# Patient Record
Sex: FEMALE | Race: WHITE | NOT HISPANIC OR LATINO | Employment: UNEMPLOYED | URBAN - METROPOLITAN AREA
[De-identification: names, ages, dates, MRNs, and addresses within clinical notes are randomized per-mention and may not be internally consistent; named-entity substitution may affect disease eponyms.]

---

## 2019-12-17 ENCOUNTER — TELEPHONE (OUTPATIENT)
Dept: NEUROLOGY | Facility: CLINIC | Age: 84
End: 2019-12-17

## 2019-12-17 NOTE — TELEPHONE ENCOUNTER
Best contact number for patient: [phone]    Emergency Contact name and number:    Referring provider: Alla Trejo     Referring provider Telephone:________________    Primary Care Provider Name: Alla Trejo     Reason for Appointment/Dx: Memory    Neurology Location patient would like to be seen: [facility] received? Yes                                                 Records Received? Have you ever seen another Neurologist?  No     Insurance Information    [place] [facility] [place] #:    Secondary Insurance:    ID/Policy#: Workman's Comp/ Accident/ School  Information      Workman's Comp/Accident/School related?      If yes name of Insurance company:    Date of Injury:    Open Claim:     Name and Telephone Number:    Notes: Np pack sent                    Appointment date: 4-13-20 3:00pm _____________________________

## 2020-04-01 ENCOUNTER — OFFICE VISIT (OUTPATIENT)
Dept: NEUROLOGY | Facility: CLINIC | Age: 85
End: 2020-04-01
Payer: COMMERCIAL

## 2020-04-01 VITALS — HEART RATE: 62 BPM | SYSTOLIC BLOOD PRESSURE: 145 MMHG | WEIGHT: 162.6 LBS | DIASTOLIC BLOOD PRESSURE: 75 MMHG

## 2020-04-01 DIAGNOSIS — G31.84 MILD COGNITIVE IMPAIRMENT: Primary | ICD-10-CM

## 2020-04-01 DIAGNOSIS — M81.8 AGE-RELATED OSTEOPOROSIS WITHOUT FRACTURE: ICD-10-CM

## 2020-04-01 DIAGNOSIS — G25.2 ACTION TREMOR: ICD-10-CM

## 2020-04-01 PROCEDURE — 99204 OFFICE O/P NEW MOD 45 MIN: CPT | Performed by: PSYCHIATRY & NEUROLOGY

## 2020-04-01 RX ORDER — DIGOXIN 125 MCG
1 TABLET ORAL DAILY
COMMUNITY
Start: 2020-02-20 | End: 2022-05-10 | Stop reason: ALTCHOICE

## 2020-04-01 RX ORDER — FUROSEMIDE 40 MG/1
1 TABLET ORAL DAILY
COMMUNITY
Start: 2020-02-20

## 2020-04-01 RX ORDER — SIMVASTATIN 20 MG
1 TABLET ORAL DAILY
COMMUNITY
Start: 2020-02-20

## 2020-04-01 RX ORDER — MULTIVIT-MIN/IRON/FOLIC ACID/K 18-600-40
500 CAPSULE ORAL DAILY
COMMUNITY

## 2020-04-01 RX ORDER — WARFARIN SODIUM 1 MG/1
1 TABLET ORAL DAILY
COMMUNITY
Start: 2020-02-20 | End: 2021-02-24 | Stop reason: ALTCHOICE

## 2020-08-02 ENCOUNTER — PATIENT MESSAGE (OUTPATIENT)
Dept: NEUROLOGY | Facility: CLINIC | Age: 85
End: 2020-08-02

## 2020-08-04 NOTE — TELEPHONE ENCOUNTER
Received call from pt's granddaughter (on consent) and reviewed message from Dr Bernardino Chand regarding pt's labs  Emailed communication consent to [employer] as Oz Hoffman would like to add more people for consent

## 2020-08-07 ENCOUNTER — TELEPHONE (OUTPATIENT)
Dept: NEUROLOGY | Facility: CLINIC | Age: 85
End: 2020-08-07

## 2020-08-07 NOTE — TELEPHONE ENCOUNTER
Dr Hector Clifton covering for Dr Key Byrne  The MRI result reviewed  The study demonstrates atrophy and small vessel change, all of which could be contributing to patient's problem  No acute changes noted  Will have to have Dr Key Byrne go into more detail on his return as he is familiar with the patient and I am not

## 2020-08-07 NOTE — TELEPHONE ENCOUNTER
Claudia calling in (granddaughter - on on communication sheet  She is asking if MRI results are in  I do see we received them today scanned into media  Please advise   Claudia recommended we call patient/          [phone] Toney Pod)

## 2020-08-10 NOTE — TELEPHONE ENCOUNTER
I suggest if she can have access as pt's proxy, then that would be ideal so that any updates reach Claudia instead via [employer]  Reviewed the MRI report from [facility] (HUBERT AGUSTIN) scanned in 8/7/20  Unfortunately, since the scan was at a different hospital system, the sequences I wanted could not be done and the images that were obtained are unable to be viewed by me for further comment  Thus, going by the report alone, it appears to support a diffuse atrophy of the brain, and no red flags of major stroke, tumor, bleed or infection as causes for the cognitive impairment  The moderate level of atrophy may be more than expected for her age and supportive of age-related cognitive decline, but no signs particularly suggestive for a neurodegenerative process  If they have any discs of those images, we would appreciate them to upload to our system   Thank you

## 2020-08-10 NOTE — TELEPHONE ENCOUNTER
Pt's grand daughter Colten Peer called regarding below  Reviewed below with her  She states that they don't check my chart very often so don't send a mychart message  She is asking that we call pt/    Dr louis-please review and advise

## 2020-08-18 NOTE — TELEPHONE ENCOUNTER
Hadley Barrett calling in (ok on communication sheet)  He states he is going to patient's house this weekend and will obtain the disk of imagine  He will bring to [facility] office next week to have it uploaded

## 2020-08-18 NOTE — TELEPHONE ENCOUNTER
My chart message was received from the patient asking for results  Copied below message to [facility] and sent to patient

## 2020-09-09 NOTE — TELEPHONE ENCOUNTER
Dr Michaela Cruz Patient    Patient's  states that he dropped the imaging CD off at the [facility] office to be copied  Asking for the MRI results after viewing the images  Please advise

## 2020-09-22 ENCOUNTER — TELEPHONE (OUTPATIENT)
Dept: NEUROLOGY | Facility: CLINIC | Age: 85
End: 2020-09-22

## 2020-09-22 NOTE — TELEPHONE ENCOUNTER
Pt's son Maral Stovall called and states that he dropped the imaging CD at the [facility] office 2 weeks ago  Noemi Campbell, were you able to view/review this?  Pls see enc 8/17/20              [phone] ok to leave detailed message

## 2020-09-22 NOTE — TELEPHONE ENCOUNTER
Left a detailed message for the son  Reviewed the imaging of the brain MRI  It shows evidence of atrophy and microvascular changes as already discussed  It was not done with NQ imaging so I am unable to speak more specifically about that  Certainly or management would not change based on these image results

## 2020-10-06 ENCOUNTER — TELEPHONE (OUTPATIENT)
Dept: NEUROLOGY | Facility: CLINIC | Age: 85
End: 2020-10-06

## 2021-02-11 DIAGNOSIS — Z23 ENCOUNTER FOR IMMUNIZATION: ICD-10-CM

## 2021-02-13 ENCOUNTER — IMMUNIZATIONS (OUTPATIENT)
Dept: FAMILY MEDICINE CLINIC | Facility: HOSPITAL | Age: 86
End: 2021-02-13

## 2021-02-13 DIAGNOSIS — Z23 ENCOUNTER FOR IMMUNIZATION: Primary | ICD-10-CM

## 2021-02-13 PROCEDURE — 0001A SARS-COV-2 / COVID-19 MRNA VACCINE (PFIZER-BIONTECH) 30 MCG: CPT

## 2021-02-13 PROCEDURE — 91300 SARS-COV-2 / COVID-19 MRNA VACCINE (PFIZER-BIONTECH) 30 MCG: CPT

## 2021-02-24 ENCOUNTER — TELEPHONE (OUTPATIENT)
Dept: NEUROLOGY | Facility: CLINIC | Age: 86
End: 2021-02-24

## 2021-02-24 ENCOUNTER — OFFICE VISIT (OUTPATIENT)
Dept: NEUROLOGY | Facility: CLINIC | Age: 86
End: 2021-02-24
Payer: COMMERCIAL

## 2021-02-24 VITALS
DIASTOLIC BLOOD PRESSURE: 90 MMHG | SYSTOLIC BLOOD PRESSURE: 130 MMHG | RESPIRATION RATE: 14 BRPM | WEIGHT: 161.9 LBS | HEIGHT: 66 IN | BODY MASS INDEX: 26.02 KG/M2

## 2021-02-24 DIAGNOSIS — G31.84 MILD COGNITIVE IMPAIRMENT: Primary | ICD-10-CM

## 2021-02-24 DIAGNOSIS — G25.2 ACTION TREMOR: ICD-10-CM

## 2021-02-24 PROCEDURE — 1160F RVW MEDS BY RX/DR IN RCRD: CPT | Performed by: PHYSICIAN ASSISTANT

## 2021-02-24 PROCEDURE — 1036F TOBACCO NON-USER: CPT | Performed by: PHYSICIAN ASSISTANT

## 2021-02-24 PROCEDURE — 99215 OFFICE O/P EST HI 40 MIN: CPT | Performed by: PHYSICIAN ASSISTANT

## 2021-02-24 RX ORDER — WARFARIN SODIUM 1 MG/1
TABLET ORAL
COMMUNITY
End: 2022-05-10 | Stop reason: ALTCHOICE

## 2021-02-24 RX ORDER — CHOLECALCIFEROL (VITAMIN D3) 1250 MCG
CAPSULE ORAL
COMMUNITY

## 2021-02-24 RX ORDER — ASCORBIC ACID 1000 MG
TABLET ORAL
COMMUNITY

## 2021-02-24 RX ORDER — ALPRAZOLAM 0.25 MG/1
TABLET ORAL
COMMUNITY
Start: 2021-01-28 | End: 2021-10-04 | Stop reason: HOSPADM

## 2021-02-24 RX ORDER — THIAMINE MONONITRATE (VIT B1) 100 MG
TABLET ORAL
COMMUNITY

## 2021-02-24 RX ORDER — CHLORAL HYDRATE 500 MG
CAPSULE ORAL
COMMUNITY

## 2021-02-24 NOTE — TELEPHONE ENCOUNTER
MSW received a return call from patient's son, Jethro Orellana  MSW inquired what services he is in search of for patient - to which he answered companionship  Jethro Reyna stated that patient's  passed away 3 weeks ago and that his sister has been staying with patient since until now  Jethro Reyna stated that he lives 2 doors away and will check in on patient  daily and will sleep there overnight, but that he and his wife still work so they are not able to be present at all times  Jethro Orellana stated that family purchased baby monitors and will observe patient remotely as able when they cannot be with her  Jethro Orellana stated that due to patient's memory issues that they do not want her to use the stove, so his sister recently arranged Meals on Wheels  Jethro Orellana stated that patient showers and dresses on her own  MSW discussed different ways of getting in home help - via a long term care insurance plan, via the [address] [place] [address] on Aging (would need to meet criteria based on her physical needs and finances, often times there is a waiting list for care), via private pay, and via applying for a waiver  Jethro Orellana stated that patient does have a long term care insurance plan  MSW advised that patient may be able to receive reimbursement for services needed to care for patient through that type of coverage  MSW explained that it would be best for patient's family to contact the insurance company to get details about patient's coverage - what services will be covered, if there is a waiting period, etc - as these details can vary from plan to plan  Jethro Orellana stated that patient previously had a cleaning person come every so often, and they have already made arrangements for that person to spend some additional companionship time there as well as they are a family friend and patent is familiar with them  MSW aso offered to email a list of private duty agencies to patient's son as well       MSW also discussed adult day programs with Jethro Orellana - that these are non-residential facilities that specialize in providing activities for older adults with cognitive/physical disabilities in a safe, supervised setting  These centers are typically open weekdays from 7AM to 5PM and provide snacks, lunch, medication administration, socialization, therapeutic activities, and respite services  Payment for these types of facilities can be through private pay/long term care insurance policies  Supplemental funding may be available through your local Area Agency on Aging, if financial criteria are met  MS will send Avani Beach a list of these agencies       Avani Beach requested that lists be emailed to him at [email or web address]

## 2021-02-24 NOTE — TELEPHONE ENCOUNTER
MSW received the following message from [place], [place]:    "Froilan, son would like to get some information regarding having an assessment with the [facility] to see if there are any resources available for his mother  Davon Meyer - [phone]"

## 2021-02-24 NOTE — Clinical Note
Froilan, son would like to get some information regarding having an assessment with the [facility] to see if there are any resources available for his mother    Bill basket - [phone]

## 2021-02-24 NOTE — TELEPHONE ENCOUNTER
MSW attempted to reach patient's son to discuss resources  No answer at [phone]  MSW left message requesting callback  Awaiting same

## 2021-02-24 NOTE — ASSESSMENT & PLAN NOTE
Patient continues to have some mild action tremors in the hands  No progression and they do not interfere with her daily functions at this time  She feels they are better when she tries to relax  No clear parkinsonian features on exam  Will continue to monitor for now

## 2021-02-24 NOTE — PROGRESS NOTES
Patient ID: Rylan Husain is a 82 y o  female  Assessment/Plan:    Mild cognitive impairment  Patient continues to have some cognitive deficits  She is no longer driving  She had one instance where she left her stove on and she is now getting meals from family and Meals On Wheels  She is still able to perform her ADLs independently  She can manage her medications however she does have some monitoring by her family  She had a MRI brain performed however this was not done with [facility] imaging  It did reveal moderate atrophy, no evidence of stroke  She scored a 21/30 on formal memory testing in the office today (28/30 at the last visit)  We discussed the difference between MCI and dementia  At this time she is having more difficulty with some of her daily functions and it is likely that she may be in early stages of dementia  She also recently lost her  which can certainly be playing a role  Regardless would like to obtain an MRI with NQ to get a better assessment of her atrophy  In the past her B12 and TSH were normal and she is now on vit D and B1 supplements  We discussed the medication options available for dementia  Also discussed the role of cognitive therapy  For now will start cognitive therapy  We will continue to monitor for any progression with time and will review the MRI results once completed  May consider a trial of Aricept depending on these results  Would also like to give her some time to grieve from the passing of her , it is likely that depression may be playing a bit of a role at this time  There is some question of assistance that could be available for the patient now that her  has passed and she is living alone  I sent a message to the  and will have her reach out to the son to see if we can get her set up for an [facility] with [facility]  She may also go stay with her daughter in [place] for a few months       Patient was encouraged to increase mind stimulating activities such as reading, crosswords, word searches, puzzles, Soduku, solitaire, coloring and other brain games  We also discussed the importance of staying physically active and eating a health diet such as the Mediterranean or MIND diet  Action tremor  Patient continues to have some mild action tremors in the hands  No progression and they do not interfere with her daily functions at this time  She feels they are better when she tries to relax  No clear parkinsonian features on exam  Will continue to monitor for now  Subjective:    Ms Zaida Schuler is an 82 y o  right handed female with A-fib on Coumadin and memory difficulties who presents for follow up  To review, at baseline the family describes her as very sharp-witted and independent  She tells me she notices that she repeats herself and sometimes unsure if she did so or not  She believes that this may be related to her "getting older"  Her family says "she is still with it some times" and corroborates of her forgetfulness since Spring 2019  During that time, she fell asleep and was mildly confused when she woke up but resolved to baseline  Ryanne Dejesus recounts pt was acting "different at dinner"  Long-term memory unaffected  Since then she has not had major confusional episodes, but occasionally when asked certain topics that she is expected to know she would sometimes "act confused" and sometimes act normally  Her  believes that she is mainly too bored at home  One time when they were driving home, she scraped the edge of the tire along the concrete traffic barrier briefly but corrected herself and no further incident afterwards  At her last visit she scored a 28/30 on memory testing  She was sent for an MRI brain given hand tremors as well  She was sent for labs, cognitive therapy and FTD evaluation  INTERVAL HISTORY:  Vit D and B1 were slightly low and she was told to start OTC supplements     MRI brain revealed diffuse atrophy of the brain, no evidence of stroke, tumor, bleed or infection  The moderate level of atrophy may be more than expected for her age and supportive of age-related cognitive decline, but no signs particularly suggestive for a neurodegenerative process  Imaging was not performed with NQ  Patient presents with her son  She continues to have issues with confusion  Her  recently passed and she has been having some issues with sleep  Her  passed 1/30/21  She is taking Xanax which is helpful  She tends to need things repeated and will often lose focus  She repeats herself  She is taking her supplements  She is no longer driving  She never went through cognitive therapy given health issues with her   She feels that she is overall doing well  She no longer feels into things since the passing of her   She resides on her own  She is able to perform her ADLs on her own  She had one recent instance where she forgot to turn off of stove  Her son brings food and she has Meals on Wheels  She is still managing her medications  She has a pill box and the family will supervise however she can take them on her own  No hallucinations  She feels that she has a good appetite  She does have some hand tremors at times  She feels they are less if she keeps herself calm  These do not interfere with her daily functions  Durable Power of : no  Living Will: yes  Family History: Number of First Degree Relatives With Parkinsonism/Dementia: none known    I personally reviewed and updated the ROS  Total time spent today was 60 minutes  Greater than 50% of total time was spent with the patient and / or family counseling and / or coordinating plan of care  Objective:    Blood pressure 130/90, resp  rate 14, height 5' 5 5" (1 664 m), weight 73 4 kg (161 lb 14 4 oz)  Physical Exam  Constitutional:       General: She is awake  HENT:      Right Ear: Hearing normal       Left Ear: Hearing normal    Eyes:      General: Lids are normal       Extraocular Movements: Extraocular movements intact  Pupils: Pupils are equal, round, and reactive to light  Neurological:      Mental Status: She is alert  Deep Tendon Reflexes: Strength normal    Psychiatric:         Speech: Speech normal          Neurological Exam  Mental Status  Awake and alert  Oriented to person, place and time  Unable to copy figure  Clock drawing is abnormal  Speech is normal  Able to name objects and write  Unable to perform serial calculations  Patient visibly upset at times, tearing up when speaking about her   MoCA 21/30 - 2/24/21    MoCA 28/30 - 4/1/20   Cranial Nerves  CN III, IV, VI: Extraocular movements intact bilaterally  Normal lids and orbits bilaterally  Pupils equal round and reactive to light bilaterally  CN V:  Right: Facial sensation is normal   Left: Facial sensation is normal on the left  CN VIII:  Right: Hearing is normal   Left: Hearing is normal   CN XI: Shoulder shrug strength is normal   CN XII: Tongue midline without atrophy or fasciculations  Motor   Strength is 5/5 throughout all four extremities  Sensory  Light touch is normal in upper and lower extremities  Coordination  Mild action tremors with FTN testing, slightly worse on the left  No resting tremors  No bradykinesia with finger taps or hand    No rigidity   Gait  Ambulating with cane  ROS:    Review of Systems   Constitutional: Negative  Negative for appetite change and fever  HENT: Negative  Negative for hearing loss, tinnitus, trouble swallowing and voice change  Eyes: Negative  Negative for photophobia and pain  Respiratory: Negative  Negative for shortness of breath  Cardiovascular: Negative  Negative for palpitations  Gastrointestinal: Negative  Negative for nausea and vomiting  Endocrine: Negative    Negative for cold intolerance  Genitourinary: Negative  Negative for dysuria, frequency and urgency  Musculoskeletal: Positive for gait problem  Negative for myalgias and neck pain  Skin: Negative  Negative for rash  Neurological: Negative for dizziness, tremors, seizures, syncope, facial asymmetry, speech difficulty, weakness, light-headedness, numbness and headaches  Hematological: Negative  Does not bruise/bleed easily  Psychiatric/Behavioral: Positive for sleep disturbance  Negative for confusion and hallucinations

## 2021-02-24 NOTE — PATIENT INSTRUCTIONS
Things that we know are helpful for thinking and memory   1  Exercise program: gradually increase your physical activity over time  Start small and be patient  Aerobic (cardio) activity is best but incorporate balance, strength and flexibility training as well    a  Try to get at least 30min 3 times per week   2  Diet: Mediterranean diet (colorful fruits and vegetables, olive oil, fish, whole grains, very little red meet is any), MIND diet, anti-inflammatory diet  a  Stay well hydrated: drink 6-8 glasses of water per day   b  What's good for your heart is good for your brain  c  Avoid high-salt foods   3  Sleep: aim for at least 7-8 hours per night   a  Avoid alcohol and medications like Benadryl (Tylenol PM) or other sedating drugs  4  Stress management/mindfulness practice:   a  Talk with our social workers about finding a cognitive behavioral therapists  b  Try a smart phone magdi like [employer] or [employer] for beginners   i  Try curable for pain    c  Take a course in Mindfulness Based Stress Reduction (MBSR)   i  Sydney spear  Read or listen to an audiobook about it:  i  Mindfulness for beginners   ii  10% happier  iii  The happiness advantage   5  Social engagement:   a  Stay in touch with family and friends   b  Plan a few specific activities for your social health every week   c  [place] rivera local support group   d  Volunteer! www [email or web address] or call [phone]     MIND diet score:  1 point for each component      · Green leafy vegetables: at least 6 per week  · Other vegetable: at least 1 per day   · Berries: at least 2 per week  · Red meat: fewer than 4 per week   · Fish: at least 1 per week   · Poultry: at least 2 per week  · Beans: at least 3 per week  · Nuts: at least 5 per week  · Fast or fried food: less than 1 per week  · Olive oil   · Butter less: less than 1 table-spoon per day   · Cheese: less than 1 serving per week   · Pastries/sweets: less than 5 servings per week  · Alcohol: no more than 1 serving/ day

## 2021-02-24 NOTE — ASSESSMENT & PLAN NOTE
Patient continues to have some cognitive deficits  She is no longer driving  She had one instance where she left her stove on and she is now getting meals from family and Meals On Wheels  She is still able to perform her ADLs independently  She can manage her medications however she does have some monitoring by her family  She had a MRI brain performed however this was not done with  imaging  It did reveal moderate atrophy, no evidence of stroke  She scored a 21/30 on formal memory testing in the office today (28/30 at the last visit)  We discussed the difference between MCI and dementia  At this time she is having more difficulty with some of her daily functions and it is likely that she may be in early stages of dementia  She also recently lost her  which can certainly be playing a role  Regardless would like to obtain an MRI with  to get a better assessment of her atrophy  In the past her B12 and TSH were normal and she is now on vit D and B1 supplements  We discussed the medication options available for dementia  Also discussed the role of cognitive therapy  For now will start cognitive therapy  We will continue to monitor for any progression with time and will review the MRI results once completed  May consider a trial of Aricept depending on these results  Would also like to give her some time to grieve from the passing of her , it is likely that depression may be playing a bit of a role at this time  There is some question of assistance that could be available for the patient now that her  has passed and she is living alone  I sent a message to the  and will have her reach out to the son to see if we can get her set up for an [facility] with [facility]  She may also go stay with her daughter in [place] for a few months       Patient was encouraged to increase mind stimulating activities such as reading, crosswords, word searches, puzzles, Soduku, solitaire, coloring and other brain games  We also discussed the importance of staying physically active and eating a health diet such as the Mediterranean or MIND diet

## 2021-02-25 NOTE — TELEPHONE ENCOUNTER
MSW emailed patient's son Avani Beach a list of the private duty agencies in the [facility] as well as information/a list of adult day programs in the [facility]  MSW asked son to contact this writer with an update after he gets patient's long term care insurance policy benefits to see if that will be able to help cover the cost of her care  If MSW does not hear from patient's son in about 1 week, MSW will contact him to follow-up

## 2021-03-05 NOTE — TELEPHONE ENCOUNTER
MSW attempted to reach patient's son this date at [phone] to follow-up regarding potential coverage from patient's long term care insurance plan  No answer  MSW left a message requesting callback  Awaiting same

## 2021-03-06 ENCOUNTER — IMMUNIZATIONS (OUTPATIENT)
Dept: FAMILY MEDICINE CLINIC | Facility: HOSPITAL | Age: 86
End: 2021-03-06

## 2021-03-06 DIAGNOSIS — Z23 ENCOUNTER FOR IMMUNIZATION: Primary | ICD-10-CM

## 2021-03-06 PROCEDURE — 91300 SARS-COV-2 / COVID-19 MRNA VACCINE (PFIZER-BIONTECH) 30 MCG: CPT

## 2021-03-06 PROCEDURE — 0002A SARS-COV-2 / COVID-19 MRNA VACCINE (PFIZER-BIONTECH) 30 MCG: CPT

## 2021-03-08 NOTE — TELEPHONE ENCOUNTER
MSW attempted to reach patient's son this date at [phone] to follow-up regarding community resources and potential coverage from patient's long term care insurance plan  No answer  MSW left a message requesting callback  Awaiting same

## 2021-03-08 NOTE — TELEPHONE ENCOUNTER
MSW received a callback from patient's son, Brian Estrella stated that he did look into patient's long term care insurance policy and that it only covers placement  Patient's son stated that he feels patient makes too much to qualify for assistance from Aging  MSW advised that Aging does not disclose their income guidelines, so there would not be a way for this writer to determine if patient would/would not meet financial criteria for services  MSW advised that a representative from the [address] [place] [address] on Aging would need to make the determination regarding eligibility  Kolby verbalized understanding  Brian Estrella stated that the arrangements they have made for patient at this time are "pretty sufficient" - he explained that they have a friend of the family who is currently stopping in regularly to clean for patient and visit with her  Brian Estrella did confirm that he had received the resource information (list of private duty agencies and information on adult day programs) and will keep this information should patient require additional services in the future  MSW encouraged patient's son to keep this writer's contact information should they require further assistance  MSW will be available to patient/family as needed

## 2021-03-10 ENCOUNTER — HOSPITAL ENCOUNTER (OUTPATIENT)
Dept: MRI IMAGING | Facility: HOSPITAL | Age: 86
Discharge: HOME | End: 2021-03-10
Payer: COMMERCIAL

## 2021-03-10 DIAGNOSIS — G31.84 MILD COGNITIVE IMPAIRMENT: ICD-10-CM

## 2021-03-10 PROCEDURE — G1004 CDSM NDSC: HCPCS

## 2021-03-10 PROCEDURE — 70551 MRI BRAIN STEM W/O DYE: CPT

## 2021-03-24 ENCOUNTER — TELEPHONE (OUTPATIENT)
Dept: NEUROLOGY | Facility: CLINIC | Age: 86
End: 2021-03-24

## 2021-03-24 NOTE — TELEPHONE ENCOUNTER
Pt's son Brandon Paz called for brain MRI results  This was completed on 3/10/21        Thanks              [phone] ok to leave detailed message

## 2021-03-25 NOTE — TELEPHONE ENCOUNTER
Left a detailed message on the son's phone regarding the MRI results  This did show atrophy of the hippocampus concerning for an underlying AD  I agree that having her depression better managed at this time would be a good start however may also consider a trial of a medication for her memory in the future such as Aricept

## 2021-03-29 NOTE — TELEPHONE ENCOUNTER
Son Jin Mello calling in  [facility] he would like to know the next steps are for right now  Next appt 8/26 with Freddyandi Davis, wondering if she can start something for depression before then as she has been more depressed lately  He also states she is traveling for 3 months at the end of April  States she will be in [place] with his sister and will be doing [address] while in [place]  He is asking if this is a bad idea or if you advise against this?

## 2021-03-31 NOTE — TELEPHONE ENCOUNTER
Called and advised pt's son Jethro Orellana of all of the below  He verbalized clear understanding of all instructions

## 2021-03-31 NOTE — TELEPHONE ENCOUNTER
IT would be best for her PCP to discuss with them starting a medication for her depression  I think this would be a good idea at this time  I would be fine with her going to [place], just know that a change from her normal routine may bring on some increased confusion  If things worsen despite her depression being better managed prior to her next visit they can call and we could start a trial of Aricept at that time  Thanks!

## 2021-04-14 ENCOUNTER — OFFICE VISIT (OUTPATIENT)
Dept: PODIATRY | Facility: CLINIC | Age: 86
End: 2021-04-14
Payer: COMMERCIAL

## 2021-04-14 VITALS
SYSTOLIC BLOOD PRESSURE: 126 MMHG | DIASTOLIC BLOOD PRESSURE: 84 MMHG | BODY MASS INDEX: 24.75 KG/M2 | WEIGHT: 154 LBS | HEIGHT: 66 IN

## 2021-04-14 DIAGNOSIS — B35.1 ONYCHOMYCOSIS: Primary | ICD-10-CM

## 2021-04-14 DIAGNOSIS — I73.9 PERIPHERAL VASCULAR DISEASE, UNSPECIFIED (HCC): ICD-10-CM

## 2021-04-14 PROCEDURE — 99202 OFFICE O/P NEW SF 15 MIN: CPT | Performed by: PODIATRIST

## 2021-04-14 PROCEDURE — 1036F TOBACCO NON-USER: CPT | Performed by: PODIATRIST

## 2021-04-14 PROCEDURE — 11721 DEBRIDE NAIL 6 OR MORE: CPT | Performed by: PODIATRIST

## 2021-04-14 PROCEDURE — 1160F RVW MEDS BY RX/DR IN RCRD: CPT | Performed by: PODIATRIST

## 2021-04-14 NOTE — PROGRESS NOTES
Assessment/Plan:    Treatment consisted of debridement of multiple mycotic toenails reducing nails in thickness and removing devitalized tissue and subungual debris  Electrical and manual debridement performed  No problem-specific Assessment & Plan notes found for this encounter  Diagnoses and all orders for this visit:    Onychomycosis    Peripheral vascular disease, unspecified ([facility] )          Subjective:      Patient ID: Guillermo Clifton is a 82 y o  female  HPI     Patient, an 61-year-old female presents with extremely thickened elongated toenails that she cannot cut  It appears as if the toenails have not been addressed in years  Patient has a severe flatfoot disorder but it is asymptomatic  The following portions of the patient's history were reviewed and updated as appropriate: allergies, current medications, past family history, past medical history, past social history, past surgical history and problem list     Review of Systems   Respiratory: Negative  Cardiovascular: Negative  Gastrointestinal: Negative  Musculoskeletal: Positive for gait problem  Neurological: Negative for numbness  Objective:      /84   Ht 5' 5 5" (1 664 m)   Wt 69 9 kg (154 lb)   BMI 25 24 kg/m²          Physical Exam  Constitutional:       Appearance: Normal appearance  Cardiovascular:      Comments: No palpable pedal pulses bilateral   Marked varicosities bilateral  Musculoskeletal:         General: Deformity present  Comments: Severe flatfoot disorder bilateral   Brought instep bilateral suggesting osteoarthritis  Skin:     Comments: Each toenail is thickened yellow with subungual debris  Multiple nails are very long  The great toenails are extremely thick compared to the other nails  Neurological:      General: No focal deficit present  Mental Status: She is oriented to person, place, and time

## 2021-05-05 ENCOUNTER — TELEPHONE (OUTPATIENT)
Dept: NEUROLOGY | Facility: CLINIC | Age: 86
End: 2021-05-05

## 2021-05-05 NOTE — TELEPHONE ENCOUNTER
Granddaughter Aram Smith called in to ask about therapy referrals so that she can assist with patient scheduling  Informed her that she is not on the communication consent form and I cannot divulge this information  I directed her to contact patient/POA regarding adding her to the communication consent via [employer] or I can send a blank form to the home  She asked for the blank consent form to be emailed to [email or web address]  Sent via secure fax to her email of a blank communication consent form

## 2021-05-13 ENCOUNTER — EVALUATION (OUTPATIENT)
Dept: SPEECH THERAPY | Facility: REHABILITATION | Age: 86
End: 2021-05-13
Payer: COMMERCIAL

## 2021-05-13 DIAGNOSIS — G31.84 MILD COGNITIVE IMPAIRMENT: ICD-10-CM

## 2021-05-13 DIAGNOSIS — R48.8 OTHER SYMBOLIC DYSFUNCTIONS: Primary | ICD-10-CM

## 2021-05-13 PROCEDURE — 96125 COGNITIVE TEST BY HC PRO: CPT

## 2021-05-13 NOTE — PROGRESS NOTES
Speech-Language Pathology Initial Evaluation    Today's date: 2021  Patients name: Rodrick Oconnor  : 1935  MRN: [id number]  Safety measures: allergy Morphine, MCI  Referring provider: Verlee Lesches, PA-C    Encounter Diagnosis     ICD-10-CM    1  Other symbolic dysfunctions  F38 6    2  Mild cognitive impairment  G31 84      Visit tracking:  -Referring provider: Epic  -Billing guidelines: CMS  -Visit #1  -Insurance: [employer] [address] [place]) Prieto Tripathi required  -RE due 2021     Subjective comments: Patient presents with memory issues for approximately 3 years as per family member however notes recent increasing changes    How did the patient hear about us? Physician    Patient's goal(s): to improve memory    Reason for referral: Change in cognitive status  Prior functional status: Communication effective and appropriate in all situations  Clinically complex situations: N/A    History: Patient is a 82 y o  female who was referred to outpatient skilled Speech Therapy services for a cognitive-linguistic evaluation  Pt with medical history significant for Mild cognitive impairment resulting in other symbolic dysfunctions at this time  Pt was previously able to recall important information/instructions/events and functional information within envirionment however now presents with increased recall difficulties impacting functionality within envirionment  Pt requires skilled SLP interventions to reduce risk of further decline in function,, reduced participation in conversations within environment,, reduced ability to function at prior level of independence, and additional associated complications       Hearing:   DNT due to time limitations this date  Vision: DNT due to time limitations this date    Home environment/lifestyle: patient lives independently (family provides support for higher level home management tasks as able)  Highest level of education: DNT due to time limitations this date  Vocational status: DNT due to time limitations this date    Mental status: Alert  Behavior status: Cooperative  Communication modalities: Verbal  Rehabilitation prognosis: Fair rehab potential to reach the established goals    Assessments    The Repeatable Battery for the Assessment of Neuropsychological Status (RBANS) is a brief, individually-administered assessment which measures attention, language, visuospatial/constructional abilities, and immediate & delayed memory  The RBANS is intended for use with adolescents to adults, ages 15 to 80 years  The following results were obtained during the administration of the assessment  Form: A    Cognitive Domain/Subtest: Index Score: Percentile Rank: Classification:   IMMEDIATE MEMORY 73 4%ile Borderline        1  List Learning (17/40)        2  Story Memory (8/24)       VISUOSPATIAL/  CONSTRUCTIONAL 102 55%ile Average        3  Figure Copy (18/20)        4  Line Orientation (16/20)       LANGUAGE 92 30%ile Average        5  Picture Naming (10/10)        6  Semantic Fluency (13/40)       ATTENTION 85 16%ile Low Average        7  Digit Span (9/16)        8  Coding (22/89)       DELAYED MEMORY 40 <0 1%ile Extremely Low        9  List Recall (0/10)        10  List Recognition (11/20)        11  Story Recall (0/12)        12  Figure Recall (0/20)         Sum of Index Scores:  392   Total Score:  73   Percentile: 4%ile   Classification: Borderline         Goals    Short-term Goals:     1  Patient will facilitate planning by completing thought organization tasks (e g , sequencing, deduction puzzles, etc ) with 70% accuracy to facilitate increased executive functioning, working memory, problem solving, and processing skills, to be achieved in 4-6 weeks      2  Patient will complete auditory immediate and short term memory tasks to 80% accuracy with use of compensatory strategies to facilitate increased ability to retell narratives and recall information within functional living environment, to be achieved in 4-6 weeks  3  Patient will demonstrate divided attention by responding to multiple tasks or details within tasks at the same time with cues as needed in a distracting environment with 80% accuracy, to be achieved in 4-6 weeks  4  Patient will complete concrete and abstract categorization tasks to 80% accuracy provided cues as needed to facilitate improved generative naming skills and working memory, to be achieved in 4-6 weeks  5  Patient will be educated on the use of internal and external memory aids and compensatory strategies with 80% accuracy to facilitate increased recall of routine, personal information, and recent events, to be achieved in 4-6 weeks  Long-term Goals:    1  Patient will demonstrate cognitive-communication skills consistent with age and education given use of compensatory strategies when needed to resume baseline activities and responsibilities in home, community, and work/school settings by discharge  2  Patient will improve functional cognitive-linguistic skills with the implementation of cues and external aids, by discharge  Impressions/Recommendations    Impressions:     Patient presents with mild to moderate cognitive linguistic difficulties at this time, characterized by reduced immediate recall, delayed recall, short term memory recall, generative naming accuracy impacting functionality within envirionment at this time  Pt was administered RBANs examination A this date  Pt scored a rating of borderline in the area of immediate memory recall, average in the area of visuospatial/constructional, average for language function, low average for attention to task, and rating extremely low in the area of delayed memory recall  Pt received a score of borderline in the overall summation of index scores   Patient would benefit from skilled SLP interventions at this time in order to facilitate improved recall of information, word finding accuracy/naming accuracy and attention to task and to train in strategies in order to facilitate improved recall of information, improved participation in ADLs allowing for improved functionality within environment         Recommendations:  -Patient would benefit from outpatient skilled Speech Therapy services: Speech/ language therapy and Cognitive-Linguistic therapy    -Frequency: 1-2x weekly  -Duration: 6-8 weeks    -Intervention certification from: 0/48/7755  -Intervention certification to: 6/4/7791     -Intervention comments:   1 hr assessment, 1 hr scoring/interpreting/preparing results

## 2021-05-20 ENCOUNTER — OFFICE VISIT (OUTPATIENT)
Dept: SPEECH THERAPY | Facility: REHABILITATION | Age: 86
End: 2021-05-20
Payer: COMMERCIAL

## 2021-05-20 DIAGNOSIS — G31.84 MILD COGNITIVE IMPAIRMENT: ICD-10-CM

## 2021-05-20 DIAGNOSIS — R48.8 OTHER SYMBOLIC DYSFUNCTIONS: Primary | ICD-10-CM

## 2021-05-20 PROCEDURE — 92507 TX SP LANG VOICE COMM INDIV: CPT

## 2021-05-20 NOTE — PROGRESS NOTES
Daily Speech Treatment Note    Today's date: 2021  Patients name: Christina Salas  : 1935  MRN: [id number]  Safety measures: allergy Morphine, MCI  Referring provider: Jorge Marc PA-C    Encounter Diagnosis     ICD-10-CM    1  Other symbolic dysfunctions  F72 1    2  Mild cognitive impairment  G31 84          Visit tracking:  -Referring provider: Epic  -Billing guidelines: CMS  -Visit #2  -Insurance: [employer] [address]) auth required  -RE due 2021     Subjective/Behavioral:  The Patient arrived late to the session  The patient reported that she is doing well and was engaged and responsive throughout the session  Objective/Assessment:  -Patient's family member/caregiver was present during today's session   -Reviewed testing results and goals in plan care with patient  Patient is in agreement at this time  Short-term goals:    1  Patient will facilitate planning by completing thought organization tasks (e g , sequencing, deduction puzzles, etc ) with 70% accuracy to facilitate increased executive functioning, working memory, problem solving, and processing skills, to be achieved in 4-6 weeks  2  Patient will complete auditory immediate and short term memory tasks to 80% accuracy with use of compensatory strategies to facilitate increased ability to retell narratives and recall information within functional living environment, to be achieved in 4-6 weeks  The patient completed immediate and delayed auditory memory tasks in order to target increased ability to recall information  The patient was presented with 4 related words orally and immediately recalled them with 83% accuracy across 3 trials  After 2 minutes, the patient recalled 3/4 items independently, increasing to 4/4 items when provided with mod verbal and visual cues from the clinician  After an additional 4 minutes, the patient required maximum clinician prompting to recall 4/4 words      3  Patient will demonstrate divided attention by responding to multiple tasks or details within tasks at the same time with cues as needed in a distracting environment with 80% accuracy, to be achieved in 4-6 weeks  4  Patient will complete concrete and abstract categorization tasks to 80% accuracy provided cues as needed to facilitate improved generative naming skills and working memory, to be achieved in 4-6 weeks  The patient completed a concrete naming task during the session to improve working memory skills with 95% accuracy independently  When provided with min clinician prompting, accuracy increased to 100%  5  Patient will be educated on the use of internal and external memory aids and compensatory strategies with 80% accuracy to facilitate increased recall of routine, personal information, and recent events, to be achieved in 4-6 weeks  The patient and accompanying family member were provided with a list of memory strategies to implement in the home in order to facilitate recall of information for functional daily living  The patient and the family member verbalized understanding  Plan:  -Patient was provided with home exercises/activities to target goals in plan of care at the end of today's session   -Discussed session and patient's progress with caregiver/family member after today's session   -Continue with current plan of care  Patient was treated by Danette Garcia, graduate SLP student, and was under the direct supervision of NICOLA Tidwell , [id number] [place] [address]

## 2021-05-27 ENCOUNTER — OFFICE VISIT (OUTPATIENT)
Dept: SPEECH THERAPY | Facility: REHABILITATION | Age: 86
End: 2021-05-27
Payer: COMMERCIAL

## 2021-05-27 DIAGNOSIS — G31.84 MILD COGNITIVE IMPAIRMENT: ICD-10-CM

## 2021-05-27 DIAGNOSIS — R48.8 OTHER SYMBOLIC DYSFUNCTIONS: Primary | ICD-10-CM

## 2021-05-27 PROCEDURE — 92507 TX SP LANG VOICE COMM INDIV: CPT

## 2021-05-27 NOTE — PROGRESS NOTES
Daily Speech Treatment Note    Today's date: 2021  Patients name: Guillermo Zaldivar  : 1935  MRN: [id number]  Safety measures: allergy Morphine, MCI  Referring provider: Rinku Mosqueda PA-C    Encounter Diagnosis     ICD-10-CM    1  Other symbolic dysfunctions  G39 5    2  Mild cognitive impairment  G31 84          Visit tracking:  -Referring provider: Epic  -Billing guidelines: CMS  -Visit #3  -Insurance: [employer] [address] [place]) auth required  -RE due 2021     Subjective/Behavioral:  The Pt arrived a few minutes late to the session in the clinic  The Pt reports she is doing pretty good  The Pt states she is reminiscing on old events and she is still having some difficulty with current events and short-term memory  Objective/Assessment:  -Patient's family member/caregiver was present during today's session  Short-term goals:    1  Patient will facilitate planning by completing thought organization tasks (e g , sequencing, deduction puzzles, etc ) with 70% accuracy to facilitate increased executive functioning, working memory, problem solving, and processing skills, to be achieved in 4-6 weeks  Pt participated in a sequencing activity in order to facilitate increased working memory and processing skills  When presented with pictures of a 4-step activity, Pt required mod-max cues to accurately organize two sequences  2  Patient will complete auditory immediate and short term memory tasks to 80% accuracy with use of compensatory strategies to facilitate increased ability to retell narratives and recall information within functional living environment, to be achieved in 4-6 weeks  The patient completed immediate and delayed auditory memory tasks in order to target increased ability to recall information  The patient was presented with 4 related words orally and trained in an association strategy  Pt immediately recalled them with 100% accuracy across 3 successive trials   After 8 minutes of unrelated cognitive activity, the patient recalled 1/4 items independently, increasing to 4/4 items when provided with mod verbal and visual cues from the clinician  After an additional minute, the Pt independently recalled 4/4 items  After an additional 5 minutes, the Pt independently recalled 3/4 words independently, increasing to 4/4 items when provided with mod verbal/visual cues from the clinician  3  Patient will demonstrate divided attention by responding to multiple tasks or details within tasks at the same time with cues as needed in a distracting environment with 80% accuracy, to be achieved in 4-6 weeks  Pt participated in divided attention tasks in the therapy office with conversation in order to facilitate improved divided attention skills across a variety of functional settings (at home, in the community, etc)  Pt achieved 75% accuracy independently, improving to 95% accuracy when provided with mod cues  4  Patient will complete concrete and abstract categorization tasks to 80% accuracy provided cues as needed to facilitate improved generative naming skills and working memory, to be achieved in 4-6 weeks  5  Patient will be educated on the use of internal and external memory aids and compensatory strategies with 80% accuracy to facilitate increased recall of routine, personal information, and recent events, to be achieved in 4-6 weeks  Plan:  -Patient was provided with home exercises/activities to target goals in plan of care at the end of today's session   -Discussed session and patient's progress with caregiver/family member after today's session   -Continue with current plan of care  Patient was treated by Salvador Hernandez, graduate SLP student, and was under the direct supervision of NICOLA Ramos , [address] [place] [address]

## 2021-06-02 ENCOUNTER — OFFICE VISIT (OUTPATIENT)
Dept: SPEECH THERAPY | Facility: REHABILITATION | Age: 86
End: 2021-06-02
Payer: COMMERCIAL

## 2021-06-02 DIAGNOSIS — R48.8 OTHER SYMBOLIC DYSFUNCTIONS: Primary | ICD-10-CM

## 2021-06-02 DIAGNOSIS — G31.84 MILD COGNITIVE IMPAIRMENT: ICD-10-CM

## 2021-06-02 PROCEDURE — 92507 TX SP LANG VOICE COMM INDIV: CPT

## 2021-06-09 ENCOUNTER — OFFICE VISIT (OUTPATIENT)
Dept: SPEECH THERAPY | Facility: REHABILITATION | Age: 86
End: 2021-06-09
Payer: COMMERCIAL

## 2021-06-09 DIAGNOSIS — G31.84 MILD COGNITIVE IMPAIRMENT: ICD-10-CM

## 2021-06-09 DIAGNOSIS — R48.8 OTHER SYMBOLIC DYSFUNCTIONS: Primary | ICD-10-CM

## 2021-06-09 PROCEDURE — 92507 TX SP LANG VOICE COMM INDIV: CPT

## 2021-06-09 NOTE — PROGRESS NOTES
Daily Speech Treatment Note    Today's date: 2021  Patients name: Alison Blake  : 1935  MRN: [id number]  Safety measures: allergy Morphine, MCI  Referring provider: Pearl Butler PA-C    Encounter Diagnosis     ICD-10-CM    1  Other symbolic dysfunctions  Q71 6    2  Mild cognitive impairment  G31 84          Visit tracking:  -Referring provider: Epic  -Billing guidelines: CMS  -Visit #5  -Insurance: [employer] [address]) auth required  -RE due 2021     Subjective/Behavioral:  The Pt arrived several minutes late to the clinic for the therapy session  Pt was alert and engaged throughout the session  Pt reports she is doing alright and that she spent a good portion of last night reminiscing about her past     Objective/Assessment:  -Patient's family member/caregiver was present during today's session  Short-term goals:    1  Patient will facilitate planning by completing thought organization tasks (e g , sequencing, deduction puzzles, etc ) with 70% accuracy to facilitate increased executive functioning, working memory, problem solving, and processing skills, to be achieved in 4-6 weeks  2  Patient will complete auditory immediate and short term memory tasks to 80% accuracy with use of compensatory strategies to facilitate increased ability to retell narratives and recall information within functional living environment, to be achieved in 4-6 weeks  The Pt accurately answered questions regarding personal past events from the current day in order to facilitate increased ability to recall information with 100% accuracy during the session  Pt participated in immediate and delayed memory recall activities presented orally in order to facilitate improved recall of information across settings (e g , at home, in the community, at work, etc)   After listening to information of 3 sentences in length presented orally, Pt immediately answered questions with 20% accuracy independently, increasing to 80% accuracy when provided with min-mod cues  After a 1 minute delay, Pt answered questions with 75% accuracy independently, increasing to 100% accuracy when provided with min verbal cues  After an additional 6 minute delay, Pt answered questions with 80% accuracy independently, increasing to 100% when provided with min verbal cues  At the end of the session, the Pt answered questions with 100% accuracy independently  3  Patient will demonstrate divided attention by responding to multiple tasks or details within tasks at the same time with cues as needed in a distracting environment with 80% accuracy, to be achieved in 4-6 weeks  Pt participated in divided attention tasks in a quiet room with conversation with 83% accuracy independently, increasing to 100% accuracy when provided with min cues  4  Patient will complete concrete and abstract categorization tasks to 80% accuracy provided cues as needed to facilitate improved generative naming skills and working memory, to be achieved in 4-6 weeks  Pt participated in abstract categorization tasks in order to facilitate improved generative naming across settings (at home, in the community, etc)  Pt completed tasks with 26% accuracy independently, increasing to 93% accuracy when provided with mod-max cues  5  Patient will be educated on the use of internal and external memory aids and compensatory strategies with 80% accuracy to facilitate increased recall of routine, personal information, and recent events, to be achieved in 4-6 weeks  Plan:  -Patient was provided with home exercises/activities to target goals in plan of care at the end of today's session   -Discussed session and patient's progress with caregiver/family member after today's session   -Continue with current plan of care  Patient was treated by Clyde Giles, graduate SLP student, and was under the direct supervision of NICOLA Craven , [address] [place] [address]

## 2021-06-17 ENCOUNTER — OFFICE VISIT (OUTPATIENT)
Dept: SPEECH THERAPY | Facility: REHABILITATION | Age: 86
End: 2021-06-17
Payer: COMMERCIAL

## 2021-06-17 DIAGNOSIS — G31.84 MILD COGNITIVE IMPAIRMENT: ICD-10-CM

## 2021-06-17 DIAGNOSIS — R48.8 OTHER SYMBOLIC DYSFUNCTIONS: Primary | ICD-10-CM

## 2021-06-17 PROCEDURE — 92507 TX SP LANG VOICE COMM INDIV: CPT

## 2021-06-17 NOTE — PROGRESS NOTES
Daily Speech Treatment Note    Today's date: 2021  Patients name: Ruperto Mahajan  : 1935  MRN: [id number]  Safety measures: allergy Morphine, MCI  Referring provider: Alicia Kimble PA-C    Encounter Diagnosis     ICD-10-CM    1  Other symbolic dysfunctions  V63 1    2  Mild cognitive impairment  G31 84          Visit tracking:  -Referring provider: Epic  -Billing guidelines: CMS  -Visit #6  -Insurance: [employer] [place] [address] [place] required  -RE due 2021 (scheduled )    Subjective/Behavioral:     - Pt reports she is "back to when she was a little girl" and began reminiscing about her childhood  The Pt's family member reports that the Pt had a challenging week in her scheduling  Objective/Assessment:  -Patient's family member/caregiver was present during today's session  Short-term goals:    1  Patient will facilitate planning by completing thought organization tasks (e g , sequencing, deduction puzzles, etc ) with 70% accuracy to facilitate increased executive functioning, working memory, problem solving, and processing skills, to be achieved in 4-6 weeks  2  Patient will complete auditory immediate and short term memory tasks to 80% accuracy with use of compensatory strategies to facilitate increased ability to retell narratives and recall information within functional living environment, to be achieved in 4-6 weeks  The Pt accurately relayed information regarding personal past events from the current day in order to facilitate increased ability to recall information with 100% accuracy during the session  Pt participated in immediate and delayed memory recall activities presented orally in order to facilitate improved recall of information across settings (e g , at home, in the community, at work, etc)   After presented with a paragraph of 3-4 sentences in length, Pt immediately recalled information with 75% accuracy independently across three trials, increasing to 95% accuracy when provided with mod cues  After a 8 min delay, Pt independently recalled information with 71% accuracy independently, increasing to 100% accuracy when provided with mod cues  3  Patient will demonstrate divided attention by responding to multiple tasks or details within tasks at the same time with cues as needed in a distracting environment with 80% accuracy, to be achieved in 4-6 weeks  4  Patient will complete concrete and abstract categorization tasks to 80% accuracy provided cues as needed to facilitate improved generative naming skills and working memory, to be achieved in 4-6 weeks  Pt participated in abstract categorization tasks in order to facilitate improved generative naming across settings (at home, in the community, etc)  Pt completed tasks with 100% accuracy when provided with mod cues  5  Patient will be educated on the use of internal and external memory aids and compensatory strategies with 80% accuracy to facilitate increased recall of routine, personal information, and recent events, to be achieved in 4-6 weeks  Plan:  -Patient was provided with home exercises/activities to target goals in plan of care at the end of today's session   -Discussed session and patient's progress with caregiver/family member after today's session   -Continue with current plan of care  Patient was treated by Vik Samaniego, graduate SLP student, and was under the direct supervision of NICOLA Gill , [address] [place] [address]

## 2021-06-21 ENCOUNTER — OFFICE VISIT (OUTPATIENT)
Dept: SPEECH THERAPY | Facility: REHABILITATION | Age: 86
End: 2021-06-21
Payer: COMMERCIAL

## 2021-06-21 DIAGNOSIS — G31.84 MILD COGNITIVE IMPAIRMENT: ICD-10-CM

## 2021-06-21 DIAGNOSIS — R48.8 OTHER SYMBOLIC DYSFUNCTIONS: Primary | ICD-10-CM

## 2021-06-21 PROCEDURE — 92507 TX SP LANG VOICE COMM INDIV: CPT

## 2021-06-21 NOTE — PROGRESS NOTES
Daily Speech Treatment Note    Today's date: 2021  Patients name: Keyon Estrella  : 1935  MRN: [id number]  Safety measures: allergy Morphine, MCI  Referring provider: Austin Butterfield PA-C    Encounter Diagnosis     ICD-10-CM    1  Other symbolic dysfunctions  V36 2    2  Mild cognitive impairment  G31 84        Visit tracking:  -Referring provider: Epic  -Billing guidelines: CMS  -Visit #7  -Insurance: [employer] [address] [place] [facility]el required  -RE due 2021 (scheduled )    Subjective/Behavioral:     - Pt reports she is doing okay, but that she had a difficult week with grief related to the passing of her   Family member reports Pt remembering appointments and utilizing strategies independently  Objective/Assessment:  -Patient's family member/caregiver was present during today's session  Short-term goals:    1  Patient will facilitate planning by completing thought organization tasks (e g , sequencing, deduction puzzles, etc ) with 70% accuracy to facilitate increased executive functioning, working memory, problem solving, and processing skills, to be achieved in 4-6 weeks  2  Patient will complete auditory immediate and short term memory tasks to 80% accuracy with use of compensatory strategies to facilitate increased ability to retell narratives and recall information within functional living environment, to be achieved in 4-6 weeks  Pt completed orally-presented directions in order to facilitate increased ability to recall information  Pt achieved 40% accuracy independent, increasing to 100% accuracy when provided with max verbal and visual prompts from the clinician  3  Patient will demonstrate divided attention by responding to multiple tasks or details within tasks at the same time with cues as needed in a distracting environment with 80% accuracy, to be achieved in 4-6 weeks        4  Patient will complete concrete and abstract categorization tasks to 80% accuracy provided cues as needed to facilitate improved generative naming skills and working memory, to be achieved in 4-6 weeks  Pt participated in complex abstract categorization tasks in order to facilitate improved generative naming across settings (at home, in the community, etc)  Pt completed tasks with 57% accuracy independently, increasing to 100% accuracy when provided with max cues  5  Patient will be educated on the use of internal and external memory aids and compensatory strategies with 80% accuracy to facilitate increased recall of routine, personal information, and recent events, to be achieved in 4-6 weeks  Plan:  -Patient was provided with home exercises/activities to target goals in plan of care at the end of today's session   -Discussed session and patient's progress with caregiver/family member after today's session   -Continue with current plan of care  Patient was treated by Abdirahman Ortega, graduate SLP student, and was under the direct supervision of NICOLA Gallardo , [address] [facility]

## 2021-06-29 ENCOUNTER — TELEPHONE (OUTPATIENT)
Dept: PALLIATIVE MEDICINE | Facility: CLINIC | Age: 86
End: 2021-06-29

## 2021-07-26 ENCOUNTER — TELEPHONE (OUTPATIENT)
Dept: SPEECH THERAPY | Facility: REHABILITATION | Age: 86
End: 2021-07-26

## 2021-07-26 NOTE — TELEPHONE ENCOUNTER
Call placed to patient, message left in response to request to schedule future appointments for speech therapy from patient  Requesting return call to schedule

## 2021-08-26 ENCOUNTER — OFFICE VISIT (OUTPATIENT)
Dept: NEUROLOGY | Facility: CLINIC | Age: 86
End: 2021-08-26
Payer: COMMERCIAL

## 2021-08-26 VITALS
WEIGHT: 158.4 LBS | HEART RATE: 52 BPM | BODY MASS INDEX: 25.96 KG/M2 | SYSTOLIC BLOOD PRESSURE: 138 MMHG | DIASTOLIC BLOOD PRESSURE: 67 MMHG

## 2021-08-26 DIAGNOSIS — F02.80 LATE ONSET ALZHEIMER'S DEMENTIA WITHOUT BEHAVIORAL DISTURBANCE (HCC): Primary | ICD-10-CM

## 2021-08-26 DIAGNOSIS — G30.1 LATE ONSET ALZHEIMER'S DEMENTIA WITHOUT BEHAVIORAL DISTURBANCE (HCC): Primary | ICD-10-CM

## 2021-08-26 PROCEDURE — 99214 OFFICE O/P EST MOD 30 MIN: CPT | Performed by: PHYSICIAN ASSISTANT

## 2021-08-26 NOTE — PATIENT INSTRUCTIONS
Patient with progressive cognitive deficits over the past 2-3 years which can interfere with her daily functions  Her prior MRI with NQ revealed findings consistent with a neurodegenerative process  Given her progression with time and her MRI findings it is likely that she has mild dementia  We discussed the treatment options at this time including medications  Would avoid Aricept at this time given she is already bradycardiac and this could make this worse  May consider a trial of namenda in the future however given overall stability since her last visit will hold off on starting a medication at this time  Patient was encouraged to increase mind stimulating activities such as reading, crosswords, word searches, puzzles, Soduku, solitaire, coloring and other brain games  We also discussed the importance of staying physically active and eating a health diet such as the Mediterranean or MIND diet  She had cognitive therapy in the past and may consider going back in the future  She no longer drives

## 2021-08-26 NOTE — PROGRESS NOTES
Patient ID: Bonny Mcallister is a 82 y o  female  Assessment/Plan:    Late onset Alzheimer's dementia without behavioral disturbance [facility]  Patient with progressive cognitive deficits over the past 2-3 years which can interfere with her daily functions  Her prior MRI with NQ revealed findings consistent with a neurodegenerative process  Given her progression with time and her MRI findings it is likely that she has mild dementia  We discussed the treatment options at this time including medications  Would avoid Aricept at this time given she is already bradycardiac and this could make this worse  May consider a trial of namenda in the future however given overall stability since her last visit will hold off on starting a medication at this time  Patient was encouraged to increase mind stimulating activities such as reading, crosswords, word searches, puzzles, Soduku, solitaire, coloring and other brain games  We also discussed the importance of staying physically active and eating a health diet such as the Mediterranean or MIND diet  She had cognitive therapy in the past and may consider going back in the future  She no longer drives  At this time will have her follow up with the [facility] with Geriatrics for continued management  We would be happy to see her back if needed  Subjective:    Ms Lakisha Voss is an 82 y o  right handed female with A-fib on Coumadin, memory difficulties and mild action tremors who presents for follow up  To review, at baseline the family describes her as very sharp-witted and independent  She tells me she notices that she repeats herself and sometimes unsure if she did so or not  She believes that this may be related to her "getting older"  Her family says "she is still with it some times" and corroborates her forgetfulness since Spring 2019  Long-term memory unaffected   Since then she has not had major confusional episodes, but occasionally when asked certain topics that she is expected to know she would sometimes "act confused" and sometimes act normally  One time when they were driving home, she scraped the edge of the tire along the concrete traffic barrier briefly but corrected herself and no further incident afterwards  Vit D and B1 were slightly low and she was told to start OTC supplements  MRI brain revealed diffuse atrophy of the brain, no evidence of stroke, tumor, bleed or infection  The moderate level of atrophy may be more than expected for her age and supportive of age-related cognitive decline, but no signs particularly suggestive for a neurodegenerative process  Imaging was not performed with NQ  No longer driving  At her last visit she scored a 21/30 on MoCA  She was sent for repeat MRI with NQ  She was referred to cognitive therapy  Tremors were stable and not bothersome  INTERVAL HISTORY:  MRI with NQ 3/10/21 - Diffuse generalized volume loss consistent with age  Mild degree of chronic small vessel disease  NeuroQuant analysis was performed: Low hippocampal volume and enlarged inferior lateral and overall ventricular system, suggestive of global ex-vacuo dilatation  The additional finding of an abnormal Hippocamal Occupancy Score, ([address]), 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  Patient presents with her son  She completed cognitive therapy   She feels that she is doing well  She goes for walks around the block with her walker   She resides alone   She likes to listen to music   She uses a chair lift to get up and down the stairs   She sits outside and does word searches   Her son lives a few doors down   She will likely restart Meals on Wheels in the fall  She will go have meals with her son   She performs her own ADLs     Family helps with medication management   She sleeps well through the night  She does not notice tremors often, she feels the right hand is more stable then the left Durable Power of : yes  Living Will: yes  Family History: Number of First Degree Relatives With Parkinsonism/Dementia: none known    I personally reviewed and updated the ROS  Objective:    Blood pressure 138/67, pulse (!) 52, weight 71 8 kg (158 lb 6 4 oz)  Physical Exam  Constitutional:       General: She is awake  HENT:      Right Ear: Hearing normal       Left Ear: Hearing normal    Eyes:      General: Lids are normal       Extraocular Movements: Extraocular movements intact  Pupils: Pupils are equal, round, and reactive to light  Neurological:      Mental Status: She is alert  Deep Tendon Reflexes: Strength normal    Psychiatric:         Speech: Speech normal          Neurological Exam  Mental Status  Awake and alert  Oriented to person, place and time  Unable to copy figure  Clock drawing is abnormal  Speech is normal  Able to name objects  Able to perform serial calculations  MoCA 20/30 - 8/26/21    MoCA 21/30 - 2/24/21  MoCA 28/30 - 4/1/20   Cranial Nerves  CN III, IV, VI: Extraocular movements intact bilaterally  Normal lids and orbits bilaterally  Pupils equal round and reactive to light bilaterally  CN V:  Right: Facial sensation is normal   Left: Facial sensation is normal on the left  CN VIII:  Right: Hearing is normal   Left: Hearing is normal   CN XI: Shoulder shrug strength is normal   CN XII: Tongue midline without atrophy or fasciculations  Motor   Strength is 5/5 throughout all four extremities  Sensory  Light touch is normal in upper and lower extremities  Coordination  Mild action tremors with FTN testing  No resting tremors  No bradykinesia with finger taps or hand    No rigidity   Gait  Ambulating with cane  ROS:    Review of Systems   Constitutional: Negative  Negative for appetite change and fever  HENT: Negative  Negative for hearing loss, tinnitus, trouble swallowing and voice change  Eyes: Negative    Negative for photophobia and pain  Respiratory: Negative  Negative for shortness of breath  Cardiovascular: Negative  Negative for palpitations  Gastrointestinal: Negative  Negative for nausea and vomiting  Endocrine: Negative  Negative for cold intolerance  Genitourinary: Negative  Negative for dysuria, frequency and urgency  Musculoskeletal: Negative for myalgias and neck pain  Balance issues a little now and then but uses cane and walker     Skin: Negative  Negative for rash  Allergic/Immunologic: Negative  Neurological: Negative  Negative for dizziness, tremors, seizures, syncope, facial asymmetry, speech difficulty, weakness, light-headedness, numbness and headaches  Hematological: Bruises/bleeds easily (bruise due to warfarin)  Psychiatric/Behavioral: Negative for confusion, hallucinations and sleep disturbance  Memory issues     All other systems reviewed and are negative

## 2021-08-26 NOTE — ASSESSMENT & PLAN NOTE
Patient with progressive cognitive deficits over the past 2-3 years which can interfere with her daily functions  Her prior MRI with NQ revealed findings consistent with a neurodegenerative process  Given her progression with time and her MRI findings it is likely that she has mild dementia  We discussed the treatment options at this time including medications  Would avoid Aricept at this time given she is already bradycardiac and this could make this worse  May consider a trial of namenda in the future however given overall stability since her last visit will hold off on starting a medication at this time  Patient was encouraged to increase mind stimulating activities such as reading, crosswords, word searches, puzzles, Soduku, solitaire, coloring and other brain games  We also discussed the importance of staying physically active and eating a health diet such as the Mediterranean or MIND diet  She had cognitive therapy in the past and may consider going back in the future  She no longer drives  At this time will have her follow up with the [facility] with Geriatrics for continued management  We would be happy to see her back if needed

## 2021-09-13 ENCOUNTER — TELEPHONE (OUTPATIENT)
Dept: GERIATRICS | Age: 86
End: 2021-09-13

## 2021-09-13 NOTE — TELEPHONE ENCOUNTER
[place] [address]  [phone]    Telephone Intake: Geriatric Assessment     Referral source: [facility] Neurology                                         Caller who is scheduling/relationship to patient: Radha Rush son  Caller's phone number: [phone]              Is there a Power of  in place/If so, who? Yes , son Interiano    Reason for referral: Family member concerns regarding memory concerns  What is the goal of visit? looking for PCP and specialties to manage Dementia   Has the patient been seen by a Neurologist or Geriatrician? Yes   Has the patient ever been diagnosed with dementia? Yes       Preferred language? English  Highest education level? High school  Does the patient wear glasses? Yes   Does the patient use hearing aids? No     Does the patient and/or caregiver have access for a virtual visit (computer or smart phone, working audio and video)? No    Caller was informed:    Please make sure the patient is accompanied by someone who knows them well / a caregiver / family member to participate in this appointment  If a patient plans to attend the assessment alone, please route a message to the assigned provider   Primary number on file will be called to confirm this appointment  Who will accompany the patient to their assessment? Son Interiano    [employer] mailed out to: Elsy Anderson [facility] [address]    NOTE FOR : If the patient was recently hospitalized, please route intake to assigned provider for chart review  [address]  [phone]  Telephone Intake: PCP    Referral Source: neurology       Caller (and relationship to patient): Son Yan Nicely (relationship to patient): son   Phone Number: [phone]   Is caller POA?  yes- please bring copy of POA    Reason for choosing Geriatric PCP: what like his mother to see a provider better at dealing with changes in her future health due to dementia  Any additional concerns that you would like the provider to be aware of: want to be prepared as health condition worsen    Patient Insurance:[employer]   If insurance is a Medicare Advantage plan please make the patient aware that they will be responsible for a specialties co-pay  Is the patient aware of higher co-pay? Yes       NOTE FOR : Dr Graham Cardenas does not accept new PCP patients who reside in facilities (i e  levels of care higher than independent living)  She WILL accept patients at independent living facilities

## 2021-10-04 ENCOUNTER — CONSULT (OUTPATIENT)
Dept: GERIATRICS | Age: 86
End: 2021-10-04
Payer: COMMERCIAL

## 2021-10-04 VITALS
SYSTOLIC BLOOD PRESSURE: 130 MMHG | RESPIRATION RATE: 14 BRPM | OXYGEN SATURATION: 96 % | HEART RATE: 66 BPM | WEIGHT: 160.25 LBS | BODY MASS INDEX: 28.39 KG/M2 | DIASTOLIC BLOOD PRESSURE: 60 MMHG | HEIGHT: 63 IN | TEMPERATURE: 97.5 F

## 2021-10-04 DIAGNOSIS — Z79.01 CURRENT USE OF LONG TERM ANTICOAGULATION: ICD-10-CM

## 2021-10-04 DIAGNOSIS — I10 PRIMARY HYPERTENSION: ICD-10-CM

## 2021-10-04 DIAGNOSIS — G47.09 OTHER INSOMNIA: ICD-10-CM

## 2021-10-04 DIAGNOSIS — R26.2 AMBULATORY DYSFUNCTION: ICD-10-CM

## 2021-10-04 DIAGNOSIS — I48.91 ATRIAL FIBRILLATION, UNSPECIFIED TYPE (HCC): ICD-10-CM

## 2021-10-04 DIAGNOSIS — G31.84 AMNESTIC MCI (MILD COGNITIVE IMPAIRMENT WITH MEMORY LOSS): Primary | ICD-10-CM

## 2021-10-04 DIAGNOSIS — Z63.4 BEREAVEMENT WITHOUT COMPLICATION: ICD-10-CM

## 2021-10-04 DIAGNOSIS — E78.49 OTHER HYPERLIPIDEMIA: ICD-10-CM

## 2021-10-04 DIAGNOSIS — L98.9 SKIN LESION: ICD-10-CM

## 2021-10-04 PROCEDURE — 99205 OFFICE O/P NEW HI 60 MIN: CPT | Performed by: STUDENT IN AN ORGANIZED HEALTH CARE EDUCATION/TRAINING PROGRAM

## 2021-10-04 SDOH — SOCIAL STABILITY - SOCIAL INSECURITY: DISSAPEARANCE AND DEATH OF FAMILY MEMBER: Z63.4

## 2021-10-06 ENCOUNTER — TELEPHONE (OUTPATIENT)
Dept: GERIATRICS | Age: 86
End: 2021-10-06

## 2021-10-06 PROBLEM — E78.49 OTHER HYPERLIPIDEMIA: Status: ACTIVE | Noted: 2021-10-06

## 2021-10-06 PROBLEM — G31.84 AMNESTIC MCI (MILD COGNITIVE IMPAIRMENT WITH MEMORY LOSS): Status: ACTIVE | Noted: 2021-10-06

## 2021-10-06 PROBLEM — G47.09 OTHER INSOMNIA: Status: ACTIVE | Noted: 2021-10-06

## 2021-10-06 PROBLEM — I48.91 ATRIAL FIBRILLATION (HCC): Status: ACTIVE | Noted: 2021-10-06

## 2021-10-06 PROBLEM — I10 PRIMARY HYPERTENSION: Status: ACTIVE | Noted: 2021-10-06

## 2021-10-08 ENCOUNTER — APPOINTMENT (OUTPATIENT)
Dept: LAB | Facility: CLINIC | Age: 86
End: 2021-10-08
Payer: COMMERCIAL

## 2021-10-08 DIAGNOSIS — G31.84 AMNESTIC MCI (MILD COGNITIVE IMPAIRMENT WITH MEMORY LOSS): ICD-10-CM

## 2021-10-08 LAB
FOLATE SERPL-MCNC: >20 NG/ML (ref 3.1–17.5)
TSH SERPL DL<=0.05 MIU/L-ACNC: 1.37 UIU/ML (ref 0.36–3.74)
VIT B12 SERPL-MCNC: 445 PG/ML (ref 100–900)

## 2021-10-08 PROCEDURE — 82607 VITAMIN B-12: CPT

## 2021-10-08 PROCEDURE — 84443 ASSAY THYROID STIM HORMONE: CPT

## 2021-10-08 PROCEDURE — 82746 ASSAY OF FOLIC ACID SERUM: CPT

## 2021-10-08 PROCEDURE — 36415 COLL VENOUS BLD VENIPUNCTURE: CPT

## 2021-11-04 ENCOUNTER — OFFICE VISIT (OUTPATIENT)
Dept: GERIATRICS | Age: 86
End: 2021-11-04
Payer: COMMERCIAL

## 2021-11-04 DIAGNOSIS — G31.84 AMNESTIC MCI (MILD COGNITIVE IMPAIRMENT WITH MEMORY LOSS): ICD-10-CM

## 2021-11-04 DIAGNOSIS — R41.3 MEMORY LOSS: ICD-10-CM

## 2021-11-04 PROCEDURE — 96138 PSYCL/NRPSYC TECH 1ST: CPT | Performed by: STUDENT IN AN ORGANIZED HEALTH CARE EDUCATION/TRAINING PROGRAM

## 2021-11-04 PROCEDURE — 96139 PSYCL/NRPSYC TST TECH EA: CPT | Performed by: STUDENT IN AN ORGANIZED HEALTH CARE EDUCATION/TRAINING PROGRAM

## 2021-11-11 ENCOUNTER — OFFICE VISIT (OUTPATIENT)
Dept: GERIATRICS | Age: 86
End: 2021-11-11
Payer: COMMERCIAL

## 2021-11-11 VITALS
BODY MASS INDEX: 28.21 KG/M2 | RESPIRATION RATE: 16 BRPM | HEART RATE: 61 BPM | DIASTOLIC BLOOD PRESSURE: 82 MMHG | TEMPERATURE: 97.3 F | OXYGEN SATURATION: 98 % | HEIGHT: 63 IN | WEIGHT: 159.2 LBS | SYSTOLIC BLOOD PRESSURE: 140 MMHG

## 2021-11-11 DIAGNOSIS — E78.49 OTHER HYPERLIPIDEMIA: ICD-10-CM

## 2021-11-11 DIAGNOSIS — G31.84 AMNESTIC MCI (MILD COGNITIVE IMPAIRMENT WITH MEMORY LOSS): Primary | ICD-10-CM

## 2021-11-11 DIAGNOSIS — I10 PRIMARY HYPERTENSION: ICD-10-CM

## 2021-11-11 DIAGNOSIS — G47.09 OTHER INSOMNIA: ICD-10-CM

## 2021-11-11 DIAGNOSIS — I48.91 ATRIAL FIBRILLATION, UNSPECIFIED TYPE (HCC): ICD-10-CM

## 2021-11-11 PROCEDURE — 99215 OFFICE O/P EST HI 40 MIN: CPT | Performed by: STUDENT IN AN ORGANIZED HEALTH CARE EDUCATION/TRAINING PROGRAM

## 2021-11-22 ENCOUNTER — TELEPHONE (OUTPATIENT)
Dept: GERIATRICS | Age: 86
End: 2021-11-22

## 2021-12-08 ENCOUNTER — TELEPHONE (OUTPATIENT)
Dept: GERIATRICS | Age: 86
End: 2021-12-08

## 2021-12-10 ENCOUNTER — TELEPHONE (OUTPATIENT)
Dept: GASTROENTEROLOGY | Facility: MEDICAL CENTER | Age: 86
End: 2021-12-10

## 2021-12-11 ENCOUNTER — IMMUNIZATIONS (OUTPATIENT)
Dept: FAMILY MEDICINE CLINIC | Facility: HOSPITAL | Age: 86
End: 2021-12-11

## 2021-12-11 DIAGNOSIS — Z23 ENCOUNTER FOR IMMUNIZATION: Primary | ICD-10-CM

## 2021-12-11 PROCEDURE — 0001A COVID-19 PFIZER VACC 0.3 ML: CPT

## 2021-12-11 PROCEDURE — 91300 COVID-19 PFIZER VACC 0.3 ML: CPT

## 2021-12-28 ENCOUNTER — OFFICE VISIT (OUTPATIENT)
Dept: GERIATRICS | Age: 86
End: 2021-12-28
Payer: COMMERCIAL

## 2021-12-28 DIAGNOSIS — M81.8 AGE-RELATED OSTEOPOROSIS WITHOUT FRACTURE: Primary | ICD-10-CM

## 2021-12-28 PROCEDURE — 90670 PCV13 VACCINE IM: CPT | Performed by: STUDENT IN AN ORGANIZED HEALTH CARE EDUCATION/TRAINING PROGRAM

## 2021-12-28 PROCEDURE — G0009 ADMIN PNEUMOCOCCAL VACCINE: HCPCS | Performed by: STUDENT IN AN ORGANIZED HEALTH CARE EDUCATION/TRAINING PROGRAM

## 2022-05-10 ENCOUNTER — APPOINTMENT (INPATIENT)
Dept: CT IMAGING | Facility: HOSPITAL | Age: 87
DRG: 291 | End: 2022-05-10
Payer: COMMERCIAL

## 2022-05-10 ENCOUNTER — APPOINTMENT (EMERGENCY)
Dept: RADIOLOGY | Facility: HOSPITAL | Age: 87
DRG: 291 | End: 2022-05-10
Payer: COMMERCIAL

## 2022-05-10 ENCOUNTER — HOSPITAL ENCOUNTER (INPATIENT)
Facility: HOSPITAL | Age: 87
LOS: 2 days | Discharge: HOME HEALTH | DRG: 291 | End: 2022-05-12
Attending: EMERGENCY MEDICINE | Admitting: FAMILY MEDICINE
Payer: COMMERCIAL

## 2022-05-10 DIAGNOSIS — R50.9 FEVER: ICD-10-CM

## 2022-05-10 DIAGNOSIS — R06.89 RESPIRATORY INSUFFICIENCY: ICD-10-CM

## 2022-05-10 DIAGNOSIS — R77.8 ELEVATED TROPONIN: ICD-10-CM

## 2022-05-10 DIAGNOSIS — I10 PRIMARY HYPERTENSION: ICD-10-CM

## 2022-05-10 DIAGNOSIS — I48.91 ATRIAL FIBRILLATION (HCC): ICD-10-CM

## 2022-05-10 DIAGNOSIS — R79.89 ELEVATED BRAIN NATRIURETIC PEPTIDE (BNP) LEVEL: ICD-10-CM

## 2022-05-10 DIAGNOSIS — I50.33 ACUTE ON CHRONIC DIASTOLIC CONGESTIVE HEART FAILURE (HCC): ICD-10-CM

## 2022-05-10 DIAGNOSIS — J18.9 ATYPICAL PNEUMONIA: ICD-10-CM

## 2022-05-10 DIAGNOSIS — I50.9 CHF (CONGESTIVE HEART FAILURE) (HCC): ICD-10-CM

## 2022-05-10 DIAGNOSIS — R05.9 COUGH: Primary | ICD-10-CM

## 2022-05-10 PROBLEM — R06.02 SOB (SHORTNESS OF BREATH): Status: ACTIVE | Noted: 2022-05-10

## 2022-05-10 PROBLEM — G93.41 ACUTE METABOLIC ENCEPHALOPATHY: Status: ACTIVE | Noted: 2022-05-10

## 2022-05-10 PROBLEM — D69.6 THROMBOCYTOPENIA (HCC): Status: ACTIVE | Noted: 2022-05-10

## 2022-05-10 LAB
2HR DELTA HS TROPONIN: 24 NG/L
4HR DELTA HS TROPONIN: 32 NG/L
ALBUMIN SERPL BCP-MCNC: 3.6 G/DL (ref 3.5–5)
ALP SERPL-CCNC: 78 U/L (ref 46–116)
ALT SERPL W P-5'-P-CCNC: 17 U/L (ref 12–78)
ANION GAP SERPL CALCULATED.3IONS-SCNC: 11 MMOL/L (ref 4–13)
APTT PPP: 33 SECONDS (ref 23–37)
AST SERPL W P-5'-P-CCNC: 27 U/L (ref 5–45)
ATRIAL RATE: 357 BPM
ATRIAL RATE: 375 BPM
BACTERIA UR QL AUTO: ABNORMAL /HPF
BASOPHILS # BLD AUTO: 0.04 THOUSANDS/ΜL (ref 0–0.1)
BASOPHILS NFR BLD AUTO: 1 % (ref 0–1)
BILIRUB SERPL-MCNC: 0.76 MG/DL (ref 0.2–1)
BILIRUB UR QL STRIP: NEGATIVE
BUN SERPL-MCNC: 18 MG/DL (ref 5–25)
CALCIUM SERPL-MCNC: 9 MG/DL (ref 8.3–10.1)
CARDIAC TROPONIN I PNL SERPL HS: 130 NG/L
CARDIAC TROPONIN I PNL SERPL HS: 154 NG/L
CARDIAC TROPONIN I PNL SERPL HS: 162 NG/L
CHLORIDE SERPL-SCNC: 102 MMOL/L (ref 100–108)
CLARITY UR: CLEAR
CO2 SERPL-SCNC: 21 MMOL/L (ref 21–32)
COLOR UR: YELLOW
CREAT SERPL-MCNC: 0.91 MG/DL (ref 0.6–1.3)
EOSINOPHIL # BLD AUTO: 0.02 THOUSAND/ΜL (ref 0–0.61)
EOSINOPHIL NFR BLD AUTO: 0 % (ref 0–6)
ERYTHROCYTE [DISTWIDTH] IN BLOOD BY AUTOMATED COUNT: 13.3 % (ref 11.6–15.1)
FLUAV RNA RESP QL NAA+PROBE: NEGATIVE
FLUBV RNA RESP QL NAA+PROBE: NEGATIVE
GFR SERPL CREATININE-BSD FRML MDRD: 57 ML/MIN/1.73SQ M
GLUCOSE SERPL-MCNC: 108 MG/DL (ref 65–140)
GLUCOSE UR STRIP-MCNC: NEGATIVE MG/DL
HCT VFR BLD AUTO: 42 % (ref 34.8–46.1)
HGB BLD-MCNC: 14.5 G/DL (ref 11.5–15.4)
HGB UR QL STRIP.AUTO: ABNORMAL
IMM GRANULOCYTES # BLD AUTO: 0.02 THOUSAND/UL (ref 0–0.2)
IMM GRANULOCYTES NFR BLD AUTO: 0 % (ref 0–2)
INR PPP: 1.19 (ref 0.84–1.19)
KETONES UR STRIP-MCNC: NEGATIVE MG/DL
L PNEUMO1 AG UR QL IA.RAPID: NEGATIVE
LACTATE SERPL-SCNC: 0.8 MMOL/L (ref 0.5–2)
LEUKOCYTE ESTERASE UR QL STRIP: ABNORMAL
LIPASE SERPL-CCNC: 70 U/L (ref 73–393)
LYMPHOCYTES # BLD AUTO: 0.72 THOUSANDS/ΜL (ref 0.6–4.47)
LYMPHOCYTES NFR BLD AUTO: 12 % (ref 14–44)
MCH RBC QN AUTO: 33.2 PG (ref 26.8–34.3)
MCHC RBC AUTO-ENTMCNC: 34.5 G/DL (ref 31.4–37.4)
MCV RBC AUTO: 96 FL (ref 82–98)
MONOCYTES # BLD AUTO: 0.65 THOUSAND/ΜL (ref 0.17–1.22)
MONOCYTES NFR BLD AUTO: 11 % (ref 4–12)
NEUTROPHILS # BLD AUTO: 4.54 THOUSANDS/ΜL (ref 1.85–7.62)
NEUTS SEG NFR BLD AUTO: 76 % (ref 43–75)
NITRITE UR QL STRIP: NEGATIVE
NON-SQ EPI CELLS URNS QL MICRO: ABNORMAL /HPF
NRBC BLD AUTO-RTO: 0 /100 WBCS
NT-PROBNP SERPL-MCNC: 7504 PG/ML
P AXIS: 0 DEGREES
PH UR STRIP.AUTO: 5.5 [PH] (ref 4.5–8)
PLATELET # BLD AUTO: 107 THOUSANDS/UL (ref 149–390)
PMV BLD AUTO: 11.9 FL (ref 8.9–12.7)
POTASSIUM SERPL-SCNC: 4.2 MMOL/L (ref 3.5–5.3)
PROCALCITONIN SERPL-MCNC: 0.07 NG/ML
PROT SERPL-MCNC: 7.4 G/DL (ref 6.4–8.2)
PROT UR STRIP-MCNC: NEGATIVE MG/DL
PROTHROMBIN TIME: 14.8 SECONDS (ref 11.6–14.5)
QRS AXIS: 55 DEGREES
QRS AXIS: 63 DEGREES
QRSD INTERVAL: 82 MS
QRSD INTERVAL: 82 MS
QT INTERVAL: 354 MS
QT INTERVAL: 388 MS
QTC INTERVAL: 368 MS
QTC INTERVAL: 403 MS
RBC # BLD AUTO: 4.37 MILLION/UL (ref 3.81–5.12)
RBC #/AREA URNS AUTO: ABNORMAL /HPF
RSV RNA RESP QL NAA+PROBE: NEGATIVE
S PNEUM AG UR QL: NEGATIVE
SARS-COV-2 RNA RESP QL NAA+PROBE: NEGATIVE
SODIUM SERPL-SCNC: 134 MMOL/L (ref 136–145)
SP GR UR STRIP.AUTO: 1.01 (ref 1–1.03)
T WAVE AXIS: 106 DEGREES
T WAVE AXIS: 65 DEGREES
UROBILINOGEN UR QL STRIP.AUTO: 0.2 E.U./DL
VENTRICULAR RATE: 65 BPM
VENTRICULAR RATE: 65 BPM
WBC # BLD AUTO: 5.99 THOUSAND/UL (ref 4.31–10.16)
WBC #/AREA URNS AUTO: ABNORMAL /HPF

## 2022-05-10 PROCEDURE — 99223 1ST HOSP IP/OBS HIGH 75: CPT | Performed by: FAMILY MEDICINE

## 2022-05-10 PROCEDURE — 81001 URINALYSIS AUTO W/SCOPE: CPT

## 2022-05-10 PROCEDURE — 85730 THROMBOPLASTIN TIME PARTIAL: CPT | Performed by: PHYSICIAN ASSISTANT

## 2022-05-10 PROCEDURE — 85025 COMPLETE CBC W/AUTO DIFF WBC: CPT | Performed by: PHYSICIAN ASSISTANT

## 2022-05-10 PROCEDURE — 94640 AIRWAY INHALATION TREATMENT: CPT

## 2022-05-10 PROCEDURE — 87449 NOS EACH ORGANISM AG IA: CPT | Performed by: FAMILY MEDICINE

## 2022-05-10 PROCEDURE — 84484 ASSAY OF TROPONIN QUANT: CPT | Performed by: FAMILY MEDICINE

## 2022-05-10 PROCEDURE — 84145 PROCALCITONIN (PCT): CPT | Performed by: PHYSICIAN ASSISTANT

## 2022-05-10 PROCEDURE — 85610 PROTHROMBIN TIME: CPT | Performed by: PHYSICIAN ASSISTANT

## 2022-05-10 PROCEDURE — 99285 EMERGENCY DEPT VISIT HI MDM: CPT

## 2022-05-10 PROCEDURE — 83605 ASSAY OF LACTIC ACID: CPT | Performed by: PHYSICIAN ASSISTANT

## 2022-05-10 PROCEDURE — 99285 EMERGENCY DEPT VISIT HI MDM: CPT | Performed by: PHYSICIAN ASSISTANT

## 2022-05-10 PROCEDURE — 84484 ASSAY OF TROPONIN QUANT: CPT | Performed by: PHYSICIAN ASSISTANT

## 2022-05-10 PROCEDURE — 93010 ELECTROCARDIOGRAM REPORT: CPT | Performed by: INTERNAL MEDICINE

## 2022-05-10 PROCEDURE — 83690 ASSAY OF LIPASE: CPT | Performed by: PHYSICIAN ASSISTANT

## 2022-05-10 PROCEDURE — 96365 THER/PROPH/DIAG IV INF INIT: CPT

## 2022-05-10 PROCEDURE — G1004 CDSM NDSC: HCPCS

## 2022-05-10 PROCEDURE — 71250 CT THORAX DX C-: CPT

## 2022-05-10 PROCEDURE — 71045 X-RAY EXAM CHEST 1 VIEW: CPT

## 2022-05-10 PROCEDURE — 87040 BLOOD CULTURE FOR BACTERIA: CPT | Performed by: PHYSICIAN ASSISTANT

## 2022-05-10 PROCEDURE — 0241U HB NFCT DS VIR RESP RNA 4 TRGT: CPT | Performed by: PHYSICIAN ASSISTANT

## 2022-05-10 PROCEDURE — 36415 COLL VENOUS BLD VENIPUNCTURE: CPT | Performed by: PHYSICIAN ASSISTANT

## 2022-05-10 PROCEDURE — 80053 COMPREHEN METABOLIC PANEL: CPT | Performed by: PHYSICIAN ASSISTANT

## 2022-05-10 PROCEDURE — 83880 ASSAY OF NATRIURETIC PEPTIDE: CPT | Performed by: PHYSICIAN ASSISTANT

## 2022-05-10 PROCEDURE — 93005 ELECTROCARDIOGRAM TRACING: CPT

## 2022-05-10 PROCEDURE — 99223 1ST HOSP IP/OBS HIGH 75: CPT | Performed by: INTERNAL MEDICINE

## 2022-05-10 RX ORDER — ONDANSETRON 2 MG/ML
4 INJECTION INTRAMUSCULAR; INTRAVENOUS EVERY 6 HOURS PRN
Status: DISCONTINUED | OUTPATIENT
Start: 2022-05-10 | End: 2022-05-12 | Stop reason: HOSPADM

## 2022-05-10 RX ORDER — FUROSEMIDE 10 MG/ML
40 INJECTION INTRAMUSCULAR; INTRAVENOUS DAILY
Status: DISCONTINUED | OUTPATIENT
Start: 2022-05-10 | End: 2022-05-11

## 2022-05-10 RX ORDER — GUAIFENESIN 600 MG
600 TABLET, EXTENDED RELEASE 12 HR ORAL EVERY 12 HOURS SCHEDULED
Status: DISCONTINUED | OUTPATIENT
Start: 2022-05-10 | End: 2022-05-12 | Stop reason: HOSPADM

## 2022-05-10 RX ORDER — ATORVASTATIN CALCIUM 40 MG/1
40 TABLET, FILM COATED ORAL
Status: DISCONTINUED | OUTPATIENT
Start: 2022-05-10 | End: 2022-05-12 | Stop reason: HOSPADM

## 2022-05-10 RX ORDER — ASPIRIN 325 MG
325 TABLET ORAL ONCE
Status: COMPLETED | OUTPATIENT
Start: 2022-05-10 | End: 2022-05-10

## 2022-05-10 RX ORDER — ALBUTEROL SULFATE 2.5 MG/3ML
2.5 SOLUTION RESPIRATORY (INHALATION) 4 TIMES DAILY PRN
Status: DISCONTINUED | OUTPATIENT
Start: 2022-05-10 | End: 2022-05-12 | Stop reason: HOSPADM

## 2022-05-10 RX ORDER — LANOLIN ALCOHOL/MO/W.PET/CERES
100 CREAM (GRAM) TOPICAL DAILY
Status: DISCONTINUED | OUTPATIENT
Start: 2022-05-10 | End: 2022-05-12 | Stop reason: HOSPADM

## 2022-05-10 RX ORDER — BENZONATATE 100 MG/1
100 CAPSULE ORAL 3 TIMES DAILY PRN
Status: DISCONTINUED | OUTPATIENT
Start: 2022-05-10 | End: 2022-05-12 | Stop reason: HOSPADM

## 2022-05-10 RX ADMIN — ALBUTEROL SULFATE 2.5 MG: 2.5 SOLUTION RESPIRATORY (INHALATION) at 20:12

## 2022-05-10 RX ADMIN — FUROSEMIDE 40 MG: 10 INJECTION, SOLUTION INTRAVENOUS at 14:53

## 2022-05-10 RX ADMIN — ASPIRIN 325 MG ORAL TABLET 325 MG: 325 PILL ORAL at 12:08

## 2022-05-10 RX ADMIN — APIXABAN 5 MG: 5 TABLET, FILM COATED ORAL at 16:58

## 2022-05-10 RX ADMIN — APIXABAN 5 MG: 5 TABLET, FILM COATED ORAL at 12:08

## 2022-05-10 RX ADMIN — CEFTRIAXONE 2000 MG: 2 INJECTION, POWDER, FOR SOLUTION INTRAMUSCULAR; INTRAVENOUS at 10:20

## 2022-05-10 RX ADMIN — THIAMINE HCL TAB 100 MG 100 MG: 100 TAB at 12:08

## 2022-05-10 RX ADMIN — AZITHROMYCIN MONOHYDRATE 500 MG: 500 INJECTION, POWDER, LYOPHILIZED, FOR SOLUTION INTRAVENOUS at 14:56

## 2022-05-10 RX ADMIN — GUAIFENESIN 600 MG: 600 TABLET, EXTENDED RELEASE ORAL at 20:40

## 2022-05-10 RX ADMIN — ATORVASTATIN CALCIUM 40 MG: 40 TABLET, FILM COATED ORAL at 16:58

## 2022-05-10 RX ADMIN — GUAIFENESIN 600 MG: 600 TABLET, EXTENDED RELEASE ORAL at 14:53

## 2022-05-10 NOTE — ASSESSMENT & PLAN NOTE
This is an 81 yo F with PMH significant for a fib on AC with Eliquis, dementia and HTN who presents with mild confusion from baseline and respiratory symptoms including cough and rhinorrhea for almost 1 week worsening today  Most of history obtained from daughter at the bedside due to patient's underlying cognitive decline  Patient was also noted for feeling hot to touch ? fever, "wheezing and crackles" by the family  No diet changes, no known infection in family members  · Initial workup suggestive of CHF exacerbation with elevated pro-BNP and pulmonary edema on portable CXR, differentials include atypical pneumonia  · Received ceftriaxone, initial procalcitonin negative, no leukocytosis - will obtain CT chest and pneumonia workup   Extensive wheezing on exam, trial of albuterol, request Pulmonology consultation, add azitrhomycin for now  · No recent 2D Echo, will order echo, initiate on IV Lasix (on 40 mg daily at home)

## 2022-05-10 NOTE — CONSULTS
Consult - Cardiology   Perez Salguero 82 y o  female MRN: [id number]  Unit/Bed#: ED 17 Encounter: [id number]        Reason For Consult:  Congestive heart failure                 Assessment:  Febrile illness  Respiratory insufficiency:  Suspect an element of congestive heart failure with additional concern for some infectious component - possible bronchitis  Permanent Atrial fibrillation   DX 2009   Eliquis anticoagulation   AV blocking Rx:  None - suspect sinus and AV sandro dysfunction   Hypertension   O/p Rx:  Lasix 40 mg daily  Dyslipidemia     Plan:       Agree with intravenous diuretic and for now empiric antibiotic   Will follow clinical course including standing weights/exam/renal function   Echocardiogram and CT scan the chest forthcoming  · For the patient's atrial fibrillation she remains on Eliquis with controlled ventricular rate sans Rx, suspecting cardiac conduction disease      History Of Present Illness:  Perez Salguero is 59-year-old with dementia and other problems as listed above  She is routinely cared for by Armandina Scheuermann Dr [place]  She has also cared for by [facility] cardiologist Dr Kunal Sanabria with problems as listed above  Approximately 3 days ago the patient began to experience symptoms of cough and chest congestion for which she started taking mucolytics and Robitussin with some improvement  Last evening the patient apparently had some worsening cough and this morning was observed by family to have some increased confusion, work of breathing and fever to 101 7 prompting her ED presentation  Since arrival COVID/flu/RSV PCR is negative  Chest x-ray reported increased interstitial markings bilaterally  Procalcitonin was normal at 0 07  NT proBNP 7,504  With CBCs and chemistry within normal limits with the exception of sodium just below normal at 134  With concern for some component of CHF our consultation is requested            Past Medical History: History reviewed  No pertinent past medical history  Past Surgical History:   Procedure Laterality Date    CHOLECYSTECTOMY      REPLACEMENT TOTAL KNEE Left 2008        Allergy:        Allergies   Allergen Reactions    Morphine        Medications:       Prior to Admission medications    Medication Sig Start Date End Date Taking? Authorizing Provider   apixaban (Eliquis) 5 mg Take 5 mg by mouth 2 (two) times a day   Yes Historical Provider, MD   Ascorbic Acid (VITAMIN C) 500 MG CAPS Take 500 mg by mouth daily   Yes Historical Provider, MD   Cholecalciferol (Vitamin D3) 1 25 MG (34971 UT) CAPS    Yes Historical Provider, MD   furosemide (LASIX) 40 mg tablet Take 1 tablet by mouth daily 2/20/20  Yes Historical Provider, MD   Omega-3 Fatty Acids (fish oil) 1,000 mg    Yes Historical Provider, MD   simvastatin (ZOCOR) 20 mg tablet Take 1 tablet by mouth daily 2/20/20  Yes Historical Provider, MD   thiamine (VITAMIN B1) 100 mg tablet    Yes Historical Provider, MD   Valerian Root 500 MG CAPS    Yes Historical Provider, MD   digoxin (LANOXIN) 0 125 mg tablet Take 1 tablet by mouth daily  Patient not taking: Reported on 8/26/2021 2/20/20 5/10/22  Historical Provider, MD   warfarin (COUMADIN) 1 mg tablet   5/10/22  Historical Provider, MD       Family History:     Family History   Problem Relation Age of Onset    Lung cancer Father     Leukemia Brother     Breast cancer Daughter         Social History:       Social History     Socioeconomic History    Marital status:       Spouse name: None    Number of children: None    Years of education: None    Highest education level: None   Occupational History    None   Tobacco Use    Smoking status: Never Smoker    Smokeless tobacco: Never Used   Vaping Use    Vaping Use: Never used   Substance and Sexual Activity    Alcohol use: Never    Drug use: Never    Sexual activity: Not Currently     Partners: Male     Birth control/protection: None   Other Topics Concern    None   Social History Narrative    None     Social Determinants of Health     Financial Resource Strain: Not on file   Food Insecurity: Not on file   Transportation Needs: Not on file   Physical Activity: Not on file   Stress: Not on file   Social Connections: Not on file   Intimate Partner Violence: Not on file   Housing Stability: Not on file       ROS:  Somewhat limited by dementia  Symptoms per HPI  Intermittent use of a cane to assist with indoor ambulation  Out of the home she regularly uses a Rollator  Patient reports that at baseline she can slowly walk 1 block uninterrupted  The remainder of the review of systems is negative    Exam:  General:  Alert, pleasant and comfortable appearing elderly woman who was normally conversant and at least a fair historian  Head: Normocephalic, atraumatic  Eyes:  EOMI  Pupils - equal, round, reactive to accomodation  No icterus  Normal Conjunctiva  Oropharynx: Moist without lesion  Neck:  No gross bruit, JVD, thyromegaly, or lymphadenopathy  Heart:  Irregular with controlled rate  Lungs:  Some scattered coarse breath sounds - mostly central with mild end-expiratory wheezing  Abdomen:  Soft and nontender with normal bowel sounds  No organomegaly or mass  Lower Limbs:  Brawny stasis changes without edema  Pulses[de-identified]  RLE - DP:  1+                 LLE - DP:  1+  Musculoskeletal: Independent movement of limbs observed, Formal ROM and strength eval not performed  Neurologic:    Oriented to: person, place, situation  Cranial Nerves: grossly intact - vision, smell, taste, and hearing were not tested       Motor function: grossly normal, symmetric   Sensation: Was not tested  Vitals:    05/10/22 0849 05/10/22 1015 05/10/22 1209   BP: 161/99 124/56 130/60   BP Location:  Right arm Right arm   Pulse: 68 64 64   Resp: (!) 28 (!) 29 (!) 28   Temp: 99 6 °F (37 6 °C)     TempSrc: Oral     SpO2: 94% 92% 97%   Weight: 79 kg (174 lb 2 6 oz)     Height: 5' 3" (1 6 m) DATA:      -----------  Telemetry:   Atrial fibrillation with a controlled ventricular rate in the 60s with narrow QRS       -----------------------------------------------------------------------------------------------------------------------------------------------  Weights: Wt Readings from Last 20 Encounters:   05/10/22 79 kg (174 lb 2 6 oz)   11/11/21 72 2 kg (159 lb 3 2 oz)   10/04/21 72 7 kg (160 lb 4 oz)   08/26/21 71 8 kg (158 lb 6 4 oz)   04/14/21 69 9 kg (154 lb)   02/24/21 73 4 kg (161 lb 14 4 oz)   04/01/20 73 8 kg (162 lb 9 6 oz)   , Body mass index is 30 85 kg/m²           Lab Studies:               Results from last 7 days   Lab Units 05/10/22  0912   WBC Thousand/uL 5 99   HEMOGLOBIN g/dL 14 5   HEMATOCRIT % 42 0   PLATELETS Thousands/uL 107*   ,   Results from last 7 days   Lab Units 05/10/22  0912   POTASSIUM mmol/L 4 2   CHLORIDE mmol/L 102   CO2 mmol/L 21   BUN mg/dL 18   CREATININE mg/dL 0 91   CALCIUM mg/dL 9 0   ALK PHOS U/L 78   ALT U/L 17   AST U/L 27

## 2022-05-10 NOTE — ASSESSMENT & PLAN NOTE
As suggestive on CXR  Patient does present tachypnic, respiratory symptoms reported by family, diffuse wheezing noted on exam, reported ? fever at home  · Otherwise without leukocytosis or fever currently, normal procal - continue to monitor  · As above, Pulmonology consult is requested  · Will check CT chest wo contrast

## 2022-05-10 NOTE — ED PROVIDER NOTES
History  Chief Complaint   Patient presents with    Fever - 75 years or older     fever of 101 7, noted to be confused by family, tachypnea noted by EMS improved with 2L nasal cannula  pt reports cough and dyspnea     Patient is an 60-year-old female past medical history of Alzheimer's, hypertension, hyperlipidemia, AFib on Eliquis who presents with cough, tachypnea, generalized weakness and fever  History from patient and family  Patient did have cough and chest congestion on Saturday, but improved with Robitussin and Mucinex  Patient started again with cough last night and this morning patient seemed more confused, generally weak, and seemed to be working to breathe  Cough has been intermittently productive with yellow sputum  Had a fever of 101 7 at home  Family reports the patient was slower to respond and had more confusion than her baseline Alzheimer's  No focal weakness  Patient currently offers no complaints, just states she feels a little sick  Patient family specifically deny any headache, dizziness, lightheadedness, vision changes, neck pain or stiffness, congestion, chest pain, shortness of breath, abdominal pain, nausea, vomiting, diarrhea, dysuria, hematuria, urinary frequency or urgency  Patient given a 1000 mg of Tylenol by EMS  Prior to Admission Medications   Prescriptions Last Dose Informant Patient Reported? Taking?    Ascorbic Acid (VITAMIN C) 500 MG CAPS 5/9/2022 at Unknown time  Yes Yes   Sig: Take 500 mg by mouth daily   Cholecalciferol (Vitamin D3) 1 25 MG (58333 UT) CAPS 5/9/2022 at Unknown time  Yes Yes   Omega-3 Fatty Acids (fish oil) 1,000 mg 5/9/2022 at Unknown time  Yes Yes   Valerian Root 500 MG CAPS 5/9/2022 at Unknown time  Yes Yes   apixaban (Eliquis) 5 mg 5/9/2022 at Unknown time  Yes Yes   Sig: Take 5 mg by mouth 2 (two) times a day   furosemide (LASIX) 40 mg tablet 5/9/2022 at Unknown time  Yes Yes   Sig: Take 1 tablet by mouth daily   simvastatin (ZOCOR) 20 mg tablet 5/9/2022 at Unknown time  Yes Yes   Sig: Take 1 tablet by mouth daily   thiamine (VITAMIN B1) 100 mg tablet 5/9/2022 at Unknown time  Yes Yes      Facility-Administered Medications: None       History reviewed  No pertinent past medical history  Past Surgical History:   Procedure Laterality Date    CHOLECYSTECTOMY      REPLACEMENT TOTAL KNEE Left 2008       Family History   Problem Relation Age of Onset    Lung cancer Father     Leukemia Brother     Breast cancer Daughter      I have reviewed and agree with the history as documented  E-Cigarette/Vaping    E-Cigarette Use Never User      E-Cigarette/Vaping Substances    Nicotine No     THC No     CBD No     Flavoring No     Other No     Unknown No      Social History     Tobacco Use    Smoking status: Never Smoker    Smokeless tobacco: Never Used   Vaping Use    Vaping Use: Never used   Substance Use Topics    Alcohol use: Never    Drug use: Never       Review of Systems   Reason unable to perform ROS: provided by EMS and patient  Constitutional: Positive for fever  Negative for chills and diaphoresis  HENT: Negative for congestion, ear pain, rhinorrhea and sore throat  Eyes: Negative for visual disturbance  Respiratory: Positive for cough  Negative for shortness of breath, wheezing and stridor  Cardiovascular: Negative for chest pain, palpitations and leg swelling  Gastrointestinal: Negative for abdominal pain, diarrhea, nausea and vomiting  Genitourinary: Negative for difficulty urinating, dysuria, frequency, hematuria and urgency  Musculoskeletal: Negative for myalgias, neck pain and neck stiffness  Skin: Negative for color change, pallor and rash  Neurological: Negative for dizziness, weakness, light-headedness, numbness and headaches  All other systems reviewed and are negative  Physical Exam  Physical Exam  Vitals and nursing note reviewed  Constitutional:       General: She is awake   She is not in acute distress  Appearance: She is well-developed  She is ill-appearing  She is not toxic-appearing or diaphoretic  Comments: Patient appears ill, nontoxic-appearing  AAO x4, answering questions appropriately   HENT:      Head: Normocephalic and atraumatic  Right Ear: External ear normal       Left Ear: External ear normal       Nose: Nose normal    Eyes:      Conjunctiva/sclera: Conjunctivae normal       Pupils: Pupils are equal, round, and reactive to light  Cardiovascular:      Rate and Rhythm: Normal rate and regular rhythm  Pulses: Normal pulses  Heart sounds: Normal heart sounds  Pulmonary:      Effort: Tachypnea present  No respiratory distress  Breath sounds: No stridor  Rhonchi present  No decreased breath sounds, wheezing or rales  Abdominal:      General: Bowel sounds are normal  There is no distension  Palpations: Abdomen is soft  Tenderness: There is no abdominal tenderness  Musculoskeletal:         General: Normal range of motion  Cervical back: Normal range of motion and neck supple  Comments: Moving all 4 extremities freely   Skin:     General: Skin is warm and dry  Capillary Refill: Capillary refill takes less than 2 seconds  Neurological:      General: No focal deficit present  Mental Status: She is alert and oriented to person, place, and time  GCS: GCS eye subscore is 4  GCS verbal subscore is 5  GCS motor subscore is 6  Psychiatric:         Behavior: Behavior is cooperative           Vital Signs  ED Triage Vitals   Temperature Pulse Respirations Blood Pressure SpO2   05/10/22 0849 05/10/22 0849 05/10/22 0849 05/10/22 0849 05/10/22 0849   99 6 °F (37 6 °C) 68 (!) 28 161/99 94 %      Temp Source Heart Rate Source Patient Position - Orthostatic VS BP Location FiO2 (%)   05/10/22 0849 05/10/22 0849 05/10/22 1015 05/10/22 1015 --   Oral Monitor Sitting Right arm       Pain Score       05/10/22 0849       No Pain Vitals:    05/10/22 0849 05/10/22 1015 05/10/22 1209 05/10/22 1545   BP: 161/99 124/56 130/60 131/63   Pulse: 68 64 64 69   Patient Position - Orthostatic VS:  Sitting Lying Sitting         Visual Acuity      ED Medications  Medications   apixaban (ELIQUIS) tablet 5 mg (5 mg Oral Given 5/10/22 1208)   atorvastatin (LIPITOR) tablet 40 mg (has no administration in time range)   thiamine tablet 100 mg (100 mg Oral Given 5/10/22 1208)   ondansetron (ZOFRAN) injection 4 mg (has no administration in time range)   guaiFENesin (MUCINEX) 12 hr tablet 600 mg (600 mg Oral Given 5/10/22 1453)   benzonatate (TESSALON PERLES) capsule 100 mg (has no administration in time range)   furosemide (LASIX) injection 40 mg (40 mg Intravenous Given 5/10/22 1453)   azithromycin (ZITHROMAX) 500 mg in sodium chloride 0 9% 250mL IVPB 500 mg (500 mg Intravenous New Bag 5/10/22 1456)   ceftriaxone (ROCEPHIN) 2 g/50 mL in dextrose IVPB (has no administration in time range)   ceftriaxone (ROCEPHIN) 2 g/50 mL in dextrose IVPB (0 mg Intravenous Stopped 5/10/22 1129)   aspirin tablet 325 mg (325 mg Oral Given 5/10/22 1208)       Diagnostic Studies  Results Reviewed     Procedure Component Value Units Date/Time    HS Troponin I 4hr [[id number]]  (Abnormal) Collected: 05/10/22 1325    Lab Status: Final result Specimen: Blood from Arm, Right Updated: 05/10/22 1410     hs TnI 4hr 162 ng/L      Delta 4hr hsTnI 32 ng/L     Blood culture #1 [[id number]] Collected: 05/10/22 0912    Lab Status: Preliminary result Specimen: Blood from Arm, Right Updated: 05/10/22 1401     Blood Culture Received in Microbiology Lab  Culture in Progress  Blood culture #2 [[id number]] Collected: 05/10/22 0915    Lab Status: Preliminary result Specimen: Blood from Arm, Left Updated: 05/10/22 1401     Blood Culture Received in Microbiology Lab  Culture in Progress      Urine Microscopic [[id number]]  (Abnormal) Collected: 05/10/22 1247    Lab Status: Final result Specimen: Urine, Clean Catch Updated: 05/10/22 1359     RBC, UA 2-4 /hpf      WBC, UA 0-1 /hpf      Epithelial Cells Occasional /hpf      Bacteria, UA Occasional /hpf     Legionella antigen, urine [[id number]] Collected: 05/10/22 1250    Lab Status: In process Specimen: Urine, Clean Catch Updated: 05/10/22 1257    Strep Pneumoniae, Urine [[id number]] Collected: 05/10/22 1250    Lab Status:  In process Specimen: Urine, Clean Catch Updated: 05/10/22 1257    Urine Macroscopic, POC [[id number]]  (Abnormal) Collected: 05/10/22 1247    Lab Status: Final result Specimen: Urine Updated: 05/10/22 1249     Color, UA Yellow     Clarity, UA Clear     pH, UA 5 5     Leukocytes, UA Trace     Nitrite, UA Negative     Protein, UA Negative mg/dl      Glucose, UA Negative mg/dl      Ketones, UA Negative mg/dl      Urobilinogen, UA 0 2 E U /dl      Bilirubin, UA Negative     Blood, UA Moderate     Specific [place], UA 1 010    Narrative:      CLINITEK RESULT    HS Troponin I 2hr [[id number]]  (Abnormal) Collected: 05/10/22 1120    Lab Status: Final result Specimen: Blood from Hand, Left Updated: 05/10/22 1206     hs TnI 2hr 154 ng/L      Delta 2hr hsTnI 24 ng/L     Sputum culture and Gram stain [[id number]]     Lab Status: No result Specimen: Sputum     Protime-INR [[id number]]  (Abnormal) Collected: 05/10/22 0913    Lab Status: Final result Specimen: Blood from Arm, Right Updated: 05/10/22 1126     Protime 14 8 seconds      INR 1 19    APTT [[id number]]  (Normal) Collected: 05/10/22 0913    Lab Status: Final result Specimen: Blood from Arm, Right Updated: 05/10/22 1126     PTT 33 seconds     COVID/FLU/RSV - 2 hour TAT [[id number]]  (Normal) Collected: 05/10/22 0920    Lab Status: Final result Specimen: Nares from Nasopharyngeal Swab Updated: 05/10/22 1004     SARS-CoV-2 Negative     INFLUENZA A PCR Negative     INFLUENZA B PCR Negative     RSV PCR Negative    Narrative:      FOR PEDIATRIC PATIENTS - copy/paste COVID Guidelines URL to browser: [email or web address]    SARS-CoV-2 assay is a Nucleic Acid Amplification assay intended for the  qualitative detection of nucleic acid from SARS-CoV-2 in nasopharyngeal  swabs  Results are for the presumptive identification of SARS-CoV-2 RNA  Positive results are indicative of infection with SARS-CoV-2, the virus  causing COVID-19, but do not rule out bacterial infection or co-infection  with other viruses  Laboratories within the [place] and its  territories are required to report all positive results to the appropriate  public health authorities  Negative results do not preclude SARS-CoV-2  infection and should not be used as the sole basis for treatment or other  patient management decisions  Negative results must be combined with  clinical observations, patient history, and epidemiological information  This test has not been FDA cleared or approved  This test has been authorized by FDA under an Emergency Use Authorization  (EUA)  This test is only authorized for the duration of time the  declaration that circumstances exist justifying the authorization of the  emergency use of an in vitro diagnostic tests for detection of SARS-CoV-2  virus and/or diagnosis of COVID-19 infection under section 564(b)(1) of  the Act, 21 U  S C  775HRB-9(V)(8), unless the authorization is terminated  or revoked sooner  The test has been validated but independent review by FDA  and CLIA is pending  Test performed using [employer] Gene[employer]pert: This RT-PCR assay targets N2,  a region unique to SARS-CoV-2  A conserved region in the E-gene was chosen  for pan-Sarbecovirus detection which includes SARS-CoV-2      Procalcitonin [[id number]]  (Normal) Collected: 05/10/22 0913    Lab Status: Final result Specimen: Blood from Arm, Right Updated: 05/10/22 1002     Procalcitonin 0 07 ng/ml     HS Troponin 0hr (reflex protocol) [[id number]]  (Abnormal) Collected: 05/10/22 0912    Lab Status: Final result Specimen: Blood from Arm, Right Updated: 05/10/22 0958     hs TnI 0hr 130 ng/L     Lactic acid [[id number]]  (Normal) Collected: 05/10/22 0912    Lab Status: Final result Specimen: Blood from Arm, Right Updated: 05/10/22 0955     LACTIC ACID 0 8 mmol/L     Narrative:      Result may be elevated if tourniquet was used during collection      NT-BNP PRO [[id number]]  (Abnormal) Collected: 05/10/22 0912    Lab Status: Final result Specimen: Blood from Arm, Right Updated: 05/10/22 0953     NT-proBNP 7,504 pg/mL     Comprehensive metabolic panel [[id number]]  (Abnormal) Collected: 05/10/22 0912    Lab Status: Final result Specimen: Blood from Arm, Right Updated: 05/10/22 0948     Sodium 134 mmol/L      Potassium 4 2 mmol/L      Chloride 102 mmol/L      CO2 21 mmol/L      ANION GAP 11 mmol/L      BUN 18 mg/dL      Creatinine 0 91 mg/dL      Glucose 108 mg/dL      Calcium 9 0 mg/dL      AST 27 U/L      ALT 17 U/L      Alkaline Phosphatase 78 U/L      Total Protein 7 4 g/dL      Albumin 3 6 g/dL      Total Bilirubin 0 76 mg/dL      eGFR 57 ml/min/1 73sq m     Narrative:      [place] guidelines for Chronic Kidney Disease (CKD):     Stage 1 with normal or high GFR (GFR > 90 mL/min/1 73 square meters)    Stage 2 Mild CKD (GFR = 60-89 mL/min/1 73 square meters)    Stage 3A Moderate CKD (GFR = 45-59 mL/min/1 73 square meters)    Stage 3B Moderate CKD (GFR = 30-44 mL/min/1 73 square meters)    Stage 4 Severe CKD (GFR = 15-29 mL/min/1 73 square meters)    Stage 5 End Stage CKD (GFR <15 mL/min/1 73 square meters)  Note: GFR calculation is accurate only with a steady state creatinine    Lipase [[id number]]  (Abnormal) Collected: 05/10/22 0912    Lab Status: Final result Specimen: Blood from Arm, Right Updated: 05/10/22 0948     Lipase 70 u/L     CBC and differential [[id number]]  (Abnormal) Collected: 05/10/22 0912    Lab Status: Final result Specimen: Blood from Arm, Right Updated: 05/10/22 0934     WBC 5 99 Thousand/uL      RBC 4 37 Million/uL      Hemoglobin 14 5 g/dL      Hematocrit 42 0 %      MCV 96 fL      MCH 33 2 pg      MCHC 34 5 g/dL      RDW 13 3 %      MPV 11 9 fL      Platelets 263 Thousands/uL      nRBC 0 /100 WBCs      Neutrophils Relative 76 %      Immat GRANS % 0 %      Lymphocytes Relative 12 %      Monocytes Relative 11 %      Eosinophils Relative 0 %      Basophils Relative 1 %      Neutrophils Absolute 4 54 Thousands/µL      Immature Grans Absolute 0 02 Thousand/uL      Lymphocytes Absolute 0 72 Thousands/µL      Monocytes Absolute 0 65 Thousand/µL      Eosinophils Absolute 0 02 Thousand/µL      Basophils Absolute 0 04 Thousands/µL                  CT chest wo contrast   Final Result by Maximo Hawkins MD (05/10 1429)      No acute focal pneumonia  Workstation performed: [id number]         XR chest portable - 1 view   Final Result by Trina Padilla MD (05/10 1009)      Increased bilateral interstitial lung markings  Differential includes atypical pneumonia, mild interstitial pulmonary edema, among other differentials  Enlarged cardiac silhouette, likely due to cardiomegaly  The study was marked in [facility] for immediate notification              Workstation performed: [id number]                    Procedures  ECG 12 Lead Documentation Only    Date/Time: 5/10/2022 9:15 AM  Performed by: Tenzin Dodd PA-C  Authorized by: Tenzin Dodd PA-C     Indications / Diagnosis:  Weakness  ECG reviewed by me, the ED Provider: yes    Patient location:  ED  Previous ECG:     Previous ECG:  Unavailable  Rate:     ECG rate:  65    ECG rate assessment: normal    Rhythm:     Rhythm: A-V block and atrial flutter      Rhythm comment:  Variable AV block  Ectopy:     Ectopy: none    QRS:     QRS axis:  Normal  Conduction:     Conduction: normal    ST segments:     ST segments:  Normal  T waves:     T waves: non-specific      ECG 12 Lead Documentation Only    Date/Time: 5/10/2022 11:20 AM  Performed by: Ila Tate PA-C  Authorized by: Ila Tate PA-C     Indications / Diagnosis:  Elevated trop  Patient location:  ED  Previous ECG:     Previous ECG:  Compared to current    Comparison ECG info:  Earlier today  Rate:     ECG rate:  65    ECG rate assessment: normal    Rhythm:     Rhythm: atrial fibrillation    Ectopy:     Ectopy: none    QRS:     QRS axis:  Normal  Conduction:     Conduction: normal    ST segments:     ST segments:  Normal  T waves:     T waves: normal               ED Course  ED Course as of 05/10/22 1605   Tue May 10, 2022   0956 NT-proBNP(!): 7,504  No prior to compare   0959 hs TnI 0hr(!): 130   1018 XR chest portable - 1 view  IMPRESSION:     Increased bilateral interstitial lung markings  Differential includes atypical pneumonia, mild interstitial pulmonary edema, among other differentials      Enlarged cardiac silhouette, likely due to cardiomegaly  1039 LACTIC ACID: 0 8   1039 Procalcitonin: 0 07   1039 SARS-COV-2: Negative   1039 INFLU A PCR: Negative   1050 Reviewed all results with patient and family at bedside, answered questions  Patient agreeable to plan for admission  1225 Erickson Johnson with [facility], Dr Vanessa Watson, reviewed case and ED course  He is agreeable to inpatient admission  Will hold off on lasix for now             HEART Risk Score      Most Recent Value   Heart Score Risk Calculator    History 0 Filed at: 05/10/2022 1000   ECG 0 Filed at: 05/10/2022 1000   Age 2 Filed at: 05/10/2022 1000   Risk Factors 2 Filed at: 05/10/2022 1000   Troponin 2 Filed at: 05/10/2022 1000   HEART Score 6 Filed at: 05/10/2022 1000                     Initial Sepsis Screening     Row Name 05/10/22 1038                Is the patient's history suggestive of a new or worsening infection?  Yes (Proceed)  -[address]        Suspected source of infection pneumonia  -[place]        Are two or more of the following signs & symptoms of infection both present and new to the patient? Yes (Proceed)  -[place]        Indicate SIRS criteria Hyperthemia > 38 3C (100 9F); Tachypnea > 20 resp per min  -[place]        If the answer is yes to both questions, suspicion of sepsis is present --        If severe sepsis is present AND tissue hypoperfusion perists in the hour after fluid resuscitation or lactate > 4, the patient meets criteria for SEPTIC SHOCK --        Are any of the following organ dysfunction criteria present within 6 hours of suspected infection and SIRS criteria that are NOT considered to be chronic conditions?  No  -[place]        Organ dysfunction --        Date of presentation of severe sepsis --        Time of presentation of severe sepsis --        Tissue hypoperfusion persists in the hour after crystalloid fluid administration, evidenced, by either: --        Was hypotension present within one hour of the conclusion of crystalloid fluid administration? --        Date of presentation of septic shock --        Time of presentation of septic shock --              User Key  (r) = Recorded By, (t) = Taken By, (c) = Cosigned By    [address] [place] [address] Name Provider Type    [address] [place] [address], [facility] Physician Assistant                              MDM    Disposition  Final diagnoses:   Cough   Fever   Elevated brain natriuretic peptide (BNP) level   Elevated troponin     Time reflects when diagnosis was documented in both MDM as applicable and the Disposition within this note     Time User Action Codes Description Comment    5/10/2022 10:39 AM CostlowGerald Punt Add [R05 9] Cough     5/10/2022 10:39 AM CostlowWintsonridharoldo Punt Add [R50 9] Fever     5/10/2022 10:39 AM CostlowWinstonridharoldo Punt Add [R79 89] Elevated brain natriuretic peptide (BNP) level     5/10/2022 10:40 AM CostGerald andrews Punt Add [R77 8] Elevated troponin     5/10/2022 11:26 AM Diana Kruger Add [I50 9] CHF (congestive heart failure) ([facility] [address] )     5/10/2022 12:03 PM Diana Kruger Add [J18 9] Atypical pneumonia       ED Disposition     ED Disposition Condition Date/Time Comment    Admit Stable Tue May 10, 2022 11:28 AM Case was discussed with Dr Krystin Tate and the patient's admission status was agreed to be Admission Status: inpatient status to the service of Dr Dr Sy Chiang  Follow-up Information    None         Patient's Medications   Discharge Prescriptions    No medications on file       No discharge procedures on file      PDMP Review     None          ED Provider  Electronically Signed by           Get Kennedy PA-C  05/10/22 5072

## 2022-05-10 NOTE — ASSESSMENT & PLAN NOTE
Patient presents with SOB and respiratory symptoms including cough and rhinorrhea for almost 1 week with SOB worsening today  Most of history obtained from daughter at the bedside due to patient's underlying cognitive decline  Patient was also noted for "wheezing and crackles" by the family   No diet changes, no known infection in family members

## 2022-05-10 NOTE — H&P
[facility]  [facility] 1935, 82 [facility]  [facility] MRN: [id number]  Unit/Bed#: ED 17 Encounter: [id number]  Primary Care Provider: Jad Frank DO   Date and time admitted to hospital: 5/10/2022  8:44 AM    * Respiratory insufficiency  Assessment & Plan  This is an 81 yo F with PMH significant for a fib on AC with Eliquis, dementia and HTN who presents with mild confusion from baseline and respiratory symptoms including cough and rhinorrhea for almost 1 week worsening today  Most of history obtained from daughter at the bedside due to patient's underlying cognitive decline  Patient was also noted for feeling hot to touch ? fever, "wheezing and crackles" by the family  No diet changes, no known infection in family members  · Initial workup suggestive of CHF exacerbation with elevated pro-BNP and pulmonary edema on portable CXR, differentials include atypical pneumonia  · Received ceftriaxone, initial procalcitonin negative, no leukocytosis - will obtain CT chest and pneumonia workup   Extensive wheezing on exam, trial of albuterol, request Pulmonology consultation, add azitrhomycin for now  · No recent 2D Echo, will order echo, initiate on IV Lasix (on 40 mg daily at home)        Acute on chronic diastolic congestive heart failure (HCC)  Assessment & Plan  Wt Readings from Last 3 Encounters:   05/10/22 79 kg (174 lb 2 6 oz)   11/11/21 72 2 kg (159 lb 3 2 oz)   10/04/21 72 7 kg (160 lb 4 oz)     Patient presents with generalized weakness and respiratory symptoms, cardiomegaly, pulm edema on CXR, elevated proBNP  Does not appear significantly fluid-overloaded on exam otherwise, on Lasix 40 mg daily at home  · Will start on Lasix 40 mg IV daily  · Request Cardiology evaluation  · Obtain 2D Echo  · Monitor daily weights, I's and O's           Atypical pneumonia  Assessment & Plan  As suggestive on CXR  Patient does present tachypnic, respiratory symptoms reported by family, diffuse wheezing noted on exam, reported ? fever at home  · Otherwise without leukocytosis or fever currently, normal procal - continue to monitor  · As above, Pulmonology consult is requested  · Will check CT chest wo contrast       Acute metabolic encephalopathy  Assessment & Plan  Underlying cognitive decline, presenting with generalized weakness respiratory symptoms, atypical PNA vs CHF exacerbation  Continue to treat underlying conditions, monitor MS/reorient       Thrombocytopenia (HCC)  Assessment & Plan  Mild with platelets 878  Previously (labs from 2018) 120-130  Monitor CBC, continue Eliquis     Amnestic MCI (mild cognitive impairment with memory loss)  Assessment & Plan  Going workup with geriatric medicine    Atrial fibrillation [facility])  Assessment & Plan  Diagnosed 2009  Follows with [facility]   Rate controlled without medication  Continue Eliquis    Primary hypertension  Assessment & Plan  On furosemide alone    VTE Pharmacologic Prophylaxis:   High Risk (Score >/= 5) - Pharmacological DVT Prophylaxis Ordered: apixaban (Eliquis)  Sequential Compression Devices Ordered  Code Status: Level 3 - DNAR and DNI   Discussion with family: Updated  (daughter) at bedside  Anticipated Length of Stay: Patient will be admitted on an inpatient basis with an anticipated length of stay of greater than 2 midnights secondary to IV Rx, diagnostic workup   Total Time for Visit, including Counseling / Coordination of Care: 60 minutes Greater than 50% of this total time spent on direct patient counseling and coordination of care  Chief Complaint: generalized weakness, respiratory symptoms    History of Present Illness:  Elke Mo is a 82 y o  female with a PMH of AFib on anticoagulation with Eliquis, cognitive decline, hypertension hyperlipidemia who presents with generalized weakness and confusion as well as respiratory symptoms noted by patient's family    The patient apparently was noted for rhinorrhea and cough since Wednesday intermittently improving symptomatic management including Mucinex and Robitussin  However, this morning the patient was found unsteady, confused and wheezing, she also was noted to be warm to touch per patient's daughter  The patient is currently on 2 L nasal cannula saturating and mid 90s  She states that her shortness of breath is better  At home she sleeps flat with 1 pillow and has not changed this recently  There is no history of smoking or lung disease  No known infectious contacts  The patient denies chest pain or palpitations  Denies GI or urinary symptoms, denies lower extremity edema  Her appetite is normal and she had a normal meal last night consisting of steak and salad, she does not follow strict low-sodium diet, however, does not add salt to food  Review of Systems:  Review of Systems   Constitutional: Positive for fatigue and fever (subjective )  HENT: Positive for congestion and rhinorrhea  Negative for trouble swallowing  Respiratory: Positive for cough and wheezing  Negative for shortness of breath  Cardiovascular: Negative for chest pain, palpitations and leg swelling  Gastrointestinal: Negative for abdominal pain, constipation, diarrhea and nausea  Endocrine: Negative for polydipsia and polyuria  Genitourinary: Negative for dysuria  Musculoskeletal: Positive for gait problem  Skin: Negative for rash  Neurological: Negative for dizziness  Hematological: Negative for adenopathy  Psychiatric/Behavioral: Positive for confusion  Past Medical and Surgical History:   History reviewed  No pertinent past medical history  Past Surgical History:   Procedure Laterality Date    CHOLECYSTECTOMY      REPLACEMENT TOTAL KNEE Left 2008       Meds/Allergies:  Prior to Admission medications    Medication Sig Start Date End Date Taking?  Authorizing Provider   apixaban (Eliquis) 5 mg Take 5 mg by mouth 2 (two) times a day   Yes Historical Provider, MD   Ascorbic Acid (VITAMIN C) 500 MG CAPS Take 500 mg by mouth daily   Yes Historical Provider, MD   Cholecalciferol (Vitamin D3) 1 25 MG (57965 UT) CAPS    Yes Historical Provider, MD   furosemide (LASIX) 40 mg tablet Take 1 tablet by mouth daily 2/20/20  Yes Historical Provider, MD   Omega-3 Fatty Acids (fish oil) 1,000 mg    Yes Historical Provider, MD   simvastatin (ZOCOR) 20 mg tablet Take 1 tablet by mouth daily 2/20/20  Yes Historical Provider, MD   thiamine (VITAMIN B1) 100 mg tablet    Yes Historical Provider, MD   Valerian Root 500 MG CAPS    Yes Historical Provider, MD   digoxin (LANOXIN) 0 125 mg tablet Take 1 tablet by mouth daily  Patient not taking: Reported on 8/26/2021 2/20/20 5/10/22  Historical Provider, MD   warfarin (COUMADIN) 1 mg tablet   5/10/22  Historical Provider, MD     I have reviewed home medications with a medical source (PCP, Pharmacy, other)  Allergies: Allergies   Allergen Reactions    Morphine        Social History:  Marital Status:     Occupation: retired  Patient Pre-hospital Living Situation: Home  Patient Pre-hospital Level of Mobility: walks with walker  Patient Pre-hospital Diet Restrictions: no salt added  Substance Use History:   Social History     Substance and Sexual Activity   Alcohol Use Never     Social History     Tobacco Use   Smoking Status Never Smoker   Smokeless Tobacco Never Used     Social History     Substance and Sexual Activity   Drug Use Never       Family History:  Family History   Problem Relation Age of Onset    Lung cancer Father     Leukemia Brother     Breast cancer Daughter        Physical Exam:     Vitals:   Blood Pressure: 130/60 (05/10/22 1209)  Pulse: 64 (05/10/22 1209)  Temperature: 99 6 °F (37 6 °C) (05/10/22 0849)  Temp Source: Oral (05/10/22 0849)  Respirations: (!) 28 (05/10/22 1209)  Height: 5' 3" (160 cm) (05/10/22 0849)  Weight - Scale: 79 kg (174 lb 2 6 oz) (05/10/22 0849)  SpO2: 97 % (05/10/22 1209)    Physical Exam  Constitutional:       General: She is not in acute distress  HENT:      Head: Normocephalic and atraumatic  Nose: No congestion  Cardiovascular:      Rate and Rhythm: Normal rate  Rhythm irregular  Pulmonary:      Effort: No respiratory distress  Breath sounds: Wheezing present  No rales  Abdominal:      General: There is no distension  Tenderness: There is no abdominal tenderness  There is no guarding  Musculoskeletal:      Right lower leg: No edema  Left lower leg: No edema  Skin:     General: Skin is warm and dry  Neurological:      Mental Status: Mental status is at baseline  Psychiatric:         Mood and Affect: Mood normal           Additional Data:     Lab Results:  Results from last 7 days   Lab Units 05/10/22  0912   WBC Thousand/uL 5 99   HEMOGLOBIN g/dL 14 5   HEMATOCRIT % 42 0   PLATELETS Thousands/uL 107*   NEUTROS PCT % 76*   LYMPHS PCT % 12*   MONOS PCT % 11   EOS PCT % 0     Results from last 7 days   Lab Units 05/10/22  0912   SODIUM mmol/L 134*   POTASSIUM mmol/L 4 2   CHLORIDE mmol/L 102   CO2 mmol/L 21   BUN mg/dL 18   CREATININE mg/dL 0 91   ANION GAP mmol/L 11   CALCIUM mg/dL 9 0   ALBUMIN g/dL 3 6   TOTAL BILIRUBIN mg/dL 0 76   ALK PHOS U/L 78   ALT U/L 17   AST U/L 27   GLUCOSE RANDOM mg/dL 108     Results from last 7 days   Lab Units 05/10/22  0913   INR  1 19             Results from last 7 days   Lab Units 05/10/22  0913 05/10/22  0912   LACTIC ACID mmol/L  --  0 8   PROCALCITONIN ng/ml 0 07  --        Imaging: Reviewed radiology reports from this admission including: chest xray and Personally reviewed the following imaging: chest xray  XR chest portable - 1 view   Final Result by Lenin Spence MD (05/10 1009)      Increased bilateral interstitial lung markings  Differential includes atypical pneumonia, mild interstitial pulmonary edema, among other differentials  Enlarged cardiac silhouette, likely due to cardiomegaly        The study was marked in [facility] for immediate notification  Workstation performed: [id number]         CT chest wo contrast    (Results Pending)       EKG and Other Studies Reviewed on Admission:   · EKG: Atrial fibrillation  HR 60s  ** Please Note: This note has been constructed using a voice recognition system   **

## 2022-05-10 NOTE — SEPSIS NOTE
Sepsis Note   Mp Pedraza 82 y o  female MRN: [id number]  Unit/Bed#: ED 17 Encounter: [id number]       qSOFA     [address] Name 05/10/22 1015 05/10/22 0849             Altered mental status GCS < 15 -- --       Respiratory Rate > / =45 1 1       Systolic BP < / =788 0 0       Q Sofa Score 1 1                  Initial Sepsis Screening     Row Name 05/10/22 1038                Is the patient's history suggestive of a new or worsening infection? Yes (Proceed)  -[facility]        Suspected source of infection pneumonia  -[facility]        Are two or more of the following signs & symptoms of infection both present and new to the patient? Yes (Proceed)  -[facility]        Indicate SIRS criteria Hyperthemia > 38 3C (100 9F); Tachypnea > 20 resp per min  -[facility]        If the answer is yes to both questions, suspicion of sepsis is present --        If severe sepsis is present AND tissue hypoperfusion perists in the hour after fluid resuscitation or lactate > 4, the patient meets criteria for SEPTIC SHOCK --        Are any of the following organ dysfunction criteria present within 6 hours of suspected infection and SIRS criteria that are NOT considered to be chronic conditions?  No  -[facility]        Organ dysfunction --        Date of presentation of severe sepsis --        Time of presentation of severe sepsis --        Tissue hypoperfusion persists in the hour after crystalloid fluid administration, evidenced, by either: --        Was hypotension present within one hour of the conclusion of crystalloid fluid administration? --        Date of presentation of septic shock --        Time of presentation of septic shock --              User Key  (r) = Recorded By, (t) = Taken By, (c) = Cosigned By    [address] Name Provider Type    [facility], PA-C Physician Assistant

## 2022-05-10 NOTE — ASSESSMENT & PLAN NOTE
Underlying cognitive decline, presenting with generalized weakness respiratory symptoms, atypical PNA vs CHF exacerbation  Continue to treat underlying conditions, monitor MS/reorient

## 2022-05-10 NOTE — ASSESSMENT & PLAN NOTE
Wt Readings from Last 3 Encounters:   05/10/22 79 kg (174 lb 2 6 oz)   11/11/21 72 2 kg (159 lb 3 2 oz)   10/04/21 72 7 kg (160 lb 4 oz)     Patient presents with generalized weakness and respiratory symptoms, cardiomegaly, pulm edema on CXR, elevated proBNP  Does not appear significantly fluid-overloaded on exam otherwise, on Lasix 40 mg daily at home  · Will start on Lasix 40 mg IV daily  · Request Cardiology evaluation  · Obtain 2D Echo  · Monitor daily weights, I's and O's

## 2022-05-10 NOTE — PLAN OF CARE
Problem: MOBILITY - ADULT  Goal: Maintain or return to baseline ADL function  Description: INTERVENTIONS:  -  Assess patient's ability to carry out ADLs; assess patient's baseline for ADL function and identify physical deficits which impact ability to perform ADLs (bathing, care of mouth/teeth, toileting, grooming, dressing, etc )  - Assess/evaluate cause of self-care deficits   - Assess range of motion  - Assess patient's mobility; develop plan if impaired  - Assess patient's need for assistive devices and provide as appropriate  - Encourage maximum independence but intervene and supervise when necessary  - Involve family in performance of ADLs  - Assess for home care needs following discharge   - Consider OT consult to assist with ADL evaluation and planning for discharge  - Provide patient education as appropriate  Outcome: Progressing  Goal: Maintains/Returns to pre admission functional level  Description: INTERVENTIONS:  - Perform BMAT or MOVE assessment daily    - Set and communicate daily mobility goal to care team and patient/family/caregiver     - Collaborate with rehabilitation services on mobility goals if consulted  - Dangle patient 3 times a day  - Stand patient 3 times a day  - Ambulate patient 3 times a day  - Out of bed to chair 3 times a day   - Out of bed for meals 3 times a day  - Out of bed for toileting  - Record patient progress and toleration of activity level   Outcome: Progressing     Problem: Potential for Falls  Goal: Patient will remain free of falls  Description: INTERVENTIONS:  - Educate patient/family on patient safety including physical limitations  - Instruct patient to call for assistance with activity   - Consult OT/PT to assist with strengthening/mobility   - Keep Call bell within reach  - Keep bed low and locked with side rails adjusted as appropriate  - Keep care items and personal belongings within reach  - Initiate and maintain comfort rounds  - Make Fall Risk Sign visible to staff  - Offer Toileting every 2 Hours, in advance of need  - Initiate/Maintain bed alarm  - Obtain necessary fall risk management equipment: bed alarm  - Apply yellow socks and bracelet for high fall risk patients  - Consider moving patient to room near nurses station  Outcome: Progressing     Problem: Prexisting or High Potential for Compromised Skin Integrity  Goal: Skin integrity is maintained or improved  Description: INTERVENTIONS:  - Identify patients at risk for skin breakdown  - Assess and monitor skin integrity  - Assess and monitor nutrition and hydration status  - Monitor labs   - Assess for incontinence   - Turn and reposition patient  - Assist with mobility/ambulation  - Relieve pressure over bony prominences  - Avoid friction and shearing  - Provide appropriate hygiene as needed including keeping skin clean and dry  - Evaluate need for skin moisturizer/barrier cream  - Collaborate with interdisciplinary team   - Patient/family teaching  - Consider wound care consult   Outcome: Progressing     Problem: PAIN - ADULT  Goal: Verbalizes/displays adequate comfort level or baseline comfort level  Description: Interventions:  - Encourage patient to monitor pain and request assistance  - Assess pain using appropriate pain scale  - Administer analgesics based on type and severity of pain and evaluate response  - Implement non-pharmacological measures as appropriate and evaluate response  - Consider cultural and social influences on pain and pain management  - Notify physician/advanced practitioner if interventions unsuccessful or patient reports new pain  Outcome: Progressing     Problem: INFECTION - ADULT  Goal: Absence or prevention of progression during hospitalization  Description: INTERVENTIONS:  - Assess and monitor for signs and symptoms of infection  - Monitor lab/diagnostic results  - Monitor all insertion sites, i e  indwelling lines, tubes, and drains  - Monitor endotracheal if appropriate and nasal secretions for changes in amount and color  - Maynard appropriate cooling/warming therapies per order  - Administer medications as ordered  - Instruct and encourage patient and family to use good hand hygiene technique  - Identify and instruct in appropriate isolation precautions for identified infection/condition  Outcome: Progressing     Problem: DISCHARGE PLANNING  Goal: Discharge to home or other facility with appropriate resources  Description: INTERVENTIONS:  - Identify barriers to discharge w/patient and caregiver  - Arrange for needed discharge resources and transportation as appropriate  - Identify discharge learning needs (meds, wound care, etc )  - Arrange for interpretive services to assist at discharge as needed  - Refer to Case Management Department for coordinating discharge planning if the patient needs post-hospital services based on physician/advanced practitioner order or complex needs related to functional status, cognitive ability, or social support system  Outcome: Progressing     Problem: Knowledge Deficit  Goal: Patient/family/caregiver demonstrates understanding of disease process, treatment plan, medications, and discharge instructions  Description: Complete learning assessment and assess knowledge base    Interventions:  - Provide teaching at level of understanding  - Provide teaching via preferred learning methods  Outcome: Progressing     Problem: CARDIOVASCULAR - ADULT  Goal: Maintains optimal cardiac output and hemodynamic stability  Description: INTERVENTIONS:  - Monitor I/O, vital signs and rhythm  - Monitor for S/S and trends of decreased cardiac output  - Administer and titrate ordered vasoactive medications to optimize hemodynamic stability  - Assess quality of pulses, skin color and temperature  - Assess for signs of decreased coronary artery perfusion  - Instruct patient to report change in severity of symptoms  Outcome: Progressing  Goal: Absence of cardiac dysrhythmias or at baseline rhythm  Description: INTERVENTIONS:  - Continuous cardiac monitoring, vital signs, obtain 12 lead EKG if ordered  - Administer antiarrhythmic and heart rate control medications as ordered  - Monitor electrolytes and administer replacement therapy as ordered  Outcome: Progressing     Problem: RESPIRATORY - ADULT  Goal: Achieves optimal ventilation and oxygenation  Description: INTERVENTIONS:  - Assess for changes in respiratory status  - Assess for changes in mentation and behavior  - Position to facilitate oxygenation and minimize respiratory effort  - Oxygen administered by appropriate delivery if ordered  - Initiate smoking cessation education as indicated  - Encourage broncho-pulmonary hygiene including cough, deep breathe, Incentive Spirometry  - Assess the need for suctioning and aspirate as needed  - Assess and instruct to report SOB or any respiratory difficulty  - Respiratory Therapy support as indicated  Outcome: Progressing

## 2022-05-11 ENCOUNTER — APPOINTMENT (INPATIENT)
Dept: NON INVASIVE DIAGNOSTICS | Facility: HOSPITAL | Age: 87
DRG: 291 | End: 2022-05-11
Payer: COMMERCIAL

## 2022-05-11 LAB
ALBUMIN SERPL BCP-MCNC: 3.1 G/DL (ref 3.5–5)
ALP SERPL-CCNC: 62 U/L (ref 46–116)
ALT SERPL W P-5'-P-CCNC: 17 U/L (ref 12–78)
ANION GAP SERPL CALCULATED.3IONS-SCNC: 9 MMOL/L (ref 4–13)
AORTIC ROOT: 3.8 CM
AORTIC VALVE MEAN VELOCITY: 7.4 M/S
APICAL FOUR CHAMBER EJECTION FRACTION: 48 %
ASCENDING AORTA: 3.7 CM
AST SERPL W P-5'-P-CCNC: 32 U/L (ref 5–45)
AV AREA BY CONTINUOUS VTI: 3.5 CM2
AV AREA PEAK VELOCITY: 3.6 CM2
AV LVOT MEAN GRADIENT: 3 MMHG
AV LVOT PEAK GRADIENT: 5 MMHG
AV MEAN GRADIENT: 3 MMHG
AV PEAK GRADIENT: 5 MMHG
AV VALVE AREA: 3.49 CM2
AV VELOCITY RATIO: 1.04
BASOPHILS # BLD AUTO: 0.03 THOUSANDS/ΜL (ref 0–0.1)
BASOPHILS NFR BLD AUTO: 1 % (ref 0–1)
BILIRUB SERPL-MCNC: 0.51 MG/DL (ref 0.2–1)
BUN SERPL-MCNC: 18 MG/DL (ref 5–25)
CALCIUM ALBUM COR SERPL-MCNC: 9.6 MG/DL (ref 8.3–10.1)
CALCIUM SERPL-MCNC: 8.9 MG/DL (ref 8.3–10.1)
CHLORIDE SERPL-SCNC: 106 MMOL/L (ref 100–108)
CO2 SERPL-SCNC: 25 MMOL/L (ref 21–32)
CREAT SERPL-MCNC: 0.84 MG/DL (ref 0.6–1.3)
DOP CALC AO PEAK VEL: 1.07 M/S
DOP CALC AO VTI: 19.02 CM
DOP CALC LVOT AREA: 3.46 CM2
DOP CALC LVOT DIAMETER: 2.1 CM
DOP CALC LVOT PEAK VEL VTI: 19.17 CM
DOP CALC LVOT PEAK VEL: 1.11 M/S
DOP CALC LVOT STROKE INDEX: 35.8 ML/M2
DOP CALC LVOT STROKE VOLUME: 66.36 CM3
EOSINOPHIL # BLD AUTO: 0.04 THOUSAND/ΜL (ref 0–0.61)
EOSINOPHIL NFR BLD AUTO: 1 % (ref 0–6)
ERYTHROCYTE [DISTWIDTH] IN BLOOD BY AUTOMATED COUNT: 13.6 % (ref 11.6–15.1)
FRACTIONAL SHORTENING: 26 % (ref 28–44)
GFR SERPL CREATININE-BSD FRML MDRD: 63 ML/MIN/1.73SQ M
GLUCOSE SERPL-MCNC: 109 MG/DL (ref 65–140)
HCT VFR BLD AUTO: 41.3 % (ref 34.8–46.1)
HGB BLD-MCNC: 13.9 G/DL (ref 11.5–15.4)
IMM GRANULOCYTES # BLD AUTO: 0.02 THOUSAND/UL (ref 0–0.2)
IMM GRANULOCYTES NFR BLD AUTO: 0 % (ref 0–2)
INTERVENTRICULAR SEPTUM IN DIASTOLE (PARASTERNAL SHORT AXIS VIEW): 1.8 CM
INTERVENTRICULAR SEPTUM: 1.8 CM (ref 0.6–1.1)
LAAS-AP2: 30 CM2
LAAS-AP4: 26.3 CM2
LEFT ATRIUM SIZE: 4.8 CM
LEFT INTERNAL DIMENSION IN SYSTOLE: 2.8 CM (ref 2.1–4)
LEFT VENTRICULAR INTERNAL DIMENSION IN DIASTOLE: 3.8 CM (ref 3.5–6)
LEFT VENTRICULAR POSTERIOR WALL IN END DIASTOLE: 1.7 CM
LEFT VENTRICULAR STROKE VOLUME: 33 ML
LVSV (TEICH): 33 ML
LYMPHOCYTES # BLD AUTO: 1.07 THOUSANDS/ΜL (ref 0.6–4.47)
LYMPHOCYTES NFR BLD AUTO: 21 % (ref 14–44)
MCH RBC QN AUTO: 32 PG (ref 26.8–34.3)
MCHC RBC AUTO-ENTMCNC: 33.7 G/DL (ref 31.4–37.4)
MCV RBC AUTO: 95 FL (ref 82–98)
MONOCYTES # BLD AUTO: 0.71 THOUSAND/ΜL (ref 0.17–1.22)
MONOCYTES NFR BLD AUTO: 14 % (ref 4–12)
MV PEAK E VEL: 90 CM/S
NEUTROPHILS # BLD AUTO: 3.29 THOUSANDS/ΜL (ref 1.85–7.62)
NEUTS SEG NFR BLD AUTO: 63 % (ref 43–75)
NRBC BLD AUTO-RTO: 0 /100 WBCS
PLATELET # BLD AUTO: 93 THOUSANDS/UL (ref 149–390)
PMV BLD AUTO: 11.6 FL (ref 8.9–12.7)
POTASSIUM SERPL-SCNC: 3.9 MMOL/L (ref 3.5–5.3)
PROCALCITONIN SERPL-MCNC: 0.08 NG/ML
PROT SERPL-MCNC: 6.7 G/DL (ref 6.4–8.2)
RBC # BLD AUTO: 4.35 MILLION/UL (ref 3.81–5.12)
RIGHT ATRIAL 2D VOLUME: 134 ML
RIGHT ATRIUM AREA SYSTOLE A4C: 36.1 CM2
RIGHT VENTRICLE ID DIMENSION: 4 CM
SL CV LEFT ATRIUM LENGTH A2C: 6.7 CM
SL CV PED ECHO LEFT VENTRICLE DIASTOLIC VOLUME (MOD BIPLANE) 2D: 63 ML
SL CV PED ECHO LEFT VENTRICLE SYSTOLIC VOLUME (MOD BIPLANE) 2D: 30 ML
SODIUM SERPL-SCNC: 140 MMOL/L (ref 136–145)
TR MAX PG: 41 MMHG
TR PEAK VELOCITY: 3.2 M/S
TRICUSPID VALVE PEAK REGURGITATION VELOCITY: 3.18 M/S
WBC # BLD AUTO: 5.16 THOUSAND/UL (ref 4.31–10.16)

## 2022-05-11 PROCEDURE — 80053 COMPREHEN METABOLIC PANEL: CPT | Performed by: FAMILY MEDICINE

## 2022-05-11 PROCEDURE — 93306 TTE W/DOPPLER COMPLETE: CPT

## 2022-05-11 PROCEDURE — 97163 PT EVAL HIGH COMPLEX 45 MIN: CPT

## 2022-05-11 PROCEDURE — 84145 PROCALCITONIN (PCT): CPT | Performed by: FAMILY MEDICINE

## 2022-05-11 PROCEDURE — 99232 SBSQ HOSP IP/OBS MODERATE 35: CPT | Performed by: INTERNAL MEDICINE

## 2022-05-11 PROCEDURE — 93306 TTE W/DOPPLER COMPLETE: CPT | Performed by: INTERNAL MEDICINE

## 2022-05-11 PROCEDURE — 92610 EVALUATE SWALLOWING FUNCTION: CPT

## 2022-05-11 PROCEDURE — 85025 COMPLETE CBC W/AUTO DIFF WBC: CPT | Performed by: FAMILY MEDICINE

## 2022-05-11 PROCEDURE — 99233 SBSQ HOSP IP/OBS HIGH 50: CPT | Performed by: FAMILY MEDICINE

## 2022-05-11 RX ORDER — AZITHROMYCIN 250 MG/1
500 TABLET, FILM COATED ORAL EVERY 24 HOURS
Status: DISCONTINUED | OUTPATIENT
Start: 2022-05-11 | End: 2022-05-12 | Stop reason: HOSPADM

## 2022-05-11 RX ORDER — AMLODIPINE BESYLATE 2.5 MG/1
2.5 TABLET ORAL DAILY
Status: DISCONTINUED | OUTPATIENT
Start: 2022-05-12 | End: 2022-05-12 | Stop reason: HOSPADM

## 2022-05-11 RX ORDER — BISOPROLOL FUMARATE 5 MG/1
2.5 TABLET ORAL DAILY
Status: DISCONTINUED | OUTPATIENT
Start: 2022-05-11 | End: 2022-05-11

## 2022-05-11 RX ORDER — PREDNISONE 20 MG/1
40 TABLET ORAL DAILY
Status: DISCONTINUED | OUTPATIENT
Start: 2022-05-11 | End: 2022-05-12 | Stop reason: HOSPADM

## 2022-05-11 RX ORDER — FUROSEMIDE 40 MG/1
40 TABLET ORAL DAILY
Status: DISCONTINUED | OUTPATIENT
Start: 2022-05-12 | End: 2022-05-12 | Stop reason: HOSPADM

## 2022-05-11 RX ADMIN — APIXABAN 5 MG: 5 TABLET, FILM COATED ORAL at 08:16

## 2022-05-11 RX ADMIN — ATORVASTATIN CALCIUM 40 MG: 40 TABLET, FILM COATED ORAL at 17:08

## 2022-05-11 RX ADMIN — GUAIFENESIN 600 MG: 600 TABLET, EXTENDED RELEASE ORAL at 08:16

## 2022-05-11 RX ADMIN — CEFTRIAXONE SODIUM 2000 MG: 10 INJECTION, POWDER, FOR SOLUTION INTRAVENOUS at 11:59

## 2022-05-11 RX ADMIN — GUAIFENESIN 600 MG: 600 TABLET, EXTENDED RELEASE ORAL at 21:15

## 2022-05-11 RX ADMIN — FUROSEMIDE 40 MG: 10 INJECTION, SOLUTION INTRAVENOUS at 08:16

## 2022-05-11 RX ADMIN — AZITHROMYCIN MONOHYDRATE 500 MG: 250 TABLET ORAL at 14:09

## 2022-05-11 RX ADMIN — THIAMINE HCL TAB 100 MG 100 MG: 100 TAB at 08:16

## 2022-05-11 RX ADMIN — PREDNISONE 40 MG: 20 TABLET ORAL at 11:58

## 2022-05-11 RX ADMIN — APIXABAN 5 MG: 5 TABLET, FILM COATED ORAL at 17:08

## 2022-05-11 NOTE — ASSESSMENT & PLAN NOTE
Wt Readings from Last 3 Encounters:   05/11/22 75 3 kg (166 lb)   11/11/21 72 2 kg (159 lb 3 2 oz)   10/04/21 72 7 kg (160 lb 4 oz)     Patient presents with generalized weakness and respiratory symptoms, cardiomegaly, pulm edema on CXR, elevated proBNP  Does not appear significantly fluid-overloaded on exam otherwise, on Lasix 40 mg daily at home  · Received Lasix 40 mg IV - transition to home 40 mg daily at discharge  · Cardiology evaluation appreciated  · 2D Echo reviewed, preserved EF, [facility]  · Monitor daily weights, I's and O's

## 2022-05-11 NOTE — PROGRESS NOTES
[address]  Progress Note - Ruba Delong 1935, 82 y o  female MRN: [id number]  Unit/Bed#: E4 -01 Encounter: [id number]  Primary Care Provider: Lazarus Mano, DO   Date and time admitted to hospital: 5/10/2022  8:44 AM    * Respiratory insufficiency  Assessment & Plan  This is an 83 yo F with PMH significant for a fib on AC with Eliquis, dementia and HTN who presents with mild confusion from baseline and respiratory symptoms including cough and rhinorrhea for almost 1 week worsening today  Most of history obtained from daughter at the bedside due to patient's underlying cognitive decline  Patient was also noted for feeling hot to touch ? fever, "wheezing and crackles" by the family   No diet changes, no known infection in family members  · Initial workup suggestive of CHF exacerbation with elevated pro-BNP and pulmonary edema on portable CXR, differentials include atypical pneumonia  · Received ceftriaxone, azithromycin was added - pneumonia workup otherwise negative with pneumonia ruled out, suspect bronchitis, trial of prednisone, short course of antibiotics  · 2D echo reviewed, preserved EF, left ventricular hypertrophy  · Received IV Lasix, will transition to oral Lasix on discharge  · Anticipate discharge in 24-48 hours        Acute on chronic diastolic congestive heart failure ([facility] [address] )  Assessment & Plan  Wt Readings from Last 3 Encounters:   05/11/22 75 3 kg (166 lb)   11/11/21 72 2 kg (159 lb 3 2 oz)   10/04/21 72 7 kg (160 lb 4 oz)     Patient presents with generalized weakness and respiratory symptoms, cardiomegaly, pulm edema on CXR, elevated proBNP  Does not appear significantly fluid-overloaded on exam otherwise, on Lasix 40 mg daily at home  · Received Lasix 40 mg IV - transition to home 40 mg daily at discharge  · Cardiology evaluation appreciated  · 2D Echo reviewed, preserved EF, LVH  · Monitor daily weights, I's and O's           Atypical pneumonia  Assessment & Plan  As suggestive on CXR  Ruled out with negative CT chest, procalcitonin negative  Suspect tracheobronchitis with dyspnea, diffused wheezing  Continue Abx coverage x3 days, then discontinue  Prednisone 40 mg daily x5 days           Acute metabolic encephalopathy  Assessment & Plan  Underlying cognitive decline, presenting with generalized weakness respiratory symptoms, atypical PNA vs CHF exacerbation  Appears improved and at baseline  Continue to treat underlying conditions, monitor MS/reorient       Thrombocytopenia (HCC)  Assessment & Plan  Mild with platelets 190  Previously (labs from 2018) 120-130  Monitor CBC, continue Eliquis     Amnestic MCI (mild cognitive impairment with memory loss)  Assessment & Plan  Undergoing workup with geriatric medicine    Atrial fibrillation [facility])  Assessment & Plan  Diagnosed 2009  Follows with [facility]   Rate controlled without medication  Continue Eliquis    Primary hypertension  Assessment & Plan  On furosemide alone        VTE Pharmacologic Prophylaxis: VTE Score: 6 High Risk (Score >/= 5) - Pharmacological DVT Prophylaxis Ordered: apixaban (Eliquis)  Sequential Compression Devices Ordered  Patient Centered Rounds: I performed bedside rounds with nursing staff today  Discussions with Specialists or Other Care Team Provider:  Cardiology    Education and Discussions with Family / Patient: Updated  (son) via phone  Time Spent for Care: 45 minutes  More than 50% of total time spent on counseling and coordination of care as described above  Current Length of Stay: 1 day(s)  Current Patient Status: Inpatient   Certification Statement: The patient will continue to require additional inpatient hospital stay due to Close monitoring  Discharge Plan: Anticipate discharge in 24-48 hrs to home with home services  Code Status: Level 3 - DNAR and DNI    Subjective:   Patient seen and examined  She reports feeling well    She denies shortness of breath or chest pain  No overnight events  Objective:     Vitals:   Temp (24hrs), Av 1 °F (36 7 °C), Min:97 °F (36 1 °C), Max:99 6 °F (37 6 °C)    Temp:  [97 °F (36 1 °C)-99 6 °F (37 6 °C)] 97 8 °F (36 6 °C)  HR:  [60-83] 70  Resp:  [18-22] 18  BP: (122-164)/(65-90) 135/66  SpO2:  [94 %-96 %] 96 %  Body mass index is 29 41 kg/m²  Input and Output Summary (last 24 hours): Intake/Output Summary (Last 24 hours) at 2022 1632  Last data filed at 2022 1001  Gross per 24 hour   Intake 720 ml   Output 2500 ml   Net -1780 ml       Physical Exam:   Physical Exam  HENT:      Head: Normocephalic and atraumatic  Mouth/Throat:      Pharynx: Oropharynx is clear  Eyes:      Conjunctiva/sclera: Conjunctivae normal    Cardiovascular:      Rate and Rhythm: Normal rate  Rhythm irregular  Pulmonary:      Effort: No respiratory distress  Breath sounds: Wheezing present  No rales  Abdominal:      General: There is no distension  Tenderness: There is no abdominal tenderness  There is no guarding  Musculoskeletal:      Right lower leg: No edema  Left lower leg: No edema  Skin:     General: Skin is warm and dry  Neurological:      Mental Status: Mental status is at baseline     Psychiatric:         Mood and Affect: Mood normal           Additional Data:     Labs:  Results from last 7 days   Lab Units 22  0527   WBC Thousand/uL 5 16   HEMOGLOBIN g/dL 13 9   HEMATOCRIT % 41 3   PLATELETS Thousands/uL 93*   NEUTROS PCT % 63   LYMPHS PCT % 21   MONOS PCT % 14*   EOS PCT % 1     Results from last 7 days   Lab Units 22  0527   SODIUM mmol/L 140   POTASSIUM mmol/L 3 9   CHLORIDE mmol/L 106   CO2 mmol/L 25   BUN mg/dL 18   CREATININE mg/dL 0 84   ANION GAP mmol/L 9   CALCIUM mg/dL 8 9   ALBUMIN g/dL 3 1*   TOTAL BILIRUBIN mg/dL 0 51   ALK PHOS U/L 62   ALT U/L 17   AST U/L 32   GLUCOSE RANDOM mg/dL 109     Results from last 7 days   Lab Units 05/10/22  0913   INR  1 19             Results from last 7 days   Lab Units 05/11/22  0527 05/10/22  0913 05/10/22  0912   LACTIC ACID mmol/L  --   --  0 8   PROCALCITONIN ng/ml 0 08 0 07  --        Lines/Drains:  Invasive Devices  Report    Peripheral Intravenous Line  Duration           Peripheral IV 05/10/22 Right Wrist 1 day                  Telemetry:  Telemetry Orders (From admission, onward)             48 Hour Telemetry Monitoring  Continuous x 48 hours        References:    Telemetry Guidelines   Question:  Reason for 48 Hour Telemetry  Answer:  Arrhythmias Requiring Medical Therapy (eg  SVT, Vtach/fib, Bradycardia, Uncontrolled A-fib)                 Telemetry Reviewed: Atrial fibrillation  HR averaging 60s  Indication for Continued Telemetry Use: No indication for continued use  Will discontinue  Imaging: Reviewed radiology reports from this admission including: ECHO and Personally reviewed the following imaging: ECHO    Recent Cultures (last 7 days):   Results from last 7 days   Lab Units 05/10/22  1250 05/10/22  0915 05/10/22  0912   BLOOD CULTURE   --  No Growth at 24 hrs  No Growth at 24 hrs     LEGIONELLA URINARY ANTIGEN  Negative  --   --        Last 24 Hours Medication List:   Current Facility-Administered Medications   Medication Dose Route Frequency Provider Last Rate    albuterol  2 5 mg Nebulization 4x Daily PRN Padmaja Aden MD     Valaria Reil [START ON 5/12/2022] amLODIPine  2 5 mg Oral Daily Reece Cortez MD      apixaban  5 mg Oral BID Padmaja Aden MD      atorvastatin  40 mg Oral Daily With Willy Jensen MD      azithromycin  500 mg Oral Q24H Padmaja Aden MD      benzonatate  100 mg Oral TID PRN Padmaja Aden MD      cefTRIAXone  2,000 mg Intravenous Q24H Padmaja Aden MD 2,000 mg (05/11/22 1159)    [START ON 5/12/2022] furosemide  40 mg Oral Daily Reece Cortez MD      guaiFENesin  600 mg Oral Q12H [address] Padmaja Aden MD      ondansetron  4 mg Intravenous Q6H PRN Padmaja Aden MD      predniSONE  40 mg Oral Daily Karyle Mater, MD      thiamine  100 mg Oral Daily Karyle Mater, MD          Today, Patient Was Seen By: Linda Carney MD    **Please Note: This note may have been constructed using a voice recognition system  **

## 2022-05-11 NOTE — ASSESSMENT & PLAN NOTE
As suggestive on CXR  Ruled out with negative CT chest, procalcitonin negative  Suspect tracheobronchitis with dyspnea, diffused wheezing  Continue Abx coverage x3 days, then discontinue  Prednisone 40 mg daily x5 days

## 2022-05-11 NOTE — PHYSICAL THERAPY NOTE
PT EVALUATION    Pt  Name: Sawyre Chan  Pt  Age: 82 y o  MRN: [id number]  LENGTH OF STAY: 1      Admitting Diagnoses:   Cough [R05 9]  CHF (congestive heart failure) (HCC) [I50 9]  Atypical pneumonia [J18 9]  Elevated troponin [R77 8]  Fever [R50 9]  Elevated brain natriuretic peptide (BNP) level [R79 89]    History reviewed  No pertinent past medical history  Past Surgical History:   Procedure Laterality Date    CHOLECYSTECTOMY      REPLACEMENT TOTAL KNEE Left 2008       Imaging Studies:  CT chest wo contrast   Final Result by Cash Gross MD (05/10 2209)      No acute focal pneumonia  Workstation performed: [id number]         XR chest portable - 1 view   Final Result by Malika Garcia MD (05/10 1009)      Increased bilateral interstitial lung markings  Differential includes atypical pneumonia, mild interstitial pulmonary edema, among other differentials  Enlarged cardiac silhouette, likely due to cardiomegaly  The study was marked in [facility] for immediate notification  Workstation performed: [id number]               05/11/22 1141   PT Last Visit   PT Visit Date 05/11/22   Note Type   Note type Evaluation   Pain Assessment   Pain Score No Pain   Restrictions/Precautions   Other Precautions Telemetry;O2;Fall Risk  (2L O2 NC)   Home Living   Type of [address] [place] [address] Multi-level;1/2 bath on main level; Laundry in basement;Stairs to enter with rails  (1STE w/ HR; FOS to basement; stair glide to 2nd flr bed/bath)   Bathroom Shower/Tub Walk-in shower   Bathroom Toilet Standard   Bathroom Equipment Grab bars in shower; Tub transfer bench;Grab bars around toilet   Home Equipment Walker;Cane;Wheelchair-manual;Other (Comment); Stair glide  (rollator)   Prior Function   Level of [place] Independent with ADLs and functional mobility  (w/o AD household amb; w/ rollator for community amb)   Lives With Alone   Receives Help From Family  (son lives house next to hers)   ADL Assistance Independent   Falls in the last 6 months 0   Vocational Retired   Comments (-) ; family provides transportation & food; has a cleaning lady every other week   General   Additional Pertinent History Amnestic MCI (mild cognitive impairment with memory loss)   Family/Caregiver Present No   Cognition   Overall Cognitive Status WFL   Arousal/Participation Alert   Orientation Level Oriented to person;Oriented to place;Oriented to time   Following Commands Follows one step commands without difficulty   Comments pt pleasant & cooperative   Subjective   Subjective Pt agreeable to PT eval    RUE Assessment   RUE Assessment WFL  (4/5 grossly)   LUE Assessment   LUE Assessment WFL  (4/5 grossly)   RLE Assessment   RLE Assessment WFL  (4/5 grossly)   LLE Assessment   LLE Assessment WFL  (4/5 grossly)   Coordination   Movements are Fluid and Coordinated 1   Sensation WFL   Bed Mobility   Supine to Sit Unable to assess   Sit to Supine Unable to assess   Additional Comments pt OOB in chair pre & post session   Transfers   Sit to Stand 5  Supervision   Additional items Armrests; Increased time required;Verbal cues   Stand to Sit 5  Supervision   Additional items Armrests; Increased time required;Verbal cues   Additional Comments cues for techniques & safety   Ambulation/Elevation   Gait pattern Decreased foot clearance; Excessively slow; Short stride   Gait Assistance 5  Supervision   Additional items Verbal cues   Assistive Device Rolling walker   Distance 80'x1   Balance   Static Sitting Good   Dynamic Sitting Fair +   Static Standing Fair   Dynamic Standing Fair -   Ambulatory Fair -   Activity Tolerance   Activity Tolerance Patient tolerated treatment well   Nurse Made Aware RN Katie   Assessment   Prognosis Good   Problem List Decreased strength;Decreased endurance; Impaired balance;Decreased mobility   Assessment Pt  87 y  o female presented with generalized weakness and confusion as well as respiratory symptoms  Pt admitted for Respiratory insufficiency w/ atypical pneumonia, CHF exacerbation, acute metabolic encephalopathy & thrombocytopenia  Pt referred to PT for mobility assessment & D/C planning  Please see above for information re: home set-up & PLOF as well as objective findings during PT assessment  PTA, pt reports being I w/o AD household amb & w/ rollator for community amb  On eval, pt functioning minimally below baseline hence will continue skilled PT to improve function & safety  Pt require S for most functional mobility + cues for techniques & safety  Gait slow w/ dec foot clearance but no gross LOB noted  The patient's AM-PAC Basic Mobility Inpatient Short Form Raw Score is 18  A Raw score of greater than 16 suggests the patient may benefit from discharge to home  Please also refer to the recommendation of the Physical Therapist for safe discharge planning  From PT standpoint, will anticipate good progress in PT for safe D/C to home  Will recommend HHPT & family support at D/C  No SOB & dizziness reported t/o session  Pt on 2L O2 upon my arrival w/ resting SpO2 93%  Pt not on home O2 at baseline hence trialed off during activity  SpO2 90% at RA after amb  No SOB & dizziness reported t/o session  Nsg staff most recent vital signs as follows: /90 (BP Location: Left arm)   Pulse 79   Temp (!) 97 °F (36 1 °C) (Temporal)   Resp 18   Ht 5' 3" (1 6 m)   Wt 75 3 kg (166 lb)   SpO2 94%   BMI 29 41 kg/m²   At end of session, pt OOB in chair in stable condition, call bell & phone in reach  Fall precautions reinforced w/ good understanding  CM to follow  Nsg staff to continue to mobilized pt (OOB in chair for all meals & ambulate in room/unit) as tolerated to prevent further decline in function  Nsg notified     Barriers to Discharge Inaccessible home environment;Decreased caregiver support   Barriers to Discharge Comments home alone; (+) stairs   Goals   Patient Goals to go home   STG Expiration Date 05/18/22   Short Term Goal #1 Goals to be met in 7 days; pt will be able to: 1) inc strength & balance by 1/2 grade to improve overall functional mobility & dec fall risk; 2) inc bed mobility to modified I for pt to be able to get in/OOB safely w/ proper techniques 100% of the time, to dec caregiver burden & safely function at home; 3) inc transfers to modified I for pt to transition safely from one surface to another w/o % of the time, to dec caregiver burden & safely function at home; 4) inc amb w/ appropriate AD approx  >80' w/ modified I for pt to ambulate household distances w/o any % of the time, to dec caregiver burden & safely function at home; 5) negotiate stairs w/ modified I for inc safety during stair mgt inside/outside of home & dec caregiver burden; 6) pt/caregiver ed   PT Treatment Day 0   Plan   Treatment/Interventions Functional transfer training;LE strengthening/ROM; Elevations; Therapeutic exercise; Endurance training;Patient/family training;Gait training;Bed mobility;Spoke to nursing   PT Frequency 3-5x/wk   Recommendation   PT Discharge Recommendation Home with home health rehabilitation   Equipment Recommended Other (Comment); Echo Camacho  (has rollator at home)   [place] 8 in Bed Without Bedrails 3   Lying on Back to Sitting on Edge of Flat Bed 3   Moving Bed to Chair 3   Standing Up From Chair 3   Walk in Room 3   Climb 3-5 Stairs 3   Basic Mobility Inpatient Raw Score 18   Basic Mobility Standardized Score 41 05   Highest Level Of Mobility   JH-HLM Goal 6: Walk 10 steps or more   JH-HLM Highest Level of Mobility 7: Walk 25 feet or more   JH-HLM Goal Achieved Yes   End of Consult   Patient Position at End of Consult Bedside chair; All needs within reach   End of Consult Comments Pt in stable condition at end of session     Hx/personal factors: co-morbidities, inaccessible home, home alone, advanced age, telemetry, use of AD, fall risk, and O2  Examination: dec balance, dec endurance, dec amb, risk for falls  Clinical: unpredictable (ongoing medical status, abnormal lab values, and risk for falls)  Complexity: high    Hanna Khan, PT

## 2022-05-11 NOTE — SPEECH THERAPY NOTE
Speech Language/Pathology  Speech/Language Pathology  Assessment    Patient Name: Shi Zhou  UYKGS'F Date: 5/11/2022     Problem List  Principal Problem:    Respiratory insufficiency  Active Problems:    Primary hypertension    Atrial fibrillation (HCC)    Amnestic MCI (mild cognitive impairment with memory loss)    Atypical pneumonia    Acute metabolic encephalopathy    Acute on chronic diastolic congestive heart failure ([facility] [address] )    Thrombocytopenia (HCC)    Past Medical History  History reviewed  No pertinent past medical history  Past Surgical History  Past Surgical History:   Procedure Laterality Date    CHOLECYSTECTOMY      REPLACEMENT TOTAL KNEE Left 2008        Bedside Swallow Evaluation:    Summary:  Family at chairside  Pt presents w/ excellent intake  Fed self large pieces of chicken, sweet potato, side salad w/ tomatoes and cucumber, fresh fruit, and ice cream  Drank gingerale, pills w/ gingerale, and some water WNL  Mastication, bolus formation, control, and transfer WNL  Swallows prompt  Laryngeal rise WNL  No cough or wet vocal quality  No overt s/s DANIELLE  Appeared to have increased RR towards end of meal  (? from talking while eating/fatigue)/ Wheeze noted x 1  Spoke w/ nurse  Reported pt had wheezing and increased RR earlier as well  Oral/pharyngeal stages WNL this eval      Recommendations:  Diet: Regular  Liquid:thin  Meds:whole as tolerated  Supervision:none  Positioning:Upright  Strategies: Pt to take PO/Meds only when fully alert and upright  Oral care  Aspiration precautions  Reflux precautions  Eval only, No f/u tx indicated  If no other etiologies are identified for change in status, Consider vbs to r/o silent aspiration  Noted lungs are clear on ST      Consider consult w/:  Nutrition    H&P/Admit info/ pertinent provider notes: (PMH noted above)  Chief Complaint: generalized weakness, respiratory symptoms  History of Present Illness:  Shi Zhou is a 82 y o  female with a PMH of AFib on anticoagulation with Eliquis, cognitive decline, hypertension hyperlipidemia who presents with generalized weakness and confusion as well as respiratory symptoms noted by patient's family  The patient apparently was noted for rhinorrhea and cough since Wednesday intermittently improving symptomatic management including Mucinex and Robitussin  However, this morning the patient was found unsteady, confused and wheezing, she also was noted to be warm to touch per patient's daughter  The patient is currently on 2 L nasal cannula saturating and mid 90s  She states that her shortness of breath is better  At home she sleeps flat with 1 pillow and has not changed this recently  There is no history of smoking or lung disease  No known infectious contacts  The patient denies chest pain or palpitations  Denies GI or urinary symptoms, denies lower extremity edema  Her appetite is normal and she had a normal meal last night consisting of steak and salad, she does not follow strict low-sodium diet, however, does not add salt to food  Special Studies:  Ct chest:  FINDINGS:  LUNGS:  Lungs are clear  There is no tracheal or endobronchial lesion  PLEURA:  Unremarkable  HEART/GREAT VESSELS: Heart is unremarkable for patient's age  No thoracic aortic aneurysm  MEDIASTINUM AND LEN:  Subcentimeter reactive lymph nodes  CHEST WALL AND LOWER NECK:  Unremarkable  VISUALIZED STRUCTURES IN THE UPPER ABDOMEN:  Unremarkable  OSSEOUS STRUCTURES:  No acute fracture or destructive osseous lesion  IMPRESSION:  No acute focal pneumonia  Previous VBS:  none    Patient's goal: none stated    Did the pt report pain? no  If yes, was nursing notified/was it addressed? na    Reason for consult:  R/o aspiration  Determine safest and least restrictive diet  H/o neurological disease  respiratory compromise    Precautions:  Fall    Food allergies:  none  Current diet:  regular  Premorbid diet[de-identified]  regular  O2 requirement:  2l nc  Voice/Speech:  wnl  Social:  Lives alone  Family close and spend a lot of time w/ her  Follows commands:  yes                       Cognitive Status:  Pt was last seen for OP ST for cog tx 6/21/21, called 7/26/21 for f/u but pt did not f/u     Oral mech exam:  Dentition:natural  Labial strength and ROM:+  Lingual strength and ROM:+  Mandibular strength and ROM:+  Secretion management:+  Volitional cough:+  Volitional swallow:  Esophageal stage:  No s/s reported    Results d/w:  Pt, nursing, family

## 2022-05-11 NOTE — ASSESSMENT & PLAN NOTE
This is an 83 yo F with PMH significant for a fib on AC with Eliquis, dementia and HTN who presents with mild confusion from baseline and respiratory symptoms including cough and rhinorrhea for almost 1 week worsening today  Most of history obtained from daughter at the bedside due to patient's underlying cognitive decline  Patient was also noted for feeling hot to touch ? fever, "wheezing and crackles" by the family   No diet changes, no known infection in family members  · Initial workup suggestive of CHF exacerbation with elevated pro-BNP and pulmonary edema on portable CXR, differentials include atypical pneumonia  · Received ceftriaxone, azithromycin was added - pneumonia workup otherwise negative with pneumonia ruled out, suspect bronchitis, trial of prednisone, short course of antibiotics  · 2D echo reviewed, preserved EF, left ventricular hypertrophy  · Received IV Lasix, will transition to oral Lasix on discharge  · Anticipate discharge in 24-48 hours

## 2022-05-11 NOTE — RESTORATIVE TECHNICIAN NOTE
Restorative Technician Note      Patient Name: Mariza Rowley     Restorative Tech Visit Date: 5/11/2022  Note Type: Mobility  Patient Position Upon Consult: Seated edge of bed  Activity Performed: Ambulated  Assistive Device: Roller walker (with O2)  Patient Position at End of Consult: Bedside chair;  All needs within reach

## 2022-05-11 NOTE — PROGRESS NOTES
CARDIOLOGY INPATIENT FOLLOW-UP PROGRESS NOTE  *-*-*-*-*-*-*-*-*-*-*-*-*-*-*-*-*-*-*-*-*-*-*-*-*-*-*-*-*-*-*-*-*-*-*-*-*-*-*-*-*-*-*-*-*-*-*-*-*-*-*-*-*-*-  [id number] DATE: 05/11/22 3:10 PM   AUTHOR: Memo Ashby MD  PATIENT: Reshma Luevano   1935    [id number]   82 y o    female  INPATIENT HOSPITALIST PHYSICIAN: Torres Antonio MD  DATE OF ADMISSION: 5/10/2022  8:44 AM; LENGTH OF STAY: 1 days  *-*-*-*-*-*-*-*-*-*-*-*-*-*-*-*-*-*-*-*-*-*-*-*-*-*-*-*-*-*-*-*-*-*-*-*-*-*-*-*-*-*-*-*-*-*-*-*-*-*-*-*-*-*-    CARDIOLOGY ASSESSMENT:  1  Hypoxemic respiratory failure  2  Asymmetric left ventricular hypertrophy with severe hypertrophy of apical region  3  Mild pulmonary hypertension  4  Moderate to marked biatrial enlargement  5  Permanent atrial fibrillation, slow ventricular response at night  6  Primary hypertension  7  Dyslipidemia  8  Peripheral venous insufficiency    *-*-*-*-*-*-*-*-*-*-*-*-*-*-*-*-*-*-*-*-*-*-*-*-*-*-*-*-*-*-*-*-*-*-*-*-*-*-*-*-*-*-*-*-*-*-*-*-*-*-*-*-*-*-    CURRENT CARDIAC MEDICATIONS:  Eliquis 5 mg twice daily, atorvastatin 40 mg daily, furosemide 40 mg IV daily    PERTINENT LABS AND OTHER INVESTIGATIONS:  Sodium 140, potassium 3 9, creatinine 0 84, BUN 18, normal procalcitonin level, stable hemoglobin and hematocrit, platelet count 93  Urinalysis significant for moderate amount of blood and trace leukocytes  NT proBNP level July 5, 2004 at presentation    ECHOCARDIOGRAM AND OTHER CARDIAC TESTS RESULTS:  Echocardiogram yes this morning showed marked asymmetric left ventricular hypertrophy involving the apex and surrounding regions, normal left ventricular systolic function, indeterminate diastolic function, moderate to marked biatrial enlargement, aortic valve sclerosis, mild pulmonary hypertension with estimated RVSP/PASP 44 mmHg, no pericardial effusion      *-*-*-*-*-*-*-*-*-*-*-*-*-*-*-*-*-*-*-*-*-*-*-*-*-*-*-*-*-*-*-*-*-*-*-*-*-*-*-*-*-*-*-*-*-*-*-*-*-*-*-*-*-*-  Patient is doing clinically better  Her acute respiratory distress and wheezing may have been precipitated by bout of bronchitis  Currently she appears to be more or less euvolemic  Echocardiogram today significant for mild pulmonary hypertension and other findings as noted above  PLAN:  -- will transition to amlodipine 2 5 mg daily and furosemide 40 mg p o  Daily from tomorrow  -- Continue Eliquis  -- Continue weaning off of O2  -- Incentive spirometry is advised  -- May likely be able to be discharged to home tomorrow if off O2       *-*-*-*-*-*-*-*-*-*-*-*-*-*-*-*-*-*-*-*-*-*-*-*-*-*-*-*-*-*-*-*-*-*-*-*-*-*-*-*-*-*-*-*-*-*-*-*-*-*-*-*-*-*-  INTERVAL CHANGES / HISTORY OF PRESENT ILLNESS:   No acute events overnight  Patient today is feeling much better with no confusion  Denies chest pain shortness of breath dizziness  Had some wheezing which is improved  Did walk around the unit with physical therapy  *-*-*-*-*-*-*-*-*-*-*-*-*-*-*-*-*-*-*-*-*-*-*-*-*-*-*-*-*-*-*-*-*-*-*-*-*-*-*-*-*-*-*-*-*-*-*-*-*-*-*-*-*-*    PERTINENT REVIEW OF SYMPTOMS:  As noted above in HPI    *-*-*-*-*-*-*-*-*-*-*-*-*-*-*-*-*-*-*-*-*-*-*-*-*-*-*-*-*-*-*-*-*-*-*-*-*-*-*-*-*-*-*-*-*-*-*-*-*-*-*-*-*-*-  VITAL SIGNS:  Vitals:    22 0250 22 0600 22 0659 22 0726   BP: 143/65 122/67 122/67 130/90   BP Location: Left arm   Left arm   Pulse: 73   79   Resp: 20   18   Temp: (!) 97 4 °F (36 3 °C)   (!) 97 °F (36 1 °C)   TempSrc: Temporal   Temporal   SpO2: 95%   94%   Weight:  75 4 kg (166 lb 3 6 oz) 75 3 kg (166 lb)    Height:  5' 3" (1 6 m) 5' 3" (1 6 m)       Temp (24hrs), Av 1 °F (36 7 °C), Min:97 °F (36 1 °C), Max:99 6 °F (37 6 °C)  Current: Temperature: (!) 97 °F (36 1 °C)    Weight    05/10/22 0849 05/10/22 1621 22 0600 22 0659   Weight: 79 kg (174 lb 2 6 oz) 76 7 kg (169 lb 1 5 oz) 75 4 kg (166 lb 3 6 oz) 75 3 kg (166 lb)      Body mass index is 29 41 kg/m²   Wt Readings from Last 3 Encounters:   22 75 3 kg (166 lb)   11/11/21 72 2 kg (159 lb 3 2 oz)   10/04/21 72 7 kg (160 lb 4 oz)      Intake/Output Summary (Last 24 hours) at 5/11/2022 1510  Last data filed at 5/11/2022 1001  Gross per 24 hour   Intake 720 ml   Output 2500 ml   Net -1780 ml          *-*-*-*-*-*-*-*-*-*-*-*-*-*-*-*-*-*-*-*-*-*-*-*-*-*-*-*-*-*-*-*-*-*-*-*-*-*-*-*-*-*-*-*-*-*-*-*-*-*-*-*-*-*-  PHYSICAL EXAM:  General Appearance:    Alert, cooperative, in no respiratory distress, appears stated age  has very mild converse a shunt dyspnea   Head, Eyes, ENT:    No obvious abnormality, moist mucous mebranes  Neck:   Supple, no carotid bruit or JVD   Back:     Symmetric, no curvature  Lungs:     Respirations unlabored  decreased breath sounds without any wheeze rhonchi or crackles,    Chest wall:    No tenderness or deformity   Heart:    Regular rate and rhythm, distant heart sounds, unable to appreciate murmur   Abdomen:     Soft, non-tender, No obvious masses, or organomegaly   Extremities:   Extremities warm, no cyanosis or edema    Skin:    moderate to marked venous static abnormalities of lower extremity  *-*-*-*-*-*-*-*-*-*-*-*-*-*-*-*-*-*-*-*-*-*-*-*-*-*-*-*-*-*-*-*-*-*-*-*-*-*-*-*-*-*-*-*-*-*-*-*-*-*-*-*-*-*-  TELEMETRY, LAST ECG:  Telemetry reviewed    Atrial fibrillation with controlled ventricular response with periods of bradycardia at night   Results for orders placed or performed during the hospital encounter of 05/10/22   ECG 12 lead   Result Value    Ventricular Rate 65    Atrial Rate 357    ND Interval     QRSD Interval 82    QT Interval 354    QTC Interval 368    P Axis     QRS Axis 63    T Wave Axis 106    Narrative    Atrial fibrillation  Possible Septal infarct (cited on or before 10-MAY-2022)  Abnormal ECG  When compared with ECG of 10-MAY-2022 09:07,  Atrial fibrillation has replaced Atrial flutter  Confirmed by Felipe Morin ([id number]) on 5/10/2022 1:05:14 PM *-*-*-*-*-*-*-*-*-*-*-*-*-*-*-*-*-*-*-*-*-*-*-*-*-*-*-*-*-*-*-*-*-*-*-*-*-*-*-*-*-*-*-*-*-*-*-*-*-*-*-*-*-*-      CURRENT SCHEDULED MEDICATIONS:    Current Facility-Administered Medications:     albuterol inhalation solution 2 5 mg, 2 5 mg, Nebulization, 4x Daily PRN, Cecilia Felix MD, 2 5 mg at 05/10/22 2012    apixaban (ELIQUIS) tablet 5 mg, 5 mg, Oral, BID, Cecilia Felix MD, 5 mg at 05/11/22 0816    atorvastatin (LIPITOR) tablet 40 mg, 40 mg, Oral, Daily With Ajay Duran MD, 40 mg at 05/10/22 1658    azithromycin (ZITHROMAX) tablet 500 mg, 500 mg, Oral, Q24H, Diana Kruger MD, 500 mg at 05/11/22 1409    benzonatate (TESSALON PERLES) capsule 100 mg, 100 mg, Oral, TID PRN, Cecilia Felix MD    ceftriaxone (ROCEPHIN) 2 g/50 mL in dextrose IVPB, 2,000 mg, Intravenous, Q24H, Diana Kruger MD, Last Rate: 100 mL/hr at 05/11/22 1159, 2,000 mg at 05/11/22 1159    furosemide (LASIX) injection 40 mg, 40 mg, Intravenous, Daily, Diana Kruger MD, 40 mg at 05/11/22 0816    guaiFENesin (MUCINEX) 12 hr tablet 600 mg, 600 mg, Oral, Q12H [facility], Diana Kruger MD, 600 mg at 05/11/22 0816    ondansetron (ZOFRAN) injection 4 mg, 4 mg, Intravenous, Q6H PRN, Cecilia Felix MD    predniSONE tablet 40 mg, 40 mg, Oral, Daily, Diana Kruger MD, 40 mg at 05/11/22 1158    thiamine tablet 100 mg, 100 mg, Oral, Daily, Cecilia Felix MD, 100 mg at 05/11/22 0816 ALLERGIES:  Allergies   Allergen Reactions    Morphine         *-*-*-*-*-*-*-*-*-*-*-*-*-*-*-*-*-*-*-*-*-*-*-*-*-*-*-*-*-*-*-*-*-*-*-*-*-*-*-*-*-*-*-*-*-*-*-*-*-*-*-*-*-*  LABORATORY DATA:  I have personally reviewed pertinent labs      CMP:  Results from last 7 days   Lab Units 05/11/22  0527 05/10/22  0912   SODIUM mmol/L 140 134*   POTASSIUM mmol/L 3 9 4 2   CHLORIDE mmol/L 106 102   CO2 mmol/L 25 21   BUN mg/dL 18 18   CREATININE mg/dL 0 84 0 91   CALCIUM mg/dL 8 9 9 0   ALK PHOS U/L 62 78   ALT U/L 17 17   AST U/L 32 27               Cardiac Profile: Results from last 7 days   Lab Units 05/10/22  0912   NT-PRO BNP pg/mL 7,504*                CBC:   Results from last 7 days   Lab Units 05/11/22  0527 05/10/22  0912   WBC Thousand/uL 5 16 5 99   HEMOGLOBIN g/dL 13 9 14 5   HEMATOCRIT % 41 3 42 0   PLATELETS Thousands/uL 93* 107*     PT/INR: No results found for: PT, INR, Microbiology:   Results from last 7 days   Lab Units 05/10/22  1250 05/10/22  0915 05/10/22  0912   BLOOD CULTURE   --  No Growth at 24 hrs  No Growth at 24 hrs  STREP PNEUMONIAE ANTIGEN, URINE  Negative  --   --    LEGIONELLA URINARY ANTIGEN  Negative  --   --           *-*-*-*-*-*-*-*-*-*-*-*-*-*-*-*-*-*-*-*-*-*-*-*-*-*-*-*-*-*-*-*-*-*-*-*-*-*-*-*-*-*-*-*-*-*-*-*-*-*-*-*-*-*-  IMAGING STUDIES REPORTS: Imaging studies results reviewed    No valid procedures specified  No Chest XR results available for this patient  *-*-*-*-*-*-*-*-*-*-*-*-*-*-*-*-*-*-*-*-*-*-*-*-*-*-*-*-*-*-*-*-*-*-*-*-*-*-*-*-*-*-*-*-*-*-*-*-*-*-*-*-*-*-    No results found for this or any previous visit  No results found for this or any previous visit  No results found for this or any previous visit  No results found for this or any previous visit         *-*-*-*-*-*-*-*-*-*-*-*-*-*-*-*-*-*-*-*-*-*-*-*-*-*-*-*-*-*-*-*-*-*-*-*-*-*-*-*-*-*-*-*-*-*-*-*-*-*-*-*-*-*-  SIGNATURES:   [unfilled]   Shaun Pittman MD

## 2022-05-11 NOTE — UTILIZATION REVIEW
Initial Clinical Review    Admission: Date/Time/Statement:   Admission Orders (From admission, onward)     Ordered        05/10/22 1126  Inpatient Admission  Once                      Orders Placed This Encounter   Procedures    Inpatient Admission     Standing Status:   Standing     Number of Occurrences:   1     Order Specific Question:   Level of Care     Answer:   Med Surg [16]     Order Specific Question:   Estimated length of stay     Answer:   More than 2 Midnights     Order Specific Question:   Certification     Answer:   I certify that inpatient services are medically necessary for this patient for a duration of greater than two midnights  See H&P and MD Progress Notes for additional information about the patient's course of treatment  ED Arrival Information     Expected   -    Arrival   5/10/2022 08:44    Acuity   Emergent            Means of arrival   Ambulance    Escorted by   [facility]    Service   Hospitalist    Admission type   Emergency            Arrival complaint   fever           Chief Complaint   Patient presents with    Fever - 75 years or older     fever of 101 7, noted to be confused by family, tachypnea noted by EMS improved with 2L nasal cannula  pt reports cough and dyspnea       Initial Presentation: 82 y o  female presents to the ED from home with c/o unsteadiness, wheezing, fatigue, warm to touch,  generalized weakness, confusion, respiratory symptoms - rhinorrhea, cough since 5/4 treated with Mucinex, Robitussin  PMH: AFib on anticoagulation with Eliquis, cognitive decline, HTN, HLD  In the ED she was put on oxygen with improvement in SOB  Has increased troponins, proBNP, lipase  She was treated with IV antibiotics, ASA, Thiamine, Robitussin, IV Lasix  Imaging shows increased bilat interstitial lung markings  On exam she is wheezing    She is admitted to INPATIENT status with Respiratory InsufficiencyAtypical Pneumonia- likely CHF exacerbation - IV antibiotics, nebs, Pulmonary consult  Acute on chronic dCHF  - IV Lasix 40 mg daily, Echo, Cardio consult  Acute metabolic Encephalopathy - treat underlying symptoms, monitor  5/10 Cardio Consult - perm A fib- rate controlled, diastolic dysfunction  On exam, comfortable on 1 5 L oxygen, no JVD, bilat Exp rhonchi, no peripheral edema  Continue IV Lasix, Echo ordered  Imaging negative  Date: 5/11   Day 2:   On 2L NC oxygen  Afebrile, BP WNL, heart rate controlled  Echo completed - mild Pulm HTN  Pt is improving today  Respiratory distress poss r/t bronchitis  Wheezing improving  is ambulating with PT  Will change to Amlodipine 2 5 mg daily and Lasix 40 mg daily  Wean off oxygen        ED Triage Vitals   Temperature Pulse Respirations Blood Pressure SpO2   05/10/22 0849 05/10/22 0849 05/10/22 0849 05/10/22 0849 05/10/22 0849   99 6 °F (37 6 °C) 68 (!) 28 161/99 94 %      Temp Source Heart Rate Source Patient Position - Orthostatic VS BP Location FiO2 (%)   05/10/22 0849 05/10/22 0849 05/10/22 1015 05/10/22 1015 --   Oral Monitor Sitting Right arm       Pain Score       05/10/22 0849       No Pain          Wt Readings from Last 1 Encounters:   05/11/22 75 3 kg (166 lb)     Additional Vital Signs:   05/11/22 0726 97 °F (36 1 °C) Abnormal  79 18 130/90 -- 94 % 28 2 L/min Nasal cannula Lying   05/11/22 0659 -- -- -- 122/67 -- -- -- -- -- --   05/11/22 0600 -- -- -- 122/67 -- -- -- -- -- --   05/11/22 0250 97 4 °F (36 3 °C) Abnormal  73 20 143/65 94 95 % 28 2 L/min Nasal cannula Lying   05/10/22 2311 99 6 °F (37 6 °C) 65 20 150/70 101 95 % 28 2 L/min Nasal cannula Lying   05/10/22 2012 -- -- -- -- -- 96 % 28 2 L/min Nasal cannula --   05/10/22 1916 98 6 °F (37 °C) 60 20 140/74 99 95 % 28 2 L/min Nasal cannula Lying   05/10/22 1716 98 °F (36 7 °C) 83 22 164/86 -- 96 % 28 2 L/min Nasal cannula Lying   05/10/22 1621 98 °F (36 7 °C) 65 22 153/78 101 97 % 28 2 L/min Nasal cannula Sitting   05/10/22 1545 -- 69 28 Abnormal  131/63 -- 96 % 28 2 L/min Nasal cannula Sitting   05/10/22 1209 -- 64 28 Abnormal  130/60 87 97 % -- -- None (Room air) Lying   05/10/22 1015 -- 64 29 Abnormal  124/56 81 92 % -- -- None (Room air) Sitting     Pertinent Labs/Diagnostic Test Results:     5/10 ECG - Atrial flutter with variable A-V block  RSR' or QR pattern in V1 suggests right ventricular conduction delay  Cannot rule out Septal infarct , age undetermined  Abnormal ECG    5/10 ECG - Atrial fibrillation  Possible Septal infarct (cited on or before 10-MAY-2022)  Abnormal ECG    5/11 Echo - Technically fair but adequate transthoracic echocardiogram study  1  Relatively small left ventricular cavity size, marked asymmetric left ventricular hypertrophy left ventricular apex and surrounding region and moderate hypertrophy of other left ventricular walls  Normal left ventricular systolic function  Indeterminate diastolic dysfunction grade  Ejection fraction is greater than 60%  2  Normal right ventricular size and systolic function  3  Moderate to marked biatrial enlargement  4  Aortic valve sclerosis, no aortic valve stenosis or regurgitation  5  Mitral valve sclerosis, mild mitral valve regurgitation  6  Mild-to-moderate tricuspid valve regurgitation  7  Mild pulmonary hypertension  Estimated RVSP/PASP 44 mmHg  8  No pericardial effusion  No previous echocardiogram is available for comparison  Follow-up study with use of definity contrast can be considered for reliable estimation of left ventricular ejection fraction although it appears to be normal       CT chest wo contrast   Final Result by Dimitry Garrido MD (05/10 8439)      No acute focal pneumonia  XR chest portable - 1 view   Final Result by Gus Uriarte MD (05/10 1007)      Increased bilateral interstitial lung markings  Differential includes atypical pneumonia, mild interstitial pulmonary edema, among other differentials        Enlarged cardiac silhouette, likely due to cardiomegaly       Results from last 7 days   Lab Units 05/10/22  0920   SARS-COV-2  Negative     Results from last 7 days   Lab Units 05/11/22  0527 05/10/22  0912   WBC Thousand/uL 5 16 5 99   HEMOGLOBIN g/dL 13 9 14 5   HEMATOCRIT % 41 3 42 0   PLATELETS Thousands/uL 93* 107*   NEUTROS ABS Thousands/µL 3 29 4 54         Results from last 7 days   Lab Units 05/11/22  0527 05/10/22  0912   SODIUM mmol/L 140 134*   POTASSIUM mmol/L 3 9 4 2   CHLORIDE mmol/L 106 102   CO2 mmol/L 25 21   ANION GAP mmol/L 9 11   BUN mg/dL 18 18   CREATININE mg/dL 0 84 0 91   EGFR ml/min/1 73sq m 63 57   CALCIUM mg/dL 8 9 9 0     Results from last 7 days   Lab Units 05/11/22  0527 05/10/22  0912   AST U/L 32 27   ALT U/L 17 17   ALK PHOS U/L 62 78   TOTAL PROTEIN g/dL 6 7 7 4   ALBUMIN g/dL 3 1* 3 6   TOTAL BILIRUBIN mg/dL 0 51 0 76         Results from last 7 days   Lab Units 05/11/22  0527 05/10/22  0912   GLUCOSE RANDOM mg/dL 109 108     Results from last 7 days   Lab Units 05/10/22  1325 05/10/22  1120 05/10/22  0912   HS TNI 0HR ng/L  --   --  130*   HS TNI 2HR ng/L  --  154*  --    HSTNI D2 ng/L  --  24*  --    HS TNI 4HR ng/L 162*  --   --    HSTNI D4 ng/L 32*  --   --          Results from last 7 days   Lab Units 05/10/22  0913   PROTIME seconds 14 8*   INR  1 19   PTT seconds 33         Results from last 7 days   Lab Units 05/11/22  0527 05/10/22  0913   PROCALCITONIN ng/ml 0 08 0 07     Results from last 7 days   Lab Units 05/10/22  0912   LACTIC ACID mmol/L 0 8             Results from last 7 days   Lab Units 05/10/22  0912   NT-PRO BNP pg/mL 7,504*             Results from last 7 days   Lab Units 05/10/22  0912   LIPASE u/L 70*     Results from last 7 days   Lab Units 05/10/22  1247   CLARITY UA  Clear   COLOR UA  Yellow   SPEC GRAV UA  1 010   PH UA  5 5   GLUCOSE UA mg/dl Negative   KETONES UA mg/dl Negative   BLOOD UA  Moderate*   PROTEIN UA mg/dl Negative   NITRITE UA  Negative   BILIRUBIN UA  Negative UROBILINOGEN UA E U /dl 0 2   LEUKOCYTES UA  Trace*   WBC UA /hpf 0-1*   RBC UA /hpf 2-4   BACTERIA UA /hpf Occasional   EPITHELIAL CELLS WET PREP /hpf Occasional     Results from last 7 days   Lab Units 05/10/22  1250 05/10/22  0920   STREP PNEUMONIAE ANTIGEN, URINE  Negative  --    LEGIONELLA URINARY ANTIGEN  Negative  --    INFLUENZA A PCR   --  Negative   INFLUENZA B PCR   --  Negative   RSV PCR   --  Negative     Results from last 7 days   Lab Units 05/10/22  0915 05/10/22  0912   BLOOD CULTURE  Received in Microbiology Lab  Culture in Progress  Received in Microbiology Lab  Culture in Progress       ED Treatment:   Medication Administration from 05/10/2022 0844 to 05/10/2022 1609    Date/Time Order Dose Route Action   05/10/2022 1020 ceftriaxone (ROCEPHIN) 2 g/50 mL in dextrose IVPB 2,000 mg Intravenous New Bag   05/10/2022 1208 aspirin tablet 325 mg 325 mg Oral Given   05/10/2022 1208 apixaban (ELIQUIS) tablet 5 mg 5 mg Oral Given   05/10/2022 1208 thiamine tablet 100 mg 100 mg Oral Given   05/10/2022 1453 guaiFENesin (MUCINEX) 12 hr tablet 600 mg 600 mg Oral Given   05/10/2022 1453 furosemide (LASIX) injection 40 mg 40 mg Intravenous Given   05/10/2022 1456 azithromycin (ZITHROMAX) 500 mg in sodium chloride 0 9% 250mL IVPB 500 mg 500 mg Intravenous New Bag        Present on Admission:   Amnestic MCI (mild cognitive impairment with memory loss)   Atrial fibrillation (formerly [facility])   Primary hypertension      Admitting Diagnosis: Cough [R05 9]  CHF (congestive heart failure) (formerly [facility]) [I50 9]  Atypical pneumonia [J18 9]  Elevated troponin [R77 8]  Fever [R50 9]  Elevated brain natriuretic peptide (BNP) level [R79 89]  Age/Sex: 82 y o  female  Admission Orders:  Scheduled Medications:  apixaban, 5 mg, Oral, BID  atorvastatin, 40 mg, Oral, Daily With Dinner  azithromycin, 500 mg, Oral, Q24H  cefTRIAXone, 2,000 mg, Intravenous, Q24H  furosemide, 40 mg, Intravenous, Daily  guaiFENesin, 600 mg, Oral, Q12H [facility]  predniSONE, 40 mg, Oral, Daily  thiamine, 100 mg, Oral, Daily      Continuous IV Infusions:     PRN Meds:  albuterol, 2 5 mg, Nebulization, 4x Daily PRN - x 1 5/10  benzonatate, 100 mg, Oral, TID PRN  ondansetron, 4 mg, Intravenous, Q6H PRN    Tele  Daily wt  Oxygen via NC  Sputum culture  IP CONSULT TO CARDIOLOGY  IP CONSULT TO CASE MANAGEMENT    Network Utilization Review Department  ATTENTION: Please call with any questions or concerns to [phone] and carefully listen to the prompts so that you are directed to the right person  All voicemails are confidential   Stephanie Brown all requests for admission clinical reviews, approved or denied determinations and any other requests to dedicated fax number below belonging to the campus where the patient is receiving treatment   List of dedicated fax numbers for the Facilities:  [address] DENIALS (Administrative/Medical Necessity) [phone]   [address] (Maternity/NICU/Pediatrics) [phone] [address]  [address] [address] [place] [address] Ashkan Mendoza [address] [facility] [place] [address] [phone]

## 2022-05-11 NOTE — PLAN OF CARE
Problem: MOBILITY - ADULT  Goal: Maintain or return to baseline ADL function  Description: INTERVENTIONS:  -  Assess patient's ability to carry out ADLs; assess patient's baseline for ADL function and identify physical deficits which impact ability to perform ADLs (bathing, care of mouth/teeth, toileting, grooming, dressing, etc )  - Assess/evaluate cause of self-care deficits   - Assess range of motion  - Assess patient's mobility; develop plan if impaired  - Assess patient's need for assistive devices and provide as appropriate  - Encourage maximum independence but intervene and supervise when necessary  - Involve family in performance of ADLs  - Assess for home care needs following discharge   - Consider OT consult to assist with ADL evaluation and planning for discharge  - Provide patient education as appropriate  Outcome: Progressing  Goal: Maintains/Returns to pre admission functional level  Description: INTERVENTIONS:  - Perform BMAT or MOVE assessment daily    - Set and communicate daily mobility goal to care team and patient/family/caregiver     - Collaborate with rehabilitation services on mobility goals if consulted  - Dangle patient 3 times a day  - Stand patient 3 times a day  - Ambulate patient 3 times a day  - Out of bed to chair 3 times a day   - Out of bed for meals 3 times a day  - Out of bed for toileting  - Record patient progress and toleration of activity level   Outcome: Progressing     Problem: Potential for Falls  Goal: Patient will remain free of falls  Description: INTERVENTIONS:  - Educate patient/family on patient safety including physical limitations  - Instruct patient to call for assistance with activity   - Consult OT/PT to assist with strengthening/mobility   - Keep Call bell within reach  - Keep bed low and locked with side rails adjusted as appropriate  - Keep care items and personal belongings within reach  - Initiate and maintain comfort rounds  - Make Fall Risk Sign visible to staff  - Offer Toileting every 2 Hours, in advance of need  - Initiate/Maintain bed alarm  - Obtain necessary fall risk management equipment: bed alarm  - Apply yellow socks and bracelet for high fall risk patients  - Consider moving patient to room near nurses station  Outcome: Progressing     Problem: Prexisting or High Potential for Compromised Skin Integrity  Goal: Skin integrity is maintained or improved  Description: INTERVENTIONS:  - Identify patients at risk for skin breakdown  - Assess and monitor skin integrity  - Assess and monitor nutrition and hydration status  - Monitor labs   - Assess for incontinence   - Turn and reposition patient  - Assist with mobility/ambulation  - Relieve pressure over bony prominences  - Avoid friction and shearing  - Provide appropriate hygiene as needed including keeping skin clean and dry  - Evaluate need for skin moisturizer/barrier cream  - Collaborate with interdisciplinary team   - Patient/family teaching  - Consider wound care consult   Outcome: Progressing     Problem: PAIN - ADULT  Goal: Verbalizes/displays adequate comfort level or baseline comfort level  Description: Interventions:  - Encourage patient to monitor pain and request assistance  - Assess pain using appropriate pain scale  - Administer analgesics based on type and severity of pain and evaluate response  - Implement non-pharmacological measures as appropriate and evaluate response  - Consider cultural and social influences on pain and pain management  - Notify physician/advanced practitioner if interventions unsuccessful or patient reports new pain  Outcome: Progressing     Problem: INFECTION - ADULT  Goal: Absence or prevention of progression during hospitalization  Description: INTERVENTIONS:  - Assess and monitor for signs and symptoms of infection  - Monitor lab/diagnostic results  - Monitor all insertion sites, i e  indwelling lines, tubes, and drains  - Monitor endotracheal if appropriate and nasal secretions for changes in amount and color  - [place] appropriate cooling/warming therapies per order  - Administer medications as ordered  - Instruct and encourage patient and family to use good hand hygiene technique  - Identify and instruct in appropriate isolation precautions for identified infection/condition  Outcome: Progressing     Problem: DISCHARGE PLANNING  Goal: Discharge to home or other facility with appropriate resources  Description: INTERVENTIONS:  - Identify barriers to discharge w/patient and caregiver  - Arrange for needed discharge resources and transportation as appropriate  - Identify discharge learning needs (meds, wound care, etc )  - Arrange for interpretive services to assist at discharge as needed  - Refer to Case Management Department for coordinating discharge planning if the patient needs post-hospital services based on physician/advanced practitioner order or complex needs related to functional status, cognitive ability, or social support system  Outcome: Progressing     Problem: Knowledge Deficit  Goal: Patient/family/caregiver demonstrates understanding of disease process, treatment plan, medications, and discharge instructions  Description: Complete learning assessment and assess knowledge base    Interventions:  - Provide teaching at level of understanding  - Provide teaching via preferred learning methods  Outcome: Progressing     Problem: CARDIOVASCULAR - ADULT  Goal: Maintains optimal cardiac output and hemodynamic stability  Description: INTERVENTIONS:  - Monitor I/O, vital signs and rhythm  - Monitor for S/S and trends of decreased cardiac output  - Administer and titrate ordered vasoactive medications to optimize hemodynamic stability  - Assess quality of pulses, skin color and temperature  - Assess for signs of decreased coronary artery perfusion  - Instruct patient to report change in severity of symptoms  Outcome: Progressing  Goal: Absence of cardiac dysrhythmias or at baseline rhythm  Description: INTERVENTIONS:  - Continuous cardiac monitoring, vital signs, obtain 12 lead EKG if ordered  - Administer antiarrhythmic and heart rate control medications as ordered  - Monitor electrolytes and administer replacement therapy as ordered  Outcome: Progressing     Problem: RESPIRATORY - ADULT  Goal: Achieves optimal ventilation and oxygenation  Description: INTERVENTIONS:  - Assess for changes in respiratory status  - Assess for changes in mentation and behavior  - Position to facilitate oxygenation and minimize respiratory effort  - Oxygen administered by appropriate delivery if ordered  - Initiate smoking cessation education as indicated  - Encourage broncho-pulmonary hygiene including cough, deep breathe, Incentive Spirometry  - Assess the need for suctioning and aspirate as needed  - Assess and instruct to report SOB or any respiratory difficulty  - Respiratory Therapy support as indicated  Outcome: Progressing

## 2022-05-11 NOTE — PROGRESS NOTES
The azithromycin has / have been converted to Oral per [facility] IV-to-PO Auto-Conversion Protocol for Adults as approved by the Pharmacy and Therapeutics Committee  The patient met all eligible criteria:  3 Age = 25years old   2) Received at least one dose of the IV form   3) Receiving at least one other scheduled oral/enteral medication   4) Tolerating an oral/enteral diet   and did not have any exclusions:   1) Critical care patient   2) Active GI bleed (IF assessing H2RAs or PPIs)   3) Continuous tube feeding (IF assessing cipro, doxycycline, levofloxacin, minocycline, rifampin, or voriconazole)   4) Receiving PO vancomycin (IF assessing metronidazole)   5) Persistent nausea and/or vomiting   6) Ileus or gastrointestinal obstruction   7) Maryanne/nasogastric tube set for continuous suction   8) Specific order not to automatically convert to PO (in the order's comments or if discussed in the most recent Infectious Disease or primary team's progress notes)

## 2022-05-11 NOTE — ASSESSMENT & PLAN NOTE
Underlying cognitive decline, presenting with generalized weakness respiratory symptoms, atypical PNA vs CHF exacerbation  Appears improved and at baseline  Continue to treat underlying conditions, monitor MS/reorient

## 2022-05-11 NOTE — PLAN OF CARE
Problem: PHYSICAL THERAPY ADULT  Goal: Performs mobility at highest level of function for planned discharge setting  See evaluation for individualized goals  Description: Treatment/Interventions: Functional transfer training, LE strengthening/ROM, Elevations, Therapeutic exercise, Endurance training, Patient/family training, Gait training, Bed mobility, Spoke to nursing  Equipment Recommended: Other (Comment), Walker (has rollator at home)       See flowsheet documentation for full assessment, interventions and recommendations  Note: Prognosis: Good  Problem List: Decreased strength, Decreased endurance, Impaired balance, Decreased mobility  Assessment: Pt  87 y  o female presented with generalized weakness and confusion as well as respiratory symptoms  Pt admitted for Respiratory insufficiency w/ atypical pneumonia, CHF exacerbation, acute metabolic encephalopathy & thrombocytopenia  Pt referred to PT for mobility assessment & D/C planning  Please see above for information re: home set-up & PLOF as well as objective findings during PT assessment  PTA, pt reports being I w/o AD household amb & w/ rollator for community amb  On eval, pt functioning minimally below baseline hence will continue skilled PT to improve function & safety  Pt require S for most functional mobility + cues for techniques & safety  Gait slow w/ dec foot clearance but no gross LOB noted  The patient's AM-PAC Basic Mobility Inpatient Short Form Raw Score is 18  A Raw score of greater than 16 suggests the patient may benefit from discharge to home  Please also refer to the recommendation of the Physical Therapist for safe discharge planning  From PT standpoint, will anticipate good progress in PT for safe D/C to home  Will recommend HHPT & family support at D/C  No SOB & dizziness reported t/o session  Pt on 2L O2 upon my arrival w/ resting SpO2 93%  Pt not on home O2 at baseline hence trialed off during activity  SpO2 90% at RA after amb   No SOB & dizziness reported t/o session  Nsg staff most recent vital signs as follows: /90 (BP Location: Left arm)   Pulse 79   Temp (!) 97 °F (36 1 °C) (Temporal)   Resp 18   Ht 5' 3" (1 6 m)   Wt 75 3 kg (166 lb)   SpO2 94%   BMI 29 41 kg/m²   At end of session, pt OOB in chair in stable condition, call bell & phone in reach  Fall precautions reinforced w/ good understanding  CM to follow  Nsg staff to continue to mobilized pt (OOB in chair for all meals & ambulate in room/unit) as tolerated to prevent further decline in function  Nsg notified  Barriers to Discharge: Inaccessible home environment, Decreased caregiver support  Barriers to Discharge Comments: home alone; (+) stairs     PT Discharge Recommendation: Home with home health rehabilitation          See flowsheet documentation for full assessment

## 2022-05-12 VITALS
DIASTOLIC BLOOD PRESSURE: 77 MMHG | OXYGEN SATURATION: 94 % | HEART RATE: 65 BPM | HEIGHT: 63 IN | SYSTOLIC BLOOD PRESSURE: 129 MMHG | RESPIRATION RATE: 18 BRPM | TEMPERATURE: 97.1 F | WEIGHT: 168.65 LBS | BODY MASS INDEX: 29.88 KG/M2

## 2022-05-12 LAB
ANION GAP SERPL CALCULATED.3IONS-SCNC: 9 MMOL/L (ref 4–13)
BASOPHILS # BLD MANUAL: 0 THOUSAND/UL (ref 0–0.1)
BASOPHILS NFR MAR MANUAL: 0 % (ref 0–1)
BUN SERPL-MCNC: 18 MG/DL (ref 5–25)
CALCIUM SERPL-MCNC: 9.7 MG/DL (ref 8.3–10.1)
CHLORIDE SERPL-SCNC: 103 MMOL/L (ref 100–108)
CO2 SERPL-SCNC: 26 MMOL/L (ref 21–32)
CREAT SERPL-MCNC: 0.66 MG/DL (ref 0.6–1.3)
EOSINOPHIL # BLD MANUAL: 0 THOUSAND/UL (ref 0–0.4)
EOSINOPHIL NFR BLD MANUAL: 0 % (ref 0–6)
ERYTHROCYTE [DISTWIDTH] IN BLOOD BY AUTOMATED COUNT: 13.2 % (ref 11.6–15.1)
GFR SERPL CREATININE-BSD FRML MDRD: 80 ML/MIN/1.73SQ M
GLUCOSE SERPL-MCNC: 119 MG/DL (ref 65–140)
HCT VFR BLD AUTO: 42.7 % (ref 34.8–46.1)
HGB BLD-MCNC: 14.5 G/DL (ref 11.5–15.4)
LYMPHOCYTES # BLD AUTO: 1.12 THOUSAND/UL (ref 0.6–4.47)
LYMPHOCYTES # BLD AUTO: 19 % (ref 14–44)
MCH RBC QN AUTO: 32.2 PG (ref 26.8–34.3)
MCHC RBC AUTO-ENTMCNC: 34 G/DL (ref 31.4–37.4)
MCV RBC AUTO: 95 FL (ref 82–98)
MONOCYTES # BLD AUTO: 0.59 THOUSAND/UL (ref 0–1.22)
MONOCYTES NFR BLD: 10 % (ref 4–12)
NEUTROPHILS # BLD MANUAL: 4.19 THOUSAND/UL (ref 1.85–7.62)
NEUTS SEG NFR BLD AUTO: 71 % (ref 43–75)
PLATELET # BLD AUTO: 98 THOUSANDS/UL (ref 149–390)
PLATELET BLD QL SMEAR: ABNORMAL
PMV BLD AUTO: 12.1 FL (ref 8.9–12.7)
POTASSIUM SERPL-SCNC: 4 MMOL/L (ref 3.5–5.3)
RBC # BLD AUTO: 4.51 MILLION/UL (ref 3.81–5.12)
RBC MORPH BLD: NORMAL
SODIUM SERPL-SCNC: 138 MMOL/L (ref 136–145)
WBC # BLD AUTO: 5.9 THOUSAND/UL (ref 4.31–10.16)

## 2022-05-12 PROCEDURE — 85025 COMPLETE CBC W/AUTO DIFF WBC: CPT | Performed by: FAMILY MEDICINE

## 2022-05-12 PROCEDURE — 85007 BL SMEAR W/DIFF WBC COUNT: CPT | Performed by: FAMILY MEDICINE

## 2022-05-12 PROCEDURE — 80048 BASIC METABOLIC PNL TOTAL CA: CPT | Performed by: FAMILY MEDICINE

## 2022-05-12 PROCEDURE — 85027 COMPLETE CBC AUTOMATED: CPT | Performed by: FAMILY MEDICINE

## 2022-05-12 PROCEDURE — 99239 HOSP IP/OBS DSCHRG MGMT >30: CPT | Performed by: FAMILY MEDICINE

## 2022-05-12 RX ORDER — PREDNISONE 20 MG/1
40 TABLET ORAL DAILY
Qty: 6 TABLET | Refills: 0 | Status: SHIPPED | OUTPATIENT
Start: 2022-05-13 | End: 2022-05-16

## 2022-05-12 RX ORDER — GUAIFENESIN 600 MG
600 TABLET, EXTENDED RELEASE 12 HR ORAL EVERY 12 HOURS SCHEDULED
Qty: 10 TABLET | Refills: 0 | Status: SHIPPED | OUTPATIENT
Start: 2022-05-12 | End: 2022-05-12 | Stop reason: SDUPTHER

## 2022-05-12 RX ORDER — PREDNISONE 20 MG/1
40 TABLET ORAL DAILY
Qty: 6 TABLET | Refills: 0 | Status: SHIPPED | OUTPATIENT
Start: 2022-05-13 | End: 2022-05-12 | Stop reason: SDUPTHER

## 2022-05-12 RX ORDER — AMLODIPINE BESYLATE 2.5 MG/1
2.5 TABLET ORAL DAILY
Qty: 30 TABLET | Refills: 0 | Status: SHIPPED | OUTPATIENT
Start: 2022-05-13 | End: 2022-05-12 | Stop reason: SDUPTHER

## 2022-05-12 RX ORDER — AMLODIPINE BESYLATE 2.5 MG/1
2.5 TABLET ORAL DAILY
Qty: 30 TABLET | Refills: 0 | Status: SHIPPED | OUTPATIENT
Start: 2022-05-13 | End: 2022-05-23 | Stop reason: SDUPTHER

## 2022-05-12 RX ORDER — GUAIFENESIN 600 MG
600 TABLET, EXTENDED RELEASE 12 HR ORAL EVERY 12 HOURS SCHEDULED
Qty: 10 TABLET | Refills: 0 | Status: SHIPPED | OUTPATIENT
Start: 2022-05-12 | End: 2022-05-23

## 2022-05-12 RX ORDER — AZITHROMYCIN 500 MG/1
500 TABLET, FILM COATED ORAL EVERY 24 HOURS
Qty: 2 TABLET | Refills: 0 | Status: SHIPPED | OUTPATIENT
Start: 2022-05-12 | End: 2022-05-12 | Stop reason: SDUPTHER

## 2022-05-12 RX ORDER — AZITHROMYCIN 500 MG/1
500 TABLET, FILM COATED ORAL EVERY 24 HOURS
Qty: 2 TABLET | Refills: 0 | Status: SHIPPED | OUTPATIENT
Start: 2022-05-12 | End: 2022-05-14

## 2022-05-12 RX ADMIN — APIXABAN 5 MG: 5 TABLET, FILM COATED ORAL at 08:28

## 2022-05-12 RX ADMIN — AMLODIPINE BESYLATE 2.5 MG: 2.5 TABLET ORAL at 08:28

## 2022-05-12 RX ADMIN — PREDNISONE 40 MG: 20 TABLET ORAL at 08:28

## 2022-05-12 RX ADMIN — FUROSEMIDE 40 MG: 40 TABLET ORAL at 08:28

## 2022-05-12 RX ADMIN — THIAMINE HCL TAB 100 MG 100 MG: 100 TAB at 08:28

## 2022-05-12 RX ADMIN — GUAIFENESIN 600 MG: 600 TABLET, EXTENDED RELEASE ORAL at 08:28

## 2022-05-12 NOTE — ASSESSMENT & PLAN NOTE
Underlying cognitive decline, presenting with generalized weakness respiratory symptoms, atypical PNA vs CHF exacerbation  Appears improved and at baseline - appropriate to discharge home with [facility] per PT/OT

## 2022-05-12 NOTE — ASSESSMENT & PLAN NOTE
Wt Readings from Last 3 Encounters:   05/12/22 76 5 kg (168 lb 10 4 oz)   11/11/21 72 2 kg (159 lb 3 2 oz)   10/04/21 72 7 kg (160 lb 4 oz)     Patient presents with generalized weakness and respiratory symptoms, cardiomegaly, pulm edema on CXR, elevated proBNP  Does not appear significantly fluid-overloaded on exam otherwise, on Lasix 40 mg daily at home  · Received Lasix 40 mg IV - transition to home 40 mg daily at discharge  · Cardiology evaluation appreciated  · 2D Echo reviewed, preserved EF, LVH  · Monitor daily weights upon discharge

## 2022-05-12 NOTE — CASE MANAGEMENT
Case Management Assessment & Discharge Planning Note    Patient name Katy Vazquez  Location [facility] /E4  [phone]-* MRN [id number]  : 1935 Date 2022       Current Admission Date: 5/10/2022  Current Admission Diagnosis:Respiratory insufficiency   Patient Active Problem List    Diagnosis Date Noted    Atypical pneumonia 05/10/2022    Respiratory insufficiency 05/10/2022    Acute metabolic encephalopathy     Acute on chronic diastolic congestive heart failure ([facility] ) 05/10/2022    Thrombocytopenia ([facility] ) 05/10/2022    Primary hypertension 10/06/2021    Other hyperlipidemia 10/06/2021    Atrial fibrillation ([facility] ) 10/06/2021    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    Other insomnia 10/06/2021    Skin lesion 10/04/2021    Action tremor 2020    Age-related osteoporosis without fracture 2020      LOS (days): 2  Geometric Mean LOS (GMLOS) (days): 3 80  Days to GMLOS:1 8     OBJECTIVE:    Risk of Unplanned Readmission Score: 10 33         Current admission status: Inpatient       Preferred Pharmacy:   [address] [place] [address], [address] [place] [address]  Phone: [phone] Fax: [phone]  [facility] [address]  Phone: [phone] Fax: [phone]    Primary Care Provider: Adrienne Chacon DO    Primary Insurance: [address] [place] [address]  Secondary Insurance:     ASSESSMENT:  Tatiana Maldonado Proxies    There are no active Health Care Proxies on file  Patient Information  Admitted from[de-identified] Home  Mental Status: Alert  During Assessment patient was accompanied by: Son, Daughter  Assessment information provided by[de-identified] Son, Daughter  Primary Caregiver: Self  Support Systems: Family members  [place] of Residence: [address] do you live in?: [place]  Home entry access options   Select all that apply : Stairs  Number of steps to enter home  : 1  Do the steps have railings?: No  Type of Current Residence: 2 story home  Upon entering residence, is there a bedroom on the main floor (no further steps)?: No  A bedroom is located on the following floor levels of residence (select all that apply):: 2nd Floor  Number of steps to 2nd floor from main floor: One Flight  In the last 12 months, was there a time when you were not able to pay the mortgage or rent on time?: No  In the last 12 months, how many places have you lived?: 1  In the last 12 months, was there a time when you did not have a steady place to sleep or slept in a shelter (including now)?: No  Homeless/housing insecurity resource given?: No, N/A  Living Arrangements: Lives Alone    Activities of Daily Living Prior to Admission  Functional Status: Assistance (family assist with things like grocery shopping/ meal prep etc)  Completes ADLs independently?: Yes  Ambulates independently?: No  Level of ambulatory dependence: Assistance  Does patient use assisted devices?: Yes  Assisted Devices (DME) used: Tomer Primes  Does patient currently own DME?: Yes  What DME does the patient currently own?: Straight Tiana Hubbard  Does patient have a history of Outpatient Therapy (PT/OT)?: Yes Chris Argueta)  Does the patient have a history of Short-Term Rehab?: No  Does patient have a history of HHC?: Yes (Angela Edwards)  Does patient currently have [address]?: No         Patient Information Continued  Income Source: Pension/senior living  Does patient have prescription coverage?: Yes  Within the past 12 months, you worried that your food would run out before you got the money to buy more : Never true  Within the past 12 months, the food you bought just didn't last and you didn't have money to get more : Never true  Food insecurity resource given?: N/A  Does patient receive dialysis treatments?: No  Does patient have a history of substance abuse?: No  Does patient have a history of Mental Health Diagnosis?: No    PHQ 2/9 Screening   Reviewed PHQ 2/9 Depression Screening Score?: No    Means of Transportation  In the past 12 months, has lack of transportation kept you from medical appointments or from getting medications?: No  In the past 12 months, has lack of transportation kept you from meetings, work, or from getting things needed for daily living?: No  Was application for public transport provided?: No        DISCHARGE DETAILS:    Discharge planning discussed with[de-identified] patient/ family  Freedom of Choice: Yes  Comments - Freedom of Choice: Pt has a history with [facility] and wants this again  Also CM recomended RN due to PNA and CHF  [facility] uses [facility] for this    Family wants to use them again  CM contacted family/caregiver?: Yes  Were Treatment Team discharge recommendations reviewed with patient/caregiver?: Yes  Did patient/caregiver verbalize understanding of patient care needs?: Yes  Were patient/caregiver advised of the risks associated with not following Treatment Team discharge recommendations?: Yes    Contacts  Patient Contacts: Mammie Falling (Son)  (M  Relationship to Patient[de-identified] Family  Contact Method: Phone  Phone Number: [phone]  Reason/Outcome: Emergency Contact, Discharge [address]         Is the patient interested in [address] at discharge?: Yes  [address] requested[de-identified] Nursing, Occupational Therapy, Physical [address] Name[de-identified] Other ([facility]/ [facility])  [address] Provider[de-identified] PCP  Home Health Services Needed[de-identified] Strengthening/Theraputic Exercises to Improve Function, Gait/ADL Training, Administration of IV, IM or SC Medications, Evaluate Functional Status and Safety  Homebound Criteria Met[de-identified] Requires Medical Transportation, Uses an Assist Device (i e  cane, walker, etc)  Supporting Clincal Findings[de-identified] Limited Endurance, Dyspnea with Exertion         Other Referral/Resources/Interventions Provided:  Interventions: [address], A  Referral Comments: provided family with info on home health aids per their request         Treatment Team Recommendation: Home with [address] [place] [address]  Discharge Destination Plan[de-identified] Home with [place] at Discharge : Family

## 2022-05-12 NOTE — ASSESSMENT & PLAN NOTE
As suggestive on CXR  Ruled out with negative CT chest, procalcitonin negative  Suspect tracheobronchitis with dyspnea, diffused wheezing  Complete 5 days of azithromycin and prednisone 40 mg daily

## 2022-05-12 NOTE — PLAN OF CARE
Problem: MOBILITY - ADULT  Goal: Maintain or return to baseline ADL function  Description: INTERVENTIONS:  -  Assess patient's ability to carry out ADLs; assess patient's baseline for ADL function and identify physical deficits which impact ability to perform ADLs (bathing, care of mouth/teeth, toileting, grooming, dressing, etc )  - Assess/evaluate cause of self-care deficits   - Assess range of motion  - Assess patient's mobility; develop plan if impaired  - Assess patient's need for assistive devices and provide as appropriate  - Encourage maximum independence but intervene and supervise when necessary  - Involve family in performance of ADLs  - Assess for home care needs following discharge   - Consider OT consult to assist with ADL evaluation and planning for discharge  - Provide patient education as appropriate  Outcome: Progressing  Goal: Maintains/Returns to pre admission functional level  Description: INTERVENTIONS:  - Perform BMAT or MOVE assessment daily    - Set and communicate daily mobility goal to care team and patient/family/caregiver     - Collaborate with rehabilitation services on mobility goals if consulted  - Dangle patient 3 times a day  - Stand patient 3 times a day  - Ambulate patient 3 times a day  - Out of bed to chair 3 times a day   - Out of bed for meals 3 times a day  - Out of bed for toileting  - Record patient progress and toleration of activity level   Outcome: Progressing     Problem: Potential for Falls  Goal: Patient will remain free of falls  Description: INTERVENTIONS:  - Educate patient/family on patient safety including physical limitations  - Instruct patient to call for assistance with activity   - Consult OT/PT to assist with strengthening/mobility   - Keep Call bell within reach  - Keep bed low and locked with side rails adjusted as appropriate  - Keep care items and personal belongings within reach  - Initiate and maintain comfort rounds  - Make Fall Risk Sign visible to staff  - Offer Toileting every 2 Hours, in advance of need  - Initiate/Maintain bed alarm  - Obtain necessary fall risk management equipment: bed alarm  - Apply yellow socks and bracelet for high fall risk patients  - Consider moving patient to room near nurses station  Outcome: Progressing     Problem: Prexisting or High Potential for Compromised Skin Integrity  Goal: Skin integrity is maintained or improved  Description: INTERVENTIONS:  - Identify patients at risk for skin breakdown  - Assess and monitor skin integrity  - Assess and monitor nutrition and hydration status  - Monitor labs   - Assess for incontinence   - Turn and reposition patient  - Assist with mobility/ambulation  - Relieve pressure over bony prominences  - Avoid friction and shearing  - Provide appropriate hygiene as needed including keeping skin clean and dry  - Evaluate need for skin moisturizer/barrier cream  - Collaborate with interdisciplinary team   - Patient/family teaching  - Consider wound care consult   Outcome: Progressing     Problem: PAIN - ADULT  Goal: Verbalizes/displays adequate comfort level or baseline comfort level  Description: Interventions:  - Encourage patient to monitor pain and request assistance  - Assess pain using appropriate pain scale  - Administer analgesics based on type and severity of pain and evaluate response  - Implement non-pharmacological measures as appropriate and evaluate response  - Consider cultural and social influences on pain and pain management  - Notify physician/advanced practitioner if interventions unsuccessful or patient reports new pain  Outcome: Progressing     Problem: INFECTION - ADULT  Goal: Absence or prevention of progression during hospitalization  Description: INTERVENTIONS:  - Assess and monitor for signs and symptoms of infection  - Monitor lab/diagnostic results  - Monitor all insertion sites, i e  indwelling lines, tubes, and drains  - Monitor endotracheal if appropriate and nasal secretions for changes in amount and color  - [place] appropriate cooling/warming therapies per order  - Administer medications as ordered  - Instruct and encourage patient and family to use good hand hygiene technique  - Identify and instruct in appropriate isolation precautions for identified infection/condition  Outcome: Progressing     Problem: DISCHARGE PLANNING  Goal: Discharge to home or other facility with appropriate resources  Description: INTERVENTIONS:  - Identify barriers to discharge w/patient and caregiver  - Arrange for needed discharge resources and transportation as appropriate  - Identify discharge learning needs (meds, wound care, etc )  - Arrange for interpretive services to assist at discharge as needed  - Refer to Case Management Department for coordinating discharge planning if the patient needs post-hospital services based on physician/advanced practitioner order or complex needs related to functional status, cognitive ability, or social support system  Outcome: Progressing     Problem: Knowledge Deficit  Goal: Patient/family/caregiver demonstrates understanding of disease process, treatment plan, medications, and discharge instructions  Description: Complete learning assessment and assess knowledge base    Interventions:  - Provide teaching at level of understanding  - Provide teaching via preferred learning methods  Outcome: Progressing     Problem: CARDIOVASCULAR - ADULT  Goal: Maintains optimal cardiac output and hemodynamic stability  Description: INTERVENTIONS:  - Monitor I/O, vital signs and rhythm  - Monitor for S/S and trends of decreased cardiac output  - Administer and titrate ordered vasoactive medications to optimize hemodynamic stability  - Assess quality of pulses, skin color and temperature  - Assess for signs of decreased coronary artery perfusion  - Instruct patient to report change in severity of symptoms  Outcome: Progressing  Goal: Absence of cardiac dysrhythmias or at baseline rhythm  Description: INTERVENTIONS:  - Continuous cardiac monitoring, vital signs, obtain 12 lead EKG if ordered  - Administer antiarrhythmic and heart rate control medications as ordered  - Monitor electrolytes and administer replacement therapy as ordered  Outcome: Progressing     Problem: RESPIRATORY - ADULT  Goal: Achieves optimal ventilation and oxygenation  Description: INTERVENTIONS:  - Assess for changes in respiratory status  - Assess for changes in mentation and behavior  - Position to facilitate oxygenation and minimize respiratory effort  - Oxygen administered by appropriate delivery if ordered  - Initiate smoking cessation education as indicated  - Encourage broncho-pulmonary hygiene including cough, deep breathe, Incentive Spirometry  - Assess the need for suctioning and aspirate as needed  - Assess and instruct to report SOB or any respiratory difficulty  - Respiratory Therapy support as indicated  Outcome: Progressing

## 2022-05-12 NOTE — ASSESSMENT & PLAN NOTE
This is an 83 yo F with PMH significant for a fib on AC with Eliquis, dementia and HTN who presents with mild confusion from baseline and respiratory symptoms including cough and rhinorrhea for almost 1 week worsening today  Most of history obtained from daughter at the bedside due to patient's underlying cognitive decline  Patient was also noted for feeling hot to touch ? fever, "wheezing and crackles" by the family   No diet changes, no known infection in family members  · Symptoms resolved, stable on room air  · Initial workup suggestive of CHF exacerbation with elevated pro-BNP and pulmonary edema on portable CXR, differentials include atypical pneumonia  · Received ceftriaxone, azithromycin was added - pneumonia workup otherwise negative with pneumonia ruled out, suspect bronchitis, trial of prednisone, short course of antibiotics  · 2D echo reviewed, preserved EF, left ventricular hypertrophy  · Received IV Lasix, transition to oral Lasix on discharge  · Stable for discharge with home VNA  · Complete course of prednisone and azithromycin as above

## 2022-05-12 NOTE — RESTORATIVE TECHNICIAN NOTE
Restorative Technician Note      Patient Name: Perez Salguero     Restorative Tech Visit Date: 5/12/2022  Note Type: Mobility  Patient Position Upon Consult: Seated edge of bed  Activity Performed: Ambulated  Assistive Device: Roller walker  Patient Position at End of Consult: Seated edge of bed;  All needs within reach

## 2022-05-12 NOTE — ASSESSMENT & PLAN NOTE
Mild with platelets around 516  Previously (labs from 2018) 120-130  Monitor CBC on outpatient basis, continue Eliquis

## 2022-05-12 NOTE — DISCHARGE SUMMARY
[address] [facility]  Discharge- Janel Phoenix 1935, 82 y o  female MRN: [id number]  Unit/Bed#: E4 -01 Encounter: [id number]  Primary Care Provider: Kay Duran DO   Date and time admitted to hospital: 5/10/2022  8:44 AM    * Respiratory insufficiency  Assessment & Plan  This is an 81 yo F with PMH significant for a fib on AC with Eliquis, dementia and HTN who presents with mild confusion from baseline and respiratory symptoms including cough and rhinorrhea for almost 1 week worsening today  Most of history obtained from daughter at the bedside due to patient's underlying cognitive decline  Patient was also noted for feeling hot to touch ? fever, "wheezing and crackles" by the family   No diet changes, no known infection in family members  · Symptoms resolved, stable on room air  · Initial workup suggestive of CHF exacerbation with elevated pro-BNP and pulmonary edema on portable CXR, differentials include atypical pneumonia  · Received ceftriaxone, azithromycin was added - pneumonia workup otherwise negative with pneumonia ruled out, suspect bronchitis, trial of prednisone, short course of antibiotics  · 2D echo reviewed, preserved EF, left ventricular hypertrophy  · Received IV Lasix, transition to oral Lasix on discharge  · Stable for discharge with home VNA  · Complete course of prednisone and azithromycin as above      Acute on chronic diastolic congestive heart failure ([facility] [address] )  Assessment & Plan  Wt Readings from Last 3 Encounters:   05/12/22 76 5 kg (168 lb 10 4 oz)   11/11/21 72 2 kg (159 lb 3 2 oz)   10/04/21 72 7 kg (160 lb 4 oz)     Patient presents with generalized weakness and respiratory symptoms, cardiomegaly, pulm edema on CXR, elevated proBNP  Does not appear significantly fluid-overloaded on exam otherwise, on Lasix 40 mg daily at home  · Received Lasix 40 mg IV - transition to home 40 mg daily at discharge  · Cardiology evaluation appreciated  · 2D Echo reviewed, preserved EF, LVH  · Monitor daily weights upon discharge          Atypical pneumonia  Assessment & Plan  As suggestive on CXR  Ruled out with negative CT chest, procalcitonin negative  Suspect tracheobronchitis with dyspnea, diffused wheezing  Complete 5 days of azithromycin and prednisone 40 mg daily            Acute metabolic encephalopathy  Assessment & Plan  Underlying cognitive decline, presenting with generalized weakness respiratory symptoms, atypical PNA vs CHF exacerbation  Appears improved and at baseline - appropriate to discharge home with VNA per PT/OT      Thrombocytopenia (HCC)  Assessment & Plan  Mild with platelets around 818  Previously (labs from 2018) 120-130  Monitor CBC on outpatient basis, continue Eliquis     Amnestic MCI (mild cognitive impairment with memory loss)  Assessment & Plan  Undergoing workup with geriatric medicine on outpatient basis    Atrial fibrillation [facility])  Assessment & Plan  Diagnosed 2009  Follows with [facility]   Rate controlled without medication  Continue Eliquis    Primary hypertension  Assessment & Plan  On furosemide alone  Amlodipine 2 5 mg daily was added      Medical Problems             Resolved Problems  Date Reviewed: 5/12/2022   None               Discharging Physician / Practitioner: Lina Patel MD  PCP: Chanell Baltazar DO  Admission Date:   Admission Orders (From admission, onward)     Ordered        05/10/22 1126  Inpatient Admission  Once                      Discharge Date: 05/12/22    Consultations During Hospital Stay:  · Cardiology    Procedures Performed:   CT chest wo contrast   Final Result by Tahira Fernandez MD (05/10 8033)      No acute focal pneumonia  Workstation performed: [id number]         XR chest portable - 1 view   Final Result by John Vera MD (05/10 9601)      Increased bilateral interstitial lung markings    Differential includes atypical pneumonia, mild interstitial pulmonary edema, among other differentials  Enlarged cardiac silhouette, likely due to cardiomegaly  The study was marked in [facility] for immediate notification  Workstation performed: [id number]           2D echo  Technically fair but adequate transthoracic echocardiogram study    1  Relatively small left ventricular cavity size, marked asymmetric left ventricular hypertrophy left ventricular apex and surrounding region and moderate hypertrophy of other left ventricular walls  Normal left ventricular systolic function  Indeterminate diastolic dysfunction grade  Ejection fraction is greater than 60%  2  Normal right ventricular size and systolic function  3  Moderate to marked biatrial enlargement  4  Aortic valve sclerosis, no aortic valve stenosis or regurgitation  5  Mitral valve sclerosis, mild mitral valve regurgitation  6  Mild-to-moderate tricuspid valve regurgitation  7  Mild pulmonary hypertension  Estimated RVSP/PASP 44 mmHg  8  No pericardial effusion  No previous echocardiogram is available for comparison      Follow-up study with use of definity contrast can be considered for reliable estimation of left ventricular ejection fraction although it appears to be normal            Significant Findings / Test Results:   Results from last 7 days   Lab Units 05/12/22  0528   WBC Thousand/uL 5 90   HEMOGLOBIN g/dL 14 5   HEMATOCRIT % 42 7   PLATELETS Thousands/uL 98*     Results from last 7 days   Lab Units 05/12/22  0528   SODIUM mmol/L 138   POTASSIUM mmol/L 4 0   CHLORIDE mmol/L 103   CO2 mmol/L 26   BUN mg/dL 18   CREATININE mg/dL 0 66   CALCIUM mg/dL 9 7         Incidental Findings:   · None     Test Results Pending at Discharge (will require follow up):    · None      Outpatient Tests Requested:  · None    Complications:  None    Reason for Admission:  Generalized weakness     Hospital Course:   Karson Brown is a 82 y o  female patient who originally presented to the hospital on 5/10/2022 due to generalized weakness, confusion, respiratory symptoms  Was admitted with initial concern for pneumonia and CHF exacerbation  Was treated for mild CHF exacerbation  Pneumonia was ruled out and the patient underwent treatment for bronchitis with antibiotics and steroids  With this treatment the patient significantly improved and was at her baseline respiratory status at time of discharge  She is to complete course of treatment as above  She was evaluated by PT and OT and was deemed appropriate for discharge home with VNA  She is encouraged to follow-up with her PCP and specialists  Please see above list of diagnoses and related plan for additional information  Condition at Discharge: good    Discharge Day Visit / Exam:   Subjective:  Patient seen and examined  She reports feeling well  She denies shortness of breath  On room air  Good appetite  No overnight events  Remains afebrile  Vitals: Blood Pressure: 129/77 (05/12/22 0734)  Pulse: 65 (05/12/22 0734)  Temperature: (!) 97 1 °F (36 2 °C) (05/12/22 0734)  Temp Source: Temporal (05/12/22 0734)  Respirations: 18 (05/12/22 0734)  Height: 5' 3" (160 cm) (05/11/22 0659)  Weight - Scale: 76 5 kg (168 lb 10 4 oz) (05/12/22 0600)  SpO2: 94 % (05/12/22 0734)  Exam:   Physical Exam  Constitutional:       General: She is not in acute distress  HENT:      Head: Normocephalic and atraumatic  Nose: No congestion  Mouth/Throat:      Pharynx: Oropharynx is clear  Eyes:      Conjunctiva/sclera: Conjunctivae normal    Cardiovascular:      Rate and Rhythm: Normal rate and regular rhythm  Heart sounds: No murmur heard  Pulmonary:      Effort: No respiratory distress  Breath sounds: No wheezing or rales  Abdominal:      General: There is no distension  Tenderness: There is no abdominal tenderness  There is no guarding  Musculoskeletal:      Right lower leg: No edema  Left lower leg: No edema     Skin:     General: Skin is warm and dry  Neurological:      Mental Status: She is oriented to person, place, and time  Psychiatric:         Mood and Affect: Mood normal           Discussion with Family: Attempted to update  (son) via phone  Left voicemail  Discharge instructions/Information to patient and family:   See after visit summary for information provided to patient and family  Provisions for Follow-Up Care:  See after visit summary for information related to follow-up care and any pertinent home health orders  Disposition:   Home with VNA Services (Reminder: Complete face to face encounter)    Planned Readmission: No     Discharge Statement:  I spent 35 minutes discharging the patient  This time was spent on the day of discharge  I had direct contact with the patient on the day of discharge  Greater than 50% of the total time was spent examining patient, answering all patient questions, arranging and discussing plan of care with patient as well as directly providing post-discharge instructions  Additional time then spent on discharge activities  Discharge Medications:  See after visit summary for reconciled discharge medications provided to patient and/or family        **Please Note: This note may have been constructed using a voice recognition system**

## 2022-05-12 NOTE — PLAN OF CARE
Problem: MOBILITY - ADULT  Goal: Maintain or return to baseline ADL function  Description: INTERVENTIONS:  -  Assess patient's ability to carry out ADLs; assess patient's baseline for ADL function and identify physical deficits which impact ability to perform ADLs (bathing, care of mouth/teeth, toileting, grooming, dressing, etc )  - Assess/evaluate cause of self-care deficits   - Assess range of motion  - Assess patient's mobility; develop plan if impaired  - Assess patient's need for assistive devices and provide as appropriate  - Encourage maximum independence but intervene and supervise when necessary  - Involve family in performance of ADLs  - Assess for home care needs following discharge   - Consider OT consult to assist with ADL evaluation and planning for discharge  - Provide patient education as appropriate  Outcome: Adequate for Discharge  Goal: Maintains/Returns to pre admission functional level  Description: INTERVENTIONS:  - Perform BMAT or MOVE assessment daily    - Set and communicate daily mobility goal to care team and patient/family/caregiver     - Collaborate with rehabilitation services on mobility goals if consulted  - Dangle patient 3 times a day  - Stand patient 3 times a day  - Ambulate patient 3 times a day  - Out of bed to chair 3 times a day   - Out of bed for meals 3 times a day  - Out of bed for toileting  - Record patient progress and toleration of activity level   Outcome: Adequate for Discharge     Problem: Potential for Falls  Goal: Patient will remain free of falls  Description: INTERVENTIONS:  - Educate patient/family on patient safety including physical limitations  - Instruct patient to call for assistance with activity   - Consult OT/PT to assist with strengthening/mobility   - Keep Call bell within reach  - Keep bed low and locked with side rails adjusted as appropriate  - Keep care items and personal belongings within reach  - Initiate and maintain comfort rounds  - Make Fall Risk Sign visible to staff  - Offer Toileting every 2 Hours, in advance of need  - Initiate/Maintain bed alarm  - Obtain necessary fall risk management equipment: bed alarm  - Apply yellow socks and bracelet for high fall risk patients  - Consider moving patient to room near nurses station  Outcome: Adequate for Discharge     Problem: Prexisting or High Potential for Compromised Skin Integrity  Goal: Skin integrity is maintained or improved  Description: INTERVENTIONS:  - Identify patients at risk for skin breakdown  - Assess and monitor skin integrity  - Assess and monitor nutrition and hydration status  - Monitor labs   - Assess for incontinence   - Turn and reposition patient  - Assist with mobility/ambulation  - Relieve pressure over bony prominences  - Avoid friction and shearing  - Provide appropriate hygiene as needed including keeping skin clean and dry  - Evaluate need for skin moisturizer/barrier cream  - Collaborate with interdisciplinary team   - Patient/family teaching  - Consider wound care consult   Outcome: Adequate for Discharge     Problem: PAIN - ADULT  Goal: Verbalizes/displays adequate comfort level or baseline comfort level  Description: Interventions:  - Encourage patient to monitor pain and request assistance  - Assess pain using appropriate pain scale  - Administer analgesics based on type and severity of pain and evaluate response  - Implement non-pharmacological measures as appropriate and evaluate response  - Consider cultural and social influences on pain and pain management  - Notify physician/advanced practitioner if interventions unsuccessful or patient reports new pain  Outcome: Adequate for Discharge     Problem: INFECTION - ADULT  Goal: Absence or prevention of progression during hospitalization  Description: INTERVENTIONS:  - Assess and monitor for signs and symptoms of infection  - Monitor lab/diagnostic results  - Monitor all insertion sites, i e  indwelling lines, tubes, and drains  - Monitor endotracheal if appropriate and nasal secretions for changes in amount and color  - [place] appropriate cooling/warming therapies per order  - Administer medications as ordered  - Instruct and encourage patient and family to use good hand hygiene technique  - Identify and instruct in appropriate isolation precautions for identified infection/condition  Outcome: Adequate for Discharge     Problem: DISCHARGE PLANNING  Goal: Discharge to home or other facility with appropriate resources  Description: INTERVENTIONS:  - Identify barriers to discharge w/patient and caregiver  - Arrange for needed discharge resources and transportation as appropriate  - Identify discharge learning needs (meds, wound care, etc )  - Arrange for interpretive services to assist at discharge as needed  - Refer to Case Management Department for coordinating discharge planning if the patient needs post-hospital services based on physician/advanced practitioner order or complex needs related to functional status, cognitive ability, or social support system  Outcome: Adequate for Discharge     Problem: Knowledge Deficit  Goal: Patient/family/caregiver demonstrates understanding of disease process, treatment plan, medications, and discharge instructions  Description: Complete learning assessment and assess knowledge base    Interventions:  - Provide teaching at level of understanding  - Provide teaching via preferred learning methods  Outcome: Adequate for Discharge     Problem: CARDIOVASCULAR - ADULT  Goal: Maintains optimal cardiac output and hemodynamic stability  Description: INTERVENTIONS:  - Monitor I/O, vital signs and rhythm  - Monitor for S/S and trends of decreased cardiac output  - Administer and titrate ordered vasoactive medications to optimize hemodynamic stability  - Assess quality of pulses, skin color and temperature  - Assess for signs of decreased coronary artery perfusion  - Instruct patient to report change in severity of symptoms  Outcome: Adequate for Discharge  Goal: Absence of cardiac dysrhythmias or at baseline rhythm  Description: INTERVENTIONS:  - Continuous cardiac monitoring, vital signs, obtain 12 lead EKG if ordered  - Administer antiarrhythmic and heart rate control medications as ordered  - Monitor electrolytes and administer replacement therapy as ordered  Outcome: Adequate for Discharge     Problem: RESPIRATORY - ADULT  Goal: Achieves optimal ventilation and oxygenation  Description: INTERVENTIONS:  - Assess for changes in respiratory status  - Assess for changes in mentation and behavior  - Position to facilitate oxygenation and minimize respiratory effort  - Oxygen administered by appropriate delivery if ordered  - Initiate smoking cessation education as indicated  - Encourage broncho-pulmonary hygiene including cough, deep breathe, Incentive Spirometry  - Assess the need for suctioning and aspirate as needed  - Assess and instruct to report SOB or any respiratory difficulty  - Respiratory Therapy support as indicated  Outcome: Adequate for Discharge

## 2022-05-15 LAB
BACTERIA BLD CULT: NORMAL
BACTERIA BLD CULT: NORMAL

## 2022-05-16 NOTE — UTILIZATION REVIEW
Notification of Discharge   This is a Notification of Discharge from our facility [address]  Please be advised that this patient has been discharge from our facility  Below you will find the admission and discharge date and time including the patients disposition  UTILIZATION REVIEW CONTACT:  Iván Turner  Utilization   Network Utilization Review Department  Phone: [phone] x carefully listen to the prompts  All voicemails are confidential   Email: [email or web address]     PHYSICIAN ADVISORY SERVICES:  FOR [id number] REVIEW - MEDICAL NECESSITY DENIAL  Phone: [phone]  Fax: [phone]  Email: [email or web address]     PRESENTATION DATE: 5/10/2022  8:44 AM    INPATIENT ADMISSION DATE: 5/10/22 11:26 AM   DISCHARGE DATE: 5/12/2022 12:45 PM  DIS[facility] [address] INFORMATION:  Send all requests for admission clinical reviews, approved or denied determinations and any other requests to dedicated fax number below belonging to the campus where the patient is receiving treatment   List of dedicated fax numbers:  [address] DENIALS (Administrative/Medical Necessity) [phone]   [address] (Maternity/NICU/Pediatrics) [phone]   [facility] [phone]   130 [facility] [phone]   [address] [place] [address] [phone]   2000 [facility] [address] [place] [address],[address] [phone]   [facility]  [phone]   2205 [facility]  2401 [facility] 1000 [address] [facility] [phone]

## 2022-05-18 ENCOUNTER — OFFICE VISIT (OUTPATIENT)
Dept: URGENT CARE | Facility: CLINIC | Age: 87
End: 2022-05-18
Payer: COMMERCIAL

## 2022-05-18 VITALS
RESPIRATION RATE: 18 BRPM | SYSTOLIC BLOOD PRESSURE: 98 MMHG | HEART RATE: 62 BPM | DIASTOLIC BLOOD PRESSURE: 60 MMHG | OXYGEN SATURATION: 95 % | TEMPERATURE: 96.8 F

## 2022-05-18 DIAGNOSIS — R06.00 DYSPNEA ON EXERTION: ICD-10-CM

## 2022-05-18 DIAGNOSIS — R53.83 FATIGUE, UNSPECIFIED TYPE: Primary | ICD-10-CM

## 2022-05-18 PROCEDURE — 99213 OFFICE O/P EST LOW 20 MIN: CPT | Performed by: PHYSICIAN ASSISTANT

## 2022-05-18 NOTE — PATIENT INSTRUCTIONS
Continue to call therapy and visiting nurse organization repeatedly until you get answers on home care follow-up  Call the primary care office that your transferring 2 to request that she be put on a cancellation list to see if she can get in earlier  Left primary care office know that she has not had follow-up post hospital stay and she needs to have this done as soon as possible  Also let them know that she was at the care now office today and we do not feel like she needed to go back to emergency room for further evaluation  Decrease amlodipine to half tablet in the morning  If worsening shortness of breath, confusion, poor appetite and worsening weakness proceed immediately to emergency room for repeat evaluation

## 2022-05-18 NOTE — PROGRESS NOTES
[address] [facility]    NAME: Mariza Rowley is a 82 y o  female  : 1935    MRN: [id number]  DATE: May 18, 2022  TIME: 8:58 AM    Assessment and Plan   Fatigue, unspecified type [R53 83]  1  Fatigue, unspecified type     2  Dyspnea on exertion         Patient Instructions   Patient Instructions   Continue to call therapy and visiting nurse organization repeatedly until you get answers on home care follow-up  Call the primary care office that your transferring 2 to request that she be put on a cancellation list to see if she can get in earlier  Left primary care office know that she has not had follow-up post hospital stay and she needs to have this done as soon as possible  Also let them know that she was at the care now office today and we do not feel like she needed to go back to emergency room for further evaluation  Decrease amlodipine to half tablet in the morning  If worsening shortness of breath, confusion, poor appetite and worsening weakness proceed immediately to emergency room for repeat evaluation  Chief Complaint     Chief Complaint   Patient presents with    Fatigue     Patient just discharged form the hospital and son and family concerned that she is overly weak and still has cough   Concerned that bp is low and she is on this new BP med  Does she need it they are wondering       History of Present Illness   Mariza Rowley presents to the clinic c/o  46-year-old female brought in by son for fatigue  She was in the hospital last week for a couple days and treated for pneumonia as well as some congestive heart failure  She seemed to be doing better when she 1st came home but yesterday she slept all day  She is not up and around as much as she was prior to the hospitalization  She was supposed to have visiting nurses as well as therapy  Therapy was supposed to come today for initial evaluation but son cancel that so he could bring her here    That has been rescheduled for Friday  She has not had follow-up visit with her primary care  Son says that her original primary care doctor could not get her in until June  They have started to get established with a primary care provider at the [facility] but that appointment is on Monday to establish care  No increased cough  No swelling of the legs  Good appetite  She is sleeping with a wedge pillow but said she has done that since she has come home from the hospital due to cough and that seems to help her sleep  She says she is sleeping like a baby  No worsening of confusion and in fact it is better than pre hospitalization  Son is concerned about patient's blood pressure  Amlodipine was started in the hospital and her blood pressure seems very low today  Typically its own the 130s  Denies chest pain, palpitations  No shortness of breath at rest   When asked about shortness of breath with walking no specific answer is given but patient says she takes her time in uses her cane or walker that has a seat that she can sit down on as needed  Review of Systems   Review of Systems   Constitutional: Positive for activity change and fatigue  Negative for appetite change, chills and fever  Respiratory: Positive for cough and shortness of breath  Negative for choking, chest tightness, wheezing and stridor  Cardiovascular: Negative for chest pain, palpitations and leg swelling  Gastrointestinal: Negative for abdominal pain  Current Medications     Long-Term Medications   Medication Sig Dispense Refill    amLODIPine (NORVASC) 2 5 mg tablet Take 1 tablet (2 5 mg total) by mouth in the morning   30 tablet 0    apixaban (ELIQUIS) 5 mg Take 5 mg by mouth 2 (two) times a day      Ascorbic Acid (VITAMIN C) 500 MG CAPS Take 500 mg by mouth daily      Cholecalciferol (Vitamin D3) 1 25 MG (51716 UT) CAPS       furosemide (LASIX) 40 mg tablet Take 1 tablet by mouth daily      Omega-3 Fatty Acids (fish oil) 1,000 mg       simvastatin (ZOCOR) 20 mg tablet Take 1 tablet by mouth daily      thiamine (VITAMIN B1) 100 mg tablet          Current Allergies     Allergies as of 05/18/2022 - Reviewed 05/18/2022   Allergen Reaction Noted    Morphine  04/07/2015          The following portions of the patient's history were reviewed and updated as appropriate: allergies, current medications, past family history, past medical history, past social history, past surgical history and problem list   History reviewed  No pertinent past medical history  Past Surgical History:   Procedure Laterality Date    CHOLECYSTECTOMY      REPLACEMENT TOTAL KNEE Left 2008     Family History   Problem Relation Age of Onset    Lung cancer Father     Leukemia Brother     Breast cancer Daughter        Objective   BP 98/60   Pulse 62   Temp (!) 96 8 °F (36 °C) (Tympanic)   Resp 18   SpO2 95%   No LMP recorded  Patient is postmenopausal        Physical Exam     Physical Exam  Vitals and nursing note reviewed  Constitutional:       General: She is not in acute distress  Appearance: She is not ill-appearing, toxic-appearing or diaphoretic  Comments: Elderly female in wheelchair with son accompanying  No acute distress  Pleasant  HENT:      Head: Normocephalic and atraumatic  Mouth/Throat:      Mouth: Mucous membranes are moist    Eyes:      Conjunctiva/sclera: Conjunctivae normal       Pupils: Pupils are equal, round, and reactive to light  Cardiovascular:      Rate and Rhythm: Normal rate and regular rhythm  Heart sounds: Normal heart sounds  No murmur heard  No friction rub  No gallop  Pulmonary:      Effort: Pulmonary effort is normal  No respiratory distress  Breath sounds: Normal breath sounds  No stridor  No wheezing, rhonchi or rales  Musculoskeletal:      Cervical back: Normal range of motion and neck supple  No rigidity or tenderness  Right lower leg: No edema  Left lower leg: No edema  Lymphadenopathy:      Cervical: No cervical adenopathy  Skin:     General: Skin is warm and dry  Coloration: Skin is not pale  Neurological:      Mental Status: She is alert     Psychiatric:         Mood and Affect: Mood normal          Behavior: Behavior normal

## 2022-05-23 ENCOUNTER — OFFICE VISIT (OUTPATIENT)
Dept: FAMILY MEDICINE CLINIC | Facility: CLINIC | Age: 87
End: 2022-05-23
Payer: COMMERCIAL

## 2022-05-23 VITALS
BODY MASS INDEX: 30.99 KG/M2 | HEIGHT: 62 IN | DIASTOLIC BLOOD PRESSURE: 66 MMHG | TEMPERATURE: 97.9 F | SYSTOLIC BLOOD PRESSURE: 122 MMHG | WEIGHT: 168.4 LBS | HEART RATE: 73 BPM | OXYGEN SATURATION: 98 % | RESPIRATION RATE: 18 BRPM

## 2022-05-23 DIAGNOSIS — R53.83 OTHER FATIGUE: ICD-10-CM

## 2022-05-23 DIAGNOSIS — J18.9 ATYPICAL PNEUMONIA: ICD-10-CM

## 2022-05-23 DIAGNOSIS — D69.6 THROMBOCYTOPENIA (HCC): ICD-10-CM

## 2022-05-23 DIAGNOSIS — I50.32 CHRONIC DIASTOLIC CONGESTIVE HEART FAILURE (HCC): Primary | ICD-10-CM

## 2022-05-23 DIAGNOSIS — G31.84 AMNESTIC MCI (MILD COGNITIVE IMPAIRMENT WITH MEMORY LOSS): ICD-10-CM

## 2022-05-23 DIAGNOSIS — I48.19 PERSISTENT ATRIAL FIBRILLATION (HCC): ICD-10-CM

## 2022-05-23 DIAGNOSIS — I10 PRIMARY HYPERTENSION: ICD-10-CM

## 2022-05-23 DIAGNOSIS — R26.2 AMBULATORY DYSFUNCTION: ICD-10-CM

## 2022-05-23 PROCEDURE — 99204 OFFICE O/P NEW MOD 45 MIN: CPT | Performed by: FAMILY MEDICINE

## 2022-05-23 PROCEDURE — 3288F FALL RISK ASSESSMENT DOCD: CPT | Performed by: FAMILY MEDICINE

## 2022-05-23 PROCEDURE — 3078F DIAST BP <80 MM HG: CPT | Performed by: FAMILY MEDICINE

## 2022-05-23 PROCEDURE — 1100F PTFALLS ASSESS-DOCD GE2>/YR: CPT | Performed by: FAMILY MEDICINE

## 2022-05-23 PROCEDURE — 1160F RVW MEDS BY RX/DR IN RCRD: CPT | Performed by: FAMILY MEDICINE

## 2022-05-23 PROCEDURE — 1036F TOBACCO NON-USER: CPT | Performed by: FAMILY MEDICINE

## 2022-05-23 PROCEDURE — 1111F DSCHRG MED/CURRENT MED MERGE: CPT | Performed by: FAMILY MEDICINE

## 2022-05-23 PROCEDURE — 3074F SYST BP LT 130 MM HG: CPT | Performed by: FAMILY MEDICINE

## 2022-05-23 RX ORDER — AMLODIPINE BESYLATE 2.5 MG/1
1.25 TABLET ORAL DAILY
Qty: 90 TABLET | Refills: 0 | Status: SHIPPED | OUTPATIENT
Start: 2022-05-23

## 2022-05-23 NOTE — ASSESSMENT & PLAN NOTE
Will repeat cbc next month as baseline is around 120-130 and around 100 in hospital likely secondary to illness

## 2022-05-23 NOTE — PROGRESS NOTES
Assessment/Plan:     1  Chronic diastolic congestive heart failure (HCC)  Assessment & Plan:  Wt Readings from Last 3 Encounters:   05/23/22 76 4 kg (168 lb 6 4 oz)   05/12/22 76 5 kg (168 lb 10 4 oz)   11/11/21 72 2 kg (159 lb 3 2 oz)       Weight stable; appears euvolemic; c/w current tx; referral to cardiology placed; VNA ordered        Orders:  -     Ambulatory Referral to Cardiology; Future    2  Primary hypertension  Assessment & Plan: Tolerating amlodipine on very low dose; hypotension resolved; will c/w current tx but may consider d/c'ing amlodipine in future given minimal dosage needed for control     Orders:  -     Ambulatory Referral to Cardiology; Future  -     amLODIPine (NORVASC) 2 5 mg tablet; Take 0 5 tablets (1 25 mg total) by mouth in the morning  3  Amnestic MCI (mild cognitive impairment with memory loss)  Assessment & Plan:  Follows with geriatric medicine      4  Thrombocytopenia ([address] )  Assessment & Plan: Will repeat cbc next month as baseline is around 120-130 and around 100 in hospital likely secondary to illness    Orders:  -     CBC and differential; Future    5  Other fatigue  Assessment & Plan:  Slowly improving; advised to c/w home PT/OT; recheck in 1 month    Orders:  -     Referral to [address] [place], [place] [address]A; Future    6  Ambulatory dysfunction  -     Referral to [address] [place] [address]; Future    7  Persistent atrial fibrillation (HCC)  Assessment & Plan:  Rate controlled; c/w current tx    Orders:  -     Ambulatory Referral to Cardiology; Future    8  Atypical pneumonia  Assessment & Plan:  CT negative for pneumonia; resolved with tx with steroids and abx          Subjective:      Patient ID: Chino Bautista is a 82 y o  female  Patient is here to establish care and for follow up on recent hospital admission  She was admitted from 5/10/22-5/12/22 and treated for mild CHF and bronchitis  Pt improved with treatment and discharged home   She was awaiting evaluation from home PT/OT when noticed she was starting to feel unwell with fatigue and unsteadiness of feet  Went to urgent care for evaluation and found to have low BP and had BP medication cut in half  Patient states she is feeling better since urgent care  Was feeling lightheaded and unsteady and blood pressure was found to be low  Feeling better and those symptoms have resolved  Cough resolved  No fevers  Does have low activity tolerance but doing okay if pacing herself  Denies any leg swelling  Would like to establish with cardiology through [address]  Patient just started with home PT and OT and had initial evaluation  Hospital Discharge Summary:     "Lexie Bates is a 82 y o  female patient who originally presented to the hospital on 5/10/2022 due to generalized weakness, confusion, respiratory symptoms  Was admitted with initial concern for pneumonia and CHF exacerbation  Was treated for mild CHF exacerbation  Pneumonia was ruled out and the patient underwent treatment for bronchitis with antibiotics and steroids  With this treatment the patient significantly improved and was at her baseline respiratory status at time of discharge  She is to complete course of treatment as above  She was evaluated by PT and OT and was deemed appropriate for discharge home with VNA  She is encouraged to follow-up with her PCP and specialists      Please see above list of diagnoses and related plan for additional information  "         The following portions of the patient's history were reviewed and updated as appropriate: allergies, current medications, past family history, past medical history, past social history, past surgical history, and problem list     Review of Systems   Constitutional: Positive for fatigue  Negative for chills and fever  Respiratory: Negative for cough, shortness of breath and wheezing  Cardiovascular: Negative for chest pain and leg swelling  Musculoskeletal: Positive for gait problem  Objective:      /66 (BP Location: Right arm, Patient Position: Sitting, Cuff Size: Adult)   Pulse 73   Temp 97 9 °F (36 6 °C) (Temporal)   Resp 18   Ht 5' 2" (1 575 m)   Wt 76 4 kg (168 lb 6 4 oz)   SpO2 98%   BMI 30 80 kg/m²          Physical Exam  Vitals reviewed  Constitutional:       General: She is not in acute distress  Appearance: Normal appearance  She is not ill-appearing, toxic-appearing or diaphoretic  HENT:      Head: Normocephalic and atraumatic  Eyes:      General: No scleral icterus  Right eye: No discharge  Left eye: No discharge  Conjunctiva/sclera: Conjunctivae normal    Cardiovascular:      Rate and Rhythm: Normal rate  Rhythm irregular  Pulses: Normal pulses  Heart sounds: Normal heart sounds  No murmur heard  No gallop  Pulmonary:      Effort: Pulmonary effort is normal  No respiratory distress  Breath sounds: Normal breath sounds  No stridor  No wheezing, rhonchi or rales  Musculoskeletal:      Right lower leg: No edema  Left lower leg: No edema  Neurological:      General: No focal deficit present  Mental Status: She is alert and oriented to person, place, and time  Psychiatric:         Mood and Affect: Mood normal          Behavior: Behavior normal          Thought Content:  Thought content normal          Judgment: Judgment normal

## 2022-05-23 NOTE — PATIENT INSTRUCTIONS
Continue with current medications  Continue with home physical therapy and occupational therapy  Follow up with cardiology  Recheck in 1 month  Call with any concerns or changes

## 2022-05-23 NOTE — ASSESSMENT & PLAN NOTE
Tolerating amlodipine on very low dose; hypotension resolved; will c/w current tx but may consider d/c'ing amlodipine in future given minimal dosage needed for control

## 2022-05-23 NOTE — ASSESSMENT & PLAN NOTE
Wt Readings from Last 3 Encounters:   05/23/22 76 4 kg (168 lb 6 4 oz)   05/12/22 76 5 kg (168 lb 10 4 oz)   11/11/21 72 2 kg (159 lb 3 2 oz)       Weight stable; appears euvolemic; c/w current tx; referral to cardiology placed; VNA ordered

## 2022-05-25 ENCOUNTER — OFFICE VISIT (OUTPATIENT)
Dept: PODIATRY | Facility: CLINIC | Age: 87
End: 2022-05-25
Payer: COMMERCIAL

## 2022-05-25 VITALS
DIASTOLIC BLOOD PRESSURE: 56 MMHG | BODY MASS INDEX: 30.91 KG/M2 | HEIGHT: 62 IN | SYSTOLIC BLOOD PRESSURE: 100 MMHG | WEIGHT: 168 LBS

## 2022-05-25 DIAGNOSIS — I73.9 PERIPHERAL VASCULAR DISEASE, UNSPECIFIED (HCC): ICD-10-CM

## 2022-05-25 DIAGNOSIS — B35.1 ONYCHOMYCOSIS: Primary | ICD-10-CM

## 2022-05-25 PROCEDURE — 11721 DEBRIDE NAIL 6 OR MORE: CPT | Performed by: PODIATRIST

## 2022-05-25 NOTE — PROGRESS NOTES
Established patient with class findings presents for mycotic toenail care  Vascular exam:  DP 0 4 bilateral; PT 0 4 bilateral  Derm exam:  Each toenail is thick, yellow with subungual debris  Diagnoses:  Mycotic toenails x 10; diabetes mellitus  Treatment:  Debrided each mycotic toenail reducing nails in thickness and removing devitalized tissue and subungual debris  Electrical and manual debridement performed

## 2022-06-15 ENCOUNTER — TELEPHONE (OUTPATIENT)
Dept: FAMILY MEDICINE CLINIC | Facility: CLINIC | Age: 87
End: 2022-06-15

## 2022-06-15 NOTE — TELEPHONE ENCOUNTER
Kavin Nelson from [address] [place] [address] called to confirm there are two referrals in Pt chart  I spoke to Moriah Sterling [address] needs a OT referral to [facility]

## 2022-06-17 ENCOUNTER — RA CDI HCC (OUTPATIENT)
Dept: OTHER | Facility: HOSPITAL | Age: 87
End: 2022-06-17

## 2022-06-17 NOTE — PROGRESS NOTES
I11 0  [facility]  coding opportunities          Chart Reviewed number of suggestions sent to Provider: 1     Patients Insurance     Medicare Insurance: Tatiana Maldonado

## 2022-06-22 ENCOUNTER — OFFICE VISIT (OUTPATIENT)
Dept: GERIATRICS | Age: 87
End: 2022-06-22
Payer: COMMERCIAL

## 2022-06-22 ENCOUNTER — TELEPHONE (OUTPATIENT)
Dept: GERIATRICS | Age: 87
End: 2022-06-22

## 2022-06-22 VITALS
OXYGEN SATURATION: 94 % | RESPIRATION RATE: 20 BRPM | DIASTOLIC BLOOD PRESSURE: 62 MMHG | SYSTOLIC BLOOD PRESSURE: 138 MMHG | HEART RATE: 51 BPM | TEMPERATURE: 99.5 F | HEIGHT: 63 IN | WEIGHT: 169.2 LBS | BODY MASS INDEX: 29.98 KG/M2

## 2022-06-22 DIAGNOSIS — I10 BENIGN ESSENTIAL HYPERTENSION: ICD-10-CM

## 2022-06-22 DIAGNOSIS — H61.20 CERUMEN IN AUDITORY CANAL ON EXAMINATION: ICD-10-CM

## 2022-06-22 DIAGNOSIS — I48.19 PERSISTENT ATRIAL FIBRILLATION (HCC): ICD-10-CM

## 2022-06-22 DIAGNOSIS — I50.33 ACUTE ON CHRONIC DIASTOLIC CONGESTIVE HEART FAILURE (HCC): ICD-10-CM

## 2022-06-22 DIAGNOSIS — R26.81 GAIT INSTABILITY: ICD-10-CM

## 2022-06-22 DIAGNOSIS — G47.09 OTHER INSOMNIA: ICD-10-CM

## 2022-06-22 DIAGNOSIS — G31.84 AMNESTIC MCI (MILD COGNITIVE IMPAIRMENT WITH MEMORY LOSS): Primary | ICD-10-CM

## 2022-06-22 DIAGNOSIS — E78.49 OTHER HYPERLIPIDEMIA: ICD-10-CM

## 2022-06-22 DIAGNOSIS — H61.23 HEARING LOSS OF BOTH EARS DUE TO CERUMEN IMPACTION: ICD-10-CM

## 2022-06-22 PROCEDURE — 1160F RVW MEDS BY RX/DR IN RCRD: CPT | Performed by: NURSE PRACTITIONER

## 2022-06-22 PROCEDURE — 3075F SYST BP GE 130 - 139MM HG: CPT | Performed by: NURSE PRACTITIONER

## 2022-06-22 PROCEDURE — 69210 REMOVE IMPACTED EAR WAX UNI: CPT | Performed by: NURSE PRACTITIONER

## 2022-06-22 PROCEDURE — 99215 OFFICE O/P EST HI 40 MIN: CPT | Performed by: NURSE PRACTITIONER

## 2022-06-22 PROCEDURE — 3078F DIAST BP <80 MM HG: CPT | Performed by: NURSE PRACTITIONER

## 2022-06-22 PROCEDURE — 1036F TOBACCO NON-USER: CPT | Performed by: NURSE PRACTITIONER

## 2022-06-22 NOTE — PATIENT INSTRUCTIONS
Recommend weight daily  Notify PCP for weight gain of 3 lbs in one day and 5 lbs in one week  Refrain from high sodium foods such as canned/processed foods  Notify PCP if you develop shortness of breath and or leg swelling    Recommend compression stockings and bilateral leg elevation when sitting in your chair  Speech therapy consulted through [facility] for cognitive rehabilitation  Follow-up in 6 months or sooner if needed

## 2022-06-22 NOTE — PROGRESS NOTES
Ear cerumen removal    Date/Time: 6/22/2022 3:28 PM  Performed by: CRISTO Burch  Authorized by: CRISTO Burch   Universal Protocol:  Consent: Verbal consent obtained  Consent given by: patient  Patient understanding: patient states understanding of the procedure being performed  Patient consent: the patient's understanding of the procedure matches consent given  Patient identity confirmed: verbally with patient      Patient location:  Clinic  Procedure details:     Location:  L ear and R ear    Procedure type: curette      Approach:  Internal and natural orifice  Post-procedure details:     Complication:  None    Hearing quality:  Improved    Patient tolerance of procedure:   Tolerated well, no immediate complications

## 2022-06-22 NOTE — TELEPHONE ENCOUNTER
Left detailed message for the son Sunday Barley that we can continue to see patient for geriatric assessment but not as a PCP as we are closing that part of the practice

## 2022-06-22 NOTE — PROGRESS NOTES
Franco [address]  [address], [address] [facility], [address] [facility]  [phone]    Geriatric Consultation    ASSESSMENT AND PLAN:  1  Amnestic MCI (mild cognitive impairment with memory loss)  Assessment & Plan:  · MOCA score 21/30 today  Previous MOCA scores were 21/30 August 2021 and 23/30 October 2021  · No acute behaviors reported  · Patient has great family support   · Continue additional support with [facility]  · Recommend to continue to stay mentally, physically and socially active  · Will order ST for home cognitive therapy  · Manage chronic conditions with PCP  · Maintain a heart healthy diet and exercise as tolerated  · Follow-up with repeat MOCA in 6 months    Orders:  -     Ambulatory Referral to Speech Therapy; Future    2  Persistent atrial fibrillation (HCC)  Assessment & Plan:  · Rate controlled  · Patient recently started on Eliquis 5 mg 2 times daily      3  Benign essential hypertension  Assessment & Plan:  · Blood pressure stable at 138/62  · Continue low dose of Amlodipine 1 25 mg daily  · Follow-up with PCP      4  Acute on chronic diastolic congestive heart failure (HCC)  Assessment & Plan:  Wt Readings from Last 3 Encounters:   06/22/22 76 7 kg (169 lb 3 2 oz)   05/25/22 76 2 kg (168 lb)   05/23/22 76 4 kg (168 lb 6 4 oz)     · Patient euvolemic on examination  · Continue to monitor weights and fluid intake  · Follow-up with Cardiology          5  Other insomnia  Assessment & Plan:  · Currently denies sleep disturbance  · Recommend melatonin if sleep issues reoccur      6  Gait instability  Assessment & Plan:  · Continue home PT services  · Continue assistive device  · Maintain home fall precautions      7  Hearing loss of both ears due to cerumen impaction  -     Ear cerumen removal    8   Other hyperlipidemia  Assessment & Plan:  · Continue statin and Omega 3 supplement            HPI:    We had the pleasure of evaluating Wally Moura who is a 82 y o  female in Geriatric consultation today  Ms Lauryn Mcneill is in the office with her granddaughter  She lives by herself  Family lives right on the side of a twin home  Patient is independent with all ADLs, has stair lift  Has PT [facility] and loves therapist   Da Calhoun once in the last month from throw rug  Knees or arms were bruised  Did not hit head  Appetite is good  No weight loss  Small weight gain  Has a life alert  Does a lot of word searches  Granddaughter states that her grandmother is doing relatively ok  Her long term memory is intact  Patient lost  last year  She has not been as emotional lately  She has support services through [facility] for 4 days a week for a couple of hours  They sit and chat  Patient gets along great with senior  Patient was in hospital last month with bronchitis and found heart issues  Patients family is making sure that the patient is weighing herself and showering   twice a week  Patient has a pill container  Senoir gives reminder to take am medications  Family reminds her and watches her to take her night time medications  Pill container is at family's house  Cleaning lady comes once every two weeks  Patient is reported not to wander and she does not cook for herself  There was a time that she would leave the stove on  Family had cameras installed in the patient's home for safety but do give her the privacy that she needs  Patient is reported to be a little resistant to care when she left the hospital  She got a little confused in the hospital and doesn't remember being in the hospital       Patient states that she feels good  She has therapy come to the house  She enjoys going for walks around the house  ROS: Review of Systems   Constitutional: Negative for appetite change  Respiratory: Negative for chest tightness and wheezing  Cardiovascular: Negative for chest pain and leg swelling  Gastrointestinal: Negative for abdominal distention, abdominal pain, constipation, nausea and vomiting  Genitourinary: Positive for frequency  Negative for difficulty urinating, dysuria and flank pain  Due to diuretic   Musculoskeletal: Negative for arthralgias, back pain, gait problem and myalgias  Neurological: Negative for dizziness, weakness, light-headedness and headaches  Psychiatric/Behavioral: Negative for dysphoric mood and sleep disturbance  The patient is not nervous/anxious  Allergies   Allergen Reactions    Morphine        Medications:    Current Outpatient Medications on File Prior to Visit   Medication Sig Dispense Refill    amLODIPine (NORVASC) 2 5 mg tablet Take 0 5 tablets (1 25 mg total) by mouth in the morning  90 tablet 0    apixaban (ELIQUIS) 5 mg Take 5 mg by mouth 2 (two) times a day      Ascorbic Acid (VITAMIN C) 500 MG CAPS Take 500 mg by mouth daily      Cholecalciferol (Vitamin D3) 1 25 MG (87464 UT) CAPS       furosemide (LASIX) 40 mg tablet Take 1 tablet by mouth daily      Omega-3 Fatty Acids (fish oil) 1,000 mg       simvastatin (ZOCOR) 20 mg tablet Take 1 tablet by mouth daily      thiamine (VITAMIN B1) 100 mg tablet       Valerian Root 500 MG CAPS        No current facility-administered medications on file prior to visit  History:  Past Medical History:   Diagnosis Date    Memory loss     Urinary tract infection      Past Surgical History:   Procedure Laterality Date    CHOLECYSTECTOMY      REPLACEMENT TOTAL KNEE Left 2008     Family History   Problem Relation Age of Onset    Lung cancer Father     Leukemia Brother     Breast cancer Daughter      Social History     Socioeconomic History    Marital status:       Spouse name: Not on file    Number of children: Not on file    Years of education: Not on file    Highest education level: Not on file   Occupational History    Not on file   Tobacco Use    Smoking status: Never Smoker  Smokeless tobacco: Never Used   Vaping Use    Vaping Use: Never used   Substance and Sexual Activity    Alcohol use: Never    Drug use: Never    Sexual activity: Not Currently     Partners: Male     Birth control/protection: None   Other Topics Concern    Not on file   Social History Narrative    Not on file     Social Determinants of Health     Financial Resource Strain: Not on file   Food Insecurity: No Food Insecurity    Worried About Running Out of Food in the Last Year: Never true    Jalen of Food in the Last Year: Never true   Transportation Needs: No Transportation Needs    Lack of Transportation (Medical): No    Lack of Transportation (Non-Medical): No   Physical Activity: Not on file   Stress: Not on file   Social Connections: Not on file   Intimate Partner Violence: Not on file   Housing Stability: Low Risk     Unable to Pay for Housing in the Last Year: No    Number of Places Lived in the Last Year: 1    Unstable Housing in the Last Year: No     Past Surgical History:   Procedure Laterality Date    CHOLECYSTECTOMY      REPLACEMENT TOTAL KNEE Left 2008       OBJECTIVE:  Vitals:    06/22/22 1403   BP: 138/62   BP Location: Left arm   Patient Position: Sitting   Cuff Size: Adult   Pulse: (!) 51   Resp: 20   Temp: 99 5 °F (37 5 °C)   TempSrc: Temporal   SpO2: 94%   Weight: 76 7 kg (169 lb 3 2 oz)   Height: 5' 3" (1 6 m)     Body mass index is 29 97 kg/m²  Physical Exam  Constitutional:       General: She is not in acute distress  Appearance: Normal appearance  She is not ill-appearing, toxic-appearing or diaphoretic  HENT:      Head: Normocephalic and atraumatic  Right Ear: Tympanic membrane, ear canal and external ear normal  There is no impacted cerumen  Left Ear: Tympanic membrane, ear canal and external ear normal  There is no impacted cerumen        Ears:      Comments: Moderate amount of cerumen present in bilateral EAC     Nose: Nose normal  No congestion or rhinorrhea  Mouth/Throat:      Mouth: Mucous membranes are moist       Pharynx: Oropharynx is clear  No oropharyngeal exudate or posterior oropharyngeal erythema  Eyes:      General: No scleral icterus  Right eye: No discharge  Left eye: No discharge  Extraocular Movements: Extraocular movements intact  Conjunctiva/sclera: Conjunctivae normal    Cardiovascular:      Rate and Rhythm: Normal rate and regular rhythm  Pulses: Normal pulses  Heart sounds: Normal heart sounds  Pulmonary:      Effort: Pulmonary effort is normal  No respiratory distress  Breath sounds: Normal breath sounds  No wheezing, rhonchi or rales  Abdominal:      General: Bowel sounds are normal  There is no distension  Palpations: Abdomen is soft  There is no mass  Tenderness: There is no abdominal tenderness  There is no guarding  Musculoskeletal:         General: Normal range of motion  Cervical back: Normal range of motion and neck supple  Right lower leg: No edema  Left lower leg: No edema  Skin:     General: Skin is warm and dry  Coloration: Skin is not jaundiced  Findings: No bruising, erythema or lesion  Neurological:      General: No focal deficit present  Mental Status: She is alert and oriented to person, place, and time  Mental status is at baseline  Cranial Nerves: No cranial nerve deficit  Motor: No weakness  Gait: Gait abnormal    Psychiatric:         Mood and Affect: Mood normal          Behavior: Behavior normal          Thought Content:  Thought content normal          MoCA:  21/30  GDS:  3/15  TUGT: 12 seconds    Labs & Imaging:  Lab Results   Component Value Date    WBC 5 90 05/12/2022    HGB 14 5 05/12/2022    HCT 42 7 05/12/2022    MCV 95 05/12/2022    PLT 98 (L) 05/12/2022     Lab Results   Component Value Date    SODIUM 138 05/12/2022    K 4 0 05/12/2022     05/12/2022    CO2 26 05/12/2022    BUN 18 05/12/2022 CREATININE 0 66 05/12/2022    GLUC 119 05/12/2022    CALCIUM 9 7 05/12/2022     Lab Results   Component Value Date    BCIXQVWN46 543 10/08/2021     Lab Results   Component Value Date    VNK2MBPPVNTY 1 370 10/08/2021     No results found for: RKIX93XODVYU, [id number]   No results found for this or any previous visit

## 2022-06-24 ENCOUNTER — TELEPHONE (OUTPATIENT)
Dept: GERIATRICS | Age: 87
End: 2022-06-24

## 2022-06-24 NOTE — TELEPHONE ENCOUNTER
Called pt son and left message to give our office a call back in regards to scheduling pt for a Jhoana 6 month follow up  Pt is due for a follow up around 12/22/2022 with Prisiclla Rasheed

## 2022-06-26 PROBLEM — H61.20 CERUMEN IN AUDITORY CANAL ON EXAMINATION: Status: ACTIVE | Noted: 2022-06-22

## 2022-06-26 NOTE — ASSESSMENT & PLAN NOTE
· Blood pressure stable at 138/62  · Continue low dose of Amlodipine 1 25 mg daily  · Follow-up with PCP

## 2022-06-26 NOTE — ASSESSMENT & PLAN NOTE
· MOCA score 21/30 today  Previous MOCA scores were 21/30 August 2021 and 23/30 October 2021  · No acute behaviors reported  · Patient has great family support   · Continue additional support with [facility]  · Recommend to continue to stay mentally, physically and socially active    · Will order ST for home cognitive therapy  · Manage chronic conditions with PCP  · Maintain a heart healthy diet and exercise as tolerated  · Follow-up with repeat MOCA in 6 months

## 2022-06-26 NOTE — ASSESSMENT & PLAN NOTE
Wt Readings from Last 3 Encounters:   06/22/22 76 7 kg (169 lb 3 2 oz)   05/25/22 76 2 kg (168 lb)   05/23/22 76 4 kg (168 lb 6 4 oz)     · Patient euvolemic on examination  · Continue to monitor weights and fluid intake  · Follow-up with Cardiology

## 2022-08-18 ENCOUNTER — TELEPHONE (OUTPATIENT)
Dept: UROLOGY | Facility: MEDICAL CENTER | Age: 87
End: 2022-08-18

## 2022-08-18 ENCOUNTER — HOSPITAL ENCOUNTER (EMERGENCY)
Facility: HOSPITAL | Age: 87
Discharge: HOME | End: 2022-08-18
Attending: EMERGENCY MEDICINE
Payer: COMMERCIAL

## 2022-08-18 VITALS
RESPIRATION RATE: 18 BRPM | TEMPERATURE: 97.7 F | HEART RATE: 67 BPM | DIASTOLIC BLOOD PRESSURE: 95 MMHG | OXYGEN SATURATION: 97 % | SYSTOLIC BLOOD PRESSURE: 158 MMHG

## 2022-08-18 DIAGNOSIS — L03.116 CELLULITIS OF LEFT LOWER EXTREMITY: Primary | ICD-10-CM

## 2022-08-18 PROCEDURE — 99284 EMERGENCY DEPT VISIT MOD MDM: CPT | Performed by: EMERGENCY MEDICINE

## 2022-08-18 PROCEDURE — 99283 EMERGENCY DEPT VISIT LOW MDM: CPT

## 2022-08-18 RX ORDER — ATORVASTATIN CALCIUM 10 MG/1
10 TABLET, FILM COATED ORAL DAILY
COMMUNITY

## 2022-08-18 RX ORDER — POLYETHYLENE GLYCOL 3350 17 G/17G
17 POWDER, FOR SOLUTION ORAL DAILY
COMMUNITY

## 2022-08-18 RX ORDER — ACETAMINOPHEN 500 MG
500 TABLET ORAL EVERY 6 HOURS PRN
COMMUNITY

## 2022-08-18 RX ORDER — CEPHALEXIN 250 MG/1
500 CAPSULE ORAL ONCE
Status: COMPLETED | OUTPATIENT
Start: 2022-08-18 | End: 2022-08-18

## 2022-08-18 RX ORDER — TRAMADOL HYDROCHLORIDE 50 MG/1
50 TABLET ORAL 2 TIMES DAILY
COMMUNITY

## 2022-08-18 RX ORDER — CALCIUM CARBONATE 200(500)MG
1 TABLET,CHEWABLE ORAL DAILY
COMMUNITY

## 2022-08-18 RX ORDER — DOCUSATE SODIUM 100 MG/1
100 CAPSULE, LIQUID FILLED ORAL 2 TIMES DAILY
COMMUNITY

## 2022-08-18 RX ORDER — POTASSIUM CHLORIDE 20 MEQ/1
20 TABLET, EXTENDED RELEASE ORAL DAILY
COMMUNITY

## 2022-08-18 RX ORDER — AMPICILLIN TRIHYDRATE 500 MG
1 CAPSULE ORAL DAILY
COMMUNITY

## 2022-08-18 RX ORDER — CEPHALEXIN 500 MG/1
500 CAPSULE ORAL EVERY 6 HOURS SCHEDULED
Qty: 28 CAPSULE | Refills: 0 | Status: SHIPPED | OUTPATIENT
Start: 2022-08-18 | End: 2022-08-25

## 2022-08-18 RX ADMIN — CEPHALEXIN 500 MG: 250 CAPSULE ORAL at 12:33

## 2022-08-18 NOTE — ED PROVIDER NOTES
History  Chief Complaint   Patient presents with    Knee Swelling     Lt knee swelling & redness  Pt new to facility - unknown baseline & wants her checked  Dementia hx     79-year-old demented female presents for evaluation of swelling and redness in the left knee  Unknown duration  Patient is unsure how long it has been there for  She states it is nonpainful and has full active and passive range of motion of her leg without pain  She is able ambulate on it  No flu symptoms  History provided by:  Patient and medical records  History limited by:  Dementia      Prior to Admission Medications   Prescriptions Last Dose Informant Patient Reported? Taking? Ascorbic Acid (VITAMIN C) 500 MG CAPS   Yes Yes   Sig: Take 500 mg by mouth daily   Cholecalciferol (Vitamin D3) 1 25 MG (25443 UT) CAPS   Yes Yes   Cranberry 450 MG CAPS   Yes Yes   Sig: Take 1 capsule by mouth in the morning   Omega-3 Fatty Acids (fish oil) 1,000 mg   Yes Yes   Valerian Root 500 MG CAPS   Yes No   acetaminophen (TYLENOL) 500 mg tablet   Yes Yes   Sig: Take 500 mg by mouth every 6 (six) hours as needed for mild pain   amLODIPine (NORVASC) 2 5 mg tablet   No Yes   Sig: Take 0 5 tablets (1 25 mg total) by mouth in the morning     apixaban (ELIQUIS) 5 mg   Yes Yes   Sig: Take 5 mg by mouth 2 (two) times a day   atorvastatin (LIPITOR) 10 mg tablet   Yes Yes   Sig: Take 10 mg by mouth daily   calcium carbonate (TUMS) 500 mg chewable tablet   Yes Yes   Sig: Chew 1 tablet daily   docusate sodium (COLACE) 100 mg capsule   Yes Yes   Sig: Take 100 mg by mouth 2 (two) times a day   furosemide (LASIX) 40 mg tablet   Yes Yes   Sig: Take 1 tablet by mouth daily   polyethylene glycol (MiraLax) 17 g packet   Yes Yes   Sig: Take 17 g by mouth daily   potassium chloride (K-DUR,KLOR-CON) 20 mEq tablet   Yes Yes   Sig: Take 20 mEq by mouth daily   simvastatin (ZOCOR) 20 mg tablet   Yes No   Sig: Take 1 tablet by mouth daily   thiamine (VITAMIN B1) 100 mg tablet   Yes Yes   traMADol (ULTRAM) 50 mg tablet   Yes Yes   Sig: Take 50 mg by mouth 2 (two) times a day      Facility-Administered Medications: None       Past Medical History:   Diagnosis Date    Memory loss     Urinary tract infection        Past Surgical History:   Procedure Laterality Date    CHOLECYSTECTOMY      REPLACEMENT TOTAL KNEE Left 2008       Family History   Problem Relation Age of Onset    Lung cancer Father     Leukemia Brother     Breast cancer Daughter      I have reviewed and agree with the history as documented  E-Cigarette/Vaping    E-Cigarette Use Never User      E-Cigarette/Vaping Substances    Nicotine No     THC No     CBD No     Flavoring No     Other No     Unknown No      Social History     Tobacco Use    Smoking status: Never Smoker    Smokeless tobacco: Never Used   Vaping Use    Vaping Use: Never used   Substance Use Topics    Alcohol use: Never    Drug use: Never       Review of Systems   Unable to perform ROS: Dementia       Physical Exam  Physical Exam  Vitals and nursing note reviewed  Constitutional:       Appearance: Normal appearance  She is well-developed  HENT:      Head: Normocephalic and atraumatic  Right Ear: External ear normal       Left Ear: External ear normal    Eyes:      Pupils: Pupils are equal, round, and reactive to light  Neck:      Vascular: No JVD  Trachea: No tracheal deviation  Cardiovascular:      Rate and Rhythm: Normal rate and regular rhythm  Pulmonary:      Effort: No tachypnea, accessory muscle usage or respiratory distress  Breath sounds: Normal breath sounds  Abdominal:      General: There is no distension  Tenderness: There is no abdominal tenderness  Musculoskeletal:         General: No swelling, tenderness or deformity  Cervical back: Normal range of motion  No rigidity        Right lower leg: Normal       Left lower leg: Normal    Skin:     Capillary Refill: Capillary refill takes less than 2 seconds  Neurological:      General: No focal deficit present  Mental Status: She is alert  Mental status is at baseline  Psychiatric:         Mood and Affect: Mood normal          Behavior: Behavior normal          Vital Signs  ED Triage Vitals [08/18/22 1156]   Temperature Pulse Respirations Blood Pressure SpO2   97 7 °F (36 5 °C) 67 18 158/95 97 %      Temp Source Heart Rate Source Patient Position - Orthostatic VS BP Location FiO2 (%)   Oral Monitor Lying Left arm --      Pain Score       --           Vitals:    08/18/22 1156   BP: 158/95   Pulse: 67   Patient Position - Orthostatic VS: Lying         Visual Acuity      ED Medications  Medications   cephalexin (KEFLEX) capsule 500 mg (has no administration in time range)       Diagnostic Studies  Results Reviewed     None                 No orders to display              Procedures  Procedures         ED Course                               SBIRT 20yo+    Flowsheet Row Most Recent Value   SBIRT (23 yo +)    In order to provide better care to our patients, we are screening all of our patients for alcohol and drug use  Would it be okay to ask you these screening questions? Yes Filed at: 08/18/2022 1211   Initial Alcohol Screen: US AUDIT-C     1  How often do you have a drink containing alcohol? 0 Filed at: 08/18/2022 1211   2  How many drinks containing alcohol do you have on a typical day you are drinking? 0 Filed at: 08/18/2022 1211   3a  Male UNDER 65: How often do you have five or more drinks on one occasion? 0 Filed at: 08/18/2022 1211   3b  FEMALE Any Age, or MALE 65+: How often do you have 4 or more drinks on one occassion? 0 Filed at: 08/18/2022 1211   Audit-C Score 0 Filed at: 08/18/2022 1211   MIN: How many times in the past year have you    Used an illegal drug or used a prescription medication for non-medical reasons?  Never Filed at: 08/18/2022 1211                    MDM  Number of Diagnoses or Management Options  Cellulitis of left lower extremity  Diagnosis management comments: Cellulitis left lower extremity without evidence of joint involvement-will reassure, counseled treat with antibiotics  Disposition  Final diagnoses:   Cellulitis of left lower extremity     Time reflects when diagnosis was documented in both MDM as applicable and the Disposition within this note     Time User Action Codes Description Comment    8/18/2022 12:20 PM Nathan Dubose Add [[id number] [id number]] Cellulitis of left lower extremity       ED Disposition     ED Disposition   Discharge    Condition   Stable    Date/Time   Thu Aug 18, 2022 12:19 PM    Comment   Jeanne Mack discharge to home/self care  Follow-up Information     Follow up With Specialties Details Why Contact Info    Christiano Villa,  Family Medicine Go in 2 days  [address]  [address] [facility]  [phone]            Patient's Medications   Discharge Prescriptions    CEPHALEXIN (KEFLEX) 500 MG CAPSULE    Take 1 capsule (500 mg total) by mouth every 6 (six) hours for 7 days       Start Date: 8/18/2022 End Date: 8/25/2022       Order Dose: 500 mg       Quantity: 28 capsule    Refills: 0       No discharge procedures on file      PDMP Review     None          ED Provider  Electronically Signed by           Nidia Mccarty MD  08/18/22 8155

## 2022-08-18 NOTE — TELEPHONE ENCOUNTER
Please Triage  New Patient    What is the reason for the patients appointment? Pt's son called the office to schedule a NP visit for Antoinette Sheth for cath change  Pt was in the hospital recently  What office location does the patient prefer? [place]  Imaging/Lab Results:in epic    Do we accept the patient's insurance or is the patient Self-Pay? [employer]  Insurance Provider:  Plan Type/Number:  Member ID#: Has the patient had any previous Urologist(s)?  no  Have patient records been requested? If not are records showing in Epic: In epic     Has the patient had any outside testing done? In [facility]  Does the patient have a personal history of cancer?  Anjali Mancia can be reached at [phone]

## 2022-08-22 NOTE — TELEPHONE ENCOUNTER
Spoke to pt's son Girish Alexandro  Pt has New Pt appt with Dr Alexandre Barajas on 9/8/22 to establish care for frost maintenance  Pt has dementia  Son stated he would be with pt for this appt

## 2022-09-08 ENCOUNTER — OFFICE VISIT (OUTPATIENT)
Dept: UROLOGY | Facility: MEDICAL CENTER | Age: 87
End: 2022-09-08
Payer: COMMERCIAL

## 2022-09-08 VITALS
OXYGEN SATURATION: 97 % | HEIGHT: 63 IN | SYSTOLIC BLOOD PRESSURE: 118 MMHG | BODY MASS INDEX: 29.97 KG/M2 | DIASTOLIC BLOOD PRESSURE: 72 MMHG | HEART RATE: 56 BPM

## 2022-09-08 DIAGNOSIS — R33.9 URINARY RETENTION: Primary | ICD-10-CM

## 2022-09-08 LAB — POST-VOID RESIDUAL VOLUME, ML POC: 149 ML

## 2022-09-08 PROCEDURE — 51798 US URINE CAPACITY MEASURE: CPT | Performed by: STUDENT IN AN ORGANIZED HEALTH CARE EDUCATION/TRAINING PROGRAM

## 2022-09-08 PROCEDURE — 3074F SYST BP LT 130 MM HG: CPT | Performed by: STUDENT IN AN ORGANIZED HEALTH CARE EDUCATION/TRAINING PROGRAM

## 2022-09-08 PROCEDURE — 99203 OFFICE O/P NEW LOW 30 MIN: CPT | Performed by: STUDENT IN AN ORGANIZED HEALTH CARE EDUCATION/TRAINING PROGRAM

## 2022-09-08 PROCEDURE — 3078F DIAST BP <80 MM HG: CPT | Performed by: STUDENT IN AN ORGANIZED HEALTH CARE EDUCATION/TRAINING PROGRAM

## 2022-09-08 NOTE — PROGRESS NOTES
UROLOGY OUTPATIENT CONSULT NOTE     Name: Ary Christianson  : 1935  MRN: [id number]  Date of Service: 2022      CHIEF COMPLAINT   Urinary retention    History of Present Illness:     Ary Christianson is a 82 y o  female presenting for evaluation of urinary retention  She presents with her son who provides the majority of the history  She was recently admitted to [address] [place] [address] found to have a left MCA acute ischemic stroke  The patient was not eligible for thrombectomy due to age and functional status  She was managed medically on Eliquis which she was already on for history of atrial fibrillation  She was discharged to a rehab facility in [place]  The son was informed that a Mcnair catheter was placed due to urinary retention  He does not know how much her residuals were  In the meantime she transfer to a facility closer to home  Urology appointment was advised for catheter management  About a week and half ago the catheter actually fell out and she has been voiding per report  The patient feels she empties her bladder with voids  She does not currently have the urge to urinate  Her son does report recent course of antibiotics for concern for urinary infection based on the appearance of the urine  Bladder Scan PVR:149mL      PAST MEDICAL HISTORY:     Past Medical History:   Diagnosis Date    Memory loss     Urinary tract infection    Atrial fibrillation  Hypertension  Hyperlipidemia  Vitamin-D deficiency    PAST SURGICAL HISTORY:     Past Surgical History:   Procedure Laterality Date    CHOLECYSTECTOMY      REPLACEMENT TOTAL KNEE Left        CURRENT MEDICATIONS:     Current Outpatient Medications   Medication Sig Dispense Refill    acetaminophen (TYLENOL) 500 mg tablet Take 500 mg by mouth every 6 (six) hours as needed for mild pain      amLODIPine (NORVASC) 2 5 mg tablet Take 0 5 tablets (1 25 mg total) by mouth in the morning   90 tablet 0    apixaban (ELIQUIS) 5 mg Take 5 mg by mouth 2 (two) times a day      Ascorbic Acid (VITAMIN C) 500 MG CAPS Take 500 mg by mouth daily      atorvastatin (LIPITOR) 10 mg tablet Take 10 mg by mouth daily      calcium carbonate (TUMS) 500 mg chewable tablet Chew 1 tablet daily      Cholecalciferol (Vitamin D3) 1 25 MG (09223 UT) CAPS       Cranberry 450 MG CAPS Take 1 capsule by mouth in the morning      docusate sodium (COLACE) 100 mg capsule Take 100 mg by mouth 2 (two) times a day      furosemide (LASIX) 40 mg tablet Take 1 tablet by mouth daily      Omega-3 Fatty Acids (fish oil) 1,000 mg       polyethylene glycol (MIRALAX) 17 g packet Take 17 g by mouth daily      potassium chloride (K-DUR,KLOR-CON) 20 mEq tablet Take 20 mEq by mouth daily      thiamine (VITAMIN B1) 100 mg tablet       traMADol (ULTRAM) 50 mg tablet Take 50 mg by mouth 2 (two) times a day      Valerian Root 500 MG CAPS       simvastatin (ZOCOR) 20 mg tablet Take 1 tablet by mouth daily (Patient not taking: Reported on 9/8/2022)       No current facility-administered medications for this visit  ALLERGIES:     Allergies   Allergen Reactions    Morphine        SOCIAL HISTORY:     Social History     Socioeconomic History    Marital status:       Spouse name: None    Number of children: None    Years of education: None    Highest education level: None   Occupational History    None   Tobacco Use    Smoking status: Never Smoker    Smokeless tobacco: Never Used   Vaping Use    Vaping Use: Never used   Substance and Sexual Activity    Alcohol use: Never    Drug use: Never    Sexual activity: Not Currently     Partners: Male     Birth control/protection: None   Other Topics Concern    None   Social History Narrative    None     Social Determinants of Health     Financial Resource Strain: Not on file   Food Insecurity: No Food Insecurity    Worried About Running Out of Food in the Last Year: Never true    [address] in the Last Year: Never true   Transportation Needs: No Transportation Needs    Lack of Transportation (Medical): No    Lack of Transportation (Non-Medical): No   Physical Activity: Not on file   Stress: Not on file   Social Connections: Not on file   Intimate Partner Violence: Not on file   Housing Stability: Low Risk     Unable to Pay for Housing in the Last Year: No    Number of Places Lived in the Last Year: 1    Unstable Housing in the Last Year: No       FAMILY HISTORY:     Family History   Problem Relation Age of Onset    Lung cancer Father     Leukemia Brother     Breast cancer Daughter        REVIEW OF SYSTEMS:     Review of Systems   Constitutional: Negative for chills, fever and unexpected weight change  HENT: Negative for hearing loss and sore throat  Eyes: Negative for redness and visual disturbance  Respiratory: Negative for cough and shortness of breath  Cardiovascular: Negative for chest pain, palpitations and leg swelling  Gastrointestinal: Negative for blood in stool, constipation, diarrhea, nausea and vomiting  Endocrine: Negative for cold intolerance and heat intolerance  Genitourinary:        Per HPI   Musculoskeletal: Negative for arthralgias, back pain and myalgias  Skin: Negative for color change and rash  Neurological: Negative for dizziness, seizures and headaches  Hematological: Bruises/bleeds easily  PHYSICAL EXAM:     /72 (BP Location: Left arm, Patient Position: Sitting, Cuff Size: Adult)   Pulse 56   Ht 5' 3" (1 6 m)   SpO2 97%   BMI 29 97 kg/m²     General:  Healthy appearing female in no acute distress  Head:  Normocephalic, atraumatic  Eyes: no scleral icterus  ENMT: Nares patent, moist mucous membranes  Cardiovascular:  Regular rate  Respiratory:  Patient has unlabored respirations  Abdomen:  Abdomen nondistended  Musculoskeletal:  Presents in a wheelchair     Neurological: Grossly intact  Psych: Normal affect    LABS:     CBC:   Lab Results Component Value Date    WBC 5 90 05/12/2022    HGB 14 5 05/12/2022    HCT 42 7 05/12/2022    MCV 95 05/12/2022    PLT 98 (L) 05/12/2022       BMP:   Lab Results   Component Value Date    CALCIUM 9 7 05/12/2022    K 4 0 05/12/2022    CO2 26 05/12/2022     05/12/2022    BUN 18 05/12/2022    CREATININE 0 66 05/12/2022       ASSESSMENT:     82 y o  female with history of urinary retention likely related to recent stroke now resolved  Bladder scan office was 149 mL  The patient did not have the urge to urinate the last voided about 3 hours prior  She has had the catheter out for over a week at this point and bladder scan is low  I do not think she needs the catheter replaced  PLAN:     Return to clinic [facility] Patient's son will have the facility continue to monitor her urination  Destiny Giraldo MD  [facility]    This note was written using fluency dictation software  Please excuse any resulting minor grammatical errors

## 2022-09-29 ENCOUNTER — CONSULT (OUTPATIENT)
Dept: CARDIOLOGY CLINIC | Facility: CLINIC | Age: 87
End: 2022-09-29
Payer: COMMERCIAL

## 2022-09-29 VITALS
SYSTOLIC BLOOD PRESSURE: 119 MMHG | HEART RATE: 69 BPM | DIASTOLIC BLOOD PRESSURE: 77 MMHG | WEIGHT: 171.2 LBS | BODY MASS INDEX: 30.33 KG/M2

## 2022-09-29 DIAGNOSIS — I10 BENIGN ESSENTIAL HYPERTENSION: Primary | ICD-10-CM

## 2022-09-29 DIAGNOSIS — I50.32 CHRONIC DIASTOLIC CONGESTIVE HEART FAILURE (HCC): ICD-10-CM

## 2022-09-29 DIAGNOSIS — I10 PRIMARY HYPERTENSION: ICD-10-CM

## 2022-09-29 DIAGNOSIS — I48.19 PERSISTENT ATRIAL FIBRILLATION (HCC): ICD-10-CM

## 2022-09-29 DIAGNOSIS — I87.2 VENOUS STASIS DERMATITIS OF BOTH LOWER EXTREMITIES: ICD-10-CM

## 2022-09-29 DIAGNOSIS — I50.32 CHRONIC DIASTOLIC HEART FAILURE (HCC): ICD-10-CM

## 2022-09-29 PROCEDURE — 99215 OFFICE O/P EST HI 40 MIN: CPT | Performed by: INTERNAL MEDICINE

## 2022-09-29 PROCEDURE — 93000 ELECTROCARDIOGRAM COMPLETE: CPT | Performed by: INTERNAL MEDICINE

## 2022-09-29 NOTE — ASSESSMENT & PLAN NOTE
Wt Readings from Last 3 Encounters:   09/29/22 77 7 kg (171 lb 3 2 oz)   06/22/22 76 7 kg (169 lb 3 2 oz)   05/25/22 76 2 kg (168 lb)     Ms Ruperto Mahajan is establishing follow-up with us regarding her history of longstanding persistent/permanent atrial fibrillation and chronic diastolic heart failure  Her blood pressure seems to be well controlled  She gives no history that suggest decompensated heart failure or angina  On examination she has swelling in left lower extremity relating to her history of injury in the knee  She has significant venous dermatitis  Her last blood work from July indicated stable renal function and electrolytes  Her ECG shows atrial fibrillation with controlled ventricular response  Her echocardiogram done in May 2022 identified marked asymmetric septal left ventricular hypertrophy with mild pulmonary hypertension  On examination there is no evidence of outflow tract obstruction  -- at this time I would like to continue her current medications  -- I am advising her to stay well hydrated but avoid excess water intake  She is also advised to take minimal salt in her diet  -- I am encouraging her to continue normal activities as much as tolerated but avoid sudden turning or bending or getting up from sitting or down or lying position  -- regarding venous dermatitis in lower extremities she should have moisturizer cream applied regularly to prevent recurrence of cellulitis in the region  I am advising her to keep the feet elevated when she is sitting or when she is lying down  -- if able to compression stockings can be tried if lower extremity edema persists  She is advised to follow regularly with her primary care physician  Cardiology follow-up can be arranged in 6 months time  Earlier if necessary  The following routine measures are being advised to prevent exacerbation of congestive heart failure:     - Daily or atleast weekly checking of weight      Notify your clinicians if greater than 2 lb is gained in 1 day or greater than 5 lb is gained in 1 wk  - Checking for lower extremity swelling  Examine legs for swelling (new or increase in existing swelling) and describe if swelling reaches ankle, shin, or knee  - Monitoring for decrease in exercise tolerance  Check your self for unusual shortness of breath at rest, or with mild exertion, moderate exertion, or none at all     - Monitoring for worsening shortness of breath on lying down  Check for increase in number of pillows used at night and for increase in waking at night with shortness of breath  - Salt/sodium restriction to less than 2 g a day  Calculate total sodium intake in a day from nutrition labels and food charts  Understand hidden sources of salt intake   Eliminate the salt shaker  (Remember, One teaspoon of table salt = 2,300 mg of sodium)    Avoid using garlic salt, onion salt, MSG, meat tenderizers, broth mixes, [place] food, soy sauce, teriyaki sauce, barbeque sauce, sauerkraut, olives, pickles, pickle relish, smith bits, and croutons    Use fresh ingredients and/or foods with no added salt   Try orange, lemon, lime, pineapple juice, or vinegar as a base for meat marinades or to add tart flavor   Avoid convenience foods such as canned soups, entrees, vegetables, pasta and rice mixes, frozen dinners, instant cereal and puddings, and gravy sauce mixes    Select frozen meals that contain around 600 mg sodium or less   Use fresh, frozen, no-added-salt canned vegetables, low-sodium soups, and low-sodium lunchmeats   Look for seasoning or spice blends with no salt, or try fresh herbs, onions, or garlic  You are advised to inform us for any concerns regarding above measures

## 2022-09-29 NOTE — PROGRESS NOTES
[facility]  [address] [facility], [place]  Phone#  [phone]  Fax#  [phone]  *-*-*-*-*-*-*-*-*-*-*-*-*-*-*-*-*-*-*-*-*-*-*-*-*-*-*-*-*-*-*-*-*-*-*-*-*-*-*-*-*-*-*-*-*-*-*-*-*-*-*-*-*-*  ENCOUNTER DATE: 09/29/22 11:37 AM  PATIENT NAME: Glo Vieyra   1935    [id number]  Age: 82 y o  Sex: female  AUTHOR: Reece Cortez  PRIMARYCARE PHYSICIAN: Daron Saeed DO  REFERRING PHYSICIAN: Daron Saeed DO  [address]  [facility] HEART,  [address] Susanne Maki DO   *-*-*-*-*-*-*-*-*-*-*-*-*-*-*-*-*-*-*-*-*-*-*-*-*-*-*-*-*-*-*-*-*-*-*-*-*-*-*-*-*-*-*-*-*-*-*-*-*-*-*-*-*-*-  REASON FOR REFERRAL:  Comanagement of cardiac comorbidities including chronic diastolic heart failure, hypertension, persistent/permanent atrial fibrillation and other comorbidities  *-*-*-*-*-*-*-*-*-*-*-*-*-*-*-*-*-*-*-*-*-*-*-*-*-*-*-*-*-*-*-*-*-*-*-*-*-*-*-*-*-*-*-*-*-*-*-*-*-*-*-*-*-*-  CARDIOLOGY ASSESSMENT & PLAN:   Diagnosis ICD-10-CM Associated Orders   1  Benign essential hypertension  I10    2  Chronic diastolic congestive heart failure ([facility] )  I50 32 Ambulatory Referral to Cardiology     POCT ECG   3  Primary hypertension  I10 Ambulatory Referral to Cardiology   4  Persistent atrial fibrillation ([facility] )  I48 19 Ambulatory Referral to Cardiology     POCT ECG   5  Chronic diastolic heart failure (HCC)  I50 32    6  Venous stasis dermatitis of both lower extremities  I87 2      Chronic diastolic heart failure (HCC)  Wt Readings from Last 3 Encounters:   09/29/22 77 7 kg (171 lb 3 2 oz)   06/22/22 76 7 kg (169 lb 3 2 oz)   05/25/22 76 2 kg (168 lb)     Ms Cody Pulse is establishing follow-up with us regarding her history of longstanding persistent/permanent atrial fibrillation and chronic diastolic heart failure  Her blood pressure seems to be well controlled  She gives no history that suggest decompensated heart failure or angina    On examination she has swelling in left lower extremity relating to her history of injury in the knee  She has significant venous dermatitis  Her last blood work from July indicated stable renal function and electrolytes  Her ECG shows atrial fibrillation with controlled ventricular response  Her echocardiogram done in May 2022 identified marked asymmetric septal left ventricular hypertrophy with mild pulmonary hypertension  On examination there is no evidence of outflow tract obstruction  -- at this time I would like to continue her current medications  -- I am advising her to stay well hydrated but avoid excess water intake  She is also advised to take minimal salt in her diet  -- I am encouraging her to continue normal activities as much as tolerated but avoid sudden turning or bending or getting up from sitting or down or lying position  -- regarding venous dermatitis in lower extremities she should have moisturizer cream applied regularly to prevent recurrence of cellulitis in the region  I am advising her to keep the feet elevated when she is sitting or when she is lying down  -- if able to compression stockings can be tried if lower extremity edema persists  She is advised to follow regularly with her primary care physician  Cardiology follow-up can be arranged in 6 months time  Earlier if necessary  The following routine measures are being advised to prevent exacerbation of congestive heart failure:     - Daily or atleast weekly checking of weight  Notify your clinicians if greater than 2 lb is gained in 1 day or greater than 5 lb is gained in 1 wk  - Checking for lower extremity swelling  Examine legs for swelling (new or increase in existing swelling) and describe if swelling reaches ankle, shin, or knee  - Monitoring for decrease in exercise tolerance      Check your self for unusual shortness of breath at rest, or with mild exertion, moderate exertion, or none at all     - Monitoring for worsening shortness of breath on lying down  Check for increase in number of pillows used at night and for increase in waking at night with shortness of breath  - Salt/sodium restriction to less than 2 g a day  Calculate total sodium intake in a day from nutrition labels and food charts  Understand hidden sources of salt intake   Eliminate the salt shaker  (Remember, One teaspoon of table salt = 2,300 mg of sodium)    Avoid using garlic salt, onion salt, MSG, meat tenderizers, broth mixes, [place] food, soy sauce, teriyaki sauce, barbeque sauce, sauerkraut, olives, pickles, pickle relish, smith bits, and croutons    Use fresh ingredients and/or foods with no added salt   Try orange, lemon, lime, pineapple juice, or vinegar as a base for meat marinades or to add tart flavor   Avoid convenience foods such as canned soups, entrees, vegetables, pasta and rice mixes, frozen dinners, instant cereal and puddings, and gravy sauce mixes    Select frozen meals that contain around 600 mg sodium or less   Use fresh, frozen, no-added-salt canned vegetables, low-sodium soups, and low-sodium lunchmeats   Look for seasoning or spice blends with no salt, or try fresh herbs, onions, or garlic  You are advised to inform us for any concerns regarding above measures  *-*-*-*-*-*-*-*-*-*-*-*-*-*-*-*-*-*-*-*-*-*-*-*-*-*-*-*-*-*-*-*-*-*-*-*-*-*-*-*-*-*-*-*-*-*-*-*-*-*-*-*-*-*-  CURRENT ECG:  Results for orders placed or performed in visit on 09/29/22   POCT ECG    Narrative    Atrial fibrillation with controlled ventricular response, HR 69 beats per minute, normal axis, diffuse nonspecific ST T-wave abnormalities  No evidence of left ventricular or right ventricular hypertrophy       *-*-*-*-*-*-*-*-*-*-*-*-*-*-*-*-*-*-*-*-*-*-*-*-*-*-*-*-*-*-*-*-*-*-*-*-*-*-*-*-*-*-*-*-*-*-*-*-*-*-*-*-*-*-  HISTORY OF PRESENT ILLNESS:  Patient is a pleasant 82-year-old female who is known to me from prior evaluation during hospitalization at [facility] in May 2022  Her prior medical history is significant for:    1  Chronic diastolic Heart failure  2  Asymmetric left ventricular hypertrophy with severe hypertrophy of apical region  3  Mild pulmonary hypertension  4  Moderate to marked biatrial enlargement  5  Permanent atrial fibrillation, slow ventricular response at night  6  Primary hypertension  7  Dyslipidemia  8  Peripheral venous insufficiency  9  History of CVA July 2022  10  Stage 2 CKD  11  DJD with Osteoarthrisits  12  Vitamin D deficiency  13  History of knee injury leading to hemarthrosis of left knee, July 2022  14  Mild dementia    She is here for visit accompanied with her son  She is currently a resident at [facility] living facility in [facility] in July 2022 she had mechanical injury while she was staying [place] her [place]'s [place]  She was taking to the local [facility] [address] [facility] and was transferred to [facility]  She had hemarthrosis of the left knee  She was eventually discharged to nursing facility where she had stroke-like symptoms and she was readmitted to the hospital between July 15th and July 19 2022  She was diagnosed with acute left MCA stroke  Her cerebrovascular angiogram showed left M2 acute occlusion  She was medically managed and her symptoms improved with time and she was discharged eventually to nursing facility and from where she is now at assisted living facility  The deficits from her stroke include some word-finding difficulty and issues with memory  She still has significant swelling in left knee and left lower extremity region relating to her history of injury  From a symptom perspective her son has noticed that she has intermittent heavy breathing and she looks slightly short of breath  Patient herself is unable to provide this history  She has chronic left closer than right lower extremity edema worse since she had the hemarthrosis in left knee    She denies any palpitations or dizziness or lightheadedness or chest pains  She ambulates with a walker  She denies any orthopnea or PND  Her recent issues also include cellulitis of the left lower extremity for which she was seen in the emergency room on August 18, 2022  She also had UTI like symptoms and was treated with antibiotics recently  She has had no recent falls  Denies any excessive bruising or bleeding  Functional capacity status: Moderate to markedly limited primarily due to age and comorbidities  (Excellent- >10 METs; Good: (7-10 METs); Moderate (4-7 METs); Poor (<= 4 METs)    Any chronic stressors:  None   (feeling of poor health, financial problems, and social isolation etc)  Tobacco or alcohol dependence:  She has never been a smoker and she does not drink    There is no family history of premature CAD or sudden cardiac death  Current cardiac meds:  Atorvastatin 10 mg daily, Eliquis 5 mg twice daily, furosemide 40 mg daily, fish oil  Last available blood work for review is from July 25, 2022  Her renal function and electrolytes were normal   Her hemoglobin A1c was 5 2  Lipid profile showed total cholesterol of 131, triglyceride 63, HDL 35, calculated LDL 83  TSH was normal at 1 46  According to her son she had blood work more recently at the assisted living facility      *-*-*-*-*-*-*-*-*-*-*-*-*-*-*-*-*-*-*-*-*-*-*-*-*-*-*-*-*-*-*-*-*-*-*-*-*-*-*-*-*-*-*-*-*-*-*-*-*-*-*-*-*-*  PAST MEDICAL HISTORY:  Past Medical History:   Diagnosis Date    Memory loss     Urinary tract infection     PAST SURGICAL HISTORY:   Past Surgical History:   Procedure Laterality Date    CHOLECYSTECTOMY      REPLACEMENT TOTAL KNEE Left 2008         FAMILY HISTORY:  Family History   Problem Relation Age of Onset    Lung cancer Father     Leukemia Brother     Breast cancer Daughter     SOCIAL HISTORY:  Social History     Tobacco Use   Smoking Status Never Smoker   Smokeless Tobacco Never Used Social History     Substance and Sexual Activity   Alcohol Use Never     Social History     Substance and Sexual Activity   Drug Use Never    [unfilled]     *-*-*-*-*-*-*-*-*-*-*-*-*-*-*-*-*-*-*-*-*-*-*-*-*-*-*-*-*-*-*-*-*-*-*-*-*-*-*-*-*-*-*-*-*-*-*-*-*-*-*-*-*-*  ALLERGIES:  Allergies   Allergen Reactions    Morphine     CURRENT SCHEDULED MEDICATIONS:    Current Outpatient Medications:     acetaminophen (TYLENOL) 500 mg tablet, Take 500 mg by mouth every 6 (six) hours as needed for mild pain, Disp: , Rfl:     apixaban (ELIQUIS) 5 mg, Take 5 mg by mouth 2 (two) times a day, Disp: , Rfl:     Ascorbic Acid (VITAMIN C) 500 MG CAPS, Take 500 mg by mouth daily, Disp: , Rfl:     atorvastatin (LIPITOR) 10 mg tablet, Take 10 mg by mouth daily, Disp: , Rfl:     bisacodyl (DULCOLAX) 5 mg EC tablet, Take 5 mg by mouth daily as needed for constipation, Disp: , Rfl:     calcium carbonate (TUMS) 500 mg chewable tablet, Chew 1 tablet daily, Disp: , Rfl:     Cholecalciferol (Vitamin D3) 1 25 MG (21128 UT) CAPS, , Disp: , Rfl:     Cranberry 450 MG CAPS, Take 1 capsule by mouth in the morning, Disp: , Rfl:     furosemide (LASIX) 40 mg tablet, Take 1 tablet by mouth daily, Disp: , Rfl:     Omega-3 Fatty Acids (fish oil) 1,000 mg, , Disp: , Rfl:     potassium chloride (K-DUR,KLOR-CON) 20 mEq tablet, Take 20 mEq by mouth daily, Disp: , Rfl:     thiamine (VITAMIN B1) 100 mg tablet, , Disp: , Rfl:     amLODIPine (NORVASC) 2 5 mg tablet, Take 0 5 tablets (1 25 mg total) by mouth in the morning   (Patient not taking: Reported on 9/29/2022), Disp: 90 tablet, Rfl: 0    docusate sodium (COLACE) 100 mg capsule, Take 100 mg by mouth 2 (two) times a day (Patient not taking: Reported on 9/29/2022), Disp: , Rfl:     polyethylene glycol (MIRALAX) 17 g packet, Take 17 g by mouth daily, Disp: , Rfl:     simvastatin (ZOCOR) 20 mg tablet, Take 1 tablet by mouth daily (Patient not taking: No sig reported), Disp: , Rfl:     traMADol (ULTRAM) 50 mg tablet, Take 50 mg by mouth 2 (two) times a day (Patient not taking: Reported on 9/29/2022), Disp: , Rfl:     Valerian Root 500 MG CAPS, , Disp: , Rfl:      *-*-*-*-*-*-*-*-*-*-*-*-*-*-*-*-*-*-*-*-*-*-*-*-*-*-*-*-*-*-*-*-*-*-*-*-*-*-*-*-*-*-*-*-*-*-*-*-*-*-*-*-*-*  REVIEW OF SYMPTOMS:    Positive for:  As noted above in HPI  Negative for: All remaining as reviewed below and in HPI   SYSTEM SYMPTOMS REVIEWED:  General--weight change, fever, night sweats  Respiratoryl-- Wheezing, shortness of breath, cough, URI symptoms, sputum, blood  Cardiovascular--chest pain, syncope, dyspnea on exertion, edema, decline in exercise tolerance, claudication   Gastrointestinal--persistent vomiting, diarrhea, abdominal distention, blood in stool   Muscular or skeletal--joint pain or swelling   Neurologic--headaches, syncope, abnormal movement  Hematologic--history of easy bruising and bleeding   Endocrine--thyroid enlargement, heat or cold intolerance, polyuria   Psychiatric--anxiety, depression      *-*-*-*-*-*-*-*-*-*-*-*-*-*-*-*-*-*-*-*-*-*-*-*-*-*-*-*-*-*-*-*-*-*-*-*-*-*-*-*-*-*-*-*-*-*-*-*-*-*-*-*-*-*  CURRENT OUTPATIENT MEDICATIONS:     Current Outpatient Medications:     acetaminophen (TYLENOL) 500 mg tablet, Take 500 mg by mouth every 6 (six) hours as needed for mild pain, Disp: , Rfl:     apixaban (ELIQUIS) 5 mg, Take 5 mg by mouth 2 (two) times a day, Disp: , Rfl:     Ascorbic Acid (VITAMIN C) 500 MG CAPS, Take 500 mg by mouth daily, Disp: , Rfl:     atorvastatin (LIPITOR) 10 mg tablet, Take 10 mg by mouth daily, Disp: , Rfl:     bisacodyl (DULCOLAX) 5 mg EC tablet, Take 5 mg by mouth daily as needed for constipation, Disp: , Rfl:     calcium carbonate (TUMS) 500 mg chewable tablet, Chew 1 tablet daily, Disp: , Rfl:     Cholecalciferol (Vitamin D3) 1 25 MG (91539 UT) CAPS, , Disp: , Rfl:     Cranberry 450 MG CAPS, Take 1 capsule by mouth in the morning, Disp: , Rfl:     furosemide (LASIX) 40 mg tablet, Take 1 tablet by mouth daily, Disp: , Rfl:     Omega-3 Fatty Acids (fish oil) 1,000 mg, , Disp: , Rfl:     potassium chloride (K-DUR,KLOR-CON) 20 mEq tablet, Take 20 mEq by mouth daily, Disp: , Rfl:     thiamine (VITAMIN B1) 100 mg tablet, , Disp: , Rfl:     amLODIPine (NORVASC) 2 5 mg tablet, Take 0 5 tablets (1 25 mg total) by mouth in the morning  (Patient not taking: Reported on 9/29/2022), Disp: 90 tablet, Rfl: 0    docusate sodium (COLACE) 100 mg capsule, Take 100 mg by mouth 2 (two) times a day (Patient not taking: Reported on 9/29/2022), Disp: , Rfl:     polyethylene glycol (MIRALAX) 17 g packet, Take 17 g by mouth daily, Disp: , Rfl:     simvastatin (ZOCOR) 20 mg tablet, Take 1 tablet by mouth daily (Patient not taking: No sig reported), Disp: , Rfl:     traMADol (ULTRAM) 50 mg tablet, Take 50 mg by mouth 2 (two) times a day (Patient not taking: Reported on 9/29/2022), Disp: , Rfl:     Valerian Root 500 MG CAPS, , Disp: , Rfl:     *-*-*-*-*-*-*-*-*-*-*-*-*-*-*-*-*-*-*-*-*-*-*-*-*-*-*-*-*-*-*-*-*-*-*-*-*-*-*-*-*-*-*-*-*-*-*-*-*-*-*-*-*-*  VITAL SIGNS:  Vitals:    09/29/22 1049   BP: 119/77   BP Location: Right arm   Patient Position: Sitting   Cuff Size: Large   Pulse: 69   Weight: 77 7 kg (171 lb 3 2 oz)       BMI: Body mass index is 30 33 kg/m²      WEIGHTS:   Wt Readings from Last 25 Encounters:   09/29/22 77 7 kg (171 lb 3 2 oz)   06/22/22 76 7 kg (169 lb 3 2 oz)   05/25/22 76 2 kg (168 lb)   05/23/22 76 4 kg (168 lb 6 4 oz)   05/12/22 76 5 kg (168 lb 10 4 oz)   11/11/21 72 2 kg (159 lb 3 2 oz)   10/04/21 72 7 kg (160 lb 4 oz)   08/26/21 71 8 kg (158 lb 6 4 oz)   04/14/21 69 9 kg (154 lb)   02/24/21 73 4 kg (161 lb 14 4 oz)   04/01/20 73 8 kg (162 lb 9 6 oz)        *-*-*-*-*-*-*-*-*-*-*-*-*-*-*-*-*-*-*-*-*-*-*-*-*-*-*-*-*-*-*-*-*-*-*-*-*-*-*-*-*-*-*-*-*-*-*-*-*-*-*-*-*-*-  PHYSICAL EXAM:  General Appearance:    Alert, cooperative, no distress, appears stated age, slightly obese   Head, Eyes, ENT:    No obvious abnormality, moist mucous mebranes  Neck:   Supple, no carotid bruit or JVD   Back:     Symmetric, no curvature  Lungs:     Respirations unlabored  Clear to auscultation bilaterally,    Chest wall:    No tenderness or deformity   Heart:   irregularly irregular rhythm, normal intensity heart sounds, unable to appreciate murmur   Abdomen:     Soft, non-tender,    Extremities:   Extremities warm, there is marked swelling of the left knee region relating to her history of injury and hemarthrosis with swelling extending upward into the thigh and downward into the leg  There is great 2-3 pedal edema in left lower extremity and grade 1-2 pedal edema in right lower extremity  Skin:   there are moderate venostatic changes in lower extremities  No open wounds are noted  Normal skin color, texture, and turgor  No rashes or lesions     *-*-*-*-*-*-*-*-*-*-*-*-*-*-*-*-*-*-*-*-*-*-*-*-*-*-*-*-*-*-*-*-*-*-*-*-*-*-*-*-*-*-*-*-*-*-*-*-*-*-*-*-*-*-  LABORATORY DATA: I have personally reviewed the available laboratory data          Potassium   Date Value Ref Range Status   05/12/2022 4 0 3 5 - 5 3 mmol/L Final   05/11/2022 3 9 3 5 - 5 3 mmol/L Final   05/10/2022 4 2 3 5 - 5 3 mmol/L Final     Chloride   Date Value Ref Range Status   05/12/2022 103 100 - 108 mmol/L Final   05/11/2022 106 100 - 108 mmol/L Final   05/10/2022 102 100 - 108 mmol/L Final     CO2   Date Value Ref Range Status   05/12/2022 26 21 - 32 mmol/L Final   05/11/2022 25 21 - 32 mmol/L Final   05/10/2022 21 21 - 32 mmol/L Final     BUN   Date Value Ref Range Status   05/12/2022 18 5 - 25 mg/dL Final   05/11/2022 18 5 - 25 mg/dL Final   05/10/2022 18 5 - 25 mg/dL Final     Creatinine   Date Value Ref Range Status   05/12/2022 0 66 0 60 - 1 30 mg/dL Final     Comment:     Standardized to IDMS reference method   05/11/2022 0 84 0 60 - 1 30 mg/dL Final     Comment:     Standardized to IDMS reference method   05/10/2022 0 91 0 60 - 1 30 mg/dL Final     Comment:     Standardized to IDMS reference method     eGFR   Date Value Ref Range Status   05/12/2022 80 ml/min/1 73sq m Final   05/11/2022 63 ml/min/1 73sq m Final   05/10/2022 57 ml/min/1 73sq m Final     Calcium   Date Value Ref Range Status   05/12/2022 9 7 8 3 - 10 1 mg/dL Final   05/11/2022 8 9 8 3 - 10 1 mg/dL Final   05/10/2022 9 0 8 3 - 10 1 mg/dL Final     AST   Date Value Ref Range Status   05/11/2022 32 5 - 45 U/L Final     Comment:     Specimen collection should occur prior to Sulfasalazine administration due to the potential for falsely depressed results  05/10/2022 27 5 - 45 U/L Final     Comment:     Specimen collection should occur prior to Sulfasalazine administration due to the potential for falsely depressed results  ALT   Date Value Ref Range Status   05/11/2022 17 12 - 78 U/L Final     Comment:     Specimen collection should occur prior to Sulfasalazine administration due to the potential for falsely depressed results  05/10/2022 17 12 - 78 U/L Final     Comment:     Specimen collection should occur prior to Sulfasalazine administration due to the potential for falsely depressed results        Alkaline Phosphatase   Date Value Ref Range Status   05/11/2022 62 46 - 116 U/L Final   05/10/2022 78 46 - 116 U/L Final     WBC   Date Value Ref Range Status   05/12/2022 5 90 4 31 - 10 16 Thousand/uL Final   05/11/2022 5 16 4 31 - 10 16 Thousand/uL Final   05/10/2022 5 99 4 31 - 10 16 Thousand/uL Final     Hemoglobin   Date Value Ref Range Status   05/12/2022 14 5 11 5 - 15 4 g/dL Final   05/11/2022 13 9 11 5 - 15 4 g/dL Final   05/10/2022 14 5 11 5 - 15 4 g/dL Final     Platelets   Date Value Ref Range Status   05/12/2022 98 (L) 149 - 390 Thousands/uL Final   05/11/2022 93 (L) 149 - 390 Thousands/uL Final   05/10/2022 107 (L) 149 - 390 Thousands/uL Final     PTT   Date Value Ref Range Status   05/10/2022 33 23 - 37 seconds Final     Comment:     Therapeutic Heparin Range = 60-90 seconds     INR   Date Value Ref Range Status   05/10/2022 1 19 0 84 - 1 19 Final     No results found for: CKMB, DIGOXIN  No results found for: TSH  No results found for: CHOL, HDL, LDL, TRIG   Hemoglobin A1C   Date Value Ref Range Status   07/16/2022 5 2 4 0 - 5 6 % Final     Comment:     The use of HbA1c to monitor glycemic status is based on normal hemoglobin and HbA composition  This test should not be used in patients with abnormal hemoglobin that affects the half life of the red blood cell or the in vivo glycation rates  Blood Culture   Date Value Ref Range Status   05/10/2022 No Growth After 5 Days  Final   05/10/2022 No Growth After 5 Days  Final     Legionella Urinary Antigen   Date Value Ref Range Status   05/10/2022 Negative Negative Final       *-*-*-*-*-*-*-*-*-*-*-*-*-*-*-*-*-*-*-*-*-*-*-*-*-*-*-*-*-*-*-*-*-*-*-*-*-*-*-*-*-*-*-*-*-*-*-*-*-*-*-*-*-*-  RADIOLOGY RESULTS:  No results found  *-*-*-*-*-*-*-*-*-*-*-*-*-*-*-*-*-*-*-*-*-*-*-*-*-*-*-*-*-*-*-*-*-*-*-*-*-*-*-*-*-*-*-*-*-*-*-*-*-*-*-*-*-*-  LAST ECHOCARDIOGRAM AND OTHER CARDIOLOGY RESULTS:  No results found for this or any previous visit  No results found for this or any previous visit  No results found for this or any previous visit  No results found for this or any previous visit  *-*-*-*-*-*-*-*-*-*-*-*-*-*-*-*-*-*-*-*-*-*-*-*-*-*-*-*-*-*-*-*-*-*-*-*-*-*-*-*-*-*-*-*-*-*-*-*-*-*-*-*-*-*-  RADIOLOGY RESULTS:  No results found  *-*-*-*-*-*-*-*-*-*-*-*-*-*-*-*-*-*-*-*-*-*-*-*-*-*-*-*-*-*-*-*-*-*-*-*-*-*-*-*-*-*-*-*-*-*-*-*-*-*-*-*-*-*-  ECHOCARDIOGRAM AND OTHER CARDIOLOGY RESULTS:  No results found for this or any previous visit  No results found for this or any previous visit  No results found for this or any previous visit  No results found for this or any previous visit         *-*-*-*-*-*-*-*-*-*-*-*-*-*-*-*-*-*-*-*-*-*-*-*-*-*-*-*-*-*-*-*-*-*-*-*-*-*-*-*-*-*-*-*-*-*-*-*-*-*-*-*-*-*-  SIGNATURES: @UQM@   [facility]     CC:   DO Kiran Bergman DO

## 2022-09-29 NOTE — PATIENT INSTRUCTIONS
CARDIOLOGY ASSESSMENT & PLAN:   Diagnosis ICD-10-CM Associated Orders   1  Benign essential hypertension  I10    2  Chronic diastolic congestive heart failure ([address] )  I50 32 Ambulatory Referral to Cardiology     POCT ECG   3  Primary hypertension  I10 Ambulatory Referral to Cardiology   4  Persistent atrial fibrillation ([address] )  I48 19 Ambulatory Referral to Cardiology     POCT ECG   5  Chronic diastolic heart failure (HCC)  I50 32    6  Venous stasis dermatitis of both lower extremities  I87 2      Chronic diastolic heart failure (HCC)  Wt Readings from Last 3 Encounters:   09/29/22 77 7 kg (171 lb 3 2 oz)   06/22/22 76 7 kg (169 lb 3 2 oz)   05/25/22 76 2 kg (168 lb)     Ms Gerard Rushing is establishing follow-up with us regarding her history of longstanding persistent/permanent atrial fibrillation and chronic diastolic heart failure  Her blood pressure seems to be well controlled  She gives no history that suggest decompensated heart failure or angina  On examination she has swelling in left lower extremity relating to her history of injury in the knee  She has significant venous dermatitis  Her last blood work from July indicated stable renal function and electrolytes  Her ECG shows atrial fibrillation with controlled ventricular response  Her echocardiogram done in May 2022 identified marked asymmetric septal left ventricular hypertrophy with mild pulmonary hypertension  On examination there is no evidence of outflow tract obstruction  -- at this time I would like to continue her current medications  -- I am advising her to stay well hydrated but avoid excess water intake  She is also advised to take minimal salt in her diet  -- I am encouraging her to continue normal activities as much as tolerated but avoid sudden turning or bending or getting up from sitting or down or lying position    -- regarding venous dermatitis in lower extremities she should have moisturizer cream applied regularly to prevent recurrence of cellulitis in the region  I am advising her to keep the feet elevated when she is sitting or when she is lying down  -- if able to compression stockings can be tried if lower extremity edema persists  She is advised to follow regularly with her primary care physician  Cardiology follow-up can be arranged in 6 months time  Earlier if necessary  The following routine measures are being advised to prevent exacerbation of congestive heart failure:     - Daily or atleast weekly checking of weight  Notify your clinicians if greater than 2 lb is gained in 1 day or greater than 5 lb is gained in 1 wk  - Checking for lower extremity swelling  Examine legs for swelling (new or increase in existing swelling) and describe if swelling reaches ankle, shin, or knee  - Monitoring for decrease in exercise tolerance  Check your self for unusual shortness of breath at rest, or with mild exertion, moderate exertion, or none at all     - Monitoring for worsening shortness of breath on lying down  Check for increase in number of pillows used at night and for increase in waking at night with shortness of breath  - Salt/sodium restriction to less than 2 g a day  Calculate total sodium intake in a day from nutrition labels and food charts  Understand hidden sources of salt intake  Eliminate the salt shaker  (Remember, One teaspoon of table salt = 2,300 mg of sodium)   Avoid using garlic salt, onion salt, MSG, meat tenderizers, broth mixes, [place] food, soy sauce, teriyaki sauce, barbeque sauce, sauerkraut, olives, pickles, pickle relish, smith bits, and croutons  Use fresh ingredients and/or foods with no added salt  Try orange, lemon, lime, pineapple juice, or vinegar as a base for meat marinades or to add tart flavor  Avoid convenience foods such as canned soups, entrees, vegetables, pasta and rice mixes, frozen dinners, instant cereal and puddings, and gravy sauce mixes  Select frozen meals that contain around 600 mg sodium or less  Use fresh, frozen, no-added-salt canned vegetables, low-sodium soups, and low-sodium lunchmeats  Look for seasoning or spice blends with no salt, or try fresh herbs, onions, or garlic  You are advised to inform us for any concerns regarding above measures

## 2022-10-11 PROBLEM — H61.20 CERUMEN IN AUDITORY CANAL ON EXAMINATION: Status: RESOLVED | Noted: 2022-06-22 | Resolved: 2022-10-11

## 2022-11-14 ENCOUNTER — APPOINTMENT (EMERGENCY)
Dept: CT IMAGING | Facility: HOSPITAL | Age: 87
End: 2022-11-14

## 2022-11-14 ENCOUNTER — HOSPITAL ENCOUNTER (INPATIENT)
Facility: HOSPITAL | Age: 87
LOS: 4 days | Discharge: NURSING, REHAB OR LONG-TERM CARE | End: 2022-11-18
Attending: EMERGENCY MEDICINE | Admitting: INTERNAL MEDICINE

## 2022-11-14 DIAGNOSIS — G93.41 ACUTE METABOLIC ENCEPHALOPATHY: ICD-10-CM

## 2022-11-14 DIAGNOSIS — R56.9 SEIZURE (HCC): ICD-10-CM

## 2022-11-14 DIAGNOSIS — Z86.73 HISTORY OF CVA (CEREBROVASCULAR ACCIDENT): ICD-10-CM

## 2022-11-14 DIAGNOSIS — R56.9 NEW ONSET SEIZURE (HCC): Primary | ICD-10-CM

## 2022-11-14 PROBLEM — R33.9 URINARY RETENTION: Status: ACTIVE | Noted: 2022-11-14

## 2022-11-14 LAB
2HR DELTA HS TROPONIN: -7 NG/L
ALBUMIN SERPL BCP-MCNC: 3.7 G/DL (ref 3.5–5)
ALP SERPL-CCNC: 104 U/L (ref 46–116)
ALT SERPL W P-5'-P-CCNC: 20 U/L (ref 12–78)
ANION GAP SERPL CALCULATED.3IONS-SCNC: 13 MMOL/L (ref 4–13)
APTT PPP: 30 SECONDS (ref 23–37)
AST SERPL W P-5'-P-CCNC: 29 U/L (ref 5–45)
ATRIAL RATE: 202 BPM
ATRIAL RATE: 300 BPM
ATRIAL RATE: 416 BPM
BASOPHILS # BLD AUTO: 0.04 THOUSANDS/ÂΜL (ref 0–0.1)
BASOPHILS NFR BLD AUTO: 1 % (ref 0–1)
BILIRUB SERPL-MCNC: 0.58 MG/DL (ref 0.2–1)
BILIRUB UR QL STRIP: NEGATIVE
BUN SERPL-MCNC: 16 MG/DL (ref 5–25)
CALCIUM SERPL-MCNC: 9.6 MG/DL (ref 8.3–10.1)
CARDIAC TROPONIN I PNL SERPL HS: 22 NG/L
CARDIAC TROPONIN I PNL SERPL HS: 29 NG/L
CHLORIDE SERPL-SCNC: 104 MMOL/L (ref 96–108)
CLARITY UR: CLEAR
CO2 SERPL-SCNC: 23 MMOL/L (ref 21–32)
COLOR UR: YELLOW
CREAT SERPL-MCNC: 0.9 MG/DL (ref 0.6–1.3)
EOSINOPHIL # BLD AUTO: 0.21 THOUSAND/ÂΜL (ref 0–0.61)
EOSINOPHIL NFR BLD AUTO: 3 % (ref 0–6)
ERYTHROCYTE [DISTWIDTH] IN BLOOD BY AUTOMATED COUNT: 15.2 % (ref 11.6–15.1)
GFR SERPL CREATININE-BSD FRML MDRD: 58 ML/MIN/1.73SQ M
GLUCOSE SERPL-MCNC: 116 MG/DL (ref 65–140)
GLUCOSE UR STRIP-MCNC: NEGATIVE MG/DL
HCT VFR BLD AUTO: 43.3 % (ref 34.8–46.1)
HGB BLD-MCNC: 14.1 G/DL (ref 11.5–15.4)
HGB UR QL STRIP.AUTO: NEGATIVE
IMM GRANULOCYTES # BLD AUTO: 0.02 THOUSAND/UL (ref 0–0.2)
IMM GRANULOCYTES NFR BLD AUTO: 0 % (ref 0–2)
INR PPP: 1.26 (ref 0.84–1.19)
KETONES UR STRIP-MCNC: NEGATIVE MG/DL
LACTATE SERPL-SCNC: 3 MMOL/L (ref 0.5–2)
LACTATE SERPL-SCNC: 5.8 MMOL/L (ref 0.5–2)
LEUKOCYTE ESTERASE UR QL STRIP: NEGATIVE
LYMPHOCYTES # BLD AUTO: 1.88 THOUSANDS/ÂΜL (ref 0.6–4.47)
LYMPHOCYTES NFR BLD AUTO: 28 % (ref 14–44)
MCH RBC QN AUTO: 29.7 PG (ref 26.8–34.3)
MCHC RBC AUTO-ENTMCNC: 32.6 G/DL (ref 31.4–37.4)
MCV RBC AUTO: 91 FL (ref 82–98)
MONOCYTES # BLD AUTO: 0.79 THOUSAND/ÂΜL (ref 0.17–1.22)
MONOCYTES NFR BLD AUTO: 12 % (ref 4–12)
NEUTROPHILS # BLD AUTO: 3.71 THOUSANDS/ÂΜL (ref 1.85–7.62)
NEUTS SEG NFR BLD AUTO: 56 % (ref 43–75)
NITRITE UR QL STRIP: NEGATIVE
NRBC BLD AUTO-RTO: 0 /100 WBCS
P AXIS: 0 DEGREES
PH UR STRIP.AUTO: 5.5 [PH]
PLATELET # BLD AUTO: 154 THOUSANDS/UL (ref 149–390)
PMV BLD AUTO: 11.8 FL (ref 8.9–12.7)
POTASSIUM SERPL-SCNC: 3.9 MMOL/L (ref 3.5–5.3)
PROT SERPL-MCNC: 8.2 G/DL (ref 6.4–8.4)
PROT UR STRIP-MCNC: NEGATIVE MG/DL
PROTHROMBIN TIME: 15.8 SECONDS (ref 11.6–14.5)
QRS AXIS: 67 DEGREES
QRS AXIS: 69 DEGREES
QRS AXIS: 91 DEGREES
QRSD INTERVAL: 106 MS
QRSD INTERVAL: 86 MS
QRSD INTERVAL: 94 MS
QT INTERVAL: 376 MS
QT INTERVAL: 412 MS
QT INTERVAL: 446 MS
QTC INTERVAL: 411 MS
QTC INTERVAL: 414 MS
QTC INTERVAL: 477 MS
RBC # BLD AUTO: 4.75 MILLION/UL (ref 3.81–5.12)
SODIUM SERPL-SCNC: 140 MMOL/L (ref 135–147)
SP GR UR STRIP.AUTO: 1.02 (ref 1–1.03)
T WAVE AXIS: 158 DEGREES
T WAVE AXIS: 72 DEGREES
T WAVE AXIS: 83 DEGREES
TSH SERPL DL<=0.05 MIU/L-ACNC: 2.82 UIU/ML (ref 0.45–4.5)
UROBILINOGEN UR QL STRIP.AUTO: 0.2 E.U./DL
VENTRICULAR RATE: 61 BPM
VENTRICULAR RATE: 69 BPM
VENTRICULAR RATE: 72 BPM
WBC # BLD AUTO: 6.65 THOUSAND/UL (ref 4.31–10.16)

## 2022-11-14 RX ORDER — ACETAMINOPHEN 500 MG
500 TABLET ORAL EVERY 6 HOURS PRN
Status: CANCELLED | OUTPATIENT
Start: 2022-11-14

## 2022-11-14 RX ORDER — ATORVASTATIN CALCIUM 10 MG/1
10 TABLET, FILM COATED ORAL DAILY
Status: DISCONTINUED | OUTPATIENT
Start: 2022-11-15 | End: 2022-11-14

## 2022-11-14 RX ORDER — FUROSEMIDE 40 MG/1
40 TABLET ORAL DAILY
Status: DISCONTINUED | OUTPATIENT
Start: 2022-11-15 | End: 2022-11-18 | Stop reason: HOSPADM

## 2022-11-14 RX ORDER — POTASSIUM CHLORIDE 20 MEQ/1
20 TABLET, EXTENDED RELEASE ORAL DAILY
Status: DISCONTINUED | OUTPATIENT
Start: 2022-11-15 | End: 2022-11-18 | Stop reason: HOSPADM

## 2022-11-14 RX ORDER — DOCUSATE SODIUM 100 MG/1
100 CAPSULE, LIQUID FILLED ORAL 2 TIMES DAILY
Status: DISCONTINUED | OUTPATIENT
Start: 2022-11-14 | End: 2022-11-18 | Stop reason: HOSPADM

## 2022-11-14 RX ORDER — ACETAMINOPHEN 325 MG/1
650 TABLET ORAL EVERY 6 HOURS PRN
Status: DISCONTINUED | OUTPATIENT
Start: 2022-11-14 | End: 2022-11-18 | Stop reason: HOSPADM

## 2022-11-14 RX ORDER — LORAZEPAM 2 MG/ML
1 INJECTION INTRAMUSCULAR ONCE
Status: COMPLETED | OUTPATIENT
Start: 2022-11-14 | End: 2022-11-14

## 2022-11-14 RX ORDER — ONDANSETRON 2 MG/ML
INJECTION INTRAMUSCULAR; INTRAVENOUS
Status: COMPLETED
Start: 2022-11-14 | End: 2022-11-14

## 2022-11-14 RX ORDER — ONDANSETRON 2 MG/ML
4 INJECTION INTRAMUSCULAR; INTRAVENOUS ONCE
Status: COMPLETED | OUTPATIENT
Start: 2022-11-14 | End: 2022-11-14

## 2022-11-14 RX ORDER — ATORVASTATIN CALCIUM 10 MG/1
10 TABLET, FILM COATED ORAL
Status: DISCONTINUED | OUTPATIENT
Start: 2022-11-15 | End: 2022-11-18 | Stop reason: HOSPADM

## 2022-11-14 RX ORDER — LANOLIN ALCOHOL/MO/W.PET/CERES
100 CREAM (GRAM) TOPICAL DAILY
Status: DISCONTINUED | OUTPATIENT
Start: 2022-11-15 | End: 2022-11-18 | Stop reason: HOSPADM

## 2022-11-14 RX ADMIN — ONDANSETRON 4 MG: 2 INJECTION INTRAMUSCULAR; INTRAVENOUS at 13:35

## 2022-11-14 RX ADMIN — ONDANSETRON 4 MG: 2 INJECTION INTRAMUSCULAR; INTRAVENOUS at 14:01

## 2022-11-14 RX ADMIN — LEVETIRACETAM 2000 MG: 100 INJECTION, SOLUTION INTRAVENOUS at 15:00

## 2022-11-14 RX ADMIN — LEVETIRACETAM 750 MG: 100 INJECTION, SOLUTION INTRAVENOUS at 22:23

## 2022-11-14 RX ADMIN — IOHEXOL 85 ML: 350 INJECTION, SOLUTION INTRAVENOUS at 13:54

## 2022-11-14 NOTE — ASSESSMENT & PLAN NOTE
Wt Readings from Last 3 Encounters:   11/14/22 77 8 kg (171 lb 8 3 oz)   09/29/22 77 7 kg (171 lb 3 2 oz)   06/22/22 76 7 kg (169 lb 3 2 oz)     · History of diastolic CHF  · Maintain on furosemide 40 mg daily  · Does not appear volume overloaded  · Monitor daily weights

## 2022-11-14 NOTE — ASSESSMENT & PLAN NOTE
This is an 16-year-old female with history of atrial fibrillation on Eliquis, dementia, history of CVA in July, hypertension, diastolic CHF who lives at [facility] walks with a walker sent for evaluation of altered mental status today  At baseline patient has aphasia and dementia  Patient was seen to be slightly altered and agitated around lunchtime was then noted to be unresponsive with new onset seizure activity  with right gaze deviation, tonic/convulsive activity and upper/lower extremities  Stroke alert initially called, neuro exam improved, not a thrombolytic candidate  No report of any fever, chills, nausea, vomiting, diarrhea      · CTA revealed mild atherosclerotic disease involving cavernous and supraclinoid internal carotid arteries bilaterally  · CT head revealed decreased and loss of gray-white differentiation within the left temporoparietal region suggesting old infarct, findings suggest a late subacute to early chronic infarct  · Evaluated by Neurology dating 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

## 2022-11-14 NOTE — ED PROVIDER NOTES
History  Chief Complaint   Patient presents with   • Seizure - New Onset     Pt arrives via EMS from SNF for altered mental status, enroute to hospital had full body seizure described as "shaking and stiffening" with a right sided gaze that lasted approx 1 minute  No meds given pta  Pt agitated and not following directions      81 yo F from [facility] brought by EMS for altered mental status, they arrived to find her not responding verbally, reported by NH staff to be confused this morning, repeating "No", and appearing in some distress before becoming unresponsive  During paramedic assessment, she had right eyeward gaze and seizure like activity, described as clonic posturing of both upper extremity and lower extremity  Command was called to me enroute for benzodiazepine, however, the clonic activity resolved within 2 min, and she became responsive, but confused  On arrival in ED she is agitated, but alert, perseverating the word "No" to any question I ask, not following commands, appears to be moving all extremities equally  History provided by:  EMS personnel  History limited by:  Mental status change      Prior to Admission Medications   Prescriptions Last Dose Informant Patient Reported? Taking?    Ascorbic Acid (VITAMIN C) 500 MG CAPS  Outside Facility (Specify) Yes No   Sig: Take 500 mg by mouth daily   Cholecalciferol (Vitamin D3) 1 25 MG (64459 UT) CAPS  Outside Facility (Specify) Yes No   Cranberry 450 MG CAPS  Outside Facility (Specify) Yes No   Sig: Take 1 capsule by mouth in the morning   Omega-3 Fatty Acids (fish oil) 1,000 mg  Outside Facility (Specify) Yes No   Valerian Root 500 MG CAPS  Outside Facility (Specify) Yes No   Patient not taking: Reported on 9/29/2022   acetaminophen (TYLENOL) 500 mg tablet  Outside Facility (Specify) Yes No   Sig: Take 500 mg by mouth every 6 (six) hours as needed for mild pain   amLODIPine (NORVASC) 2 5 mg tablet  Outside Facility (Specify) No No Sig: Take 0 5 tablets (1 25 mg total) by mouth in the morning     Patient not taking: Reported on 9/29/2022   apixaban (ELIQUIS) 5 mg  Outside Facility (Specify) Yes No   Sig: Take 5 mg by mouth 2 (two) times a day   atorvastatin (LIPITOR) 10 mg tablet  Outside Facility (Specify) Yes No   Sig: Take 10 mg by mouth daily   bisacodyl (DULCOLAX) 5 mg EC tablet   Yes No   Sig: Take 5 mg by mouth daily as needed for constipation   calcium carbonate (TUMS) 500 mg chewable tablet  Outside Facility (Specify) Yes No   Sig: Chew 1 tablet daily   docusate sodium (COLACE) 100 mg capsule  Outside Facility (Specify) Yes No   Sig: Take 100 mg by mouth 2 (two) times a day   Patient not taking: Reported on 9/29/2022   furosemide (LASIX) 40 mg tablet  Outside Facility (Specify) Yes No   Sig: Take 1 tablet by mouth daily   polyethylene glycol (MIRALAX) 17 g packet  Outside Facility (Specify) Yes No   Sig: Take 17 g by mouth daily   potassium chloride (K-DUR,KLOR-CON) 20 mEq tablet  Outside Facility (Specify) Yes No   Sig: Take 20 mEq by mouth daily   simvastatin (ZOCOR) 20 mg tablet  Outside Facility (Specify) Yes No   Sig: Take 1 tablet by mouth daily   Patient not taking: No sig reported   thiamine (VITAMIN B1) 100 mg tablet  Outside Facility (Specify) Yes No   traMADol (ULTRAM) 50 mg tablet  Outside Facility (Specify) Yes No   Sig: Take 50 mg by mouth 2 (two) times a day   Patient not taking: Reported on 9/29/2022      Facility-Administered Medications: None       Past Medical History:   Diagnosis Date   • A-fib ([facility] )    • CAD (coronary artery disease)    • CKD (chronic kidney disease) stage 2, GFR 60-89 ml/min    • Hypertension    • Memory loss    • Urinary tract infection        Past Surgical History:   Procedure Laterality Date   • CHOLECYSTECTOMY     • REPLACEMENT TOTAL KNEE Left 2008       Family History   Problem Relation Age of Onset   • Lung cancer Father    • Leukemia Brother    • Breast cancer Daughter      I have reviewed and agree with the history as documented  E-Cigarette/Vaping   • E-Cigarette Use Never User      E-Cigarette/Vaping Substances   • Nicotine No    • THC No    • CBD No    • Flavoring No    • Other No    • Unknown No      Social History     Tobacco Use   • Smoking status: Never Smoker   • Smokeless tobacco: Never Used   Vaping Use   • Vaping Use: Never used   Substance Use Topics   • Alcohol use: Never   • Drug use: Never       Review of Systems   Unable to perform ROS: Mental status change   Neurological: Positive for seizures  Physical Exam  Physical Exam  Vitals and nursing note reviewed  Constitutional:       Appearance: She is well-developed and well-groomed  She is obese  HENT:      Head: Normocephalic and atraumatic  Eyes:      Pupils: Pupils are equal, round, and reactive to light  Cardiovascular:      Rate and Rhythm: Normal rate  Rhythm irregular  Pulmonary:      Effort: Tachypnea present  No accessory muscle usage or respiratory distress  Abdominal:      General: There is no distension  Tenderness: There is no abdominal tenderness  Skin:     Capillary Refill: Capillary refill takes less than 2 seconds  Neurological:      Mental Status: She is alert  She is confused  Cranial Nerves: No facial asymmetry  Gait: Abnormal gait: not tested  Psychiatric:         Behavior: Behavior is agitated  Cognition and Memory: Cognition is impaired  Memory is impaired           Vital Signs  ED Triage Vitals   Temp Pulse Respirations Blood Pressure SpO2   -- 11/14/22 1321 11/14/22 1321 11/14/22 1321 11/14/22 1321    82 19 (!) 183/92 91 %      Temp src Heart Rate Source Patient Position - Orthostatic VS BP Location FiO2 (%)   -- 11/14/22 1415 11/14/22 1345 -- --    Monitor Lying        Pain Score       11/14/22 1321       No Pain           Vitals:    11/14/22 1345 11/14/22 1400 11/14/22 1415 11/14/22 1430   BP: (!) 178/88 (!) 183/92 (!) 158/132 166/77   Pulse:   65 60 Patient Position - Orthostatic VS: Lying Lying Lying Lying         Visual Acuity  Visual Acuity    Flowsheet Row Most Recent Value   L Pupil Size (mm) 4   R Pupil Size (mm) 4          ED Medications  Medications   levETIRAcetam (KEPPRA) 750 mg in sodium chloride 0 9 % 100 mL IVPB (has no administration in time range)   LORazepam (FOR EMS ONLY) (ATIVAN) 2 mg/mL injection 2 mg (0 mg Does not apply Given to EMS 11/14/22 1323)   ondansetron (ZOFRAN) injection 4 mg (4 mg Intravenous Given 11/14/22 1335)   iohexol (OMNIPAQUE) 350 MG/ML injection (MULTI-DOSE) 85 mL (85 mL Intravenous Given 11/14/22 1354)   ondansetron (ZOFRAN) injection 4 mg (4 mg Intravenous Given 11/14/22 1401)   levETIRAcetam (KEPPRA) 2,000 mg in sodium chloride 0 9 % 250 mL IVPB (0 mg Intravenous Stopped 11/14/22 1515)       Diagnostic Studies  Results Reviewed     Procedure Component Value Units Date/Time    HS Troponin I 4hr [[id number]]     Lab Status: No result Specimen: Blood     Lactic acid [[id number]]  (Abnormal) Collected: 11/14/22 1339    Lab Status: Final result Specimen: Blood from Arm, Left Updated: 11/14/22 1423     LACTIC ACID 5 8 mmol/L     Narrative:      Result may be elevated if tourniquet was used during collection  Lactic acid 2 Hours [[id number]]     Lab Status: No result Specimen: Blood     TSH [[id number]]  (Normal) Collected: 11/14/22 1339    Lab Status: Final result Specimen: Blood from Arm, Left Updated: 11/14/22 1417     TSH 3RD GENERATON 2 823 uIU/mL     Narrative:      Patients undergoing fluorescein dye angiography may retain small amounts of fluorescein in the body for 48-72 hours post procedure  Samples containing fluorescein can produce falsely depressed TSH values  If the patient had this procedure,a specimen should be resubmitted post fluorescein clearance        HS Troponin 0hr (reflex protocol) [[id number]]  (Normal) Collected: 11/14/22 1339    Lab Status: Final result Specimen: Blood from Arm, Left Updated: 11/14/22 1411     hs TnI 0hr 29 ng/L     HS Troponin I 2hr [[id number]]     Lab Status: No result Specimen: Blood     Comprehensive metabolic panel [[id number]] Collected: 11/14/22 1339    Lab Status: Final result Specimen: Blood from Arm, Left Updated: 11/14/22 1408     Sodium 140 mmol/L      Potassium 3 9 mmol/L      Chloride 104 mmol/L      CO2 23 mmol/L      ANION GAP 13 mmol/L      BUN 16 mg/dL      Creatinine 0 90 mg/dL      Glucose 116 mg/dL      Calcium 9 6 mg/dL      AST 29 U/L      ALT 20 U/L      Alkaline Phosphatase 104 U/L      Total Protein 8 2 g/dL      Albumin 3 7 g/dL      Total Bilirubin 0 58 mg/dL      eGFR 58 ml/min/1 73sq m     Narrative:      [place] guidelines for Chronic Kidney Disease (CKD):   •  Stage 1 with normal or high GFR (GFR > 90 mL/min/1 73 square meters)  •  Stage 2 Mild CKD (GFR = 60-89 mL/min/1 73 square meters)  •  Stage 3A Moderate CKD (GFR = 45-59 mL/min/1 73 square meters)  •  Stage 3B Moderate CKD (GFR = 30-44 mL/min/1 73 square meters)  •  Stage 4 Severe CKD (GFR = 15-29 mL/min/1 73 square meters)  •  Stage 5 End Stage CKD (GFR <15 mL/min/1 73 square meters)  Note: GFR calculation is accurate only with a steady state creatinine    Protime-INR [[id number]]  (Abnormal) Collected: 11/14/22 1339    Lab Status: Final result Specimen: Blood from Arm, Left Updated: 11/14/22 1406     Protime 15 8 seconds      INR 1 26    APTT [[id number]]  (Normal) Collected: 11/14/22 1339    Lab Status: Final result Specimen: Blood from Arm, Left Updated: 11/14/22 1406     PTT 30 seconds     CBC and differential [[id number]]  (Abnormal) Collected: 11/14/22 1339    Lab Status: Final result Specimen: Blood from Arm, Left Updated: 11/14/22 1346     WBC 6 65 Thousand/uL      RBC 4 75 Million/uL      Hemoglobin 14 1 g/dL      Hematocrit 43 3 %      MCV 91 fL      MCH 29 7 pg      MCHC 32 6 g/dL      RDW 15 2 %      MPV 11 8 fL      Platelets 177 Thousands/uL      nRBC 0 /100 WBCs Neutrophils Relative 56 %      Immat GRANS % 0 %      Lymphocytes Relative 28 %      Monocytes Relative 12 %      Eosinophils Relative 3 %      Basophils Relative 1 %      Neutrophils Absolute 3 71 Thousands/µL      Immature Grans Absolute 0 02 Thousand/uL      Lymphocytes Absolute 1 88 Thousands/µL      Monocytes Absolute 0 79 Thousand/µL      Eosinophils Absolute 0 21 Thousand/µL      Basophils Absolute 0 04 Thousands/µL                  CTA stroke alert (head/neck)   Final Result by Gene Jalene Collet, DO (11/14 1417)      Unremarkable cervical vasculature  There is mild atherosclerotic disease involving the cavernous and supraclinoid internal carotid arteries bilaterally with no moderate right and mild left smooth stenosis  Both M1 segments are patent  Slight decrease in the number of M2/M3 branches on the left compared to the right side without a definitive branch occlusion identified  Findings were directly discussed with Sammy Zacarias at approximately 2:10 PM                            Workstation performed: [id number]         CT stroke alert brain   Final Result by Gene Jalene Collet, DO (11/14 1417)      Decreased attenuation and loss of gray-white differentiation within the left temporoparietal region posterior to the insula suggestive of an old infarct, new since prior MRI dated 3/10/2021  There is a prior outside MRI report available from 7/16/2022    describing restricted diffusion in this region  Findings are suggestive of a late subacute to early chronic infarct  If there is concern for new ethel-infarct ischemia, MRI with diffusion-weighted imaging is recommended        Findings were directly discussed with Sammy Zacarias at approximately 2:10 PM       Workstation performed: [id number]                    Procedures  ECG 12 Lead Documentation Only    Date/Time: 11/14/2022 1:30 PM  Performed by: Cheyenne Patel MD  Authorized by: Cheyenne Patel MD Rate:     ECG rate:  61  Rhythm:     Rhythm: atrial fibrillation               ED Course  ED Course as of 11/14/22 1646   Mon Nov 14, 2022   1339 Patient began to have dry heaving  I have opted to call stroke alert for onset of AMS, with seizure, considering ICH, CVA, for new onset seizure, given limitations of exam   1400 She vomited for nurse just prior to CT, for which I gave Consuella Bullocks, now on the way back from CT she began to vomit profusely, so I am trying more zofran  0 Affirmed with her son at baseline, she has aphasia, but is typically ambulatory, oriented and recognizes things familiar to her, with remote memory issues  No h/o seizure   1446 LACTIC ACID(!!): 5 8  C/w lactic acidosis from seizure   1534 D/W Bennetta Erp, for neurology, no TPA, more likely this is new onset seizure from previous stroke, started Keppra, and recommended admission to start meds, confirm she returns to functional basline, but does not need to be stroke pathway  MDM    Disposition  Final diagnoses:   New onset seizure ([facility] )   History of CVA (cerebrovascular accident)     Time reflects when diagnosis was documented in both MDM as applicable and the Disposition within this note     Time User Action Codes Description Comment    11/14/2022  2:45 PM Kristen Pearce Add [R56 9] New onset seizure ([address] )     11/14/2022  4:36 PM Emmie Blizzard Add [R56 9] Seizure ([facility] )     11/14/2022  4:46 PM Kristen Pearce Add [Z86 73] History of CVA (cerebrovascular accident)       ED Disposition     ED Disposition   Admit    Condition   Stable    Date/Time   Mon Nov 14, 2022  3:55 PM    Comment   Case was discussed with Dr Elaine Garcia and the patient's admission status was agreed to be Admission Status: inpatient status to the service of Dr Elaine Garcia              Follow-up Information    None         Patient's Medications   Discharge Prescriptions    No medications on file       No discharge procedures on file      PDMP Review     None          ED Provider  Electronically Signed by           Usman Darling MD  11/14/22 5427

## 2022-11-14 NOTE — H&P
[address] [facility]  H&P- Nasir Zazueta 1935, 82 y o  female MRN: [id number]  Unit/Bed#: ED 22 Encounter: [id number]  Primary Care Provider: Izabel Reardon DO   Date and time admitted to hospital: 11/14/2022  1:16 PM    * Seizure [facility])  Assessment & Plan  This is an 29-year-old female with history of atrial fibrillation on Eliquis, dementia, history of CVA in July, hypertension, diastolic CHF who lives at [facility] walks with a walker sent for evaluation of altered mental status today  At baseline patient has aphasia and dementia  Patient was seen to be slightly altered and agitated around lunchtime was then noted to be unresponsive with new onset seizure activity  with right gaze deviation, tonic/convulsive activity and upper/lower extremities  Stroke alert initially called, neuro exam improved, not a thrombolytic candidate  No report of any fever, chills, nausea, vomiting, diarrhea      · CTA revealed mild atherosclerotic disease involving cavernous and supraclinoid internal carotid arteries bilaterally  · CT head revealed decreased and loss of gray-white differentiation within the left temporoparietal region suggesting old infarct, findings suggest a late subacute to early chronic infarct  · Evaluated by Neurology dating 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    Urinary retention  Assessment & Plan  · Noted to have bladder retention in the ED  · Straight cathed in the ED  · Will place orders for urinary retention protocol  · Will check urinalysis    Chronic diastolic heart failure (HCC)  Assessment & Plan  Wt Readings from Last 3 Encounters:   11/14/22 77 8 kg (171 lb 8 3 oz)   09/29/22 77 7 kg (171 lb 3 2 oz)   06/22/22 76 7 kg (169 lb 3 2 oz)     · History of diastolic CHF  · Maintain on furosemide 40 mg daily  · Does not appear volume overloaded  · Monitor daily weights    Atrial fibrillation (HCC)  Assessment & Plan  · Maintain on Eliquis  · Heart rate well controlled off of medication    Other hyperlipidemia  Assessment & Plan  · Continue statin    Benign essential hypertension  Assessment & Plan  · On furosemide alone        VTE Prophylaxis: Apixaban (Eliquis)  / sequential compression device   Code Status: DNR/DNI  POLST: There is no POLST form on file for this patient (pre-hospital)    Anticipated Length of Stay:  Patient will be admitted on an Inpatient basis with an anticipated length of stay of  greater than 2 midnights  Justification for Hospital Stay:  Episode of altered mental status    Total Time for Visit, including Counseling / Coordination of Care: 45 minutes  Greater than 50% of this total time spent on direct patient counseling and coordination of care  Chief Complaint:   AMS    History of Present Illness:    Sheryl Delgado is a 82 y o  female who presents with episode of altered mental status 1  Patient has historyof atrial fibrillation on Eliquis, dementia, history of CVA in July, hypertension, diastolic CHF who lives at [facility] with a walker sent for evaluation of altered mental status today  At baseline patient has aphasia and dementia  Patient was seen to be slightly altered and agitated around lunchtime was then noted to be unresponsive with new onset seizure activity  with right gaze deviation, tonic/convulsive activity and upper/lower extremities  Stroke alert initially called, neuro exam improved, not a thrombolytic candidate  No report of any fever, chills, nausea, vomiting, diarrhea  Review of Systems:    Review of Systems   Constitutional: Negative  HENT: Negative  Eyes: Negative  Respiratory: Negative  Cardiovascular: Negative  Gastrointestinal: Negative  Endocrine: Negative  Genitourinary: Negative  Musculoskeletal: Negative  Skin: Negative  Allergic/Immunologic: Negative  Neurological: Negative  Hematological: Negative  Psychiatric/Behavioral: Positive for confusion  Past Medical and Surgical History:     Past Medical History:   Diagnosis Date   • A-fib ([facility] [address] )    • CAD (coronary artery disease)    • CKD (chronic kidney disease) stage 2, GFR 60-89 ml/min    • Hypertension    • Memory loss    • Urinary tract infection        Past Surgical History:   Procedure Laterality Date   • CHOLECYSTECTOMY     • REPLACEMENT TOTAL KNEE Left 2008       Meds/Allergies:    Prior to Admission medications    Medication Sig Start Date End Date Taking? Authorizing Provider   acetaminophen (TYLENOL) 500 mg tablet Take 500 mg by mouth every 6 (six) hours as needed for mild pain    Historical Provider, MD   amLODIPine (NORVASC) 2 5 mg tablet Take 0 5 tablets (1 25 mg total) by mouth in the morning    Patient not taking: Reported on 9/29/2022 5/23/22   Keya Garcia DO   apixaban (ELIQUIS) 5 mg Take 5 mg by mouth 2 (two) times a day    Historical Provider, MD   Ascorbic Acid (VITAMIN C) 500 MG CAPS Take 500 mg by mouth daily    Historical Provider, MD   atorvastatin (LIPITOR) 10 mg tablet Take 10 mg by mouth daily    Historical Provider, MD   bisacodyl (DULCOLAX) 5 mg EC tablet Take 5 mg by mouth daily as needed for constipation    Historical Provider, MD   calcium carbonate (TUMS) 500 mg chewable tablet Chew 1 tablet daily    Historical Provider, MD   Cholecalciferol (Vitamin D3) 1 25 MG (96114 UT) CAPS     Historical Provider, MD   Cranberry 450 MG CAPS Take 1 capsule by mouth in the morning    Historical Provider, MD   docusate sodium (COLACE) 100 mg capsule Take 100 mg by mouth 2 (two) times a day  Patient not taking: Reported on 9/29/2022    Historical Provider, MD   furosemide (LASIX) 40 mg tablet Take 1 tablet by mouth daily 2/20/20   Historical Provider, MD   Omega-3 Fatty Acids (fish oil) 1,000 mg     Historical Provider, MD   polyethylene glycol (MIRALAX) 17 g packet Take 17 g by mouth daily    Historical Provider, MD   potassium chloride (K-DUR,KLOR-CON) 20 mEq tablet Take 20 mEq by mouth daily    Historical Provider, MD   simvastatin (ZOCOR) 20 mg tablet Take 1 tablet by mouth daily  Patient not taking: No sig reported 2/20/20   Historical Provider, MD   thiamine (VITAMIN B1) 100 mg tablet     Historical Provider, MD   traMADol (ULTRAM) 50 mg tablet Take 50 mg by mouth 2 (two) times a day  Patient not taking: Reported on 9/29/2022    Historical Provider, MD   Valeriarenita Root 500 MG CAPS     Historical Provider, MD     I have reveiwed home medications using records provided by [facility]  Allergies: Allergies   Allergen Reactions   • Morphine        Social History:     Social History     Substance and Sexual Activity   Alcohol Use Never     Social History     Tobacco Use   Smoking Status Never Smoker   Smokeless Tobacco Never Used     Social History     Substance and Sexual Activity   Drug Use Never       Family History:    Family History   Problem Relation Age of Onset   • Lung cancer Father    • Leukemia Brother    • Breast cancer Daughter        Physical Exam:     Vitals:   Blood Pressure: 165/75 (11/14/22 1815)  Pulse: 65 (11/14/22 1815)  Respirations: 18 (11/14/22 1815)  Height: 5' 3" (160 cm) (11/14/22 1321)  Weight - Scale: 77 8 kg (171 lb 8 3 oz) (11/14/22 1321)  SpO2: 95 % (11/14/22 1815)    Constitutional: Patient is in no acute distress  HEENT:  Normocephalic, atraumatic  Cardiovascular: Normal S1S2, irregular, No murmurs/rubs/gallops appreciated  Pulmonary:  Bilateral air entry, No rhonchi/rales/wheezing appreciated  Abdominal: Soft, Bowel sounds present, Non-tender, Non-distended  Extremities:  No cyanosis, clubbing or edema  Neurological:  Awake, alert, moves all extremities    Additional Data:     Lab Results: I have personally reviewed pertinent reports        Results from last 7 days   Lab Units 11/14/22  1339   WBC Thousand/uL 6 65 HEMOGLOBIN g/dL 14 1   HEMATOCRIT % 43 3   PLATELETS Thousands/uL 154   NEUTROS PCT % 56   LYMPHS PCT % 28   MONOS PCT % 12   EOS PCT % 3     Results from last 7 days   Lab Units 11/14/22  1339   POTASSIUM mmol/L 3 9   CHLORIDE mmol/L 104   CO2 mmol/L 23   BUN mg/dL 16   CREATININE mg/dL 0 90   CALCIUM mg/dL 9 6   ALK PHOS U/L 104   ALT U/L 20   AST U/L 29     Results from last 7 days   Lab Units 11/14/22  1339   INR  1 26*       Imaging: I have personally reviewed pertinent reports  CT stroke alert brain    Result Date: 11/14/2022  Narrative: CT BRAIN - STROKE ALERT PROTOCOL INDICATION:   Seizure, new-onset, no history of trauma seizure  COMPARISON:  MRI dated 3/10/2021 TECHNIQUE:  CT examination of the brain was performed  In addition to axial images, coronal reformatted images were created and submitted for interpretation  Radiation dose length product (DLP) for this visit:  947 mGy-cm   This examination, like all CT scans performed in the [facility], was performed utilizing techniques to minimize radiation dose exposure, including the use of iterative reconstruction and automated exposure control  IMAGE QUALITY:  Diagnostic  FINDINGS:  PARENCHYMA:  There is a focus of decreased attenuation and loss of gray-white differentiation identified within the temporal parietal region posterior to the sylvian fissure on the left  This appears to demonstrate volume loss and likely represents encephalomalacia, possibly from an old infarct  Possibility of late subacute or new ethel-infarct ischemia is not excluded  Additional decreased attenuation within the cerebral hemispheres consistent with chronic microangiopathic change  No mass, mass effect or midline shift  No hemorrhage  No definitive signs of acute territorial infarction  Normal intracranial vasculature  VENTRICLES AND EXTRA-AXIAL SPACES:  Normal for patient's age  VISUALIZED ORBITS AND PARANASAL SINUSES:  Unremarkable   CALVARIUM AND EXTRACRANIAL SOFT TISSUES:   Normal      Impression: Decreased attenuation and loss of gray-white differentiation within the left temporoparietal region posterior to the insula suggestive of an old infarct, new since prior MRI dated 3/10/2021  There is a prior outside MRI report available from 7/16/2022 describing restricted diffusion in this region  Findings are suggestive of a late subacute to early chronic infarct  If there is concern for new ethel-infarct ischemia, MRI with diffusion-weighted imaging is recommended  Findings were directly discussed with Felipe Clayton at approximately 2:10 PM  Workstation performed: [id number]     CTA stroke alert (head/neck)    Result Date: 11/14/2022  Narrative: CTA NECK AND BRAIN WITH CONTRAST INDICATION: Stroke Alert COMPARISON:   No prior vascular imaging  TECHNIQUE:   Post contrast imaging was performed after administration of iodinated contrast through the neck and brain  Post contrast axial 0 625 mm images timed to opacify the arterial system  3D rendering was performed on an independent workstation  MIP reconstructions performed  Coronal reconstructions were performed of the noncontrast portion of the brain  Radiation dose length product (DLP) for this visit:  749 mGy-cm   This examination, like all CT scans performed in the [facility], was performed utilizing techniques to minimize radiation dose exposure, including the use of iterative reconstruction and automated exposure control  IV Contrast:  85 mL of iohexol (OMNIPAQUE)  IMAGE QUALITY:   Diagnostic FINDINGS: CERVICAL VASCULATURE AORTIC ARCH AND GREAT VESSELS:  Mild atherosclerotic disease of the arch, proximal great vessels and visualized subclavian vessels  No significant stenosis  Mild tortuosity of the proximal great vessels with no focal stenosis  RIGHT VERTEBRAL ARTERY CERVICAL SEGMENT:  Normal origin  The vessel is normal in caliber throughout the neck   LEFT VERTEBRAL ARTERY CERVICAL SEGMENT:  Normal origin  The vessel is normal in caliber throughout the neck  RIGHT EXTRACRANIAL CAROTID SEGMENT:  Minor atherosclerotic disease of the carotid bifurcation  No stenosis  LEFT EXTRACRANIAL CAROTID SEGMENT:  Normal caliber common carotid artery  Normal bifurcation and cervical internal carotid artery  No stenosis or dissection  NASCET criteria was used to determine the degree of internal carotid artery diameter stenosis  INTRACRANIAL VASCULATURE INTERNAL CAROTID ARTERIES:  Calcification of the intracranial internal carotid arteries involving the cavernous and paraclinoid segments  There is moderate stenosis of the right side at the level of the anterior clinoid  Minimal stenosis on the left  ANTERIOR CIRCULATION:  Dominant left and hypoplastic right A1 segments  Normal anterior communicating artery and A2/A3 segments  MIDDLE CEREBRAL ARTERY CIRCULATION:  The M1 segments are patent  Normal right M2 and M3 branches  Prior outside CT angiogram describes an M2 branch occlusion  Although there is slight decrease in the number of M2 and M3 branches on the left side, an abrupt cut off is not visualized  DISTAL VERTEBRAL ARTERIES:  Normal distal vertebral arteries  Posterior inferior cerebellar artery origins are normal  Normal vertebral basilar junction  BASILAR ARTERY:  Basilar artery is normal in caliber  Normal superior cerebellar arteries  POSTERIOR CEREBRAL ARTERIES: There is fetal origin of the right posterior cerebral artery  The left posterior cerebral artery arises from the basilar tip  Both demonstrate no focal stenosis  Normal left posterior communicating artery  VENOUS STRUCTURES:  Midcervical degenerative change with no acute osseous abnormality  NON VASCULAR ANATOMY BONY STRUCTURES:  No acute osseous abnormality  SOFT TISSUES OF THE NECK:  Normal  THORACIC INLET:  Unremarkable  Impression: Unremarkable cervical vasculature   There is mild atherosclerotic disease involving the cavernous and supraclinoid internal carotid arteries bilaterally with no moderate right and mild left smooth stenosis  Both M1 segments are patent  Slight decrease in the number of M2/M3 branches on the left compared to the right side without a definitive branch occlusion identified  Findings were directly discussed with Nuvia Spencer at approximately 2:10 PM  Workstation performed: [id number]       EKG, Pathology, and Other Studies Reviewed on Admission:   · EKG: atrial fib    Allscripts / Epic Records Reviewed: Yes     ** Please Note: This note has been constructed using a voice recognition system   **

## 2022-11-14 NOTE — CONSULTS
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 aphasia, prior neurologic evaluations for progressive cognitive impairment and dementia, atrial fibrillation on Eliquis  Presents as stroke alert today 11/14 following altered mental status at nursing facility with new onset seizure activity (right gaze deviation, tonic/convulsive activity in the upper/lower extremities), was perseverative and with aphasia after the event (may be post ictal)  Vomiting as well in ED during stroke alert  Hypertensive as well  Neuro exam improved during duration of stroke alert  Suspect new onset seizure, 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): No new/obvious neurologic deficits compared to baseline are appreciated during stroke alert this afternoon  Thrombolytic Decision: Patient not a candidate  Bleeding risk  and on Eliquis    Plan:  -no need for stroke pathway initiation:  -CT head during alert with right temporoparietal infarct, now late subacute to early chronic (matches description of stroke from [facility] in July 2022)  -CTA head/neck with no significant critical stenosis nor large vessel occlusion   -no need for MRI brain  -can continue Eliquis from neurologic standpoint  -given seizure concern given 2 G Keppra load and started on maintenance Keppra 750 twice daily  -will obtain routine EEG, but can be performed as an outpatient if otherwise back to baseline neurologically and medically stable for discharge  -supportive care/seizure precautions  -monitoring further metabolic/infectious derangements    Discussed plan of care with attending neurologist during stroke alert  Recommendations for outpatient neurological follow up have yet to be determined      History of Present Illness     Reason for Consult / Principal Problem: Stroke alert, new onset seizure, AMS, aphasia  Hx and PE limited by:   Patient last known well: Unclear, approximately 12 PM  Stroke alert called: 1:38 PM  Neurology time of arrival: 1:40 PM  HPI: Jorge Alberto Carney is a 82 y o  female with history as mentioned above in assessment who neurology is asked to in regards to stroke alert for new onset seizure concern, altered mental status  See below for neurologic history back in July with left MCA stroke/residual aphasia; patient resides at nursing facility and was seen slightly altered and agitated around lunchtime/noon today  She was then noted to be unresponsive with new onset seizure activity including a right gaze deviation and tonic/convulsive activity of the upper extremities (and potentially the lower extremities); EMS was called patient was still altered and upon arrival to the ED perseverative (stating no to all questions):  Stroke alert was activated, see below for NIHSS, and above for neuro imaging results  Was not deemed a thrombolytic candidate as she is on Eliquis for AFib and stroke, no endovascular intervention  In discussion with patient's son at bedside and review of notes, patient was [facility] in July with acute left MCA stroke (this was in the setting of holding her Eliquis given a fall and concern for bleeding at the time)  Stroke symptoms at that time included aphasia, facial droop; son states at baseline patient continues to have expressive aphasia        Consults    Review of Systems   Unable to perform ROS: Acuity of condition       Historical Information   Past Medical History:   Diagnosis Date   • A-fib ([facility] [address] )    • CAD (coronary artery disease)    • CKD (chronic kidney disease) stage 2, GFR 60-89 ml/min    • Hypertension    • Memory loss    • Urinary tract infection      Past Surgical History:   Procedure Laterality Date   • CHOLECYSTECTOMY     • REPLACEMENT TOTAL KNEE Left 2008     Social History Social History     Substance and Sexual Activity   Alcohol Use Never     Social History     Substance and Sexual Activity   Drug Use Never     E-Cigarette/Vaping   • E-Cigarette Use Never User      E-Cigarette/Vaping Substances   • Nicotine No    • THC No    • CBD No    • Flavoring No    • Other No    • Unknown No      Social History     Tobacco Use   Smoking Status Never Smoker   Smokeless Tobacco Never Used     Family History:   Family History   Problem Relation Age of Onset   • Lung cancer Father    • Leukemia Brother    • Breast cancer Daughter        Review of previous medical records was completed  Meds/Allergies   current meds:   No current facility-administered medications for this encounter  and PTA meds:   Prior to Admission Medications   Prescriptions Last Dose Informant Patient Reported? Taking? Ascorbic Acid (VITAMIN C) 500 MG CAPS  Outside Facility (Specify) Yes No   Sig: Take 500 mg by mouth daily   Cholecalciferol (Vitamin D3) 1 25 MG (09166 UT) CAPS  Outside Facility (Specify) Yes No   Cranberry 450 MG CAPS  Outside Facility (Specify) Yes No   Sig: Take 1 capsule by mouth in the morning   Omega-3 Fatty Acids (fish oil) 1,000 mg  Outside Facility (Specify) Yes No   Valerian Root 500 MG CAPS  Outside Facility (Specify) Yes No   Patient not taking: Reported on 9/29/2022   acetaminophen (TYLENOL) 500 mg tablet  Outside Facility (Specify) Yes No   Sig: Take 500 mg by mouth every 6 (six) hours as needed for mild pain   amLODIPine (NORVASC) 2 5 mg tablet  Outside Facility (Specify) No No   Sig: Take 0 5 tablets (1 25 mg total) by mouth in the morning     Patient not taking: Reported on 9/29/2022   apixaban (ELIQUIS) 5 mg  Outside Facility (Specify) Yes No   Sig: Take 5 mg by mouth 2 (two) times a day   atorvastatin (LIPITOR) 10 mg tablet  Outside Facility (Specify) Yes No   Sig: Take 10 mg by mouth daily   bisacodyl (DULCOLAX) 5 mg EC tablet   Yes No   Sig: Take 5 mg by mouth daily as needed for constipation   calcium carbonate (TUMS) 500 mg chewable tablet  Outside Facility (Specify) Yes No   Sig: Chew 1 tablet daily   docusate sodium (COLACE) 100 mg capsule  Outside Facility (Specify) Yes No   Sig: Take 100 mg by mouth 2 (two) times a day   Patient not taking: Reported on 9/29/2022   furosemide (LASIX) 40 mg tablet  Outside Facility (Specify) Yes No   Sig: Take 1 tablet by mouth daily   polyethylene glycol (MIRALAX) 17 g packet  Outside Facility (Specify) Yes No   Sig: Take 17 g by mouth daily   potassium chloride (K-DUR,KLOR-CON) 20 mEq tablet  Outside Facility (Specify) Yes No   Sig: Take 20 mEq by mouth daily   simvastatin (ZOCOR) 20 mg tablet  Outside Facility (Specify) Yes No   Sig: Take 1 tablet by mouth daily   Patient not taking: No sig reported   thiamine (VITAMIN B1) 100 mg tablet  Outside Facility (Specify) Yes No   traMADol (ULTRAM) 50 mg tablet  Outside Facility (Specify) Yes No   Sig: Take 50 mg by mouth 2 (two) times a day   Patient not taking: Reported on 9/29/2022      Facility-Administered Medications: None       Allergies   Allergen Reactions   • Morphine        Objective   Vitals:Blood pressure (!) 183/92, pulse 82, resp  rate 19, height 5' 3" (1 6 m), weight 77 8 kg (171 lb 8 3 oz), SpO2 91 %  ,Body mass index is 30 38 kg/m²  No intake or output data in the 24 hours ending 11/14/22 1409    Invasive Devices: Invasive Devices  Report    Peripheral Intravenous Line  Duration           Peripheral IV 11/14/22 Dorsal (posterior); Left Hand <1 day    Peripheral IV 11/14/22 Left Antecubital <1 day                Physical Exam  Constitutional:       Appearance: She is ill-appearing  HENT:      Head: Normocephalic and atraumatic  Eyes:      Extraocular Movements: Extraocular movements intact  Conjunctiva/sclera: Conjunctivae normal       Pupils: Pupils are equal, round, and reactive to light  Cardiovascular:      Rate and Rhythm: Normal rate and regular rhythm     Musculoskeletal: Cervical back: Normal range of motion and neck supple  Skin:     Comments: Patient unfortunately did vomit prior to exam, covering the left upper extremity   Neurological:      Mental Status: She is alert  Neurologic Exam     Mental Status     Patient is awake, altered, able to state her 1st and last name in [facility]  Otherwise states "I do not know" when asked the month and location  Has expressive aphasia: Cannot name simple objects, some speech is poorly intelligible  She does follow verbal commands including sticking out tongue, smiling, following extremity commands  Cranial Nerves     CN III, IV, VI   Pupils are equal, round, and reactive to light  Conjugate gaze, tracks in both directions, difficult to assess blink to threat  No obvious facial asymmetry or droop  Difficult to formally assess visual fields although no obvious blink to threat abnormality  Motor Exam   Is able to sustain arms antigravity for 10 seconds; slightly stronger on the left and right with antigravity maintenance  In the lowers, slightly brisker withdrawal on the left compared to right with noxious stim application  Brisk grimace/moans and withdrawal in all extremities  Gait, Coordination, and Reflexes   No clinical seizure activity seen throughout exam        NIHSS:  1a Level of Consciousness: 0 = Alert   1b  LOC Questions: 2 = Answers neither correctly   1c  LOC Commands: 0 = Obeys both correctly   2  Best Gaze: 0 = Normal   3  Visual: 0 = No visual field loss   4  Facial Palsy: 0=Normal symmetric movement   5a  Motor Right Arm: 0=No drift, limb holds 90 (or 45) degrees for full 10 seconds   5b  Motor Left Arm: 0=No drift, limb holds 90 (or 45) degrees for full 10 seconds   6a  Motor Right Le=Some effort against gravity, limb cannot get to or maintain (if cured) 90 (or 45) degrees, drifts down to bed, but has some effort against gravity   6b   Motor Left Le=Drift, limb holds 90 (or 45) degrees but drifts down before full 10 seconds: does not hit bed   7  Limb Ataxia:  0=Absent   8  Sensory: 0=Normal; no sensory loss   9  Best Language:  2=Severe aphasia; fragmentary expression, inference needed, cannot identify materials   10  Dysarthria: 0=Normal articulation   11  Extinction and Inattention (formerly Neglect): 0=No abnormality   Total Score: 5     Time NIHSS was completed: 2:10 PM    Modified Travis Score:  3 (Moderate disability; requiring some help, but able to walk without assistance)    Lab Results:   CBC:   Results from last 7 days   Lab Units 11/14/22  1339   WBC Thousand/uL 6 65   RBC Million/uL 4 75   HEMOGLOBIN g/dL 14 1   HEMATOCRIT % 43 3   MCV fL 91   PLATELETS Thousands/uL 154   , BMP/CMP:   Results from last 7 days   Lab Units 11/14/22  1339   SODIUM mmol/L 140   POTASSIUM mmol/L 3 9   CHLORIDE mmol/L 104   CO2 mmol/L 23   BUN mg/dL 16   CREATININE mg/dL 0 90   CALCIUM mg/dL 9 6   AST U/L 29   ALT U/L 20   ALK PHOS U/L 104   EGFR ml/min/1 73sq m 58   , Vitamin B12:   , HgBA1C:   , TSH:   Results from last 7 days   Lab Units 11/14/22  1339   TSH 3RD GENERATON uIU/mL 2 823   , Coagulation:   Results from last 7 days   Lab Units 11/14/22  1339   INR  1 26*   , Lipid Profile:   , Ammonia:   , Urinalysis:       Invalid input(s): URIBILINOGEN, Drug Screen:   , Medication Drug Levels:       Invalid input(s): CARBAMAZEPINE,  PHENOBARB, LACOSAMIDE, OXCARBAZEPINE  Imaging Studies: I have personally reviewed pertinent films in PACS   CTA stroke alert (head/neck)   Final Result by Giovani Miller DO (11/14 1417)      Unremarkable cervical vasculature  There is mild atherosclerotic disease involving the cavernous and supraclinoid internal carotid arteries bilaterally with no moderate right and mild left smooth stenosis  Both M1 segments are patent    Slight decrease in the number of M2/M3 branches on the left compared to the right side without a definitive branch occlusion identified  Findings were directly discussed with Anny Ta at approximately 2:10 PM                            Workstation performed: [id number]         CT stroke alert brain   Final Result by Giovani Rojas DO (11/14 1417)      Decreased attenuation and loss of gray-white differentiation within the left temporoparietal region posterior to the insula suggestive of an old infarct, new since prior MRI dated 3/10/2021  There is a prior outside MRI report available from 7/16/2022    describing restricted diffusion in this region  Findings are suggestive of a late subacute to early chronic infarct  If there is concern for new ethel-infarct ischemia, MRI with diffusion-weighted imaging is recommended  Findings were directly discussed with Anny Ta at approximately 2:10 PM       Workstation performed: [id number]             EKG, Pathology, and Other Studies: I have personally reviewed pertinent reports  VTE Prophylaxis: None, in ED    Code Status: Prior    Total Critical Care time spent 45 minutes excluding procedures, teaching and family updates

## 2022-11-14 NOTE — ASSESSMENT & PLAN NOTE
· Noted to have bladder retention in the ED  · Straight cathed in the ED  · Will place orders for urinary retention protocol  · Will check urinalysis

## 2022-11-15 LAB
4HR DELTA HS TROPONIN: 28 NG/L
ALBUMIN SERPL BCP-MCNC: 3.1 G/DL (ref 3.5–5)
ALP SERPL-CCNC: 80 U/L (ref 46–116)
ALT SERPL W P-5'-P-CCNC: 16 U/L (ref 12–78)
ANION GAP SERPL CALCULATED.3IONS-SCNC: 8 MMOL/L (ref 4–13)
AST SERPL W P-5'-P-CCNC: 28 U/L (ref 5–45)
BASOPHILS # BLD AUTO: 0.05 THOUSANDS/ÂΜL (ref 0–0.1)
BASOPHILS NFR BLD AUTO: 1 % (ref 0–1)
BILIRUB SERPL-MCNC: 0.8 MG/DL (ref 0.2–1)
BILIRUB UR QL STRIP: NEGATIVE
BUN SERPL-MCNC: 13 MG/DL (ref 5–25)
CALCIUM ALBUM COR SERPL-MCNC: 9.9 MG/DL (ref 8.3–10.1)
CALCIUM SERPL-MCNC: 9.2 MG/DL (ref 8.3–10.1)
CARDIAC TROPONIN I PNL SERPL HS: 57 NG/L
CHLORIDE SERPL-SCNC: 105 MMOL/L (ref 96–108)
CLARITY UR: CLEAR
CO2 SERPL-SCNC: 27 MMOL/L (ref 21–32)
COLOR UR: YELLOW
CREAT SERPL-MCNC: 0.77 MG/DL (ref 0.6–1.3)
EOSINOPHIL # BLD AUTO: 0.11 THOUSAND/ÂΜL (ref 0–0.61)
EOSINOPHIL NFR BLD AUTO: 2 % (ref 0–6)
ERYTHROCYTE [DISTWIDTH] IN BLOOD BY AUTOMATED COUNT: 15.6 % (ref 11.6–15.1)
GFR SERPL CREATININE-BSD FRML MDRD: 70 ML/MIN/1.73SQ M
GLUCOSE SERPL-MCNC: 88 MG/DL (ref 65–140)
GLUCOSE UR STRIP-MCNC: NEGATIVE MG/DL
HCT VFR BLD AUTO: 39.4 % (ref 34.8–46.1)
HGB BLD-MCNC: 12.7 G/DL (ref 11.5–15.4)
HGB UR QL STRIP.AUTO: NEGATIVE
IMM GRANULOCYTES # BLD AUTO: 0.02 THOUSAND/UL (ref 0–0.2)
IMM GRANULOCYTES NFR BLD AUTO: 0 % (ref 0–2)
KETONES UR STRIP-MCNC: NEGATIVE MG/DL
LEUKOCYTE ESTERASE UR QL STRIP: NEGATIVE
LYMPHOCYTES # BLD AUTO: 1.14 THOUSANDS/ÂΜL (ref 0.6–4.47)
LYMPHOCYTES NFR BLD AUTO: 19 % (ref 14–44)
MCH RBC QN AUTO: 29.1 PG (ref 26.8–34.3)
MCHC RBC AUTO-ENTMCNC: 32.2 G/DL (ref 31.4–37.4)
MCV RBC AUTO: 90 FL (ref 82–98)
MONOCYTES # BLD AUTO: 0.71 THOUSAND/ÂΜL (ref 0.17–1.22)
MONOCYTES NFR BLD AUTO: 12 % (ref 4–12)
NEUTROPHILS # BLD AUTO: 3.97 THOUSANDS/ÂΜL (ref 1.85–7.62)
NEUTS SEG NFR BLD AUTO: 66 % (ref 43–75)
NITRITE UR QL STRIP: NEGATIVE
NRBC BLD AUTO-RTO: 0 /100 WBCS
PH UR STRIP.AUTO: 5.5 [PH] (ref 4.5–8)
PLATELET # BLD AUTO: 141 THOUSANDS/UL (ref 149–390)
PMV BLD AUTO: 12.2 FL (ref 8.9–12.7)
POTASSIUM SERPL-SCNC: 3.9 MMOL/L (ref 3.5–5.3)
PROT SERPL-MCNC: 7 G/DL (ref 6.4–8.4)
PROT UR STRIP-MCNC: NEGATIVE MG/DL
RBC # BLD AUTO: 4.36 MILLION/UL (ref 3.81–5.12)
SARS-COV-2 RNA RESP QL NAA+PROBE: NEGATIVE
SODIUM SERPL-SCNC: 140 MMOL/L (ref 135–147)
SP GR UR STRIP.AUTO: 1.02 (ref 1–1.03)
UROBILINOGEN UR QL STRIP.AUTO: 0.2 E.U./DL
WBC # BLD AUTO: 6 THOUSAND/UL (ref 4.31–10.16)

## 2022-11-15 RX ORDER — QUETIAPINE FUMARATE 25 MG/1
12.5 TABLET, FILM COATED ORAL
Status: DISCONTINUED | OUTPATIENT
Start: 2022-11-15 | End: 2022-11-18 | Stop reason: HOSPADM

## 2022-11-15 RX ORDER — QUETIAPINE FUMARATE 25 MG/1
12.5 TABLET, FILM COATED ORAL ONCE
Status: DISCONTINUED | OUTPATIENT
Start: 2022-11-16 | End: 2022-11-18 | Stop reason: HOSPADM

## 2022-11-15 RX ORDER — LEVETIRACETAM 750 MG/1
750 TABLET ORAL 2 TIMES DAILY
Qty: 60 TABLET | Refills: 1 | Status: SHIPPED | OUTPATIENT
Start: 2022-11-15 | End: 2022-11-18

## 2022-11-15 RX ADMIN — APIXABAN 5 MG: 5 TABLET, FILM COATED ORAL at 08:37

## 2022-11-15 RX ADMIN — FUROSEMIDE 40 MG: 40 TABLET ORAL at 08:37

## 2022-11-15 RX ADMIN — QUETIAPINE FUMARATE 12.5 MG: 25 TABLET ORAL at 23:30

## 2022-11-15 RX ADMIN — POTASSIUM CHLORIDE 20 MEQ: 1500 TABLET, EXTENDED RELEASE ORAL at 08:37

## 2022-11-15 RX ADMIN — DOCUSATE SODIUM 100 MG: 100 CAPSULE, LIQUID FILLED ORAL at 08:37

## 2022-11-15 RX ADMIN — LEVETIRACETAM 750 MG: 100 INJECTION, SOLUTION INTRAVENOUS at 08:36

## 2022-11-15 RX ADMIN — LEVETIRACETAM 750 MG: 100 INJECTION, SOLUTION INTRAVENOUS at 20:05

## 2022-11-15 RX ADMIN — THIAMINE HCL TAB 100 MG 100 MG: 100 TAB at 08:37

## 2022-11-15 RX ADMIN — DOCUSATE SODIUM 100 MG: 100 CAPSULE, LIQUID FILLED ORAL at 17:02

## 2022-11-15 RX ADMIN — APIXABAN 5 MG: 5 TABLET, FILM COATED ORAL at 17:02

## 2022-11-15 RX ADMIN — ATORVASTATIN CALCIUM 10 MG: 10 TABLET, FILM COATED ORAL at 17:02

## 2022-11-15 NOTE — NURSING NOTE
Patient's bed alarm ringing, staff responded, pt found seated on the floor at the bottom of her bed at [phone], pt appears to have slid of the bed  Patient denies injuries  Pt unable to report what happened due to her aphasia  Patients vital signs stable  Pt assisted back into the bed  Dr Emma Brand made aware of fall, no new orders  Patient moved to room 217 from room 210-2 to be closer to the nursing station  Patients son , Phillip Thorpe, made aware of fall and room change at [address] [place] [address]

## 2022-11-15 NOTE — ASSESSMENT & PLAN NOTE
This is an 72-year-old female with history of atrial fibrillation on Eliquis, dementia, history of CVA in July, hypertension, diastolic CHF who lives at [facility] walks with a walker sent for evaluation of altered mental status today  At baseline patient has aphasia and dementia  Patient was seen to be slightly altered and agitated around lunchtime was then noted to be unresponsive with new onset seizure activity  with right gaze deviation, tonic/convulsive activity and upper/lower extremities  Stroke alert initially called, neuro exam improved, not a thrombolytic candidate  No report of any fever, chills, nausea, vomiting, diarrhea  · CTA revealed mild atherosclerotic disease involving cavernous and supraclinoid internal carotid arteries bilaterally  · CT head revealed decreased and loss of gray-white differentiation within the left temporoparietal region suggesting old infarct, findings suggest a late subacute to early chronic infarct  · Evaluated by Neurology dating 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

## 2022-11-15 NOTE — ASSESSMENT & PLAN NOTE
This is an 16-year-old female with history of atrial fibrillation on Eliquis, dementia, history of CVA in July, hypertension, diastolic CHF who lives at [facility] walks with a walker sent for evaluation of altered mental status today  At baseline patient has aphasia and dementia  Patient was seen to be slightly altered and agitated around lunchtime was then noted to be unresponsive with new onset seizure activity  with right gaze deviation, tonic/convulsive activity and upper/lower extremities  Stroke alert initially called, neuro exam improved, not a thrombolytic candidate  No report of any fever, chills, nausea, vomiting, diarrhea  · CTA revealed mild atherosclerotic disease involving cavernous and supraclinoid internal carotid arteries bilaterally  · CT head revealed decreased and loss of gray-white differentiation within the left temporoparietal region suggesting old infarct, findings suggest a late subacute to early chronic infarct  · Evaluated by Neurology dating 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

## 2022-11-15 NOTE — PROGRESS NOTES
[facility]  Progress Note - Zoila1935, 82 y o  female MRN: [id number]  Unit/Bed#: [place] [address] [address] 210-02 Encounter: [id number]  Primary Care Provider: Donte Barillas DO   Date and time admitted to hospital: 2022  1:16 PM    * Seizure [facility])  Assessment & Plan  This is an 81-year-old female with history of atrial fibrillation on Eliquis, dementia, history of CVA in July, hypertension, diastolic CHF who lives at [facility] [place], operated by [facility] walks with a walker sent for evaluation of altered mental status today  At baseline patient has aphasia and dementia  Patient was seen to be slightly altered and agitated around lunchtime was then noted to be unresponsive with new onset seizure activity  with right gaze deviation, tonic/convulsive activity and upper/lower extremities  Stroke alert initially called, neuro exam improved, not a thrombolytic candidate  No report of any fever, chills, nausea, vomiting, diarrhea  · CTA revealed mild atherosclerotic disease involving cavernous and supraclinoid internal carotid arteries bilaterally  · CT head revealed decreased and loss of gray-white differentiation within the left temporoparietal region suggesting old infarct, findings suggest a late subacute to early chronic infarct  · Evaluated by Neurology dating 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    Patient has had no further seizures since starting the [address]  Tried did discharge her back to assisted than, but they refused to take her back in feel she needs a higher level of care  We will cancel discharge and put in a PT, OT consult          Subjective:   No further seizures  She has needed help with feeding herself        Objective:     Vitals:   Temp (24hrs), Av 9 °F (36 6 °C), Min:96 7 °F (35 9 °C), Max:99 2 °F (37 3 °C)    Temp:  [96 7 °F (35 9 °C)-99 2 °F (37 3 °C)] 96 7 °F (35 9 °C)  HR:  [53-79] 55  Resp:  [18-20] 18  BP: (105-177)/(52-92) 125/69  SpO2:  [91 %-95 %] 93 %  Body mass index is 30 38 kg/m²  Input and Output Summary (last 24 hours): Intake/Output Summary (Last 24 hours) at 11/15/2022 1521  Last data filed at 11/15/2022 1422  Gross per 24 hour   Intake --   Output 2200 ml   Net -2200 ml       Physical Exam:     Physical Exam  Vitals and nursing note reviewed  HENT:      Head: Normocephalic and atraumatic  Eyes:      Pupils: Pupils are equal, round, and reactive to light  Cardiovascular:      Rate and Rhythm: Normal rate and regular rhythm  Heart sounds: No murmur heard  No friction rub  No gallop  Pulmonary:      Effort: Pulmonary effort is normal       Breath sounds: Normal breath sounds  No wheezing or rales  Abdominal:      General: Bowel sounds are normal       Palpations: Abdomen is soft  Tenderness: There is no abdominal tenderness  Musculoskeletal:      Right lower leg: No edema  Left lower leg: No edema                 Additional Data:     Labs:    Results from last 7 days   Lab Units 11/15/22  0647   WBC Thousand/uL 6 00   HEMOGLOBIN g/dL 12 7   HEMATOCRIT % 39 4   PLATELETS Thousands/uL 141*   NEUTROS PCT % 66   LYMPHS PCT % 19   MONOS PCT % 12   EOS PCT % 2     Results from last 7 days   Lab Units 11/15/22  0647   POTASSIUM mmol/L 3 9   CHLORIDE mmol/L 105   CO2 mmol/L 27   BUN mg/dL 13   CREATININE mg/dL 0 77   CALCIUM mg/dL 9 2   ALK PHOS U/L 80   ALT U/L 16   AST U/L 28     Results from last 7 days   Lab Units 11/14/22  1339   INR  1 26*                   * I Have Reviewed All Lab Data     Recent Cultures (last 7 days):             Last 24 Hours Medication List:   Current Facility-Administered Medications   Medication Dose Route Frequency Provider Last Rate   • acetaminophen  650 mg Oral Q6H PRN Isaiah Dias MD     • apixaban  5 mg Oral BID Isaiah Dias MD     • atorvastatin  10 mg Oral Daily With Alonzo Boone MD     • bisacodyl  5 mg Oral Daily PRN Bartolo Cao Monica Small MD     • docusate sodium  100 mg Oral BID Home Knowles MD     • furosemide  40 mg Oral Daily Home Knowles MD     • levETIRAcetam  750 mg Intravenous Q12H Home Knowles  mg (11/15/22 9191)   • potassium chloride  20 mEq Oral Daily Home Knowles MD     • thiamine  100 mg Oral Daily Home Knowles MD           VTE Pharmacologic Prophylaxis:   Pharmacologic: Apixaban (Eliquis)      Current Length of Stay: 1 day(s)    Current Patient Status: Inpatient       Discharge Plan: [facility] refused to take her back, feel she needs a higher level of care  Code Status: Level 3 - DNAR and DNI           Today, Patient Was Seen By: Merlin Sovereign, DO    ** Please Note: Dictation voice to text software may have been used in the creation of this document   **

## 2022-11-15 NOTE — PLAN OF CARE
Problem: Potential for Falls  Goal: Patient will remain free of falls  Description: INTERVENTIONS:  - Educate patient/family on patient safety including physical limitations  - Instruct patient to call for assistance with activity   - Consult OT/PT to assist with strengthening/mobility   - Keep Call bell within reach  - Keep bed low and locked with side rails adjusted as appropriate  - Keep care items and personal belongings within reach  - Initiate and maintain comfort rounds  - Make Fall Risk Sign visible to staff  - Offer Toileting every 2 Hours, in advance of need  - Initiate/Maintain bed alarm  - Obtain necessary fall risk management equipment:   - Apply yellow socks and bracelet for high fall risk patients  - Consider moving patient to room near nurses station  Outcome: Progressing     Problem: Nutrition/Hydration-ADULT  Goal: Nutrient/Hydration intake appropriate for improving, restoring or maintaining nutritional needs  Description: Monitor and assess patient's nutrition/hydration status for malnutrition  Collaborate with interdisciplinary team and initiate plan and interventions as ordered  Monitor patient's weight and dietary intake as ordered or per policy  Utilize nutrition screening tool and intervene as necessary  Determine patient's food preferences and provide high-protein, high-caloric foods as appropriate       INTERVENTIONS:  - Monitor oral intake, urinary output, labs, and treatment plans  - Assess nutrition and hydration status and recommend course of action  - Evaluate amount of meals eaten  - Assist patient with eating if necessary   - Allow adequate time for meals  - Recommend/ encourage appropriate diets, oral nutritional supplements, and vitamin/mineral supplements  - Order, calculate, and assess calorie counts as needed  - Recommend, monitor, and adjust tube feedings and TPN/PPN based on assessed needs  - Assess need for intravenous fluids  - Provide specific nutrition/hydration education as appropriate  - Include patient/family/caregiver in decisions related to nutrition  Outcome: Progressing     Problem: MOBILITY - ADULT  Goal: Maintain or return to baseline ADL function  Description: INTERVENTIONS:  -  Assess patient's ability to carry out ADLs; assess patient's baseline for ADL function and identify physical deficits which impact ability to perform ADLs (bathing, care of mouth/teeth, toileting, grooming, dressing, etc )  - Assess/evaluate cause of self-care deficits   - Assess range of motion  - Assess patient's mobility; develop plan if impaired  - Assess patient's need for assistive devices and provide as appropriate  - Encourage maximum independence but intervene and supervise when necessary  - Involve family in performance of ADLs  - Assess for home care needs following discharge   - Consider OT consult to assist with ADL evaluation and planning for discharge  - Provide patient education as appropriate  Outcome: Progressing  Goal: Maintains/Returns to pre admission functional level  Description: INTERVENTIONS:  - Perform BMAT or MOVE assessment daily    - Set and communicate daily mobility goal to care team and patient/family/caregiver  - Collaborate with rehabilitation services on mobility goals if consulted  - Perform Range of Motion 2 times a day  - Reposition patient every 2 hours    - Dangle patient 2 times a day  - Stand patient 2 times a day  - Ambulate patient 2 times a day  - Out of bed to chair 2 times a day   - Out of bed for meals 3 times a day  - Out of bed for toileting  - Record patient progress and toleration of activity level   Outcome: Progressing

## 2022-11-15 NOTE — UTILIZATION REVIEW
Initial Clinical Review    Admission: Date/Time/Statement:   Admission Orders (From admission, onward)     Ordered        11/14/22 1556  INPATIENT ADMISSION  Once                      Orders Placed This Encounter   Procedures   • INPATIENT ADMISSION     Standing Status:   Standing     Number of Occurrences:   1     Order Specific Question:   Level of Care     Answer:   Med Surg [16]     Order Specific Question:   Estimated length of stay     Answer:   More than 2 Midnights     Order Specific Question:   Certification     Answer:   I certify that inpatient services are medically necessary for this patient for a duration of greater than two midnights  See H&P and MD Progress Notes for additional information about the patient's course of treatment  ED Arrival Information     Expected   -    Arrival   11/14/2022 13:15    Acuity   Emergent            Means of arrival   Ambulance    Escorted by   [facility]    Admission type   Emergency            Arrival complaint   seizure           Chief Complaint   Patient presents with   • Seizure - New Onset     Pt arrives via EMS from SNF for altered mental status, enroute to hospital had full body seizure described as "shaking and stiffening" with a right sided gaze that lasted approx 1 minute  No meds given pta  Pt agitated and not following directions    Initial Presentation: 82 y o  female to the ED from nursing home via EMS with complaints of confused, repeating the word "no"  Upon EMS evaluation, noted to have right eyeward gaze, seizure like activity with clonic posturing which resolved within 2 minutes  On arrival to the ED, stroke alert  H/O CHF, htn, afib, dementia  Arrives agitated, impaired, tachypneic  Lactic acid elevated  CT head revealed decreased and loss of gray-white differentiation within the left temporoparietal region suggesting old infarct, findings suggest a late subacute to early chronic infarct  Lactic acid 5 8  As per family, she has aphagia baseline, typically ambulatory  Started on [address]  Awake, alert, moving all extremities  Check UA  Neurology consult:  Right gaze deviation followed by generalized tonic clonic movement are consistent with seizure  Vomiting on arrival  Loaded with IV keppra  Continue  Continue ELiquis  Date: 11/15   Day 2: Has been seizure free  Continu Keppra  Neurology:  Suspect new onset seizures possibly related to recent left MCA infarct  No further stroke workup needed  No need for MRi brain  Continue keppra       ED Triage Vitals   Temperature Pulse Respirations Blood Pressure SpO2   11/14/22 2052 11/14/22 1321 11/14/22 1321 11/14/22 1321 11/14/22 1321   99 2 °F (37 3 °C) 82 19 (!) 183/92 91 %      Temp Source Heart Rate Source Patient Position - Orthostatic VS BP Location FiO2 (%)   11/15/22 0750 11/14/22 1415 11/14/22 1345 11/14/22 1815 --   Oral Monitor Lying Right arm       Pain Score       11/14/22 1321       No Pain          Wt Readings from Last 1 Encounters:   11/14/22 77 8 kg (171 lb 8 3 oz)     Additional Vital Signs:   Date/Time Temp Pulse Resp BP MAP (mmHg) SpO2 O2 Device Patient Position - Orthostatic VS   11/15/22 07:50:26 98 1 °F (36 7 °C) 53 Abnormal  19 120/68 85 92 % None (Room air) Lying   11/15/22 03:13:19 98 °F (36 7 °C) 57 18 135/74 94 94 % -- --   11/14/22 2100 -- -- -- -- -- 92 % None (Room air) --   11/14/22 20:52:07 99 2 °F (37 3 °C) 67 18 146/72 97 91 % -- --   11/14/22 1924 -- 79 18 177/92 Abnormal  -- 95 % None (Room air) --   11/14/22 1815 -- 65 18 165/75 -- 95 % None (Room air) Lying   11/14/22 14:30:53 -- 60 19 166/77 -- 96 % None (Room air) Lying   11/14/22 14:15:32 -- 65 18 158/132 Abnormal  -- 92 % None (Room air) Lying   11/14/22 14:00:26 -- -- 17 183/92 Abnormal  -- 94 % None (Room air) Lying   11/14/22 1345 -- -- 18 178/88 Abnormal  -- 92 % None (Room air) Lying   11/14/22 1321 -- 82 19 183/92 Abnormal  125 91 % -- --       Pertinent Labs/Diagnostic Test Results:   11/13 EKG: Atrial fibrillation  Incomplete right bundle branch block  Septal infarct (cited on or before 10-MAY-2022)  Abnormal ECG  When compared with ECG of 14-NOV-2022 13:23, (unconfirmed)  No significant change was found  CTA stroke alert (head/neck)   Final Result by Giovani Miller DO (11/14 1417)      Unremarkable cervical vasculature  There is mild atherosclerotic disease involving the cavernous and supraclinoid internal carotid arteries bilaterally with no moderate right and mild left smooth stenosis  Both M1 segments are patent  Slight decrease in the number of M2/M3 branches on the left compared to the right side without a definitive branch occlusion identified  Findings were directly discussed with Dhara Villa at approximately 2:10 PM                            Workstation performed: [id number]         CT stroke alert brain   Final Result by Giovani Miller DO (11/14 1417)      Decreased attenuation and loss of gray-white differentiation within the left temporoparietal region posterior to the insula suggestive of an old infarct, new since prior MRI dated 3/10/2021  There is a prior outside MRI report available from 7/16/2022    describing restricted diffusion in this region  Findings are suggestive of a late subacute to early chronic infarct  If there is concern for new ethel-infarct ischemia, MRI with diffusion-weighted imaging is recommended        Findings were directly discussed with Dhara Villa at approximately 2:10 PM       Workstation performed: [id number]               Results from last 7 days   Lab Units 11/15/22  0647 11/14/22  1339   WBC Thousand/uL 6 00 6 65   HEMOGLOBIN g/dL 12 7 14 1   HEMATOCRIT % 39 4 43 3   PLATELETS Thousands/uL 141* 154   NEUTROS ABS Thousands/µL 3 97 3 71         Results from last 7 days   Lab Units 11/15/22  0647 11/14/22  1339   SODIUM mmol/L 140 140   POTASSIUM mmol/L 3 9 3 9   CHLORIDE mmol/L 105 104   CO2 mmol/L 27 23   ANION GAP mmol/L 8 13   BUN mg/dL 13 16   CREATININE mg/dL 0 77 0 90   EGFR ml/min/1 73sq m 70 58   CALCIUM mg/dL 9 2 9 6     Results from last 7 days   Lab Units 11/15/22  0647 11/14/22  1339   AST U/L 28 29   ALT U/L 16 20   ALK PHOS U/L 80 104   TOTAL PROTEIN g/dL 7 0 8 2   ALBUMIN g/dL 3 1* 3 7   TOTAL BILIRUBIN mg/dL 0 80 0 58         Results from last 7 days   Lab Units 11/15/22  0647 11/14/22  1339   GLUCOSE RANDOM mg/dL 88 116       Results from last 7 days   Lab Units 11/14/22  1730 11/14/22  1339   HS TNI 0HR ng/L  --  29   HS TNI 2HR ng/L 22  --    HSTNI D2 ng/L -7  --          Results from last 7 days   Lab Units 11/14/22  1339   PROTIME seconds 15 8*   INR  1 26*   PTT seconds 30     Results from last 7 days   Lab Units 11/14/22  1339   TSH 3RD GENERATON uIU/mL 2 823         Results from last 7 days   Lab Units 11/14/22  1730 11/14/22  1339   LACTIC ACID mmol/L 3 0* 5 8*         Results from last 7 days   Lab Units 11/14/22  1927   CLARITY UA  Clear   COLOR UA  Yellow   SPEC GRAV UA  1 025   PH UA  5 5   GLUCOSE UA mg/dl Negative   KETONES UA mg/dl Negative   BLOOD UA  Negative   PROTEIN UA mg/dl Negative   NITRITE UA  Negative   BILIRUBIN UA  Negative   UROBILINOGEN UA E U /dl 0 2   LEUKOCYTES UA  Negative               ED Treatment:   Medication Administration from 11/14/2022 1315 to 11/14/2022 2032      Date/Time Order Dose Route Action    11/14/2022 1335 ondansetron (ZOFRAN) injection 4 mg 4 mg Intravenous Given    11/14/2022 1401 ondansetron (ZOFRAN) injection 4 mg 4 mg Intravenous Given    11/14/2022 1500 levETIRAcetam (KEPPRA) 2,000 mg in sodium chloride 0 9 % 250 mL IVPB 2,000 mg Intravenous New Bag        Past Medical History:   Diagnosis Date   • A-fib (HCC)    • CAD (coronary artery disease)    • CKD (chronic kidney disease) stage 2, GFR 60-89 ml/min    • Hypertension    • Memory loss    • Urinary tract infection        Ad[facility]: [facility] [R56 9]  History of CVA (cerebrovascular accident) [Z86 73]  New onset seizure ([facility] ) [R56 9]  Age/Sex: 82 y o  female  Admission Orders:    Scheduled Medications:  apixaban, 5 mg, Oral, BID  atorvastatin, 10 mg, Oral, Daily With Dinner  docusate sodium, 100 mg, Oral, BID  furosemide, 40 mg, Oral, Daily  levETIRAcetam, 750 mg, Intravenous, Q12H  potassium chloride, 20 mEq, Oral, Daily  thiamine, 100 mg, Oral, Daily      Continuous IV Infusions:     PRN Meds:  acetaminophen, 650 mg, Oral, Q6H PRN  bisacodyl, 5 mg, Oral, Daily PRN        IP CONSULT TO NEUROLOGY    Network Utilization Review Department  ATTENTION: Please call with any questions or concerns to [phone] and carefully listen to the prompts so that you are directed to the right person  All voicemails are confidential   Sudie Crigler all requests for admission clinical reviews, approved or denied determinations and any other requests to dedicated fax number below belonging to the campus where the patient is receiving treatment   List of dedicated fax numbers for the Facilities:  [address] DENIALS (Administrative/Medical Necessity) [phone]   [address] (Maternity/NICU/Pediatrics) [phone]   91 Winnie Fink [phone]   Eusebio Camacho 77 [phone]   [address] [place] [address] Karma Kavon [place] 28 [phone]   1551 [facility] Alex Harp [facility] 134 815 [facility] [phone]

## 2022-11-15 NOTE — ASSESSMENT & PLAN NOTE
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 (not not described as nonverbal), prior neurologic evaluations for progressive cognitive impairment and dementia, atrial fibrillation on Eliquis, presented as stroke alert on 11/14 following altered mental status at nursing facility with new onset seizure activity (right gaze deviation, tonic/convulsive activity in the upper/lower extremities), was perseverative and with aphasia after the event (may have been post ictal)  Vomiting and hypertensive as well in ED during stroke alert  Neuro exam improved during duration of stroke alert      Suspect new onset seizure, 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)  No new/obvious neurologic deficits compared to baseline are appreciated during stroke alert  -CT head during alert with right temporoparietal infarct, now late subacute to early chronic (matches description of stroke from [facility] in July 2022)  -CTA head/neck with no significant critical stenosis nor large vessel occlusion  Neuro exam 11/15: continues with expressive aphasia, no other significant deficits  Asked by Medicine to re-evaluate patient on 11/16 due to reported agitation, with suspicion that Keppra could be contributing  Patient actually quite lethargic on exam after briefly attending examiner and not responding verbally, only laughing inappropriately at times  Following select commands  Upon re-evaluation with attending in the afternoon, patient more consistently following commands and offering verbalizations, though without obvious expressive aphasia  Patient likely experiencing delirium      Plan:  -2 G Keppra bolus on presentation and started on maintenance Keppra 750 twice daily  Keppra discontinued on 11/16/2022 with plan to initiate alternative AED given agitation    -will instead start zonisamide 100 mg daily, and after 2 weeks, can increase to 100 mg b i d Karrie Nissen   -routine EEG can be performed as an outpatient  -supportive care/seizure precautions  -monitoring further metabolic/infectious derangements  -Geriatrics consult ordered; appreciate recommendations

## 2022-11-15 NOTE — DISCHARGE SUMMARY
[address] [facility]  Discharge- Wendee Friday 1935, 82 y o  female MRN: [id number]  Unit/Bed#: [place] [address] 210-02 Encounter: [id number]  Primary Care Provider: Donte Barillas DO   Date and time admitted to hospital: 11/14/2022  1:16 PM    * Seizure [facility])  Assessment & Plan  This is an 82-year-old female with history of atrial fibrillation on Eliquis, dementia, history of CVA in July, hypertension, diastolic CHF who lives at [facility] walks with a walker sent for evaluation of altered mental status today  At baseline patient has aphasia and dementia  Patient was seen to be slightly altered and agitated around lunchtime was then noted to be unresponsive with new onset seizure activity  with right gaze deviation, tonic/convulsive activity and upper/lower extremities  Stroke alert initially called, neuro exam improved, not a thrombolytic candidate  No report of any fever, chills, nausea, vomiting, diarrhea  · CTA revealed mild atherosclerotic disease involving cavernous and supraclinoid internal carotid arteries bilaterally  · CT head revealed decreased and loss of gray-white differentiation within the left temporoparietal region suggesting old infarct, findings suggest a late subacute to early chronic infarct  · Evaluated by Neurology dating 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    Urinary retention  Assessment & Plan  She did require straight catheterization here in the hospital   Recommend they continue to follow this as an outpatient    Chronic diastolic heart failure [facility])  Assessment & Plan  Wt Readings from Last 3 Encounters:   11/14/22 77 8 kg (171 lb 8 3 oz)   09/29/22 77 7 kg (171 lb 3 2 oz)   06/22/22 76 7 kg (169 lb 3 2 oz)     · History of diastolic CHF  · Maintain on furosemide 40 mg daily  · Does not appear volume overloaded  · Monitor daily weights    Atrial fibrillation ([facility] )  Assessment & Plan  · Maintain on Eliquis  · Heart rate well controlled off of medication        Discharging Physician / Practitioner: Tad Alexandre DO  PCP: Lance Melton DO  Admission Date:   Admission Orders (From admission, onward)     Ordered        11/14/22 [address]  Once                      Discharge Date: 11/15/22    Medical Problems             Resolved Problems  Date Reviewed: 11/14/2022   None                   Consultations During Hospital Stay:  · Neurology      Procedures Performed:     · CTA head      Reason for Admission:  Seizure-like activity      Hospital Course:     Clara Younger is a 82 y o  female patient who originally presented to the hospital on 11/14/2022 due to seizure-like activity  Patient has a history of stroke  She presents from assisted living with seizure-like activity was tonic clonic movements  No history of seizures  She was seen by Neurology  They felt this was common with a recent stroke  They started her on Keppra  She has had no further seizures  We will continue this at discharge         She did have some urinary retention in the hospital   Certainly would like this to be kept in I on at assisted living  If necessary they could place a Mcnair catheter    Please see above list of diagnoses and related plan for additional information  Condition at Discharge: stable       Discharge Day Visit / Exam:     Subjective:  Feels well  No complaints  Vitals: Blood Pressure: 105/52 (11/15/22 1123)  Pulse: 56 (11/15/22 1123)  Temperature: 97 7 °F (36 5 °C) (11/15/22 1123)  Temp Source: Oral (11/15/22 1123)  Respirations: 20 (11/15/22 1123)  Height: 5' 3" (160 cm) (11/14/22 1321)  Weight - Scale: 77 8 kg (171 lb 8 3 oz) (11/14/22 1321)  SpO2: 92 % (11/15/22 1123)    Exam:     Physical Exam  Vitals and nursing note reviewed     HENT:      Head: Normocephalic and atraumatic  Eyes:      Pupils: Pupils are equal, round, and reactive to light  Cardiovascular:      Rate and Rhythm: Normal rate and regular rhythm  Heart sounds: No murmur heard  No friction rub  No gallop  Pulmonary:      Effort: Pulmonary effort is normal       Breath sounds: Normal breath sounds  No wheezing or rales  Abdominal:      General: Bowel sounds are normal       Palpations: Abdomen is soft  Tenderness: There is no abdominal tenderness  Musculoskeletal:      Right lower leg: No edema  Left lower leg: No edema            Discharge instructions/Information to patient and family:   See after visit summary for information provided to patient and family  Provisions for Follow-Up Care:  See after visit summary for information related to follow-up care and any pertinent home health orders  Disposition:     Assisted Living Facility at   Discharge Statement:  I spent 38 minutes discharging the patient  This time was spent on the day of discharge  I had direct contact with the patient on the day of discharge  Greater than 50% of the total time was spent examining patient, answering all patient questions, arranging and discussing plan of care with patient as well as directly providing post-discharge instructions  Additional time then spent on discharge activities  Discharge Medications:  See after visit summary for reconciled discharge medications provided to patient and family        ** Please Note: This note has been constructed using a voice recognition system **

## 2022-11-15 NOTE — CASE MANAGEMENT
Case Management Assessment & Discharge Planning Note    Patient name Ary Christianson  Location [facility] 2 /South 2 Glo Olivo* MRN [id number]  : 1935 Date 11/15/2022       Current Admission Date: 2022  Current Admission Diagnosis:Seizure [facility])   Patient Active Problem List    Diagnosis Date Noted   • Seizure ([facility] [address] ) 2022   • Urinary retention 2022   • Venous stasis dermatitis of both lower extremities 2022   • Gait instability 2022   • Other fatigue 2022   • Atypical pneumonia 05/10/2022   • Respiratory insufficiency 05/10/2022   • Acute metabolic encephalopathy    • Chronic diastolic heart failure ([facility] [address] ) 05/10/2022   • Thrombocytopenia ([facility] [address] ) 05/10/2022   • Other hyperlipidemia 10/06/2021   • 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   • Other insomnia 10/06/2021   • Skin lesion 10/04/2021   • Action tremor 2020   • Age-related osteoporosis without fracture 2020   • Atrial fibrillation ([facility] [address] ) 04/10/2015   • Chronic anticoagulation 04/10/2015   • Benign essential hypertension 2011      LOS (days): 1  Geometric Mean LOS (GMLOS) (days):   Days to GMLOS:     OBJECTIVE:    Risk of Unplanned Readmission Score: 12 59         Current admission status: Inpatient       Preferred Pharmacy:   [address] [place] [address], [address] [place] [address]  Phone: [phone] Fax: [phone] [place], [place] - [address] [place] [address] Dr Molina  [facility] [address]  Phone: [phone] Fax: [phone]    [facility], [address] [facility] [address] [place] [address] [address]  [place] [id number]  Phone: [phone] Fax: [phone]    Primary Care Provider: El Franklin DO    Primary Insurance: [facility]  Secondary Insurance:     ASSESSMENT:  [address] Representative - Son Primary Phone: [phone] (Mobile)                         Readmission Root Cause  30 Day Readmission: No    Patient Information  Admitted from[de-identified] Facility  Mental Status: Alert  During Assessment patient was accompanied by: Not accompanied during assessment  Assessment information provided by[de-identified] Other - please comment (Bre at [facility])  Primary Caregiver: Other (Comment) (staff at [address] [place] [address])  [address] Name[de-identified] staff at [address] [facility] Telephone Number[de-identified] [phone]  Support Systems: Other (Comment) (staff at [address] [place] [address])  What city do you live in?: [place]  Home entry access options   Select all that apply : No steps to enter home  Type of Current Residence: Other (Comment) ([facility])  In the last 12 months, was there a time when you were not able to pay the mortgage or rent on time?: No  In the last 12 months, how many places have you lived?: 1  In the last 12 months, was there a time when you did not have a steady place to sleep or slept in a shelter (including now)?: No  Homeless/housing insecurity resource given?: N/A  Living Arrangements: Other (Comment) ([facility])    Activities of Daily Living Prior to Admission  Functional Status: Assistance  Completes ADLs independently?: No  Level of ADL dependence: Assistance  Ambulates independently?: No  Level of ambulatory dependence: Assistance  Does patient use assisted devices?: Yes  Assisted Devices (DME) used: Johnny Oquendo  Does patient currently own DME?: Yes  What DME does the patient currently own?: Johnny Oquendo  Does patient have a history of Outpatient Therapy (PT/OT)?: No  Does the patient have a history of Short-Term Rehab?: No  Does patient currently have [facility]?: No         Patient Information Continued  Income Source: Pension/intermediate  Does patient have prescription coverage?: Yes  Within the past 12 months, you worried that your food would run out before you got the money to buy more : Never true  Within the past 12 months, the food you bought just didn't last and you didn't have money to get more : Never true  Food insecurity resource given?: N/A  Does patient receive dialysis treatments?: No  Does patient have a history of substance abuse?: No  Does patient have a history of Mental Health Diagnosis?: No         Means of Transportation  Means of Transport to Appts[de-identified] Other (Comment) (staff at [address] [place] [address])  In the past 12 months, has lack of transportation kept you from medical appointments or from getting medications?: No  In the past 12 months, has lack of transportation kept you from meetings, work, or from getting things needed for daily living?: No  Was application for public transport provided?: N/A        DISCHARGE DETAILS:    Discharge planning discussed with[de-identified] Bre at [address] [place] [address]         Is the patient interested in [place] [address] at discharge?: No    DME Referral Provided  Referral made for DME?: No    Other Referral/Resources/Interventions Provided:  Interventions: Assisted Living         Treatment Team Recommendation: Assisted Living  Discharge Destination Plan[de-identified] Assisted Living  Transport at Discharge : BLS Ambulance                       Accompanied by: EMS personnel

## 2022-11-15 NOTE — ASSESSMENT & PLAN NOTE
She did require straight catheterization here in the hospital   Recommend they continue to follow this as an outpatient

## 2022-11-15 NOTE — PLAN OF CARE
Problem: Potential for Falls  Goal: Patient will remain free of falls  Description: INTERVENTIONS:  - Educate patient/family on patient safety including physical limitations  - Instruct patient to call for assistance with activity   - Consult OT/PT to assist with strengthening/mobility   - Keep Call bell within reach  - Keep bed low and locked with side rails adjusted as appropriate  - Keep care items and personal belongings within reach  - Initiate and maintain comfort rounds  - Apply yellow socks and bracelet for high fall risk patients  - Consider moving patient to room near nurses station  Outcome: Progressing     Problem: Nutrition/Hydration-ADULT  Goal: Nutrient/Hydration intake appropriate for improving, restoring or maintaining nutritional needs  Description: Monitor and assess patient's nutrition/hydration status for malnutrition  Collaborate with interdisciplinary team and initiate plan and interventions as ordered  Monitor patient's weight and dietary intake as ordered or per policy  Utilize nutrition screening tool and intervene as necessary  Determine patient's food preferences and provide high-protein, high-caloric foods as appropriate       INTERVENTIONS:  - Monitor oral intake, urinary output, labs, and treatment plans  - Assess nutrition and hydration status and recommend course of action  - Evaluate amount of meals eaten  - Assist patient with eating if necessary   - Allow adequate time for meals  - Recommend/ encourage appropriate diets, oral nutritional supplements, and vitamin/mineral supplements  - Order, calculate, and assess calorie counts as needed  - Recommend, monitor, and adjust tube feedings and TPN/PPN based on assessed needs  - Assess need for intravenous fluids  - Provide specific nutrition/hydration education as appropriate  - Include patient/family/caregiver in decisions related to nutrition  Outcome: Progressing     Problem: MOBILITY - ADULT  Goal: Maintain or return to baseline ADL function  Description: INTERVENTIONS:  -  Assess patient's ability to carry out ADLs; assess patient's baseline for ADL function and identify physical deficits which impact ability to perform ADLs (bathing, care of mouth/teeth, toileting, grooming, dressing, etc )  - Assess/evaluate cause of self-care deficits   - Assess range of motion  - Assess patient's mobility; develop plan if impaired  - Assess patient's need for assistive devices and provide as appropriate  - Encourage maximum independence but intervene and supervise when necessary  - Involve family in performance of ADLs  - Assess for home care needs following discharge   - Consider OT consult to assist with ADL evaluation and planning for discharge  - Provide patient education as appropriate  Outcome: Progressing  Goal: Maintains/Returns to pre admission functional level  Description: INTERVENTIONS:  - Perform BMAT or MOVE assessment daily    - Set and communicate daily mobility goal to care team and patient/family/caregiver  - Collaborate with rehabilitation services on mobility goals if consulted  - Perform Range of Motion 3 times a day  - Reposition patient every 3 hours    - Dangle patient 3 times a day  - Stand patient 3 times a day  - Out of bed for toileting  - Record patient progress and toleration of activity level   Outcome: Progressing

## 2022-11-15 NOTE — PROGRESS NOTES
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, prior neurologic evaluations for progressive cognitive impairment and dementia, atrial fibrillation on Eliquis      Presents as stroke alert on 11/14 following altered mental status at nursing facility with new onset seizure activity (right gaze deviation, tonic/convulsive activity in the upper/lower extremities), was perseverative and with aphasia after the event (may be post ictal)  Vomiting as well in ED during stroke alert  Hypertensive as well  Neuro exam improved during duration of stroke alert      Suspect new onset seizure, 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?):   No new/obvious neurologic deficits compared to baseline were appreciated during stroke alert   Neuro exam this morning 11/15: continues with expressive aphasia, no other significant deficits (appears at her neurologic baseline)       Plan:  -no need for stroke pathway initiation:  -CT head during alert with right temporoparietal infarct, now late subacute to early chronic (matches description of stroke from [facility] in July 2022)  -CTA head/neck with no significant critical stenosis nor large vessel occlusion   -no need for MRI brain  -can continue Eliquis from neurologic standpoint  -given seizure concern given 2 G Keppra load yesterday and started on maintenance Keppra 750 twice daily (to continue)  -routine EEG can be performed as an outpatient as patient medically stable for discharge (will order)  -supportive care/seizure precautions  -monitoring for metabolic/infectious derangements  -will set up  epilepsy clinic follow up    No further inpatient neurologic workup, ok from neurology standpoint for discharge   Discussed plan of care with attending neurologist      Guero Uyen will need follow up in in 6 weeks with epilepsy attending or advance practitioner  She will require a routine EEG within 4-6 weeks  Subjective:   Patient resting in bed, aphasia precludes full ROS; patient pleasant appearing, laughing, does not appear in any distress  No recurrence of seizure activity overnight  Vitals: Blood pressure 105/52, pulse 56, temperature 97 7 °F (36 5 °C), temperature source Oral, resp  rate 20, height 5' 3" (1 6 m), weight 77 8 kg (171 lb 8 3 oz), SpO2 92 %  ,Body mass index is 30 38 kg/m²  Physical Exam:  Physical Exam  Constitutional:       Appearance: Normal appearance  HENT:      Head: Normocephalic and atraumatic  Eyes:      Extraocular Movements: Extraocular movements intact and EOM normal       Conjunctiva/sclera: Conjunctivae normal       Pupils: Pupils are equal, round, and reactive to light  Cardiovascular:      Rate and Rhythm: Normal rate  Pulmonary:      Effort: Pulmonary effort is normal    Abdominal:      General: There is no distension  Musculoskeletal:         General: Normal range of motion  Cervical back: Normal range of motion and neck supple  Skin:     General: Skin is warm and dry  Neurological:      Mental Status: She is alert  Neurologic Exam     Mental Status   Awake, alert, pleasant, expressive aphasia with simple questions, but can state her first and last name, unable to name simple objects  Some speech output if garbled/out of context  Does follow simple commands reliably  Cranial Nerves     CN II   Visual fields full to confrontation  CN III, IV, VI   Pupils are equal, round, and reactive to light  Extraocular motions are normal      CN V   Facial sensation intact  CN VII   Facial expression full, symmetric       CN VIII   CN VIII normal      CN IX, X   CN IX normal    CN X normal      CN XI   CN XI normal      CN XII   CN XII normal      Motor Exam Lifts and sustains arms and legs antigravity; no significant focal deficit or laterality observed (at most just minimal L strength > R in keeping with L MCA infarct in July)     Sensory Exam   Light touch normal      Gait, Coordination, and Reflexes     Tremor   Resting tremor: absent  Intention tremor: absent  Volitional movements without ataxia/clumsiness  No clinical seizure activity observed throughout exam  Gait deferred for safety/seizure precautions  Lab, Imaging and other studies:   CTA stroke alert (head/neck)   Final Result by Giovani Bright DO (11/14 1417)      Unremarkable cervical vasculature  There is mild atherosclerotic disease involving the cavernous and supraclinoid internal carotid arteries bilaterally with no moderate right and mild left smooth stenosis  Both M1 segments are patent  Slight decrease in the number of M2/M3 branches on the left compared to the right side without a definitive branch occlusion identified  Findings were directly discussed with Michelle Flores at approximately 2:10 PM                            Workstation performed: [id number]         CT stroke alert brain   Final Result by Giovani Bright DO (11/14 1417)      Decreased attenuation and loss of gray-white differentiation within the left temporoparietal region posterior to the insula suggestive of an old infarct, new since prior MRI dated 3/10/2021  There is a prior outside MRI report available from 7/16/2022    describing restricted diffusion in this region  Findings are suggestive of a late subacute to early chronic infarct  If there is concern for new ethel-infarct ischemia, MRI with diffusion-weighted imaging is recommended        Findings were directly discussed with Michelle Flores at approximately 2:10 PM       Workstation performed: [id number]             CBC:   Results from last 7 days   Lab Units 11/15/22  0647 11/14/22  1339   WBC Thousand/uL 6 00 6 65   RBC Million/uL 4 36 4  75   HEMOGLOBIN g/dL 12 7 14 1   HEMATOCRIT % 39 4 43 3   MCV fL 90 91   PLATELETS Thousands/uL 141* 154   , BMP/CMP:   Results from last 7 days   Lab Units 11/15/22  0647 11/14/22  1339   SODIUM mmol/L 140 140   POTASSIUM mmol/L 3 9 3 9   CHLORIDE mmol/L 105 104   CO2 mmol/L 27 23   BUN mg/dL 13 16   CREATININE mg/dL 0 77 0 90   CALCIUM mg/dL 9 2 9 6   AST U/L 28 29   ALT U/L 16 20   ALK PHOS U/L 80 104   EGFR ml/min/1 73sq m 70 58   , Vitamin B12:   , HgBA1C:   , TSH:   Results from last 7 days   Lab Units 11/14/22  1339   TSH 3RD GENERATON uIU/mL 2 823   , Coagulation:   Results from last 7 days   Lab Units 11/14/22  1339   INR  1 26*   , Lipid Profile:     VTE Prophylaxis: Sequential compression device (Venodyne)  and Eliquis    Total time spent today 15 minutes

## 2022-11-15 NOTE — CASE MANAGEMENT
Case Management Discharge Planning Note    Patient name Domingo Howell  Location [facility] 2 /South 2 Felton Yanez* MRN [id number]  : 1935 Date 11/15/2022       Current Admission Date: 2022  Current Admission Diagnosis:Seizure [facility])   Patient Active Problem List    Diagnosis Date Noted   • Seizure ([facility] [address] ) 2022   • Urinary retention 2022   • Venous stasis dermatitis of both lower extremities 2022   • Gait instability 2022   • Other fatigue 2022   • Atypical pneumonia 05/10/2022   • Respiratory insufficiency 05/10/2022   • Acute metabolic encephalopathy    • Chronic diastolic heart failure ([facility] [address] ) 05/10/2022   • Thrombocytopenia ([facility] [address] ) 05/10/2022   • Other hyperlipidemia 10/06/2021   • 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   • Other insomnia 10/06/2021   • Skin lesion 10/04/2021   • Action tremor 2020   • Age-related osteoporosis without fracture 2020   • Atrial fibrillation ([facility] [address] ) 04/10/2015   • Chronic anticoagulation 04/10/2015   • Benign essential hypertension 2011      LOS (days): 1  Geometric Mean LOS (GMLOS) (days): 3 80  Days to GMLOS:2 8     OBJECTIVE:  Risk of Unplanned Readmission Score: 12 59         Current admission status: Inpatient   Preferred Pharmacy:   [address] [place] [address], [address] [place] [address]  Phone: [phone] Fax: [phone]  [address] - [address] [facility] [address] Molina  [place] [address] [address]  Phone: [phone] Fax: [phone]    [address] [facility], [address] [facility]  [address]  [place] [address] [address]  Phone: [phone] Fax: [phone]    Primary Care Provider: Antony Dugan DO    Primary Insurance: [place]:     DISCHARGE DETAILS:  [facility] has declined accepting return of pt due to her not being at baseline  Requires observation with walker now and assist with eating per RN  Referrals for STR made in [place]  Son Christiano Nicolás in pts room provided above information  He asked pt not go to [facility] or [facility] [employer] at [facility]

## 2022-11-15 NOTE — CONSULTS
Duplicate order, please see my/Dr Saul's neurology consult note during stroke alert yesterday, 11/14

## 2022-11-16 RX ORDER — ZONISAMIDE 100 MG/1
100 CAPSULE ORAL DAILY
Status: DISCONTINUED | OUTPATIENT
Start: 2022-11-16 | End: 2022-11-18 | Stop reason: HOSPADM

## 2022-11-16 RX ORDER — LORAZEPAM 2 MG/ML
0.5 INJECTION INTRAMUSCULAR ONCE
Status: DISCONTINUED | OUTPATIENT
Start: 2022-11-16 | End: 2022-11-18 | Stop reason: HOSPADM

## 2022-11-16 RX ADMIN — APIXABAN 5 MG: 5 TABLET, FILM COATED ORAL at 18:13

## 2022-11-16 RX ADMIN — INFLUENZA A VIRUS A/VICTORIA/2570/2019 IVR-215 (H1N1) ANTIGEN (FORMALDEHYDE INACTIVATED), INFLUENZA A VIRUS A/DARWIN/9/2021 SAN-010 (H3N2) ANTIGEN (FORMALDEHYDE INACTIVATED), INFLUENZA B VIRUS B/PHUKET/3073/2013 ANTIGEN (FORMALDEHYDE INACTIVATED), AND INFLUENZA B VIRUS B/MICHIGAN/01/2021 ANTIGEN (FORMALDEHYDE INACTIVATED) 0.7 ML: 60; 60; 60; 60 INJECTION, SUSPENSION INTRAMUSCULAR at 16:11

## 2022-11-16 RX ADMIN — DOCUSATE SODIUM 100 MG: 100 CAPSULE, LIQUID FILLED ORAL at 08:54

## 2022-11-16 RX ADMIN — LEVETIRACETAM 750 MG: 100 INJECTION, SOLUTION INTRAVENOUS at 08:49

## 2022-11-16 RX ADMIN — ATORVASTATIN CALCIUM 10 MG: 10 TABLET, FILM COATED ORAL at 16:07

## 2022-11-16 RX ADMIN — POTASSIUM CHLORIDE 20 MEQ: 1500 TABLET, EXTENDED RELEASE ORAL at 08:54

## 2022-11-16 RX ADMIN — THIAMINE HCL TAB 100 MG 100 MG: 100 TAB at 08:55

## 2022-11-16 RX ADMIN — APIXABAN 5 MG: 5 TABLET, FILM COATED ORAL at 08:54

## 2022-11-16 RX ADMIN — FUROSEMIDE 40 MG: 40 TABLET ORAL at 08:54

## 2022-11-16 RX ADMIN — QUETIAPINE FUMARATE 12.5 MG: 25 TABLET ORAL at 22:39

## 2022-11-16 RX ADMIN — ZONISAMIDE 100 MG: 100 CAPSULE ORAL at 16:08

## 2022-11-16 RX ADMIN — DOCUSATE SODIUM 100 MG: 100 CAPSULE, LIQUID FILLED ORAL at 18:13

## 2022-11-16 NOTE — PROGRESS NOTES
Addendum    1  Agitachandlerion    I spoke with neurology  It may be the keppra contributing to this agitation  Will stop the keppra    Neuro to follow up on patient today for an alternative agent

## 2022-11-16 NOTE — CASE MANAGEMENT
Support Center received request for authorization from Care Manager  Authorization request for: SNF  Facility Name: [facility]  NPI: [id number]  Facility MD:  Dr Amy Morris: [id number]  Authorization initiated by contacting: Ingris Bustillos  Via: Phone  Pending Reference #: [id number]  Clinicals submitted via:  Fax

## 2022-11-16 NOTE — CONSULTS
Consultation - Geriatrics   Ozzy Vallecillo 82 y o  female MRN: [id number]  Unit/Bed#: [place] [facility] 2 -01 Encounter: [id number]      Assessment/Plan    Cognitive Screening  · Patient with history of mild cognitive impairment as noted in geriatrics note from 6/22/2022  · Scored a 21/30 on the [facility] OF THE [facility]  · Prior [facility] scores as follows  · October 2021 23/30  · August 2021 21/30  · No behaviors noted back in June   Most recent TSH on 11/14/2022 noted to be 2 823  Most recent vitamin B12 level on 10/8/2021 noted to be 445  · Recommend rechecking vitamin B 12 level  CT of the brain on 11/14/2022 revealed encephalomalacia possibly from an old infarct with the possibility of a late subacute or new ethel-infarct ischemia not excluded and chronic microangiopathic changes  Patient noted to be agitated this morning   Neurology consulted and suspected agitation to be secondary to recent Keppra administration for seizure  · Keppra discontinued by neurology and alternative medication to be ordered   · Maintain delirium precautions   · Keep physically, mentally, and socially active     Delirium  • Baseline mentation: alert and oriented x 2-3   · Patient also with known history of dementia  • Current mentation: alert and oriented to person and place   • Patient is at high risk secondary to age, medication, and hospitalization   • Maintain delirium precautions   · Provide redirection, reorientation, and distraction techniques  · Maintain fall and safety precautions   · Assist with ADLs/IADLs  · Avoid deliriogenic medications such as tramadol, benzodiazepines, anticholinergics, benadryl  · Treat pain using geriatric pain protocol   · Encourage oral hydration and nutrition   · Monitor for constipation and urinary retention   · Implement sleep hygiene and limit night time interuptions   · Maintain sleep-wake cycle   · Encourage early and frequent mobilization   · Encourage participation in group activities  · EKG on 11/14/2022 revealed QTc interval of 477  · Would recommend avoiding Zyprexa unless repeat EKG reveals normalized QTc interval  · Can consider Depakote 125 mg once for agitation if needed  · Would avoid Ativan or other benzodiazepines as this can contribute to worsening delirium     Deconditioning  • Baseline function: ADLs and IADLs  • Patient is at increased risk for deconditioning secondary to recent CVA, weakness, gait dysfunction, seizure, and hospitalization   • Continue to optimize diet, hydration, and mobility for healing   · GFR on labs from 11/15 noted to be 70  · Avoid nephrotoxic medication   · Keep hydrated  · Congestive heart failure  · Monitor I&O  · Monitor for signs and symptoms of infection, dehydration, DVT, and skin breakdown    Frailty  · Clinical Frail Scale: 6- Moderately Frail  · Need help with all outside activities  · Need help with stairs and bathing  · May need assistance with dressing  · Most recent albumin on 11/15/2022 noted to be 3 1  · Consider nutrition consult  · Encourage protein supplementation     Ambulatory Dysfunction/Falls  · Patients son notes no recent falls   · Last fall was when she had her stroke in July  · Ambulates with a walker at baseline  PT/OT consulted  · Recommending rehab   Maintain fall and safety precautions   Encourage adequate oral hydration and nutrition   Out of bed as tolerated     Impaired Vision  • Patients son notes no vision difficulties   • Encourage adequate lighting and encourage use of assistance with ambulation  • Keep personal belongings close to avoid reaching  • Encourage appropriate footwear at all times  • Recommend large font for printed materials provided to patient    Impaired Hearing  · Patients son notes no hearing difficulties  · Hearing impairment strongly correlated with depression, cognitive impairment, delirium and falls in the older adult  · Use sound amplifier as needed   · Speak face to face  · Use clear dictation and enunciation of words    Elimination  · Patient primarily continent of bowel and bladder at baseline per son   · Was noted to have urinary retention on admission requiring straight cath   · Was agitated yesterday and nursing notes that they bladder scanned her as they remembered she was retaining and she had over 500 cc in the bladder  · She was placed on the commode and voided 600 cc   · Encourage patient to use the commode for voiding  · No documented bowel movement since admission   · Current bowel regimen includes  · Colace 100 mg BID  · Bisacodyl 5 mg tablet daily prn   · If constipation becomes an issue can order the following  · Senna 8 6 mg BID  · Miralax 17 g daily or daily prn   · If patient is agitated, consider urinary retention and constipation as the source of agitation     Insomnia  · Son notes no difficulties with sleeping at baseline   · Denies that she takes any OTC medication   First line is behavioral therapy   Avoid sedative hypnotics including benzodiazepines and benadryl  Encourage staying awake during the day   Encourage daytime activities and morning exercise   Decrease or eliminate daytime naps   Avoid caffeine especially during late afternoon and evening hours  Establish a nighttime routine  · Implement sleep hygiene and limit nighttime interruptions    Seizure  · Patient presented to the hospital for change in mental status and seizure-like activity  · Seizure had been described as "shaking and stiffening" with a right sided gaze that lasted for approximately 1 minute per EMS   · Patient then became agitated and confused   · Neurology consulted and initially ordered [address] for seizure activity   · Patient noted to be agitated this morning   · Neurology evaluated and suspected possible cause of confusion to be Keppra  · Medication adjusted to Zonegran daily   · Patient to follow-up outpatient with neurology   · Management per neurology     Home Safety  · Patient resides at [facility]   · Is primarily independent with ADLs at baseline   · Will obtain more information from Grac[facility]  · Level 3 DNR    Home Medication Review  · Will attempt to obtain from [facility] tomorrow      History of Present Illness     Physician Requesting Consult: Babs Martinez DO  Reason for Consult / Principal Problem: Severe agitation   Hx and PE limited by: Aphasia and confusion     HPI: Lexii Angela is a 82y o  year old female who has hypertension, hyperlipidemia, CHF, atrial fibrillation, mild cognitive impairment, and gait instability and presented to the hospital for seizure-like activity  Per EMS, the patient was having a full body seizure described as "shaking and stiffening" with a right gaze that lasted approximately one minute  No medications were administered  She was noted to be confused and agitated when she woke up  CTA revealed mild atherosclerotic disease involving cavernous and supraclinoid internal carotid arteries bilaterally and the CT of the head revealed decreased and loss of gray-white differentiation within the left temporoparietal region suggesting an old infarct with findings suggestive of a late subacute to early chronic infarct  Neurology 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  She was initially started on Keppra for seizures, however, today she was noted to be more agitated  Neurology was consulted again and suspected that [address] could be contributing to agitation and confusion  They will be prescribing a new medication for seizures  Care was coordinated with patients son Fall at the bedside  He notes that at this time, the patient appears to be close to her baseline with respect to her mentation and aphasia  He states that at her baseline she was typically alert and oriented to person and place but not always time   He states that she was ambulating with a rollator and that she has not had any recent falls that he is aware of  He notes her last fall to be right before she had her stroke in July  He notes that she is primarily independent for ADLs but believes that she does require some assistance at times  He notes that she is primarily continent of bowel and bladder  He notes no difficulties with vision or hearing that he is aware of  He also notes no issues sleeping  The patient was seen and evaluated today at the bedside  She is noted to be lying in bed comfortably in no acute distress  She is alert and oriented to person and place but is disoriented to time  She is noted to have expressive aphasia which is her baseline  Her son who is at the bedside notes that she is almost back to baseline with her mentation and aphasia  She denies headaches or dizziness  She denies chest pain, shortness of breath, and cough  She denies nausea, vomiting, constipation, and diarrhea  She notes that she is eating and sleeping  Care was coordinated with patients nurse Avelino Chapin and Neurology team Marvin Hayden PA-C and Dr Tomer Lizarraga at the bedside  Will attempt to contact [facility]  Inpatient consult to Gerontology  Consult performed by: CRISTO Stover  Consult ordered by: Ekaterina Fisher DO        Review of Systems   Constitutional: Positive for activity change  Negative for appetite change  Respiratory: Negative for cough and shortness of breath  Cardiovascular: Negative for chest pain  Gastrointestinal: Negative for constipation, diarrhea, nausea and vomiting  Musculoskeletal: Positive for gait problem  Negative for arthralgias  Neurological: Positive for weakness  Psychiatric/Behavioral: Positive for confusion  Negative for agitation, behavioral problems and sleep disturbance  The patient is not nervous/anxious          Historical Information   Past Medical History:   Diagnosis Date   • A-fib [facility])    • CAD (coronary artery disease)    • CKD (chronic kidney disease) stage 2, GFR 60-89 ml/min    • Hypertension    • Memory loss    • Urinary tract infection      Past Surgical History:   Procedure Laterality Date   • CHOLECYSTECTOMY     • REPLACEMENT TOTAL KNEE Left 2008     Social History   Social History     Substance and Sexual Activity   Alcohol Use Never     Social History     Substance and Sexual Activity   Drug Use Never     Social History     Tobacco Use   Smoking Status Never   Smokeless Tobacco Never         Family History:   Family History   Problem Relation Age of Onset   • Lung cancer Father    • Leukemia Brother    • Breast cancer Daughter        Meds/Allergies   Current meds:   Current Facility-Administered Medications   Medication Dose Route Frequency   • acetaminophen (TYLENOL) tablet 650 mg  650 mg Oral Q6H PRN   • apixaban (ELIQUIS) tablet 5 mg  5 mg Oral BID   • atorvastatin (LIPITOR) tablet 10 mg  10 mg Oral Daily With Dinner   • bisacodyl (DULCOLAX) EC tablet 5 mg  5 mg Oral Daily PRN   • docusate sodium (COLACE) capsule 100 mg  100 mg Oral BID   • furosemide (LASIX) tablet 40 mg  40 mg Oral Daily   • LORazepam (ATIVAN) injection 0 5 mg  0 5 mg Intravenous Once   • potassium chloride (K-DUR,KLOR-CON) CR tablet 20 mEq  20 mEq Oral Daily   • QUEtiapine (SEROquel) tablet 12 5 mg  12 5 mg Oral HS   • QUEtiapine (SEROquel) tablet 12 5 mg  12 5 mg Oral Once   • thiamine tablet 100 mg  100 mg Oral Daily   • zonisamide (ZONEGRAN) capsule 100 mg  100 mg Oral Daily          Allergies   Allergen Reactions   • Morphine        Objective   Vitals: Blood pressure 111/64, pulse 67, temperature 98 7 °F (37 1 °C), resp  rate 16, height 5' 3" (1 6 m), weight 77 8 kg (171 lb 8 3 oz), SpO2 94 %  ,Body mass index is 30 38 kg/m²  Physical Exam  Vitals reviewed  Constitutional:       General: She is not in acute distress  Appearance: Normal appearance  She is not ill-appearing  HENT:      Head: Normocephalic  Mouth/Throat:      Mouth: Mucous membranes are dry     Eyes: General: No scleral icterus  Conjunctiva/sclera: Conjunctivae normal    Cardiovascular:      Rate and Rhythm: Normal rate and regular rhythm  Heart sounds: No murmur heard  Pulmonary:      Effort: Pulmonary effort is normal  No respiratory distress  Breath sounds: Normal breath sounds  Abdominal:      General: Bowel sounds are normal  There is no distension  Palpations: Abdomen is soft  Tenderness: There is no abdominal tenderness  Musculoskeletal:         General: No swelling or tenderness  Skin:     General: Skin is warm and dry  Neurological:      General: No focal deficit present  Mental Status: She is alert  Mental status is at baseline  Motor: No weakness  Gait: Gait abnormal       Comments: Oriented to person and place       Lab Results:   Results from last 7 days   Lab Units 11/15/22  0647   WBC Thousand/uL 6 00   HEMOGLOBIN g/dL 12 7   HEMATOCRIT % 39 4   PLATELETS Thousands/uL 141*        Results from last 7 days   Lab Units 11/15/22  0647   POTASSIUM mmol/L 3 9   CHLORIDE mmol/L 105   CO2 mmol/L 27   BUN mg/dL 13   CREATININE mg/dL 0 77   CALCIUM mg/dL 9 2   ALK PHOS U/L 80   ALT U/L 16   AST U/L 28       Imaging Studies: I have personally reviewed pertinent reports  EKG, Pathology, and Other Studies: I have personally reviewed pertinent reports      VTE Prophylaxis: Sequential compression device (Venodyne)     Code Status: Level 3 - DNAR and DNI

## 2022-11-16 NOTE — PLAN OF CARE
Problem: PHYSICAL THERAPY ADULT  Goal: Performs mobility at highest level of function for planned discharge setting  See evaluation for individualized goals  Description: Treatment/Interventions: Functional transfer training, LE strengthening/ROM, Therapeutic exercise, Endurance training, Patient/family training, Bed mobility, Gait training, Spoke to nursing, Spoke to case management, OT          See flowsheet documentation for full assessment, interventions and recommendations  Note: Prognosis: Guarded  Problem List: Decreased strength, Decreased endurance, Impaired balance, Decreased mobility, Decreased cognition, Impaired judgement, Decreased safety awareness  Assessment: Pt  82 y  o female presented w/ episode of altered mental status & new onset seizure activity  Pt admitted for Seizure [facility]) w/ urinary retention & afib  Pt referred to PT for mobility assessment & D/C planning w/ orders of up & OOB as tolerated  Please see above for information re: home set-up & PLOF as well as objective findings during PT assessment  On eval, pt functioning below baseline hence will continue skilled PT to improve function & safety  Pt require modAx2 for most functional mobility + cues for techniques & safety  Gait deviations as above, slow & unsteady but no gross LOB noted  Pt was initially lethargic in the beginning of session but inc alertness once seated at EOB  The patient's AM-PAC Basic Mobility Inpatient Short Form Raw Score is 10  A Raw score of less than or equal to 16 suggests the patient may benefit from discharge to post-acute rehabilitation services  Please also refer to the recommendation of the Physical Therapist for safe discharge planning  From PT standpoint, due to above mentioned deficits & high risk for falls, pt will benefit from inpt rehab at D/C  No SOB & dizziness reported t/o session   Nsg staff most recent vital signs as follows: /64   Pulse (!) 52   Temp 97 8 °F (36 6 °C)   Resp 16   Ht 5' 3" (1 6 m)   Wt 77 8 kg (171 lb 8 3 oz)   SpO2 93%   BMI 30 38 kg/m²   Pt not appropriate to remain OOB 2* to severely impaired cognition & alertness level at this time hence assisted back in bed at end of session  All needs in reach  Bed alarn activated  Fall precautions reinforced w/ good understanding  CM to follow  Nsg staff to continue to mobilized pt (OOB in chair for all meals) as tolerated to prevent further decline in function  Nsg notified  Co-eval was necessary to complete this PT eval for the pt's best interest given pt's medical acuity & complexity  PT Discharge Recommendation: Post acute rehabilitation services    See flowsheet documentation for full assessment

## 2022-11-16 NOTE — NURSING NOTE
Patient attempting to get out of bed many times  Patient fell earlier today during day shift  Due to fall risk, cognitive impairment related to dementia, and confusion, after 7p SLIM notified to discontinue telemetry and Masimo  Patient given HS 12 5mg Seroquel      Most recent vitals:  Visit Vitals  /80 (BP Location: Right arm)   Pulse 65   Temp 97 9 °F (36 6 °C) (Oral)   Resp 20   Ht 5' 3" (1 6 m)   Wt 77 8 kg (171 lb 8 3 oz)   SpO2 93%   BMI 30 38 kg/m²   OB Status Postmenopausal   Smoking Status Never Smoker   BSA 1 81 m²       Leon Conroy RN

## 2022-11-16 NOTE — PROGRESS NOTES
Progress Note - Neurology   Mike Sethi 82 y o  female MRN: [id number]  Unit/Bed#: [place] [id number] -01 Encounter: [id number]      Assessment/Plan     * Seizure [facility])  Assessment & Plan  44-year-old female with left MCA stroke in July 2022 with residual expressive aphasia (not not described as nonverbal), prior neurologic evaluations for progressive cognitive impairment and dementia, atrial fibrillation on Eliquis, presented as stroke alert on 11/14 following altered mental status at nursing facility with new onset seizure activity (right gaze deviation, tonic/convulsive activity in the upper/lower extremities), was perseverative and with aphasia after the event (may have been post ictal)  Vomiting and hypertensive as well in ED during stroke alert  Neuro exam improved during duration of stroke alert      Suspect new onset seizure, 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)  No new/obvious neurologic deficits compared to baseline are appreciated during stroke alert  -CT head during alert with right temporoparietal infarct, now late subacute to early chronic (matches description of stroke from [facility] in July 2022)  -CTA head/neck with no significant critical stenosis nor large vessel occlusion  Neuro exam 11/15: continues with expressive aphasia, no other significant deficits  Asked by Medicine to re-evaluate patient on 11/16 due to reported agitation, with suspicion that Keppra could be contributing  Patient actually quite lethargic on exam after briefly attending examiner and not responding verbally, only laughing inappropriately at times  Following select commands  Upon re-evaluation with attending in the afternoon, patient more consistently following commands and offering verbalizations, though without obvious expressive aphasia  Patient likely experiencing delirium      Plan:  -2 G Keppra bolus on presentation and started on maintenance Keppra 750 twice daily  Keppra discontinued on 11/16/2022 with plan to initiate alternative AED given agitation  -will instead start zonisamide 100 mg daily, and after 2 weeks, can increase to 100 mg b i d  -routine EEG can be performed as an outpatient  -supportive care/seizure precautions  -monitoring further metabolic/infectious derangements  -Geriatrics consult ordered; appreciate recommendations    Atrial fibrillation (HCC)  Assessment & Plan  Continue Eliquis 5 mg b i d  for secondary stroke prevention  Benign essential hypertension  Assessment & Plan  Goal normotension  Wendee Friday will need follow up in in 6 weeks with epilepsy attending or advance practitioner  She will require a routine EEG within 4 weeks  Subjective:   Contacted by Internal Medicine as patient reportedly agitated and yelling out, now requiring one-to-one observation at bedside  Coy felt to be contributing  Upon neurologic evaluation, patient kept falling asleep (does not appear she received any p r n  prior to evaluation)  Per PCA at bedside, has not offered any verbal responses today, which does not appear in keeping with her baseline of mild to moderate expressive aphasia  Patient noted to at times laugh inappropriately but otherwise nonverbal     ROS: unable to query secondary to patient's dementia/currently nonverbal     Vitals: Blood pressure 111/64, pulse 67, temperature 98 7 °F (37 1 °C), resp  rate 16, height 5' 3" (1 6 m), weight 77 8 kg (171 lb 8 3 oz), SpO2 94 %  ,Body mass index is 30 38 kg/m²  Physical Exam:  Limited due to patient's inability to fully cooperate  General:  Patient is well-developed, obese BMI 30 38, and in no acute distress  HEENT:  Head normocephalic  Eyes anicteric  Cardiovascular:  With irregularly irregular rhythm  Lungs:  Normal effort  Nonlabored breathing  Extremities:  With no significant edema  Skin: No rashes    Skin changes associated with venous stasis bilateral lower extremities  Neurologic:  Patient is lethargic and falls asleep when left unstimulated  Does not offer any verbalizations and will laugh inappropriately at times  Follows select commands such as wiggling toes but otherwise does not follow (would not protrude tongue, give thumbs up, follow finger)  Patient does briefly attend examiner  Gait deferred for safety  Cranial Nerves:   Patient blinks to threat bilaterally  EOMs appear intact without nystagmus  No gaze preference or deviation  No obvious facial asymmetry noted  Coordination:  Unable to assess  Patient able to hold bilateral upper extremities antigravity when raised passively  Did wiggle toes on command, right more so than left foot  Lab, Imaging and other studies:   CBC:   Results from last 7 days   Lab Units 11/15/22  0647 11/14/22  1339   WBC Thousand/uL 6 00 6 65   RBC Million/uL 4 36 4 75   HEMOGLOBIN g/dL 12 7 14 1   HEMATOCRIT % 39 4 43 3   MCV fL 90 91   PLATELETS Thousands/uL 141* 154   , BMP/CMP:   Results from last 7 days   Lab Units 11/15/22  0647 11/14/22  1339   SODIUM mmol/L 140 140   POTASSIUM mmol/L 3 9 3 9   CHLORIDE mmol/L 105 104   CO2 mmol/L 27 23   BUN mg/dL 13 16   CREATININE mg/dL 0 77 0 90   CALCIUM mg/dL 9 2 9 6   AST U/L 28 29   ALT U/L 16 20   ALK PHOS U/L 80 104   EGFR ml/min/1 73sq m 70 58     VTE Prophylaxis:  Eliquis    Counseling / Coordination of Care  Total time spent today 25 minutes  Greater than 50% of total time was spent with the patient and / or family counseling and / or coordination of care  A description of the counseling / coordination of care: Discussion with nursing and with patient's son at bedside regarding plan to discontinue Keppra and start zonisamide for seizure prevention

## 2022-11-16 NOTE — CASE MANAGEMENT
Case Management Discharge Planning Note    Patient name Guero Qiu  Location [facility] 68 2 /South 2 Radha Rucker* MRN [id number]  : 1935 Date 2022       Current Admission Date: 2022  Current Admission Diagnosis:Seizure [facility])   Patient Active Problem List    Diagnosis Date Noted   • Seizure ([facility] [address] ) 2022   • Urinary retention 2022   • Venous stasis dermatitis of both lower extremities 2022   • Gait instability 2022   • Other fatigue 2022   • Atypical pneumonia 05/10/2022   • Respiratory insufficiency 05/10/2022   • Acute metabolic encephalopathy    • Chronic diastolic heart failure ([facility] [address] ) 05/10/2022   • Thrombocytopenia ([facility] [address] ) 05/10/2022   • Other hyperlipidemia 10/06/2021   • 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   • Other insomnia 10/06/2021   • Skin lesion 10/04/2021   • Action tremor 2020   • Age-related osteoporosis without fracture 2020   • Atrial fibrillation ([facility] [address] ) 04/10/2015   • Chronic anticoagulation 04/10/2015   • Benign essential hypertension 2011      LOS (days): 2  Geometric Mean LOS (GMLOS) (days): 3 80  Days to GMLOS:2     OBJECTIVE:  Risk of Unplanned Readmission Score: 15 26         Current admission status: Inpatient   Preferred Pharmacy:   [address] [place] [address], [address] [place] [address]  Phone: [phone] Fax: [phone] [place], [place] - [address] [place] [address] Dr Molina  [facility] [address]  Phone: [phone] Fax: [phone]    6 [facility], [address] [facility] [address] [place] [address]  [facility] [address]  Phone: [phone] Fax: [phone]    Primary Care Provider: Claudell Alias, DO    Primary Insurance: [address] [facility]  Secondary Insurance:     DISCHARGE DETAILS:  CM called Nallely Zamarripa (son) and reviewed 3 SNF facilities with bed availability   He will call back no later than tomorrow with decision  Choice List left in pt room for Matt's review upon his arrival later today

## 2022-11-16 NOTE — ASSESSMENT & PLAN NOTE
This is an 61-year-old female with history of atrial fibrillation on Eliquis, dementia, history of CVA in July, hypertension, diastolic CHF who lives at [facility] walks with a walker sent for evaluation of altered mental status today  At baseline patient has aphasia and dementia  Patient was seen to be slightly altered and agitated around lunchtime was then noted to be unresponsive with new onset seizure activity  with right gaze deviation, tonic/convulsive activity and upper/lower extremities  Stroke alert initially called, neuro exam improved, not a thrombolytic candidate  No report of any fever, chills, nausea, vomiting, diarrhea  Patient seen by Neurology  Not thought to have a new stroke    Thought to have a seizure which is new, likely from the area of her stroke it July  She was started on Keppra but had severe agitation thought to be possibly from the [address]    She has changed alternative agent is doing much better   We are now looking for skilled nursing facility for her

## 2022-11-16 NOTE — ASSESSMENT & PLAN NOTE
This is an 22-year-old female with history of atrial fibrillation on Eliquis, dementia, history of CVA in July, hypertension, diastolic CHF who lives at [facility], operated by [facility] walks with a walker sent for evaluation of altered mental status today  At baseline patient has aphasia and dementia  Patient was seen to be slightly altered and agitated around lunchtime was then noted to be unresponsive with new onset seizure activity  with right gaze deviation, tonic/convulsive activity and upper/lower extremities  Stroke alert initially called, neuro exam improved, not a thrombolytic candidate  No report of any fever, chills, nausea, vomiting, diarrhea  Patient seen by Neurology  Not thought to have a new stroke    Thought to have a seizure which is new, likely from the area of her stroke it July  She was started on Keppra  However she was not able to go back to assisted living because she was agitated    She ready has a history of dementia and aphasia from her stroke in July, and now has new seizures which are likely contributing to the aphasia  I will have geriatrics see her  Will give a 1 time dose of Ativan

## 2022-11-16 NOTE — OCCUPATIONAL THERAPY NOTE
Occupational Therapy Evaluation(time=4685-0728)     Patient Name: Christina MURPHY Date: 11/16/2022  Problem List  Principal Problem:    Seizure [facility])  Active Problems:    Benign essential hypertension    Other hyperlipidemia    Atrial fibrillation ([place] [address] )    Chronic diastolic heart failure [facility])    Urinary retention    Past Medical History  Past Medical History:   Diagnosis Date    A-fib ([place] [address] )     CAD (coronary artery disease)     CKD (chronic kidney disease) stage 2, GFR 60-89 ml/min     Hypertension     Memory loss     Urinary tract infection      Past Surgical History  Past Surgical History:   Procedure Laterality Date    CHOLECYSTECTOMY      REPLACEMENT TOTAL KNEE Left 2008 11/16/22 0840   Note Type   Note type Evaluation   Pain Assessment   Pain Assessment Tool FLACC   Pain Rating: FLACC (Rest) - Face 0   Pain Rating: FLACC (Rest) - Legs 0   Pain Rating: FLACC (Rest) - Activity 0   Pain Rating: FLACC (Rest) - Cry 0   Pain Rating: FLACC (Rest) - Consolability 0   Score: FLACC (Rest) 0   Restrictions/Precautions   Weight Bearing Precautions Per Order No   Other Precautions Cognitive; Chair Alarm; Bed Alarm; Fall Risk;Multiple lines; Seizure   Home Living   Type of Home Assisted living  ([facility])   Home Layout One level   Prior Function   Lives With Facility staff   Falls in the last 6 months 1 to 4   Lifestyle   Autonomy Per CM notes, PTA pt had assistance with her ADLs; ambulates with RW   Reciprocal Relationships children   Service to Others unable to assess   Intrinsic Gratification unable to assess   Subjective   Subjective limited verbalization   ADL   Where Assessed Edge of bed   Eating Assistance 4  Minimal Assistance   Grooming Assistance 2  Maximal Assistance   UB Bathing Assistance 2  Maximal Assistance   LB Bathing Assistance 2  Maximal Assistance   UB Dressing Assistance 2  Maximal Assistance   LB Dressing Assistance 2  Maximal Assistance   Bed Mobility   Rolling R 4  Minimal assistance   Additional items Increased time required;Verbal cues;LE management   Rolling L 4  Minimal assistance   Additional items Assist x 1; Increased time required;Verbal cues;LE management   Supine to Sit 3  Moderate assistance   Additional items Assist x 2; Increased time required;Verbal cues;LE management   Sit to Supine 3  Moderate assistance   Additional items Assist x 2;LE management   Transfers   Sit to Stand 3  Moderate assistance   Additional items Assist x 2; Increased time required   Stand to Sit 3  Moderate assistance   Additional items Assist x 2; Increased time required   Functional Mobility   Functional Mobility 3  Moderate assistance   Additional Comments x2   Additional items Rolling walker   Balance   Static Sitting Poor +   Dynamic Sitting Poor   Static Standing Poor   Dynamic Standing Poor -   Activity Tolerance   Activity Tolerance Patient limited by fatigue   Medical Staff Made Aware nsg, P T    RUE Assessment   RUE Assessment X  (limited formal assessment; actively moving with functional tasks;PROM=WFLs, active shr flex=40-50*;elbow-distal=grossly WFLs)   RUE Strength   RUE Overall Strength   (shr=3-/5, elbow-distal=3+/5)   LUE Assessment   LUE Assessment X  (limited formal assessment; actively moving L UE moreso than R UE)   LUE Strength   LUE Overall Strength   (grossly 3+/5 throughout)   Hand Function   Gross Motor Coordination Functional   Fine Motor Coordination   (L=intact, R=impaired)   Sensation   Light Touch No apparent deficits   Proprioception   Proprioception No apparent deficits   Vision - Complex Assessment   Visual Fields   (actively scanning visual fields)   Psychosocial   Psychosocial (WDL) X   Patient Behaviors/Mood Wandering; Cooperative  (poor attention)   Perception   Inattention/Neglect Appears intact   Cognition   Overall Cognitive Status Impaired   Arousal/Participation Arousable   Attention Difficulty attending to directions   Orientation Level Disoriented to person;Disoriented to place; Disoriented to time;Disoriented to situation   Memory Decreased long term memory;Decreased recall of biographical information;Decreased short term memory;Decreased recall of recent events;Decreased recall of precautions   Following Commands Follows one step commands inconsistently   Comments hx aphasia and dementia   Assessment   Limitation Decreased ADL status; Decreased UE ROM; Decreased Safe judgement during ADL;Decreased UE strength;Decreased cognition;Decreased endurance;Decreased high-level ADLs   Prognosis Fair   Assessment Pt is a 86y/o female admitted to the hospital 2* noted altered mental status, agitation, episode of unresponsiveness, new seizure activity; CT(brain)=possible late subacute infarct  Pt with PMH A-fib, CAD, CKD, HTN, CVA, L TKR  Per CM notes, PTA pt had assistance with her ADLs; ambulates with RW  During initial eval, pt demonstrated deficits with her functional balance, functional mobility, ADL status, transfer safety, b/l UE strength, activity tolerance(currently fair=15-20mins), and cognition(i e attention, memory, direction-following)  If pt is able to demonstrate tx carryover, pt would benefit from continued OT tx for the above deficits  2-3xwk/1-2wks  The patient's raw score on the AM-PAC Daily Activity inpatient short form is 13, standardized score is 32 03, less than 39 4  Patients at this level are likely to benefit from discharge to post-acute rehabilitation services  Please refer to the recommendation of the Occupational Therapist for safe discharge planning  Goals   Patient Goals unable to assess   STG Time Frame   (1-7 days)   Short Term Goal #1 Pt will demonstrate improved activity tolerance to good(20-30mins) and standing tolerance to 3-5mins to assist with ADLs  Short Term Goal #2 Pt will tolerate continued cognitive/home-safety assessment and appropriate d/c recommendations will be provided     Short Term Goal  Pt will demonstrate proper walker/transfer safety 100% of the time  LTG Time Frame   (7-14 days)   Long Term Goal #1 Pt will demonstrate improved functional balance by 1 grade to assist with ADLs/transfers  Long Term Goal #2 Pt will demonstrate supervision with simple ADL task(i e grooming, feeding)  Long Term Goal Pt will demonstrate supervision with their sit-stand transfers to assist with completion of their LE dressing  Plan   Treatment Interventions ADL retraining;Functional transfer training;UE strengthening/ROM; Endurance training;Cognitive reorientation;Patient/family training; Compensatory technique education;Continued evaluation   Goal Expiration Date 11/30/22   OT Treatment Day 0   OT Frequency 2-3x/wk   Recommendation   OT Discharge Recommendation Post acute rehabilitation services   AM-PAC Daily Activity Inpatient   Lower Body Dressing 2   Bathing 2   Toileting 1   Upper Body Dressing 2   Grooming 3   Eating 3   Daily Activity Raw Score 13   Daily Activity Standardized Score (Calc for Raw Score >=11) 32 03   AM-PAC Applied Cognition Inpatient   Following a Speech/Presentation 1   Understanding Ordinary Conversation 1   Taking Medications 1   Remembering Where Things Are Placed or Put Away 1   Remembering List of 4-5 Errands 1   Taking Care of Complicated Tasks 1   Applied Cognition Raw Score 6   Applied Cognition Standardized Score 7 69   Priyank Naqvi

## 2022-11-16 NOTE — NURSING NOTE
Maral Stovall (Son) made this nurse aware that he would be taking a life alert button and room key that belong to [facility]  He states he will be returning them as they are pricey items that belong to them  He also is taking with the patient's watch   Documented in belongings at this time

## 2022-11-16 NOTE — PHYSICAL THERAPY NOTE
PT EVALUATION    Pt  Name: Gerard Rushing  Pt  Age: 82 y o  MRN: [id number]  LENGTH OF STAY: 2      Admitting Diagnoses:   Seizure ([facility] [address] ) [R56 9]  History of CVA (cerebrovascular accident) [[facility] [facility]]  New onset seizure ([facility] [address] ) [R56 9]    Past Medical History:   Diagnosis Date    A-fib ([facility] [address] )     CAD (coronary artery disease)     CKD (chronic kidney disease) stage 2, GFR 60-89 ml/min     Hypertension     Memory loss     Urinary tract infection        Past Surgical History:   Procedure Laterality Date    CHOLECYSTECTOMY      REPLACEMENT TOTAL KNEE Left 2008       Imaging Studies:  CTA stroke alert (head/neck)   Final Result by Gene Renie Hammans, DO (11/14 1417)      Unremarkable cervical vasculature  There is mild atherosclerotic disease involving the cavernous and supraclinoid internal carotid arteries bilaterally with no moderate right and mild left smooth stenosis  Both M1 segments are patent  Slight decrease in the number of M2/M3 branches on the left compared to the right side without a definitive branch occlusion identified  Findings were directly discussed with Luis Nguyen at approximately 2:10 PM                            Workstation performed: [id number]         CT stroke alert brain   Final Result by Gene Renie Hammans, DO (11/14 1417)      Decreased attenuation and loss of gray-white differentiation within the left temporoparietal region posterior to the insula suggestive of an old infarct, new since prior MRI dated 3/10/2021  There is a prior outside MRI report available from 7/16/2022    describing restricted diffusion in this region  Findings are suggestive of a late subacute to early chronic infarct  If there is concern for new ethel-infarct ischemia, MRI with diffusion-weighted imaging is recommended        Findings were directly discussed with Luis Nguyen at approximately 2:10 PM       Workstation performed: [id number]               11/16/22 0901   PT Last Visit   PT Visit Date 11/16/22   Note Type   Note type Evaluation   Pain Assessment   Pain Assessment Tool FLACC   Pain Score No Pain   Pain Rating: FLACC (Rest) - Face 0   Pain Rating: FLACC (Rest) - Legs 0   Pain Rating: FLACC (Rest) - Activity 0   Pain Rating: FLACC (Rest) - Cry 0   Pain Rating: FLACC (Rest) - Consolability 0   Score: FLACC (Rest) 0   Pain Rating: FLACC (Activity) - Face 0   Pain Rating: FLACC (Activity) - Legs 0   Pain Rating: FLACC (Activity) - Activity 0   Pain Rating: FLACC (Activity) - Cry 0   Pain Rating: FLACC (Activity) - Consolability 0   Score: FLACC (Activity) 0   Restrictions/Precautions   Weight Bearing Precautions Per Order No   Other Precautions Cognitive; Chair Alarm; Bed Alarm; Fall Risk;Seizure  (masimo)   Home Living   Type of Home Assisted living  [facility])   Additional Comments pt poor historian, unable to provide any history; per chart, pt resident of [address] [place] [address] staff   Receives Help From Personal care attendant   Falls in the last 6 months 1 to 4  (pt unable to provide history; at least 1x, pt fell yesterday 11/15/22)   Comments Pt poor historian, unable to provide any history; per chart, requires assistance w/ ADLs & able to ambulate w/ RW   General   Additional Pertinent History dementia at baseline; h/o stroke 7/2022   Family/Caregiver Present No   Cognition   Overall Cognitive Status Impaired   Arousal/Participation Arousable   Orientation Level Disoriented to person;Disoriented to place; Disoriented to time;Disoriented to situation   Following Commands Follows one step commands inconsistently   Comments pt initially lethargic but inc alertness w/ further activity   RUE Assessment   RUE Assessment   (refer to OT)   LUE Assessment   LUE Assessment   (refer to OT)   RLE Assessment   RLE Assessment X  (WFL PROM; unable to formally assess MMT as pt unable to follow commands)   LLE Assessment   LLE Assessment X  (WFL PROM; unable to formally assess MMT as pt unable to follow commands)   Bed Mobility   Supine to Sit 3  Moderate assistance   Additional items Assist x 2;HOB elevated; Increased time required;Verbal cues;LE management   Sit to Supine 3  Moderate assistance   Additional items Assist x 2; Increased time required;Verbal cues;LE management   Additional Comments cues for techniques & safety; inc alertness once seated at EOB   Transfers   Sit to Stand 3  Moderate assistance   Additional items Assist x 2; Increased time required;Verbal cues   Stand to Sit 3  Moderate assistance   Additional items Assist x 2; Increased time required;Verbal cues   Additional Comments cues for techniques & safety   Ambulation/Elevation   Gait pattern Decreased foot clearance;Shuffling; Short stride; Ataxia; Excessively slow;Decreased heel strike;Decreased toe off;Decreased hip extension   Gait Assistance 3  Moderate assist   Additional items Assist x 2;Verbal cues; Tactile cues   Assistive Device Rolling walker   Distance 2'x1 lateral steps to [facility]   Ambulation/Elevation Additional Comments unsteady gait; require cues for gait sequencing; assist w/ RW mgt   Balance   Static Sitting Poor +   Dynamic Sitting Poor   Static Standing Poor   Dynamic Standing Poor -   Ambulatory Poor -   Activity Tolerance   Activity Tolerance Patient limited by fatigue;Treatment limited secondary to medical complications (Comment)   Medical Staff Made Aware OTR Henrique   Nurse Made Aware BERTO Lima   Assessment   Prognosis Guarded   Problem List Decreased strength;Decreased endurance; Impaired balance;Decreased mobility; Decreased cognition; Impaired judgement;Decreased safety awareness   Assessment Pt  87 y  o female presented w/ episode of altered mental status & new onset seizure activity  Pt admitted for Seizure [facility]) w/ urinary retention & afib  Pt referred to PT for mobility assessment & D/C planning w/ orders of up & OOB as tolerated   Please see above for information re: home set-up & PLOF as well as objective findings during PT assessment  On eval, pt functioning below baseline hence will continue skilled PT to improve function & safety  Pt require modAx2 for most functional mobility + cues for techniques & safety  Gait deviations as above, slow & unsteady but no gross LOB noted  Pt was initially lethargic in the beginning of session but inc alertness once seated at EOB  The patient's AM-PAC Basic Mobility Inpatient Short Form Raw Score is 10  A Raw score of less than or equal to 16 suggests the patient may benefit from discharge to post-acute rehabilitation services  Please also refer to the recommendation of the Physical Therapist for safe discharge planning  From PT standpoint, due to above mentioned deficits & high risk for falls, pt will benefit from inpt rehab at D/C  No SOB & dizziness reported t/o session  Nsg staff most recent vital signs as follows: /64   Pulse (!) 52   Temp 97 8 °F (36 6 °C)   Resp 16   Ht 5' 3" (1 6 m)   Wt 77 8 kg (171 lb 8 3 oz)   SpO2 93%   BMI 30 38 kg/m²   Pt not appropriate to remain OOB 2* to severely impaired cognition & alertness level at this time hence assisted back in bed at end of session  All needs in reach  Bed alarn activated  Fall precautions reinforced w/ good understanding  CM to follow  Nsg staff to continue to mobilized pt (OOB in chair for all meals) as tolerated to prevent further decline in function  Nsg notified  Co-eval was necessary to complete this PT eval for the pt's best interest given pt's medical acuity & complexity     Goals   Patient Goals none stated 2* to impaired cognition   STG Expiration Date 11/30/22   Short Term Goal #1 Goals to be met in 14 days; pt will be able to: 1) inc strength & balance by 1/2 grade to improve overall functional mobility & dec fall risk; 2) inc bed mobility to minAx1 for pt to be able to get in/OOB safely w/ proper techniques 100% of the time, to dec caregiver burden & safely function at home; 3) inc transfers to minAx1 for pt to transition safely from one surface to another w/o % of the time, to dec caregiver burden & safely function at home; 4) inc amb w/ RW approx  150' w/ minAx1 for pt to ambulate household distances w/o any % of the time, to dec caregiver burden & safely function at home; 5) pt/caregiver ed   PT Treatment Day 0   Plan   Treatment/Interventions Functional transfer training;LE strengthening/ROM; Therapeutic exercise; Endurance training;Patient/family training;Bed mobility;Gait training;Spoke to nursing;Spoke to case management;OT   PT Frequency 3-5x/wk   Recommendation   PT Discharge Recommendation Post acute rehabilitation services   AM-PAC Basic Mobility Inpatient   Turning in Bed Without Bedrails 2   Lying on Back to Sitting on Edge of Flat Bed 2   Moving Bed to Chair 2   Standing Up From Chair 2   Walk in Room 1   Climb 3-5 Stairs 1   Basic Mobility Inpatient Raw Score 10   Turning Head Towards Sound 2   Follow Simple Instructions 1   Low Function Basic Mobility Raw Score 13   Low Function Basic Mobility Standardized Score 20 14   Highest Level Of Mobility   -[facility] Goal 4: Move to chair/commode   -[facility] Achieved 3: Sit at edge of bed   End of Consult   Patient Position at End of Consult Supine;Bed/Chair alarm activated; All needs within reach   End of Consult Comments Pt in stable condition  All needs in reach  Bed alarm activated     Hx/personal factors: co-morbidities, advanced age, mutliple lines, use of AD, dec cognition, h/o of falls, fall risk, and assist w/ ADL's  Examination: dec mobility, dec balance, dec endurance, dec amb, risk for falls, dec cognition  Clinical: unpredictable (ongoing medical status, abnormal lab values, and risk for falls)  Complexity: high    [employer]

## 2022-11-16 NOTE — PROGRESS NOTES
[facility]  Progress Note - Mike Sethi 1935, 82 y o  female MRN: [id number]  Unit/Bed#: [place] [address] -01 Encounter: [id number]  Primary Care Provider: Nathan Kiran DO   Date and time admitted to hospital: 2022  1:16 PM    * Seizure [facility])  Assessment & Plan  This is an 82-year-old female with history of atrial fibrillation on Eliquis, dementia, history of CVA in July, hypertension, diastolic CHF who lives at [facility] walks with a walker sent for evaluation of altered mental status today  At baseline patient has aphasia and dementia  Patient was seen to be slightly altered and agitated around lunchtime was then noted to be unresponsive with new onset seizure activity  with right gaze deviation, tonic/convulsive activity and upper/lower extremities  Stroke alert initially called, neuro exam improved, not a thrombolytic candidate  No report of any fever, chills, nausea, vomiting, diarrhea  Patient seen by Neurology  Not thought to have a new stroke  Thought to have a seizure which is new, likely from the area of her stroke it July  She was started on Keppra  However she was not able to go back to assisted living because she was agitated    She ready has a history of dementia and aphasia from her stroke in July, and now has new seizures which are likely contributing to the aphasia  I will have geriatrics see her  Will give a 1 time dose of Ativan    Urinary retention  Assessment & Plan  She did require straight cath once in the hospital  Will keep an eye on her bladder volume    Atrial fibrillation ([address] )  Assessment & Plan  · Maintain on Eliquis  · Heart rate well controlled off of medication            Subjective:   Agitated this morning  Calling out for her   Trying to get out of bed    She can offer me any specific      Objective:     Vitals:   Temp (24hrs), Av 8 °F (36 6 °C), Min:96 7 °F (35 9 °C), Max:98 8 °F (37 1 °C)    Temp:  [96 7 °F (35 9 °C)-98 8 °F (37 1 °C)] 98 2 °F (36 8 °C)  HR:  [54-67] 54  Resp:  [16-20] 20  BP: (105-136)/(52-80) 126/80  SpO2:  [92 %-95 %] 95 %  Body mass index is 30 38 kg/m²  Input and Output Summary (last 24 hours): Intake/Output Summary (Last 24 hours) at 11/16/2022 1041  Last data filed at 11/15/2022 1700  Gross per 24 hour   Intake 480 ml   Output 500 ml   Net -20 ml       Physical Exam:     Physical Exam  Vitals and nursing note reviewed  Constitutional:       Comments: agitated   HENT:      Head: Normocephalic and atraumatic  Eyes:      Extraocular Movements: EOM normal       Pupils: Pupils are equal, round, and reactive to light  Cardiovascular:      Rate and Rhythm: Normal rate and regular rhythm  Heart sounds: No murmur heard  No friction rub  No gallop  Pulmonary:      Effort: Pulmonary effort is normal       Breath sounds: Normal breath sounds  No wheezing or rales  Abdominal:      General: Bowel sounds are normal       Palpations: Abdomen is soft  Tenderness: There is no abdominal tenderness  Musculoskeletal:         General: No edema  Right lower leg: No edema  Left lower leg: No edema     Complaints          Additional Data:     Labs:    Results from last 7 days   Lab Units 11/15/22  0647   WBC Thousand/uL 6 00   HEMOGLOBIN g/dL 12 7   HEMATOCRIT % 39 4   PLATELETS Thousands/uL 141*   NEUTROS PCT % 66   LYMPHS PCT % 19   MONOS PCT % 12   EOS PCT % 2     Results from last 7 days   Lab Units 11/15/22  0647   POTASSIUM mmol/L 3 9   CHLORIDE mmol/L 105   CO2 mmol/L 27   BUN mg/dL 13   CREATININE mg/dL 0 77   CALCIUM mg/dL 9 2   ALK PHOS U/L 80   ALT U/L 16   AST U/L 28     Results from last 7 days   Lab Units 11/14/22  1339   INR  1 26*                   * I Have Reviewed All Lab Data     Recent Cultures (last 7 days):             Last 24 Hours Medication List:   Current Facility-Administered Medications   Medication Dose Route Frequency Provider Last Rate   • acetaminophen  650 mg Oral Q6H PRN Marlo Holbrook MD     • apixaban  5 mg Oral BID Marlo Holbrook MD     • atorvastatin  10 mg Oral Daily With Abdirashid Collins MD     • bisacodyl  5 mg Oral Daily PRN Marlo Holbrook MD     • docusate sodium  100 mg Oral BID Marlo Holbrook MD     • furosemide  40 mg Oral Daily Marlo Holbrook MD     • levETIRAcetam  750 mg Intravenous Q12H Marlo Holbrook  mg (11/16/22 0849)   • LORazepam  0 5 mg Intravenous Once Guanako Shoemaker DO     • potassium chloride  20 mEq Oral Daily Marlo Holbrook MD     • QUEtiapine  12 5 mg Oral HS Amparo Kent PA-C     • QUEtiapine  12 5 mg Oral Once Bjorn Dey PA-C     • thiamine  100 mg Oral Daily Marlo Holbrook MD           VTE Pharmacologic Prophylaxis:   Pharmacologic: Apixaban (Eliquis)      Current Length of Stay: 2 day(s)    Current Patient Status: Inpatient       Discharge Plan: looking for SNF    Code Status: Level 3 - DNAR and DNI           Today, Patient Was Seen By: Hoa Calabrese DO    ** Please Note: Dictation voice to text software may have been used in the creation of this document   **

## 2022-11-16 NOTE — PLAN OF CARE
Problem: OCCUPATIONAL THERAPY ADULT  Goal: Performs self-care activities at highest level of function for planned discharge setting  See evaluation for individualized goals  Description: Treatment Interventions: ADL retraining, Functional transfer training, UE strengthening/ROM, Endurance training, Cognitive reorientation, Patient/family training, Compensatory technique education, Continued evaluation          See flowsheet documentation for full assessment, interventions and recommendations  Note: Limitation: Decreased ADL status, Decreased UE ROM, Decreased Safe judgement during ADL, Decreased UE strength, Decreased cognition, Decreased endurance, Decreased high-level ADLs  Prognosis: Fair  Assessment: Pt is a 86y/o female admitted to the hospital 2* noted altered mental status, agitation, episode of unresponsiveness, new seizure activity; CT(brain)=possible late subacute infarct  Pt with PMH A-fib, CAD, CKD, HTN, CVA, L TKR  Per CM notes, PTA pt had assistance with her ADLs; ambulates with RW  During initial eval, pt demonstrated deficits with her functional balance, functional mobility, ADL status, transfer safety, b/l UE strength, activity tolerance(currently fair=15-20mins), and cognition(i e attention, memory, direction-following)  If pt is able to demonstrate tx carryover, pt would benefit from continued OT tx for the above deficits  2-3xwk/1-2wks  The patient's raw score on the AM-PAC Daily Activity inpatient short form is 13, standardized score is 32 03, less than 39 4  Patients at this level are likely to benefit from discharge to post-acute rehabilitation services  Please refer to the recommendation of the Occupational Therapist for safe discharge planning       OT Discharge Recommendation: Post acute rehabilitation services

## 2022-11-17 LAB — SARS-COV-2 RNA RESP QL NAA+PROBE: NEGATIVE

## 2022-11-17 RX ADMIN — POTASSIUM CHLORIDE 20 MEQ: 1500 TABLET, EXTENDED RELEASE ORAL at 08:44

## 2022-11-17 RX ADMIN — THIAMINE HCL TAB 100 MG 100 MG: 100 TAB at 08:45

## 2022-11-17 RX ADMIN — DOCUSATE SODIUM 100 MG: 100 CAPSULE, LIQUID FILLED ORAL at 08:45

## 2022-11-17 RX ADMIN — ZONISAMIDE 100 MG: 100 CAPSULE ORAL at 08:46

## 2022-11-17 RX ADMIN — QUETIAPINE FUMARATE 12.5 MG: 25 TABLET ORAL at 21:30

## 2022-11-17 RX ADMIN — APIXABAN 5 MG: 5 TABLET, FILM COATED ORAL at 18:20

## 2022-11-17 RX ADMIN — DOCUSATE SODIUM 100 MG: 100 CAPSULE, LIQUID FILLED ORAL at 18:20

## 2022-11-17 RX ADMIN — FUROSEMIDE 40 MG: 40 TABLET ORAL at 08:45

## 2022-11-17 RX ADMIN — APIXABAN 5 MG: 5 TABLET, FILM COATED ORAL at 08:45

## 2022-11-17 RX ADMIN — ATORVASTATIN CALCIUM 10 MG: 10 TABLET, FILM COATED ORAL at 18:20

## 2022-11-17 NOTE — PHYSICAL THERAPY NOTE
PHYSICAL THERAPY NOTE          Patient Name: Radha Millard  VNMTJ'N Date: 11/17/2022 11/17/22 1518   Note Type   Note Type Treatment   Restrictions/Precautions   Other Precautions Cognitive; Chair Alarm; Bed Alarm; Fall Risk;Seizure   General   Chart Reviewed Yes   Family/Caregiver Present No   Cognition   Overall Cognitive Status Impaired   Arousal/Participation Alert; Responsive; Cooperative   Attention Difficulty attending to directions   Orientation Level Oriented to person;Disoriented to place; Disoriented to time;Disoriented to situation   Memory Decreased long term memory;Decreased recall of biographical information;Decreased short term memory;Decreased recall of recent events;Decreased recall of precautions   Following Commands Follows one step commands with increased time or repetition   Subjective   Subjective I have to go to the bathroom  Bed Mobility   Supine to Sit 3  Moderate assistance   Additional items Assist x 1;HOB elevated; Increased time required;Verbal cues;LE management   Sit to Supine 4  Minimal assistance   Additional items HOB elevated;Assist x 1; Increased time required;Verbal cues;LE management   Transfers   Sit to Stand 3  Moderate assistance   Additional items Assist x 1; Increased time required;Verbal cues   Stand to Sit 4  Minimal assistance   Additional items Assist x 1; Armrests; Increased time required;Verbal cues   Stand pivot 3  Moderate assistance   Additional items Assist x 1; Increased time required;Verbal cues   Additional Comments cues for hand placement   Ambulation/Elevation   Gait pattern Forward Flexion; Short stride; Excessively slow; Step to;Decreased foot clearance; Improper Weight shift   Gait Assistance 3  Moderate assist   Additional items Assist x 1;Verbal cues   Assistive Device Rolling walker   Distance 2' x1, 6' x1   Balance   Static Sitting Fair   Dynamic Sitting Fair -   Static Standing Poor +   Dynamic Standing Poor   Ambulatory Poor   Exercises   Hip Flexion Sitting;10 reps;AROM; Bilateral   Hip Adduction Sitting;10 reps;Bilateral  (isometric hip add )   Knee AROM Long Arc Quad Sitting;10 reps;AROM; Bilateral   Ankle Pumps Sitting;10 reps;AROM; Bilateral   Marching Sitting;10 reps;AROM; Bilateral   Assessment   Prognosis Fair   Problem List Decreased strength;Decreased endurance; Impaired balance;Decreased mobility; Decreased cognition; Impaired judgement;Decreased safety awareness   Assessment Pt attempting to get out of bed upon entering room  Pt asking to go to the bathroom  Pt is showing improved ability to perform bed mobility, transfer and ambulation  Pt requiring less assistance for all mobility, performing supine to sit with mod assist x1, si to supine with min assist x1, sit to stand transfers with mod assist x1 and stand to sit with min assist x1  Pt progressed with ambulation distances to 2' x1 and 6' x1 with use of rw and mod assist x1, cues for le sequencing, upright posture, safety cues for turning and backing up to bed with use of rw  Pt  Limited by fatigue  Pt  Transfers on and off BSC with min- mod assist x1  Pt  performed seated b/l heather arom and isometric exercises at EOB x 10 reps  With verbal, tactile and visual cues for correct technique and performance  Gait is slow unsteady with decreased foot clearance and step to gait pattern  Pt  will benefit from continued inpt PT to progress mobility, strength, balance, activity tolerance and endurance in order to facilitate return to [facility] and rehab to [place] functional outcomes and mobility  The patient's AM-PAC Basic Mobility Inpatient Short Form Raw Score is 14  A Raw score of less than or equal to 16 suggests the patient may benefit from discharge to post-acute rehabilitation services  Please also refer to the recommendation of the Physical Therapist for safe discharge planning     Goals   Patient Goals none stated duet o impaired cognition   STG Expiration Date 11/30/22   PT Treatment Day 1   Plan   Treatment/Interventions Functional transfer training;LE strengthening/ROM; Therapeutic exercise; Endurance training;Patient/family training;Equipment eval/education; Bed mobility;Gait training;Spoke to nursing   Progress Slow progress, decreased activity tolerance   PT Frequency 3-5x/wk   Recommendation   PT Discharge Recommendation Post acute rehabilitation services   AM-PAC Basic Mobility Inpatient   Turning in Bed Without Bedrails 2   Lying on Back to Sitting on Edge of Flat Bed 2   Moving Bed to Chair 3   Standing Up From Chair 3   Walk in Room 3   Climb 3-5 Stairs 1   Basic Mobility Inpatient Raw Score 14   Basic Mobility Standardized Score 35 55   Highest Level Of Mobility   -[facility] Goal 4: Move to chair/commode   -[facility] Achieved 5: Stand (1 or more minutes)   Education   Education Provided Mobility training;Home exercise program;Assistive device   Patient Reinforcement needed;Demonstrates acceptance/verbal understanding   End of Consult   Patient Position at End of Consult Supine;Bed/Chair alarm activated; All needs within reach      11/17/22 4168   Note Type   Note Type Treatment   Restrictions/Precautions   Other Precautions Cognitive; Chair Alarm; Bed Alarm; Fall Risk;Seizure   General   Chart Reviewed Yes   Family/Caregiver Present No   Cognition   Overall Cognitive Status Impaired   Arousal/Participation Alert; Responsive; Cooperative   Attention Difficulty attending to directions   Orientation Level Oriented to person;Disoriented to place; Disoriented to time;Disoriented to situation   Memory Decreased long term memory;Decreased recall of biographical information;Decreased short term memory;Decreased recall of recent events;Decreased recall of precautions   Following Commands Follows one step commands with increased time or repetition   Subjective   Subjective I have to go to the bathroom     Bed Mobility   Supine to Sit 3  Moderate assistance Additional items Assist x 1;HOB elevated; Increased time required;Verbal cues;LE management   Sit to Supine 4  Minimal assistance   Additional items HOB elevated;Assist x 1; Increased time required;Verbal cues;LE management   Transfers   Sit to Stand 3  Moderate assistance   Additional items Assist x 1; Increased time required;Verbal cues   Stand to Sit 4  Minimal assistance   Additional items Assist x 1; Armrests; Increased time required;Verbal cues   Stand pivot 3  Moderate assistance   Additional items Assist x 1; Increased time required;Verbal cues   Additional Comments cues for hand placement   Ambulation/Elevation   Gait pattern Forward Flexion; Short stride; Excessively slow; Step to;Decreased foot clearance; Improper Weight shift   Gait Assistance 3  Moderate assist   Additional items Assist x 1;Verbal cues   Assistive Device Rolling walker   Distance 2' x1, 6' x1   Balance   Static Sitting Fair   Dynamic Sitting Fair -   Static Standing Poor +   Dynamic Standing Poor   Ambulatory Poor   Exercises   Hip Flexion Sitting;10 reps;AROM; Bilateral   Hip Adduction Sitting;10 reps;Bilateral  (isometric hip add )   Knee AROM Long Arc Quad Sitting;10 reps;AROM; Bilateral   Ankle Pumps Sitting;10 reps;AROM; Bilateral   Marching Sitting;10 reps;AROM; Bilateral   Assessment   Prognosis Fair   Problem List Decreased strength;Decreased endurance; Impaired balance;Decreased mobility; Decreased cognition; Impaired judgement;Decreased safety awareness   Assessment Pt attempting to get out of bed upon entering room  Pt asking to go to the bathroom  Pt is showing improved ability to perform bed mobility, transfer and ambulation  Pt requiring less assistance for all mobility, performing supine to sit with mod assist x1, si to supine with min assist x1, sit to stand transfers with mod assist x1 and stand to sit with min assist x1   Pt progressed with ambulation distances to 2' x1 and 6' x1 with use of rw and mod assist x1, cues for le sequencing, upright posture, safety cues for turning and backing up to bed with use of rw  Pt  Limited by fatigue  Pt  Transfers on and off BSC with min- mod assist x1  Pt  performed seated b/l heather arom and isometric exercises at EOB x 10 reps  With verbal, tactile and visual cues for correct technique and performance  Gait is slow unsteady with decreased foot clearance and step to gait pattern  Pt  will benefit from continued inpt PT to progress mobility, strength, balance, activity tolerance and endurance in order to facilitate return to [facility] and rehab to [facility] functional outcomes and mobility  The patient's [facility] Basic Mobility Inpatient Short Form Raw Score is 14  A Raw score of less than or equal to 16 suggests the patient may benefit from discharge to post-acute rehabilitation services  Please also refer to the recommendation of the Physical Therapist for safe discharge planning  Goals   Patient Goals none stated duet o impaired cognition   STG Expiration Date 11/30/22   PT Treatment Day 1   Plan   Treatment/Interventions Functional transfer training;LE strengthening/ROM; Therapeutic exercise; Endurance training;Patient/family training;Equipment eval/education; Bed mobility;Gait training;Spoke to nursing   Progress Slow progress, decreased activity tolerance   PT Frequency 3-5x/wk   Recommendation   PT Discharge Recommendation Post acute rehabilitation services   -[facility] Basic Mobility Inpatient   Turning in Bed Without Bedrails 2   Lying on Back to Sitting on Edge of Flat Bed 2   Moving Bed to Chair 3   Standing Up From Chair 3   Walk in Room 3   Climb 3-5 Stairs 1   Basic Mobility Inpatient Raw Score 14   Basic Mobility Standardized Score 35 55   Highest Level Of Mobility   [facility] Goal 4: Move to chair/commode   [facility] Achieved 5: Stand (1 or more minutes)   Education   Education Provided Mobility training;Home exercise program;Assistive device   Patient Reinforcement needed;Demonstrates acceptance/verbal understanding   End of Consult   Patient Position at End of Consult Supine;Bed/Chair alarm activated; All needs within reach   Ival Blush

## 2022-11-17 NOTE — PROGRESS NOTES
Progress Note - Geriatric Medicine   Christina Salas 82 y o  female MRN: [id number]  Unit/Bed#: [place] [address] -01 Encounter: [id number]      Assessment/Plan:    Cognitive Screening  · Patient with history of mild cognitive impairment as noted in geriatrics note from 6/22/2022  · Scored a 21/30 on the [facility] THE [facility]   · Geriatrics was recommending repeat MOCA in 6 months  · Recommend OT repeat MOCA when patient more medically stable as patient has had a stroke since that time   · No behaviors noted back in June  · Facility notes no behaviors before this most recent admission   · Most recent TSH stable   · Most recent vitamin B 12 level on 10/8/2021 noted to be 445  · Recommend rechecking vitamin B 12 level  · CT of the brain on 11/14/2022 revealed encephalomalacia possibly from an old infarct with the possibility of a late subacute or new ethel-infarct ischemia not excluded and chronic microangiopathic changes  · Patient noted to be agitated yesterday morning   · Neurology re-consulted and suspected agitation to be related to [address]   · [address] has since been discontinued and patient is now on Zonegran daily   · Nursing notes no agitation or behavioral issues overnight or this morning   · Patient knew her name and date of birth this morning for nursing   · Patient is alert to name and month on my exam   · Is calm and cooperative   · Maintain delirium precautions   · Keep physically, mentally, and socially active    Delirium   • Current mentation: alert and oriented to person and month   • Patient is at high risk secondary to age, medication, and hospitalization  • Maintain delirium precautions   · Provide redirection, reorientation, and distraction techniques  · Maintain fall and safety precautions   · Assist with ADLs/IADLs  · Avoid deliriogenic medications such as tramadol, benzodiazepines, anticholinergics, benadryl  · Treat pain using geriatric pain protocol   · Encourage oral hydration and nutrition   · Monitor for constipation and urinary retention   · Implement sleep hygiene and limit night time interuptions   · Maintain sleep-wake cycle   · Encourage early and frequent mobilization   · Encourage participation in group activities  · Most recent EKG on 11/14/2022 revealed QTc interval of 477  · Would recommend avoiding Zyprexa unless repeat EKG revealed normalized QTc interval   · Can consider Depakote 125 mg once for agitation if needed  · Would avoid Ativan and other benzodiazepines if possible as this can contribute to worsening delirium     Deconditioning  • Baseline function: independent with ADLs and dependent with IADLs per facility staff   • Patient is at increased risk for deconditioning secondary to recent CVA, weakness, gait dysfunction, seizure, and hospitalization    • Continue to optimize diet, hydration, and mobility for healing   · GFR on labs from 11/15 noted to be 70  · Avoid nephrotoxic medication   · Keep hydrated  · Congestive heart failure  · Monitor I&O  • Monitor for signs and symptoms of infection, dehydration, DVT, and skin breakdown    Frailty   Clinical Frail Scale: 6- Moderately Frail  · Need help with all outside activities  · Need help with stairs and bathing  · May need assistance with dressing  · Most recent albumin on 11/15/2022 noted to be 3 1  · Consider nutrition consult  · Encourage protein supplementation     Ambulatory Dysfunction/Falls  · Facility notes no recent falls   · Ambulates with a walker (not a rollator per facility) at baseline   PT/OT consulted   Maintain fall and safety precautions   Encourage adequate oral hydration and nutrition   Out of bed as tolerated     Impaired Vision   · Facility notes that patient does wear glasses   · No glasses noted at the bedside  Recommend use of corrective lenses at all appropriate times  Encourage adequate lighting and encourage use of assistance with ambulation  Keep personal belongings close to avoid reaching  Encourage appropriate footwear at all times  Recommend large font for printed materials provided to patient    Elimination  · Patient primarily continent of bowel and bladder at baseline per facility   · Has been noted to have accidents on occasion   · Was noted to have urinary rentention on admission requiring straight cath   · Was agitated yesterday and nursing notes that they bladder scanned her as they remembered she was retaining and she has over 500 cc in the bladder  · She was placed on the commode and voided 600 cc  · Encourage patient to use the commode for voiding   · No documented bowel movement since admission   · Nursing notes that she had a bowel movement either last night or the night of 11/15  · Current bowel regimen includes   · Colace 100 mg BID  · Bisacodyl 5 mg tablet daily prn   · If constipation becomes an issue can order the following  · Senna 8 6 mg BID   · Miralax 17 g daily or daily prn   · If patient becomes agitated, consider urinary retention and constipation as the source of agitation     Insomnia  · Facility denies difficulty sleeping difficulties  · Patient notes she slept well last night   · Nursing confirmed  First line is behavioral therapy   Avoid sedative hypnotics including benzodiazepines and benadryl  Encourage staying awake during the day   Encourage daytime activities and morning exercise   Decrease or eliminate daytime naps   Avoid caffeine especially during late afternoon and evening hours  Establish a nighttime routine  · Implement sleep hygiene and limit nighttime interruptions    Seizure  · Patient presented to the hospital for change in mental status and seizure-like activity   · Seizure had been described as "shaking and stiffening" with a right sided gaze that lasted for approximately 1 minute per EMS  · Patient then became agitated and confused  · Neurology consulted and initially ordered [address] for seizure activity   · Patient noted to be agitated yesterday   · Neurology evaluated and suspected possible cause of confusion to be Keppra  · Medication adjusted to Zonegran daily   · Patient to follow-up outpatient with neurology   · Management per neurology     Home Safety   · Patient has been residing at [facility]   · Per staff is primarily independent with ADLs only requiring some cueing   · Does not cook or clean   · Facility manges medication        Subjective: The patient was seen and evaluated today at the bedside for geriatric follow-up  She is noted to be lying in bed comfortably in no acute distress  She is alert and oriented to person and month but is disoriented to place and year  She is noted to be aphasic  She states that she is feeling well today and offers no acute complaints on exam  She appears to be grimacing in pain at times but denies pain when asked  She denies dizziness and headaches  She denies chest pain, shortness of breath, and cough  She denies nausea, vomiting, constipation, and diarrhea  She notes no difficulty voiding  She notes that she has been eating and sleeping well  Care was coordinated with the PCA on her 1:1 who notes that she just finished eating 100% of her breakfast  He denies any behavioral issues since he arrived  Care was also coordinated with patients nurse Avelino Chapin who notes significant improvement since yesterday  She notes that the patient slept well last night and was more alert and awake this morning  She notes that she took her medication without issue  I contacted [facility] and spoke to Zachariah regarding the patient  She notes that the patient was residing in the skilled dementia unit as they felt they were unable to appropriately assess her secondary to her aphasia  They note that after observing her she would probably be appropriate for the skilled side  While at the facility, she was primarily oriented to person and place but not often time   She did always know where her room was and was able to manipulate her key to open her door  She was primarily independent with her ADLs and only required some cueing but no physical assistance  She was ambulating with a walker, not rollator, at the facility and they noted no recent falls  She was not doing any cooking or cleaning while there and staff was managing her medication  They note no acute episodes of confusion prior to the one that led to this hospital admission  She notes that the patient does wear glasses but has no hearing difficulties  She states that the patient was primarily continent of bowel and bladder  She was noted to have a good appetite and they note no issues with sleep  I did request a medication list but it has not arrived yet  Will continue to follow-up  Review of Systems   Constitutional: Positive for activity change  Negative for appetite change, chills, fatigue and fever  Respiratory: Negative for cough, chest tightness and shortness of breath  Cardiovascular: Negative for chest pain  Gastrointestinal: Negative for abdominal distention, abdominal pain, constipation, diarrhea, nausea and vomiting  Musculoskeletal: Positive for gait problem  Negative for arthralgias and myalgias  Neurological: Positive for weakness  Negative for dizziness, light-headedness, numbness and headaches  Psychiatric/Behavioral: Positive for confusion  Negative for behavioral problems and sleep disturbance  The patient is not nervous/anxious  Objective:     Vitals: Blood pressure 129/77, pulse (!) 53, temperature 97 8 °F (36 6 °C), resp  rate 16, height 5' 3" (1 6 m), weight 77 8 kg (171 lb 8 3 oz), SpO2 96 %  ,Body mass index is 30 38 kg/m²  Intake/Output Summary (Last 24 hours) at 11/17/2022 1123  Last data filed at 11/17/2022 0955  Gross per 24 hour   Intake --   Output 1600 ml   Net -1600 ml       Current Medications: Reviewed    Physical Exam:   Physical Exam  Constitutional:       General: She is not in acute distress  Appearance: Normal appearance   She is not ill-appearing  HENT:      Head: Normocephalic  Mouth/Throat:      Mouth: Mucous membranes are dry  Eyes:      General: No scleral icterus  Conjunctiva/sclera: Conjunctivae normal    Cardiovascular:      Rate and Rhythm: Normal rate and regular rhythm  Heart sounds: No murmur heard  Pulmonary:      Effort: Pulmonary effort is normal  No respiratory distress  Breath sounds: Normal breath sounds  Abdominal:      General: Bowel sounds are normal  There is no distension  Palpations: Abdomen is soft  Tenderness: There is no abdominal tenderness  Musculoskeletal:         General: No swelling or tenderness  Skin:     General: Skin is warm and dry  Neurological:      General: No focal deficit present  Mental Status: She is alert  Gait: Gait abnormal       Comments: Oriented to person and month  Disoriented to place and year          Invasive Devices     Peripheral Intravenous Line  Duration           Peripheral IV 11/14/22 Left Antecubital 2 days                Lab, Imaging and other studies: I have personally reviewed pertinent reports

## 2022-11-17 NOTE — CASE MANAGEMENT
Case Management Discharge Planning Note    Patient name Donavon Burkitt  [facility] Milan Bazzi* MRN [id number]  : 1935 Date 2022       Current Admission Date: 2022  Current Admission Diagnosis:Seizure [facility])   Patient Active Problem List    Diagnosis Date Noted   • Seizure ([facility] [address] ) 2022   • Urinary retention 2022   • Venous stasis dermatitis of both lower extremities 2022   • Gait instability 2022   • Other fatigue 2022   • Atypical pneumonia 05/10/2022   • Respiratory insufficiency 05/10/2022   • Acute metabolic encephalopathy    • Chronic diastolic heart failure ([facility] [address] ) 05/10/2022   • Thrombocytopenia ([facility] [address] ) 05/10/2022   • Other hyperlipidemia 10/06/2021   • 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   • Other insomnia 10/06/2021   • Skin lesion 10/04/2021   • Action tremor 2020   • Age-related osteoporosis without fracture 2020   • Atrial fibrillation ([facility] [address] ) 04/10/2015   • Chronic anticoagulation 04/10/2015   • Benign essential hypertension 2011      LOS (days): 3  Geometric Mean LOS (GMLOS) (days): 3 80  Days to GMLOS:0 8     OBJECTIVE:  Risk of Unplanned Readmission Score: 15 69         Current admission status: Inpatient   Preferred Pharmacy:   [address] [place] [address], [address] [place] [address]   Phone: [phone] Fax: [phone]  [address] [place]  [place] [address] [address]  Phone: [phone] Fax: [phone]    0 [facility], [address] [facility]  [address]  [place] [address] [address]  Phone: [phone] Fax: [phone]    Primary Care Provider: Eliot Darling DO    Primary Insurance: [address] [facility]  Secondary Insurance:     DISCHARGE DETAILS:    Discharge planning discussed with[de-identified] Arcenio Mcneil (Son)   [phone]  Freedom of Choice: Yes        Were Treatment Team discharge recommendations reviewed with patient/caregiver?: Yes  Did patient/caregiver verbalize understanding of patient care needs?: Yes       Contacts  Patient Contacts: Johnnette Mcardle (Son)  (M  Relationship to Patient[de-identified] Family  Reason/Outcome: Discharge [address]         Is the patient interested in [place] [address] at discharge?: No    DME Referral Provided  Referral made for DME?: No    Other Referral/Resources/Interventions Provided:  Interventions: Short Term Rehab         Treatment Team Recommendation: Short Term Rehab  Discharge Destination Plan[de-identified] Short Term Rehab  Transport at Discharge : [facility]  Dispatcher Contacted: Yes  Number/Name of Dispatcher: Roundtrip  Transported by [employer] and Unit #): [employer]  ETA of Transport (Date): 11/18/22  ETA of Transport (Time): 1200    RN, provider, son Pate Sic & facility aware of above transport information       Transfer Mode: Stretcher  Accompanied by: EMS personnel  Transfer Equipment: BLS devices  IMM Given (Date):: 11/17/22  IMM Given to[de-identified] Family  Family notified[de-identified] Ameena Herr (Son) [phone]

## 2022-11-17 NOTE — PLAN OF CARE
Problem: PHYSICAL THERAPY ADULT  Goal: Performs mobility at highest level of function for planned discharge setting  See evaluation for individualized goals  Description: Treatment/Interventions: Functional transfer training, LE strengthening/ROM, Therapeutic exercise, Endurance training, Patient/family training, Bed mobility, Gait training, Spoke to nursing, Spoke to case management, OT          See flowsheet documentation for full assessment, interventions and recommendations  Outcome: Progressing  Note: Prognosis: Fair  Problem List: Decreased strength, Decreased endurance, Impaired balance, Decreased mobility, Decreased cognition, Impaired judgement, Decreased safety awareness  Assessment: Pt attempting to get out of bed upon entering room  Pt asking to go to the bathroom  Pt is showing improved ability to perform bed mobility, transfer and ambulation  Pt requiring less assistance for all mobility, performing supine to sit with mod assist x1, si to supine with min assist x1, sit to stand transfers with mod assist x1 and stand to sit with min assist x1  Pt progressed with ambulation distances to 2' x1 and 6' x1 with use of rw and mod assist x1, cues for le sequencing, upright posture, safety cues for turning and backing up to bed with use of rw  Pt  Limited by fatigue  Pt  Transfers on and off BSC with min- mod assist x1  Pt  performed seated b/l heather arom and isometric exercises at EOB x 10 reps  With verbal, tactile and visual cues for correct technique and performance  Gait is slow unsteady with decreased foot clearance and step to gait pattern  Pt  will benefit from continued inpt PT to progress mobility, strength, balance, activity tolerance and endurance in order to facilitate return to PLOF and rehab to maximzie functional outcomes and mobility  The patient's AM-PAC Basic Mobility Inpatient Short Form Raw Score is 14   A Raw score of less than or equal to 16 suggests the patient may benefit from discharge to post-acute rehabilitation services  Please also refer to the recommendation of the Physical Therapist for safe discharge planning  PT Discharge Recommendation: Post acute rehabilitation services    See flowsheet documentation for full assessment

## 2022-11-17 NOTE — CASE MANAGEMENT
Dorota Barber has received approved authorization from:   [facility] in by Buck Arroyo P#   [phone]  Authorization received for: SNF  Facility: [facility]    Authorization #: [id number]  Start of Care: 11/17  Next Review Date: 11/21  Care Coordinator: Angel Aguayo P#: [phone]  Submit next review to: [phone]   Care Manager notified:  Amy Hinson

## 2022-11-17 NOTE — CASE MANAGEMENT
Case Management Discharge Planning Note    Patient name Grisel Oakley  Location [facility] 68 2 /South 2 Sandra Morris* MRN [id number]  : 1935 Date 2022       Current Admission Date: 2022  Current Admission Diagnosis:Seizure [facility])   Patient Active Problem List    Diagnosis Date Noted   • Seizure ([facility] [address] ) 2022   • Urinary retention 2022   • Venous stasis dermatitis of both lower extremities 2022   • Gait instability 2022   • Other fatigue 2022   • Atypical pneumonia 05/10/2022   • Respiratory insufficiency 05/10/2022   • Acute metabolic encephalopathy    • Chronic diastolic heart failure ([facility] [address] [facility] ) 05/10/2022   • Thrombocytopenia ([facility] [address] ) 05/10/2022   • Other hyperlipidemia 10/06/2021   • 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   • Other insomnia 10/06/2021   • Skin lesion 10/04/2021   • Action tremor 2020   • Age-related osteoporosis without fracture 2020   • Atrial fibrillation ([facility] [address] ) 04/10/2015   • Chronic anticoagulation 04/10/2015   • Benign essential hypertension 2011      LOS (days): 3  Geometric Mean LOS (GMLOS) (days): 3 80  Days to GMLOS:0 9     OBJECTIVE:  Risk of Unplanned Readmission Score: 15 69         Current admission status: Inpatient   Preferred Pharmacy:   [address] [place] [address], [address] [place] [address]  Phone: [phone] Fax: [phone]  [address] [place]  [facility] [address]  Phone: [phone] Fax: [phone]    9 [facility], [address] [place] [address]  [place] [address]  Phone: [phone] Fax: [phone]    Primary Care Provider: Bull Vasquez DO    Primary Insurance: [address] [place] [address]  Secondary Insurance:     DISCHARGE DETAILS: [address] [place] [address] Number: [id number]

## 2022-11-17 NOTE — CASE MANAGEMENT
Case Management Discharge Planning Note    Patient name   Location [place] 2 /South 2 Luna Lucio* MRN [id number]  : 1935 Date 2022       Current Admission Date: 2022  Current Admission Diagnosis:Seizure [facility])   Patient Active Problem List    Diagnosis Date Noted   • Seizure ([facility] [address] ) 2022   • Urinary retention 2022   • Venous stasis dermatitis of both lower extremities 2022   • Gait instability 2022   • Other fatigue 2022   • Atypical pneumonia 05/10/2022   • Respiratory insufficiency 05/10/2022   • Acute metabolic encephalopathy    • Chronic diastolic heart failure ([facility] [address] ) 05/10/2022   • Thrombocytopenia ([facility] [address] ) 05/10/2022   • Other hyperlipidemia 10/06/2021   • 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   • Other insomnia 10/06/2021   • Skin lesion 10/04/2021   • Action tremor 2020   • Age-related osteoporosis without fracture 2020   • Atrial fibrillation ([facility] [address] ) 04/10/2015   • Chronic anticoagulation 04/10/2015   • Benign essential hypertension 2011      LOS (days): 3  Geometric Mean LOS (GMLOS) (days): 3 80  Days to GMLOS:1 1     OBJECTIVE:  Risk of Unplanned Readmission Score: 15 65         Current admission status: Inpatient   Preferred Pharmacy:   [address] [place] [address], [address] [place] [address]  Phone: [phone] Fax: [phone]  [address] [place]  [facility] [address]  Phone: [phone] Fax: [phone]    0 [facility], [address] [facility] [address] [place] [address]  [facility] [address]  Phone: [phone] Fax: [phone]    Primary Care Provider: Donte Barillas DO    Primary Insurance: [address] [place] [address]  Secondary Insurance:     DISCHARGE DETAILS:  [facility] has accepted pt  Ins auth pending per CM Decision Support   PASSR completed & uploaded to [employer]  Will necovid swab 24 prior to transfer  Insurance approved  Waiting on [employer] for bed availability                                                                                                         Accepting Facility Name, [address] : [facility] [address] [facility], [address] [facility]  Receiving Facility/Agency Phone Number: Report [phone]  Facility/Agency Fax Number: Fax [phone]

## 2022-11-17 NOTE — PROGRESS NOTES
[address] [facility]  Progress Note - Jonnathan Rao 1935, 82 y o  female MRN: [id number]  Unit/Bed#: [place] [address] -01 Encounter: [id number]  Primary Care Provider: Rebecca Wiley DO   Date and time admitted to hospital: 2022  1:16 PM    * Seizure [facility])  Assessment & Plan  This is an 68-year-old female with history of atrial fibrillation on Eliquis, dementia, history of CVA in July, hypertension, diastolic CHF who lives at [facility] walks with a walker sent for evaluation of altered mental status today  At baseline patient has aphasia and dementia  Patient was seen to be slightly altered and agitated around lunchtime was then noted to be unresponsive with new onset seizure activity  with right gaze deviation, tonic/convulsive activity and upper/lower extremities  Stroke alert initially called, neuro exam improved, not a thrombolytic candidate  No report of any fever, chills, nausea, vomiting, diarrhea  Patient seen by Neurology  Not thought to have a new stroke  Thought to have a seizure which is new, likely from the area of her stroke it July  She was started on Keppra but had severe agitation thought to be possibly from the [address]    She has changed alternative agent is doing much better   We are now looking for skilled nursing facility for her    [facility]  Assessment & Plan  · Maintain on Eliquis  · Heart rate well controlled off of medication            Subjective:   Feels much better  Much less agitated  Able to have a conversation      Objective:     Vitals:   Temp (24hrs), Av 2 °F (36 8 °C), Min:97 2 °F (36 2 °C), Max:99 5 °F (37 5 °C)    Temp:  [97 2 °F (36 2 °C)-99 5 °F (37 5 °C)] 99 5 °F (37 5 °C)  HR:  [53-68] 67  Resp:  [16-18] 16  BP: (129-140)/(74-77) 139/74  SpO2:  [92 %-96 %] 94 %  Body mass index is 30 38 kg/m²  Input and Output Summary (last 24 hours):        Intake/Output Summary (Last 24 hours) at 11/17/2022 1555  Last data filed at 11/17/2022 0955  Gross per 24 hour   Intake --   Output 2000 ml   Net -2000 ml       Physical Exam:     Physical Exam  Vitals and nursing note reviewed  HENT:      Head: Normocephalic and atraumatic  Eyes:      Extraocular Movements: EOM normal       Pupils: Pupils are equal, round, and reactive to light  Cardiovascular:      Rate and Rhythm: Normal rate and regular rhythm  Heart sounds: No murmur heard  No friction rub  No gallop  Pulmonary:      Effort: Pulmonary effort is normal       Breath sounds: Normal breath sounds  No wheezing or rales  Abdominal:      General: Bowel sounds are normal       Palpations: Abdomen is soft  Tenderness: There is no abdominal tenderness  Musculoskeletal:         General: No edema  Right lower leg: No edema  Left lower leg: No edema                 Additional Data:     Labs:    Results from last 7 days   Lab Units 11/15/22  0647   WBC Thousand/uL 6 00   HEMOGLOBIN g/dL 12 7   HEMATOCRIT % 39 4   PLATELETS Thousands/uL 141*   NEUTROS PCT % 66   LYMPHS PCT % 19   MONOS PCT % 12   EOS PCT % 2     Results from last 7 days   Lab Units 11/15/22  0647   POTASSIUM mmol/L 3 9   CHLORIDE mmol/L 105   CO2 mmol/L 27   BUN mg/dL 13   CREATININE mg/dL 0 77   CALCIUM mg/dL 9 2   ALK PHOS U/L 80   ALT U/L 16   AST U/L 28     Results from last 7 days   Lab Units 11/14/22  1339   INR  1 26*                   * I Have Reviewed All Lab Data     Recent Cultures (last 7 days):             Last 24 Hours Medication List:   Current Facility-Administered Medications   Medication Dose Route Frequency Provider Last Rate   • acetaminophen  650 mg Oral Q6H PRN Toya Salter MD     • apixaban  5 mg Oral BID Toya Salter MD     • atorvastatin  10 mg Oral Daily With Ian Palencia MD     • bisacodyl  5 mg Oral Daily PRN Toya Salter MD     • docusate sodium  100 mg Oral BID Toya Salter MD     • furosemide  40 mg Oral Daily Deejay Kareem Antunez MD     • LORazepam  0 5 mg Intravenous Once Guanako Shoemaker DO     • potassium chloride  20 mEq Oral Daily Tianna Hernandez MD     • QUEtiapine  12 5 mg Oral HS Amparo Kent PA-C     • QUEtiapine  12 5 mg Oral Once Eliza Reynolds PA-C     • thiamine  100 mg Oral Daily Tianna Hernandez MD     • zonisamide  100 mg Oral Daily Chanel Galloway PA-C           VTE Pharmacologic Prophylaxis:   Pharmacologic: Apixaban (Eliquis)      Current Length of Stay: 3 day(s)    Current Patient Status: Inpatient       Discharge Plan:   Code Status: Level 3 - DNAR and DNI           Today, Patient Was Seen By: Grace Bueno DO    ** Please Note: Dictation voice to text software may have been used in the creation of this document   **

## 2022-11-18 VITALS
OXYGEN SATURATION: 95 % | HEART RATE: 53 BPM | WEIGHT: 171.52 LBS | RESPIRATION RATE: 18 BRPM | TEMPERATURE: 98 F | DIASTOLIC BLOOD PRESSURE: 81 MMHG | BODY MASS INDEX: 30.39 KG/M2 | HEIGHT: 63 IN | SYSTOLIC BLOOD PRESSURE: 119 MMHG

## 2022-11-18 RX ORDER — ZONISAMIDE 100 MG/1
100 CAPSULE ORAL DAILY
Refills: 0
Start: 2022-11-19

## 2022-11-18 RX ORDER — QUETIAPINE FUMARATE 25 MG/1
12.5 TABLET, FILM COATED ORAL
Qty: 30 TABLET | Refills: 0
Start: 2022-11-18

## 2022-11-18 RX ORDER — QUETIAPINE FUMARATE 25 MG/1
12.5 TABLET, FILM COATED ORAL ONCE
Qty: 1 TABLET | Refills: 0
Start: 2022-11-18 | End: 2022-11-18

## 2022-11-18 RX ADMIN — DOCUSATE SODIUM 100 MG: 100 CAPSULE, LIQUID FILLED ORAL at 09:13

## 2022-11-18 RX ADMIN — ZONISAMIDE 100 MG: 100 CAPSULE ORAL at 09:14

## 2022-11-18 RX ADMIN — APIXABAN 5 MG: 5 TABLET, FILM COATED ORAL at 09:13

## 2022-11-18 RX ADMIN — THIAMINE HCL TAB 100 MG 100 MG: 100 TAB at 09:13

## 2022-11-18 RX ADMIN — POTASSIUM CHLORIDE 20 MEQ: 1500 TABLET, EXTENDED RELEASE ORAL at 09:13

## 2022-11-18 RX ADMIN — FUROSEMIDE 40 MG: 40 TABLET ORAL at 09:13

## 2022-11-18 NOTE — PLAN OF CARE
Medications: Problem: Potential for Falls  Goal: Patient will remain free of falls  Description: INTERVENTIONS:  - Educate patient/family on patient safety including physical limitations  - Instruct patient to call for assistance with activity  - Consult OT/PT to assist with strengthening/mobility   - Keep Call bell within reach  - Keep bed low and locked with side rails adjusted as appropriate  - Keep care items and personal belongings within reach  - Initiate and maintain comfort rounds  - Apply yellow socks and bracelet for high fall risk patients  - Consider moving patient to room near nurses station  Outcome: Progressing     Problem: Nutrition/Hydration-ADULT  Goal: Nutrient/Hydration intake appropriate for improving, restoring or maintaining nutritional needs  Description: Monitor and assess patient's nutrition/hydration status for malnutrition  Collaborate with interdisciplinary team and initiate plan and interventions as ordered  Monitor patient's weight and dietary intake as ordered or per policy  Utilize nutrition screening tool and intervene as necessary  Determine patient's food preferences and provide high-protein, high-caloric foods as appropriate       INTERVENTIONS:  - Monitor oral intake, urinary output, labs, and treatment plans  - Assess nutrition and hydration status and recommend course of action  - Evaluate amount of meals eaten  - Assist patient with eating if necessary   - Allow adequate time for meals  - Recommend/ encourage appropriate diets, oral nutritional supplements, and vitamin/mineral supplements  - Order, calculate, and assess calorie counts as needed  - Recommend, monitor, and adjust tube feedings and TPN/PPN based on assessed needs  - Assess need for intravenous fluids  - Provide specific nutrition/hydration education as appropriate  - Include patient/family/caregiver in decisions related to nutrition  Outcome: Progressing     Problem: MOBILITY - ADULT  Goal: Maintain or return to baseline ADL function  Description: INTERVENTIONS:  -  Assess patient's ability to carry out ADLs; assess patient's baseline for ADL function and identify physical deficits which impact ability to perform ADLs (bathing, care of mouth/teeth, toileting, grooming, dressing, etc )  - Assess/evaluate cause of self-care deficits   - Assess range of motion  - Assess patient's mobility; develop plan if impaired  - Assess patient's need for assistive devices and provide as appropriate  - Encourage maximum independence but intervene and supervise when necessary  - Involve family in performance of ADLs  - Assess for home care needs following discharge   - Consider OT consult to assist with ADL evaluation and planning for discharge  - Provide patient education as appropriate  Outcome: Progressing  Goal: Maintains/Returns to pre admission functional level  Description: INTERVENTIONS:  - Perform BMAT or MOVE assessment daily    - Set and communicate daily mobility goal to care team and patient/family/caregiver  - Collaborate with rehabilitation services on mobility goals if consulted  - Perform Range of Motion 3 times a day  - Reposition patient every 3 hours    - Dangle patient 3 times a day  - Stand patient 3 times a day  - Out of bed for toileting  - Record patient progress and toleration of activity level   Outcome: Progressing     Problem: Prexisting or High Potential for Compromised Skin Integrity  Goal: Skin integrity is maintained or improved  Description: INTERVENTIONS:  - Identify patients at risk for skin breakdown  - Assess and monitor skin integrity  - Assess and monitor nutrition and hydration status  - Monitor labs   - Assess for incontinence   - Turn and reposition patient  - Assist with mobility/ambulation  - Relieve pressure over bony prominences  - Avoid friction and shearing  - Provide appropriate hygiene as needed including keeping skin clean and dry  - Evaluate need for skin moisturizer/barrier cream  - Collaborate with interdisciplinary team   - Patient/family teaching  - Consider wound care consult   Outcome: Progressing

## 2022-11-18 NOTE — ASSESSMENT & PLAN NOTE
This is an 22-year-old female with history of atrial fibrillation on Eliquis, dementia, history of CVA in July, hypertension, diastolic CHF who lives at [facility] walks with a walker sent for evaluation of altered mental status today  At baseline patient has aphasia and dementia  Patient was seen to be slightly altered and agitated around lunchtime was then noted to be unresponsive with new onset seizure activity  with right gaze deviation, tonic/convulsive activity and upper/lower extremities  Stroke alert initially called, neuro exam improved, not a thrombolytic candidate  No report of any fever, chills, nausea, vomiting, diarrhea  Patient seen by Neurology  Not thought to have a new stroke  Thought to have a seizure which is new, likely from the area of her stroke it July  She was started on Keppra but had severe agitation thought to be possibly from the [address]    She was changed to zonisamide and is doing much better  She will be going to a skilled nursing facility instead of back to her assisted living for now    Hopefully she will improve and be able to return to her assisted living

## 2022-11-18 NOTE — DISCHARGE SUMMARY
[facility]  Discharge- [facility] 1935, 82 y o  female MRN: [id number]  Unit/Bed#: [address] [place] [address]-01 Encounter: [id number]  Primary Care Provider: Cecile Hernadez DO   Date and time admitted to hospital: 11/14/2022  1:16 PM    * Seizure [facility])  Assessment & Plan  This is an 51-year-old female with history of atrial fibrillation on Eliquis, dementia, history of CVA in July, hypertension, diastolic CHF who lives at [facility] walks with a walker sent for evaluation of altered mental status today  At baseline patient has aphasia and dementia  Patient was seen to be slightly altered and agitated around lunchtime was then noted to be unresponsive with new onset seizure activity  with right gaze deviation, tonic/convulsive activity and upper/lower extremities  Stroke alert initially called, neuro exam improved, not a thrombolytic candidate  No report of any fever, chills, nausea, vomiting, diarrhea  Patient seen by Neurology  Not thought to have a new stroke  Thought to have a seizure which is new, likely from the area of her stroke it July  She was started on Keppra but had severe agitation thought to be possibly from the [address]    She was changed to zonisamide and is doing much better  She will be going to a skilled nursing facility instead of back to her assisted living for now    Hopefully she will improve and be able to return to her assisted living    Atrial fibrillation [facility])  Assessment & Plan  · Maintain on Eliquis  · Heart rate well controlled off of medication      Discharging Physician / Practitioner: Concetta Birmingham DO  PCP: Cecile Hernadez DO  Admission Date:   Admission Orders (From admission, onward)     Ordered        11/14/22 1556  [address] [facility],[address] [place]  Once                      Discharge Date: 11/18/22    Medical Problems     Resolved Problems  Date Reviewed: 11/14/2022   None           [facility]  · Neurology      Procedures Performed:     · CT head      Reason for Admission:  Seizure      Hospital Course:     Christina Salas is a 82 y o  female patient who originally presented to the hospital on 11/14/2022 due to seizure activity  Patient has no history of seizure disorder  She did however, have a stroke in July 2022  She had tonic clonic movements consistent with a seizure  She was seen by Neurology  They felt this was likely a seizure in the area where she had the previous stroke and she will be prone to of future seizures  She was started on Keppra which controlled the seizures  However it made her extremely agitated and confused  Neurology saw her again and discontinue the Keppra and put her on zonisamide  She is doing much better now  She will be going to short-term rehab  Hopefully she will improve there and then can return to her assisted living  Please see above list of diagnoses and related plan for additional information  Condition at Discharge: stable       Discharge Day Visit / Exam:     Subjective:  Feels well  No complaints  No headache  No further seizure activity      Vitals: Blood Pressure: 119/81 (11/18/22 0909)  Pulse: (!) 53 (11/18/22 0909)  Temperature: 98 °F (36 7 °C) (11/18/22 0909)  Temp Source: Axillary (11/17/22 2143)  Respirations: 18 (11/18/22 0909)  Height: 5' 3" (160 cm) (11/14/22 1321)  Weight - Scale: 77 8 kg (171 lb 8 3 oz) (11/14/22 1321)  SpO2: 95 % (11/18/22 0909)    Exam:     Physical Exam  Vitals and nursing note reviewed  HENT:      Head: Normocephalic and atraumatic  Eyes:      Extraocular Movements: EOM normal       Pupils: Pupils are equal, round, and reactive to light  Cardiovascular:      Rate and Rhythm: Normal rate and regular rhythm  Heart sounds: No murmur heard  No friction rub  No gallop  Pulmonary:      Effort: Pulmonary effort is normal       Breath sounds: Normal breath sounds  No wheezing or rales     Abdominal: General: Bowel sounds are normal       Palpations: Abdomen is soft  Tenderness: There is no abdominal tenderness  Musculoskeletal:         General: No edema  Right lower leg: No edema  Left lower leg: No edema            Discharge instructions/Information to patient and family:   See after visit summary for information provided to patient and family  Provisions for Follow-Up Care:  See after visit summary for information related to follow-up care and any pertinent home health orders  Disposition:     Other: STR       Discharge Statement:  I spent 36 minutes discharging the patient  This time was spent on the day of discharge  I had direct contact with the patient on the day of discharge  Greater than 50% of the total time was spent examining patient, answering all patient questions, arranging and discussing plan of care with patient as well as directly providing post-discharge instructions  Additional time then spent on discharge activities  Discharge Medications:  See after visit summary for reconciled discharge medications provided to patient and family        ** Please Note: This note has been constructed using a voice recognition system **

## 2022-11-18 NOTE — CASE MANAGEMENT
Case Management Discharge Planning Note    Patient name Soco Velez  Location [place] [address] /[address] Rubi Purcell* MRN [id number]  : 1935 Date 2022       Current Admission Date: 2022  Current Admission Diagnosis:Seizure [facility])   Patient Active Problem List    Diagnosis Date Noted   • Seizure ([facility] ) 2022   • Urinary retention 2022   • Venous stasis dermatitis of both lower extremities 2022   • Gait instability 2022   • Other fatigue 2022   • Atypical pneumonia 05/10/2022   • Respiratory insufficiency 05/10/2022   • Acute metabolic encephalopathy    • Chronic diastolic heart failure ([facility] ) 05/10/2022   • Thrombocytopenia ([facility] ) 05/10/2022   • Other hyperlipidemia 10/06/2021   • 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   • Other insomnia 10/06/2021   • Skin lesion 10/04/2021   • Action tremor 2020   • Age-related osteoporosis without fracture 2020   • Atrial fibrillation ([facility] ) 04/10/2015   • Chronic anticoagulation 04/10/2015   • Benign essential hypertension 2011      LOS (days): 4  Geometric Mean LOS (GMLOS) (days): 3 80  Days to GMLOS:0 1     OBJECTIVE:  Risk of Unplanned Readmission Score: 14 2         Current admission status: Inpatient   Preferred Pharmacy:   [facility] , [address] [place] [address]  Phone: [phone] Fax: [phone]  [address] [place]  [facility] [address]  Phone: [phone] Fax: [phone]    0 [facility], [address] [facility] [address] [place] [address]  [place] [address]  Phone: [phone] Fax: [phone]    Primary Care Provider: Ekaterina Gomez DO    Primary Insurance: [address]  Secondary Insurance:     DISCHARGE DETAILS:    Discharge planning discussed with[de-identified] Felipe Tay (Son)   [phone]     Comments - Freedom of Choice: [facility]  Holdings for SNF/rehab  CM contacted family/caregiver?: Yes  Were Treatment Team discharge recommendations reviewed with patient/caregiver?: Yes  Did patient/caregiver verbalize understanding of patient care needs?: Yes       Contacts  Patient Contacts: Phillip Thorpe (Son)  (M  Relationship to Patient[de-identified] Family  Contact Method: In Person  Reason/Outcome: Discharge [address]         Is the patient interested in [facility] at discharge?: No    DME Referral Provided  Referral made for DME?: No    Other Referral/Resources/Interventions Provided:  Interventions: Short Term Rehab         Treatment Team Recommendation: Short Term Rehab  Discharge Destination Plan[de-identified] Short Term Rehab  Transport at Discharge : S Ambulance  Dispatcher Contacted: Yes  Number/Name of Dispatcher: Karin De Paz by Shelia and Unit #): [facility] of Transport (Date): 11/18/22  ETA of Transport (Time): 1200   Family, RN, provider & facility aware of transport information above     Transfer Mode: Stretcher  Accompanied by: EMS personnel  Transfer Equipment: BLS devices  IMM Given (Date):: 11/17/22  IMM Given to[de-identified] Family  Family notified[de-identified] Cherry Mccabe (Son) [phone]

## 2022-11-18 NOTE — PROGRESS NOTES
Progress Note - Geriatric Medicine   Gustabo Soliz 82 y o  female MRN: [id number]  Unit/Bed#: [facility] 2 -01 Encounter: [id number]      Assessment/Plan:    Cognitive Screening  · Patient with history of mild cognitive impairment as noted in geriatrics note from 6/22/2022  · Scored a 21/30 on the [facility] OF THE [facility]   · Geriatrics was recommending repeat MOCA in 6 months  · Recommend OT repeat MOCA when patient more medically stable as patient has had a stroke since that time   · No behaviors noted back in June  · Facility notes no behaviors before this most recent admission   · Most recent TSH stable   · Most recent vitamin B 12 level on 10/8/2021 noted to be 445  · Recommend rechecking vitamin B 12 level  · CT of the brain on 11/14/2022 revealed encephalomalacia possibly from an old infarct with the possibility of a late subacute or new ethel-infarct ischemia not excluded and chronic microangiopathic changes  · Patient noted to be agitated yesterday morning   · Neurology re-consulted and suspected agitation to be related to [address]   · [address] has since been discontinued and patient is now on Zonegran daily   · Nursing notes no agitation or behavioral issues overnight or this morning   · Patient is alert to self on exam today   · Is calm and cooperative   · Maintain delirium precautions   · Keep physically, mentally, and socially active    Delirium   • Current mentation: alert and oriented to person  • Patient is at high risk secondary to age, medication, and hospitalization  • Maintain delirium precautions   · Provide redirection, reorientation, and distraction techniques  · Maintain fall and safety precautions   · Assist with ADLs/IADLs  · Avoid deliriogenic medications such as tramadol, benzodiazepines, anticholinergics, benadryl  · Treat pain using geriatric pain protocol   · Encourage oral hydration and nutrition   · Monitor for constipation and urinary retention   · Implement sleep hygiene and limit night time interuptions · Maintain sleep-wake cycle   · Encourage early and frequent mobilization   · Encourage participation in group activities  · Most recent EKG on 11/14/2022 revealed QTc interval of 477  · Would recommend avoiding Zyprexa unless repeat EKG revealed normalized QTc interval   · Can consider Depakote 125 mg once for agitation if needed  · Would avoid Ativan and other benzodiazepines if possible as this can contribute to worsening delirium     Deconditioning  • Patient is at increased risk for deconditioning secondary to recent CVA, weakness, gait dysfunction, seizure, and hospitalization    • Continue to optimize diet, hydration, and mobility for healing   · GFR on labs from 11/15 noted to be 70  · Avoid nephrotoxic medication   · Keep hydrated  · Congestive heart failure  · Monitor I&O  • Monitor for signs and symptoms of infection, dehydration, DVT, and skin breakdown    Frailty   Clinical Frail Scale: 6- Moderately Frail  · Need help with all outside activities  · Need help with stairs and bathing  · May need assistance with dressing  · Most recent albumin on 11/15/2022 noted to be 3 1  · Consider nutrition consult  · Encourage protein supplementation     Ambulatory Dysfunction/Falls  · Facility notes no recent falls   · Ambulates with a walker (not a rollator per facility) at baseline   PT/OT consulted   Maintain fall and safety precautions   Encourage adequate oral hydration and nutrition   Out of bed as tolerated     Impaired Vision   · Facility notes that patient does wear glasses   · No glasses noted at the bedside  Recommend use of corrective lenses at all appropriate times  Encourage adequate lighting and encourage use of assistance with ambulation  Keep personal belongings close to avoid reaching  Encourage appropriate footwear at all times  Recommend large font for printed materials provided to patient    Elimination  · Patient primarily continent of bowel and bladder at baseline per facility   · Has been noted to have accidents on occasion   · Was noted to have urinary rentention on admission requiring straight cath   · Was agitated on 11/16 and nursing notes that they bladder scanned her as they remembered she was retaining and she has over 500 cc in the bladder  · She was placed on the commode and voided 600 cc  · Encourage patient to use the commode for voiding   · No documented bowel movement since admission   · Nursing notes that she had a bowel movement either last night or the night of 11/15  · Current bowel regimen includes   · Colace 100 mg BID  · Bisacodyl 5 mg tablet daily prn   · If constipation becomes an issue can order the following  · Senna 8 6 mg BID   · Miralax 17 g daily or daily prn   · If patient becomes agitated, consider urinary retention and constipation as the source of agitation     Insomnia  · Facility denies difficulty sleeping difficulties  · Patient notes she slept well last night   · Nursing confirmed  First line is behavioral therapy   Avoid sedative hypnotics including benzodiazepines and benadryl  Encourage staying awake during the day   Encourage daytime activities and morning exercise   Decrease or eliminate daytime naps   Avoid caffeine especially during late afternoon and evening hours  Establish a nighttime routine  · Implement sleep hygiene and limit nighttime interruptions    Seizure  · Patient presented to the hospital for change in mental status and seizure-like activity   · Seizure had been described as "shaking and stiffening" with a right sided gaze that lasted for approximately 1 minute per EMS  · Patient then became agitated and confused  · Neurology consulted and initially ordered [address] for seizure activity   · Patient noted to be agitated yesterday   · Neurology evaluated and suspected possible cause of confusion to be Keppra  · Medication adjusted to Zonegran daily   · Patient to follow-up outpatient with neurology   · Management per neurology        Subjective: The patient was seen and evaluated today at the bedside for geriatric follow-up  She is noted to be lying in bed comfortably in no acute distress  She is sleeping on my entry into the room but is arousable to her name  She is oriented to person and disoriented to place and time  She is noted to be aphasic  She states that she is feeling okay today and offers no acute complaints on exam  She is currently denying pain  She denies dizziness and headaches  She denies chest pain, shortness of breath, and cough  She denies nausea, vomiting, constipation, and diarrhea  She notes no difficulty voiding  She notes that she has been eating and sleeping well  Care was also coordinated with patients nurse Jacquie Morton who notes no acute events overnight  She slept well and was sleeping for staff most of the morning  He notes that she was arousable to her name and she took her medication this morning  He notes that she is scheduled to be discharged today  Review of Systems   Constitutional: Positive for activity change  Negative for appetite change, chills, fatigue and fever  Respiratory: Negative for cough, chest tightness and shortness of breath  Cardiovascular: Negative for chest pain  Gastrointestinal: Negative for abdominal distention, abdominal pain, constipation, diarrhea, nausea and vomiting  Musculoskeletal: Positive for gait problem  Negative for arthralgias and myalgias  Neurological: Positive for weakness  Negative for dizziness, light-headedness, numbness and headaches  Psychiatric/Behavioral: Positive for confusion  Negative for behavioral problems and sleep disturbance  The patient is not nervous/anxious  Objective:     Vitals: Blood pressure 119/81, pulse (!) 53, temperature 98 °F (36 7 °C), resp  rate 18, height 5' 3" (1 6 m), weight 77 8 kg (171 lb 8 3 oz), SpO2 95 %  ,Body mass index is 30 38 kg/m²        Intake/Output Summary (Last 24 hours) at 11/18/2022 2121  Last data filed at 11/17/2022 1625  Gross per 24 hour   Intake --   Output 100 ml   Net -100 ml       Current Medications: Reviewed    Physical Exam:   Physical Exam  Constitutional:       General: She is not in acute distress  Appearance: Normal appearance  She is not ill-appearing  HENT:      Head: Normocephalic  Mouth/Throat:      Mouth: Mucous membranes are dry  Eyes:      General: No scleral icterus  Conjunctiva/sclera: Conjunctivae normal    Cardiovascular:      Rate and Rhythm: Normal rate and regular rhythm  Heart sounds: No murmur heard  Pulmonary:      Effort: Pulmonary effort is normal  No respiratory distress  Breath sounds: Normal breath sounds  Abdominal:      General: Bowel sounds are normal  There is no distension  Palpations: Abdomen is soft  Tenderness: There is no abdominal tenderness  Musculoskeletal:         General: No swelling or tenderness  Skin:     General: Skin is warm and dry  Neurological:      General: No focal deficit present  Mental Status: She is alert  Gait: Gait abnormal       Comments: Oriented to person   Disoriented to place and time          Invasive Devices     None                 Lab, Imaging and other studies: I have personally reviewed pertinent reports

## 2022-11-25 ENCOUNTER — TELEPHONE (OUTPATIENT)
Dept: NEUROLOGY | Facility: CLINIC | Age: 87
End: 2022-11-25

## 2022-11-25 NOTE — TELEPHONE ENCOUNTER
HFU Instructions:   Samy Wooten will need follow up in in 6 weeks with epilepsy attending or advance practitioner  She will require a routine EEG within 4 weeks      Pt is now scheduled with Ruth Ann Ramos, 3 pm on 01/31/22      Thank you,     Doug Fox

## 2022-12-13 ENCOUNTER — HOSPITAL ENCOUNTER (OUTPATIENT)
Dept: NEUROLOGY | Facility: CLINIC | Age: 87
Discharge: HOME | End: 2022-12-13

## 2022-12-13 DIAGNOSIS — R56.9 NEW ONSET SEIZURE (HCC): ICD-10-CM

## 2022-12-14 ENCOUNTER — TELEPHONE (OUTPATIENT)
Age: 87
End: 2022-12-14

## 2022-12-14 NOTE — TELEPHONE ENCOUNTER
I called an spoke to patient's son, Padmini Mcdonnell ([phone]) following up on appointment for a follow up on 1/4/2023 @8:30am that was cancelled on Vicky Barrett advised patient is in a nursing home now and had a stroke and will not be rescheduling  Advised provider

## 2022-12-15 ENCOUNTER — TELEPHONE (OUTPATIENT)
Dept: NEUROLOGY | Facility: CLINIC | Age: 87
End: 2022-12-15

## 2022-12-15 NOTE — TELEPHONE ENCOUNTER
received [facility], yes, my name is Whit Amor  I'm calling in regards to my mother, Marcus Salazar, she had an EEG done on Tuesday, december 13th, and I saw the results are posted  Now will I get contacted with the results or Will the results get relayed? She has an appointment january 31st, I believe with neurology  So I was just wondering like what we find out about that  Thank you very much krystal   [phone]    Pt has a HFU scheduled with you   Can you advise on EEG results

## 2022-12-15 NOTE — TELEPHONE ENCOUNTER
There are no epileptiform discharges or focal abnormalities seen on study but this does not mean she did or did not have a seizure prior to hospitalization  I will review the testing in further detail with the patient and son at hospital follow up visit  In the future, whomever ordered the testing is responsible for reviewing results and calling results to patient if they are ordering testing to be done after discharge  I have not yet established with this patient and can not provide further recommendations at this time  show

## 2022-12-20 ENCOUNTER — TELEPHONE (OUTPATIENT)
Dept: NEUROLOGY | Facility: CLINIC | Age: 87
End: 2022-12-20

## 2022-12-20 NOTE — TELEPHONE ENCOUNTER
Received vm-Yes, I was wondering, my mother in law has an appointment on January 31st at 245  I was just wondering if my , her oldest son can be at the appointment With [facility], the 2nd son  Can you just please call me back? Her name is Chuck Davenport  Thank you    [phone]    Called and advised that as of now there are no restrictions in regards to how many people can be in the room but did advise that could potentially change

## 2023-01-09 ENCOUNTER — APPOINTMENT (EMERGENCY)
Dept: CT IMAGING | Facility: HOSPITAL | Age: 88
End: 2023-01-09

## 2023-01-09 ENCOUNTER — HOSPITAL ENCOUNTER (INPATIENT)
Facility: HOSPITAL | Age: 88
LOS: 4 days | Discharge: HOME | End: 2023-01-13
Attending: EMERGENCY MEDICINE | Admitting: INTERNAL MEDICINE

## 2023-01-09 ENCOUNTER — APPOINTMENT (EMERGENCY)
Dept: RADIOLOGY | Facility: HOSPITAL | Age: 88
End: 2023-01-09

## 2023-01-09 ENCOUNTER — TELEPHONE (OUTPATIENT)
Dept: NEUROLOGY | Facility: CLINIC | Age: 88
End: 2023-01-09

## 2023-01-09 DIAGNOSIS — R53.1 GENERALIZED WEAKNESS: ICD-10-CM

## 2023-01-09 DIAGNOSIS — I48.91 ATRIAL FIBRILLATION WITH SLOW VENTRICULAR RESPONSE (HCC): ICD-10-CM

## 2023-01-09 DIAGNOSIS — I48.19 PERSISTENT ATRIAL FIBRILLATION (HCC): ICD-10-CM

## 2023-01-09 DIAGNOSIS — I63.9 CVA (CEREBRAL VASCULAR ACCIDENT) (HCC): Primary | ICD-10-CM

## 2023-01-09 DIAGNOSIS — N39.0 UTI (URINARY TRACT INFECTION): ICD-10-CM

## 2023-01-09 DIAGNOSIS — R26.81 GAIT INSTABILITY: ICD-10-CM

## 2023-01-09 LAB
2HR DELTA HS TROPONIN: 0 NG/L
4HR DELTA HS TROPONIN: 6 NG/L
ALBUMIN SERPL BCP-MCNC: 3.4 G/DL (ref 3.5–5)
ALP SERPL-CCNC: 97 U/L (ref 46–116)
ALT SERPL W P-5'-P-CCNC: 18 U/L (ref 12–78)
ANION GAP SERPL CALCULATED.3IONS-SCNC: 10 MMOL/L (ref 4–13)
APTT PPP: 29 SECONDS (ref 23–37)
AST SERPL W P-5'-P-CCNC: 22 U/L (ref 5–45)
ATRIAL RATE: 312 BPM
BASOPHILS # BLD AUTO: 0.05 THOUSANDS/ÂΜL (ref 0–0.1)
BASOPHILS NFR BLD AUTO: 1 % (ref 0–1)
BILIRUB SERPL-MCNC: 0.33 MG/DL (ref 0.2–1)
BUN SERPL-MCNC: 22 MG/DL (ref 5–25)
CALCIUM ALBUM COR SERPL-MCNC: 9.8 MG/DL (ref 8.3–10.1)
CALCIUM SERPL-MCNC: 9.3 MG/DL (ref 8.3–10.1)
CARDIAC TROPONIN I PNL SERPL HS: 35 NG/L
CARDIAC TROPONIN I PNL SERPL HS: 35 NG/L
CARDIAC TROPONIN I PNL SERPL HS: 41 NG/L
CHLORIDE SERPL-SCNC: 108 MMOL/L (ref 96–108)
CO2 SERPL-SCNC: 24 MMOL/L (ref 21–32)
CREAT SERPL-MCNC: 0.88 MG/DL (ref 0.6–1.3)
EOSINOPHIL # BLD AUTO: 0.15 THOUSAND/ÂΜL (ref 0–0.61)
EOSINOPHIL NFR BLD AUTO: 3 % (ref 0–6)
ERYTHROCYTE [DISTWIDTH] IN BLOOD BY AUTOMATED COUNT: 17.6 % (ref 11.6–15.1)
FLUAV RNA RESP QL NAA+PROBE: NEGATIVE
FLUBV RNA RESP QL NAA+PROBE: NEGATIVE
GFR SERPL CREATININE-BSD FRML MDRD: 60 ML/MIN/1.73SQ M
GLUCOSE SERPL-MCNC: 102 MG/DL (ref 65–140)
HCT VFR BLD AUTO: 41.3 % (ref 34.8–46.1)
HGB BLD-MCNC: 13.6 G/DL (ref 11.5–15.4)
IMM GRANULOCYTES # BLD AUTO: 0.01 THOUSAND/UL (ref 0–0.2)
IMM GRANULOCYTES NFR BLD AUTO: 0 % (ref 0–2)
INR PPP: 1.18 (ref 0.84–1.19)
LYMPHOCYTES # BLD AUTO: 1.71 THOUSANDS/ÂΜL (ref 0.6–4.47)
LYMPHOCYTES NFR BLD AUTO: 28 % (ref 14–44)
MCH RBC QN AUTO: 30.7 PG (ref 26.8–34.3)
MCHC RBC AUTO-ENTMCNC: 32.9 G/DL (ref 31.4–37.4)
MCV RBC AUTO: 93 FL (ref 82–98)
MONOCYTES # BLD AUTO: 0.71 THOUSAND/ÂΜL (ref 0.17–1.22)
MONOCYTES NFR BLD AUTO: 12 % (ref 4–12)
NEUTROPHILS # BLD AUTO: 3.48 THOUSANDS/ÂΜL (ref 1.85–7.62)
NEUTS SEG NFR BLD AUTO: 56 % (ref 43–75)
NRBC BLD AUTO-RTO: 0 /100 WBCS
NT-PROBNP SERPL-MCNC: 3821 PG/ML
PLATELET # BLD AUTO: 117 THOUSANDS/UL (ref 149–390)
PMV BLD AUTO: 12.4 FL (ref 8.9–12.7)
POTASSIUM SERPL-SCNC: 4.2 MMOL/L (ref 3.5–5.3)
PROT SERPL-MCNC: 7.4 G/DL (ref 6.4–8.4)
PROTHROMBIN TIME: 15 SECONDS (ref 11.6–14.5)
QRS AXIS: 24 DEGREES
QRSD INTERVAL: 92 MS
QT INTERVAL: 426 MS
QTC INTERVAL: 407 MS
RBC # BLD AUTO: 4.43 MILLION/UL (ref 3.81–5.12)
RSV RNA RESP QL NAA+PROBE: NEGATIVE
SARS-COV-2 RNA RESP QL NAA+PROBE: NEGATIVE
SODIUM SERPL-SCNC: 142 MMOL/L (ref 135–147)
T WAVE AXIS: 115 DEGREES
TSH SERPL DL<=0.05 MIU/L-ACNC: 1.94 UIU/ML (ref 0.45–4.5)
VENTRICULAR RATE: 55 BPM
WBC # BLD AUTO: 6.11 THOUSAND/UL (ref 4.31–10.16)

## 2023-01-09 RX ORDER — ZONISAMIDE 100 MG/1
100 CAPSULE ORAL DAILY
Status: DISCONTINUED | OUTPATIENT
Start: 2023-01-10 | End: 2023-01-13 | Stop reason: HOSPADM

## 2023-01-09 RX ORDER — POLYETHYLENE GLYCOL 3350 17 G/17G
17 POWDER, FOR SOLUTION ORAL DAILY
Status: DISCONTINUED | OUTPATIENT
Start: 2023-01-10 | End: 2023-01-13 | Stop reason: HOSPADM

## 2023-01-09 RX ORDER — MAGNESIUM HYDROXIDE/ALUMINUM HYDROXICE/SIMETHICONE 120; 1200; 1200 MG/30ML; MG/30ML; MG/30ML
30 SUSPENSION ORAL EVERY 6 HOURS PRN
Status: DISCONTINUED | OUTPATIENT
Start: 2023-01-09 | End: 2023-01-13 | Stop reason: HOSPADM

## 2023-01-09 RX ORDER — ACETAMINOPHEN 325 MG/1
650 TABLET ORAL EVERY 4 HOURS PRN
Status: DISCONTINUED | OUTPATIENT
Start: 2023-01-09 | End: 2023-01-13 | Stop reason: HOSPADM

## 2023-01-09 RX ORDER — QUETIAPINE FUMARATE 25 MG/1
12.5 TABLET, FILM COATED ORAL
Status: DISCONTINUED | OUTPATIENT
Start: 2023-01-09 | End: 2023-01-13 | Stop reason: HOSPADM

## 2023-01-09 RX ORDER — FUROSEMIDE 40 MG/1
40 TABLET ORAL DAILY
Status: DISCONTINUED | OUTPATIENT
Start: 2023-01-10 | End: 2023-01-11

## 2023-01-09 RX ORDER — ONDANSETRON 2 MG/ML
4 INJECTION INTRAMUSCULAR; INTRAVENOUS EVERY 6 HOURS PRN
Status: DISCONTINUED | OUTPATIENT
Start: 2023-01-09 | End: 2023-01-13 | Stop reason: HOSPADM

## 2023-01-09 RX ORDER — LANOLIN ALCOHOL/MO/W.PET/CERES
3 CREAM (GRAM) TOPICAL
Status: DISCONTINUED | OUTPATIENT
Start: 2023-01-09 | End: 2023-01-13 | Stop reason: HOSPADM

## 2023-01-09 RX ORDER — ATORVASTATIN CALCIUM 40 MG/1
40 TABLET, FILM COATED ORAL EVERY EVENING
Status: DISCONTINUED | OUTPATIENT
Start: 2023-01-09 | End: 2023-01-13 | Stop reason: HOSPADM

## 2023-01-09 RX ORDER — CALCIUM CARBONATE 200(500)MG
500 TABLET,CHEWABLE ORAL DAILY
Status: DISCONTINUED | OUTPATIENT
Start: 2023-01-10 | End: 2023-01-13 | Stop reason: HOSPADM

## 2023-01-09 RX ADMIN — MELATONIN 3 MG: at 21:21

## 2023-01-09 RX ADMIN — SODIUM CHLORIDE 500 ML: 0.9 INJECTION, SOLUTION INTRAVENOUS at 17:13

## 2023-01-09 NOTE — TELEPHONE ENCOUNTER
Called sons number to try and get patient in for sooner apt tomorrow 12:45pm did not leave a message

## 2023-01-09 NOTE — ED PROVIDER NOTES
History  Chief Complaint   Patient presents with   • Fatigue     Pt brought by ems from [facility] care  Staff stated that pt was more lethargic today  Pt oriented to self  81 y/o female with generalized weakness noticed today at NH/Memory Care unit  Pt  Denies cp, sob, abd  Pain, n/v/d or headache  Pt  Is on eliquis for a fib  pt  States that she normally walks with a walker and was having some trouble getting around today  Prior to Admission Medications   Prescriptions Last Dose Informant Patient Reported? Taking?    Ascorbic Acid (VITAMIN C) 500 MG CAPS   Yes Yes   Sig: Take 500 mg by mouth daily   Cholecalciferol (Vitamin D3) 1 25 MG (53402 UT) CAPS   Yes Yes   Cranberry 450 MG CAPS   Yes Yes   Sig: Take 1 capsule by mouth in the morning   Omega-3 Fatty Acids (fish oil) 1,000 mg   Yes Yes   QUEtiapine (SEROquel) 25 mg tablet   No Yes   Sig: Take 0 5 tablets (12 5 mg total) by mouth daily at bedtime Hold if sedated   QUEtiapine (SEROquel) 25 mg tablet   No No   Sig: Take 0 5 tablets (12 5 mg total) by mouth once for 1 dose Hold if sedated   acetaminophen (TYLENOL) 500 mg tablet   Yes Yes   Sig: Take 500 mg by mouth every 6 (six) hours as needed for mild pain   apixaban (ELIQUIS) 5 mg   Yes Yes   Sig: Take 5 mg by mouth 2 (two) times a day   atorvastatin (LIPITOR) 10 mg tablet   Yes Yes   Sig: Take 10 mg by mouth daily   bisacodyl (DULCOLAX) 5 mg EC tablet   Yes No   Sig: Take 5 mg by mouth daily as needed for constipation   calcium carbonate (TUMS) 500 mg chewable tablet   Yes Yes   Sig: Chew 1 tablet daily   docusate sodium (COLACE) 100 mg capsule   Yes Yes   Sig: Take 100 mg by mouth 2 (two) times a day   furosemide (LASIX) 40 mg tablet   Yes Yes   Sig: Take 1 tablet by mouth daily   polyethylene glycol (MIRALAX) 17 g packet   Yes Yes   Sig: Take 17 g by mouth daily   potassium chloride (K-DUR,KLOR-CON) 20 mEq tablet   Yes Yes   Sig: Take 20 mEq by mouth daily   thiamine (VITAMIN B1) 100 mg tablet   Yes Yes   zonisamide (ZONEGRAN) 100 mg capsule   No Yes   Sig: Take 1 capsule (100 mg total) by mouth daily Do not start before November 19, 2022  Facility-Administered Medications: None       Past Medical History:   Diagnosis Date   • A-fib ([address] )    • CAD (coronary artery disease)    • CKD (chronic kidney disease) stage 2, GFR 60-89 ml/min    • Hypertension    • Memory loss    • Urinary tract infection        Past Surgical History:   Procedure Laterality Date   • CHOLECYSTECTOMY     • REPLACEMENT TOTAL KNEE Left 2008       Family History   Problem Relation Age of Onset   • Lung cancer Father    • Leukemia Brother    • Breast cancer Daughter      I have reviewed and agree with the history as documented  E-Cigarette/Vaping   • E-Cigarette Use Never User      E-Cigarette/Vaping Substances   • Nicotine No    • THC No    • CBD No    • Flavoring No    • Other No    • Unknown No      Social History     Tobacco Use   • Smoking status: Never   • Smokeless tobacco: Never   Vaping Use   • Vaping Use: Never used   Substance Use Topics   • Alcohol use: Never   • Drug use: Never       Review of Systems   Unable to perform ROS: Dementia   Constitutional: Positive for fatigue  Respiratory: Negative for shortness of breath  Cardiovascular: Negative for chest pain  Gastrointestinal: Negative for diarrhea and vomiting  Neurological: Positive for weakness  Negative for headaches  Physical Exam  Physical Exam  Vitals and nursing note reviewed  Constitutional:       Appearance: She is well-developed  HENT:      Head: Normocephalic and atraumatic  Right Ear: External ear normal       Left Ear: External ear normal       Mouth/Throat:      Mouth: Mucous membranes are dry  Eyes:      General: No scleral icterus  Extraocular Movements: Extraocular movements intact  Pupils: Pupils are equal, round, and reactive to light     Cardiovascular:      Rate and Rhythm: Normal rate and regular rhythm  Pulmonary:      Effort: Pulmonary effort is normal  No respiratory distress  Breath sounds: Normal breath sounds  Abdominal:      Palpations: Abdomen is soft  Tenderness: There is no abdominal tenderness  Musculoskeletal:         General: No deformity or signs of injury  Normal range of motion  Cervical back: Normal range of motion and neck supple  Skin:     General: Skin is warm and dry  Coloration: Skin is not jaundiced or pale  Neurological:      General: No focal deficit present  Mental Status: She is alert and oriented to person, place, and time  Cranial Nerves: No cranial nerve deficit  Sensory: No sensory deficit  Motor: No weakness  Comments: Moves all ext      Psychiatric:         Mood and Affect: Mood normal          Behavior: Behavior normal          Vital Signs  ED Triage Vitals   Temperature Pulse Respirations Blood Pressure SpO2   01/09/23 1801 01/09/23 1652 01/09/23 1652 01/09/23 1652 01/09/23 1652   97 6 °F (36 4 °C) 59 18 148/80 97 %      Temp Source Heart Rate Source Patient Position - Orthostatic VS BP Location FiO2 (%)   01/09/23 1801 01/09/23 1652 01/09/23 1652 01/09/23 1652 --   Oral Monitor Sitting Right arm       Pain Score       01/09/23 1652       No Pain           Vitals:    01/09/23 1652 01/09/23 1907 01/09/23 2123   BP: 148/80 168/77 168/79   Pulse: 59 56 (!) 51   Patient Position - Orthostatic VS: Sitting Lying          Visual Acuity      ED Medications  Medications   melatonin tablet 3 mg (3 mg Oral Given 1/9/23 2121)   sodium chloride 0 9 % bolus 500 mL (0 mL Intravenous Stopped 1/9/23 1802)       Diagnostic Studies  Results Reviewed     Procedure Component Value Units Date/Time    HS Troponin I 4hr [[id number]] Collected: 01/09/23 2121    Lab Status: No result Specimen: Blood from Arm, Left     HS Troponin I 2hr [[id number]]  (Normal) Collected: 01/09/23 1906    Lab Status: Final result Specimen: Blood from Arm, Left Updated: 01/09/23 1941     hs TnI 2hr 35 ng/L      Delta 2hr hsTnI 0 ng/L     Protime-INR [[id number]]  (Abnormal) Collected: 01/09/23 1714    Lab Status: Final result Specimen: Blood from Arm, Left Updated: 01/09/23 1815     Protime 15 0 seconds      INR 1 18    APTT [[id number]]  (Normal) Collected: 01/09/23 1714    Lab Status: Final result Specimen: Blood from Arm, Left Updated: 01/09/23 1815     PTT 29 seconds     FLU/RSV/COVID - if FLU/RSV clinically relevant [[id number]]  (Normal) Collected: 01/09/23 1714    Lab Status: Final result Specimen: Nares from Nose Updated: 01/09/23 1803     SARS-CoV-2 Negative     INFLUENZA A PCR Negative     INFLUENZA B PCR Negative     RSV PCR Negative    Narrative:      FOR PEDIATRIC PATIENTS - copy/paste COVID Guidelines URL to browser: [email or web address]  [employer]    SARS-CoV-2 assay is a Nucleic Acid Amplification assay intended for the  qualitative detection of nucleic acid from SARS-CoV-2 in nasopharyngeal  swabs  Results are for the presumptive identification of SARS-CoV-2 RNA  Positive results are indicative of infection with SARS-CoV-2, the virus  causing COVID-19, but do not rule out bacterial infection or co-infection  with other viruses  Laboratories within the [place] and its  territories are required to report all positive results to the appropriate  public health authorities  Negative results do not preclude SARS-CoV-2  infection and should not be used as the sole basis for treatment or other  patient management decisions  Negative results must be combined with  clinical observations, patient history, and epidemiological information  This test has not been FDA cleared or approved  This test has been authorized by FDA under an Emergency Use Authorization  (EUA)   This test is only authorized for the duration of time the  declaration that circumstances exist justifying the authorization of the  emergency use of an in vitro diagnostic tests for detection of SARS-CoV-2  virus and/or diagnosis of COVID-19 infection under section 564(b)(1) of  the Act, 21 U  S C  367SYX-4(R)(6), unless the authorization is terminated  or revoked sooner  The test has been validated but independent review by FDA  and CLIA is pending  Test performed using [employer]: This RT-PCR assay targets N2,  a region unique to SARS-CoV-2  A conserved region in the E-gene was chosen  for pan-Sarbecovirus detection which includes SARS-CoV-2  According to CMS-2020-01-R, this platform meets the definition of high-throughput technology  TSH [[id number]]  (Normal) Collected: 01/09/23 1714    Lab Status: Final result Specimen: Blood from Arm, Left Updated: 01/09/23 1746     TSH 3RD GENERATON 1 937 uIU/mL     Narrative:      Patients undergoing fluorescein dye angiography may retain small amounts of fluorescein in the body for 48-72 hours post procedure  Samples containing fluorescein can produce falsely depressed TSH values  If the patient had this procedure,a specimen should be resubmitted post fluorescein clearance        NT-BNP PRO [[id number]]  (Abnormal) Collected: 01/09/23 1714    Lab Status: Final result Specimen: Blood from Arm, Left Updated: 01/09/23 1746     NT-proBNP 3,821 pg/mL     HS Troponin 0hr (reflex protocol) [[id number]]  (Normal) Collected: 01/09/23 1714    Lab Status: Final result Specimen: Blood from Arm, Left Updated: 01/09/23 1744     hs TnI 0hr 35 ng/L     Comprehensive metabolic panel [[id number]]  (Abnormal) Collected: 01/09/23 1714    Lab Status: Final result Specimen: Blood from Arm, Left Updated: 01/09/23 1737     Sodium 142 mmol/L      Potassium 4 2 mmol/L      Chloride 108 mmol/L      CO2 24 mmol/L      ANION GAP 10 mmol/L      BUN 22 mg/dL      Creatinine 0 88 mg/dL      Glucose 102 mg/dL      Calcium 9 3 mg/dL      Corrected Calcium 9 8 mg/dL      AST 22 U/L      ALT 18 U/L      Alkaline Phosphatase 97 U/L      Total Protein 7 4 g/dL      Albumin 3 4 g/dL      Total Bilirubin 0 33 mg/dL      eGFR 60 ml/min/1 73sq m     Narrative:      National Kidney Disease Foundation guidelines for Chronic Kidney Disease (CKD):   •  Stage 1 with normal or high GFR (GFR > 90 mL/min/1 73 square meters)  •  Stage 2 Mild CKD (GFR = 60-89 mL/min/1 73 square meters)  •  Stage 3A Moderate CKD (GFR = 45-59 mL/min/1 73 square meters)  •  Stage 3B Moderate CKD (GFR = 30-44 mL/min/1 73 square meters)  •  Stage 4 Severe CKD (GFR = 15-29 mL/min/1 73 square meters)  •  Stage 5 End Stage CKD (GFR <15 mL/min/1 73 square meters)  Note: GFR calculation is accurate only with a steady state creatinine    CBC and differential [[id number]]  (Abnormal) Collected: 01/09/23 1714    Lab Status: Final result Specimen: Blood from Arm, Left Updated: 01/09/23 1720     WBC 6 11 Thousand/uL      RBC 4 43 Million/uL      Hemoglobin 13 6 g/dL      Hematocrit 41 3 %      MCV 93 fL      MCH 30 7 pg      MCHC 32 9 g/dL      RDW 17 6 %      MPV 12 4 fL      Platelets 777 Thousands/uL      nRBC 0 /100 WBCs      Neutrophils Relative 56 %      Immat GRANS % 0 %      Lymphocytes Relative 28 %      Monocytes Relative 12 %      Eosinophils Relative 3 %      Basophils Relative 1 %      Neutrophils Absolute 3 48 Thousands/µL      Immature Grans Absolute 0 01 Thousand/uL      Lymphocytes Absolute 1 71 Thousands/µL      Monocytes Absolute 0 71 Thousand/µL      Eosinophils Absolute 0 15 Thousand/µL      Basophils Absolute 0 05 Thousands/µL                  CT head without contrast   Final Result by Jolanta Fulton MD (01/09 1742)      Interval development of hypodensity in the left parietal lobe image 2/24 most consistent with subacute infarct  Consider MRI brain for confirmation  Stable cutaneous scalp lesion in the left parietal region measures 1 5 cm on image 3/37              Workstation performed: [id number]         XR chest 1 view portable    (Results Pending) Procedures  ECG 12 Lead Documentation Only    Date/Time: 1/9/2023 4:59 PM  Performed by: Dawood Ortiz MD  Authorized by: Dawood Ortiz MD     Rate:     ECG rate:  55    ECG rate assessment: bradycardic    Rhythm:     Rhythm: atrial fibrillation    ST segments:     ST segments:  Non-specific             ED Course                               SBIRT 22yo+    Flowsheet Row Most Recent Value   SBIRT (25 yo +)    In order to provide better care to our patients, we are screening all of our patients for alcohol and drug use  Would it be okay to ask you these screening questions? Unable to answer at this time Filed at: 01/09/2023 1718                    Medical Decision Making  Pt  Admitted for for further workup for subacute CVA    CVA (cerebral vascular accident) [facility]): acute illness or injury     Details: subacute  Gait instability: acute illness or injury  Generalized weakness: acute illness or injury  Amount and/or Complexity of Data Reviewed  Labs: ordered  Radiology: ordered  Risk  Decision regarding hospitalization            Disposition  Final diagnoses:   CVA (cerebral vascular accident) ([facility] [address] )   Generalized weakness   Gait instability     Time reflects when diagnosis was documented in both MDM as applicable and the Disposition within this note     Time User Action Codes Description Comment    1/9/2023  7:06 PM Alla Garcia Add [I63 9] CVA (cerebral vascular accident) ([facility] [address] )     1/9/2023  7:06 PM Alla Garcia Add [R53 1] Generalized weakness     1/9/2023  7:06 PM Tevin Garcia Add [R26 81] Gait instability       ED Disposition     ED Disposition   Admit    Condition   Stable    Date/Time   Mon Jan 9, 2023  7:05 PM    Comment   Case was discussed with LEANNE  and the patient's admission status was agreed to be inpt/tele         Follow-up Information    None         Patient's Medications   Discharge Prescriptions    No medications on file       No discharge procedures on file      PDMP Review     None          ED Provider  Electronically Signed by           Nirav Amador MD  01/09/23 4296

## 2023-01-10 ENCOUNTER — APPOINTMENT (INPATIENT)
Dept: CT IMAGING | Facility: HOSPITAL | Age: 88
End: 2023-01-10

## 2023-01-10 ENCOUNTER — APPOINTMENT (INPATIENT)
Dept: NON INVASIVE DIAGNOSTICS | Facility: HOSPITAL | Age: 88
End: 2023-01-10

## 2023-01-10 ENCOUNTER — TELEPHONE (OUTPATIENT)
Dept: NEUROLOGY | Facility: CLINIC | Age: 88
End: 2023-01-10

## 2023-01-10 PROBLEM — Z86.73 HISTORY OF STROKE: Status: ACTIVE | Noted: 2023-01-10

## 2023-01-10 PROBLEM — R53.83 LETHARGY: Status: ACTIVE | Noted: 2023-01-09

## 2023-01-10 LAB
ALBUMIN SERPL BCP-MCNC: 3.2 G/DL (ref 3.5–5)
ALP SERPL-CCNC: 79 U/L (ref 46–116)
ALT SERPL W P-5'-P-CCNC: 17 U/L (ref 12–78)
ANION GAP SERPL CALCULATED.3IONS-SCNC: 8 MMOL/L (ref 4–13)
AST SERPL W P-5'-P-CCNC: 22 U/L (ref 5–45)
ATRIAL RATE: 312 BPM
ATRIAL RATE: 326 BPM
BACTERIA UR QL AUTO: ABNORMAL /HPF
BILIRUB SERPL-MCNC: 0.52 MG/DL (ref 0.2–1)
BILIRUB UR QL STRIP: NEGATIVE
BUN SERPL-MCNC: 21 MG/DL (ref 5–25)
CALCIUM ALBUM COR SERPL-MCNC: 9.9 MG/DL (ref 8.3–10.1)
CALCIUM SERPL-MCNC: 9.3 MG/DL (ref 8.3–10.1)
CHLORIDE SERPL-SCNC: 109 MMOL/L (ref 96–108)
CHOLEST SERPL-MCNC: 135 MG/DL
CLARITY UR: ABNORMAL
CO2 SERPL-SCNC: 24 MMOL/L (ref 21–32)
COLOR UR: COLORLESS
CREAT SERPL-MCNC: 0.8 MG/DL (ref 0.6–1.3)
ERYTHROCYTE [DISTWIDTH] IN BLOOD BY AUTOMATED COUNT: 17.6 % (ref 11.6–15.1)
EST. AVERAGE GLUCOSE BLD GHB EST-MCNC: 108 MG/DL
GFR SERPL CREATININE-BSD FRML MDRD: 67 ML/MIN/1.73SQ M
GLUCOSE SERPL-MCNC: 87 MG/DL (ref 65–140)
GLUCOSE UR STRIP-MCNC: NEGATIVE MG/DL
HBA1C MFR BLD: 5.4 %
HCT VFR BLD AUTO: 38.9 % (ref 34.8–46.1)
HDLC SERPL-MCNC: 52 MG/DL
HGB BLD-MCNC: 12.9 G/DL (ref 11.5–15.4)
HGB UR QL STRIP.AUTO: ABNORMAL
KETONES UR STRIP-MCNC: NEGATIVE MG/DL
LDLC SERPL CALC-MCNC: 67 MG/DL (ref 0–100)
LEUKOCYTE ESTERASE UR QL STRIP: ABNORMAL
MCH RBC QN AUTO: 30.4 PG (ref 26.8–34.3)
MCHC RBC AUTO-ENTMCNC: 33.2 G/DL (ref 31.4–37.4)
MCV RBC AUTO: 92 FL (ref 82–98)
NITRITE UR QL STRIP: NEGATIVE
NON-SQ EPI CELLS URNS QL MICRO: ABNORMAL /HPF
PH UR STRIP.AUTO: 6 [PH]
PLATELET # BLD AUTO: 103 THOUSANDS/UL (ref 149–390)
PMV BLD AUTO: 11.9 FL (ref 8.9–12.7)
POTASSIUM SERPL-SCNC: 3.8 MMOL/L (ref 3.5–5.3)
PROT SERPL-MCNC: 6.6 G/DL (ref 6.4–8.4)
PROT UR STRIP-MCNC: NEGATIVE MG/DL
QRS AXIS: 57 DEGREES
QRS AXIS: 58 DEGREES
QRSD INTERVAL: 86 MS
QRSD INTERVAL: 88 MS
QT INTERVAL: 416 MS
QT INTERVAL: 430 MS
QTC INTERVAL: 397 MS
QTC INTERVAL: 403 MS
RBC # BLD AUTO: 4.25 MILLION/UL (ref 3.81–5.12)
RBC #/AREA URNS AUTO: ABNORMAL /HPF
SODIUM SERPL-SCNC: 141 MMOL/L (ref 135–147)
SP GR UR STRIP.AUTO: <=1.005 (ref 1–1.03)
T WAVE AXIS: 157 DEGREES
T WAVE AXIS: 68 DEGREES
TRIGL SERPL-MCNC: 78 MG/DL
UROBILINOGEN UR QL STRIP.AUTO: 0.2 E.U./DL
VENTRICULAR RATE: 53 BPM
VENTRICULAR RATE: 55 BPM
WBC # BLD AUTO: 5 THOUSAND/UL (ref 4.31–10.16)
WBC #/AREA URNS AUTO: ABNORMAL /HPF

## 2023-01-10 RX ADMIN — APIXABAN 5 MG: 5 TABLET, FILM COATED ORAL at 09:13

## 2023-01-10 RX ADMIN — IOHEXOL 85 ML: 350 INJECTION, SOLUTION INTRAVENOUS at 10:25

## 2023-01-10 RX ADMIN — CALCIUM CARBONATE (ANTACID) CHEW TAB 500 MG 500 MG: 500 CHEW TAB at 09:12

## 2023-01-10 RX ADMIN — ATORVASTATIN CALCIUM 40 MG: 40 TABLET, FILM COATED ORAL at 00:16

## 2023-01-10 RX ADMIN — ATORVASTATIN CALCIUM 40 MG: 40 TABLET, FILM COATED ORAL at 17:51

## 2023-01-10 RX ADMIN — APIXABAN 5 MG: 5 TABLET, FILM COATED ORAL at 17:51

## 2023-01-10 RX ADMIN — QUETIAPINE FUMARATE 12.5 MG: 25 TABLET ORAL at 00:16

## 2023-01-10 RX ADMIN — MELATONIN 3 MG: at 21:06

## 2023-01-10 RX ADMIN — FUROSEMIDE 40 MG: 40 TABLET ORAL at 09:12

## 2023-01-10 RX ADMIN — QUETIAPINE FUMARATE 12.5 MG: 25 TABLET ORAL at 21:06

## 2023-01-10 RX ADMIN — ZONISAMIDE 100 MG: 100 CAPSULE ORAL at 09:13

## 2023-01-10 NOTE — H&P
[address] [facility]  H&P- James Cotton 1935, 82 y o  female MRN: [id number]  Unit/Bed#: ED 18 Encounter: [id number]  Primary Care Provider: Ac Valente DO   Date and time admitted to hospital: 1/9/2023  4:50 PM    Stroke [facility])  Assessment & Plan  Presents today from memory care unit for generalized weakness, trouble walking with her walker  Patient was tangential in though, oriented to self, place and event  - VS: afebrile, normotensive, on room air   - Labs: unremarkable   - Imaging:  CTH hypodensity of left parietal lobe image consistent with subacute infarct   Diffdx: encephalopathy, seizure, CVA/TIA    Plan  - stroke pathway  - MRI brain, TTE   - neuro checks, telemetry  - check lipid panel, A1c   - continue PTA eliquis, if MRI brain shows a new stroke consider Eliquis failure and change to Pradaxa   - consults: neurology, PM&R, PT/OT    Seizure [facility])  Assessment & Plan  Denies loss of consciousness, no reported seizure like activity from nursing unit, no evidence tongue bite/incontinence   - monitor for seizure activity  - continue PTA zonisamide 100mg      Chronic diastolic heart failure (HCC)  Assessment & Plan  Wt Readings from Last 3 Encounters:   11/14/22 77 8 kg (171 lb 8 3 oz)   09/29/22 77 7 kg (171 lb 3 2 oz)   06/22/22 76 7 kg (169 lb 3 2 oz)     No evidence of overt volume overload  - continue PTA diuretics        Amnestic MCI (mild cognitive impairment with memory loss)  Assessment & Plan  - high risk for hospital acquired delirium: avoid opioids, benzodiazepines, antihistamines  maintain circadian rhythm lights/TV out at night, have hearing aids/glasses close, frequent reorientation   If agitated risk to self/others antipsychotics/safety sitter      Atrial fibrillation ([facility] [address] )  Assessment & Plan  HR controlled on admission, denies chest pain/palpitations   - continue PTA eliquis     VTE Pharmacologic Prophylaxis: VTE Score: 4 Moderate Risk (Score 3-4) - Pharmacological DVT Prophylaxis Ordered: apixaban (Eliquis)  Code Status: Prior   Discussion with family: Updated  (son) via phone  Anticipated Length of Stay: Patient will be admitted on an inpatient basis with an anticipated length of stay of greater than 2 midnights secondary to cva  Total Time for Visit, including Counseling / Coordination of Care: 60 minutes Greater than 50% of this total time spent on direct patient counseling and coordination of care  Chief Complaint: weakness    History of Present Illness:  Jeanne Kinney is a 82 y o  female with a PMH of dementia, seizures, atrial fibrillation on eliquis, HTN, HLD and others listed below who presents with weakness  On my exam the patient was oriented to herself and place  Could not tell me a consistent history of what occurred, tangential in thought, requiring some redirection  She states she can typically get around without a problem  As of recent she has had a much harder time walking even with her walker  She has had no change in appetite and is eating well  Denies recent illness or sick contacts  Denies headache, change in vision, chest pain, shortness of breath, nausea/vomiting/diarrhea  Review of Systems:  Review of Systems   Unable to perform ROS: Dementia   Constitutional: Positive for activity change and fatigue  Neurological: Positive for weakness  Negative for syncope  Past Medical and Surgical History:   Past Medical History:   Diagnosis Date   • A-fib ([facility] [address] )    • CAD (coronary artery disease)    • CKD (chronic kidney disease) stage 2, GFR 60-89 ml/min    • Hypertension    • Memory loss    • Urinary tract infection        Past Surgical History:   Procedure Laterality Date   • CHOLECYSTECTOMY     • REPLACEMENT TOTAL KNEE Left 2008       Meds/Allergies:  Prior to Admission medications    Medication Sig Start Date End Date Taking?  Authorizing Provider   acetaminophen (TYLENOL) 500 mg tablet Take 500 mg by mouth every 6 (six) hours as needed for mild pain   Yes Historical Provider, MD   apixaban (ELIQUIS) 5 mg Take 5 mg by mouth 2 (two) times a day   Yes Historical Provider, MD   Ascorbic Acid (VITAMIN C) 500 MG CAPS Take 500 mg by mouth daily   Yes Historical Provider, MD   atorvastatin (LIPITOR) 10 mg tablet Take 10 mg by mouth daily   Yes Historical Provider, MD   calcium carbonate (TUMS) 500 mg chewable tablet Chew 1 tablet daily   Yes Historical Provider, MD   Cholecalciferol (Vitamin D3) 1 25 MG (36597 UT) CAPS    Yes Historical Provider, MD   Cranberry 450 MG CAPS Take 1 capsule by mouth in the morning   Yes Historical Provider, MD   docusate sodium (COLACE) 100 mg capsule Take 100 mg by mouth 2 (two) times a day   Yes Historical Provider, MD   furosemide (LASIX) 40 mg tablet Take 1 tablet by mouth daily 2/20/20  Yes Historical Provider, MD   Omega-3 Fatty Acids (fish oil) 1,000 mg    Yes Historical Provider, MD   polyethylene glycol (MIRALAX) 17 g packet Take 17 g by mouth daily   Yes Historical Provider, MD   potassium chloride (K-DUR,KLOR-CON) 20 mEq tablet Take 20 mEq by mouth daily   Yes Historical Provider, MD   QUEtiapine (SEROquel) 25 mg tablet Take 0 5 tablets (12 5 mg total) by mouth daily at bedtime Hold if sedated 11/18/22  Yes Guanako Shoemaker,    thiamine (VITAMIN B1) 100 mg tablet    Yes Historical Provider, MD   zonisamide (ZONEGRAN) 100 mg capsule Take 1 capsule (100 mg total) by mouth daily Do not start before November 19, 2022 11/19/22  Yes Guanako Shoemaker,    bisacodyl (DULCOLAX) 5 mg EC tablet Take 5 mg by mouth daily as needed for constipation    Historical Provider, MD   QUEtiapine (SEROquel) 25 mg tablet Take 0 5 tablets (12 5 mg total) by mouth once for 1 dose Hold if sedated 11/18/22 11/18/22  Katerin Shoemaker, DO     I have reviewed home medications using recent Epic encounter  Allergies: Allergies   Allergen Reactions   • Morphine        Social History:  Marital Status:   Occupation:   Patient Pre-hospital Living Situation: Assisted Living  Patient Pre-hospital Level of Mobility: walks with walker  Patient Pre-hospital Diet Restrictions:   Substance Use History:   Social History     Substance and Sexual Activity   Alcohol Use Never     Social History     Tobacco Use   Smoking Status Never   Smokeless Tobacco Never     Social History     Substance and Sexual Activity   Drug Use Never       Family History:  Family History   Problem Relation Age of Onset   • Lung cancer Father    • Leukemia Brother    • Breast cancer Daughter        Physical Exam:     Vitals:   Blood Pressure: 168/77 (01/09/23 1907)  Pulse: 56 (01/09/23 1907)  Temperature: 97 6 °F (36 4 °C) (01/09/23 1801)  Temp Source: Oral (01/09/23 1801)  Respirations: 18 (01/09/23 1907)  SpO2: 95 % (01/09/23 1907)    Physical Exam  Vitals and nursing note reviewed  Constitutional:       General: She is not in acute distress  Appearance: She is well-developed  HENT:      Head: Normocephalic and atraumatic  Eyes:      Conjunctiva/sclera: Conjunctivae normal    Cardiovascular:      Rate and Rhythm: Normal rate and regular rhythm  Heart sounds: No murmur heard  Pulmonary:      Effort: Pulmonary effort is normal  No respiratory distress  Breath sounds: Normal breath sounds  No wheezing or rales  Abdominal:      Palpations: Abdomen is soft  Tenderness: There is no abdominal tenderness  Musculoskeletal:         General: No swelling  Cervical back: Neck supple  Right lower leg: Edema present  Left lower leg: Edema present  Comments: Trace LE edema   Skin:     General: Skin is warm and dry  Capillary Refill: Capillary refill takes less than 2 seconds  Neurological:      Mental Status: She is alert  Mental status is at baseline  Sensory: No sensory deficit  Motor: No weakness        Coordination: Coordination normal       Comments: Speech intact   Psychiatric:         Mood and Affect: Mood normal           Additional Data:     Lab Results:  Results from last 7 days   Lab Units 01/09/23  1714   WBC Thousand/uL 6 11   HEMOGLOBIN g/dL 13 6   HEMATOCRIT % 41 3   PLATELETS Thousands/uL 117*   NEUTROS PCT % 56   LYMPHS PCT % 28   MONOS PCT % 12   EOS PCT % 3     Results from last 7 days   Lab Units 01/09/23  1714   SODIUM mmol/L 142   POTASSIUM mmol/L 4 2   CHLORIDE mmol/L 108   CO2 mmol/L 24   BUN mg/dL 22   CREATININE mg/dL 0 88   ANION GAP mmol/L 10   CALCIUM mg/dL 9 3   ALBUMIN g/dL 3 4*   TOTAL BILIRUBIN mg/dL 0 33   ALK PHOS U/L 97   ALT U/L 18   AST U/L 22   GLUCOSE RANDOM mg/dL 102     Results from last 7 days   Lab Units 01/09/23  1714   INR  1 18                   Lines/Drains:  Invasive Devices     Peripheral Intravenous Line  Duration           Peripheral IV 01/09/23 Dorsal (posterior); Left Forearm <1 day                    Imaging: Reviewed radiology reports from this admission including: CT head  CT head without contrast   Final Result by Mariah Perdomo MD (01/09 1742)      Interval development of hypodensity in the left parietal lobe image 2/24 most consistent with subacute infarct  Consider MRI brain for confirmation  Stable cutaneous scalp lesion in the left parietal region measures 1 5 cm on image 3/37  Workstation performed: [id number]         XR chest 1 view portable    (Results Pending)       EKG and Other Studies Reviewed on Admission:   · EKG: Atrial fibrillation  HR 55     ** Please Note: This note has been constructed using a voice recognition system   **

## 2023-01-10 NOTE — ASSESSMENT & PLAN NOTE
- high risk for hospital acquired delirium: avoid opioids, benzodiazepines, antihistamines  maintain circadian rhythm lights/TV out at night, have hearing aids/glasses close, frequent reorientation   If agitated risk to self/others antipsychotics/safety sitter

## 2023-01-10 NOTE — OCCUPATIONAL THERAPY NOTE
Occupational Therapy Evaluation     Patient Name: Em Jimenez  VORCX'D Date: 1/10/2023  Problem List  Principal Problem:    Lethargy  Active Problems:    Atrial fibrillation ([facility] )    Amnestic MCI (mild cognitive impairment with memory loss)    Chronic diastolic heart failure ([facility] )    Seizure ([facility] )    History of stroke    Past Medical History  Past Medical History:   Diagnosis Date    A-fib ([facility] )     CAD (coronary artery disease)     CKD (chronic kidney disease) stage 2, GFR 60-89 ml/min     Hypertension     Memory loss     Urinary tract infection      Past Surgical History  Past Surgical History:   Procedure Laterality Date    CHOLECYSTECTOMY      REPLACEMENT TOTAL KNEE Left 2008         01/10/23 1432   OT Last Visit   OT Visit Date 01/10/23   Note Type   Note type Evaluation   Pain Assessment   Pain Assessment Tool 0-10   Pain Score No Pain   Restrictions/Precautions   Weight Bearing Precautions Per Order No   Other Precautions Cognitive; Chair Alarm; Bed Alarm; Fall Risk;Multiple lines;Telemetry   Home Living   Type of Home Assisted living  ([facility] per chart)   Home Layout One level; Able to live on main level with bedroom/bathroom; Performs ADLs on one level   Bathroom Shower/Tub Walk-in shower   Bathroom Toilet Standard   Bathroom Equipment Shower chair;Grab bars in shower;Grab bars around toilet   [facility]   Additional Comments pt reports they have started to assist her with some things at [facility]   Prior Function   Level of [place] Independent with functional mobility; Needs assistance with ADLs; Needs assistance with IADLS  (assist at times w/ LB ADLs)   Lives With Facility staff   Receives Help From Personal care attendant   IADLs Family/Friend/Other provides transportation; Family/Friend/Other provides meals; Family/Friend/Other provides medication management   Falls in the last 6 months 0  (unable to report)   Vocational Retired   Comments pt reports use of RW for mobility   Lifestyle   Autonomy per pt independent w/ UB ADLs, assist w/ LB ADLs at times, supervision functional transfers and mobility w/ RW, assist w/ IADLs   Reciprocal Relationships facility staff   Service to Others retired    Intrinsic Gratification read   Subjective   Subjective "I am okay"   ADL   Where Assessed Chair   Eating Assistance 5  Supervision/Setup   Grooming Assistance 4  Minimal Assistance   [address] 4  Minimal Assistance    [address] Deficit Setup;Steadying; Requires assistive device for steadying;Verbal cueing;Supervision/safety; Increased time to complete; Don/doff R shoe;Don/doff L shoe   Toileting Assistance  4  Minimal Assistance   Functional Assistance 4  Minimal Assistance   Bed Mobility   Supine to Sit 4  Minimal assistance   Additional items Assist x 1; Increased time required;Verbal cues;LE management; Bedrails;HOB elevated   Additional Comments increased time to complete   Transfers   Sit to Stand 4  Minimal assistance   Additional items Assist x 1; Increased time required;Verbal cues;Armrests   Stand to Sit 5  Supervision   Additional items Assist x 1; Increased time required;Armrests   Toilet transfer 4  Minimal assistance   Additional items Assist x 1; Increased time required;Verbal cues;Standard toilet  (grab bars)   Additional Comments cues for positioning and safety   Functional Mobility   Functional Mobility 4  Minimal assistance   Additional Comments assist x1 w/ increased time to complete   Additional items Rolling walker   Balance   Static Sitting Good   Dynamic Sitting Fair +   Static Standing Fair   Dynamic Standing Fair -   Ambulatory Poor +   Activity Tolerance   Activity Tolerance Patient limited by fatigue   Nurse Made Aware appropriate to see Cheli Amado   RUAMPARO Assessment   RUE Assessment WFL  (4-/5)   LUE Assessment   LUE Assessment WFL  (4-/5)   Hand Function   Gross Motor Coordination Functional  (increased time, slight dysmetria)   Fine Motor Coordination Functional   Sensation   Light Touch No apparent deficits   Vision-Basic Assessment   Current Vision Wears glasses all the time   Vision - Complex Assessment   Ocular Range of Motion Intact   Acuity Able to read clock/calendar on wall without difficulty   Perception   Inattention/Neglect Appears intact   Cognition   Overall Cognitive Status Impaired   Arousal/Participation Responsive; Cooperative   Attention Attends with cues to redirect   Orientation Level Oriented to person;Disoriented to situation;Disoriented to time;Disoriented to place (stated 5/22 as date)   Memory Decreased short term memory;Decreased recall of recent events;Decreased recall of precautions   Following Commands Follows one step commands with increased time or repetition   Comments impaired insight and safety awareness, implusive; Dementia at baseline   Assessment   Limitation Decreased ADL status; Decreased UE strength;Decreased cognition;Decreased Safe judgement during ADL;Decreased endurance;Decreased self-care trans;Decreased high-level ADLs   Prognosis Good   Assessment Pt is a 82 y o  female seen for OT evaluation s/p admit to [facility] on 1/9/2023 w/ r/o Stroke ([facility] ), increased difficulty walking  Comorbidities affecting pt's functional performance at time of assessment include: a-fib, CHF, seizure, CAD  CT head:  Left parietal lobe infarction is unchanged from the prior head CT from 11/14/2022 at least 8 weeks in age, although the report from the prior head CT indicates that a prior outside MRI report from 7/16/2022 described diffusion restriction in this region, indicative of a chronic left MCA region infarction  No new or evolving hypodensity to confirm interval infarction   MRI pending   Personal factors affecting pt at time of IE include:difficulty performing ADLS, difficulty performing IADLS , limited insight into deficits, flat affect and decreased initiation and engagement   Prior to admission, pt was living at [address] [place] [address] and reports setup-min assist ADLs, supervision functional mobility w/ RW, assist w/ IADLs  Upon evaluation: Pt requires min assist supine>sit bed mobility w/ increased time to complete, MIN assist sit>stand w/ VCs for hand placement and positioning, supervision stand>sit transfer w/ cues for hand placement, MIN assist x1 functional mobility w/ RW, MIN assist LB ADLs, setup UB ADLs 2* the following deficits impacting occupational performance: decreased strength and endurance, impaired balance, impaired activity tolerance, fall risk, expressive aphasia, increased time coordination tasks, decreased insight and safety awareness  Pt to benefit from continued skilled OT tx while in the hospital to address deficits as defined above and maximize level of functional independence w ADL's and functional mobility  Occupational Performance areas to address include: grooming, bathing/shower, toilet hygiene, dressing, health maintenance, functional mobility and clothing management, formal cognitive assessment, safety education  From OT standpoint, recommendation at time of d/c would be return to facility w/ HOME OT services  The patient's raw score on the AM-PAC Daily Activity inpatient short form is 19, standardized score is 40 22, greater than 39 4  Patients at this level are likely to benefit from discharge to home  Please refer to the recommendation of the Occupational Therapist for safe discharge planning  Goals   Patient Goals "sit out in the chair"   LT Time Frame 10-14   Long Term Goal please see below goals   Plan   Treatment Interventions ADL retraining;Functional transfer training;UE strengthening/ROM; Endurance training;Cognitive reorientation;Patient/family training;Equipment evaluation/education; Compensatory technique education; Energy conservation; Activityengagement   Goal Expiration Date 01/24/23   OT Frequency 2-3x/wk   Recommendation   OT Discharge Recommendation Return to facility with rehabilitation services   AM-PAC Daily Activity Inpatient   Lower Body Dressing 3   Bathing 3   Toileting 3   Upper Body Dressing 3   Grooming 3   Eating 4   Daily Activity Raw Score 19   Daily Activity Standardized Score (Calc for Raw Score >=11) 40 22   AM-PAC Applied Cognition Inpatient   Following a Speech/Presentation 2   Understanding Ordinary Conversation 3   Taking Medications 2   Remembering Where Things Are Placed or Put Away 2   Remembering List of 4-5 Errands 1   Taking Care of Complicated Tasks 1   Applied Cognition Raw Score 11   Applied Cognition Standardized Score 27 03   Modified [place] Scale   Modified [place] Scale 4   End of Consult   Education Provided Yes   Patient Position at End of Consult Bedside chair;Bed/Chair alarm activated; All needs within reach   Nurse Communication Nurse aware of consult     Occupational Therapy Goals to be met in 10-14 days:  1) Pt will improve activity tolerance to G for 30 min txment sessions to enhance ADLs  2) Pt will complete ADLs/self care w/ supervision  3) Pt will complete toileting w/ supervision w/ G hygiene/thoroughness using DME PRN  4) Pt will improve functional transfers on/off all surfaces using DME PRN w/ G balance/safety including toileting w/ supervision  5) Pt will improve fx'l mobility during I/ADl/leisure tasks using DME PRN w/ g balance/safety w/ supervision  6) Pt will engage in ongoing cognitive assessment w/ G participation to A w/ safe d/c planning/recommendations  7) Pt will demonstrate G carryover of pt/caregiver education and training as appropriate w/ mod I  w/ G tolerance  8) Pt will engage in depression screen/leisure interest checklist w/ G participation to monitor s/s depression and ID 3 positive coping strategies to A w/ emotional regulation and management  9) Pt will demonstrate 100% carryover of E C  techniques w/ mod I t/o fx'l I/ADL/leisure tasks w/o cues s/p skilled education  10) Pt will demonstrate improved bed mobility to MOD I to enhance ADLs  11) Pt will engage in activity configuration activity w/ G participation and mod I to increase time management skills and improve participation in a structured routine to improve overall quality of life  12) Pt will demonstrate improved standing tolerance to 3-5 minutes during functional tasks w/ Good - dynamic standing balance to enhance ADL performance  13) Pt will demonstrate improved b/l UE strength by 1 MMT grade to enhance ADLS and functional transfers  Documentation completed by: Carlos Skiff, MS, OTR/L

## 2023-01-10 NOTE — ASSESSMENT & PLAN NOTE
- Prior embolic L MCA infarct in July 2022 (after being taken off [facility] following a fall)  - Continue Afib on Eliquis 5mg twice daily

## 2023-01-10 NOTE — ASSESSMENT & PLAN NOTE
Presentsfrom memory care unit for generalized weakness, trouble walking with her walker  Patient was tangential in though, oriented to self, place and event  - VS: afebrile, normotensive, on room air   - Labs: unremarkable   - Imaging:  CTH hypodensity of left parietal lobe image consistent with subacute infarct   CTA head and neck reviewed  Await MRI of the brain and further stroke work-up    Diffdx: encephalopathy, seizure, CVA/TIA    Plan  -Continue with stroke pathway  -Follow-up on MRI brain, TTE   -Continue with serial neuro checks, maintain telemetry in the setting of pauses seen on telemetry   -Reviewed lipid panel, A1c   - continue PTA eliquis, if MRI brain shows a new stroke consider Eliquis failure and change to Pradaxa   - consults: neurology, cardiology PM&R, PT/OT

## 2023-01-10 NOTE — ASSESSMENT & PLAN NOTE
Patient is maintained on Eliquis  Is not on any AV sandro blocking agents  Telemetry since admission reviewed and noted multiple pauses of 3 or greater than 3 seconds  This was discussed with cardiology and cardiac  consultation was obtained  Cardiology to consider permanent pacemaker placement after discussion with EP  Follow-up on echocardiogram   Continue with telemetry monitoring  Johnnie Celis

## 2023-01-10 NOTE — PLAN OF CARE
Pt arrived on the unit; not speaking to RN; vitals were done; pt stated that she is not in any pain;

## 2023-01-10 NOTE — ASSESSMENT & PLAN NOTE
Denies loss of consciousness, no reported seizure like activity from nursing unit, no evidence tongue bite/incontinence   - monitor for seizure activity  - continue PTA zonisamide 100mg

## 2023-01-10 NOTE — TELEPHONE ENCOUNTER
Pt's son has called to re-scheduled the HFU on 01/11/23 with [facility] due to being admitted to hospital again  Re-scheduled for 01/20/23, Ruth Ann, Nathan Elizabeth  But he stated he will call back if appt needs to be changed        Thank you,     Beto Awad

## 2023-01-10 NOTE — UTILIZATION REVIEW
Initial Clinical Review    Admission: Date/Time/Statement:   Admission Orders (From admission, onward)     Ordered        01/09/23 Bertha 24  Once                      Orders Placed This Encounter   Procedures   • INPATIENT ADMISSION     Standing Status:   Standing     Number of Occurrences:   1     Order Specific Question:   Level of Care     Answer:   Med Surg [16]     Order Specific Question:   Estimated length of stay     Answer:   More than 2 Midnights     Order Specific Question:   Certification     Answer:   I certify that inpatient services are medically necessary for this patient for a duration of greater than two midnights  See H&P and MD Progress Notes for additional information about the patient's course of treatment  ED Arrival Information     Expected   -    Arrival   1/9/2023 16:50    Acuity   Urgent            Means of arrival   Ambulance    [facility]    Admission type   Emergency            Arrival complaint   altered mental state           Chief Complaint   Patient presents with   • Fatigue     Pt brought by ems from [facility]  Staff stated that pt was more lethargic today  Pt oriented to self  Initial Presentation: 82 y o  female  to ED via EMS from [facility]  Admitted with [facility] with weakness; can typically get around without a problem  As of recent she has had a much harder time walking even with her walker; on exam oriented to herself and place; activity change; fatigue; Bilateral trace edema;  Albumin 3 4; BNP 3821; GCS 14   CT = CTH hypodensity of left parietal lobe image consistent with subacute infarct   Given in ED  ml bolus  PMHX: chronic diastolic CHF, hypertension, CAD; HTN; CKD stage 2; cognitive impairment persistent atrial fibrillation (on eliquis), and seizure; dementia   PLAN: MRI - pending; AIDAN pending; Neuro checks; consult Neurology      Date: 1/10/23  Oriented to self only  Nonfocal exam ; Cl 109; albumin 3 2  PLAN: Cont  Stroke pathway; cont  W/ neuro checks and tele      Cardiology Consult: Reason For Consult: Atrial fibrillation with SVR, pauses  Telemetry reviewed; patient with atrial fibrillation with slow ventricular response and several 3 - > 3 second pauses since this moring  PLAN: Possible PPM; ECHO - pending    Neurology Consult: Exam close to baseline; Per CTA: L parietal lobe infarct appears chronic     PLAN: stroke work-up, EEG; UA Cx; MRI pending; freq neuro checks          ED Triage Vitals   Temperature Pulse Respirations Blood Pressure SpO2   01/09/23 1801 01/09/23 1652 01/09/23 1652 01/09/23 1652 01/09/23 1652   97 6 °F (36 4 °C) 59 18 148/80 97 %      Temp Source Heart Rate Source Patient Position - Orthostatic VS BP Location FiO2 (%)   01/09/23 1801 01/09/23 1652 01/09/23 1652 01/09/23 1652 --   Oral Monitor Sitting Right arm       Pain Score       01/09/23 1652       No Pain          Wt Readings from Last 1 Encounters:   01/10/23 77 6 kg (171 lb)     Additional Vital Signs:   Date/Time Temp Pulse Resp BP MAP (mmHg) SpO2 O2 Device Patient Position - Orthostatic VS   01/10/23 1000 97 8 °F (36 6 °C) 51 Abnormal  18 115/58 80 96 % None (Room air) Lying   01/10/23 0800 97 7 °F (36 5 °C) 65 18 124/59 -- 95 % None (Room air) Lying   01/10/23 0600 -- 44 Abnormal  -- 124/70 91 -- -- Lying   01/10/23 0400 -- 43 Abnormal  -- 124/75 94 -- -- Lying   01/10/23 0200 -- 55 -- 140/60 87 -- -- Lying   01/10/23 0100 -- 51 Abnormal  -- 141/67 -- -- -- Lying   01/10/23 0000 -- 54 Abnormal  -- 140/68 98 96 % None (Room air) Lying   01/09/23 2249 97 4 °F (36 3 °C) Abnormal  49 Abnormal  18 148/70 -- 95 % None (Room air) Lying   01/09/23 2234 -- 50 Abnormal  18 150/70 -- 100 % None (Room air) Lying   01/09/23 2123 -- 51 Abnormal  18 168/79 -- 98 % None (Room air) --   01/09/23 1907 -- 56 18 168/77 -- 95 % None (Room air) Lying   01/09/23 1801 97 6 °F (36 4 °C) -- -- -- -- -- -- --   01/09/23 1652 -- 59 18 148/80 -- 97 % None (Room air) Sitting         EKG: Atrial fibrillation with slow ventricular response  RSR' or QR pattern in V1 suggests right ventricular conduction delay  Cannot rule out Anteroseptal infarct , age undetermined  Abnormal ECG        Pertinent Labs/Diagnostic Test Results:   CTA head and neck w wo contrast   Final Result by Mikayla Prieto MD (01/10 6968)         1  Left parietal lobe infarction is unchanged from the prior head CT from 11/14/2022 at least 8 weeks in age, although the report from the prior head CT indicates that a prior outside MRI report from 7/16/2022 described diffusion restriction in this    region, indicative of a chronic left MCA region infarction  No new or evolving hypodensity to confirm interval infarction from yesterday's study  2   Retrospectively stable 1 cm foramen magnum extra-axial densely calcified mass, seen on a prior outside MRI from 7/28/2020 which has been imported into the [employer], previously demonstrating enhancement, most likely meningioma  3   Mild, chronic microangiopathy elsewhere  No acute intracranial hemorrhage or evolving large territorial infarction  4   Pulmonary arterial hypertension  5   Probable pulmonary interstitial edema in the visualized lung apices  6   No hemodynamically significant stenosis major arteries of the neck  7   No intracranial aneurysm or major intracranial arterial stenosis  Workstation performed: [id number]         XR chest 1 view portable   Final Result by Sury Hilliard MD (01/10 8672)      Enlargement of cardiac silhouette, clear lungs                  Workstation performed: [id number]         CT head without contrast   Final Result by Pam Montes MD (01/09 1742)      Interval development of hypodensity in the left parietal lobe image 2/24 most consistent with subacute infarct  Consider MRI brain for confirmation        Stable cutaneous scalp lesion in the left parietal region measures 1 5 cm on image 3/37              Workstation performed: [id number]         MRI Inpatient Order    (Results Pending)     Results from last 7 days   Lab Units 01/09/23  1714   SARS-COV-2  Negative     Results from last 7 days   Lab Units 01/10/23  0500 01/09/23  1714   WBC Thousand/uL 5 00 6 11   HEMOGLOBIN g/dL 12 9 13 6   HEMATOCRIT % 38 9 41 3   PLATELETS Thousands/uL 103* 117*   NEUTROS ABS Thousands/µL  --  3 48         Results from last 7 days   Lab Units 01/10/23  0500 01/09/23  1714   SODIUM mmol/L 141 142   POTASSIUM mmol/L 3 8 4 2   CHLORIDE mmol/L 109* 108   CO2 mmol/L 24 24   ANION GAP mmol/L 8 10   BUN mg/dL 21 22   CREATININE mg/dL 0 80 0 88   EGFR ml/min/1 73sq m 67 60   CALCIUM mg/dL 9 3 9 3     Results from last 7 days   Lab Units 01/10/23  0500 01/09/23  1714   AST U/L 22 22   ALT U/L 17 18   ALK PHOS U/L 79 97   TOTAL PROTEIN g/dL 6 6 7 4   ALBUMIN g/dL 3 2* 3 4*   TOTAL BILIRUBIN mg/dL 0 52 0 33         Results from last 7 days   Lab Units 01/10/23  0500 01/09/23  1714   GLUCOSE RANDOM mg/dL 87 102         Results from last 7 days   Lab Units 01/09/23  2121 01/09/23  1906 01/09/23  1714   HS TNI 0HR ng/L  --   --  35   HS TNI 2HR ng/L  --  35  --    HSTNI D2 ng/L  --  0  --    HS TNI 4HR ng/L 41  --   --    HSTNI D4 ng/L 6  --   --          Results from last 7 days   Lab Units 01/09/23  1714   PROTIME seconds 15 0*   INR  1 18   PTT seconds 29     Results from last 7 days   Lab Units 01/09/23  1714   TSH 3RD GENERATON uIU/mL 1 937         Results from last 7 days   Lab Units 01/09/23  1714   NT-PRO BNP pg/mL 3,821*         Results from last 7 days   Lab Units 01/09/23  1714   INFLUENZA A PCR  Negative   INFLUENZA B PCR  Negative   RSV PCR  Negative       ED Treatment:   Medication Administration from 01/09/2023 1650 to 01/09/2023 2241       Date/Time Order Dose Route Action Action by Comments     01/09/2023 7391 EST sodium chloride 0 9 % bolus 500 mL 0 mL Intravenous Stopped  --     01/09/2023 1713 EST sodium chloride 0 9 % bolus 500 mL 500 mL Intravenous New Bag  --     01/09/2023 2121 EST melatonin tablet 3 mg 3 mg Oral Given  --          Present on Admission:  • Seizure ([facility] )  • Chronic diastolic heart failure (HCC)  • Atrial fibrillation (HCC)  • Amnestic MCI (mild cognitive impairment with memory loss)      Admitting Diagnosis: Altered mental state [R41 82]  Gait instability [R26 81]  CVA (cerebral vascular accident) ([facility] ) [I63 9]  Generalized weakness [R53 1]  Age/Sex: 82 y o  female     Admission Orders: NIH ; Tele; Neuro checks; dysphagia diet; IS; I/O; SCD's      Scheduled Medications:  apixaban, 5 mg, Oral, BID  atorvastatin, 40 mg, Oral, QPM  calcium carbonate, 500 mg, Oral, Daily  furosemide, 40 mg, Oral, Daily  melatonin, 3 mg, Oral, HS  polyethylene glycol, 17 g, Oral, Daily  QUEtiapine, 12 5 mg, Oral, HS  zonisamide, 100 mg, Oral, Daily      Continuous IV Infusions:     PRN Meds:  acetaminophen, 650 mg, Oral, Q4H PRN  aluminum-magnesium hydroxide-simethicone, 30 mL, Oral, Q6H PRN  ondansetron, 4 mg, Intravenous, Q6H PRN        IP CONSULT TO PHYSICAL MEDICINE REHAB  IP CONSULT TO CASE MANAGEMENT  IP CONSULT TO NUTRITION SERVICES  IP CONSULT TO NEUROLOGY  IP CONSULT TO CARDIOLOGY    Network Utilization Review Department  ATTENTION: Please call with any questions or concerns to [phone] and carefully listen to the prompts so that you are directed to the right person  All voicemails are confidential   Lou Mon all requests for admission clinical reviews, approved or denied determinations and any other requests to dedicated fax number below belonging to the campus where the patient is receiving treatment   List of dedicated fax numbers for the Facilities:  [address] [place] [address] DENIALS (Administrative/Medical Necessity) [phone]   [address] (Maternity/NICU/Pediatrics) [phone] 2600 [facility] Destiny Morrison [phone]   Eusebio Camacho 77 [phone]   [address] [place] [address] [facility] [place] 28 [phone] [address] [facility] 134 815 [facility] [phone]

## 2023-01-10 NOTE — CASE MANAGEMENT
Case Management Assessment & Discharge Planning Note    Patient name Breann Almaraz  Location [place] [address] [place] [address] Drive-* MRN [id number]  : 1935 Date 1/10/2023       Current Admission Date: 2023  Current Admission Diagnosis:Lethargy   Patient Active Problem List    Diagnosis Date Noted   • History of stroke 01/10/2023   • Lethargy 2023   • New onset seizure ([address] )    • Seizure ([facility] [address] ) 2022   • Urinary retention 2022   • Venous stasis dermatitis of both lower extremities 2022   • Gait instability 2022   • Other fatigue 2022   • Atypical pneumonia 05/10/2022   • Respiratory insufficiency 05/10/2022   • Acute metabolic encephalopathy    • Chronic diastolic heart failure ([facility] [address] ) 05/10/2022   • Thrombocytopenia ([facility] [address] ) 05/10/2022   • Other hyperlipidemia 10/06/2021   • 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   • Other insomnia 10/06/2021   • Skin lesion 10/04/2021   • Action tremor 2020   • Age-related osteoporosis without fracture 2020   • Atrial fibrillation ([facility] [address] ) 04/10/2015   • Chronic anticoagulation 04/10/2015   • Benign essential hypertension 2011      LOS (days): 1  Geometric Mean LOS (GMLOS) (days): 2 90  Days to GMLOS:2 1     OBJECTIVE:    Risk of Unplanned Readmission Score: 15 56         Current admission status: Inpatient       Preferred Pharmacy:   [facility] , [address] [place] [address]  Phone: [phone] Fax: [phone] [place], [place] - [address] [place]  [facility] [address]  Phone: [phone] Fax: [phone]    4 [facility], [address] [place] [address] [address]  [facility] [address]  Phone: [phone] Fax: [phone]    Primary Care Provider: Nasim Penaloza DO    Primary Insurance: [address] [facility] REP  Secondary Insurance: ASSESSMENT:  [address] [facility] [address] Representative - Son   Primary Phone: [phone] (Mobile)               Advance Directives  Does patient have a [address]?: Yes (Son, Minerva Glass)  Does patient have Advance Directives?: Yes  Advance Directives: Power of  for finance  Primary Contact: Enrique Aguirre (son) [phone]         Readmission Root Cause  30 Day Readmission: No    Patient Information  Admitted from[de-identified] Facility  Mental Status: Confused  During Assessment patient was accompanied by: Not accompanied during assessment  Assessment information provided by[de-identified] Son  Primary Caregiver: Other (Comment) (staff at [address] [place] [address])  [facility] Name[de-identified] staff at [address] [facility] Relationship to Patient[de-identified] Other (Specify)  Caregiver's Telephone Number[de-identified] [phone]  Support Systems: Other (Comment) (staff at [address] [place] [address])  [place] of Residence: [facility] do you live in?: [employer]  Home entry access options   Select all that apply : No steps to enter home  Type of Current Residence: Facility  Upon entering residence, is there a bedroom on the main floor (no further steps)?: Yes  Upon entering residence, is there a bathroom on the main floor (no further steps)?: Yes  In the last 12 months, was there a time when you were not able to pay the mortgage or rent on time?: No  In the last 12 months, how many places have you lived?: 1  In the last 12 months, was there a time when you did not have a steady place to sleep or slept in a shelter (including now)?: No  Homeless/housing insecurity resource given?: N/A  Living Arrangements: Other (Comment) ([facility])    Activities of Daily Living Prior to Admission  Functional Status: Assistance  Completes ADLs independently?: Yes  Ambulates independently?: No  Level of ambulatory dependence: Assistance  Does patient use assisted devices?: Yes  Assisted Devices (DME) used: [employer]   Does patient currently own DME?: Yes  What DME does the patient currently own?: Vane Contreras  Does patient have a history of Outpatient Therapy (PT/OT)?: Yes ([facility])  Does the patient have a history of Short-Term Rehab?: Yes ([facility])  Does patient have a history of HHC?: Yes ([facility])  Does patient currently have [place]?: Yes    Current Home Health Care  Type of Current Home Care Services: Home PT, [address][de-identified] Other (please enter name in comment) ([facility])  [facility] Provider[de-identified] PCP    Patient Information Continued  Income Source: Pension/snf  Does patient have prescription coverage?: Yes  Within the past 12 months, you worried that your food would run out before you got the money to buy more : Never true  Within the past 12 months, the food you bought just didn't last and you didn't have money to get more : Never true  Food insecurity resource given?: N/A  Does patient receive dialysis treatments?: No  Does patient have a history of substance abuse?: No  Does patient have a history of Mental Health Diagnosis?: No         Means of Transportation  Means of Transport to Appts[de-identified] Family transport  In the past 12 months, has lack of transportation kept you from medical appointments or from getting medications?: No  In the past 12 months, has lack of transportation kept you from meetings, work, or from getting things needed for daily living?: No  Was application for public transport provided?: N/A        DISCHARGE DETAILS:    Discharge planning discussed with[de-identified] Pt sonHelene Forsyth of Choice: Yes  Comments - Freedom of Choice: Son would like pt to go back to [facility] w/ rehab services  CM contacted family/caregiver?: Yes  Were Treatment Team discharge recommendations reviewed with patient/caregiver?: Yes  Did patient/caregiver verbalize understanding of patient care needs?: N/A- going to facility  Were patient/caregiver advised of the risks associated with not following Treatment Team discharge recommendations?: Yes    Contacts  Patient Contacts: Roger Hanks (Son)  (M  Relationship to Patient[de-identified] Family  Contact Method: Phone  Phone Number: [phone]  Reason/Outcome: Discharge Planning, Emergency Contact, Continuity of Care                                                                     Additional Comments: CM called pt son, Dipika Mcneill to complete assessment over the phone  Pt is currently residing at [facility] care [facility] since August of last year  Per son pt is able to complete her ADL's independently w/ minimal assitance & use of a walker  Pt has hx of going to [place] for [facility] & having [facility] come to her facility  Pt son Dipika Mcneill is her [address] just financially  Son made CM aware that if they recommend STR that he would prefer she just go back to [facility] and get PT/OT there  CM understood  Pt also requested this CM set up transport at time of discharge  Son denied having any other questions or concerns at this time  CM to continue to follow pt care & d/c

## 2023-01-10 NOTE — ASSESSMENT & PLAN NOTE
Wt Readings from Last 3 Encounters:   01/10/23 77 6 kg (171 lb)   01/09/23 77 8 kg (171 lb 8 3 oz)   11/14/22 77 8 kg (171 lb 8 3 oz)     No evidence of overt volume overload  - continue PTA diuretics

## 2023-01-10 NOTE — CONSULTS
Consult - Cardiology   Sofy Morales 82 y o  female MRN: [id number]  Unit/Bed#: E4 -01 Encounter: [id number]        Reason For Consult: Atrial fibrillation with SVR, pauses               ASSESSMENT:  Abnormal CT of the brain   - CT of the head on arrival revealed interval development of hypodensity in the left parietal lobe image 2/24 most consistent with subacute infarct  Stable cutaneous Lesion in the left parietal region measures 1 5 cm on image 3/37  Permanent atrial fibrillation    - with SVR and pauses via telemetry review  - Anticoagulation with Eliquis 5 mg twice daily    - AV blocker Rx, none  Chronic diastolic congestive heart failure   - TTE 5/11/22: LVEF 60%, moderate to marked biatrial enlargement, AV sclerosis, MV sclerosis with mild MR, mild-moderate TR, mild pulmonary hypertension, PASP 44 mmHg  - Outpatient diuretic Rx, furosemide 40 mg daily  Hypertension  Dyslipidemia  Seizure disorder  Dementia  History of knee injury leading to hemarthrosis of left knee, July 2022  History of CVA, July 2022    PLAN/ DISCUSSION:     · Telemetry reviewed; patient with atrial fibrillation with slow ventricular response and several 3 - > 3 second pauses since this moring  · Will discuss possible permanent pacemaker insertion with family  In addition, will discuss possible permanent pacemaker insertion with the electrophysiology team   · Echocardiogram ordered to assess cardiac structure and function  · MRI completed today, follow-up results when completed  · Continue anticoagulation with Eliquis 5 mg twice daily  · Continue statin therapy with atorvastatin 40 mg daily  · Currently on furosemide 40 mg daily, will continue same while monitoring volume status  · Negative cardiac enzymes with high-sensitivity troponin trend of 35, 35, 41   · NT proBNP 3821, however patient does not appear to be overtly volume overloaded    · Monitor volume status closely with strict intake/output, daily weight  History Of Present Illness:  29[facility] to [facility]argy  CT of the head performed in the emergency department revealed subacute stroke in the left parietal region  Patient was awake but was unable to provide any meaningful history due to tangential speech  She was able to follow simple commands  Upon assessment and evaluation, patient resting in bed post MRI  Patient is not oriented to location or year  She does not offer complaints and appears comfortable  Unable to obtain history  Please see significant cardiac history and problems enumerated above  Patient was seen by outpatient cardiologist, Dr Nugent Adjutant for initial cardiology consultation in September 2022  Referral was placed for comanagement of cardiac comorbidities including chronic diastolic heart failure, hypertension, persistent/permanent atrial fibrillation  Past Medical History:        Past Medical History:   Diagnosis Date   • A-fib ([facility] [address] )    • CAD (coronary artery disease)    • CKD (chronic kidney disease) stage 2, 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  Authorizing Provider   acetaminophen (TYLENOL) 500 mg tablet Take 500 mg by mouth every 6 (six) hours as needed for mild pain   Yes Historical Provider, MD   apixaban (ELIQUIS) 5 mg Take 5 mg by mouth 2 (two) times a day   Yes Historical Provider, MD   Ascorbic Acid (VITAMIN C) 500 MG CAPS Take 500 mg by mouth daily   Yes Historical Provider, MD   atorvastatin (LIPITOR) 10 mg tablet Take 10 mg by mouth daily   Yes Historical Provider, MD   calcium carbonate (TUMS) 500 mg chewable tablet Chew 1 tablet daily   Yes Historical Provider, MD   Cholecalciferol (Vitamin D3) 1 25 MG (39430 UT) CAPS    Yes Historical Provider, MD   Cranberry 450 MG CAPS Take 1 capsule by mouth in the morning   Yes Historical Provider, MD   docusate sodium (COLACE) 100 mg capsule Take 100 mg by mouth 2 (two) times a day   Yes Historical Provider, MD   furosemide (LASIX) 40 mg tablet Take 1 tablet by mouth daily 2/20/20  Yes Historical Provider, MD   Omega-3 Fatty Acids (fish oil) 1,000 mg    Yes Historical Provider, MD   polyethylene glycol (MIRALAX) 17 g packet Take 17 g by mouth daily   Yes Historical Provider, MD   potassium chloride (K-DUR,KLOR-CON) 20 mEq tablet Take 20 mEq by mouth daily   Yes Historical Provider, MD   QUEtiapine (SEROquel) 25 mg tablet Take 0 5 tablets (12 5 mg total) by mouth daily at bedtime Hold if sedated 11/18/22  Yes Guanako Shoemaker,    thiamine (VITAMIN B1) 100 mg tablet    Yes Historical Provider, MD   zonisamide (ZONEGRAN) 100 mg capsule Take 1 capsule (100 mg total) by mouth daily Do not start before November 19, 2022 11/19/22  Yes Guanako Shoemaker,    bisacodyl (DULCOLAX) 5 mg EC tablet Take 5 mg by mouth daily as needed for constipation    Historical Provider, MD   QUEtiapine (SEROquel) 25 mg tablet Take 0 5 tablets (12 5 mg total) by mouth once for 1 dose Hold if sedated 11/18/22 11/18/22  Maira Starkey DO       Family History:     Family History   Problem Relation Age of Onset   • Lung cancer Father    • Leukemia Brother    • Breast cancer Daughter         Social History:       Social History     Socioeconomic History   • Marital status:       Spouse name: None   • Number of children: None   • Years of education: None   • Highest education level: None   Occupational History   • None   Tobacco Use   • Smoking status: Never   • Smokeless tobacco: Never   Vaping Use   • Vaping Use: Never used   Substance and Sexual Activity   • Alcohol use: Never   • Drug use: Never   • Sexual activity: Not Currently     Partners: Male     Birth control/protection: None   Other Topics Concern   • None   Social History Narrative   • None     Social Determinants of Health     Financial Resource Strain: Not on file   Food Insecurity: No Food Insecurity   • Worried About Running Out of Food in the Last Year: Never true   • Ran Out of Food in the Last Year: Never true   Transportation Needs: No Transportation Needs   • Lack of Transportation (Medical): No   • Lack of Transportation (Non-Medical): No   Physical Activity: Not on file   Stress: Not on file   Social Connections: Not on file   Intimate Partner Violence: Not on file   Housing Stability: Low Risk    • Unable to Pay for Housing in the Last Year: No   • Number of Places Lived in the Last Year: 1   • Unstable Housing in the Last Year: No       ROS:  unobtainable  Remainder review of systems is negative    Exam:  General: Awake, no acute distress  Head: Normocephalic, atraumatic  Eyes:  EOMI  Pupils - equal, round, reactive to accomodation  No icterus  Normal Conjunctiva  Oropharynx: moist and normal-appearing mucosa  Neck: supple, symmetrical, trachea midline   Heart: Irregular rate, irregular rhythm  Respiratory effort / Chest Inspection: unlabored  Lungs:  normal air entry, lungs clear to auscultation and no rales, rhonchi or wheezing   Abdomen: flat, normal findings: bowel sounds normal and soft, non-tender  Lower Limbs:  no pitting edema, chronic venous stasis changes      DATA:      ECG:   Atrial fibrillation with slow ventricular response  Possible Anteroseptal infarct (cited on or before 09-JAN-2023)  Abnormal ECG  When compared with ECG of 09-JAN-2023 19:05, (unconfirmed)  No significant change was found  Confirmed by Alex Saul ([id number]) on 1/10/2023 8:08:08 AM                    Telemetry: Atrial fibrillation with slow ventricular response, pauses, NSVT on telemetry review           Echocardiogram completed on 5/11/22:    1   Relatively small left ventricular cavity size, marked asymmetric left ventricular hypertrophy left ventricular apex and surrounding region and moderate hypertrophy of other left ventricular walls  Normal left ventricular systolic function  Indeterminate diastolic dysfunction grade  Ejection fraction is greater than 60%  2  Normal right ventricular size and systolic function  3  Moderate to marked biatrial enlargement  4  Aortic valve sclerosis, no aortic valve stenosis or regurgitation  5  Mitral valve sclerosis, mild mitral valve regurgitation  6  Mild-to-moderate tricuspid valve regurgitation  7  Mild pulmonary hypertension  Estimated RVSP/PASP 44 mmHg  8  No pericardial effusion  No previous echocardiogram is available for comparison      Follow-up study with use of definity contrast can be considered for reliable estimation of left ventricular ejection fraction although it appears to be normal           Weights: Wt Readings from Last 3 Encounters:   01/09/23 77 8 kg (171 lb 8 3 oz)   11/14/22 77 8 kg (171 lb 8 3 oz)   09/29/22 77 7 kg (171 lb 3 2 oz)   , There is no height or weight on file to calculate BMI           Lab Studies:           Results from last 7 days   Lab Units 01/09/23  1714   TRIGLYCERIDES mg/dL 78   HDL mg/dL 52     Results from last 7 days   Lab Units 01/10/23  0500 01/09/23  1714   WBC Thousand/uL 5 00 6 11   HEMOGLOBIN g/dL 12 9 13 6   HEMATOCRIT % 38 9 41 3   PLATELETS Thousands/uL 103* 117*   ,   Results from last 7 days   Lab Units 01/10/23  0500 01/09/23  1714   POTASSIUM mmol/L 3 8 4 2   CHLORIDE mmol/L 109* 108   CO2 mmol/L 24 24   BUN mg/dL 21 22   CREATININE mg/dL 0 80 0 88   CALCIUM mg/dL 9 3 9 3   ALK PHOS U/L 79 97   ALT U/L 17 18   AST U/L 22 22

## 2023-01-10 NOTE — ASSESSMENT & PLAN NOTE
- Initial presentation on 11/14/22 was R gaze deviation, tonic/convulsive activity in BUE/BLEs  - 11/2022 Routine EEG:abnormal due to excessive diffuse theta and delta activity during wakefulness consistent with nonspecific moderate diffuse cerebral dysfunction   No epileptiform discharges or electrographic seizures identified   - Continue home med of Zonegran 100 mg daily

## 2023-01-10 NOTE — SPEECH THERAPY NOTE
Speech Language/Pathology  Speech-Language Pathology Bedside Swallow Evaluation        Patient Name: Dante Amaya    Today's Date: 1/10/2023     Problem List  Principal Problem:    Stroke [facility])  Active Problems:    Atrial fibrillation ([facility] [address] )    Amnestic MCI (mild cognitive impairment with memory loss)    Chronic diastolic heart failure ([facility] [address] [address] )    Seizure ([facility] [address] [address] )         Past Medical History  Past Medical History:   Diagnosis Date   • A-fib ([facility] )    • CAD (coronary artery disease)    • CKD (chronic kidney disease) stage 2, GFR 60-89 ml/min    • Hypertension    • Memory loss    • Urinary tract infection        Past Surgical History  Past Surgical History:   Procedure Laterality Date   • CHOLECYSTECTOMY     • REPLACEMENT TOTAL KNEE Left 2008       Summary    Pt presents with oropharyngeal swallows that appear WNL  Swallows appeared timely  There were no overt s/s of aspiration with any consistencies  Risk of aspiration appears low  Pt appears appropriate to continue regular diet and thin liquids  She would benefit from speech/language assessment for suspected expressive aphasia, ? apraxia  Recommendations:   Diet: regular diet and thin liquids   Meds: whole with liquid   Frequent Oral care: 4x/day  Independent for feeding  Aspiration precautions and compensatory swallowing strategies: upright posture  Other Recommendations/ considerations: No further swallow intervention is indicated at this time  Speech/language assessment recommended  Current Medical Status  Copied from admission/physician notes: In brief she is an 29-year-old female with known history of chronic diastolic CHF, hypertension, cognitive impairment persistent atrial fibrillation, and seizure  Upon review of her chart, she was admitted here on November 14 to November 18, 2022 due to to new onset seizure presumed to be from previous stroke  She was started on Keppra but did not tolerate the medication so she was switched to emids    She was sent to the hospital from the memory care unit due to lethargy  CT of the head was performed in the ED which showed subacute stroke in the left parietal region  The patient was  awake but was not able to provide any meaningful history due to tangential speech  She was able to follow simple commands  She tended to  Move her upper extremities more than her lower extremities  She had action tremors no seizure-like activity  I spoke with her son and gave update  Explained her condition and plan in detail  Assessment and plan:  Abnormal CT of the brain-possible stroke, rule out tumor  Agree with MRI of the brain and stroke pathway  Continue Eliquis for now and await further evaluation/recommendation by neurology  Atrial fibrillation on chronic anticoagulation-check echocardiogram rule out development of thrombus  If present while on Eliquis, she will need to be switched to a different agent  Seizure disorder continue Zonegran      Cognitive decline-monitor for delirium        Order received and chart reviewed  Discussed with RN  Assisted pt in ordering breakfast, then returned when breakfast arrived in order to assess swallow  Pt appears to have expressive aphasia  She denies any dysphagia sx  Pt was assessed by SLP when admitted in May 2022; a regular diet and thin liquids were recommended at that time  Pt saw OP SLP in 2021 for mild cognitive impairment  Past medical history:   Please see H&P for details      Special Studies:  CT brain-  Interval development of hypodensity in the left parietal lobe image 2/24 most consistent with subacute infarct  Consider MRI brain for confirmation  Stable cutaneous scalp lesion in the left parietal region measures 1 5 cm on image 3/37        CXR-  Enlargement of cardiac silhouette, clear lungs          Social/Education/Vocational Hx:  Pt lives in SNF/ECF and memory care unit    Swallow Information   Current Risks for Dysphagia & Aspiration: CVA  Current Symptoms/Concerns: pt passed nursing dysphagia screen, but continued with swallow eval as ordered by physician due to new CVA  Current Diet: regular diet, thin liquids and 2 gm NA   Baseline Diet: assume regular    Baseline Assessment   Behavior/Cognition: alert  Speech/Language Status: able to follow commands, limited verbal output and pt appears to have expressive aphasia, ? apraxia  Patient Positioning: upright in bed     Swallow Mechanism Exam   Facial: symmetrical  Labial: decreased ROM right side  Lingual: WFL  Velum: unable to visualize  Mandible:  decreased ROM  Dentition: adequate and natural  Vocal quality:clear/adequate   Volitional Cough: strong/productive   Respiratory: RA      Consistencies Assessed and Performance   Consistencies Administered: thin liquids, puree, soft solids and hard solids  -pt fed herself breakfast, including hard boiled eggs, yogurt, canned peaches, toast with butter, water by straw    Oral Stage: Adequate bolus retrieval and draw from straw, prompt mastication, bolus formation and transfer  No overt residue or pocketing, adequate lip seal      Pharyngeal Stage: Hyolaryngeal elevation observed and palpated, swallows appeared prompt  There were no overt s/s of aspiration         Esophageal Concerns: none reported      Results Reviewed with: patient and RN   Dysphagia Goals: none at this time  Discharge recommendation: no follow up needed for swallowing and pt would benefit from speech/language assessment

## 2023-01-10 NOTE — PLAN OF CARE
Problem: OCCUPATIONAL THERAPY ADULT  Goal: Performs self-care activities at highest level of function for planned discharge setting  See evaluation for individualized goals  Description: Treatment Interventions: ADL retraining, Functional transfer training, UE strengthening/ROM, Endurance training, Cognitive reorientation, Patient/family training, Equipment evaluation/education, Compensatory technique education, Energy conservation, Activityengagement          See flowsheet documentation for full assessment, interventions and recommendations  Note: Limitation: Decreased ADL status, Decreased UE strength, Decreased cognition, Decreased Safe judgement during ADL, Decreased endurance, Decreased self-care trans, Decreased high-level ADLs  Prognosis: Good  Assessment: Pt is a 82 y o  female seen for OT evaluation s/p admit to [facility] on 1/9/2023 w/ r/o Stroke ([address] ), increased difficulty walking  Comorbidities affecting pt's functional performance at time of assessment include: a-fib, CHF, seizure, CAD  CT head:  Left parietal lobe infarction is unchanged from the prior head CT from 11/14/2022 at least 8 weeks in age, although the report from the prior head CT indicates that a prior outside MRI report from 7/16/2022 described diffusion restriction in this region, indicative of a chronic left MCA region infarction  No new or evolving hypodensity to confirm interval infarction   MRI pending  Personal factors affecting pt at time of IE include:difficulty performing ADLS, difficulty performing IADLS , limited insight into deficits, flat affect and decreased initiation and engagement   Prior to admission, pt was living at [address] [place] [address] and reports setup-min assist ADLs, supervision functional mobility w/ RW, assist w/ IADLs   Upon evaluation: Pt requires min assist supine>sit bed mobility w/ increased time to complete, MIN assist sit>stand w/ VCs for hand placement and positioning, supervision stand>sit transfer w/ cues for hand placement, MIN assist x1 functional mobility w/ RW, MIN assist LB ADLs, setup UB ADLs 2* the following deficits impacting occupational performance: decreased strength and endurance, impaired balance, impaired activity tolerance, fall risk, expressive aphasia, increased time coordination tasks, decreased insight and safety awareness  Pt to benefit from continued skilled OT tx while in the hospital to address deficits as defined above and maximize level of functional independence w ADL's and functional mobility  Occupational Performance areas to address include: grooming, bathing/shower, toilet hygiene, dressing, health maintenance, functional mobility and clothing management, formal cognitive assessment, safety education  From OT standpoint, recommendation at time of d/c would be return to facility w/ HOME OT services  The patient's raw score on the AM-PAC Daily Activity inpatient short form is 19, standardized score is 40 22, greater than 39 4  Patients at this level are likely to benefit from discharge to home  Please refer to the recommendation of the Occupational Therapist for safe discharge planning       OT Discharge Recommendation: Return to facility with rehabilitation services

## 2023-01-10 NOTE — ASSESSMENT & PLAN NOTE
Wt Readings from Last 3 Encounters:   11/14/22 77 8 kg (171 lb 8 3 oz)   09/29/22 77 7 kg (171 lb 3 2 oz)   06/22/22 76 7 kg (169 lb 3 2 oz)     No evidence of overt volume overload  - continue PTA diuretics

## 2023-01-10 NOTE — ASSESSMENT & PLAN NOTE
Presents today from memory care unit for generalized weakness, trouble walking with her walker   Patient was tangential in though, oriented to self, place and event  - VS: afebrile, normotensive, on room air   - Labs: unremarkable   - Imaging:  CTH hypodensity of left parietal lobe image consistent with subacute infarct   Diffdx: encephalopathy, seizure, CVA/TIA    Plan  - stroke pathway  - MRI brain, TTE   - neuro checks, telemetry  - check lipid panel, A1c   - continue PTA eliquis, if MRI brain shows a new stroke consider Eliquis failure and change to Pradaxa   - consults: neurology, PM&R, PT/OT

## 2023-01-10 NOTE — ASSESSMENT & PLAN NOTE
82 y o  female with prior stroke in July 2022, Afib on Eliquis 5mg twice daily, CAD, chronic diastolic CHF CKD, HTN, cognitive impairment with memory impairment and prior seizures who was brought to the ED from [facility] unit due to lethargy  Initial CT head revealed a subacute stroke in the left parietal region; however CTA H/N notes that this was previously seen on imaging from 11/14/22 and may have even been present on outside MRI from 7/16/22  Work-up:  · CT head notable for subacute stroke in the left parietal region  · CTA head and neck:  · Left parietal stroke noted to be unchanged from prior CT head on 11/14/2022  Additionally prior CT also mentions prior outside MRI report from 7/16/2022 revealing diffusion restriction in this region indicative of a chronic left MCA infarct  · No LVO or flow-limiting stenosis  · Stable 1 cm foramen magnum extra-axial densely calcified mass most likely meningioma seen on prior MRI from 7/28/2020  · LDL 67  · A1c 5 4  · 5/11/2022 TTE: EF 60% with marked bilateral atrial enlargement  · CXR: lungs are clear  · Respiratory panel unremarkable    On exam, patient oriented to person and hospital when given 3 choices  Speech is garbled and tangential  Patient demonstrating expressive aphasia, with inability to name objects, repeat phrases or read sentences correctly  Patient's motor and sensory exam is nonfocal  No sign of seizure activity on today's exam  No excessive lethargy  After review of prior notes and prior imaging, it appears that patient's neurologic exam today is close to baseline  Additionally, as noted in CTA report, L parietal lobe infarct appears chronic  Would recommend stroke work-up, EEG and infectious work-up as detailed below      Plan:  - Acute ischemic stroke protocol  - Obtain UA and culture  - Continue Eliquis  - Continue Atorvastatin 40mg  - MRI brain without contrast pending   - Routine EEG  - Echo completed, read pending  - Telemetry  - Secondary risk factor modification  - Goal of normotension and euglycemia   - PT/OT/ST  - Stroke Education  - Frequent neurological checks  - Stat CT head with acute worsening in neuro exam/mental status  - Notify neurology with any changes in examination     - thrombocytopenia work-up as per primary team, especially as patient on Eliquis

## 2023-01-10 NOTE — CONSULTS
Consultation - Neurology   Radha Varela 82 y o  female MRN: [id number]  Unit/Bed#: E4 -01 Encounter: [id number]      Assessment/Plan     * Lethargy  Assessment & Plan  82 y o  female with prior stroke in July 2022, Afib on Eliquis 5mg twice daily, CAD, chronic diastolic CHF CKD, HTN, cognitive impairment with memory impairment and prior seizures who was brought to the ED from [facility] unit due to lethargy  Initial CT head revealed a subacute stroke in the left parietal region; however CTA H/N notes that this was previously seen on imaging from 11/14/22 and may have even been present on outside MRI from 7/16/22  Work-up:  · CT head notable for subacute stroke in the left parietal region  · CTA head and neck:  · Left parietal stroke noted to be unchanged from prior CT head on 11/14/2022  Additionally prior CT also mentions prior outside MRI report from 7/16/2022 revealing diffusion restriction in this region indicative of a chronic left MCA infarct  · No LVO or flow-limiting stenosis  · Stable 1 cm foramen magnum extra-axial densely calcified mass most likely meningioma seen on prior MRI from 7/28/2020  · LDL 67  · A1c 5 4  · 5/11/2022 TTE: EF 60% with marked bilateral atrial enlargement  · CXR: lungs are clear  · Respiratory panel unremarkable    On exam, patient oriented to person and hospital when given 3 choices  Speech is garbled and tangential  Patient demonstrating expressive aphasia, with inability to name objects, repeat phrases or read sentences correctly  Patient's motor and sensory exam is nonfocal  No sign of seizure activity on today's exam  No excessive lethargy  After review of prior notes and prior imaging, it appears that patient's neurologic exam today is close to baseline  Additionally, as noted in CTA report, L parietal lobe infarct appears chronic  Would recommend stroke work-up, EEG and infectious work-up as detailed below      Plan:  - Acute ischemic stroke protocol  - Obtain UA and culture  - Continue Eliquis  - Continue Atorvastatin 40mg  - MRI brain without contrast pending   - Routine EEG  - Echo completed, read pending  - Telemetry  - Secondary risk factor modification  - Goal of normotension and euglycemia   - PT/OT/ST  - Stroke Education  - Frequent neurological checks  - Stat CT head with acute worsening in neuro exam/mental status  - Notify neurology with any changes in examination  - thrombocytopenia work-up as per primary team, especially as patient on Eliquis    History of stroke  Assessment & Plan  - Prior embolic L MCA infarct in July 2022 (after being taken off [facility] following a fall)  - Continue Afib on Eliquis 5mg twice daily    Seizure [facility])  Assessment & Plan  - Initial presentation on 11/14/22 was R gaze deviation, tonic/convulsive activity in BUE/BLEs  - 11/2022 Routine EEG:abnormal due to excessive diffuse theta and delta activity during wakefulness consistent with nonspecific moderate diffuse cerebral dysfunction  No epileptiform discharges or electrographic seizures identified   - Continue home med of Zonegran 100 mg daily    Atrial fibrillation (HCC)  Assessment & Plan  - Continue Eliquis mg bid (switched from Coumadin to Eliquis in 5/2022)    Keep newly resheduled outpatient Neurology appt for 1/20/23 at 3pm at [place] office with [facility]  History of Present Illness     Reason for Consult / Principal Problem: Stroke  Hx and PE limited by: Not fully oriented, cognitive impairment  HPI: Jessie Orta is a 82 y o  female with prior embolic L MCA infarct in July 2022 (after being taken off [facility] following a fall), Afib on Eliquis 5mg twice daily, CAD, chronic diastolic CHF CKD, HTN, cognitive impairment with memory impairment and post stroke epilepsy (with new onset seizures in 11/2022) who was brought to the ED from [facility] unit due to lethargy  BP on arrival 148/80  Respiratory panel negative    CT head revealed a subacute stroke in the left parietal region  Patient had a recent hospitalization from 11/14 to 11/18/2022 due to his new onset seizure activity (R gaze deviation, tonic/convulsive activity in BUE/BLEs) presumed to be secondary to prior stroke  EEG was abnormal due to excessive diffuse theta and delta activity during wakefulness consistent with nonspecific moderate diffuse cerebral dysfunction  No epileptiform discharges or electrographic seizures identified  She was started on Keppra however did not tolerate this medication and ultimately was switched to Zonegran 100mg daily  At baseline, patient reportedly walks with a walker but was having difficulty ambulating on the day of admission  Inpatient consult to Neurology  Consult performed by: Lon Bamberger, PA-C  Consult ordered by: Janet Max PA-C          Review of Systems patient denies pain and unable to answer other ROS questions    Historical Information   Past Medical History:   Diagnosis Date   • A-fib [facility])    • CAD (coronary artery disease)    • CKD (chronic kidney disease) stage 2, GFR 60-89 ml/min    • Hypertension    • Memory loss    • Urinary tract infection      Past Surgical History:   Procedure Laterality Date   • CHOLECYSTECTOMY     • REPLACEMENT TOTAL KNEE Left 2008     Social History   Social History     Substance and Sexual Activity   Alcohol Use Never     Social History     Substance and Sexual Activity   Drug Use Never     E-Cigarette/Vaping   • E-Cigarette Use Never User      E-Cigarette/Vaping Substances   • Nicotine No    • THC No    • CBD No    • Flavoring No    • Other No    • Unknown No      Social History     Tobacco Use   Smoking Status Never   Smokeless Tobacco Never     Family History:   Family History   Problem Relation Age of Onset   • Lung cancer Father    • Leukemia Brother    • Breast cancer Daughter        Review of previous medical records was completed       Meds/Allergies   all current active meds have been reviewed    Allergies   Allergen Reactions   • Morphine        Objective   Vitals:Blood pressure 130/68, pulse (!) 53, temperature 97 8 °F (36 6 °C), temperature source Temporal, resp  rate 18, height 5' 3" (1 6 m), weight 77 6 kg (171 lb), SpO2 97 %  ,Body mass index is 30 29 kg/m²  Intake/Output Summary (Last 24 hours) at 1/10/2023 1306  Last data filed at 1/9/2023 1802  Gross per 24 hour   Intake 500 ml   Output --   Net 500 ml       Invasive Devices: Invasive Devices     Peripheral Intravenous Line  Duration           Peripheral IV 01/10/23 Distal;Dorsal (posterior); Left Forearm <1 day                Physical Exam  Vitals reviewed  Constitutional:       General: She is not in acute distress  Appearance: Normal appearance  She is well-developed  She is not diaphoretic  Comments: Elderly female, supine in the bed   HENT:      Head: Normocephalic and atraumatic  Eyes:      General: No scleral icterus  Right eye: No discharge  Left eye: No discharge  Extraocular Movements: EOM normal       Conjunctiva/sclera: Conjunctivae normal       Pupils: Pupils are equal, round, and reactive to light  Cardiovascular:      Rate and Rhythm: Normal rate and regular rhythm  Pulmonary:      Effort: Pulmonary effort is normal  No respiratory distress  Breath sounds: No stridor  Musculoskeletal:         General: No tenderness or deformity  Cervical back: Normal range of motion and neck supple  Skin:     General: Skin is warm and dry  Findings: No erythema or rash  Neurological:      Mental Status: She is alert  Coordination: Finger-Nose-Finger Test normal        Neurologic Exam     Mental Status   Patient is awake  Alert to self  Able to choose hospital when given 3 choices  Not oriented to time nor reason for admission  Patient is tangential and garbled with her speech    She is aphasic as she is unable to consistently name objects correctly, demonstrates word substitution  Patient can repeat 1-2 words at a time however is unable to correctly repeat phrases stated by examiner  Patient can read single words however unable to read sentences correctly  Patient is able to follow most one-step commands however intermittently she has difficulty with this  When asking her to take her right thumb and touch her left ear, but patient used her right hand to touch her nose and then when examiner asked her to do it again she put both hands on her nose  Cranial Nerves     CN II   Visual acuity: (Able to count fingers)  Right visual field deficit: none  Left visual field deficit: none     CN III, IV, VI   Pupils are equal, round, and reactive to light  Extraocular motions are normal    Conjugate gaze: present    CN V   Facial sensation intact  CN XII   Tongue deviation: none  Subtle flattening of the right nasolabial fold     Motor Exam   Muscle bulk: normal  Overall muscle tone: normal  Right arm pronator drift: absent  Left arm pronator drift: absent  BUEs: 5/5 strength throughout  BLEs: proximal and plantarflexion 5/5 strength  Able to wiggle toes  ROM restriction in L ankle  Sensory Exam   Light touch normal      Gait, Coordination, and Reflexes     Coordination   Finger to nose coordination: normal    Reflexes   Right plantar: normal  Left plantar: normal      Lab Results: I have personally reviewed pertinent reports  Imaging Studies: I have personally reviewed pertinent films in PACS  EKG, Pathology, and Other Studies: I have personally reviewed pertinent reports      VTE Prophylaxis:Eliquis    Code Status: Level 3 - DNAR and DNI

## 2023-01-10 NOTE — PROGRESS NOTES
[facility] [facility]  Progress Note - Iván Wilkes 1935, 82 y o  female MRN: [id number]  Unit/Bed#: E4 -01 Encounter: [id number]  Primary Care Provider: Joi Escalona DO   Date and time admitted to hospital: 1/9/2023  4:50 PM    * Stroke [facility])  Assessment & Plan  Presentsfrom memory care unit for generalized weakness, trouble walking with her walker  Patient was tangential in though, oriented to self, place and event  - VS: afebrile, normotensive, on room air   - Labs: unremarkable   - Imaging:  CTH hypodensity of left parietal lobe image consistent with subacute infarct   CTA head and neck reviewed  Await MRI of the brain and further stroke work-up  Diffdx: encephalopathy, seizure, CVA/TIA    Plan  -Continue with stroke pathway  -Follow-up on MRI brain, TTE   -Continue with serial neuro checks, maintain telemetry in the setting of pauses seen on telemetry   -Reviewed lipid panel, A1c   - continue PTA eliquis, if MRI brain shows a new stroke consider Eliquis failure and change to Pradaxa   - consults: neurology, cardiology PM&R, PT/OT    Seizure [facility])  Assessment & Plan  Denies loss of consciousness, no reported seizure like activity from nursing unit, no evidence tongue bite/incontinence   - monitor for seizure activity  - continue PTA zonisamide 100mg      Chronic diastolic heart failure (HCC)  Assessment & Plan  Wt Readings from Last 3 Encounters:   01/10/23 77 6 kg (171 lb)   01/09/23 77 8 kg (171 lb 8 3 oz)   11/14/22 77 8 kg (171 lb 8 3 oz)     No evidence of overt volume overload  - continue PTA diuretics        Amnestic MCI (mild cognitive impairment with memory loss)  Assessment & Plan  - high risk for hospital acquired delirium: avoid opioids, benzodiazepines, antihistamines  maintain circadian rhythm lights/TV out at night, have hearing aids/glasses close,   frequent reorientation     If agitated risk to self/others antipsychotics/safety sitter      Atrial fibrillation [facility])  Assessment & Plan  Patient is maintained on Eliquis  Is not on any AV sandro blocking agents  Telemetry since admission reviewed and noted multiple pauses of 3 or greater than 3 seconds  This was discussed with cardiology and cardiac  consultation was obtained  Cardiology to consider permanent pacemaker placement after discussion with EP  Follow-up on echocardiogram   Continue with telemetry monitoring  Melody Garre VTE Pharmacologic Prophylaxis: VTE Score: 4 High Risk (Score >/= 5) - Pharmacological DVT Prophylaxis Ordered: apixaban (Eliquis)  Sequential Compression Devices Ordered  Patient Centered Rounds: I performed bedside rounds with nursing staff today  Discussions with Specialists or Other Care Team Provider: Discussed with neurology  and cardiologist     Education and Discussions with Family / Patient: Attempted to update  (son) via phone  Unable to contact  Time Spent for Care: 30 minutes  More than 50% of total time spent on counseling and coordination of care as described above  Current Length of Stay: 1 day(s)  Current Patient Status: Inpatient   Certification Statement: The patient will continue to require additional inpatient hospital stay due to Ongoing stroke and cardiac work-up  Discharge Plan: Anticipate discharge in 48 hrs to rehab facility  Code Status: Level 3 - DNAR and DNI    Subjective:   Patient seen and examined at bedside  Pleasantly confused  She was feeding herself breakfast   She denied any headache, blurring of vision, numbness or weakness of any extremity  Denies any lightheadedness or dizziness  Telemetry reviewed  Objective:     Vitals:   Temp (24hrs), Av 7 °F (36 5 °C), Min:97 4 °F (36 3 °C), Max:97 8 °F (36 6 °C)    Temp:  [97 4 °F (36 3 °C)-97 8 °F (36 6 °C)] 97 8 °F (36 6 °C)  HR:  [43-65] 53  Resp:  [18] 18  BP: (115-168)/(58-80) 130/68  SpO2:  [95 %-100 %] 97 %  Body mass index is 30 29 kg/m²       Input and Output Summary (last 24 hours): Intake/Output Summary (Last 24 hours) at 1/10/2023 1159  Last data filed at 1/9/2023 1802  Gross per 24 hour   Intake 500 ml   Output --   Net 500 ml       Physical Exam:   Physical Exam  Constitutional:       General: She is not in acute distress  HENT:      Head: Normocephalic and atraumatic  Nose: Nose normal       Mouth/Throat:      Mouth: Mucous membranes are moist    Eyes:      Pupils: Pupils are equal, round, and reactive to light  Cardiovascular:      Rate and Rhythm: Bradycardia present  Rhythm irregular  Pulses: Normal pulses  Pulmonary:      Effort: Pulmonary effort is normal       Breath sounds: Normal breath sounds  Abdominal:      General: Abdomen is flat  Bowel sounds are normal       Palpations: Abdomen is soft  Musculoskeletal:      Right lower leg: No edema  Left lower leg: No edema  Skin:     General: Skin is warm  Neurological:      Mental Status: She is alert  Comments: Oriented to self only    Nonfocal exam   Psychiatric:         Mood and Affect: Mood normal          Additional Data:     Labs:  Results from last 7 days   Lab Units 01/10/23  0500 01/09/23  1714   WBC Thousand/uL 5 00 6 11   HEMOGLOBIN g/dL 12 9 13 6   HEMATOCRIT % 38 9 41 3   PLATELETS Thousands/uL 103* 117*   NEUTROS PCT %  --  56   LYMPHS PCT %  --  28   MONOS PCT %  --  12   EOS PCT %  --  3     Results from last 7 days   Lab Units 01/10/23  0500   SODIUM mmol/L 141   POTASSIUM mmol/L 3 8   CHLORIDE mmol/L 109*   CO2 mmol/L 24   BUN mg/dL 21   CREATININE mg/dL 0 80   ANION GAP mmol/L 8   CALCIUM mg/dL 9 3   ALBUMIN g/dL 3 2*   TOTAL BILIRUBIN mg/dL 0 52   ALK PHOS U/L 79   ALT U/L 17   AST U/L 22   GLUCOSE RANDOM mg/dL 87     Results from last 7 days   Lab Units 01/09/23  1714   INR  1 18         Results from last 7 days   Lab Units 01/09/23  1714   HEMOGLOBIN A1C % 5 4           Lines/Drains:  Invasive Devices     Peripheral Intravenous Line  Duration           Peripheral IV 01/09/23 Dorsal (posterior); Left Forearm 1 day                  Telemetry:  Telemetry Orders (From admission, onward)             48 Hour Telemetry Monitoring  Continuous x 48 hours        References:    Telemetry Guidelines   Question:  Reason for 48 Hour Telemetry  Answer:  Acute CVA (<24 hrs old, hemispheric strokes, selected brainstem strokes, cardiac arrhythmias)                 Telemetry Reviewed: Atrial fibrillation  HR averaging 50  Indication for Continued Telemetry Use: Arrthymias requiring medical therapy             Imaging: Reviewed radiology reports from this admission including: CT head    Recent Cultures (last 7 days):         Last 24 Hours Medication List:   Current Facility-Administered Medications   Medication Dose Route Frequency Provider Last Rate   • acetaminophen  650 mg Oral Q4H PRN Valeda Boas, PA-C     • aluminum-magnesium hydroxide-simethicone  30 mL Oral Q6H PRN Valeda Boas, PA-C     • apixaban  5 mg Oral BID Valeda Boas, PA-C     • atorvastatin  40 mg Oral QPM Valeda Boas, PA-C     • calcium carbonate  500 mg Oral Daily Valeda Boas, PA-C     • furosemide  40 mg Oral Daily Valeda Boas, PA-C     • melatonin  3 mg Oral HS Valeda Boas, PA-C     • ondansetron  4 mg Intravenous Q6H PRN Valeda Boas, PA-C     • polyethylene glycol  17 g Oral Daily Valeda Boas, PA-C     • QUEtiapine  12 5 mg Oral HS Valeda Boas, PA-C     • zonisamide  100 mg Oral Daily Valeda Boas, PA-C          Today, Patient Was Seen By: Cecilia Sanchez MD    **Please Note: This note may have been constructed using a voice recognition system  **

## 2023-01-10 NOTE — PLAN OF CARE
Problem: PAIN - ADULT  Goal: Verbalizes/displays adequate comfort level or baseline comfort level  Description: Interventions:  - Encourage patient to monitor pain and request assistance  - Assess pain using appropriate pain scale  - Administer analgesics based on type and severity of pain and evaluate response  - Implement non-pharmacological measures as appropriate and evaluate response  - Consider cultural and social influences on pain and pain management  - Notify physician/advanced practitioner if interventions unsuccessful or patient reports new pain  Outcome: Progressing     Problem: INFECTION - ADULT  Goal: Absence or prevention of progression during hospitalization  Description: INTERVENTIONS:  - Assess and monitor for signs and symptoms of infection  - Monitor lab/diagnostic results  - Monitor all insertion sites, i e  indwelling lines, tubes, and drains  - Monitor endotracheal if appropriate and nasal secretions for changes in amount and color  - [place] appropriate cooling/warming therapies per order  - Administer medications as ordered  - Instruct and encourage patient and family to use good hand hygiene technique  - Identify and instruct in appropriate isolation precautions for identified infection/condition  Outcome: Progressing  Goal: Absence of fever/infection during neutropenic period  Description: INTERVENTIONS:  - Monitor WBC    Outcome: Progressing     Problem: SAFETY ADULT  Goal: Patient will remain free of falls  Description: INTERVENTIONS:  - Educate patient/family on patient safety including physical limitations  - Instruct patient to call for assistance with activity   - Consult OT/PT to assist with strengthening/mobility   - Keep Call bell within reach  - Keep bed low and locked with side rails adjusted as appropriate  - Keep care items and personal belongings within reach  - Initiate and maintain comfort rounds  - Make Fall Risk Sign visible to staff  - Offer Toileting every 2 Hours, in advance of need  - Initiate/Maintain bed alarm  - Apply yellow socks and bracelet for high fall risk patients  - Consider moving patient to room near nurses station  Outcome: Progressing  Goal: Maintain or return to baseline ADL function  Description: INTERVENTIONS:  -  Assess patient's ability to carry out ADLs; assess patient's baseline for ADL function and identify physical deficits which impact ability to perform ADLs (bathing, care of mouth/teeth, toileting, grooming, dressing, etc )  - Assess/evaluate cause of self-care deficits   - Assess range of motion  - Assess patient's mobility; develop plan if impaired  - Assess patient's need for assistive devices and provide as appropriate  - Encourage maximum independence but intervene and supervise when necessary  - Involve family in performance of ADLs  - Assess for home care needs following discharge   - Consider OT consult to assist with ADL evaluation and planning for discharge  - Provide patient education as appropriate  Outcome: Progressing  Goal: Maintains/Returns to pre admission functional level  Description: INTERVENTIONS:  - Perform BMAT or MOVE assessment daily    - Set and communicate daily mobility goal to care team and patient/family/caregiver  - Collaborate with rehabilitation services on mobility goals if consulted  - Perform Range of Motion 4 times a day  - Reposition patient every 4 hours    - Dangle patient 4 times a day  - Stand patient 4 times a day  - Ambulate patient 4 times a day  - Out of bed to chair 4 times a day   - Out of bed for meals 4 times a day  - Out of bed for toileting  - Record patient progress and toleration of activity level   Outcome: Progressing     Problem: DISCHARGE PLANNING  Goal: Discharge to home or other facility with appropriate resources  Description: INTERVENTIONS:  - Identify barriers to discharge w/patient and caregiver  - Arrange for needed discharge resources and transportation as appropriate  - Identify discharge learning needs (meds, wound care, etc )  - Arrange for interpretive services to assist at discharge as needed  - Refer to Case Management Department for coordinating discharge planning if the patient needs post-hospital services based on physician/advanced practitioner order or complex needs related to functional status, cognitive ability, or social support system  Outcome: Progressing     Problem: Knowledge Deficit  Goal: Patient/family/caregiver demonstrates understanding of disease process, treatment plan, medications, and discharge instructions  Description: Complete learning assessment and assess knowledge base    Interventions:  - Provide teaching at level of understanding  - Provide teaching via preferred learning methods  Outcome: Progressing     Problem: COPING  Goal: Pt/Family able to verbalize concerns and demonstrate effective coping strategies  Description: INTERVENTIONS:  - Assist patient/family to identify coping skills, available support systems and cultural and spiritual values  - Provide emotional support, including active listening and acknowledgement of concerns of patient and caregivers  - Reduce environmental stimuli, as able  - Provide patient education  - Assess for spiritual pain/suffering and initiate spiritual care, including notification of Pastoral Care or faiza based community as needed  - Assess effectiveness of coping strategies  Outcome: Progressing  Goal: Will report anxiety at manageable levels  Description: INTERVENTIONS:  - Administer medication as ordered  - Teach and encourage coping skills  - Provide emotional support  - Assess patient/family for anxiety and ability to cope  Outcome: Progressing     Problem: DEATH & DYING  Goal: Pt/Family communicate acceptance of impending death and expresses psychological comfort and peace  Description: INTERVENTIONS:  - Assess patient/family anxiety and grief process related to end of life issues  - Provide emotional, spiritual and psychosocial support  - Provide information about the patient’s health status with consideration of family and cultural values  - Communicate willingness to discuss death and facilitate grief process  with patient/family as appropriate  - Emphasize sustaining relationships within family system and community, or faiza/spiritual traditions  - Initiate Spiritual Care, Pastoral care or other ancillary consults as needed  - Refer to community support groups as appropriate  Outcome: Progressing     Problem: CHANGE IN BODY IMAGE  Goal: Pt/Family communicate acceptance of loss or change in body image and expresses psychological comfort and peace  Description: INTERVENTIONS:  - Assess patient/family anxiety and grief process related to change in body image, loss of functional status and loss of sense of self  - Assess patient/family's coping skills and provide emotional, spiritual and psychosocial support  - Provide information about the patient's health status with consideration of family and cultural values  - Communicate willingness to discuss loss and facilitate grief process with patient/family as appropriate  - Emphasize sustaining relationships within family system and community, or faiza/spiritual traditions  - Refer to community support groups as appropriate  - Initiate Spiritual Care, Pastoral care or other ancillary consults as needed  Outcome: Progressing     Problem: DECISION MAKING  Goal: Pt/Family able to effectively weigh alternatives and participate in decision making related to treatment and care  Description: INTERVENTIONS:  - Identify decision maker  - Determine when there are differences among patient's view, family's view, and healthcare provider's view of patient condition and care goals  - Facilitate patient/family articulation of goals for care  - Help patient/family identify pros/cons of alternative solutions  - Provide information as requested by patient/family  - Respect patient/family rights related to privacy and sharing information   - Serve as a liaison between patient, family and health care team  - Initiate consults as appropriate (Ethics Team, Palliative Care, Family Care Conference, etc )  Outcome: Progressing     Problem: CONFUSION/THOUGHT DISTURBANCE  Goal: Thought disturbances (confusion, delirium, depression, dementia or psychosis) are managed to maintain or return to baseline mental status and functional level  Description: INTERVENTIONS:  - Assess for possible contributors to  thought disturbance, including but not limited to medications, infection, impaired vision or hearing, underlying metabolic abnormalities, dehydration, respiratory compromise,  psychiatric diagnoses and notify attending PHYSICAN/AP  - Monitor and intervene to maintain adequate nutrition, hydration, elimination, sleep and activity  - Decrease environmental stimuli, including noise as appropriate  - Provide frequent contacts to provide refocusing, direction and reassurance as needed  Approach patient calmly with eye contact and at their level  - [place] high risk fall precautions, aspiration precautions and other safety measures, as indicated  - If delirium suspected, notify physician/AP of change in condition and request immediate in-person evaluation  - Pursue consults as appropriate including Geriatric (campus dependent), OT for cognitive evaluation/activity planning, psychiatric, pastoral care, etc   Outcome: Progressing     Problem: BEHAVIOR  Goal: Pt/Family maintain appropriate behavior and adhere to behavioral management agreement, if implemented  Description: INTERVENTIONS:  - Assess the family dynamic   - Encourage verbalization of thoughts and concerns in a socially appropriate manner  - Assess patient/family's coping skills and non-compliant behavior (including use of illegal substances)    - Utilize positive, consistent limit setting strategies supporting safety of patient, staff and others  - Initiate consult with Case Management, Spiritual Care or other ancillary services as appropriate  - If a patient's/visitor's behavior jeopardizes the safety of the patient, staff, or others, refer to organization procedure  - Notify Security of behavior or suspected illegal substances which indicate the need for search of the patient and/or belongings  - Encourage participation in the decision making process about a behavioral management agreement; implement if patient meets criteria  Outcome: Progressing     Problem: Depression - IP adult  Goal: Effects of depression will be minimized  Description: INTERVENTIONS:  - Assess impact of patient's symptoms on level of functioning, self-care needs and offer support as indicated  - Assess patient/family knowledge of depression, impact on illness and need for teaching  - Provide emotional support, presence and reassurance  - Assess for possible suicidal thoughts, ideation or self-harm  If patient expresses suicidal thoughts or statements do not leave alone, notify physician/AP immediately, initiate Suicide Precautions, and determine need for continual observation   - Initiate consults and referrals as appropriate (a mental health professional, Spiritual Care)  - Administer medication as ordered  Outcome: Progressing     Problem: SELF HARM  Goal: Effect of psychiatric condition will be minimized and patient will be protected from self harm  Description: INTERVENTIONS:  - Assess impact of patient's symptoms on level of functioning, self-care needs and offer support as indicated  - Assess patient/family knowledge of depression, impact on illness and need for teaching  - Provide emotional support, presence and reassurance  - Assess for possible suicidal thoughts, ideation or self-harm  If patient expresses suicidal thoughts or statements do not leave alone, notify physician/AP immediately, initiate suicide precautions, and determine need for continual observation    - initiate consults and referrals as appropriate (a mental health professional, Spiritual Care  Outcome: Progressing     Problem: ABUSE/NEGLECT  Goal: Pt/Caregiver/Family aware of resources to assist with issues of abuse and neglect  Description: INTERVENTIONS:  - If child abuse and/or neglect is suspected, notify [employer] directly  - Assess for level of risk and safety  - Initiate referral to Case Management  - Notify PHYSICIAN/AP and Nursing Supervisor  - Provide appropriate education and resources to patient and/or family  - Initiate referral to age appropriate protective services, i e  [facility] or Child Protective Services  - Offer patient/caregiver the option to [place] of patient information directory  - Provide emotional support, including active listening and acknowledgment of concerns  - Provide the patient with information about supportive services i e  shelters, community resources for domestic violence  Outcome: Progressing     Problem: SUBSTANCE USE/ABUSE  Goal: Will have no detox symptoms and will verbalize plan for changing substance-related behavior  Description: INTERVENTIONS:  - Monitor physical status and assess for symptoms of withdrawal  - Administer medication as ordered  - Provide emotional support with 1 on 1 interaction with staff  - Encourage recovery focused program/ addiction education  - Assess for verbalization of changing behaviors related to substance abuse  - Initiate consults and referrals as appropriate (Case Management, Spiritual Care, etc )  Outcome: Progressing  Goal: By discharge, will develop insight into their chemical dependency and sustain motivation to continue in recovery  Description: INTERVENTIONS:  - Attends all daily group sessions and scheduled AA groups  - Actively practices coping skills through participation in the therapeutic community and adherence to program rules  - Reviews and completes assignments from individual treatment plan  - Assist patient development of understanding of their personal cycle of addiction and relapse triggers  Outcome: Progressing  Goal: By discharge, patient will have ongoing treatment plan addressing chemical dependency  Description: INTERVENTIONS:  - Assist patient with resources and/or appointments for ongoing recovery based living  Outcome: Progressing     Problem: SPIRITUAL CARE  Goal: Pt/Family able to move forward in process of forgiving self, others and/or higher power  Description: INTERVENTIONS:  - Assist patient with any spiritual needs/requests such as communion, confession, anointing, etc  - Explore guilt and help patient/family identify possible spiritual/cultural beliefs and values  - Explore possibilities of making amends & reconciliation with self, others, and/or a greater power  - Guide patient/family in identifying painful feelings  - Help patient explore and identify spiritual beliefs, cultural understandings or values that may help or hinder letting go of issue  - Help patient explore feelings of anger, bitterness, resentment, anxiety   Help patient/family identify and examine the situation in which these feelings are experienced  - Help patient/family identify destructive displacement of feelings onto other individuals  - Refer patient to formal counseling and/or to faiza community for further support as needed or per request  Outcome: Progressing  Goal: Patient feels balance and connection with others and/or higher power that empowers the self during times of loss, guilt and fear  Description: INTERVENTIONS:  - Create safety for patient through empathic presence and non-judgmental listening  - Encourage patient to explore his/her values, beliefs and/or spiritual images and practices  - Encourage use of breath work, imagery, meditation, relaxation, reiki to ease distress and provide healing  - Encourage use of cultural and spiritual celebrations and rituals  - Facilitate discussion that helps patient sort out spiritual concerns  - Help patient identify where meaning/hope/comfort & strength are in his/her life  - Refer patient to faiza community for assistance, as appropriate  - Respond to patient/family need for prayer/ritual/sacrament/ceremony  Outcome: Progressing     Problem: Potential for Falls  Goal: Patient will remain free of falls  Description: INTERVENTIONS:  - Educate patient/family on patient safety including physical limitations  - Instruct patient to call for assistance with activity   - Consult OT/PT to assist with strengthening/mobility   - Keep Call bell within reach  - Keep bed low and locked with side rails adjusted as appropriate  - Keep care items and personal belongings within reach  - Initiate and maintain comfort rounds  - Make Fall Risk Sign visible to staff  - Offer Toileting every 2 Hours, in advance of need  - Initiate/Maintain bed alarm  - Apply yellow socks and bracelet for high fall risk patients  - Consider moving patient to room near nurses station  Outcome: Progressing

## 2023-01-11 ENCOUNTER — APPOINTMENT (INPATIENT)
Dept: MRI IMAGING | Facility: HOSPITAL | Age: 88
End: 2023-01-11

## 2023-01-11 ENCOUNTER — PREP FOR PROCEDURE (OUTPATIENT)
Dept: CARDIOLOGY CLINIC | Facility: CLINIC | Age: 88
End: 2023-01-11

## 2023-01-11 ENCOUNTER — APPOINTMENT (INPATIENT)
Dept: NEUROLOGY | Facility: HOSPITAL | Age: 88
End: 2023-01-11

## 2023-01-11 DIAGNOSIS — I49.5 SICK SINUS SYNDROME (HCC): Primary | ICD-10-CM

## 2023-01-11 DIAGNOSIS — I48.91 ATRIAL FIBRILLATION WITH SLOW VENTRICULAR RESPONSE (HCC): ICD-10-CM

## 2023-01-11 PROBLEM — R53.83 LETHARGY: Status: RESOLVED | Noted: 2023-01-09 | Resolved: 2023-01-11

## 2023-01-11 PROBLEM — R29.90 STROKE-LIKE SYMPTOMS: Status: ACTIVE | Noted: 2023-01-11

## 2023-01-11 LAB
ANION GAP SERPL CALCULATED.3IONS-SCNC: 10 MMOL/L (ref 4–13)
AORTIC ROOT: 3.2 CM
AORTIC VALVE MEAN VELOCITY: 6.4 M/S
APICAL FOUR CHAMBER EJECTION FRACTION: 67 %
AV AREA BY CONTINUOUS VTI: 2.4 CM2
AV AREA PEAK VELOCITY: 2.3 CM2
AV LVOT MEAN GRADIENT: 1 MMHG
AV LVOT PEAK GRADIENT: 3 MMHG
AV MEAN GRADIENT: 2 MMHG
AV PEAK GRADIENT: 4 MMHG
AV VALVE AREA: 2.43 CM2
AV VELOCITY RATIO: 0.8
BUN SERPL-MCNC: 25 MG/DL (ref 5–25)
CALCIUM SERPL-MCNC: 9.5 MG/DL (ref 8.3–10.1)
CHLORIDE SERPL-SCNC: 105 MMOL/L (ref 96–108)
CO2 SERPL-SCNC: 24 MMOL/L (ref 21–32)
CREAT SERPL-MCNC: 0.85 MG/DL (ref 0.6–1.3)
DOP CALC AO PEAK VEL: 1 M/S
DOP CALC AO VTI: 22.88 CM
DOP CALC LVOT AREA: 2.83 CM2
DOP CALC LVOT DIAMETER: 1.9 CM
DOP CALC LVOT PEAK VEL VTI: 19.61 CM
DOP CALC LVOT PEAK VEL: 0.8 M/S
DOP CALC LVOT STROKE INDEX: 30.4 ML/M2
DOP CALC LVOT STROKE VOLUME: 55.57 CM3
FRACTIONAL SHORTENING: 33 % (ref 28–44)
GFR SERPL CREATININE-BSD FRML MDRD: 62 ML/MIN/1.73SQ M
GLUCOSE SERPL-MCNC: 83 MG/DL (ref 65–140)
INTERVENTRICULAR SEPTUM IN DIASTOLE (PARASTERNAL SHORT AXIS VIEW): 1.4 CM
INTERVENTRICULAR SEPTUM: 1.4 CM (ref 0.6–1.1)
IVC: 23 MM
LAAS-AP2: 28.7 CM2
LAAS-AP4: 35 CM2
LEFT ATRIUM AREA SYSTOLE SINGLE PLANE A4C: 35.4 CM2
LEFT ATRIUM SIZE: 3.7 CM
LEFT INTERNAL DIMENSION IN SYSTOLE: 3 CM (ref 2.1–4)
LEFT VENTRICULAR INTERNAL DIMENSION IN DIASTOLE: 4.5 CM (ref 3.5–6)
LEFT VENTRICULAR POSTERIOR WALL IN END DIASTOLE: 1.5 CM
LEFT VENTRICULAR STROKE VOLUME: 57 ML
LVSV (TEICH): 57 ML
MRSA NOSE QL CULT: NORMAL
PA SYSTOLIC PRESSURE: 53 MMHG
PISA TR MAX VEL: 0.3 M/S
POTASSIUM SERPL-SCNC: 3.7 MMOL/L (ref 3.5–5.3)
RA PRESSURE ESTIMATED: 15 MMHG
RIGHT ATRIUM AREA SYSTOLE A4C: 25.4 CM2
RIGHT VENTRICLE ID DIMENSION: 4.3 CM
RV PSP: 53 MMHG
SL CV LEFT ATRIUM LENGTH A2C: 6.6 CM
SL CV LV EF: 67
SL CV PED ECHO LEFT VENTRICLE DIASTOLIC VOLUME (MOD BIPLANE) 2D: 92 ML
SL CV PED ECHO LEFT VENTRICLE SYSTOLIC VOLUME (MOD BIPLANE) 2D: 35 ML
SL CV TV EROA: 0.1 CM2
SL CV TV REGURGITANT FLOW RATE: 26 ML/S
SODIUM SERPL-SCNC: 139 MMOL/L (ref 135–147)
TR MAX PG: 38 MMHG
TR PEAK VELOCITY: 3.1 M/S
TR VTI: 87.7 CM
TRICUSPID VALVE PEAK REGURGITATION VELOCITY: 3.09 M/S

## 2023-01-11 RX ORDER — FUROSEMIDE 40 MG/1
40 TABLET ORAL DAILY
Status: DISCONTINUED | OUTPATIENT
Start: 2023-01-11 | End: 2023-01-13 | Stop reason: HOSPADM

## 2023-01-11 RX ORDER — POTASSIUM CHLORIDE 20 MEQ/1
20 TABLET, EXTENDED RELEASE ORAL ONCE
Status: COMPLETED | OUTPATIENT
Start: 2023-01-11 | End: 2023-01-11

## 2023-01-11 RX ADMIN — QUETIAPINE FUMARATE 12.5 MG: 25 TABLET ORAL at 21:02

## 2023-01-11 RX ADMIN — MELATONIN 3 MG: at 21:02

## 2023-01-11 RX ADMIN — ZONISAMIDE 100 MG: 100 CAPSULE ORAL at 09:15

## 2023-01-11 RX ADMIN — APIXABAN 5 MG: 5 TABLET, FILM COATED ORAL at 17:43

## 2023-01-11 RX ADMIN — ATORVASTATIN CALCIUM 40 MG: 40 TABLET, FILM COATED ORAL at 17:43

## 2023-01-11 RX ADMIN — APIXABAN 5 MG: 5 TABLET, FILM COATED ORAL at 09:16

## 2023-01-11 RX ADMIN — POLYETHYLENE GLYCOL 3350 17 G: 17 POWDER, FOR SOLUTION ORAL at 09:16

## 2023-01-11 RX ADMIN — POTASSIUM CHLORIDE 20 MEQ: 1500 TABLET, EXTENDED RELEASE ORAL at 11:12

## 2023-01-11 RX ADMIN — CALCIUM CARBONATE (ANTACID) CHEW TAB 500 MG 500 MG: 500 CHEW TAB at 09:16

## 2023-01-11 NOTE — PLAN OF CARE
Problem: PAIN - ADULT  Goal: Verbalizes/displays adequate comfort level or baseline comfort level  Description: Interventions:  - Encourage patient to monitor pain and request assistance  - Assess pain using appropriate pain scale  - Administer analgesics based on type and severity of pain and evaluate response  - Implement non-pharmacological measures as appropriate and evaluate response  - Consider cultural and social influences on pain and pain management  - Notify physician/advanced practitioner if interventions unsuccessful or patient reports new pain  Outcome: Progressing     Problem: INFECTION - ADULT  Goal: Absence or prevention of progression during hospitalization  Description: INTERVENTIONS:  - Assess and monitor for signs and symptoms of infection  - Monitor lab/diagnostic results  - Monitor all insertion sites, i e  indwelling lines, tubes, and drains  - Monitor endotracheal if appropriate and nasal secretions for changes in amount and color  - Ar[place] appropriate cooling/warming therapies per order  - Administer medications as ordered  - Instruct and encourage patient and family to use good hand hygiene technique  - Identify and instruct in appropriate isolation precautions for identified infection/condition  Outcome: Progressing  Goal: Absence of fever/infection during neutropenic period  Description: INTERVENTIONS:  - Monitor WBC    Outcome: Progressing     Problem: SAFETY ADULT  Goal: Patient will remain free of falls  Description: INTERVENTIONS:  - Educate patient/family on patient safety including physical limitations  - Instruct patient to call for assistance with activity   - Consult OT/PT to assist with strengthening/mobility   - Keep Call bell within reach  - Keep bed low and locked with side rails adjusted as appropriate  - Keep care items and personal belongings within reach  - Initiate and maintain comfort rounds  - Make Fall Risk Sign visible to staff  - Offer Toileting every 2 Hours, in advance of need  - Initiate/Maintain bed alarm  - Apply yellow socks and bracelet for high fall risk patients  - Consider moving patient to room near nurses station  Outcome: Progressing  Goal: Maintain or return to baseline ADL function  Description: INTERVENTIONS:  -  Assess patient's ability to carry out ADLs; assess patient's baseline for ADL function and identify physical deficits which impact ability to perform ADLs (bathing, care of mouth/teeth, toileting, grooming, dressing, etc )  - Assess/evaluate cause of self-care deficits   - Assess range of motion  - Assess patient's mobility; develop plan if impaired  - Assess patient's need for assistive devices and provide as appropriate  - Encourage maximum independence but intervene and supervise when necessary  - Involve family in performance of ADLs  - Assess for home care needs following discharge   - Consider OT consult to assist with ADL evaluation and planning for discharge  - Provide patient education as appropriate  Outcome: Progressing  Goal: Maintains/Returns to pre admission functional level  Description: INTERVENTIONS:  - Perform BMAT or MOVE assessment daily    - Set and communicate daily mobility goal to care team and patient/family/caregiver  - Collaborate with rehabilitation services on mobility goals if consulted  - Perform Range of Motion 4 times a day  - Reposition patient every 4 hours    - Dangle patient 4 times a day  - Stand patient 4 times a day  - Ambulate patient 4 times a day  - Out of bed to chair 4 times a day   - Out of bed for meals 4 times a day  - Out of bed for toileting  - Record patient progress and toleration of activity level   Outcome: Progressing     Problem: DISCHARGE PLANNING  Goal: Discharge to home or other facility with appropriate resources  Description: INTERVENTIONS:  - Identify barriers to discharge w/patient and caregiver  - Arrange for needed discharge resources and transportation as appropriate  - Identify discharge learning needs (meds, wound care, etc )  - Arrange for interpretive services to assist at discharge as needed  - Refer to Case Management Department for coordinating discharge planning if the patient needs post-hospital services based on physician/advanced practitioner order or complex needs related to functional status, cognitive ability, or social support system  Outcome: Progressing     Problem: Knowledge Deficit  Goal: Patient/family/caregiver demonstrates understanding of disease process, treatment plan, medications, and discharge instructions  Description: Complete learning assessment and assess knowledge base    Interventions:  - Provide teaching at level of understanding  - Provide teaching via preferred learning methods  Outcome: Progressing     Problem: COPING  Goal: Pt/Family able to verbalize concerns and demonstrate effective coping strategies  Description: INTERVENTIONS:  - Assist patient/family to identify coping skills, available support systems and cultural and spiritual values  - Provide emotional support, including active listening and acknowledgement of concerns of patient and caregivers  - Reduce environmental stimuli, as able  - Provide patient education  - Assess for spiritual pain/suffering and initiate spiritual care, including notification of Pastoral Care or faiza based community as needed  - Assess effectiveness of coping strategies  Outcome: Progressing  Goal: Will report anxiety at manageable levels  Description: INTERVENTIONS:  - Administer medication as ordered  - Teach and encourage coping skills  - Provide emotional support  - Assess patient/family for anxiety and ability to cope  Outcome: Progressing     Problem: DEATH & DYING  Goal: Pt/Family communicate acceptance of impending death and expresses psychological comfort and peace  Description: INTERVENTIONS:  - Assess patient/family anxiety and grief process related to end of life issues  - Provide emotional, spiritual and psychosocial support  - Provide information about the patient’s health status with consideration of family and cultural values  - Communicate willingness to discuss death and facilitate grief process  with patient/family as appropriate  - Emphasize sustaining relationships within family system and community, or faiza/spiritual traditions  - Initiate Spiritual Care, Pastoral care or other ancillary consults as needed  - Refer to community support groups as appropriate  Outcome: Progressing     Problem: CHANGE IN BODY IMAGE  Goal: Pt/Family communicate acceptance of loss or change in body image and expresses psychological comfort and peace  Description: INTERVENTIONS:  - Assess patient/family anxiety and grief process related to change in body image, loss of functional status and loss of sense of self  - Assess patient/family's coping skills and provide emotional, spiritual and psychosocial support  - Provide information about the patient's health status with consideration of family and cultural values  - Communicate willingness to discuss loss and facilitate grief process with patient/family as appropriate  - Emphasize sustaining relationships within family system and community, or faiza/spiritual traditions  - Refer to community support groups as appropriate  - Initiate Spiritual Care, Pastoral care or other ancillary consults as needed  Outcome: Progressing     Problem: DECISION MAKING  Goal: Pt/Family able to effectively weigh alternatives and participate in decision making related to treatment and care  Description: INTERVENTIONS:  - Identify decision maker  - Determine when there are differences among patient's view, family's view, and healthcare provider's view of patient condition and care goals  - Facilitate patient/family articulation of goals for care  - Help patient/family identify pros/cons of alternative solutions  - Provide information as requested by patient/family  - Respect patient/family rights related to privacy and sharing information   - Serve as a liaison between patient, family and health care team  - Initiate consults as appropriate (Ethics Team, Palliative Care, Family Care Conference, etc )  Outcome: Progressing     Problem: CONFUSION/THOUGHT DISTURBANCE  Goal: Thought disturbances (confusion, delirium, depression, dementia or psychosis) are managed to maintain or return to baseline mental status and functional level  Description: INTERVENTIONS:  - Assess for possible contributors to  thought disturbance, including but not limited to medications, infection, impaired vision or hearing, underlying metabolic abnormalities, dehydration, respiratory compromise,  psychiatric diagnoses and notify attending PHYSICAN/AP  - Monitor and intervene to maintain adequate nutrition, hydration, elimination, sleep and activity  - Decrease environmental stimuli, including noise as appropriate  - Provide frequent contacts to provide refocusing, direction and reassurance as needed  Approach patient calmly with eye contact and at their level  - [place] high risk fall precautions, aspiration precautions and other safety measures, as indicated  - If delirium suspected, notify physician/AP of change in condition and request immediate in-person evaluation  - Pursue consults as appropriate including Geriatric (campus dependent), OT for cognitive evaluation/activity planning, psychiatric, pastoral care, etc   Outcome: Progressing     Problem: BEHAVIOR  Goal: Pt/Family maintain appropriate behavior and adhere to behavioral management agreement, if implemented  Description: INTERVENTIONS:  - Assess the family dynamic   - Encourage verbalization of thoughts and concerns in a socially appropriate manner  - Assess patient/family's coping skills and non-compliant behavior (including use of illegal substances)    - Utilize positive, consistent limit setting strategies supporting safety of patient, staff and others  - Initiate consult with Case Management, Spiritual Care or other ancillary services as appropriate  - If a patient's/visitor's behavior jeopardizes the safety of the patient, staff, or others, refer to organization procedure  - Notify Security of behavior or suspected illegal substances which indicate the need for search of the patient and/or belongings  - Encourage participation in the decision making process about a behavioral management agreement; implement if patient meets criteria  Outcome: Progressing     Problem: Depression - IP adult  Goal: Effects of depression will be minimized  Description: INTERVENTIONS:  - Assess impact of patient's symptoms on level of functioning, self-care needs and offer support as indicated  - Assess patient/family knowledge of depression, impact on illness and need for teaching  - Provide emotional support, presence and reassurance  - Assess for possible suicidal thoughts, ideation or self-harm  If patient expresses suicidal thoughts or statements do not leave alone, notify physician/AP immediately, initiate Suicide Precautions, and determine need for continual observation   - Initiate consults and referrals as appropriate (a mental health professional, Spiritual Care)  - Administer medication as ordered  Outcome: Progressing     Problem: SELF HARM  Goal: Effect of psychiatric condition will be minimized and patient will be protected from self harm  Description: INTERVENTIONS:  - Assess impact of patient's symptoms on level of functioning, self-care needs and offer support as indicated  - Assess patient/family knowledge of depression, impact on illness and need for teaching  - Provide emotional support, presence and reassurance  - Assess for possible suicidal thoughts, ideation or self-harm  If patient expresses suicidal thoughts or statements do not leave alone, notify physician/AP immediately, initiate suicide precautions, and determine need for continual observation    - initiate consults and referrals as appropriate (a mental health professional, Spiritual Care  Outcome: Progressing     Problem: ABUSE/NEGLECT  Goal: Pt/Caregiver/Family aware of resources to assist with issues of abuse and neglect  Description: INTERVENTIONS:  - If child abuse and/or neglect is suspected, notify [employer]line directly  - Assess for level of risk and safety  - Initiate referral to Case Management  - Notify PHYSICIAN/AP and Nursing Supervisor  - Provide appropriate education and resources to patient and/or family  - Initiate referral to age appropriate protective services, i e  [facility] or Child Protective Services  - Offer patient/caregiver the option to [place] of patient information directory  - Provide emotional support, including active listening and acknowledgment of concerns  - Provide the patient with information about supportive services i e  shelters, community resources for domestic violence  Outcome: Progressing     Problem: SUBSTANCE USE/ABUSE  Goal: Will have no detox symptoms and will verbalize plan for changing substance-related behavior  Description: INTERVENTIONS:  - Monitor physical status and assess for symptoms of withdrawal  - Administer medication as ordered  - Provide emotional support with 1 on 1 interaction with staff  - Encourage recovery focused program/ addiction education  - Assess for verbalization of changing behaviors related to substance abuse  - Initiate consults and referrals as appropriate (Case Management, Spiritual Care, etc )  Outcome: Progressing  Goal: By discharge, will develop insight into their chemical dependency and sustain motivation to continue in recovery  Description: INTERVENTIONS:  - Attends all daily group sessions and scheduled AA groups  - Actively practices coping skills through participation in the therapeutic community and adherence to program rules  - Reviews and completes assignments from individual treatment plan  - Assist patient development of understanding of their personal cycle of addiction and relapse triggers  Outcome: Progressing  Goal: By discharge, patient will have ongoing treatment plan addressing chemical dependency  Description: INTERVENTIONS:  - Assist patient with resources and/or appointments for ongoing recovery based living  Outcome: Progressing     Problem: SPIRITUAL CARE  Goal: Pt/Family able to move forward in process of forgiving self, others and/or higher power  Description: INTERVENTIONS:  - Assist patient with any spiritual needs/requests such as communion, confession, anointing, etc  - Explore guilt and help patient/family identify possible spiritual/cultural beliefs and values  - Explore possibilities of making amends & reconciliation with self, others, and/or a greater power  - Guide patient/family in identifying painful feelings  - Help patient explore and identify spiritual beliefs, cultural understandings or values that may help or hinder letting go of issue  - Help patient explore feelings of anger, bitterness, resentment, anxiety   Help patient/family identify and examine the situation in which these feelings are experienced  - Help patient/family identify destructive displacement of feelings onto other individuals  - Refer patient to formal counseling and/or to faiza community for further support as needed or per request  Outcome: Progressing  Goal: Patient feels balance and connection with others and/or higher power that empowers the self during times of loss, guilt and fear  Description: INTERVENTIONS:  - Create safety for patient through empathic presence and non-judgmental listening  - Encourage patient to explore his/her values, beliefs and/or spiritual images and practices  - Encourage use of breath work, imagery, meditation, relaxation, reiki to ease distress and provide healing  - Encourage use of cultural and spiritual celebrations and rituals  - Facilitate discussion that helps patient sort out spiritual concerns  - Help patient identify where meaning/hope/comfort & strength are in his/her life  - Refer patient to faiza community for assistance, as appropriate  - Respond to patient/family need for prayer/ritual/sacrament/ceremony  Outcome: Progressing     Problem: Potential for Falls  Goal: Patient will remain free of falls  Description: INTERVENTIONS:  - Educate patient/family on patient safety including physical limitations  - Instruct patient to call for assistance with activity   - Consult OT/PT to assist with strengthening/mobility   - Keep Call bell within reach  - Keep bed low and locked with side rails adjusted as appropriate  - Keep care items and personal belongings within reach  - Initiate and maintain comfort rounds  - Make Fall Risk Sign visible to staff  - Offer Toileting every 2 Hours, in advance of need  - Initiate/Maintain bed alarm  - Apply yellow socks and bracelet for high fall risk patients  - Consider moving patient to room near nurses station  Outcome: Progressing     Problem: Nutrition/Hydration-ADULT  Goal: Nutrient/Hydration intake appropriate for improving, restoring or maintaining nutritional needs  Description: Monitor and assess patient's nutrition/hydration status for malnutrition  Collaborate with interdisciplinary team and initiate plan and interventions as ordered  Monitor patient's weight and dietary intake as ordered or per policy  Utilize nutrition screening tool and intervene as necessary  Determine patient's food preferences and provide high-protein, high-caloric foods as appropriate       INTERVENTIONS:  - Monitor oral intake, urinary output, labs, and treatment plans  - Assess nutrition and hydration status and recommend course of action  - Evaluate amount of meals eaten  - Assist patient with eating if necessary   - Allow adequate time for meals  - Recommend/ encourage appropriate diets, oral nutritional supplements, and vitamin/mineral supplements  - Order, calculate, and assess calorie counts as needed  - Recommend, monitor, and adjust tube feedings and TPN/PPN based on assessed needs  - Assess need for intravenous fluids  - Provide specific nutrition/hydration education as appropriate  - Include patient/family/caregiver in decisions related to nutrition  Outcome: Progressing     Problem: MOBILITY - ADULT  Goal: Maintain or return to baseline ADL function  Description: INTERVENTIONS:  -  Assess patient's ability to carry out ADLs; assess patient's baseline for ADL function and identify physical deficits which impact ability to perform ADLs (bathing, care of mouth/teeth, toileting, grooming, dressing, etc )  - Assess/evaluate cause of self-care deficits   - Assess range of motion  - Assess patient's mobility; develop plan if impaired  - Assess patient's need for assistive devices and provide as appropriate  - Encourage maximum independence but intervene and supervise when necessary  - Involve family in performance of ADLs  - Assess for home care needs following discharge   - Consider OT consult to assist with ADL evaluation and planning for discharge  - Provide patient education as appropriate  Outcome: Progressing  Goal: Maintains/Returns to pre admission functional level  Description: INTERVENTIONS:  - Perform BMAT or MOVE assessment daily    - Set and communicate daily mobility goal to care team and patient/family/caregiver  - Collaborate with rehabilitation services on mobility goals if consulted  - Perform Range of Motion 4 times a day  - Reposition patient every 4 hours    - Dangle patient 4 times a day  - Stand patient 4 times a day  - Ambulate patient 4 times a day  - Out of bed to chair 4 times a day   - Out of bed for meals 4 times a day  - Out of bed for toileting  - Record patient progress and toleration of activity level   Outcome: Progressing

## 2023-01-11 NOTE — ASSESSMENT & PLAN NOTE
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

## 2023-01-11 NOTE — PLAN OF CARE
Problem: OCCUPATIONAL THERAPY ADULT  Goal: Performs self-care activities at highest level of function for planned discharge setting  See evaluation for individualized goals  Description: Treatment Interventions: ADL retraining, Functional transfer training, UE strengthening/ROM, Endurance training, Cognitive reorientation, Patient/family training, Equipment evaluation/education, Compensatory technique education, Energy conservation, Activityengagement          See flowsheet documentation for full assessment, interventions and recommendations  Outcome: Progressing  Note: Limitation: Decreased ADL status, Decreased UE strength, Decreased cognition, Decreased Safe judgement during ADL, Decreased endurance, Decreased self-care trans, Decreased high-level ADLs  Prognosis: Good  Assessment: Pt seen for skilled OT session focused on ADLs, functional transfers and mobility, UE exercises  Pt w/ MIN assist sit>stand from bed w/ increased time to complete  Pt w/ MIN assist x1 functional mobility w/ RW in homelike environment and pt w/ dyspnea noted and cues for proper breathing and pt w/ SPO2 on room air 97%  Pt w/ MIN assist x1 functional transfer stand>sit to chair  Pt seated upright in chair for ADLs, setup grooming  Pt w/ setup UB ADLs  Pt w/ MIN assist LB Bathing w/ steadying support and increased time to complete  Pt w/ MIN assist LB Dressing w/ increased time and impaired functional reach and steadying support to pull up underwear over hips  Pt completed b/l UE exercises seen above to increase strength and endurance for ADLs, functional transfers and mobility w/ cues for correct techniques at 2 sets x15 reps  Pt w/ all needs met and alarm intact completing word searches  Pt continues to be limited due to decreased strength and endurance, impaired balance, impaired activity tolerance, fall risk, expressive aphasia, impaired cognition all causing a decline in ADLs functional transfers and mobility   Recommend return to facility w/ HOME therapy  The patient's raw score on the AM-PAC Daily Activity inpatient short form is 19, standardized score is 40 22, greater than 39 4  Patients at this level are likely to benefit from discharge to home  Please refer to the recommendation of the Occupational Therapist for safe discharge planning       OT Discharge Recommendation: Return to facility with rehabilitation services

## 2023-01-11 NOTE — OCCUPATIONAL THERAPY NOTE
Occupational Therapy Progress Note     Patient Name: Scarlett Brower  [id number] Date: 1/11/2023  Problem List  Principal Problem:    Lethargy  Active Problems:    Atrial fibrillation ([facility] )    Amnestic MCI (mild cognitive impairment with memory loss)    Chronic diastolic heart failure ([facility] )    Seizure ([facility] )    History of stroke            01/11/23 1052   OT Last Visit   OT Visit Date 01/11/23   Note Type   Note Type Treatment   Pain Assessment   Pain Assessment Tool 0-10   Pain Score No Pain   Restrictions/Precautions   Weight Bearing Precautions Per Order No   Other Precautions Cognitive; Bed Alarm; Chair Alarm; Fall Risk;Multiple lines;Telemetry   ADL   Where Assessed Chair   Eating Assistance 5  Supervision/Setup   Grooming Assistance 5  Supervision/Setup   Grooming Deficit Setup;Verbal cueing;Supervision/safety; Increased time to complete;Wash/dry hands; Wash/dry face;Brushing hair   UB Bathing Assistance 5  Supervision/Setup   UB Bathing Deficit Setup;Verbal cueing;Supervision/safety; Increased time to complete   LB Bathing Assistance 4  Minimal Assistance   LB Bathing Deficit Setup;Steadying;Verbal cueing;Supervision/safety; Increased time to complete; Buttocks; Perineal area   LB Bathing Comments steadying support   UB Dressing Assistance 5  Supervision/Setup   UB Dressing Deficit Setup;Supervision/safety; Increased time to complete   LB Dressing Assistance 4  Minimal Assistance   LB Dressing Deficit Setup;Steadying; Requires assistive device for steadying;Supervision/safety;Verbal cueing; Increased time to complete; Thread LLE into underwear; Thread RLE into underwear;Pull up over hips;Don/doff L shoe;Don/doff R shoe   LB Dressing Comments steadying support to pull up over hips and assist thread LEs through hospital underwear   Transfers   Sit to Stand 4  Minimal assistance   Additional items Assist x 1; Increased time required;Verbal cues;Armrests   Stand to Sit 4  Minimal assistance   Additional items Assist x 1; Increased time required;Verbal cues;Armrests   Additional Comments cues for hand placement and positioning   Functional Mobility   Functional Mobility 4  Minimal assistance   Additional Comments assist x1 w/ increased time to complete   Additional items Rolling walker   Therapeutic Excerise-Strength   UE Strength Yes   Right Upper Extremity- Strength   R Shoulder Flexion; Extension;Horizontal ABduction   R Elbow Elbow flexion;Elbow extension  (punchouts)   R Weight/Reps/Sets 2 sets x 15 reps   RUE Strength Comment tolerated well   Left Upper Extremity-Strength   L Shoulder Flexion; Extension;Horizontal ABduction   L Elbow Elbow flexion;Elbow extension  (punchouts)   L Weights/Reps/Sets 2 sets x 15 reps   LUE Strength Comment tolerated well   Cognition   Overall Cognitive Status Impaired   Arousal/Participation Responsive; Cooperative   Attention Attends with cues to redirect   Orientation Level Oriented to person;Oriented to place; Disoriented to time;Disoriented to situation  (able to recall month as January end of session)   Memory Decreased short term memory;Decreased recall of precautions;Decreased recall of recent events   Following Commands Follows one step commands with increased time or repetition   Comments pt w/ Dementia at baseline, some aphasia noted, pleasant and cooperative   Additional Activities   Additional Activities   (education on positioning and safety)   Additional Activities Comments pt receptive; provided pt w/ word searches end of session as she enjoys   Activity Tolerance   Activity Tolerance Patient tolerated treatment well;Patient limited by fatigue   Medical Staff Made Aware appropriate to see per RNRd   Assessment   Assessment Pt seen for skilled OT session focused on ADLs, functional transfers and mobility, UE exercises  Pt w/ MIN assist sit>stand from bed w/ increased time to complete   Pt w/ MIN assist x1 functional mobility w/ RW in homelike environment and pt w/ dyspnea noted and cues for proper breathing and pt w/ SPO2 on room air 97%  Pt w/ MIN assist x1 functional transfer stand>sit to chair  Pt seated upright in chair for ADLs, setup grooming  Pt w/ setup UB ADLs  Pt w/ MIN assist LB Bathing w/ steadying support and increased time to complete  Pt w/ MIN assist LB Dressing w/ increased time and impaired functional reach and steadying support to pull up underwear over hips  Pt completed b/l UE exercises seen above to increase strength and endurance for ADLs, functional transfers and mobility w/ cues for correct techniques at 2 sets x15 reps  Pt w/ all needs met and alarm intact completing word searches  Pt continues to be limited due to decreased strength and endurance, impaired balance, impaired activity tolerance, fall risk, expressive aphasia, impaired cognition all causing a decline in ADLs functional transfers and mobility  Recommend return to facility w/ HOME therapy  The patient's raw score on the AM-PAC Daily Activity inpatient short form is 19, standardized score is 40 22, greater than 39 4  Patients at this level are likely to benefit from discharge to home  Please refer to the recommendation of the Occupational Therapist for safe discharge planning  Plan   Treatment Interventions ADL retraining;Functional transfer training; Endurance training;UE strengthening/ROM; Cognitive reorientation;Patient/family training;Equipment evaluation/education; Compensatory technique education; Energy conservation; Activityengagement   Goal Expiration Date 01/24/23   OT Treatment Day 1   OT Frequency 2-3x/wk   Recommendation   OT Discharge Recommendation Return to facility with rehabilitation services   AM-[facility] Daily Activity Inpatient   Lower Body Dressing 3   Bathing 3   Toileting 3   Upper Body Dressing 3   Grooming 3   Eating 4   Daily Activity Raw Score 19   Daily Activity Standardized Score (Calc for Raw Score >=11) 40 22   AM-PAC Applied Cognition Inpatient   Following a Speech/Presentation 2 Understanding Ordinary Conversation 3   Taking Medications 2   Remembering Where Things Are Placed or Put Away 2   Remembering List of 4-5 Errands 1   Taking Care of Complicated Tasks 1   Applied Cognition Raw Score 11   Applied Cognition Standardized Score 27 03   Modified Deuel Scale   Modified Deuel Scale 4   End of Consult   Education Provided Yes   Patient Position at End of Consult Bedside chair;Bed/Chair alarm activated; All needs within reach   Nurse Communication Nurse aware of consult     Documentation completed by: Robi Patterson MS, OTR/L

## 2023-01-11 NOTE — PLAN OF CARE
Problem: PHYSICAL THERAPY ADULT  Goal: Performs mobility at highest level of function for planned discharge setting  See evaluation for individualized goals  Description: Treatment/Interventions: Functional transfer training, LE strengthening/ROM, Therapeutic exercise, Endurance training, Cognitive reorientation, Patient/family training, Bed mobility, Equipment eval/education, Gait training, Compensatory technique education, Continued evaluation, Spoke to nursing, OT, Spoke to advanced practitioner          See flowsheet documentation for full assessment, interventions and recommendations  Note: Prognosis: Good  Problem List: Decreased strength, Decreased endurance, Impaired balance, Decreased cognition, Impaired judgement, Decreased safety awareness, Obesity  Assessment: Radha Varela is a 82 y o  female admitted to [facility] on 1/9/2023 for Lethargy  PT was consulted and pt was seen on 1/11/2023 for mobility assessment and d/c planning  Pt presents w telemetry monitoring  Poor historian; per chart review pt gets assist for ADLs and IADLs at baseline, ambulates w RW  Pt is currently functioning at a supervision assistance x1 level for bed mobility, minimum assistance x1 level for transfers and ambulation w RW  Pt demonstrated deficits of cognition (baseline), communication (aphasia), strength (generally deconditioned), endurance (CUADRA, desat on RA), and balance (gait deviations, fall risk)  Currently ambulating household distances  No complaints during session  Likely functioning near baseline despite prev mentioned factors  Pt will benefit from continued skilled IP PT to address the above mentioned impairments  in order to maximize recovery and increase functional independence when completing mobility and ADLs  At this time PT recommendations for d/c are return to facility w PT services as indicated based on fall risk and PLOF  Barriers to Discharge:  Other (Comment)  Barriers to Discharge Comments: unable to identify  PT Discharge Recommendation: Return to facility with rehabilitation services    See flowsheet documentation for full assessment

## 2023-01-11 NOTE — ASSESSMENT & PLAN NOTE
Presents from memory care unit for generalized weakness, trouble walking with her walker  Patient was tangential in though, oriented to self, place and event    CTH hypodensity of left parietal lobe image consistent with subacute infarct   Await MRI of the brain and further stroke work-up    Continue PTA eliquis  PT Recommending to return to facility

## 2023-01-11 NOTE — PLAN OF CARE
Problem: PAIN - ADULT  Goal: Verbalizes/displays adequate comfort level or baseline comfort level  Description: Interventions:  - Encourage patient to monitor pain and request assistance  - Assess pain using appropriate pain scale  - Administer analgesics based on type and severity of pain and evaluate response  - Implement non-pharmacological measures as appropriate and evaluate response  - Consider cultural and social influences on pain and pain management  - Notify physician/advanced practitioner if interventions unsuccessful or patient reports new pain  Outcome: Progressing     Problem: INFECTION - ADULT  Goal: Absence or prevention of progression during hospitalization  Description: INTERVENTIONS:  - Assess and monitor for signs and symptoms of infection  - Monitor lab/diagnostic results  - Monitor all insertion sites, i e  indwelling lines, tubes, and drains  - Monitor endotracheal if appropriate and nasal secretions for changes in amount and color  - [place] appropriate cooling/warming therapies per order  - Administer medications as ordered  - Instruct and encourage patient and family to use good hand hygiene technique  - Identify and instruct in appropriate isolation precautions for identified infection/condition  Outcome: Progressing  Goal: Absence of fever/infection during neutropenic period  Description: INTERVENTIONS:  - Monitor WBC    Outcome: Progressing     Problem: SAFETY ADULT  Goal: Patient will remain free of falls  Description: INTERVENTIONS:  - Educate patient/family on patient safety including physical limitations  - Instruct patient to call for assistance with activity   - Consult OT/PT to assist with strengthening/mobility   - Keep Call bell within reach  - Keep bed low and locked with side rails adjusted as appropriate  - Keep care items and personal belongings within reach  - Initiate and maintain comfort rounds  - Make Fall Risk Sign visible to staff  - Offer Toileting every 2 Hours, in advance of need  - Initiate/Maintain bed alarm  - Obtain necessary fall risk management equipment: alarm  - Apply yellow socks and bracelet for high fall risk patients  - Consider moving patient to room near nurses station  Outcome: Progressing  Goal: Maintain or return to baseline ADL function  Description: INTERVENTIONS:  -  Assess patient's ability to carry out ADLs; assess patient's baseline for ADL function and identify physical deficits which impact ability to perform ADLs (bathing, care of mouth/teeth, toileting, grooming, dressing, etc )  - Assess/evaluate cause of self-care deficits   - Assess range of motion  - Assess patient's mobility; develop plan if impaired  - Assess patient's need for assistive devices and provide as appropriate  - Encourage maximum independence but intervene and supervise when necessary  - Involve family in performance of ADLs  - Assess for home care needs following discharge   - Consider OT consult to assist with ADL evaluation and planning for discharge  - Provide patient education as appropriate  Outcome: Progressing  Goal: Maintains/Returns to pre admission functional level  Description: INTERVENTIONS:  - Perform BMAT or MOVE assessment daily    - Set and communicate daily mobility goal to care team and patient/family/caregiver  - Collaborate with rehabilitation services on mobility goals if consulted  - Perform Range of Motion 2 times a day  - Reposition patient every 2 hours    - Dangle patient 2 times a day  - Stand patient 2 times a day  - Ambulate patient 2 times a day  - Out of bed to chair 2 times a day   - Out of bed for meals 2 times a day  - Out of bed for toileting  - Record patient progress and toleration of activity level   Outcome: Progressing     Problem: DISCHARGE PLANNING  Goal: Discharge to home or other facility with appropriate resources  Description: INTERVENTIONS:  - Identify barriers to discharge w/patient and caregiver  - Arrange for needed discharge resources and transportation as appropriate  - Identify discharge learning needs (meds, wound care, etc )  - Arrange for interpretive services to assist at discharge as needed  - Refer to Case Management Department for coordinating discharge planning if the patient needs post-hospital services based on physician/advanced practitioner order or complex needs related to functional status, cognitive ability, or social support system  Outcome: Progressing     Problem: Knowledge Deficit  Goal: Patient/family/caregiver demonstrates understanding of disease process, treatment plan, medications, and discharge instructions  Description: Complete learning assessment and assess knowledge base    Interventions:  - Provide teaching at level of understanding  - Provide teaching via preferred learning methods  Outcome: Progressing     Problem: COPING  Goal: Pt/Family able to verbalize concerns and demonstrate effective coping strategies  Description: INTERVENTIONS:  - Assist patient/family to identify coping skills, available support systems and cultural and spiritual values  - Provide emotional support, including active listening and acknowledgement of concerns of patient and caregivers  - Reduce environmental stimuli, as able  - Provide patient education  - Assess for spiritual pain/suffering and initiate spiritual care, including notification of Pastoral Care or faiza based community as needed  - Assess effectiveness of coping strategies  Outcome: Progressing  Goal: Will report anxiety at manageable levels  Description: INTERVENTIONS:  - Administer medication as ordered  - Teach and encourage coping skills  - Provide emotional support  - Assess patient/family for anxiety and ability to cope  Outcome: Progressing     Problem: COPING  Goal: Will report anxiety at manageable levels  Description: INTERVENTIONS:  - Administer medication as ordered  - Teach and encourage coping skills  - Provide emotional support  - Assess patient/family for anxiety and ability to cope  Outcome: Progressing     Problem: COPING  Goal: Will report anxiety at manageable levels  Description: INTERVENTIONS:  - Administer medication as ordered  - Teach and encourage coping skills  - Provide emotional support  - Assess patient/family for anxiety and ability to cope  Outcome: Progressing     Problem: DEATH & DYING  Goal: Pt/Family communicate acceptance of impending death and expresses psychological comfort and peace  Description: INTERVENTIONS:  - Assess patient/family anxiety and grief process related to end of life issues  - Provide emotional, spiritual and psychosocial support  - Provide information about the patient’s health status with consideration of family and cultural values  - Communicate willingness to discuss death and facilitate grief process  with patient/family as appropriate  - Emphasize sustaining relationships within family system and community, or faiza/spiritual traditions  - Initiate Spiritual Care, Pastoral care or other ancillary consults as needed  - Refer to community support groups as appropriate  Outcome: Progressing     Problem: CHANGE IN BODY IMAGE  Goal: Pt/Family communicate acceptance of loss or change in body image and expresses psychological comfort and peace  Description: INTERVENTIONS:  - Assess patient/family anxiety and grief process related to change in body image, loss of functional status and loss of sense of self  - Assess patient/family's coping skills and provide emotional, spiritual and psychosocial support  - Provide information about the patient's health status with consideration of family and cultural values  - Communicate willingness to discuss loss and facilitate grief process with patient/family as appropriate  - Emphasize sustaining relationships within family system and community, or faiza/spiritual traditions  - Refer to community support groups as appropriate  - Initiate Spiritual Care, Pastoral care or other ancillary consults as needed  Outcome: Progressing     Problem: DECISION MAKING  Goal: Pt/Family able to effectively weigh alternatives and participate in decision making related to treatment and care  Description: INTERVENTIONS:  - Identify decision maker  - Determine when there are differences among patient's view, family's view, and healthcare provider's view of patient condition and care goals  - Facilitate patient/family articulation of goals for care  - Help patient/family identify pros/cons of alternative solutions  - Provide information as requested by patient/family  - Respect patient/family rights related to privacy and sharing information   - Serve as a liaison between patient, family and health care team  - Initiate consults as appropriate (Ethics Team, Palliative Care, Family Care Conference, etc )  Outcome: Progressing     Problem: CONFUSION/THOUGHT DISTURBANCE  Goal: Thought disturbances (confusion, delirium, depression, dementia or psychosis) are managed to maintain or return to baseline mental status and functional level  Description: INTERVENTIONS:  - Assess for possible contributors to  thought disturbance, including but not limited to medications, infection, impaired vision or hearing, underlying metabolic abnormalities, dehydration, respiratory compromise,  psychiatric diagnoses and notify attending PHYSICAN/AP  - Monitor and intervene to maintain adequate nutrition, hydration, elimination, sleep and activity  - Decrease environmental stimuli, including noise as appropriate  - Provide frequent contacts to provide refocusing, direction and reassurance as needed  Approach patient calmly with eye contact and at their level    - [place] high risk fall precautions, aspiration precautions and other safety measures, as indicated  - If delirium suspected, notify physician/AP of change in condition and request immediate in-person evaluation  - Pursue consults as appropriate including Geriatric (campus dependent), OT for cognitive evaluation/activity planning, psychiatric, pastoral care, etc   Outcome: Progressing     Problem: BEHAVIOR  Goal: Pt/Family maintain appropriate behavior and adhere to behavioral management agreement, if implemented  Description: INTERVENTIONS:  - Assess the family dynamic   - Encourage verbalization of thoughts and concerns in a socially appropriate manner  - Assess patient/family's coping skills and non-compliant behavior (including use of illegal substances)  - Utilize positive, consistent limit setting strategies supporting safety of patient, staff and others  - Initiate consult with Case Management, Spiritual Care or other ancillary services as appropriate  - If a patient's/visitor's behavior jeopardizes the safety of the patient, staff, or others, refer to organization procedure  - Notify Security of behavior or suspected illegal substances which indicate the need for search of the patient and/or belongings  - Encourage participation in the decision making process about a behavioral management agreement; implement if patient meets criteria  Outcome: Progressing     Problem: Depression - IP adult  Goal: Effects of depression will be minimized  Description: INTERVENTIONS:  - Assess impact of patient's symptoms on level of functioning, self-care needs and offer support as indicated  - Assess patient/family knowledge of depression, impact on illness and need for teaching  - Provide emotional support, presence and reassurance  - Assess for possible suicidal thoughts, ideation or self-harm   If patient expresses suicidal thoughts or statements do not leave alone, notify physician/AP immediately, initiate Suicide Precautions, and determine need for continual observation   - Initiate consults and referrals as appropriate (a mental health professional, Spiritual Care)  - Administer medication as ordered  Outcome: Progressing     Problem: SELF HARM  Goal: Effect of psychiatric condition will be minimized and patient will be protected from self harm  Description: INTERVENTIONS:  - Assess impact of patient's symptoms on level of functioning, self-care needs and offer support as indicated  - Assess patient/family knowledge of depression, impact on illness and need for teaching  - Provide emotional support, presence and reassurance  - Assess for possible suicidal thoughts, ideation or self-harm  If patient expresses suicidal thoughts or statements do not leave alone, notify physician/AP immediately, initiate suicide precautions, and determine need for continual observation    - initiate consults and referrals as appropriate (a mental health professional, Spiritual Care  Outcome: Progressing     Problem: ABUSE/NEGLECT  Goal: Pt/Caregiver/Family aware of resources to assist with issues of abuse and neglect  Description: INTERVENTIONS:  - If child abuse and/or neglect is suspected, notify Childline directly  - Assess for level of risk and safety  - Initiate referral to Case Management  - Notify PHYSICIAN/AP and Nursing Supervisor  - Provide appropriate education and resources to patient and/or family  - Initiate referral to age appropriate protective services, i e  [facility] or Child Protective Services  - Offer patient/caregiver the option to [place] of patient information directory  - Provide emotional support, including active listening and acknowledgment of concerns  - Provide the patient with information about supportive services i e  shelters, community resources for domestic violence  Outcome: Progressing     Problem: SUBSTANCE USE/ABUSE  Goal: Will have no detox symptoms and will verbalize plan for changing substance-related behavior  Description: INTERVENTIONS:  - Monitor physical status and assess for symptoms of withdrawal  - Administer medication as ordered  - Provide emotional support with 1 on 1 interaction with staff  - Encourage recovery focused program/ addiction education  - Assess for verbalization of changing behaviors related to substance abuse  - Initiate consults and referrals as appropriate (Case Management, Spiritual Care, etc )  Outcome: Progressing  Goal: By discharge, will develop insight into their chemical dependency and sustain motivation to continue in recovery  Description: INTERVENTIONS:  - Attends all daily group sessions and scheduled AA groups  - Actively practices coping skills through participation in the therapeutic community and adherence to program rules  - Reviews and completes assignments from individual treatment plan  - Assist patient development of understanding of their personal cycle of addiction and relapse triggers  Outcome: Progressing  Goal: By discharge, patient will have ongoing treatment plan addressing chemical dependency  Description: INTERVENTIONS:  - Assist patient with resources and/or appointments for ongoing recovery based living  Outcome: Progressing     Problem: Potential for Falls  Goal: Patient will remain free of falls  Description: INTERVENTIONS:  - Educate patient/family on patient safety including physical limitations  - Instruct patient to call for assistance with activity   - Consult OT/PT to assist with strengthening/mobility   - Keep Call bell within reach  - Keep bed low and locked with side rails adjusted as appropriate  - Keep care items and personal belongings within reach  - Initiate and maintain comfort rounds  - Make Fall Risk Sign visible to staff  - Offer Toileting every 2 Hours, in advance of need  - Initiate/Maintain bed alarm  - Obtain necessary fall risk management equipment: alarm  - Apply yellow socks and bracelet for high fall risk patients  - Consider moving patient to room near nurses station  Outcome: Progressing     Problem: Nutrition/Hydration-ADULT  Goal: Nutrient/Hydration intake appropriate for improving, restoring or maintaining nutritional needs  Description: Monitor and assess patient's nutrition/hydration status for malnutrition  Collaborate with interdisciplinary team and initiate plan and interventions as ordered  Monitor patient's weight and dietary intake as ordered or per policy  Utilize nutrition screening tool and intervene as necessary  Determine patient's food preferences and provide high-protein, high-caloric foods as appropriate       INTERVENTIONS:  - Monitor oral intake, urinary output, labs, and treatment plans  - Assess nutrition and hydration status and recommend course of action  - Evaluate amount of meals eaten  - Assist patient with eating if necessary   - Allow adequate time for meals  - Recommend/ encourage appropriate diets, oral nutritional supplements, and vitamin/mineral supplements  - Order, calculate, and assess calorie counts as needed  - Recommend, monitor, and adjust tube feedings and TPN/PPN based on assessed needs  - Assess need for intravenous fluids  - Provide specific nutrition/hydration education as appropriate  - Include patient/family/caregiver in decisions related to nutrition  Outcome: Progressing

## 2023-01-11 NOTE — ASSESSMENT & PLAN NOTE
Tentatively scheduled for PPM placement 1/13/2022 due to cardiac pauses    Family to discuss placement

## 2023-01-11 NOTE — ASSESSMENT & PLAN NOTE
Patient is maintained on Eliquis     VR Rate controlled  Cardiology to consider permanent pacemaker placement after discussion with EP

## 2023-01-11 NOTE — SPEECH THERAPY NOTE
Speech Language/Pathology  Chart and dysphagia eval reviewed, previous records reviewed  Pt seen briefly this am  Took pills WNL  Nurse at bedside, indicated family stated her speech has been this way from previous stroke  Not new  Per neuro note, neuro exam 1/10/23 was close to baseline  Will d/c ST  If there are acute changes  Please notify

## 2023-01-11 NOTE — QUICK NOTE
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, recent bradycardia with pauses and pending MRI and EEG  Son wondered if patient's episodes of bradycardia have been occurring for a while, as he explained that a year ago she had a fall that resulted in an extensive knee hematoma which ultimately prompted her provider to stop her AC  Following that in 7/2022 is when she had her stroke in the setting of Af[facility] not on [facility]  She then was restarted on [facility]  Son expressed to me that he believes they should process with the PPM; however he notes that he is not his mom's medical POA and he has to speak with his siblings  Patient should keep her follow-up outpatient Neurology appt for 1/20/23 at 3pm at [place] office with [facility]

## 2023-01-11 NOTE — PHYSICAL THERAPY NOTE
PHYSICAL THERAPY EVALUATION          Patient Name: Jeanne Kinney  IDYQD'B Date: 1/11/2023  PT EVALUATION    82 y o     [id number]    Altered mental state [R41 82]  Gait instability [R26 81]  CVA (cerebral vascular accident) ([facility] [address] ) [I63 9]  Generalized weakness [R53 1]    Past Medical History:   Diagnosis Date    A-fib ([facility] [address] )     CAD (coronary artery disease)     CKD (chronic kidney disease) stage 2, GFR 60-89 ml/min     Hypertension     Memory loss     Urinary tract infection      Past Surgical History:   Procedure Laterality Date    CHOLECYSTECTOMY      REPLACEMENT TOTAL KNEE Left 2008 01/11/23 1021   PT Last Visit   PT Visit Date 01/11/23   Note Type   Note type Evaluation   Pain Assessment   Pain Assessment Tool 0-10   Pain Score No Pain   Restrictions/Precautions   Other Precautions Cognitive; Chair Alarm; Bed Alarm;Telemetry; Fall Risk   Home Living   Type of Home Assisted living   Home Layout Elevator;Able to live on main level with bedroom/bathroom   Bathroom Shower/Tub Walk-in shower   Bathroom Toilet Standard   Bathroom Equipment Grab bars in shower; Shower chair;Grab bars around toilet   [address] [facility]   Additional Comments per chart review from [facility]   Prior Function   Level of [place] Needs assistance with ADLs; Needs assistance with [address] staff   Receives Help From Personal care attendant;Home health   IADLs Family/Friend/Other provides transportation; Family/Friend/Other provides meals; Family/Friend/Other provides medication management   Falls in the last 6 months 1 to 4   Vocational Retired   Comments per patient and chart review, pt gets assist at baseline  ambulates w RW  was getting [facility] PT/OT? General   Additional Pertinent History pt admitted 1/9/23 for lethargy  admitted on stroke pathway  Impression CTH: "hypodensity of left parietal lobe image consistent with subacute infarct"  up w assist orders   PMHx significant for afib, CAD, CKD, memory loss, Seizure, CHF   Cognition   Overall Cognitive Status Impaired   Arousal/Participation Cooperative   Attention Attends with cues to redirect   Orientation Level Oriented to person;Oriented to place;Oriented to time;Disoriented to time;Disoriented to situation  (general to time (month), difficult to assess dt aphasia)   Memory Decreased recall of precautions;Decreased recall of recent events;Decreased short term memory  (hx of memory deficits at baseline)   Following Commands Follows one step commands with increased time or repetition   Comments hx impaired cognition  noted some aphasia   RLE Assessment   RLE Assessment WFL  (3-)   LLE Assessment   LLE Assessment WFL  (3-)   Coordination   Sensation WFL   Bed Mobility   Supine to Sit 5  Supervision   Additional items HOB elevated; Bedrails; Increased time required;Verbal cues   Transfers   Sit to Stand 5  Supervision   Additional items Increased time required;Verbal cues; Other  (RW)   Stand to Sit 4  Minimal assistance   Additional items Assist x 1; Increased time required;Verbal cues; Other;Armrests  (RW)   Ambulation/Elevation   Gait pattern Forward Flexion; Short stride; Excessively slow   Gait Assistance 4  Minimal assist  (CGA)   Additional items Assist x 1   Assistive Device Rolling walker   Distance 55'   Balance   Static Standing Fair -   Dynamic Standing Poor +   Ambulatory Poor +   Endurance Deficit   Endurance Deficit Yes   Endurance Deficit Description mild CUADRA  O2 86% on RA post amb, quickly improved to 96% w rest   Activity Tolerance   Activity Tolerance Patient tolerated treatment well   Medical Staff Made Aware OT   Nurse Made Aware [address] RN   Assessment   Prognosis Good   Problem List Decreased strength;Decreased endurance; Impaired balance;Decreased cognition; Impaired judgement;Decreased safety awareness; Obesity   Assessment Dontae Araiza is a 82 y o  female admitted to [facility] on 1/9/2023 for Lethargy   PT was consulted and pt was seen on 1/11/2023 for mobility assessment and d/c planning  Pt presents w telemetry monitoring  Poor historian; per chart review pt gets assist for ADLs and IADLs at baseline, ambulates w RW  Pt is currently functioning at a supervision assistance x1 level for bed mobility, minimum assistance x1 level for transfers and ambulation w RW  Pt demonstrated deficits of cognition (baseline), communication (aphasia), strength (generally deconditioned), endurance (CUADRA, desat on RA), and balance (gait deviations, fall risk)  Currently ambulating household distances  No complaints during session  Likely functioning near baseline despite prev mentioned factors  Pt will benefit from continued skilled IP PT to address the above mentioned impairments  in order to maximize recovery and increase functional independence when completing mobility and ADLs  At this time PT recommendations for d/c are return to facility w PT services as indicated based on fall risk and PLOF  Barriers to Discharge Other (Comment)   Barriers to Discharge Comments unable to identify   Goals   Patient Goals none stated   STG Expiration Date 01/21/23   Short Term Goal #1 1)  Pt will perform bed mobility with S demonstrating appropriate technique 100% of the time in order to improve function  2)  Perform all transfers with S demonstrating safe and appropriate technique 100% of the time in order to improve ability to negotiate safely in home environment  3) Amb with least restrictive AD > 150'x1 with S in order to demonstrate ability to negotiate in home environment  4)  Improve overall strength and balance 1/2 grade in order to optimize ability to perform functional tasks and reduce fall risk  5) Increase activity tolerance to 45 minutes in order to improve endurance to functional tasks  6)   PT for ongoing patient and family/caregiver education, DME needs and d/c planning in order to promote highest level of function in least restrictive environment     Plan   Treatment/Interventions Functional transfer training;LE strengthening/ROM; Therapeutic exercise; Endurance training;Cognitive reorientation;Patient/family training;Bed mobility; Equipment eval/education;Gait training; Compensatory technique education;Continued evaluation;Spoke to nursing;OT;Spoke to advanced practitioner   PT Frequency Other (Comment)  (3-4x)   Recommendation   PT Discharge Recommendation Return to facility with rehabilitation services   AM-PAC Basic Mobility Inpatient   Turning in Flat Bed Without Bedrails 2   Lying on Back to Sitting on Edge of Flat Bed Without Bedrails 2   Moving Bed to Chair 3   Standing Up From Chair Using Arms 3   Walk in Room 3   Climb 3-5 Stairs With Railing 3   Basic Mobility Inpatient Raw Score 16   Basic Mobility Standardized Score 38 32   Highest Level Of Mobility   -HLM Goal 5: Stand one or more mins   -HLM Achieved 7: Walk 25 feet or more   End of Consult   Patient Position at End of Consult Bedside chair;Bed/Chair alarm activated; All needs within reach   End of Consult Comments OT present   History: co - morbidities, fall risk, use of assistive device, assist for adl's/iadl's, cognition  Exam: impairments in systems including musculoskeletal (strength), neuromuscular (balance, gait, transfers, motor function and sensation), am-pac, integumentary (skin integrity, presence of L knee bruising), cardiopulmonary, cognition  Clinical: unstable/unpredictable  Complexity:high      Ricardo Leary, PT

## 2023-01-11 NOTE — PROGRESS NOTES
Progress Note - Cardiology   Jeanne Kinney 82 y o  female MRN: [id number]  Unit/Bed#: E4 -01 Encounter: [id number]    Assessment  Abnormal CT of the brain              - CT of the head on arrival revealed interval development of hypodensity in the left parietal lobe image 2/24 most consistent with subacute infarct  Stable cutaneous Lesion in the left parietal region measures 1 5 cm on image 3/37  Permanent atrial fibrillation               - with SVR and pauses via telemetry review  - Anticoagulation with Eliquis 5 mg twice daily               - AV blocker Rx, none  Chronic diastolic congestive heart failure              - TTE 5/11/22: LVEF 60%, moderate to marked biatrial enlargement, AV sclerosis, MV sclerosis with mild MR, mild-moderate TR, mild pulmonary hypertension, PASP 44 mmHg  - Outpatient diuretic Rx, furosemide 40 mg daily  Hypertension  Dyslipidemia  Seizure disorder  Dementia  History of knee injury leading to hemarthrosis of left knee, July 2022  History of CVA, July 2022     Plan/discussion   • Telemetry reviewed; patient with continued atrial fibrillation with slow ventricular response and several 3 - > 3 second pauses  Overnight, patient with 4-second pause  • Case discussed with the electrophysiology team earlier today  We will further discussed the case again with potential plan to proceed with permanent pacemaker insertion on Friday  • MRI to be completed, follow-up results  • Continue anticoagulation with Eliquis 5 mg twice daily  • Continue statin therapy with atorvastatin 40 mg daily, furosemide 40 mg daily  • Monitor volume status closely with strict intake/output, daily weight  • Renal function stable, maintain potassium > 4, magnesium > 2  Most recent potassium 3 7, will provide K-Dur 20 mEq x 1  Subjective:  Patient seen and examined at the bedside  She is awake  Patient is able to state that the year is 2, 0, 22    She is aphasic and is unable to consistently name objects correctly, but demonstrates word substitution      Vitals:  Vitals:    01/10/23 1000   Weight: 77 6 kg (171 lb)   ,  Vitals:    01/10/23 1520 01/10/23 1923 01/11/23 0240 01/11/23 0712   BP: 120/56 148/89 110/65 103/57   BP Location: Left arm Right arm Left arm Right arm   Pulse: 58 67 56 (!) 54   Resp: 18 18 18 20   Temp: 97 5 °F (36 4 °C) 98 7 °F (37 1 °C) 97 5 °F (36 4 °C) 97 8 °F (36 6 °C)   TempSrc: Temporal Temporal Temporal Temporal   SpO2: 96% 96% 95% 94%   Weight:       Height:           Exam:  General: Awake, no acute distress  Heart: Irregular rate, irregular rhythm  Respiratory effort/ Lungs:  Breathing comfortably on room air, clear bilaterally without wheezing, rales, crackles   Abdominal: Non-tender to palpation, + bowel sounds, soft, no masses or distension  Lower Limbs: No overt bilateral lower extremity edema, chronic venous stasis changes           Telemetry:       Atrial fibrillation with SVR, pauses    Medications:    Current Facility-Administered Medications:   •  acetaminophen (TYLENOL) tablet 650 mg, 650 mg, Oral, Q4H PRN, Drew Degroot PA-C  •  aluminum-magnesium hydroxide-simethicone (MYLANTA) oral suspension 30 mL, 30 mL, Oral, Q6H PRN, Drew Degroot PA-C  •  apixaban (ELIQUIS) tablet 5 mg, 5 mg, Oral, BID, Drew Degroot PA-C, 5 mg at 01/11/23 2127  •  atorvastatin (LIPITOR) tablet 40 mg, 40 mg, Oral, QPM, MELQUIADES PolancoC, 40 mg at 01/10/23 1751  •  calcium carbonate (TUMS) chewable tablet 500 mg, 500 mg, Oral, Daily, Drew Degroot PA-C, 500 mg at 01/11/23 8155  •  furosemide (LASIX) tablet 40 mg, 40 mg, Oral, Daily, Drew Degroot PA-C, 40 mg at 01/10/23 6277  •  melatonin tablet 3 mg, 3 mg, Oral, HS, MELQUIADES PolancoC, 3 mg at 01/10/23 2106  •  ondansetron (ZOFRAN) injection 4 mg, 4 mg, Intravenous, Q6H PRN, Drew Degroot PA-C  •  polyethylene glycol (MIRALAX) packet 17 g, 17 g, Oral, Daily, Drew Degroot PA-C, 17 g at 01/11/23 0916  •  QUEtiapine (SEROquel) tablet 12 5 mg, 12 5 mg, Oral, HS, Itzel Poplin, PA-C, 12 5 mg at 01/10/23 2106  •  zonisamide (ZONEGRAN) capsule 100 mg, 100 mg, Oral, Daily, Itzel Poplin, PA-C, 100 mg at 01/11/23 0915      Labs/Data:        Results from last 7 days   Lab Units 01/10/23  0500 01/09/23  1714   WBC Thousand/uL 5 00 6 11   HEMOGLOBIN g/dL 12 9 13 6   HEMATOCRIT % 38 9 41 3   PLATELETS Thousands/uL 103* 117*     Results from last 7 days   Lab Units 01/11/23  0452 01/10/23  0500 01/09/23  1714   POTASSIUM mmol/L 3 7 3 8 4 2   CHLORIDE mmol/L 105 109* 108   CO2 mmol/L 24 24 24   BUN mg/dL 25 21 22   CREATININE mg/dL 0 85 0 80 0 88

## 2023-01-11 NOTE — PROGRESS NOTES
[address]  Progress Note - Ashtyn Sabillon 1935, 82 y o  female MRN: [id number]  Unit/Bed#: E4 -01 Encounter: [id number]  Primary Care Provider: Jane See DO   Date and time admitted to hospital: 1/9/2023  4:50 PM    * Stroke-like symptoms  Assessment & Plan  Presents from memory care unit for generalized weakness, trouble walking with her walker  Patient was tangential in though, oriented to self, place and event    CTH hypodensity of left parietal lobe image consistent with subacute infarct   Await MRI of the brain and further stroke work-up  Continue PTA eliquis  PT Recommending to return to facility    Sick sinus syndrome [facility])  Assessment & Plan  Tentatively scheduled for PPM placement 1/13/2022 due to cardiac pauses  Family to discuss placement    History of stroke  Assessment & Plan  Previous history of CVA, current CTA with old chronic stroke  MRI pending  EEG ordered to evaluate for post stroke epilepsy    Seizure [facility])  Assessment & Plan  Denies loss of consciousness, no reported seizure like activity from nursing unit, no evidence tongue bite/incontinence   - monitor for seizure activity  - continue PTA zonisamide 100mg      Chronic diastolic heart failure (HCC)  Assessment & Plan  Wt Readings from Last 3 Encounters:   01/10/23 77 6 kg (171 lb)   01/09/23 77 8 kg (171 lb 8 3 oz)   11/14/22 77 8 kg (171 lb 8 3 oz)     No evidence of overt volume overload  - continue PTA diuretics        Amnestic MCI (mild cognitive impairment with memory loss)  Assessment & Plan  - high risk for hospital acquired delirium: avoid opioids, benzodiazepines, antihistamines  maintain circadian rhythm lights/TV out at night, have hearing aids/glasses close,   frequent reorientation  If agitated risk to self/others antipsychotics/safety sitter      Atrial fibrillation [facility])  Assessment & Plan  Patient is maintained on Eliquis     VR Rate controlled  Cardiology to consider permanent pacemaker placement after discussion with EP        [facility] Internal Medicine Progress Note  Patient: Edd Thomas 82 y o  female   MRN: [id number]  PCP: Silva Galeana DO  Unit/Bed#: E4 -01 Encounter: [id number]  Date Of Visit: 23    Assessment:    Principal Problem:    Stroke-like symptoms  Active Problems:    Atrial fibrillation (HCC)    Amnestic MCI (mild cognitive impairment with memory loss)    Chronic diastolic heart failure (HCC)    Seizure (HCC)    History of stroke    Sick sinus syndrome ([facility] [address] )      Plan:    · Continue Stroke Work up  · Await cardiology recommendations on PPM placement       VTE Pharmacologic Prophylaxis:   Pharmacologic: Apixaban (Eliquis)  Mechanical VTE Prophylaxis in Place: Yes    Patient Centered Rounds: I have performed bedside rounds with nursing staff today  Discussions with Specialists or Other Care Team Provider: Cardiology    Education and Discussions with Family / Patient: Marc Elliott at 2023    Time Spent for Care: 1 hour  More than 50% of total time spent on counseling and coordination of care as described above  Current Length of Stay: 2 day(s)    Current Patient Status: Inpatient   Certification Statement: The patient will continue to require additional inpatient hospital stay due to sick sinus syndrome    Discharge Plan / Estimated Discharge Date: Patient sons    Code Status: Level 3 - DNAR and DNI      Subjective:   Seen and examined, no acute complaints    A complete and comprehensive 14 point organ system review has been performed and all other systems are negative other than stated above  Objective:     Vitals:   Temp (24hrs), Av 2 °F (36 8 °C), Min:97 5 °F (36 4 °C), Max:98 7 °F (37 1 °C)    Temp:  [97 5 °F (36 4 °C)-98 7 °F (37 1 °C)] 98 4 °F (36 9 °C)  HR:  [50-67] 50  Resp:  [18-20] 18  BP: ()/(55-89) 136/73  SpO2:  [94 %-98 %] 98 %  Body mass index is 30 29 kg/m²       Input and Output Summary (last 24 hours): Intake/Output Summary (Last 24 hours) at 1/11/2023 1537  Last data filed at 1/11/2023 0900  Gross per 24 hour   Intake --   Output 550 ml   Net -550 ml       Physical Exam:     General: well appearing, no acute distress  HEENT: atraumatic, PERRLA, moist mucosa, normal pharynx, normal tonsils and adenoids, normal tongue, no fluid in sinuses  Neck: Trachea midline, no carotid bruit, no masses  Respiratory: normal chest wall expansion, CTA B, no r/r/w, no rubs  Cardiovascular: RRR, no m/r/g, Normal S1 and S2  Abdomen: Soft, non-tender, non-distended, normal bowel sounds in all quadrants, no hepatosplenomegaly, no tympany  Rectal: deferred  Musculoskeletal: moves all  Integumentary: warm, dry, and pink, with no rash, purpura, or petechia  Heme/Lymph: no lymphadenopathy, no bruises  Neurological: Cranial Nerves II-XII grossly intact  Psychiatric: cooperative with normal mood, affect, and cognition      Additional Data:     Labs:    Results from last 7 days   Lab Units 01/10/23  0500 01/09/23  1714   WBC Thousand/uL 5 00 6 11   HEMOGLOBIN g/dL 12 9 13 6   HEMATOCRIT % 38 9 41 3   PLATELETS Thousands/uL 103* 117*   NEUTROS PCT %  --  56   LYMPHS PCT %  --  28   MONOS PCT %  --  12   EOS PCT %  --  3     Results from last 7 days   Lab Units 01/11/23  0452 01/10/23  0500   POTASSIUM mmol/L 3 7 3 8   CHLORIDE mmol/L 105 109*   CO2 mmol/L 24 24   BUN mg/dL 25 21   CREATININE mg/dL 0 85 0 80   CALCIUM mg/dL 9 5 9 3   ALK PHOS U/L  --  79   ALT U/L  --  17   AST U/L  --  22     Results from last 7 days   Lab Units 01/09/23  1714   INR  1 18       * I Have Reviewed All Lab Data Listed Above  * Additional Pertinent Lab Tests Reviewed:  [address] [facility] Admission Reviewed    Imaging:    Imaging Reports Reviewed Today Include: CT head  Imaging Personally Reviewed by Myself Includes:  CT head    Recent Cultures (last 7 days):           Last 24 Hours Medication List:   Current Facility-Administered Medications Medication Dose Route Frequency Provider Last Rate   • acetaminophen  650 mg Oral Q4H PRN Rinku Hyde PA-C     • aluminum-magnesium hydroxide-simethicone  30 mL Oral Q6H PRN Rinku Hyde PA-C     • apixaban  5 mg Oral BID Rinku Hyde PA-C     • atorvastatin  40 mg Oral QPM Rinku Hyde PA-C     • calcium carbonate  500 mg Oral Daily Rinku Hyde PA-C     • furosemide  40 mg Oral Daily CRISTO Parmar     • melatonin  3 mg Oral HS Rinku Hyde PA-C     • ondansetron  4 mg Intravenous Q6H PRN Rinku Hyde PA-C     • polyethylene glycol  17 g Oral Daily Rinku Hyde PA-C     • QUEtiapine  12 5 mg Oral HS Rinku Hyde PA-C     • zonisamide  100 mg Oral Daily Rinku Hyde PA-C          Today, Patient Was Seen By: Marc Saravia DO    ** Please Note: This note was completed in part utilizing M-Modal Fluency Direct Software  Grammatical errors, random word insertions, spelling mistakes, and incomplete sentences may be an occasional consequence of this system secondary to software limitations, ambient noise, and hardware issues  If you have any questions or concerns about the content, text, or information contained within the body of this dictation, please contact the provider for clarification   **

## 2023-01-12 RX ORDER — CEFAZOLIN SODIUM 1 G/50ML
1000 SOLUTION INTRAVENOUS ONCE
Status: DISCONTINUED | OUTPATIENT
Start: 2023-01-13 | End: 2023-01-13 | Stop reason: HOSPADM

## 2023-01-12 RX ORDER — SODIUM CHLORIDE 9 MG/ML
125 INJECTION, SOLUTION INTRAVENOUS CONTINUOUS
Status: CANCELLED | OUTPATIENT
Start: 2023-01-13

## 2023-01-12 RX ORDER — FENTANYL CITRATE/PF 50 MCG/ML
25 SYRINGE (ML) INJECTION
Status: CANCELLED | OUTPATIENT
Start: 2023-01-12

## 2023-01-12 RX ORDER — ONDANSETRON 2 MG/ML
4 INJECTION INTRAMUSCULAR; INTRAVENOUS ONCE AS NEEDED
Status: CANCELLED | OUTPATIENT
Start: 2023-01-12

## 2023-01-12 RX ADMIN — MELATONIN 3 MG: at 22:25

## 2023-01-12 RX ADMIN — POLYETHYLENE GLYCOL 3350 17 G: 17 POWDER, FOR SOLUTION ORAL at 09:38

## 2023-01-12 RX ADMIN — QUETIAPINE FUMARATE 12.5 MG: 25 TABLET ORAL at 22:24

## 2023-01-12 RX ADMIN — CALCIUM CARBONATE (ANTACID) CHEW TAB 500 MG 500 MG: 500 CHEW TAB at 09:38

## 2023-01-12 RX ADMIN — ATORVASTATIN CALCIUM 40 MG: 40 TABLET, FILM COATED ORAL at 17:42

## 2023-01-12 RX ADMIN — APIXABAN 5 MG: 5 TABLET, FILM COATED ORAL at 17:42

## 2023-01-12 RX ADMIN — APIXABAN 5 MG: 5 TABLET, FILM COATED ORAL at 09:38

## 2023-01-12 RX ADMIN — ZONISAMIDE 100 MG: 100 CAPSULE ORAL at 09:38

## 2023-01-12 NOTE — CASE MANAGEMENT
Case Management Discharge Planning Note    Patient name Jad Young  Location [address]-* MRN [id number]  : 1935 Date 2023       Current Admission Date: 2023  Current Admission Diagnosis:Stroke-like symptoms   Patient Active Problem List    Diagnosis Date Noted   • Sick sinus syndrome ([address] ) 2023   • Stroke-like symptoms 2023   • History of stroke 01/10/2023   • New onset seizure ([address] )    • Seizure ([address] ) 2022   • Urinary retention 2022   • Venous stasis dermatitis of both lower extremities 2022   • Gait instability 2022   • Other fatigue 2022   • Atypical pneumonia 05/10/2022   • Respiratory insufficiency 05/10/2022   • Acute metabolic encephalopathy    • Chronic diastolic heart failure ([address] ) 05/10/2022   • Thrombocytopenia ([facility] [address] ) 05/10/2022   • Other hyperlipidemia 10/06/2021   • 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   • Other insomnia 10/06/2021   • Skin lesion 10/04/2021   • Action tremor 2020   • Age-related osteoporosis without fracture 2020   • Atrial fibrillation ([address] ) 04/10/2015   • Chronic anticoagulation 04/10/2015   • Benign essential hypertension 2011      LOS (days): 3  Geometric Mean LOS (GMLOS) (days): 2 90  Days to GMLOS:0 3     OBJECTIVE:  Risk of Unplanned Readmission Score: 15 99         Current admission status: Inpatient   Preferred Pharmacy:   [facility] [place] , [address] [facility]  [address] [address]  Phone: [phone] Fax: [phone] [place], [place] - [address] [place]  [facility] [address]  Phone: [phone] Fax: [phone]    [address] [facility], [address] [facility] [address]  [facility] [address]  Phone: [phone] Fax: [phone]    Primary Care Provider: Jose Antonio Hamilton DO    Primary Insurance: [address] [facility] REP  Secondary Insurance:     DISCHARGE DETAILS:    Per MD rounds, patient not medically stable for discharge at this time  Patient's family still deciding on pacemaker  Anticipated d/c in 24-48hrs  DC Plan remaind for patient to return to [address] [place] [address] [address] [facility] Unit at discharge  Transport will need to be scheduled  CM will continue to follow patient

## 2023-01-12 NOTE — ASSESSMENT & PLAN NOTE
Wt Readings from Last 3 Encounters:   01/10/23 77 6 kg (171 lb)   01/09/23 77 8 kg (171 lb 8 3 oz)   11/14/22 77 8 kg (171 lb 8 3 oz)     · No evidence of overt volume overload, continue PTA diuretics

## 2023-01-12 NOTE — ASSESSMENT & PLAN NOTE
· Patient is maintained on Eliquis     · VR Rate controlled  · Cardiology to consider permanent pacemaker placement after discussion with EP

## 2023-01-12 NOTE — PLAN OF CARE
Problem: PAIN - ADULT  Goal: Verbalizes/displays adequate comfort level or baseline comfort level  Description: Interventions:  - Encourage patient to monitor pain and request assistance  - Assess pain using appropriate pain scale  - Administer analgesics based on type and severity of pain and evaluate response  - Implement non-pharmacological measures as appropriate and evaluate response  - Consider cultural and social influences on pain and pain management  - Notify physician/advanced practitioner if interventions unsuccessful or patient reports new pain  Outcome: Progressing     Problem: INFECTION - ADULT  Goal: Absence or prevention of progression during hospitalization  Description: INTERVENTIONS:  - Assess and monitor for signs and symptoms of infection  - Monitor lab/diagnostic results  - Monitor all insertion sites, i e  indwelling lines, tubes, and drains  - Monitor endotracheal if appropriate and nasal secretions for changes in amount and color  - Rose appropriate cooling/warming therapies per order  - Administer medications as ordered  - Instruct and encourage patient and family to use good hand hygiene technique  - Identify and instruct in appropriate isolation precautions for identified infection/condition  Outcome: Progressing  Goal: Absence of fever/infection during neutropenic period  Description: INTERVENTIONS:  - Monitor WBC    Outcome: Progressing     Problem: SAFETY ADULT  Goal: Patient will remain free of falls  Description: INTERVENTIONS:  - Educate patient/family on patient safety including physical limitations  - Instruct patient to call for assistance with activity   - Consult OT/PT to assist with strengthening/mobility   - Keep Call bell within reach  - Keep bed low and locked with side rails adjusted as appropriate  - Keep care items and personal belongings within reach  - Initiate and maintain comfort rounds  - Make Fall Risk Sign visible to staff  - Offer Toileting every 2 Hours, in advance of need  - Initiate/Maintain bed alarm  - Obtain necessary fall risk management equipment: alarm  - Apply yellow socks and bracelet for high fall risk patients  - Consider moving patient to room near nurses station  Outcome: Progressing  Goal: Maintain or return to baseline ADL function  Description: INTERVENTIONS:  -  Assess patient's ability to carry out ADLs; assess patient's baseline for ADL function and identify physical deficits which impact ability to perform ADLs (bathing, care of mouth/teeth, toileting, grooming, dressing, etc )  - Assess/evaluate cause of self-care deficits   - Assess range of motion  - Assess patient's mobility; develop plan if impaired  - Assess patient's need for assistive devices and provide as appropriate  - Encourage maximum independence but intervene and supervise when necessary  - Involve family in performance of ADLs  - Assess for home care needs following discharge   - Consider OT consult to assist with ADL evaluation and planning for discharge  - Provide patient education as appropriate  Outcome: Progressing  Goal: Maintains/Returns to pre admission functional level  Description: INTERVENTIONS:  - Perform BMAT or MOVE assessment daily    - Set and communicate daily mobility goal to care team and patient/family/caregiver  - Collaborate with rehabilitation services on mobility goals if consulted  - Perform Range of Motion 2 times a day  - Reposition patient every 2 hours    - Dangle patient 2 times a day  - Stand patient 2 times a day  - Ambulate patient 2 times a day  - Out of bed to chair 2 times a day   - Out of bed for meals 2 times a day  - Out of bed for toileting  - Record patient progress and toleration of activity level   Outcome: Progressing     Problem: DISCHARGE PLANNING  Goal: Discharge to home or other facility with appropriate resources  Description: INTERVENTIONS:  - Identify barriers to discharge w/patient and caregiver  - Arrange for needed discharge resources and transportation as appropriate  - Identify discharge learning needs (meds, wound care, etc )  - Arrange for interpretive services to assist at discharge as needed  - Refer to Case Management Department for coordinating discharge planning if the patient needs post-hospital services based on physician/advanced practitioner order or complex needs related to functional status, cognitive ability, or social support system  Outcome: Progressing     Problem: Knowledge Deficit  Goal: Patient/family/caregiver demonstrates understanding of disease process, treatment plan, medications, and discharge instructions  Description: Complete learning assessment and assess knowledge base    Interventions:  - Provide teaching at level of understanding  - Provide teaching via preferred learning methods  Outcome: Progressing     Problem: COPING  Goal: Pt/Family able to verbalize concerns and demonstrate effective coping strategies  Description: INTERVENTIONS:  - Assist patient/family to identify coping skills, available support systems and cultural and spiritual values  - Provide emotional support, including active listening and acknowledgement of concerns of patient and caregivers  - Reduce environmental stimuli, as able  - Provide patient education  - Assess for spiritual pain/suffering and initiate spiritual care, including notification of Pastoral Care or faiza based community as needed  - Assess effectiveness of coping strategies  Outcome: Progressing  Goal: Will report anxiety at manageable levels  Description: INTERVENTIONS:  - Administer medication as ordered  - Teach and encourage coping skills  - Provide emotional support  - Assess patient/family for anxiety and ability to cope  Outcome: Progressing     Problem: DEATH & DYING  Goal: Pt/Family communicate acceptance of impending death and expresses psychological comfort and peace  Description: INTERVENTIONS:  - Assess patient/family anxiety and grief process related to end of life issues  - Provide emotional, spiritual and psychosocial support  - Provide information about the patient’s health status with consideration of family and cultural values  - Communicate willingness to discuss death and facilitate grief process  with patient/family as appropriate  - Emphasize sustaining relationships within family system and community, or faiza/spiritual traditions  - Initiate Spiritual Care, Pastoral care or other ancillary consults as needed  - Refer to community support groups as appropriate  Outcome: Progressing     Problem: CHANGE IN BODY IMAGE  Goal: Pt/Family communicate acceptance of loss or change in body image and expresses psychological comfort and peace  Description: INTERVENTIONS:  - Assess patient/family anxiety and grief process related to change in body image, loss of functional status and loss of sense of self  - Assess patient/family's coping skills and provide emotional, spiritual and psychosocial support  - Provide information about the patient's health status with consideration of family and cultural values  - Communicate willingness to discuss loss and facilitate grief process with patient/family as appropriate  - Emphasize sustaining relationships within family system and community, or faiza/spiritual traditions  - Refer to community support groups as appropriate  - Initiate Spiritual Care, Pastoral care or other ancillary consults as needed  Outcome: Progressing     Problem: DECISION MAKING  Goal: Pt/Family able to effectively weigh alternatives and participate in decision making related to treatment and care  Description: INTERVENTIONS:  - Identify decision maker  - Determine when there are differences among patient's view, family's view, and healthcare provider's view of patient condition and care goals  - Facilitate patient/family articulation of goals for care  - Help patient/family identify pros/cons of alternative solutions  - Provide information as requested by patient/family  - Respect patient/family rights related to privacy and sharing information   - Serve as a liaison between patient, family and health care team  - Initiate consults as appropriate (Ethics Team, Palliative Care, Family Care Conference, etc )  Outcome: Progressing     Problem: CONFUSION/THOUGHT DISTURBANCE  Goal: Thought disturbances (confusion, delirium, depression, dementia or psychosis) are managed to maintain or return to baseline mental status and functional level  Description: INTERVENTIONS:  - Assess for possible contributors to  thought disturbance, including but not limited to medications, infection, impaired vision or hearing, underlying metabolic abnormalities, dehydration, respiratory compromise,  psychiatric diagnoses and notify attending PHYSICAN/AP  - Monitor and intervene to maintain adequate nutrition, hydration, elimination, sleep and activity  - Decrease environmental stimuli, including noise as appropriate  - Provide frequent contacts to provide refocusing, direction and reassurance as needed  Approach patient calmly with eye contact and at their level  - Orient high risk fall precautions, aspiration precautions and other safety measures, as indicated  - If delirium suspected, notify physician/AP of change in condition and request immediate in-person evaluation  - Pursue consults as appropriate including Geriatric (campus dependent), OT for cognitive evaluation/activity planning, psychiatric, pastoral care, etc   Outcome: Progressing     Problem: BEHAVIOR  Goal: Pt/Family maintain appropriate behavior and adhere to behavioral management agreement, if implemented  Description: INTERVENTIONS:  - Assess the family dynamic   - Encourage verbalization of thoughts and concerns in a socially appropriate manner  - Assess patient/family's coping skills and non-compliant behavior (including use of illegal substances)    - Utilize positive, consistent limit setting strategies supporting safety of patient, staff and others  - Initiate consult with Case Management, Spiritual Care or other ancillary services as appropriate  - If a patient's/visitor's behavior jeopardizes the safety of the patient, staff, or others, refer to organization procedure  - Notify Security of behavior or suspected illegal substances which indicate the need for search of the patient and/or belongings  - Encourage participation in the decision making process about a behavioral management agreement; implement if patient meets criteria  Outcome: Progressing     Problem: Depression - IP adult  Goal: Effects of depression will be minimized  Description: INTERVENTIONS:  - Assess impact of patient's symptoms on level of functioning, self-care needs and offer support as indicated  - Assess patient/family knowledge of depression, impact on illness and need for teaching  - Provide emotional support, presence and reassurance  - Assess for possible suicidal thoughts, ideation or self-harm  If patient expresses suicidal thoughts or statements do not leave alone, notify physician/AP immediately, initiate Suicide Precautions, and determine need for continual observation   - Initiate consults and referrals as appropriate (a mental health professional, Spiritual Care)  - Administer medication as ordered  Outcome: Progressing     Problem: SELF HARM  Goal: Effect of psychiatric condition will be minimized and patient will be protected from self harm  Description: INTERVENTIONS:  - Assess impact of patient's symptoms on level of functioning, self-care needs and offer support as indicated  - Assess patient/family knowledge of depression, impact on illness and need for teaching  - Provide emotional support, presence and reassurance  - Assess for possible suicidal thoughts, ideation or self-harm   If patient expresses suicidal thoughts or statements do not leave alone, notify physician/AP immediately, initiate suicide precautions, and determine need for continual observation    - initiate consults and referrals as appropriate (a mental health professional, Spiritual Care  Outcome: Progressing     Problem: ABUSE/NEGLECT  Goal: Pt/Caregiver/Family aware of resources to assist with issues of abuse and neglect  Description: INTERVENTIONS:  - If child abuse and/or neglect is suspected, notify Childline directly  - Assess for level of risk and safety  - Initiate referral to Case Management  - Notify PHYSICIAN/AP and Nursing Supervisor  - Provide appropriate education and resources to patient and/or family  - Initiate referral to age appropriate protective services, i e  [facility] or Child Protective Services  - Offer patient/caregiver the option to [place] of patient information directory  - Provide emotional support, including active listening and acknowledgment of concerns  - Provide the patient with information about supportive services i e  shelters, community resources for domestic violence  Outcome: Progressing     Problem: SUBSTANCE USE/ABUSE  Goal: Will have no detox symptoms and will verbalize plan for changing substance-related behavior  Description: INTERVENTIONS:  - Monitor physical status and assess for symptoms of withdrawal  - Administer medication as ordered  - Provide emotional support with 1 on 1 interaction with staff  - Encourage recovery focused program/ addiction education  - Assess for verbalization of changing behaviors related to substance abuse  - Initiate consults and referrals as appropriate (Case Management, Spiritual Care, etc )  Outcome: Progressing  Goal: By discharge, will develop insight into their chemical dependency and sustain motivation to continue in recovery  Description: INTERVENTIONS:  - Attends all daily group sessions and scheduled AA groups  - Actively practices coping skills through participation in the therapeutic community and adherence to program rules  - Reviews and completes assignments from individual treatment plan  - Assist patient development of understanding of their personal cycle of addiction and relapse triggers  Outcome: Progressing  Goal: By discharge, patient will have ongoing treatment plan addressing chemical dependency  Description: INTERVENTIONS:  - Assist patient with resources and/or appointments for ongoing recovery based living  Outcome: Progressing     Problem: SPIRITUAL CARE  Goal: Pt/Family able to move forward in process of forgiving self, others and/or higher power  Description: INTERVENTIONS:  - Assist patient with any spiritual needs/requests such as communion, confession, anointing, etc  - Explore guilt and help patient/family identify possible spiritual/cultural beliefs and values  - Explore possibilities of making amends & reconciliation with self, others, and/or a greater power  - Guide patient/family in identifying painful feelings  - Help patient explore and identify spiritual beliefs, cultural understandings or values that may help or hinder letting go of issue  - Help patient explore feelings of anger, bitterness, resentment, anxiety   Help patient/family identify and examine the situation in which these feelings are experienced  - Help patient/family identify destructive displacement of feelings onto other individuals  - Refer patient to formal counseling and/or to faiza community for further support as needed or per request  Outcome: Progressing  Goal: Patient feels balance and connection with others and/or higher power that empowers the self during times of loss, guilt and fear  Description: INTERVENTIONS:  - Create safety for patient through empathic presence and non-judgmental listening  - Encourage patient to explore his/her values, beliefs and/or spiritual images and practices  - Encourage use of breath work, imagery, meditation, relaxation, reiki to ease distress and provide healing  - Encourage use of cultural and spiritual celebrations and rituals  - Facilitate discussion that helps patient sort out spiritual concerns  - Help patient identify where meaning/hope/comfort & strength are in his/her life  - Refer patient to faiza community for assistance, as appropriate  - Respond to patient/family need for prayer/ritual/sacrament/ceremony  Outcome: Progressing     Problem: Potential for Falls  Goal: Patient will remain free of falls  Description: INTERVENTIONS:  - Educate patient/family on patient safety including physical limitations  - Instruct patient to call for assistance with activity   - Consult OT/PT to assist with strengthening/mobility   - Keep Call bell within reach  - Keep bed low and locked with side rails adjusted as appropriate  - Keep care items and personal belongings within reach  - Initiate and maintain comfort rounds  - Make Fall Risk Sign visible to staff  - Offer Toileting every 2 Hours, in advance of need  - Initiate/Maintain bed alarm  - Obtain necessary fall risk management equipment: alarm  - Apply yellow socks and bracelet for high fall risk patients  - Consider moving patient to room near nurses station  Outcome: Progressing     Problem: Nutrition/Hydration-ADULT  Goal: Nutrient/Hydration intake appropriate for improving, restoring or maintaining nutritional needs  Description: Monitor and assess patient's nutrition/hydration status for malnutrition  Collaborate with interdisciplinary team and initiate plan and interventions as ordered  Monitor patient's weight and dietary intake as ordered or per policy  Utilize nutrition screening tool and intervene as necessary  Determine patient's food preferences and provide high-protein, high-caloric foods as appropriate       INTERVENTIONS:  - Monitor oral intake, urinary output, labs, and treatment plans  - Assess nutrition and hydration status and recommend course of action  - Evaluate amount of meals eaten  - Assist patient with eating if necessary   - Allow adequate time for meals  - Recommend/ encourage appropriate diets, oral nutritional supplements, and vitamin/mineral supplements  - Order, calculate, and assess calorie counts as needed  - Recommend, monitor, and adjust tube feedings and TPN/PPN based on assessed needs  - Assess need for intravenous fluids  - Provide specific nutrition/hydration education as appropriate  - Include patient/family/caregiver in decisions related to nutrition  Outcome: Progressing

## 2023-01-12 NOTE — ASSESSMENT & PLAN NOTE
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

## 2023-01-12 NOTE — DISCHARGE INSTR - AVS FIRST PAGE
Follow up with family doctor   Follow up with neurology appointment scheduled for 1/20/23, call to reschedule number listed on paper  Continue on outpatient medications no medication changes were made

## 2023-01-12 NOTE — ASSESSMENT & PLAN NOTE
· Previous history of CVA, current CTA with old chronic stroke  · MRI showing no acute infarct  · EEG ordered showing no seizure

## 2023-01-12 NOTE — ASSESSMENT & PLAN NOTE
· high risk for hospital acquired delirium: avoid opioids, benzodiazepines, antihistamines  Maintain circadian rhythm lights/TV out at night, have hearing aids/glasses close, frequent reorientation  If agitated risk to self/others antipsychotics/safety sitter

## 2023-01-12 NOTE — PROGRESS NOTES
Progress Note - Cardiology   Desiree Bhatt 82 y o  female MRN: [id number]  Unit/Bed#: E4 -01 Encounter: [id number]    Assessment:    • Atrial fibrillation with a slow ventricular response and pauses from 3 seconds to 4 seconds  • Recent cerebrovascular accident but able to carry on simple conversations but with more complex objects and has difficulty with word finding  • Patient on no AV blocking drugs  • For placement of pacemaker tomorrow  • Abnormal CT of the brain consistent with subacute infarct  • Chronic diastolic congestive heart failure, LVEF 60%, moderate biatrial enlargement, aortic valve sclerosis, mitral valve sclerosis with mild MR, mild to moderate TR, mild pulmonary hypertension, PASP 44 mmHg  • Hypertension  • Dyslipidemia  • Seizure disorder  • Mild dementia  • History of knee injury leading to hemarthrosis of the left knee July 2022  • History of CVA July 2022    Plan:    • Permanent ventricular pacemaker tomorrow  • Continue Eliquis 5 mg twice daily  • Continue atorvastatin 40 mg and furosemide 40 mg daily  • Monitor volume status, daily weights, renal function and electrolytes      Interval history:  Patient very sleepy today but was arousable  She has no complaints of dizziness or lightheadedness in bed and not quite yet awake  She was able to ambulate to the eduardo yesterday and was able to increase her heart rate    However, these long pauses when just resting even when not asleep cannot be good for perfusion of her brain particularly with recent CVA    PROBLEM LIST:    Patient Active Problem List    Diagnosis Date Noted   • Sick sinus syndrome ([facility] [address] ) 01/11/2023   • Stroke-like symptoms 01/11/2023   • History of stroke 01/10/2023   • New onset seizure ([address] )    • Seizure ([address] ) 11/14/2022   • Urinary retention 11/14/2022   • Venous stasis dermatitis of both lower extremities 09/29/2022   • Gait instability 06/22/2022   • Other fatigue 05/23/2022   • Atypical pneumonia 05/10/2022   • Respiratory insufficiency 05/10/2022   • Acute metabolic encephalopathy 05/02/7111   • Chronic diastolic heart failure ([facility] ) 05/10/2022   • Thrombocytopenia ([facility] ) 05/10/2022   • Other hyperlipidemia 10/06/2021   • 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   • Other insomnia 10/06/2021   • Skin lesion 10/04/2021   • Action tremor 04/01/2020   • Age-related osteoporosis without fracture 04/01/2020   • Atrial fibrillation ([place] [address] ) 04/10/2015   • Chronic anticoagulation 04/10/2015   • Benign essential hypertension 04/11/2011       Vitals: /57 (BP Location: Left arm)   Pulse (!) 50   Temp 97 5 °F (36 4 °C) (Temporal)   Resp 18   Ht 5' 3" (1 6 m)   Wt 77 6 kg (171 lb)   SpO2 96%   BMI 30 29 kg/m²   PREVIOUS WEIGHTS:   Weight (last 2 days)     Date/Time Weight    01/10/23 1000 77 6 (171)          Wt Readings from Last 2 Encounters:   01/10/23 77 6 kg (171 lb)   01/09/23 77 8 kg (171 lb 8 3 oz)       Labs/Data:        Results from last 7 days   Lab Units 01/10/23  0500 01/09/23  1714   WBC Thousand/uL 5 00 6 11   HEMOGLOBIN g/dL 12 9 13 6   HEMATOCRIT % 38 9 41 3   MCV fL 92 93   MCH pg 30 4 30 7   MCHC g/dL 33 2 32 9   PLATELETS Thousands/uL 103* 117*     Results from last 7 days   Lab Units 01/11/23  0452 01/10/23  0500 01/09/23  1714   EGFR ml/min/1 73sq m 62 67 60   SODIUM mmol/L 139 141 142   POTASSIUM mmol/L 3 7 3 8 4 2   CHLORIDE mmol/L 105 109* 108   CO2 mmol/L 24 24 24   BUN mg/dL 25 21 22   CREATININE mg/dL 0 85 0 80 0 88     Results from last 7 days   Lab Units 01/11/23  0452 01/10/23  0500 01/09/23  1714   CALCIUM mg/dL 9 5 9 3 9 3   AST U/L  --  22 22   ALT U/L  --  17 18   ALK PHOS U/L  --  79 97     Lab Results   Component Value Date    NTBNP 3,821 (H) 01/09/2023    NTBNP 7,504 (H) 05/10/2022     Lab Results   Component Value Date    CHOLESTEROL 135 01/09/2023    TRIG 78 01/09/2023    HDL 52 01/09/2023    LDLCALC 67 01/09/2023    HGBA1C 5 4 01/09/2023       Results from last 7 days   Lab Units 01/09/23  1714   INR  1 18   PROTIME seconds 15 0*          Current Facility-Administered Medications:   •  acetaminophen (TYLENOL) tablet 650 mg, 650 mg, Oral, Q4H PRN, Brinda Escobar PA-C  •  aluminum-magnesium hydroxide-simethicone (MYLANTA) oral suspension 30 mL, 30 mL, Oral, Q6H PRN, Garwood Denver, PA-C  •  apixaban (ELIQUIS) tablet 5 mg, 5 mg, Oral, BID, Garwood Denver, PA-C, 5 mg at 01/12/23 2986  •  atorvastatin (LIPITOR) tablet 40 mg, 40 mg, Oral, QPM, Garwood Denver, PA-C, 40 mg at 01/11/23 1743  •  calcium carbonate (TUMS) chewable tablet 500 mg, 500 mg, Oral, Daily, Garwood Denver, PA-C, 500 mg at 01/12/23 8735  •  furosemide (LASIX) tablet 40 mg, 40 mg, Oral, Daily, CRISTO Parmar  •  melatonin tablet 3 mg, 3 mg, Oral, HS, Garwood Denver, PA-C, 3 mg at 01/11/23 2102  •  ondansetron (ZOFRAN) injection 4 mg, 4 mg, Intravenous, Q6H PRN, Garwoodjas Escobar PA-C  •  polyethylene glycol (MIRALAX) packet 17 g, 17 g, Oral, Daily, Garwood Denver, PA-C, 17 g at 01/12/23 3686  •  QUEtiapine (SEROquel) tablet 12 5 mg, 12 5 mg, Oral, HS, Garwood Denver, PA-C, 12 5 mg at 01/11/23 2102  •  zonisamide (ZONEGRAN) capsule 100 mg, 100 mg, Oral, Daily, Garwood Denver, PA-C, 100 mg at 01/12/23 4613  Allergies   Allergen Reactions   • Morphine          Intake/Output Summary (Last 24 hours) at 1/12/2023 0949  Last data filed at 1/12/2023 0330  Gross per 24 hour   Intake --   Output 800 ml   Net -800 ml       Invasive Devices     Peripheral Intravenous Line  Duration           Peripheral IV 01/10/23 Distal;Dorsal (posterior); Left Forearm 1 day                Review of Systems   Respiratory: Negative for cough, choking, chest tightness, shortness of breath and wheezing  Cardiovascular: Negative for chest pain, palpitations and leg swelling  Musculoskeletal: Negative for gait problem  Skin: Negative for rash  Neurological: Negative for dizziness, tremors, syncope, weakness, light-headedness, numbness and headaches  Very sleepy and not quite awake at present   Psychiatric/Behavioral: Negative for agitation and behavioral problems  The patient is not hyperactive  Physical Exam  Constitutional:       General: She is not in acute distress  Appearance: She is well-developed  Comments: Patient very sleepy at present  HENT:      Head: Normocephalic and atraumatic  Neck:      Thyroid: No thyromegaly  Vascular: No carotid bruit or JVD  Trachea: No tracheal deviation  Cardiovascular:      Rate and Rhythm: Bradycardia present  Rhythm irregularly irregular  Pulses: Normal pulses  Heart sounds: Normal heart sounds  No murmur heard  No friction rub  No gallop  Pulmonary:      Effort: Pulmonary effort is normal  No respiratory distress  Breath sounds: Normal breath sounds  No wheezing, rhonchi or rales  Chest:      Chest wall: No tenderness  Abdominal:      General: There is no distension  Musculoskeletal:         General: Normal range of motion  Cervical back: Normal range of motion and neck supple  Right lower leg: No edema  Left lower leg: No edema  Skin:     General: Skin is warm and dry  Neurological:      General: No focal deficit present          ======================================================     Imaging:   I have personally reviewed pertinent reports  EKG: Atrial fibrillation with 3-1/2-second and greater pauses      Portions of the record may have been created with voice recognition software  Occasional wrong word or "sound a like" substitutions may have occurred due to the inherent limitations of voice recognition software  Read the chart carefully and recognize, using context, where substitutions have occurred

## 2023-01-12 NOTE — PROGRESS NOTES
[facility]  Progress Note - Eugenia Dixon 1935, 82 y o  female MRN: [id number]  Unit/Bed#: E4 -01 Encounter: [id number]  Primary Care Provider: Ivonne Barry DO   Date and time admitted to hospital: 1/9/2023  4:50 PM    Sick sinus syndrome [facility])  Assessment & Plan  · Patient presented with generalized weakness and trouble walking  · Likely to bradycardia and pauses seen on telemetry  · Tentatively scheduled for PPM placement 1/13/2022 due to cardiac pauses  · Family still undecided on pacemaker    * Stroke-like symptoms  Assessment & Plan  · Presented from memory care unit for generalized weakness, trouble walking with her walker  Patient was tangential in though, oriented to self, place and event  · CTH hypodensity of left parietal lobe image consistent with subacute infarct   · MRI brain showing no acute infarction, mild chronic microangiopathy, infarction in the left parietal lobe appear chronic, 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  · Continue PTA eliquis  · PT Recommending to return to facility    Amnestic MCI (mild cognitive impairment with memory loss)  Assessment & Plan  · high risk for hospital acquired delirium: avoid opioids, benzodiazepines, antihistamines  Maintain circadian rhythm lights/TV out at night, have hearing aids/glasses close, frequent reorientation  If agitated risk to self/others antipsychotics/safety sitter      Atrial fibrillation [facility])  Assessment & Plan  · Patient is maintained on Eliquis     · VR Rate controlled  · Cardiology to consider permanent pacemaker placement after discussion with EP      Seizure [facility])  Assessment & Plan  · Denies loss of consciousness, no reported seizure like activity from nursing unit, no evidence tongue bite/incontinence   · monitor for seizure activity  · EEG without seizure activity  · continue PTA zonisamide 100mg      History of stroke  Assessment & Plan  · Previous history of CVA, current CTA with old chronic stroke  · MRI showing no acute infarct  · EEG ordered showing no seizure    Chronic diastolic heart failure (HCC)  Assessment & Plan  Wt Readings from Last 3 Encounters:   01/10/23 77 6 kg (171 lb)   23 77 8 kg (171 lb 8 3 oz)   22 77 8 kg (171 lb 8 3 oz)     · No evidence of overt volume overload, continue PTA diuretics          VTE Pharmacologic Prophylaxis: VTE Score: 4 Moderate Risk (Score 3-4) - Pharmacological DVT Prophylaxis Ordered: apixaban (Eliquis)  Patient Centered Rounds: I performed bedside rounds with nursing staff today  Discussions with Specialists or Other Care Team Provider: cardiology    Education and Discussions with Family / Patient: Updated  (son) via phone  Time Spent for Care: 30 minutes  More than 50% of total time spent on counseling and coordination of care as described above  Current Length of Stay: 3 day(s)  Current Patient Status: Inpatient   Certification Statement: The patient will continue to require additional inpatient hospital stay due to possible pacemaker placement tomorrow  Discharge Plan: Anticipate discharge in 24-48 hrs to prior assisted or independent living facility  Code Status: Level 3 - DNAR and DNI    Subjective:   Patient was seen sleeping in bed today  She reports eating all her breakfast  She states she feels tired today  She denied any acute complaints at this time  Objective:     Vitals:   Temp (24hrs), Av 1 °F (36 7 °C), Min:97 5 °F (36 4 °C), Max:98 5 °F (36 9 °C)    Temp:  [97 5 °F (36 4 °C)-98 5 °F (36 9 °C)] 97 5 °F (36 4 °C)  HR:  [50-58] 50  Resp:  [18] 18  BP: ()/(55-73) 109/57  SpO2:  [95 %-98 %] 96 %  Body mass index is 30 29 kg/m²  Input and Output Summary (last 24 hours):      Intake/Output Summary (Last 24 hours) at 2023 0957  Last data filed at 2023 0330  Gross per 24 hour   Intake --   Output 800 ml   Net -800 ml       Physical Exam:   Physical Exam  Vitals and nursing note reviewed  Constitutional:       Appearance: Normal appearance  She is not ill-appearing  Eyes:      General: No scleral icterus  Cardiovascular:      Rate and Rhythm: Normal rate and regular rhythm  Pulmonary:      Effort: Pulmonary effort is normal       Breath sounds: Normal breath sounds  No wheezing  Abdominal:      General: Abdomen is flat  Bowel sounds are normal       Palpations: Abdomen is soft  Tenderness: There is no abdominal tenderness  Musculoskeletal:      Right lower leg: No edema  Left lower leg: No edema  Skin:     General: Skin is warm and dry  Neurological:      Mental Status: She is alert  Mental status is at baseline  Comments: Oriented to person and place   Psychiatric:         Mood and Affect: Mood normal          Behavior: Behavior normal          Additional Data:     Labs:  Results from last 7 days   Lab Units 01/10/23  0500 01/09/23  1714   WBC Thousand/uL 5 00 6 11   HEMOGLOBIN g/dL 12 9 13 6   HEMATOCRIT % 38 9 41 3   PLATELETS Thousands/uL 103* 117*   NEUTROS PCT %  --  56   LYMPHS PCT %  --  28   MONOS PCT %  --  12   EOS PCT %  --  3     Results from last 7 days   Lab Units 01/11/23  0452 01/10/23  0500   SODIUM mmol/L 139 141   POTASSIUM mmol/L 3 7 3 8   CHLORIDE mmol/L 105 109*   CO2 mmol/L 24 24   BUN mg/dL 25 21   CREATININE mg/dL 0 85 0 80   ANION GAP mmol/L 10 8   CALCIUM mg/dL 9 5 9 3   ALBUMIN g/dL  --  3 2*   TOTAL BILIRUBIN mg/dL  --  0 52   ALK PHOS U/L  --  79   ALT U/L  --  17   AST U/L  --  22   GLUCOSE RANDOM mg/dL 83 87     Results from last 7 days   Lab Units 01/09/23  1714   INR  1 18         Results from last 7 days   Lab Units 01/09/23  1714   HEMOGLOBIN A1C % 5 4           Lines/Drains:  Invasive Devices     Peripheral Intravenous Line  Duration           Peripheral IV 01/10/23 Distal;Dorsal (posterior); Left Forearm 1 day                  Telemetry:  Telemetry Orders (From admission, onward)             48 Hour Telemetry Monitoring Continuous x 48 hours        References:    Telemetry Guidelines   Question:  Reason for 48 Hour Telemetry  Answer:  Arrhythmias Requiring Medical Therapy (eg  SVT, Vtach/fib, Bradycardia, Uncontrolled A-fib)                 Telemetry Reviewed: Sinus Bradycardia  Indication for Continued Telemetry Use: Arrthymias requiring medical therapy significant pauses and bradycardia             Imaging: Reviewed radiology reports from this admission including: MRI brain    Recent Cultures (last 7 days):         Last 24 Hours Medication List:   Current Facility-Administered Medications   Medication Dose Route Frequency Provider Last Rate   • acetaminophen  650 mg Oral Q4H PRN Lisajoy Byrne PA-C     • aluminum-magnesium hydroxide-simethicone  30 mL Oral Q6H PRN Lisa Caty PA-C     • apixaban  5 mg Oral BID MELQUIADES McgowanC     • atorvastatin  40 mg Oral QPM Lisa Byrne PA-C     • calcium carbonate  500 mg Oral Daily MELQUIADES McgowanC     • furosemide  40 mg Oral Daily CRISTO Parmar     • melatonin  3 mg Oral HS MELQUIADES McgowanC     • ondansetron  4 mg Intravenous Q6H PRN MELQUIADES McgowanC     • polyethylene glycol  17 g Oral Daily Lisa Byrne PA-C     • QUEtiapine  12 5 mg Oral HS Lisa PARAMJIT Byrne-C     • zonisamide  100 mg Oral Daily Lisa Byrne PA-C          Today, Patient Was Seen By: Daniel Leonardo PA-C    **Please Note: This note may have been constructed using a voice recognition system  **

## 2023-01-12 NOTE — ASSESSMENT & PLAN NOTE
· Patient presented with generalized weakness and trouble walking  · Likely to bradycardia and pauses seen on telemetry  · Tentatively scheduled for PPM placement 1/13/2022 due to cardiac pauses    · Family still undecided on pacemaker

## 2023-01-12 NOTE — ASSESSMENT & PLAN NOTE
· Denies loss of consciousness, no reported seizure like activity from nursing unit, no evidence tongue bite/incontinence   · monitor for seizure activity  · EEG without seizure activity  · continue PTA zonisamide 100mg

## 2023-01-13 VITALS
WEIGHT: 171 LBS | RESPIRATION RATE: 17 BRPM | HEART RATE: 50 BPM | TEMPERATURE: 97.1 F | SYSTOLIC BLOOD PRESSURE: 119 MMHG | DIASTOLIC BLOOD PRESSURE: 81 MMHG | OXYGEN SATURATION: 99 % | HEIGHT: 63 IN | BODY MASS INDEX: 30.3 KG/M2

## 2023-01-13 LAB
ANION GAP SERPL CALCULATED.3IONS-SCNC: 6 MMOL/L (ref 4–13)
BUN SERPL-MCNC: 23 MG/DL (ref 5–25)
CALCIUM SERPL-MCNC: 9.7 MG/DL (ref 8.3–10.1)
CHLORIDE SERPL-SCNC: 107 MMOL/L (ref 96–108)
CO2 SERPL-SCNC: 24 MMOL/L (ref 21–32)
CREAT SERPL-MCNC: 0.78 MG/DL (ref 0.6–1.3)
ERYTHROCYTE [DISTWIDTH] IN BLOOD BY AUTOMATED COUNT: 17.4 % (ref 11.6–15.1)
GFR SERPL CREATININE-BSD FRML MDRD: 69 ML/MIN/1.73SQ M
GLUCOSE SERPL-MCNC: 93 MG/DL (ref 65–140)
HCT VFR BLD AUTO: 42.2 % (ref 34.8–46.1)
HGB BLD-MCNC: 14.2 G/DL (ref 11.5–15.4)
MCH RBC QN AUTO: 30.6 PG (ref 26.8–34.3)
MCHC RBC AUTO-ENTMCNC: 33.6 G/DL (ref 31.4–37.4)
MCV RBC AUTO: 91 FL (ref 82–98)
PLATELET # BLD AUTO: 115 THOUSANDS/UL (ref 149–390)
PMV BLD AUTO: 12.1 FL (ref 8.9–12.7)
POTASSIUM SERPL-SCNC: 4.1 MMOL/L (ref 3.5–5.3)
RBC # BLD AUTO: 4.64 MILLION/UL (ref 3.81–5.12)
SODIUM SERPL-SCNC: 137 MMOL/L (ref 135–147)
WBC # BLD AUTO: 6.64 THOUSAND/UL (ref 4.31–10.16)

## 2023-01-13 RX ADMIN — FUROSEMIDE 40 MG: 40 TABLET ORAL at 09:00

## 2023-01-13 RX ADMIN — CALCIUM CARBONATE (ANTACID) CHEW TAB 500 MG 500 MG: 500 CHEW TAB at 09:00

## 2023-01-13 RX ADMIN — ZONISAMIDE 100 MG: 100 CAPSULE ORAL at 09:00

## 2023-01-13 RX ADMIN — APIXABAN 5 MG: 5 TABLET, FILM COATED ORAL at 09:00

## 2023-01-13 NOTE — CONSULTS
The patient is a resident at [address] [place] [address] in memory care unit and needed minimal assist with ADLs and ambulation with rolling walker at baseline  She is currently functioning at supervision to minimal assist level  Agree with therapy recommendation to return to facility with therapy services  No formal PM&R consult needed at this time  Please re-consult should function decline

## 2023-01-13 NOTE — NURSING NOTE
Report called to [id number] [address], [address] [place] aware that patient has not had dinner at time of pickup for transfer to facility

## 2023-01-13 NOTE — PLAN OF CARE
Problem: PAIN - ADULT  Goal: Verbalizes/displays adequate comfort level or baseline comfort level  Description: Interventions:  - Encourage patient to monitor pain and request assistance  - Assess pain using appropriate pain scale  - Administer analgesics based on type and severity of pain and evaluate response  - Implement non-pharmacological measures as appropriate and evaluate response  - Consider cultural and social influences on pain and pain management  - Notify physician/advanced practitioner if interventions unsuccessful or patient reports new pain  Outcome: Progressing     Problem: INFECTION - ADULT  Goal: Absence or prevention of progression during hospitalization  Description: INTERVENTIONS:  - Assess and monitor for signs and symptoms of infection  - Monitor lab/diagnostic results  - Monitor all insertion sites, i e  indwelling lines, tubes, and drains  - Monitor endotracheal if appropriate and nasal secretions for changes in amount and color  - [place] appropriate cooling/warming therapies per order  - Administer medications as ordered  - Instruct and encourage patient and family to use good hand hygiene technique  - Identify and instruct in appropriate isolation precautions for identified infection/condition  Outcome: Progressing  Goal: Absence of fever/infection during neutropenic period  Description: INTERVENTIONS:  - Monitor WBC    Outcome: Progressing     Problem: SAFETY ADULT  Goal: Patient will remain free of falls  Description: INTERVENTIONS:  - Educate patient/family on patient safety including physical limitations  - Instruct patient to call for assistance with activity   - Consult OT/PT to assist with strengthening/mobility   - Keep Call bell within reach  - Keep bed low and locked with side rails adjusted as appropriate  - Keep care items and personal belongings within reach  - Initiate and maintain comfort rounds  - Make Fall Risk Sign visible to staff  - Offer Toileting every  Hours, in advance of need  - Initiate/Maintain alarm  - Obtain necessary fall risk management equipment:   - Apply yellow socks and bracelet for high fall risk patients  - Consider moving patient to room near nurses station  Outcome: Progressing  Goal: Maintain or return to baseline ADL function  Description: INTERVENTIONS:  -  Assess patient's ability to carry out ADLs; assess patient's baseline for ADL function and identify physical deficits which impact ability to perform ADLs (bathing, care of mouth/teeth, toileting, grooming, dressing, etc )  - Assess/evaluate cause of self-care deficits   - Assess range of motion  - Assess patient's mobility; develop plan if impaired  - Assess patient's need for assistive devices and provide as appropriate  - Encourage maximum independence but intervene and supervise when necessary  - Involve family in performance of ADLs  - Assess for home care needs following discharge   - Consider OT consult to assist with ADL evaluation and planning for discharge  - Provide patient education as appropriate  Outcome: Progressing  Goal: Maintains/Returns to pre admission functional level  Description: INTERVENTIONS:  - Perform BMAT or MOVE assessment daily    - Set and communicate daily mobility goal to care team and patient/family/caregiver  - Collaborate with rehabilitation services on mobility goals if consulted  - Perform Range of Motion  times a day  - Reposition patient every  hours    - Dangle patient  times a day  - Stand patient  times a day  - Ambulate patient  times a day  - Out of bed to chair  times a day   - Out of bed for meals  times a day  - Out of bed for toileting  - Record patient progress and toleration of activity level   Outcome: Progressing     Problem: DISCHARGE PLANNING  Goal: Discharge to home or other facility with appropriate resources  Description: INTERVENTIONS:  - Identify barriers to discharge w/patient and caregiver  - Arrange for needed discharge resources and transportation as appropriate  - Identify discharge learning needs (meds, wound care, etc )  - Arrange for interpretive services to assist at discharge as needed  - Refer to Case Management Department for coordinating discharge planning if the patient needs post-hospital services based on physician/advanced practitioner order or complex needs related to functional status, cognitive ability, or social support system  Outcome: Progressing     Problem: Knowledge Deficit  Goal: Patient/family/caregiver demonstrates understanding of disease process, treatment plan, medications, and discharge instructions  Description: Complete learning assessment and assess knowledge base    Interventions:  - Provide teaching at level of understanding  - Provide teaching via preferred learning methods  Outcome: Progressing     Problem: COPING  Goal: Pt/Family able to verbalize concerns and demonstrate effective coping strategies  Description: INTERVENTIONS:  - Assist patient/family to identify coping skills, available support systems and cultural and spiritual values  - Provide emotional support, including active listening and acknowledgement of concerns of patient and caregivers  - Reduce environmental stimuli, as able  - Provide patient education  - Assess for spiritual pain/suffering and initiate spiritual care, including notification of Pastoral Care or faiza based community as needed  - Assess effectiveness of coping strategies  Outcome: Progressing  Goal: Will report anxiety at manageable levels  Description: INTERVENTIONS:  - Administer medication as ordered  - Teach and encourage coping skills  - Provide emotional support  - Assess patient/family for anxiety and ability to cope  Outcome: Progressing     Problem: DEATH & DYING  Goal: Pt/Family communicate acceptance of impending death and expresses psychological comfort and peace  Description: INTERVENTIONS:  - Assess patient/family anxiety and grief process related to end of life issues  - Provide emotional, spiritual and psychosocial support  - Provide information about the patient’s health status with consideration of family and cultural values  - Communicate willingness to discuss death and facilitate grief process  with patient/family as appropriate  - Emphasize sustaining relationships within family system and community, or faiza/spiritual traditions  - Initiate Spiritual Care, Pastoral care or other ancillary consults as needed  - Refer to community support groups as appropriate  Outcome: Progressing     Problem: CHANGE IN BODY IMAGE  Goal: Pt/Family communicate acceptance of loss or change in body image and expresses psychological comfort and peace  Description: INTERVENTIONS:  - Assess patient/family anxiety and grief process related to change in body image, loss of functional status and loss of sense of self  - Assess patient/family's coping skills and provide emotional, spiritual and psychosocial support  - Provide information about the patient's health status with consideration of family and cultural values  - Communicate willingness to discuss loss and facilitate grief process with patient/family as appropriate  - Emphasize sustaining relationships within family system and community, or faiza/spiritual traditions  - Refer to community support groups as appropriate  - Initiate Spiritual Care, Pastoral care or other ancillary consults as needed  Outcome: Progressing     Problem: DECISION MAKING  Goal: Pt/Family able to effectively weigh alternatives and participate in decision making related to treatment and care  Description: INTERVENTIONS:  - Identify decision maker  - Determine when there are differences among patient's view, family's view, and healthcare provider's view of patient condition and care goals  - Facilitate patient/family articulation of goals for care  - Help patient/family identify pros/cons of alternative solutions  - Provide information as requested by patient/family  - Respect patient/family rights related to privacy and sharing information   - Serve as a liaison between patient, family and health care team  - Initiate consults as appropriate (Ethics Team, Palliative Care, Family Care Conference, etc )  Outcome: Progressing     Problem: CONFUSION/THOUGHT DISTURBANCE  Goal: Thought disturbances (confusion, delirium, depression, dementia or psychosis) are managed to maintain or return to baseline mental status and functional level  Description: INTERVENTIONS:  - Assess for possible contributors to  thought disturbance, including but not limited to medications, infection, impaired vision or hearing, underlying metabolic abnormalities, dehydration, respiratory compromise,  psychiatric diagnoses and notify attending PHYSICAN/AP  - Monitor and intervene to maintain adequate nutrition, hydration, elimination, sleep and activity  - Decrease environmental stimuli, including noise as appropriate  - Provide frequent contacts to provide refocusing, direction and reassurance as needed  Approach patient calmly with eye contact and at their level  - [place] high risk fall precautions, aspiration precautions and other safety measures, as indicated  - If delirium suspected, notify physician/AP of change in condition and request immediate in-person evaluation  - Pursue consults as appropriate including Geriatric (campus dependent), OT for cognitive evaluation/activity planning, psychiatric, pastoral care, etc   Outcome: Progressing     Problem: BEHAVIOR  Goal: Pt/Family maintain appropriate behavior and adhere to behavioral management agreement, if implemented  Description: INTERVENTIONS:  - Assess the family dynamic   - Encourage verbalization of thoughts and concerns in a socially appropriate manner  - Assess patient/family's coping skills and non-compliant behavior (including use of illegal substances)    - Utilize positive, consistent limit setting strategies supporting safety of patient, staff and others  - Initiate consult with Case Management, Spiritual Care or other ancillary services as appropriate  - If a patient's/visitor's behavior jeopardizes the safety of the patient, staff, or others, refer to organization procedure  - Notify Security of behavior or suspected illegal substances which indicate the need for search of the patient and/or belongings  - Encourage participation in the decision making process about a behavioral management agreement; implement if patient meets criteria  Outcome: Progressing     Problem: Depression - IP adult  Goal: Effects of depression will be minimized  Description: INTERVENTIONS:  - Assess impact of patient's symptoms on level of functioning, self-care needs and offer support as indicated  - Assess patient/family knowledge of depression, impact on illness and need for teaching  - Provide emotional support, presence and reassurance  - Assess for possible suicidal thoughts, ideation or self-harm  If patient expresses suicidal thoughts or statements do not leave alone, notify physician/AP immediately, initiate Suicide Precautions, and determine need for continual observation   - Initiate consults and referrals as appropriate (a mental health professional, Spiritual Care)  - Administer medication as ordered  Outcome: Progressing     Problem: SELF HARM  Goal: Effect of psychiatric condition will be minimized and patient will be protected from self harm  Description: INTERVENTIONS:  - Assess impact of patient's symptoms on level of functioning, self-care needs and offer support as indicated  - Assess patient/family knowledge of depression, impact on illness and need for teaching  - Provide emotional support, presence and reassurance  - Assess for possible suicidal thoughts, ideation or self-harm   If patient expresses suicidal thoughts or statements do not leave alone, notify physician/AP immediately, initiate suicide precautions, and determine need for continual observation    - initiate consults and referrals as appropriate (a mental health professional, Spiritual Care  Outcome: Progressing     Problem: ABUSE/NEGLECT  Goal: Pt/Caregiver/Family aware of resources to assist with issues of abuse and neglect  Description: INTERVENTIONS:  - If child abuse and/or neglect is suspected, notify Childline directly  - Assess for level of risk and safety  - Initiate referral to Case Management  - Notify PHYSICIAN/AP and Nursing Supervisor  - Provide appropriate education and resources to patient and/or family  - Initiate referral to age appropriate protective services, i e  [facility] or Child Protective Services  - Offer patient/caregiver the option to [place] of patient information directory  - Provide emotional support, including active listening and acknowledgment of concerns  - Provide the patient with information about supportive services i e  shelters, community resources for domestic violence  Outcome: Progressing     Problem: SUBSTANCE USE/ABUSE  Goal: Will have no detox symptoms and will verbalize plan for changing substance-related behavior  Description: INTERVENTIONS:  - Monitor physical status and assess for symptoms of withdrawal  - Administer medication as ordered  - Provide emotional support with 1 on 1 interaction with staff  - Encourage recovery focused program/ addiction education  - Assess for verbalization of changing behaviors related to substance abuse  - Initiate consults and referrals as appropriate (Case Management, Spiritual Care, etc )  Outcome: Progressing  Goal: By discharge, will develop insight into their chemical dependency and sustain motivation to continue in recovery  Description: INTERVENTIONS:  - Attends all daily group sessions and scheduled AA groups  - Actively practices coping skills through participation in the therapeutic community and adherence to program rules  - Reviews and completes assignments from individual treatment plan  - Assist patient development of understanding of their personal cycle of addiction and relapse triggers  Outcome: Progressing  Goal: By discharge, patient will have ongoing treatment plan addressing chemical dependency  Description: INTERVENTIONS:  - Assist patient with resources and/or appointments for ongoing recovery based living  Outcome: Progressing     Problem: SPIRITUAL CARE  Goal: Pt/Family able to move forward in process of forgiving self, others and/or higher power  Description: INTERVENTIONS:  - Assist patient with any spiritual needs/requests such as communion, confession, anointing, etc  - Explore guilt and help patient/family identify possible spiritual/cultural beliefs and values  - Explore possibilities of making amends & reconciliation with self, others, and/or a greater power  - Guide patient/family in identifying painful feelings  - Help patient explore and identify spiritual beliefs, cultural understandings or values that may help or hinder letting go of issue  - Help patient explore feelings of anger, bitterness, resentment, anxiety   Help patient/family identify and examine the situation in which these feelings are experienced  - Help patient/family identify destructive displacement of feelings onto other individuals  - Refer patient to formal counseling and/or to faiza community for further support as needed or per request  Outcome: Progressing  Goal: Patient feels balance and connection with others and/or higher power that empowers the self during times of loss, guilt and fear  Description: INTERVENTIONS:  - Create safety for patient through empathic presence and non-judgmental listening  - Encourage patient to explore his/her values, beliefs and/or spiritual images and practices  - Encourage use of breath work, imagery, meditation, relaxation, reiki to ease distress and provide healing  - Encourage use of cultural and spiritual celebrations and rituals  - Facilitate discussion that helps patient sort out spiritual concerns  - Help patient identify where meaning/hope/comfort & strength are in his/her life  - Refer patient to faiza community for assistance, as appropriate  - Respond to patient/family need for prayer/ritual/sacrament/ceremony  Outcome: Progressing     Problem: Potential for Falls  Goal: Patient will remain free of falls  Description: INTERVENTIONS:  - Educate patient/family on patient safety including physical limitations  - Instruct patient to call for assistance with activity   - Consult OT/PT to assist with strengthening/mobility   - Keep Call bell within reach  - Keep bed low and locked with side rails adjusted as appropriate  - Keep care items and personal belongings within reach  - Initiate and maintain comfort rounds  - Make Fall Risk Sign visible to staff  - Offer Toileting every  Hours, in advance of need  - Initiate/Maintain alarm  - Obtain necessary fall risk management equipment:   - Apply yellow socks and bracelet for high fall risk patients  - Consider moving patient to room near nurses station  Outcome: Progressing     Problem: Nutrition/Hydration-ADULT  Goal: Nutrient/Hydration intake appropriate for improving, restoring or maintaining nutritional needs  Description: Monitor and assess patient's nutrition/hydration status for malnutrition  Collaborate with interdisciplinary team and initiate plan and interventions as ordered  Monitor patient's weight and dietary intake as ordered or per policy  Utilize nutrition screening tool and intervene as necessary  Determine patient's food preferences and provide high-protein, high-caloric foods as appropriate       INTERVENTIONS:  - Monitor oral intake, urinary output, labs, and treatment plans  - Assess nutrition and hydration status and recommend course of action  - Evaluate amount of meals eaten  - Assist patient with eating if necessary   - Allow adequate time for meals  - Recommend/ encourage appropriate diets, oral nutritional supplements, and vitamin/mineral supplements  - Order, calculate, and assess calorie counts as needed  - Recommend, monitor, and adjust tube feedings and TPN/PPN based on assessed needs  - Assess need for intravenous fluids  - Provide specific nutrition/hydration education as appropriate  - Include patient/family/caregiver in decisions related to nutrition  Outcome: Progressing     Problem: MOBILITY - ADULT  Goal: Maintain or return to baseline ADL function  Description: INTERVENTIONS:  -  Assess patient's ability to carry out ADLs; assess patient's baseline for ADL function and identify physical deficits which impact ability to perform ADLs (bathing, care of mouth/teeth, toileting, grooming, dressing, etc )  - Assess/evaluate cause of self-care deficits   - Assess range of motion  - Assess patient's mobility; develop plan if impaired  - Assess patient's need for assistive devices and provide as appropriate  - Encourage maximum independence but intervene and supervise when necessary  - Involve family in performance of ADLs  - Assess for home care needs following discharge   - Consider OT consult to assist with ADL evaluation and planning for discharge  - Provide patient education as appropriate  Outcome: Progressing  Goal: Maintains/Returns to pre admission functional level  Description: INTERVENTIONS:  - Perform BMAT or MOVE assessment daily    - Set and communicate daily mobility goal to care team and patient/family/caregiver  - Collaborate with rehabilitation services on mobility goals if consulted  - Perform Range of Motion  times a day  - Reposition patient every  hours    - Dangle patient  times a day  - Stand patient  times a day  - Ambulate patient  times a day  - Out of bed to chair  times a day   - Out of bed for meals times a day  - Out of bed for toileting  - Record patient progress and toleration of activity level   Outcome: Progressing     Problem: Prexisting or High Potential for Compromised Skin Integrity  Goal: Skin integrity is maintained or improved  Description: INTERVENTIONS:  - Identify patients at risk for skin breakdown  - Assess and monitor skin integrity  - Assess and monitor nutrition and hydration status  - Monitor labs   - Assess for incontinence   - Turn and reposition patient  - Assist with mobility/ambulation  - Relieve pressure over bony prominences  - Avoid friction and shearing  - Provide appropriate hygiene as needed including keeping skin clean and dry  - Evaluate need for skin moisturizer/barrier cream  - Collaborate with interdisciplinary team   - Patient/family teaching  - Consider wound care consult   Outcome: Progressing

## 2023-01-13 NOTE — PHYSICAL THERAPY NOTE
Physical Therapy Cancellation Note           01/13/23 1111   PT Last Visit   PT Visit Date 01/13/23   Note Type   Note Type Cancelled Session   Cancel Reasons Medical status  (per chart review pt to go for permanent pacemaker today   will cx and continue to follow post procedure )         Shahnaz Morales, PT

## 2023-01-13 NOTE — CASE MANAGEMENT
Case Management Discharge Planning Note    Patient name Ashtyn Sabillon  Location [place] [address] [place] [address] Drive-* MRN [id number]  : 1935 Date 2023       Current Admission Date: 2023  Current Admission Diagnosis:Stroke-like symptoms   Patient Active Problem List    Diagnosis Date Noted   • Sick sinus syndrome ([address] ) 2023   • Stroke-like symptoms 2023   • History of stroke 01/10/2023   • New onset seizure ([address] )    • Seizure ([address] ) 2022   • Urinary retention 2022   • Venous stasis dermatitis of both lower extremities 2022   • Gait instability 2022   • Other fatigue 2022   • Atypical pneumonia 05/10/2022   • Respiratory insufficiency 05/10/2022   • Acute metabolic encephalopathy    • Chronic diastolic heart failure ([address] ) 05/10/2022   • Thrombocytopenia ([facility] ) 05/10/2022   • Other hyperlipidemia 10/06/2021   • 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   • Other insomnia 10/06/2021   • Skin lesion 10/04/2021   • Action tremor 2020   • Age-related osteoporosis without fracture 2020   • Atrial fibrillation ([address] ) 04/10/2015   • Chronic anticoagulation 04/10/2015   • Benign essential hypertension 2011      LOS (days): 4  Geometric Mean LOS (GMLOS) (days): 2 90  Days to GMLOS:-0 8     OBJECTIVE:  Risk of Unplanned Readmission Score: 16 43         Current admission status: Inpatient   Preferred Pharmacy:   [facility] , [address] [address]  Phone: [phone] Fax: [phone] [place], [place] - [address] [place] [address] [address]ina  [facility] [address]  Phone: [phone] Fax: [phone]    [facility], [address] [address]  [facility] [address]  Phone: [phone] Fax: [phone]    Primary Care Provider: Jane See DO    Primary Insurance: [facility] REP  Secondary Insurance:     DISCHARGE DETAILS:    Discharge planning discussed with[de-identified] Pt sonLucnia Quest of Choice: Yes  Comments - Freedom of Choice: Son would like pt to go back to [facility] and Home/ rehab services  CM contacted family/caregiver?: Yes  Were Treatment Team discharge recommendations reviewed with patient/caregiver?: Yes  Did patient/caregiver verbalize understanding of patient care needs?: N/A- going to facility  Were patient/caregiver advised of the risks associated with not following Treatment Team discharge recommendations?: Yes    Contacts  Patient Contacts: Blanka Jimenez (Son)  (M  Relationship to Patient[de-identified] Family  Contact Method: Phone  Phone Number: [phone]  Reason/Outcome: Discharge Planning, Emergency Contact, Continuity of [address]         Is the patient interested in [facility] at discharge?: Yes  [address] requested[de-identified] Occupational Therapy, Physical [address] Name[de-identified] Other [facility] rehab)  [facility] Provider[de-identified] Referring Provider  Home Health Services Needed[de-identified] Evaluate Functional Status and Safety, Gait/ADL Training, Strengthening/Theraputic Exercises to Improve Function  Homebound Criteria Met[de-identified] Uses an Assist Device (i e  cane, walker, etc), Requires the Assistance of Another Person for Safe Ambulation or to Leave the Home  Supporting Clincal Findings[de-identified] Fatigues Easliy in [employer], Limited Endurance                   Treatment Team Recommendation: Facility Return  Discharge Destination Plan[de-identified] Facility Return  Transport at Discharge : [facility]     Number/Name of Dispatcher: Roundtrip  Transported by [employer] and Unit #): [employer] @ 1700  ETA of Transport (Date): 01/13/23  ETA of Transport (Time): 1700              IMM Given (Date):: 01/13/23 (son signed verbally over phone)  IMM Given to[de-identified] Family     Additional Comments: CM called son & let him know that pt was medically cleared to head back to [facility] that she would be picked up [facility] @ 2093  Son agreeable w/ discharge & plan  Son denied having any questions or concerns at this time  CM to continue to follow pt care & d/c

## 2023-01-13 NOTE — ASSESSMENT & PLAN NOTE
· Denies loss of consciousness, no reported seizure like activity from nursing unit, no evidence tongue bite/incontinence   · monitor for seizure activity  · EEG without seizure activity  · continue PTA zonisamide 100mg  · Follow-up with neurology outpatient

## 2023-01-13 NOTE — DISCHARGE SUMMARY
[facility]  Discharge- Pratik Hernandez 1935, 82 y o  female MRN: [id number]  Unit/Bed#: E4 -01 Encounter: [id number]  Primary Care Provider: Julianne Walden DO   Date and time admitted to hospital: 1/9/2023  4:50 PM    * Sick sinus syndrome [facility])  Assessment & Plan  · Patient presented with generalized weakness and trouble walking  · Likely secondary to bradycardia and pauses seen on telemetry  · Patient family has decided they would not like to proceed with plan for pacemaker at this time    Stroke-like symptoms  Assessment & Plan  · Presented from memory care unit for generalized weakness, trouble walking with her walker  Patient was tangential in though, oriented to self, place and event  · CTH hypodensity of left parietal lobe image consistent with subacute infarct   · MRI brain showing no acute infarction, mild chronic microangiopathy, infarction in the left parietal lobe appear chronic, 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  · Continue PTA eliquis  · PT Recommending to return to facility    Amnestic MCI (mild cognitive impairment with memory loss)  Assessment & Plan  · high risk for hospital acquired delirium: avoid opioids, benzodiazepines, antihistamines  Maintain circadian rhythm lights/TV out at night, have hearing aids/glasses close, frequent reorientation  If agitated risk to self/others antipsychotics/safety sitter      Atrial fibrillation [facility])  Assessment & Plan  · Patient is maintained on Eliquis     · VR Rate controlled      Seizure ([facility] [address] [facility] )  Assessment & Plan  · Denies loss of consciousness, no reported seizure like activity from nursing unit, no evidence tongue bite/incontinence   · monitor for seizure activity  · EEG without seizure activity  · continue PTA zonisamide 100mg  · Follow-up with neurology outpatient      History of stroke  Assessment & Plan  · Previous history of CVA, current CTA with old chronic stroke  · MRI showing no acute infarct  · EEG ordered showing no seizure    Chronic diastolic heart failure (HCC)  Assessment & Plan  Wt Readings from Last 3 Encounters:   01/10/23 77 6 kg (171 lb)   01/09/23 77 8 kg (171 lb 8 3 oz)   11/14/22 77 8 kg (171 lb 8 3 oz)     · No evidence of overt volume overload, continue PTA diuretics          Medical Problems     Resolved Problems  Date Reviewed: 1/13/2023          Resolved    Lethargy 1/11/2023     Resolved by  Marc Saravia DO        Discharging Physician / Practitioner: Adarsh Jones PA-C  PCP: Merlyn Benoit DO  Admission Date:   Admission Orders (From admission, onward)     Ordered        01/09/23 [address]  Once                      Discharge Date: 01/13/23    Consultations During Hospital Stay:  · Cardiology, neurology    Procedures Performed:   · EEG routine and awake to rotation sowed diffuse theta and occasional delta frequency showing suggested moderate nonspecific diffuse cerebral dysfunction  No seizures are present  Significant Findings / Test Results:   · CT head showing hypodensity in the left parietal lobe most consistent with subacute infarct  · Chest x-ray showing enlarged cardiac silhouette, clear lung  · CTA head neck showing left parietal lobe infarct, stable 1 cm foramen magnum calcified mass, mild chronic microangiopathic, pulmonary hypertension  · MRI brain showing no acute infarct, intracranial hemorrhage or mass, mild chronic microangiopathic disease, infarct to the left parietal lobe appears chronic, mass in the foramen magnum is stable likely calcified meningioma  · Echo showing action fraction 51%, systolic function vigorous  ·     Incidental Findings:   · None    Test Results Pending at Discharge (will require follow up):    · None     Outpatient Tests Requested:  · None    Complications: None    Reason for Admission: None    Hospital Course:   Jimy Gibbs is a 82 y o  female patient who originally presented to the hospital on 1/9/2023 due to generalized weakness and difficulty walking  Patient was admitted under stroke pathway  CT head showed hypodensity of the left parietal lobe consistent with subacute infarct  MRI brain showed chronic left parietal infarct  Patient was found to have sick sinus syndrome on telemetry  Plan for pacemaker was discussed but family ultimately decided to not go through with pacemaker  EEG was done while inpatient which showed no signs of seizure  Patient was evaluated by physical therapy and Occupational Therapy and was recommended to return to her facility  Patient was cleared to discharge today  She will follow-up outpatient with neurology and her family doctor  Please see above list of diagnoses and related plan for additional information  Condition at Discharge: stable    Discharge Day Visit / Exam:   Subjective: Patient was seen lying in bed today  She denied being in any pain  She denied any acute complaints at that time  Vitals: Blood Pressure: 119/81 (01/13/23 1119)  Pulse: (!) 50 (01/13/23 1119)  Temperature: (!) 97 1 °F (36 2 °C) (01/13/23 1119)  Temp Source: Temporal (01/13/23 1119)  Respirations: 17 (01/13/23 1119)  Height: 5' 3" (160 cm) (01/10/23 1000)  Weight - Scale: 77 6 kg (171 lb) (01/10/23 1000)  SpO2: 99 % (01/13/23 1119)  Exam:   Physical Exam  Vitals and nursing note reviewed  Constitutional:       Appearance: Normal appearance  She is not ill-appearing  HENT:      Head: Normocephalic and atraumatic  Eyes:      General: No scleral icterus  Cardiovascular:      Rate and Rhythm: Normal rate  Rhythm irregular  Pulmonary:      Effort: Pulmonary effort is normal       Breath sounds: Normal breath sounds  No wheezing  Abdominal:      General: Abdomen is flat  Bowel sounds are normal       Palpations: Abdomen is soft  Tenderness: There is no abdominal tenderness  Musculoskeletal:      Right lower leg: No edema  Left lower leg: No edema     Skin:     General: Skin is warm and dry  Neurological:      Mental Status: She is alert  Mental status is at baseline  Comments: Oriented to person   Psychiatric:         Mood and Affect: Mood normal          Behavior: Behavior normal           Discussion with Family: Updated  (son) via phone  Discharge instructions/Information to patient and family:   See after visit summary for information provided to patient and family  Provisions for Follow-Up Care:  See after visit summary for information related to follow-up care and any pertinent home health orders  Disposition:   [facility]    Planned Readmission: none     Discharge Statement:  I spent 31 minutes discharging the patient  This time was spent on the day of discharge  I had direct contact with the patient on the day of discharge  Greater than 50% of the total time was spent examining patient, answering all patient questions, arranging and discussing plan of care with patient as well as directly providing post-discharge instructions  Additional time then spent on discharge activities  Discharge Medications:  See after visit summary for reconciled discharge medications provided to patient and/or family        **Please Note: This note may have been constructed using a voice recognition system**

## 2023-01-13 NOTE — ASSESSMENT & PLAN NOTE
· Patient presented with generalized weakness and trouble walking  · Likely secondary to bradycardia and pauses seen on telemetry  · Patient family has decided they would not like to proceed with plan for pacemaker at this time

## 2023-01-15 LAB
BACTERIA UR CULT: ABNORMAL
BACTERIA UR CULT: ABNORMAL

## 2023-01-15 RX ORDER — AMOXICILLIN AND CLAVULANATE POTASSIUM 500; 125 MG/1; MG/1
1 TABLET, FILM COATED ORAL EVERY 12 HOURS SCHEDULED
Qty: 14 TABLET | Refills: 0
Start: 2023-01-15 | End: 2023-01-22

## 2023-01-15 NOTE — QUICK NOTE
Ms Mitch Dejesus was recently admitted with generalized weakness and lethargy  MRI brain did not show acute abnormality  UCx now growing >100K CFU/mL of E coli  Neurology had recommended following up UCx in last notes  I have forwarded result to primary team from recent hospitalization  Suspect that etiology of AMS and weakness was likely UTI, superimposed upon baseline cognitive dysfunction and prior stroke     Eun Casas

## 2023-01-16 ENCOUNTER — TELEPHONE (OUTPATIENT)
Dept: FAMILY MEDICINE CLINIC | Facility: CLINIC | Age: 88
End: 2023-01-16

## 2023-01-16 ENCOUNTER — TRANSITIONAL CARE MANAGEMENT (OUTPATIENT)
Dept: FAMILY MEDICINE CLINIC | Facility: CLINIC | Age: 88
End: 2023-01-16

## 2023-01-16 NOTE — TELEPHONE ENCOUNTER
I spoke with son, Glenda Prieto  He states this patient permanently resides at [facility] and is treated by their in-house providers  She sees CRISTO Bedolla  Please remove Dr Tyra Estrella as PCP

## 2023-01-18 ENCOUNTER — TELEPHONE (OUTPATIENT)
Dept: CARDIOLOGY CLINIC | Facility: CLINIC | Age: 88
End: 2023-01-18

## 2023-01-18 NOTE — TELEPHONE ENCOUNTER
PC from patient's son Mercedes Becerra was scheduled for a pacemaker, and "family decided against it", but now has changed their mind  Please call patient and reschedule  Thanks!   [phone]

## 2023-01-19 NOTE — TELEPHONE ENCOUNTER
Patient scheduled for single chamber Pacer implant at [facility] on 2023 with Dr Irwin   Patient aware of general instructions, labs test required  Eliquis hold the nigh prior and the morning of the procedure  Swanzey 3 hold the week prior to the procedure  Lasix to hold the morning of the procedure    Can we please check insurance for approval    Can we please have the case

## 2023-01-21 ENCOUNTER — HOSPITAL ENCOUNTER (INPATIENT)
Facility: HOSPITAL | Age: 88
LOS: 2 days | End: 2023-01-24
Attending: EMERGENCY MEDICINE | Admitting: FAMILY MEDICINE

## 2023-01-21 ENCOUNTER — APPOINTMENT (EMERGENCY)
Dept: RADIOLOGY | Facility: HOSPITAL | Age: 88
End: 2023-01-21

## 2023-01-21 DIAGNOSIS — N39.0 UTI (URINARY TRACT INFECTION): Primary | ICD-10-CM

## 2023-01-21 DIAGNOSIS — I49.5 SICK SINUS SYNDROME (HCC): ICD-10-CM

## 2023-01-21 DIAGNOSIS — R53.1 WEAKNESS: ICD-10-CM

## 2023-01-21 PROBLEM — N30.00 ACUTE CYSTITIS WITHOUT HEMATURIA: Status: ACTIVE | Noted: 2023-01-21

## 2023-01-21 PROBLEM — G30.1 MODERATE LATE ONSET ALZHEIMER'S DEMENTIA WITHOUT BEHAVIORAL DISTURBANCE, PSYCHOTIC DISTURBANCE, MOOD DISTURBANCE, OR ANXIETY (HCC): Status: ACTIVE | Noted: 2023-01-21

## 2023-01-21 PROBLEM — F02.B0 MODERATE LATE ONSET ALZHEIMER'S DEMENTIA WITHOUT BEHAVIORAL DISTURBANCE, PSYCHOTIC DISTURBANCE, MOOD DISTURBANCE, OR ANXIETY (HCC): Status: ACTIVE | Noted: 2023-01-21

## 2023-01-21 PROBLEM — R55 SYNCOPE: Status: ACTIVE | Noted: 2023-01-21

## 2023-01-21 LAB
2HR DELTA HS TROPONIN: 7 NG/L
ALBUMIN SERPL BCP-MCNC: 4.3 G/DL (ref 3.5–5)
ALP SERPL-CCNC: 83 U/L (ref 34–104)
ALT SERPL W P-5'-P-CCNC: 10 U/L (ref 7–52)
ANION GAP SERPL CALCULATED.3IONS-SCNC: 10 MMOL/L (ref 4–13)
APTT PPP: 30 SECONDS (ref 23–37)
AST SERPL W P-5'-P-CCNC: 21 U/L (ref 13–39)
BACTERIA UR QL AUTO: ABNORMAL /HPF
BASOPHILS # BLD AUTO: 0.06 THOUSANDS/ÂΜL (ref 0–0.1)
BASOPHILS NFR BLD AUTO: 1 % (ref 0–1)
BILIRUB SERPL-MCNC: 0.75 MG/DL (ref 0.2–1)
BILIRUB UR QL STRIP: NEGATIVE
BNP SERPL-MCNC: 577 PG/ML (ref 0–100)
BUN SERPL-MCNC: 18 MG/DL (ref 5–25)
CALCIUM SERPL-MCNC: 10.3 MG/DL (ref 8.4–10.2)
CARDIAC TROPONIN I PNL SERPL HS: 64 NG/L
CARDIAC TROPONIN I PNL SERPL HS: 71 NG/L
CHLORIDE SERPL-SCNC: 106 MMOL/L (ref 96–108)
CLARITY UR: CLEAR
CO2 SERPL-SCNC: 21 MMOL/L (ref 21–32)
COLOR UR: YELLOW
CREAT SERPL-MCNC: 0.91 MG/DL (ref 0.6–1.3)
EOSINOPHIL # BLD AUTO: 0.15 THOUSAND/ÂΜL (ref 0–0.61)
EOSINOPHIL NFR BLD AUTO: 2 % (ref 0–6)
ERYTHROCYTE [DISTWIDTH] IN BLOOD BY AUTOMATED COUNT: 17 % (ref 11.6–15.1)
FLUAV RNA RESP QL NAA+PROBE: NEGATIVE
FLUBV RNA RESP QL NAA+PROBE: NEGATIVE
GFR SERPL CREATININE-BSD FRML MDRD: 57 ML/MIN/1.73SQ M
GLUCOSE SERPL-MCNC: 108 MG/DL (ref 65–140)
GLUCOSE UR STRIP-MCNC: NEGATIVE MG/DL
HCT VFR BLD AUTO: 48.3 % (ref 34.8–46.1)
HGB BLD-MCNC: 15.9 G/DL (ref 11.5–15.4)
HGB UR QL STRIP.AUTO: ABNORMAL
IMM GRANULOCYTES # BLD AUTO: 0.03 THOUSAND/UL (ref 0–0.2)
IMM GRANULOCYTES NFR BLD AUTO: 0 % (ref 0–2)
INR PPP: 1.29 (ref 0.84–1.19)
KETONES UR STRIP-MCNC: NEGATIVE MG/DL
LACTATE SERPL-SCNC: 2 MMOL/L (ref 0.5–2)
LEUKOCYTE ESTERASE UR QL STRIP: ABNORMAL
LYMPHOCYTES # BLD AUTO: 2.05 THOUSANDS/ÂΜL (ref 0.6–4.47)
LYMPHOCYTES NFR BLD AUTO: 26 % (ref 14–44)
MCH RBC QN AUTO: 30.9 PG (ref 26.8–34.3)
MCHC RBC AUTO-ENTMCNC: 32.9 G/DL (ref 31.4–37.4)
MCV RBC AUTO: 94 FL (ref 82–98)
MONOCYTES # BLD AUTO: 1.23 THOUSAND/ÂΜL (ref 0.17–1.22)
MONOCYTES NFR BLD AUTO: 16 % (ref 4–12)
NEUTROPHILS # BLD AUTO: 4.4 THOUSANDS/ÂΜL (ref 1.85–7.62)
NEUTS SEG NFR BLD AUTO: 55 % (ref 43–75)
NITRITE UR QL STRIP: NEGATIVE
NON-SQ EPI CELLS URNS QL MICRO: ABNORMAL /HPF
NRBC BLD AUTO-RTO: 0 /100 WBCS
PH UR STRIP.AUTO: 6 [PH]
PLATELET # BLD AUTO: 117 THOUSANDS/UL (ref 149–390)
PMV BLD AUTO: 12.1 FL (ref 8.9–12.7)
POTASSIUM SERPL-SCNC: 4.2 MMOL/L (ref 3.5–5.3)
PROCALCITONIN SERPL-MCNC: <0.05 NG/ML
PROT SERPL-MCNC: 8 G/DL (ref 6.4–8.4)
PROT UR STRIP-MCNC: NEGATIVE MG/DL
PROTHROMBIN TIME: 16 SECONDS (ref 11.6–14.5)
RBC # BLD AUTO: 5.15 MILLION/UL (ref 3.81–5.12)
RBC #/AREA URNS AUTO: ABNORMAL /HPF
RSV RNA RESP QL NAA+PROBE: NEGATIVE
SARS-COV-2 RNA RESP QL NAA+PROBE: NEGATIVE
SODIUM SERPL-SCNC: 137 MMOL/L (ref 135–147)
SP GR UR STRIP.AUTO: 1.02 (ref 1–1.03)
UROBILINOGEN UR QL STRIP.AUTO: 0.2 E.U./DL
WBC # BLD AUTO: 7.92 THOUSAND/UL (ref 4.31–10.16)
WBC #/AREA URNS AUTO: ABNORMAL /HPF

## 2023-01-21 RX ORDER — QUETIAPINE FUMARATE 25 MG/1
12.5 TABLET, FILM COATED ORAL
Status: DISCONTINUED | OUTPATIENT
Start: 2023-01-21 | End: 2023-01-24 | Stop reason: HOSPADM

## 2023-01-21 RX ORDER — ACETAMINOPHEN 325 MG/1
650 TABLET ORAL EVERY 6 HOURS PRN
Status: DISCONTINUED | OUTPATIENT
Start: 2023-01-21 | End: 2023-01-24 | Stop reason: HOSPADM

## 2023-01-21 RX ORDER — POLYETHYLENE GLYCOL 3350 17 G/17G
17 POWDER, FOR SOLUTION ORAL DAILY
Status: DISCONTINUED | OUTPATIENT
Start: 2023-01-22 | End: 2023-01-24 | Stop reason: HOSPADM

## 2023-01-21 RX ORDER — AMOXICILLIN AND CLAVULANATE POTASSIUM 875; 125 MG/1; MG/1
1 TABLET, FILM COATED ORAL EVERY 12 HOURS SCHEDULED
Status: DISCONTINUED | OUTPATIENT
Start: 2023-01-21 | End: 2023-01-22

## 2023-01-21 RX ORDER — POTASSIUM CHLORIDE 20 MEQ/1
20 TABLET, EXTENDED RELEASE ORAL DAILY
Status: DISCONTINUED | OUTPATIENT
Start: 2023-01-22 | End: 2023-01-24 | Stop reason: HOSPADM

## 2023-01-21 RX ORDER — ONDANSETRON 2 MG/ML
4 INJECTION INTRAMUSCULAR; INTRAVENOUS EVERY 6 HOURS PRN
Status: DISCONTINUED | OUTPATIENT
Start: 2023-01-21 | End: 2023-01-24 | Stop reason: HOSPADM

## 2023-01-21 RX ORDER — ATORVASTATIN CALCIUM 10 MG/1
10 TABLET, FILM COATED ORAL DAILY
Status: DISCONTINUED | OUTPATIENT
Start: 2023-01-22 | End: 2023-01-24 | Stop reason: HOSPADM

## 2023-01-21 RX ORDER — FUROSEMIDE 40 MG/1
40 TABLET ORAL DAILY
Status: DISCONTINUED | OUTPATIENT
Start: 2023-01-22 | End: 2023-01-24 | Stop reason: HOSPADM

## 2023-01-21 RX ORDER — CHLORAL HYDRATE 500 MG
1000 CAPSULE ORAL DAILY
Status: DISCONTINUED | OUTPATIENT
Start: 2023-01-22 | End: 2023-01-24 | Stop reason: HOSPADM

## 2023-01-21 RX ORDER — ZONISAMIDE 100 MG/1
100 CAPSULE ORAL DAILY
Status: DISCONTINUED | OUTPATIENT
Start: 2023-01-22 | End: 2023-01-24 | Stop reason: HOSPADM

## 2023-01-21 RX ORDER — DOCUSATE SODIUM 100 MG/1
100 CAPSULE, LIQUID FILLED ORAL 2 TIMES DAILY
Status: DISCONTINUED | OUTPATIENT
Start: 2023-01-21 | End: 2023-01-24 | Stop reason: HOSPADM

## 2023-01-21 RX ORDER — LANOLIN ALCOHOL/MO/W.PET/CERES
100 CREAM (GRAM) TOPICAL DAILY
Status: DISCONTINUED | OUTPATIENT
Start: 2023-01-22 | End: 2023-01-24 | Stop reason: HOSPADM

## 2023-01-21 RX ORDER — CALCIUM CARBONATE 200(500)MG
500 TABLET,CHEWABLE ORAL DAILY
Status: DISCONTINUED | OUTPATIENT
Start: 2023-01-22 | End: 2023-01-24 | Stop reason: HOSPADM

## 2023-01-21 NOTE — ED PROVIDER NOTES
History  Chief Complaint   Patient presents with   • Weakness - Generalized     Pt sent for weakness, was unable to ambulate  From memory care facility  Pt not following commands  URI symptoms  29-year-old female history of A  fib, CAD, CKD, dementia, presenting due to possible syncopal episode, weakness  Staff states patient has had cough and upper respiratory like symptoms and seemed to have progressive weakness and was unresponsive for short period today  They also states she had low blood pressure at their facility with systolic in the 12J  EMS states when they arrived systolic was 951 and patient was alert and at baseline mental status  Patient is currently          Prior to Admission Medications   Prescriptions Last Dose Informant Patient Reported? Taking?    Ascorbic Acid (VITAMIN C) 500 MG CAPS   Yes No   Sig: Take 500 mg by mouth daily   Cholecalciferol (Vitamin D3) 1 25 MG (08314 UT) CAPS   Yes No   Cranberry 450 MG CAPS   Yes No   Sig: Take 1 capsule by mouth in the morning   Omega-3 Fatty Acids (fish oil) 1,000 mg   Yes No   QUEtiapine (SEROquel) 25 mg tablet   No No   Sig: Take 0 5 tablets (12 5 mg total) by mouth daily at bedtime Hold if sedated   acetaminophen (TYLENOL) 500 mg tablet   Yes No   Sig: Take 500 mg by mouth every 6 (six) hours as needed for mild pain   amoxicillin-clavulanate (AUGMENTIN) 500-125 mg per tablet   No No   Sig: Take 1 tablet by mouth every 12 (twelve) hours for 7 days   apixaban (ELIQUIS) 5 mg   Yes No   Sig: Take 5 mg by mouth 2 (two) times a day   atorvastatin (LIPITOR) 10 mg tablet   Yes No   Sig: Take 10 mg by mouth daily   bisacodyl (DULCOLAX) 5 mg EC tablet   Yes No   Sig: Take 5 mg by mouth daily as needed for constipation   calcium carbonate (TUMS) 500 mg chewable tablet   Yes No   Sig: Chew 1 tablet daily   docusate sodium (COLACE) 100 mg capsule   Yes No   Sig: Take 100 mg by mouth 2 (two) times a day   furosemide (LASIX) 40 mg tablet   Yes No   Sig: Take 1 tablet by mouth daily   polyethylene glycol (MIRALAX) 17 g packet   Yes No   Sig: Take 17 g by mouth daily   potassium chloride (K-DUR,KLOR-CON) 20 mEq tablet   Yes No   Sig: Take 20 mEq by mouth daily   thiamine (VITAMIN B1) 100 mg tablet   Yes No   zonisamide (ZONEGRAN) 100 mg capsule   No No   Sig: Take 1 capsule (100 mg total) by mouth daily Do not start before November 19, 2022  Facility-Administered Medications: None       Past Medical History:   Diagnosis Date   • A-fib ([facility] )    • CAD (coronary artery disease)    • CKD (chronic kidney disease) stage 2, GFR 60-89 ml/min    • Hypertension    • Memory loss    • Urinary tract infection        Past Surgical History:   Procedure Laterality Date   • CHOLECYSTECTOMY     • REPLACEMENT TOTAL KNEE Left 2008       Family History   Problem Relation Age of Onset   • Lung cancer Father    • Leukemia Brother    • Breast cancer Daughter      I have reviewed and agree with the history as documented  E-Cigarette/Vaping   • E-Cigarette Use Never User      E-Cigarette/Vaping Substances   • Nicotine No    • THC No    • CBD No    • Flavoring No    • Other No    • Unknown No      Social History     Tobacco Use   • Smoking status: Never   • Smokeless tobacco: Never   Vaping Use   • Vaping Use: Never used   Substance Use Topics   • Alcohol use: Never   • Drug use: Never       Review of Systems   Unable to perform ROS: Dementia       Physical Exam  Physical Exam  Vitals and nursing note reviewed  Constitutional:       General: She is not in acute distress  Appearance: She is well-developed  She is not diaphoretic  HENT:      Head: Normocephalic and atraumatic  Right Ear: External ear normal       Left Ear: External ear normal    Eyes:      General: No scleral icterus  Right eye: No discharge  Left eye: No discharge  Conjunctiva/sclera: Conjunctivae normal    Neck:      Vascular: No JVD  Trachea: No tracheal deviation     Cardiovascular: Rate and Rhythm: Normal rate  Rhythm irregular  Heart sounds: Normal heart sounds  Pulmonary:      Effort: Pulmonary effort is normal  No respiratory distress  Breath sounds: Normal breath sounds  Abdominal:      General: There is no distension  Musculoskeletal:         General: No tenderness or deformity  Normal range of motion  Cervical back: Normal range of motion  Skin:     General: Skin is warm  Findings: No erythema or rash  Neurological:      Mental Status: She is alert  Mental status is at baseline  Motor: No abnormal muscle tone  Psychiatric:         Mood and Affect: Mood normal          Behavior: Behavior normal          Thought Content:  Thought content normal          Vital Signs  ED Triage Vitals   Temperature Pulse Respirations Blood Pressure SpO2   01/21/23 1344 01/21/23 1335 01/21/23 1335 01/21/23 1335 01/21/23 1335   98 8 °F (37 1 °C) 59 (!) 25 135/71 96 %      Temp Source Heart Rate Source Patient Position - Orthostatic VS BP Location FiO2 (%)   01/21/23 1344 01/21/23 1335 01/21/23 1335 01/21/23 1335 --   Axillary Monitor Lying Right arm       Pain Score       --                  Vitals:    01/21/23 1335 01/21/23 1600 01/21/23 1615 01/21/23 1853   BP: 135/71  138/62 146/81   Pulse: 59 56 80 64   Patient Position - Orthostatic VS: Lying   Lying         Visual Acuity  Visual Acuity    Flowsheet Row Most Recent Value   L Pupil Size (mm) 4   R Pupil Size (mm) 4          ED Medications  Medications   apixaban (ELIQUIS) tablet 5 mg (has no administration in time range)   atorvastatin (LIPITOR) tablet 10 mg (has no administration in time range)   bisacodyl (DULCOLAX) EC tablet 5 mg (has no administration in time range)   calcium carbonate (TUMS) chewable tablet 500 mg (has no administration in time range)   docusate sodium (COLACE) capsule 100 mg (has no administration in time range)   furosemide (LASIX) tablet 40 mg (has no administration in time range)   fish oil capsule 1,000 mg (has no administration in time range)   polyethylene glycol (MIRALAX) packet 17 g (has no administration in time range)   potassium chloride (K-DUR,KLOR-CON) CR tablet 20 mEq (has no administration in time range)   QUEtiapine (SEROquel) tablet 12 5 mg (has no administration in time range)   thiamine tablet 100 mg (has no administration in time range)   zonisamide (ZONEGRAN) capsule 100 mg (has no administration in time range)   ondansetron (ZOFRAN) injection 4 mg (has no administration in time range)   acetaminophen (TYLENOL) tablet 650 mg (has no administration in time range)   amoxicillin-clavulanate (AUGMENTIN) 875-125 mg per tablet 1 tablet (has no administration in time range)       Diagnostic Studies  Results Reviewed     Procedure Component Value Units Date/Time    Urine Microscopic [[id number]]  (Abnormal) Collected: 01/21/23 1826    Lab Status: Final result Specimen: Urine, Straight Cath Updated: 01/21/23 1856     RBC, UA 0-1 /hpf      WBC, UA 4-10 /hpf      Epithelial Cells Moderate /hpf      Bacteria, UA Occasional /hpf     UA w Reflex to Microscopic w Reflex to Culture [[id number]]  (Abnormal) Collected: 01/21/23 1826    Lab Status: Final result Specimen: Urine, Straight Cath Updated: 01/21/23 1838     Color, UA Yellow     Clarity, UA Clear     Specific Gravity, UA 1 025     pH, UA 6 0     Leukocytes, UA Trace     Nitrite, UA Negative     Protein, UA Negative mg/dl      Glucose, UA Negative mg/dl      Ketones, UA Negative mg/dl      Urobilinogen, UA 0 2 E U /dl      Bilirubin, UA Negative     Occult Blood, UA Small    HS Troponin I 2hr [[id number]]  (Abnormal) Collected: 01/21/23 1654    Lab Status: Final result Specimen: Blood from Arm, Left Updated: 01/21/23 1725     hs TnI 2hr 71 ng/L      Delta 2hr hsTnI 7 ng/L     B-Type Natriuretic Peptide(BNP) [[id number]]  (Abnormal) Collected: 01/21/23 1414    Lab Status: Final result Specimen: Blood from Arm, Left Updated: 01/21/23 1630      pg/mL     FLU/RSV/COVID - if FLU/RSV clinically relevant [[id number]]  (Normal) Collected: 01/21/23 1414    Lab Status: Final result Specimen: Nares from Nasopharyngeal Swab Updated: 01/21/23 1608     SARS-CoV-2 Negative     INFLUENZA A PCR Negative     INFLUENZA B PCR Negative     RSV PCR Negative    Narrative:      FOR PEDIATRIC PATIENTS - copy/paste COVID Guidelines URL to browser: [email or web address]  [email or web address]    SARS-CoV-2 assay is a Nucleic Acid Amplification assay intended for the  qualitative detection of nucleic acid from SARS-CoV-2 in nasopharyngeal  swabs  Results are for the presumptive identification of SARS-CoV-2 RNA  Positive results are indicative of infection with SARS-CoV-2, the virus  causing COVID-19, but do not rule out bacterial infection or co-infection  with other viruses  Laboratories within the [place] and its  territories are required to report all positive results to the appropriate  public health authorities  Negative results do not preclude SARS-CoV-2  infection and should not be used as the sole basis for treatment or other  patient management decisions  Negative results must be combined with  clinical observations, patient history, and epidemiological information  This test has not been FDA cleared or approved  This test has been authorized by FDA under an Emergency Use Authorization  (EUA)  This test is only authorized for the duration of time the  declaration that circumstances exist justifying the authorization of the  emergency use of an in vitro diagnostic tests for detection of SARS-CoV-2  virus and/or diagnosis of COVID-19 infection under section 564(b)(1) of  the Act, 21 U  S C  687KLT-9(G)(1), unless the authorization is terminated  or revoked sooner  The test has been validated but independent review by FDA  and CLIA is pending  Test performed using [employer]:  This RT-PCR assay targets N2,  a region unique to SARS-CoV-2  A conserved region in the E-gene was chosen  for pan-Sarbecovirus detection which includes SARS-CoV-2  According to CMS-2020-01-R, this platform meets the definition of high-throughput technology      HS Troponin I 4hr [[id number]]     Lab Status: No result Specimen: Blood     Comprehensive metabolic panel [[id number]]  (Abnormal) Collected: 01/21/23 1414    Lab Status: Final result Specimen: Blood from Arm, Right Updated: 01/21/23 1526     Sodium 137 mmol/L      Potassium 4 2 mmol/L      Chloride 106 mmol/L      CO2 21 mmol/L      ANION GAP 10 mmol/L      BUN 18 mg/dL      Creatinine 0 91 mg/dL      Glucose 108 mg/dL      Calcium 10 3 mg/dL      AST 21 U/L      ALT 10 U/L      Alkaline Phosphatase 83 U/L      Total Protein 8 0 g/dL      Albumin 4 3 g/dL      Total Bilirubin 0 75 mg/dL      eGFR 57 ml/min/1 73sq m     Narrative:      [facility] guidelines for Chronic Kidney Disease (CKD):   •  Stage 1 with normal or high GFR (GFR > 90 mL/min/1 73 square meters)  •  Stage 2 Mild CKD (GFR = 60-89 mL/min/1 73 square meters)  •  Stage 3A Moderate CKD (GFR = 45-59 mL/min/1 73 square meters)  •  Stage 3B Moderate CKD (GFR = 30-44 mL/min/1 73 square meters)  •  Stage 4 Severe CKD (GFR = 15-29 mL/min/1 73 square meters)  •  Stage 5 End Stage CKD (GFR <15 mL/min/1 73 square meters)  Note: GFR calculation is accurate only with a steady state creatinine    Procalcitonin [[id number]]  (Normal) Collected: 01/21/23 1414    Lab Status: Final result Specimen: Blood from Arm, Left Updated: 01/21/23 1503     Procalcitonin <0 05 ng/ml     HS Troponin 0hr (reflex protocol) [[id number]]  (Abnormal) Collected: 01/21/23 1414    Lab Status: Final result Specimen: Blood from Arm, Left Updated: 01/21/23 1450     hs TnI 0hr 64 ng/L     Protime-INR [[id number]]  (Abnormal) Collected: 01/21/23 1414    Lab Status: Final result Specimen: Blood from Arm, Left Updated: 01/21/23 1447     Protime 16 0 seconds      INR 1  29    APTT [[id number]]  (Normal) Collected: 01/21/23 1414    Lab Status: Final result Specimen: Blood from Arm, Left Updated: 01/21/23 1447     PTT 30 seconds     Lactic Acid [[id number]]  (Normal) Collected: 01/21/23 1414    Lab Status: Final result Specimen: Blood from Arm, Left Updated: 01/21/23 1445     LACTIC ACID 2 0 mmol/L     Narrative:      Result may be elevated if tourniquet was used during collection  CBC and differential [[id number]]  (Abnormal) Collected: 01/21/23 1414    Lab Status: Final result Specimen: Blood from Arm, Left Updated: 01/21/23 1439     WBC 7 92 Thousand/uL      RBC 5 15 Million/uL      Hemoglobin 15 9 g/dL      Hematocrit 48 3 %      MCV 94 fL      MCH 30 9 pg      MCHC 32 9 g/dL      RDW 17 0 %      MPV 12 1 fL      Platelets 348 Thousands/uL      nRBC 0 /100 WBCs      Neutrophils Relative 55 %      Immat GRANS % 0 %      Lymphocytes Relative 26 %      Monocytes Relative 16 %      Eosinophils Relative 2 %      Basophils Relative 1 %      Neutrophils Absolute 4 40 Thousands/µL      Immature Grans Absolute 0 03 Thousand/uL      Lymphocytes Absolute 2 05 Thousands/µL      Monocytes Absolute 1 23 Thousand/µL      Eosinophils Absolute 0 15 Thousand/µL      Basophils Absolute 0 06 Thousands/µL     Blood culture #1 [[id number]] Collected: 01/21/23 1414    Lab Status: In process Specimen: Blood from Arm, Right Updated: 01/21/23 1422    Blood culture #2 [[id number]] Collected: 01/21/23 1414    Lab Status: In process Specimen: Blood from Arm, Left Updated: 01/21/23 1422                 XR chest 1 view portable    (Results Pending)              Procedures  Procedures         ED Course                                             Medical Decision Making  Unclear etiology of syncope however known UTI which patient is currently being treated for may contribute to this  Cannot rule out dysrhythmia  Will be admitted to medicine for further work-up and evaluation with telemetry      UTI (urinary tract infection): acute illness or injury  Weakness: acute illness or injury  Amount and/or Complexity of Data Reviewed  Labs: ordered  Radiology: ordered  Risk  Decision regarding hospitalization  Disposition  Final diagnoses:   UTI (urinary tract infection)   Weakness     Time reflects when diagnosis was documented in both MDM as applicable and the Disposition within this note     Time User Action Codes Description Comment    1/21/2023  5:44 PM Paster, Otelia Abts Add [N39 0] UTI (urinary tract infection)     1/21/2023  5:44 PM Paster, Otelia Abts Add [R53 1] Weakness     1/21/2023  5:59 PM Diana Wei Add [I49 5] Sick sinus syndrome [facility])       ED Disposition     ED Disposition   Admit    Condition   Stable    Date/Time   Sat Jan 21, 2023  5:43 PM    Comment   Case was discussed with LEANNE and the patient's admission status was agreed to be Admission Status: observation status to the service of Dr Paulie Landrum              Follow-up Information    None         Current Discharge Medication List      CONTINUE these medications which have NOT CHANGED    Details   acetaminophen (TYLENOL) 500 mg tablet Take 500 mg by mouth every 6 (six) hours as needed for mild pain      amoxicillin-clavulanate (AUGMENTIN) 500-125 mg per tablet Take 1 tablet by mouth every 12 (twelve) hours for 7 days  Qty: 14 tablet, Refills: 0    Associated Diagnoses: UTI (urinary tract infection)      apixaban (ELIQUIS) 5 mg Take 5 mg by mouth 2 (two) times a day      Ascorbic Acid (VITAMIN C) 500 MG CAPS Take 500 mg by mouth daily      atorvastatin (LIPITOR) 10 mg tablet Take 10 mg by mouth daily      bisacodyl (DULCOLAX) 5 mg EC tablet Take 5 mg by mouth daily as needed for constipation      calcium carbonate (TUMS) 500 mg chewable tablet Chew 1 tablet daily      Cholecalciferol (Vitamin D3) 1 25 MG (30445 UT) CAPS       Cranberry 450 MG CAPS Take 1 capsule by mouth in the morning      docusate sodium (COLACE) 100 mg capsule Take 100 mg by mouth 2 (two) times a day      furosemide (LASIX) 40 mg tablet Take 1 tablet by mouth daily      Omega-3 Fatty Acids (fish oil) 1,000 mg       polyethylene glycol (MIRALAX) 17 g packet Take 17 g by mouth daily      potassium chloride (K-DUR,KLOR-CON) 20 mEq tablet Take 20 mEq by mouth daily      QUEtiapine (SEROquel) 25 mg tablet Take 0 5 tablets (12 5 mg total) by mouth daily at bedtime Hold if sedated  Qty: 30 tablet, Refills: 0    Associated Diagnoses: New onset seizure ([address] )      thiamine (VITAMIN B1) 100 mg tablet       zonisamide (ZONEGRAN) 100 mg capsule Take 1 capsule (100 mg total) by mouth daily Do not start before November 19, 2022  Refills: 0    Associated Diagnoses: New onset seizure ([facility] [address] )             No discharge procedures on file      PDMP Review     None          ED Provider  Electronically Signed by           Martita Almeida DO  01/21/23 4924

## 2023-01-21 NOTE — ASSESSMENT & PLAN NOTE
Wt Readings from Last 3 Encounters:   01/10/23 77 6 kg (171 lb)   01/09/23 77 8 kg (171 lb 8 3 oz)   11/14/22 77 8 kg (171 lb 8 3 oz)     · Appears euvolemic   · Continue home Lasix   · Monitor daily weights

## 2023-01-21 NOTE — ED NOTES
Pt found standing in the hallway, voided on herself  Pt assisted back to bed and placed on bed alarm for safety        Everitt Duane, RN  01/21/23 5919

## 2023-01-21 NOTE — ED NOTES
Spoke with nursing home staff, reports patient being treated for uti       Minerva Nguyen RN  01/21/23 4569

## 2023-01-22 LAB
4HR DELTA HS TROPONIN: 18 NG/L
ALBUMIN SERPL BCP-MCNC: 3.6 G/DL (ref 3.5–5)
ALP SERPL-CCNC: 60 U/L (ref 34–104)
ALT SERPL W P-5'-P-CCNC: 9 U/L (ref 7–52)
ANION GAP SERPL CALCULATED.3IONS-SCNC: 4 MMOL/L (ref 4–13)
AST SERPL W P-5'-P-CCNC: 18 U/L (ref 13–39)
ATRIAL RATE: 357 BPM
ATRIAL RATE: 375 BPM
BASOPHILS # BLD AUTO: 0.04 THOUSANDS/ÂΜL (ref 0–0.1)
BASOPHILS NFR BLD AUTO: 1 % (ref 0–1)
BILIRUB SERPL-MCNC: 0.82 MG/DL (ref 0.2–1)
BUN SERPL-MCNC: 19 MG/DL (ref 5–25)
CALCIUM SERPL-MCNC: 9.3 MG/DL (ref 8.4–10.2)
CARDIAC TROPONIN I PNL SERPL HS: 82 NG/L
CHLORIDE SERPL-SCNC: 109 MMOL/L (ref 96–108)
CO2 SERPL-SCNC: 23 MMOL/L (ref 21–32)
CREAT SERPL-MCNC: 0.82 MG/DL (ref 0.6–1.3)
EOSINOPHIL # BLD AUTO: 0.18 THOUSAND/ÂΜL (ref 0–0.61)
EOSINOPHIL NFR BLD AUTO: 4 % (ref 0–6)
ERYTHROCYTE [DISTWIDTH] IN BLOOD BY AUTOMATED COUNT: 17.2 % (ref 11.6–15.1)
GFR SERPL CREATININE-BSD FRML MDRD: 65 ML/MIN/1.73SQ M
GLUCOSE P FAST SERPL-MCNC: 91 MG/DL (ref 65–99)
GLUCOSE SERPL-MCNC: 91 MG/DL (ref 65–140)
HCT VFR BLD AUTO: 42.1 % (ref 34.8–46.1)
HGB BLD-MCNC: 14.4 G/DL (ref 11.5–15.4)
IMM GRANULOCYTES # BLD AUTO: 0.01 THOUSAND/UL (ref 0–0.2)
IMM GRANULOCYTES NFR BLD AUTO: 0 % (ref 0–2)
LYMPHOCYTES # BLD AUTO: 1.57 THOUSANDS/ÂΜL (ref 0.6–4.47)
LYMPHOCYTES NFR BLD AUTO: 30 % (ref 14–44)
MCH RBC QN AUTO: 31.1 PG (ref 26.8–34.3)
MCHC RBC AUTO-ENTMCNC: 34.2 G/DL (ref 31.4–37.4)
MCV RBC AUTO: 91 FL (ref 82–98)
MONOCYTES # BLD AUTO: 0.84 THOUSAND/ÂΜL (ref 0.17–1.22)
MONOCYTES NFR BLD AUTO: 16 % (ref 4–12)
NEUTROPHILS # BLD AUTO: 2.53 THOUSANDS/ÂΜL (ref 1.85–7.62)
NEUTS SEG NFR BLD AUTO: 49 % (ref 43–75)
NRBC BLD AUTO-RTO: 0 /100 WBCS
PLATELET # BLD AUTO: 112 THOUSANDS/UL (ref 149–390)
PMV BLD AUTO: 12 FL (ref 8.9–12.7)
POTASSIUM SERPL-SCNC: 3.9 MMOL/L (ref 3.5–5.3)
PROT SERPL-MCNC: 6.5 G/DL (ref 6.4–8.4)
QRS AXIS: 53 DEGREES
QRS AXIS: 74 DEGREES
QRSD INTERVAL: 88 MS
QRSD INTERVAL: 98 MS
QT INTERVAL: 386 MS
QT INTERVAL: 426 MS
QTC INTERVAL: 395 MS
QTC INTERVAL: 403 MS
RBC # BLD AUTO: 4.63 MILLION/UL (ref 3.81–5.12)
SODIUM SERPL-SCNC: 136 MMOL/L (ref 135–147)
T WAVE AXIS: 105 DEGREES
T WAVE AXIS: 81 DEGREES
VENTRICULAR RATE: 54 BPM
VENTRICULAR RATE: 63 BPM
WBC # BLD AUTO: 5.17 THOUSAND/UL (ref 4.31–10.16)

## 2023-01-22 RX ORDER — CHLORAL HYDRATE 500 MG
1000 CAPSULE ORAL DAILY
Status: CANCELLED | OUTPATIENT
Start: 2023-01-23

## 2023-01-22 RX ORDER — FUROSEMIDE 40 MG/1
40 TABLET ORAL DAILY
Status: CANCELLED | OUTPATIENT
Start: 2023-01-23

## 2023-01-22 RX ORDER — POLYETHYLENE GLYCOL 3350 17 G/17G
17 POWDER, FOR SOLUTION ORAL DAILY
Status: CANCELLED | OUTPATIENT
Start: 2023-01-23

## 2023-01-22 RX ORDER — AMOXICILLIN AND CLAVULANATE POTASSIUM 875; 125 MG/1; MG/1
1 TABLET, FILM COATED ORAL EVERY 12 HOURS SCHEDULED
Status: DISCONTINUED | OUTPATIENT
Start: 2023-01-22 | End: 2023-01-22

## 2023-01-22 RX ORDER — ATORVASTATIN CALCIUM 10 MG/1
10 TABLET, FILM COATED ORAL DAILY
Status: CANCELLED | OUTPATIENT
Start: 2023-01-23

## 2023-01-22 RX ORDER — AMOXICILLIN AND CLAVULANATE POTASSIUM 875; 125 MG/1; MG/1
1 TABLET, FILM COATED ORAL EVERY 12 HOURS SCHEDULED
Status: CANCELLED | OUTPATIENT
Start: 2023-01-22 | End: 2023-01-23

## 2023-01-22 RX ORDER — CALCIUM CARBONATE 200(500)MG
500 TABLET,CHEWABLE ORAL DAILY
Status: CANCELLED | OUTPATIENT
Start: 2023-01-23

## 2023-01-22 RX ORDER — ONDANSETRON 2 MG/ML
4 INJECTION INTRAMUSCULAR; INTRAVENOUS EVERY 6 HOURS PRN
Status: CANCELLED | OUTPATIENT
Start: 2023-01-22

## 2023-01-22 RX ORDER — ACETAMINOPHEN 325 MG/1
650 TABLET ORAL EVERY 6 HOURS PRN
Status: CANCELLED | OUTPATIENT
Start: 2023-01-22

## 2023-01-22 RX ORDER — ZONISAMIDE 100 MG/1
100 CAPSULE ORAL DAILY
Status: CANCELLED | OUTPATIENT
Start: 2023-01-23

## 2023-01-22 RX ORDER — POTASSIUM CHLORIDE 20 MEQ/1
20 TABLET, EXTENDED RELEASE ORAL DAILY
Status: CANCELLED | OUTPATIENT
Start: 2023-01-23

## 2023-01-22 RX ORDER — LANOLIN ALCOHOL/MO/W.PET/CERES
100 CREAM (GRAM) TOPICAL DAILY
Status: CANCELLED | OUTPATIENT
Start: 2023-01-23

## 2023-01-22 RX ORDER — DOCUSATE SODIUM 100 MG/1
100 CAPSULE, LIQUID FILLED ORAL 2 TIMES DAILY
Status: CANCELLED | OUTPATIENT
Start: 2023-01-22

## 2023-01-22 RX ORDER — QUETIAPINE FUMARATE 25 MG/1
12.5 TABLET, FILM COATED ORAL
Status: CANCELLED | OUTPATIENT
Start: 2023-01-22

## 2023-01-22 RX ADMIN — AMOXICILLIN AND CLAVULANATE POTASSIUM 1 TABLET: 875; 125 TABLET, FILM COATED ORAL at 22:06

## 2023-01-22 RX ADMIN — FUROSEMIDE 40 MG: 40 TABLET ORAL at 10:24

## 2023-01-22 RX ADMIN — AMOXICILLIN AND CLAVULANATE POTASSIUM 1 TABLET: 875; 125 TABLET, FILM COATED ORAL at 10:10

## 2023-01-22 RX ADMIN — ZONISAMIDE 100 MG: 100 CAPSULE ORAL at 10:24

## 2023-01-22 RX ADMIN — QUETIAPINE FUMARATE 12.5 MG: 25 TABLET ORAL at 22:07

## 2023-01-22 RX ADMIN — CALCIUM CARBONATE (ANTACID) CHEW TAB 500 MG 500 MG: 500 CHEW TAB at 10:10

## 2023-01-22 RX ADMIN — THIAMINE HCL TAB 100 MG 100 MG: 100 TAB at 10:10

## 2023-01-22 RX ADMIN — APIXABAN 5 MG: 5 TABLET, FILM COATED ORAL at 17:21

## 2023-01-22 RX ADMIN — OMEGA-3 FATTY ACIDS CAP 1000 MG 1000 MG: 1000 CAP at 10:10

## 2023-01-22 RX ADMIN — APIXABAN 5 MG: 5 TABLET, FILM COATED ORAL at 10:10

## 2023-01-22 RX ADMIN — POTASSIUM CHLORIDE 20 MEQ: 1500 TABLET, EXTENDED RELEASE ORAL at 10:10

## 2023-01-22 RX ADMIN — ATORVASTATIN CALCIUM 10 MG: 10 TABLET, FILM COATED ORAL at 10:10

## 2023-01-22 RX ADMIN — DOCUSATE SODIUM 100 MG: 100 CAPSULE, LIQUID FILLED ORAL at 10:10

## 2023-01-22 RX ADMIN — POLYETHYLENE GLYCOL 3350 17 G: 17 POWDER, FOR SOLUTION ORAL at 10:13

## 2023-01-22 NOTE — ASSESSMENT & PLAN NOTE
· Diagnosed prior to admission  · Urine cultures and sensitivities from 1/11/2023 reviewed  · Continue Augmentin through 1/22/2023

## 2023-01-22 NOTE — CONSULTS
Cardiology Consult  01/22/23    Referring Physian: DO LEANNE Rubio    Chief Complain/Reason for Referal:     IMPRESSION/RECOMMENDATIONS/DISCUSSION:    1  Syncope  2  Atrial fibrillation with slow ventricular spots with history of pauses up to 4 seconds  3  Subacute CVA 1/2023, currently on Eliquis anticoagulation  4  Chronic diastolic CHF  5  Pretension  6  Dyslipidemia  7  Seizure disorder  8  Dementia      · So far telemetry unremarkable with atrial fibrillation slow ventricular sponsor mostly in the 45s  Uncertain if patient actually had syncopal episode or was feeling lightheaded or dizzy  Patient son is not quite aware but he states that she seems to have been unresponsive intermittently  Agree with pacemaker placement considering prior history of pauses  We will touch base with electrophysiology for timing purposes  Keep off AV node blockers  · Will defer neurological work-up to primary service         ======================================================    HPI:  I am seeing this patient in cardiology consultation for: This is an 82-year-old female with hypertension, chronic diastolic heart failure, permanent atrial fibrillation  She also has a history of CVA 7/2022, CKD, and dementia  She has been following Dr Jessica Allred in the office  Echocardiogram 5/2022 had revealed asymmetric septal hypertrophy without LVOT obstruction  She was last seen by him 9/2022 at which time she was doing well  Her medications including Eliquis, atorvastatin, furosemide, amlodipine, and simvastatin were continued per    She was thereafter seen by our group 1/10/2023 when she presented to the hospital with lethargy  CT head revealed a subacute stroke in the left parietal region  Telemetry had revealed slow ventricular response and pauses on telemetry up to 3 seconds  Permanent pacemaker placement was recommended at the time but her family had requested conservative management in light of her dementia  Subsequently a 4-second pause was noted on telemetry but the patient clinically improved  Echo in the office 1/18/2023 at which time the patient's son had mentioned that the family changed their mind and were agreeable for pacemaker placement  Echocardiogram 1/10/2023 demonstrated severe asymmetric hypertrophy of the apical wall with apical obliteration in systole, left ventricle ejection fraction 67%, mild to moderate mitral gravitation and moderate tricuspid regurgitation  On this background, she presented to the hospital yesterday evening with a syncopal episode            Past Medical History:   Diagnosis Date   • A-fib ([facility] )    • CAD (coronary artery disease)    • CKD (chronic kidney disease) stage 2, GFR 60-89 ml/min    • Hypertension    • Memory loss    • Urinary tract infection          Scheduled Meds:  Current Facility-Administered Medications   Medication Dose Route Frequency Provider Last Rate   • acetaminophen  650 mg Oral Q6H PRN Diana Kruger MD     • amoxicillin-clavulanate  1 tablet Oral Q12H [address] Patricia Manriquez PA-C     • apixaban  5 mg Oral BID Naresh Vergara MD     • atorvastatin  10 mg Oral Daily Naresh Vergara MD     • bisacodyl  5 mg Oral Daily PRN Naresh Vergara MD     • calcium carbonate  500 mg Oral Daily Naresh Vergara MD     • docusate sodium  100 mg Oral BID Naresh Vergara MD     • fish oil  1,000 mg Oral Daily Naresh Vergara MD     • furosemide  40 mg Oral Daily Naresh Vergara MD     • ondansetron  4 mg Intravenous Q6H PRN Naresh Vergara MD     • polyethylene glycol  17 g Oral Daily Naresh Vergara MD     • potassium chloride  20 mEq Oral Daily Naresh Vergara MD     • QUEtiapine  12 5 mg Oral HS Naresh Vergara MD     • thiamine  100 mg Oral Daily Naresh Vergara MD     • zonisamide  100 mg Oral Daily Naresh Vergara MD       Continuous Infusions:   PRN Meds: •  acetaminophen  • bisacodyl  •  ondansetron  Allergies   Allergen Reactions   • Morphine      I reviewed the Home Medication list in the chart  Family History   Problem Relation Age of Onset   • Lung cancer Father    • Leukemia Brother    • Breast cancer Daughter        Social History     Socioeconomic History   • Marital status:      Spouse name: Not on file   • Number of children: Not on file   • Years of education: Not on file   • Highest education level: Not on file   Occupational History   • Not on file   Tobacco Use   • Smoking status: Never   • Smokeless tobacco: Never   Vaping Use   • Vaping Use: Never used   Substance and Sexual Activity   • Alcohol use: Never   • Drug use: Never   • Sexual activity: Not Currently     Partners: Male     Birth control/protection: None   Other Topics Concern   • Not on file   Social History Narrative   • Not on file     Social Determinants of Health     Financial Resource Strain: Not on file   Food Insecurity: No Food Insecurity   • Worried About Running Out of Food in the Last Year: Never true   • Ran Out of Food in the Last Year: Never true   Transportation Needs: No Transportation Needs   • Lack of Transportation (Medical): No   • Lack of Transportation (Non-Medical):  No   Physical Activity: Not on file   Stress: Not on file   Social Connections: Not on file   Intimate Partner Violence: Not on file   Housing Stability: Low Risk    • Unable to Pay for Housing in the Last Year: No   • Number of Places Lived in the Last Year: 1   • Unstable Housing in the Last Year: No       Review of Systems - as per HPI, all others reviewed and negative  Vitals:    01/22/23 1023   BP: 121/71   Pulse: 63   Resp:    Temp:    SpO2:      I/O     None        Weight (last 2 days)     None          GEN: NAD, Alert  HEENT: Mucus membranes moist, pink conjunctivae  EYES: Pupils equal, sclera anicteric  NECK: No JVD/HJR, no carotid bruit  CARDIOVASCULAR: RRR, No murmur, rub, gallops S1,S2, no parasternal heave/thrill  LUNGS: Clear To auscultation bilaterally  ABDOMEN: Soft, nondistended, no hepatic systolic pulsation  EXTREMITIES/VASCULAR: No edema  PSYCH: Normal Affect by limited examination  NEURO: Grossly intact by limited examination    HEME: No significant bleeding, bruising, petechia by limited examination  SKIN: No significant rashes by limited examination  MSK:  Normal upper extremity and trunk strengths by limited examination      EKG: A  fib with slow ventricular response  TELE: AFib with slow ventricular response      Results from last 7 days   Lab Units 01/22/23  0600 01/21/23  1414   WBC Thousand/uL 5 17 7 92   HEMOGLOBIN g/dL 14 4 15 9*   HEMATOCRIT % 42 1 48 3*   PLATELETS Thousands/uL 112* 117*   NEUTROS PCT % 49 55   MONOS PCT % 16* 16*     Results from last 7 days   Lab Units 01/22/23  0600 01/21/23  1414   POTASSIUM mmol/L 3 9 4 2   CHLORIDE mmol/L 109* 106   CO2 mmol/L 23 21   BUN mg/dL 19 18   CREATININE mg/dL 0 82 0 91   CALCIUM mg/dL 9 3 10 3*     Results from last 7 days   Lab Units 01/22/23  0600 01/21/23  1414   POTASSIUM mmol/L 3 9 4 2   CHLORIDE mmol/L 109* 106   CO2 mmol/L 23 21   BUN mg/dL 19 18   CREATININE mg/dL 0 82 0 91   CALCIUM mg/dL 9 3 10 3*   ALK PHOS U/L 60 83   ALT U/L 9 10   AST U/L 18 21     No results found for: TROPONINT              Results from last 7 days   Lab Units 01/21/23  1414   INR  1 29*               I have personally reviewed the EKG, CXR and Telemetry images directly        Patient Active Problem List    Diagnosis Date Noted   • Syncope 01/21/2023   • Acute cystitis without hematuria 01/21/2023   • 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 behavioral disturbance, psychotic disturbance, mood disturbance, or anxiety ([facility] [address] ) 01/21/2023   • Sick sinus syndrome ([facility] [address] ) 01/11/2023   • Stroke-like symptoms 01/11/2023   • History of stroke 01/10/2023   • New onset seizure [facility])    • Seizure ([facility] [address] ) 11/14/2022   • Urinary retention 11/14/2022   • Venous stasis dermatitis of both lower extremities 09/29/2022   • Gait instability 06/22/2022   • Other fatigue 05/23/2022   • Atypical pneumonia 05/10/2022   • Respiratory insufficiency 05/10/2022   • Acute metabolic encephalopathy 84/60/4426   • Chronic diastolic heart failure ([facility] [address] ) 05/10/2022   • Thrombocytopenia ([facility] [address] ) 05/10/2022   • Other hyperlipidemia 10/06/2021   • 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   • Other insomnia 10/06/2021   • Skin lesion 10/04/2021   • Action tremor 04/01/2020   • Age-related osteoporosis without fracture 04/01/2020   • Atrial fibrillation ([facility] [address] ) 04/10/2015   • Chronic anticoagulation 04/10/2015   • Benign essential hypertension 04/11/2011       Portions of the record may have been created with voice recognition software  Occasional wrong word or "sound a like" substitutions may have occurred due to the inherent limitations of voice recognition software  Read the chart carefully and recognize, using context, where substitutions have occurred

## 2023-01-22 NOTE — DISCHARGE SUMMARY
[address]  Discharge- [facility] 1935, 82 y o  female MRN: [id number]  Unit/Bed#: E4 -01 Encounter: [id number]  Primary Care Provider: Kimmy Gamboa DO   Date and time admitted to hospital: 1/21/2023  1:27 PM      Transfer to [facility] for pacemaker insertion    * Syncope  Assessment & Plan  · 82 year female patient with PMH of afib with slow ventricular response, sick sinus syndrome  · Ended after a questionable syncopal event  · Patient unable to provide meaningful history secondary to dementia  · Prior recommendations for pacemaker placement however declined by family  · Then was suppose to get pacemaker as an outpt on 2/9/23  · Seen and evaluated by cardiology here - still recommending pacemaker and family now agreeable  · Pt will [facility] for pacemaker insertion    Atrial fibrillation ([facility] )  Assessment & Plan  · Anticoagulated on elquis   · History of A  fib with slow ventricular response/sick sinus syndrome with previously recorded 3 to 4-second pauses  · Previous recommendations for pacemaker placement  · Patient's son now interested in pacemaker placement this admission  · Cardiology agrees and recommends transfer to [facility] for pacer insertion    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 behavioral disturbance, psychotic disturbance, mood disturbance, or anxiety ([facility] [address] )  Assessment & Plan  · Patient's son updated on the phone    Acute cystitis without hematuria  Assessment & Plan  · Diagnosed prior to admission  · Urine cultures and sensitivities from 1/11/2023 reviewed  · Continue Augmentin through 1/22/2023    Sick sinus syndrome [facility])  Assessment & Plan  · Needs pacemaker insertion    Chronic diastolic heart failure ([facility] [address] )  Assessment & Plan  Wt Readings from Last 3 Encounters:   01/10/23 77 6 kg (171 lb)   01/09/23 77 8 kg (171 lb 8 3 oz)   11/14/22 77 8 kg (171 lb 8 3 oz)   · Appears euvolemic   · Continue home Lasix 40 mg daily  · Monitor daily weights       Consultations During Hospital Stay:  · Cardiology     Procedures Performed:   XR chest 1 view portable  Result Date: 1/22/2023  · Impression: Stable cardiomegaly without acute pulmonary findings  Workstation performed: [id number]     Significant Findings / Test Results:   · Sick sinus syndrome requiring pacemaker    Complications:  none    Hospital Course:     Jimy Gibbs is a 82 y o  female patient who originally presented to the hospital on 1/21/2023 after sustaining a syncopal episode at her facility  The patient was previously recommended pacemaker during her 1/9/2023 to 1/13/2023 admission however family was hesitant and declined  It was then set up for outpatient and scheduled for February 2023  Patient represented to the hospital after questionable syncopal event and again pacemaker is being recommended  Patient will be transferred to [facility] for insertion  In conclusion, patient is stable for transfer at this time  Condition at Discharge: stable     Discharge Day Visit / Exam:     Subjective:  Resting in bed sleeping  Feels okay  Noted bradycardia  Spoke with son via telephone  Agreeable for pacer and transfer to \[facility]\""  Vitals: Blood Pressure: 109/59 (01/22/23 1156)  Pulse: 63 (01/22/23 1156)  Temperature: (!) 97 3 °F (36 3 °C) (01/22/23 1156)  Temp Source: Temporal (01/22/23 1156)  Respirations: 18 (01/22/23 1156)  SpO2: 93 % (01/22/23 1156)     Exam:   Physical Exam  Vitals and nursing note reviewed  Constitutional:       General: She is not in acute distress  Appearance: She is normal weight  She is not ill-appearing, toxic-appearing or diaphoretic  HENT:      Head: Normocephalic and atraumatic  Eyes:      General: No scleral icterus  Cardiovascular:      Rate and Rhythm: Regular rhythm  Bradycardia present  Pulmonary:      Effort: Pulmonary effort is normal  No respiratory distress  Breath sounds: Normal breath sounds   No stridor  No wheezing or rhonchi  Abdominal:      General: Bowel sounds are normal  There is no distension  Palpations: Abdomen is soft  There is no mass  Tenderness: There is no abdominal tenderness  Hernia: No hernia is present  Musculoskeletal:         General: No swelling  Cervical back: Neck supple  Skin:     General: Skin is warm and dry  Neurological:      Mental Status: Mental status is at baseline  Psychiatric:      Comments: Sleeping on exam       Discussion with Family: son    Discharge instructions/Information to patient and family:   See after visit summary for information provided to patient and family  Provisions for Follow-Up Care:  See after visit summary for information related to follow-up care and any pertinent home health orders  Planned Readmission: yes     Discharge Statement:  I spent 50 minutes discharging the patient  This time was spent on the day of discharge  I had direct contact with the patient on the day of discharge  Greater than 50% of the total time was spent examining patient, answering all patient questions, arranging and discussing plan of care with patient as well as directly providing post-discharge instructions  Additional time then spent on discharge activities  Discharge Medications:  See after visit summary for reconciled discharge medications provided to patient and family        ** Please Note: This note has been constructed using a voice recognition system **

## 2023-01-22 NOTE — CASE MANAGEMENT
Case Management Assessment & Discharge Planning Note    Patient name Ansley Wagner  Location [address] [facility] [address] MRN [id number]  : 1935 Date 2023       Current Admission Date: 2023  Current Admission Diagnosis:Syncope   Patient Active Problem List    Diagnosis Date Noted   • Syncope 2023   • Acute cystitis without hematuria 2023   • 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 behavioral disturbance, psychotic disturbance, mood disturbance, or anxiety ([facility] [address] ) 2023   • Sick sinus syndrome ([address] ) 2023   • Stroke-like symptoms 2023   • History of stroke 01/10/2023   • New onset seizure ([address] )    • Seizure ([facility] [address] ) 2022   • Urinary retention 2022   • Venous stasis dermatitis of both lower extremities 2022   • Gait instability 2022   • Other fatigue 2022   • Atypical pneumonia 05/10/2022   • Respiratory insufficiency 05/10/2022   • Acute metabolic encephalopathy 68/15/8717   • Chronic diastolic heart failure ([facility] [address] ) 05/10/2022   • Thrombocytopenia ([facility] [address] ) 05/10/2022   • Other hyperlipidemia 10/06/2021   • 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   • Other insomnia 10/06/2021   • Skin lesion 10/04/2021   • Action tremor 2020   • Age-related osteoporosis without fracture 2020   • Atrial fibrillation ([address] ) 04/10/2015   • Chronic anticoagulation 04/10/2015   • Benign essential hypertension 2011      LOS (days): 0  Geometric Mean LOS (GMLOS) (days):   Days to GMLOS:     OBJECTIVE:  PATIENT READMITTED TO HOSPITAL  Risk of Unplanned Readmission Score: 24 12         Current admission status: Inpatient       Preferred Pharmacy:   [facility] , [address] [place] [address]  Phone: [phone] Fax: [phone] [place], [place] - [address] [place] [address] Dr Molina  [facility] [address]  Phone: [phone] Fax: [address] [facility], [address] [facility]  [address]  [place] [address] [place] [facility]  Phone: [phone] Fax: [phone]    Primary Care Provider: Johnnie Mohamud DO    Primary Insurance: [facility] REP  Secondary Insurance:     ASSESSMENT:  [address] Representative - Son   Primary Phone: [phone] (Mobile)               Advance Directives  Does patient have a Health Care POA?: Yes  Does patient have Advance Directives?: Yes  Advance Directives: Living will, Power of  for finance  Primary Contact: Gracie Nieves [phone]    Readmission Root Cause  30 Day Readmission: Yes  Patient was readmitted due to: Syncope  Action Plan: Pacer    Patient Information  Admitted from[de-identified] Facility ([facility])  Mental Status: Confused  During Assessment patient was accompanied by: Son  Assessment information provided by[de-identified] Son  Primary Caregiver:  Other (Comment)  Caregiver's Name[de-identified] Vernal Keep Relationship to Patient[de-identified] Other (Specify)  Caregiver's Telephone Number[de-identified] R [address]ills [address] of Residence: [address] do you live in?: [place]  Type of Current Residence: Facility  Upon entering residence, is there a bedroom on the main floor (no further steps)?: Yes  Upon entering residence, is there a bathroom on the main floor (no further steps)?: Yes  Living Arrangements: Other (Comment) ([facility])    Activities of Daily Living Prior to Admission  Functional Status: Assistance  Completes ADLs independently?: Yes  Ambulates independently?: No  Level of ambulatory dependence: Assistance  Does patient use assisted devices?: Yes  Assisted Devices (DME) used: Learta Lime  Does patient currently own DME?: Yes  What DME does the patient currently own?: Learta Lime  Does patient have a history of Outpatient Therapy (PT/OT)?: Yes ([facility])  Does the patient have a history of Short-Term Rehab?: Yes ([facility])  Does patient have a history of HHC?: Yes ([address])  Does patient currently have Leslyu Favio?: Yes    [facility]  Type of Current Home Care Services: Home PT, [address][de-identified] Other (please enter name in comment)  [facility] Provider[de-identified] PCP    Patient Information Continued  Does patient have prescription coverage?: Yes  Does patient have a history of substance abuse?: No  Does patient have a history of Mental Health Diagnosis?: No    Means of Transportation  Means of Transport to Appts[de-identified] Family transport        DISCHARGE DETAILS:    Discharge planning discussed with[de-identified] Scot Paige CM contacted family/caregiver?: Yes       Contacts  Patient Contacts: Abby Molina (Son)  (M  Relationship to Patient[de-identified] Family  Contact Method: Phone  Phone Number: [phone]  Reason/Outcome: Discharge Planning, Emergency Contact, Continuity of Care       Additional Comments: Patient transferring to B for pacer

## 2023-01-22 NOTE — H&P
[facility] 1935[facility] 82 [facility]  [facility] MRN: [id number]  Unit/Bed#: E4 -01 Encounter: [id number]  Primary Care Provider: Kimmy Gamboa DO   Date and time admitted to hospital: 1/21/2023  1:27 PM    * Syncope  Assessment & Plan  82 year female patient with PMH of afib with slow ventricular response, sick sinus syndrome with prior recommendations and plan for pacemaker placement, history of dementia, prehospital diagnosis of UTI who presents status post syncopal episode    Patient unable to provide meaningful history secondary to dementia  · Full inpatient admission  · Cardiology consult requested for considerations for inpatient pacemaker placement  · Telemetry monitoring    Chronic diastolic heart failure ([facility] )  Assessment & Plan  Wt Readings from Last 3 Encounters:   01/10/23 77 6 kg (171 lb)   01/09/23 77 8 kg (171 lb 8 3 oz)   11/14/22 77 8 kg (171 lb 8 3 oz)     · Appears euvolemic   · Continue home Lasix   · Monitor daily weights       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 behavioral disturbance, psychotic disturbance, mood disturbance, or anxiety ([address] )  Assessment & Plan  Patient's son updated on the phone    Acute cystitis without hematuria  Assessment & Plan  Diagnosed prior to admission, urine cultures and sensitivities from 1/11/2023 reviewed  Continue Augmentin through 1/22/2023    Sick sinus syndrome [facility])  Assessment & Plan  Patient was previously recommended pacemaker placement, with family initially hesitant to proceed       Atrial fibrillation ([facility] )  Assessment & Plan  · History of A  fib with slow ventricular response/sick sinus syndrome with previously recorded 3 to 4-second pauses and recommendations for pacemaker placement  · Discussed with patient's son on the phone who would be interested in pacemaker placement this admission  · Cardiology consult requested  · Continue Eliquis for anticoagulation    Benign essential hypertension  Assessment & Plan  Continue home regimen       VTE Pharmacologic Prophylaxis: VTE Score: 6 High Risk (Score >/= 5) - Pharmacological DVT Prophylaxis Ordered: apixaban (Eliquis)  Sequential Compression Devices Ordered  Code Status: Level 3 - DNAR and DNI   Discussion with family: Updated  (son) via phone  Anticipated Length of Stay: Patient will be admitted on an inpatient basis with an anticipated length of stay of greater than 2 midnights secondary to syncope, cardiac workup, potential pacemaker placement   Total Time for Visit, including Counseling / Coordination of Care: 75 minutes  Greater than 50% of this total time spent on direct patient counseling and coordination of care  Chief Complaint: syncope     History of Present Illness:  Eugenia Dixon is a 82 y o  female with a PMH of A  Fib, sick sinus syndrome, dementia, UTI prior to admission who presents with a syncopal episode, brought in by facility  The patient was previously recommended pacemaker placement with family initially hesitant but later deciding to proceed with pacemaker placement and the procedure scheduled for February  Per my discussion with family the patient's family now is hoping that a pacemaker could be placed during this admission if indicated  The patient is unable to provide meaningful history due to underlying dementia  Review of Systems:  Review of Systems   Unable to perform ROS: Dementia       Past Medical and Surgical History:   Past Medical History:   Diagnosis Date   • A-fib ([facility] )    • CAD (coronary artery disease)    • CKD (chronic kidney disease) stage 2, GFR 60-89 ml/min    • Hypertension    • Memory loss    • Urinary tract infection        Past Surgical History:   Procedure Laterality Date   • CHOLECYSTECTOMY     • REPLACEMENT TOTAL KNEE Left 2008       Meds/Allergies:  Prior to Admission medications    Medication Sig Start Date End Date Taking?  Authorizing Provider   acetaminophen (TYLENOL) 500 mg tablet Take 500 mg by mouth every 6 (six) hours as needed for mild pain    Historical Provider, MD   amoxicillin-clavulanate (AUGMENTIN) 500-125 mg per tablet Take 1 tablet by mouth every 12 (twelve) hours for 7 days 1/15/23 1/22/23  Glenna Loaiza PA-C   apixaban (ELIQUIS) 5 mg Take 5 mg by mouth 2 (two) times a day    Historical Provider, MD   Ascorbic Acid (VITAMIN C) 500 MG CAPS Take 500 mg by mouth daily    Historical Provider, MD   atorvastatin (LIPITOR) 10 mg tablet Take 10 mg by mouth daily    Historical Provider, MD   bisacodyl (DULCOLAX) 5 mg EC tablet Take 5 mg by mouth daily as needed for constipation    Historical Provider, MD   calcium carbonate (TUMS) 500 mg chewable tablet Chew 1 tablet daily    Historical Provider, MD   Cholecalciferol (Vitamin D3) 1 25 MG (18348 UT) CAPS     Historical Provider, MD   Cranberry 450 MG CAPS Take 1 capsule by mouth in the morning    Historical Provider, MD   docusate sodium (COLACE) 100 mg capsule Take 100 mg by mouth 2 (two) times a day    Historical Provider, MD   furosemide (LASIX) 40 mg tablet Take 1 tablet by mouth daily 2/20/20   Historical Provider, MD   Omega-3 Fatty Acids (fish oil) 1,000 mg     Historical Provider, MD   polyethylene glycol (MIRALAX) 17 g packet Take 17 g by mouth daily    Historical Provider, MD   potassium chloride (K-DUR,KLOR-CON) 20 mEq tablet Take 20 mEq by mouth daily    Historical Provider, MD   QUEtiapine (SEROquel) 25 mg tablet Take 0 5 tablets (12 5 mg total) by mouth daily at bedtime Hold if sedated 11/18/22   Ligia Shoemaker,    thiamine (VITAMIN B1) 100 mg tablet     Historical Provider, MD   zonisamide (ZONEGRAN) 100 mg capsule Take 1 capsule (100 mg total) by mouth daily Do not start before November 19, 2022 11/19/22   Erin Cook, DO     I have reveiwed home medications using records provided by [facility]  Allergies:    Allergies   Allergen Reactions   • Morphine        Social History:  Marital Status:    Occupation: Retired  Patient Pre-hospital Living Situation: Nursing home facility  Patient Pre-hospital Level of Mobility: Unable to assess  Patient Pre-hospital Diet Restrictions: none   Substance Use History:   Social History     Substance and Sexual Activity   Alcohol Use Never     Social History     Tobacco Use   Smoking Status Never   Smokeless Tobacco Never     Social History     Substance and Sexual Activity   Drug Use Never       Family History:  Family History   Problem Relation Age of Onset   • Lung cancer Father    • Leukemia Brother    • Breast cancer Daughter        Physical Exam:     Vitals:   Blood Pressure: 146/81 (01/21/23 1853)  Pulse: 64 (01/21/23 1853)  Temperature: 98 7 °F (37 1 °C) (01/21/23 1853)  Temp Source: Temporal (01/21/23 1853)  Respirations: (!) 32 (01/21/23 1853)  SpO2: 94 % (01/21/23 1853)    Physical Exam  Constitutional:       General: She is not in acute distress  HENT:      Head: Normocephalic and atraumatic  Mouth/Throat:      Pharynx: Oropharynx is clear  Eyes:      Conjunctiva/sclera: Conjunctivae normal    Cardiovascular:      Rate and Rhythm: Normal rate  Rhythm irregular  Heart sounds: No murmur heard  Pulmonary:      Effort: No respiratory distress  Breath sounds: No wheezing or rales  Abdominal:      General: There is no distension  Tenderness: There is no abdominal tenderness  There is no guarding  Musculoskeletal:      Right lower leg: No edema  Left lower leg: No edema  Skin:     Comments: Venous dermatitis changes   Neurological:      Mental Status: She is disoriented            Additional Data:     Lab Results:  Results from last 7 days   Lab Units 01/21/23  1414   WBC Thousand/uL 7 92   HEMOGLOBIN g/dL 15 9*   HEMATOCRIT % 48 3*   PLATELETS Thousands/uL 117*   NEUTROS PCT % 55   LYMPHS PCT % 26   MONOS PCT % 16*   EOS PCT % 2     Results from last 7 days   Lab Units 01/21/23  1414   SODIUM mmol/L 137   POTASSIUM mmol/L 4 2   CHLORIDE mmol/L 106   CO2 mmol/L 21   BUN mg/dL 18   CREATININE mg/dL 0 91   ANION GAP mmol/L 10   CALCIUM mg/dL 10 3*   ALBUMIN g/dL 4 3   TOTAL BILIRUBIN mg/dL 0 75   ALK PHOS U/L 83   ALT U/L 10   AST U/L 21   GLUCOSE RANDOM mg/dL 108     Results from last 7 days   Lab Units 01/21/23  1414   INR  1 29*             Results from last 7 days   Lab Units 01/21/23  1414   LACTIC ACID mmol/L 2 0   PROCALCITONIN ng/ml <0 05       Lines/Drains:  Invasive Devices     Peripheral Intravenous Line  Duration           Peripheral IV 01/21/23 Right Antecubital <1 day                Imaging: No pertinent imaging reviewed  XR chest 1 view portable    (Results Pending)       EKG and Other Studies Reviewed on Admission:   · EKG: Atrial fibrillation with slow ventricular response  ** Please Note: This note has been constructed using a voice recognition system   **

## 2023-01-22 NOTE — ASSESSMENT & PLAN NOTE
Diagnosed prior to admission, urine cultures and sensitivities from 1/11/2023 reviewed  Continue Augmentin through 1/22/2023

## 2023-01-22 NOTE — PLAN OF CARE
Problem: PAIN - ADULT  Goal: Verbalizes/displays adequate comfort level or baseline comfort level  Description: Interventions:  - Encourage patient to monitor pain and request assistance  - Assess pain using appropriate pain scale  - Administer analgesics based on type and severity of pain and evaluate response  - Implement non-pharmacological measures as appropriate and evaluate response  - Consider cultural and social influences on pain and pain management  - Notify physician/advanced practitioner if interventions unsuccessful or patient reports new pain  Outcome: Progressing     Problem: INFECTION - ADULT  Goal: Absence or prevention of progression during hospitalization  Description: INTERVENTIONS:  - Assess and monitor for signs and symptoms of infection  - Monitor lab/diagnostic results  - Monitor all insertion sites, i e  indwelling lines, tubes, and drains  - Monitor endotracheal if appropriate and nasal secretions for changes in amount and color  - [place] appropriate cooling/warming therapies per order  - Administer medications as ordered  - Instruct and encourage patient and family to use good hand hygiene technique  - Identify and instruct in appropriate isolation precautions for identified infection/condition  Outcome: Progressing  Goal: Absence of fever/infection during neutropenic period  Description: INTERVENTIONS:  - Monitor WBC    Outcome: Progressing     Problem: SAFETY ADULT  Goal: Patient will remain free of falls  Description: INTERVENTIONS:  - Educate patient/family on patient safety including physical limitations  - Instruct patient to call for assistance with activity   - Consult OT/PT to assist with strengthening/mobility   - Keep Call bell within reach  - Keep bed low and locked with side rails adjusted as appropriate  - Keep care items and personal belongings within reach  - Initiate and maintain comfort rounds  - Make Fall Risk Sign visible to staff  - Offer Toileting every  Hours, in advance of need  - Initiate/Maintain alarm  - Obtain necessary fall risk management equipment:   - Apply yellow socks and bracelet for high fall risk patients  - Consider moving patient to room near nurses station  Outcome: Progressing  Goal: Maintain or return to baseline ADL function  Description: INTERVENTIONS:  -  Assess patient's ability to carry out ADLs; assess patient's baseline for ADL function and identify physical deficits which impact ability to perform ADLs (bathing, care of mouth/teeth, toileting, grooming, dressing, etc )  - Assess/evaluate cause of self-care deficits   - Assess range of motion  - Assess patient's mobility; develop plan if impaired  - Assess patient's need for assistive devices and provide as appropriate  - Encourage maximum independence but intervene and supervise when necessary  - Involve family in performance of ADLs  - Assess for home care needs following discharge   - Consider OT consult to assist with ADL evaluation and planning for discharge  - Provide patient education as appropriate  Outcome: Progressing  Goal: Maintains/Returns to pre admission functional level  Description: INTERVENTIONS:  - Perform BMAT or MOVE assessment daily    - Set and communicate daily mobility goal to care team and patient/family/caregiver  - Collaborate with rehabilitation services on mobility goals if consulted  - Perform Range of Motion  times a day  - Reposition patient every  hours    - Dangle patient  times a day  - Stand patient  times a day  - Ambulate patient  times a day  - Out of bed to chair  times a day   - Out of bed for meals times a day  - Out of bed for toileting  - Record patient progress and toleration of activity level   Outcome: Progressing     Problem: DISCHARGE PLANNING  Goal: Discharge to home or other facility with appropriate resources  Description: INTERVENTIONS:  - Identify barriers to discharge w/patient and caregiver  - Arrange for needed discharge resources and transportation as appropriate  - Identify discharge learning needs (meds, wound care, etc )  - Arrange for interpretive services to assist at discharge as needed  - Refer to Case Management Department for coordinating discharge planning if the patient needs post-hospital services based on physician/advanced practitioner order or complex needs related to functional status, cognitive ability, or social support system  Outcome: Progressing     Problem: Knowledge Deficit  Goal: Patient/family/caregiver demonstrates understanding of disease process, treatment plan, medications, and discharge instructions  Description: Complete learning assessment and assess knowledge base    Interventions:  - Provide teaching at level of understanding  - Provide teaching via preferred learning methods  Outcome: Progressing

## 2023-01-22 NOTE — PLAN OF CARE
Problem: PAIN - ADULT  Goal: Verbalizes/displays adequate comfort level or baseline comfort level  Description: Interventions:  - Encourage patient to monitor pain and request assistance  - Assess pain using appropriate pain scale  - Administer analgesics based on type and severity of pain and evaluate response  - Implement non-pharmacological measures as appropriate and evaluate response  - Consider cultural and social influences on pain and pain management  - Notify physician/advanced practitioner if interventions unsuccessful or patient reports new pain  Outcome: Progressing     Problem: INFECTION - ADULT  Goal: Absence or prevention of progression during hospitalization  Description: INTERVENTIONS:  - Assess and monitor for signs and symptoms of infection  - Monitor lab/diagnostic results  - Monitor all insertion sites, i e  indwelling lines, tubes, and drains  - Monitor endotracheal if appropriate and nasal secretions for changes in amount and color  - [place] appropriate cooling/warming therapies per order  - Administer medications as ordered  - Instruct and encourage patient and family to use good hand hygiene technique  - Identify and instruct in appropriate isolation precautions for identified infection/condition  Outcome: Progressing  Goal: Absence of fever/infection during neutropenic period  Description: INTERVENTIONS:  - Monitor WBC    Outcome: Progressing     Problem: SAFETY ADULT  Goal: Patient will remain free of falls  Description: INTERVENTIONS:  - Educate patient/family on patient safety including physical limitations  - Instruct patient to call for assistance with activity   - Consult OT/PT to assist with strengthening/mobility   - Keep Call bell within reach  - Keep bed low and locked with side rails adjusted as appropriate  - Keep care items and personal belongings within reach  - Initiate and maintain comfort rounds  - Make Fall Risk Sign visible to staff  - Offer Toileting every 2 Hours, in advance of need  - Initiate/Maintain bed alarm  - Obtain necessary fall risk management equipment:   - Apply yellow socks and bracelet for high fall risk patients  - Consider moving patient to room near nurses station  Outcome: Progressing  Goal: Maintain or return to baseline ADL function  Description: INTERVENTIONS:  -  Assess patient's ability to carry out ADLs; assess patient's baseline for ADL function and identify physical deficits which impact ability to perform ADLs (bathing, care of mouth/teeth, toileting, grooming, dressing, etc )  - Assess/evaluate cause of self-care deficits   - Assess range of motion  - Assess patient's mobility; develop plan if impaired  - Assess patient's need for assistive devices and provide as appropriate  - Encourage maximum independence but intervene and supervise when necessary  - Involve family in performance of ADLs  - Assess for home care needs following discharge   - Consider OT consult to assist with ADL evaluation and planning for discharge  - Provide patient education as appropriate  Outcome: Progressing  Goal: Maintains/Returns to pre admission functional level  Description: INTERVENTIONS:  - Perform BMAT or MOVE assessment daily    - Set and communicate daily mobility goal to care team and patient/family/caregiver  - Collaborate with rehabilitation services on mobility goals if consulted  - Perform Range of Motion 3 times a day  - Reposition patient every 3 hours    - Dangle patient 3 times a day  - Stand patient 3 times a day  - Ambulate patient 3 times a day  - Out of bed to chair 3 times a day   - Out of bed for meals 3 times a day  - Out of bed for toileting  - Record patient progress and toleration of activity level   Outcome: Progressing     Problem: DISCHARGE PLANNING  Goal: Discharge to home or other facility with appropriate resources  Description: INTERVENTIONS:  - Identify barriers to discharge w/patient and caregiver  - Arrange for needed discharge resources and transportation as appropriate  - Identify discharge learning needs (meds, wound care, etc )  - Arrange for interpretive services to assist at discharge as needed  - Refer to Case Management Department for coordinating discharge planning if the patient needs post-hospital services based on physician/advanced practitioner order or complex needs related to functional status, cognitive ability, or social support system  Outcome: Progressing     Problem: Knowledge Deficit  Goal: Patient/family/caregiver demonstrates understanding of disease process, treatment plan, medications, and discharge instructions  Description: Complete learning assessment and assess knowledge base  Interventions:  - Provide teaching at level of understanding  - Provide teaching via preferred learning methods  Outcome: Progressing     Problem: MOBILITY - ADULT  Goal: Maintain or return to baseline ADL function  Description: INTERVENTIONS:  -  Assess patient's ability to carry out ADLs; assess patient's baseline for ADL function and identify physical deficits which impact ability to perform ADLs (bathing, care of mouth/teeth, toileting, grooming, dressing, etc )  - Assess/evaluate cause of self-care deficits   - Assess range of motion  - Assess patient's mobility; develop plan if impaired  - Assess patient's need for assistive devices and provide as appropriate  - Encourage maximum independence but intervene and supervise when necessary  - Involve family in performance of ADLs  - Assess for home care needs following discharge   - Consider OT consult to assist with ADL evaluation and planning for discharge  - Provide patient education as appropriate  Outcome: Progressing  Goal: Maintains/Returns to pre admission functional level  Description: INTERVENTIONS:  - Perform BMAT or MOVE assessment daily    - Set and communicate daily mobility goal to care team and patient/family/caregiver     - Collaborate with rehabilitation services on mobility goals if consulted  - Perform Range of Motion 3 times a day  - Reposition patient every 3 hours    - Dangle patient 3 times a day  - Stand patient 3 times a day  - Ambulate patient 3 times a day  - Out of bed to chair 3 times a day   - Out of bed for meals 3 times a day  - Out of bed for toileting  - Record patient progress and toleration of activity level   Outcome: Progressing     Problem: Prexisting or High Potential for Compromised Skin Integrity  Goal: Skin integrity is maintained or improved  Description: INTERVENTIONS:  - Identify patients at risk for skin breakdown  - Assess and monitor skin integrity  - Assess and monitor nutrition and hydration status  - Monitor labs   - Assess for incontinence   - Turn and reposition patient  - Assist with mobility/ambulation  - Relieve pressure over bony prominences  - Avoid friction and shearing  - Provide appropriate hygiene as needed including keeping skin clean and dry  - Evaluate need for skin moisturizer/barrier cream  - Collaborate with interdisciplinary team   - Patient/family teaching  - Consider wound care consult   Outcome: Progressing Cell Phone/PDA (specify)

## 2023-01-22 NOTE — ASSESSMENT & PLAN NOTE
· Anticoagulated on elquis   · History of A  fib with slow ventricular response/sick sinus syndrome with previously recorded 3 to 4-second pauses  · Previous recommendations for pacemaker placement  · Patient's son now interested in pacemaker placement this admission  · Cardiology agrees and recommends transfer to [facility] for pacer insertion

## 2023-01-22 NOTE — ASSESSMENT & PLAN NOTE
Wt Readings from Last 3 Encounters:   01/10/23 77 6 kg (171 lb)   01/09/23 77 8 kg (171 lb 8 3 oz)   11/14/22 77 8 kg (171 lb 8 3 oz)   · Appears euvolemic   · Continue home Lasix 40 mg daily  · Monitor daily weights

## 2023-01-22 NOTE — ASSESSMENT & PLAN NOTE
· 82 year female patient with PMH of afib with slow ventricular response, sick sinus syndrome  · Ended after a questionable syncopal event  · Patient unable to provide meaningful history secondary to dementia  · Prior recommendations for pacemaker placement however declined by family  · Then was suppose to get pacemaker as an outpt on 2/9/23  · Seen and evaluated by cardiology here - still recommending pacemaker and family now agreeable  · Pt will be transferred to [facility] insertion

## 2023-01-22 NOTE — UTILIZATION REVIEW
Initial Clinical Review    Pt initially admitted as Observation on 1/21  Changed to Inpatient on 1/22  Pt requiring continued stay d/t ongoing workup and treatment of syncope  Admission: Date/Time/Statement:   Admission Orders (From admission, onward)     Ordered        01/22/23 1147  Inpatient Admission  Once            01/21/23 1744  Place in Observation  Once                      Orders Placed This Encounter   Procedures   • Inpatient Admission     Standing Status:   Standing     Number of Occurrences:   1     Order Specific Question:   Level of Care     Answer:   Med Surg [16]     Order Specific Question:   Estimated length of stay     Answer:   More than 2 Midnights     Order Specific Question:   Certification     Answer:   I certify that inpatient services are medically necessary for this patient for a duration of greater than two midnights  See H&P and MD Progress Notes for additional information about the patient's course of treatment  ED Arrival Information     Expected   -    Arrival   1/21/2023 13:27    Acuity   Urgent            Means of arrival   Ambulance    Escorted by   [facility]    Admission type   Emergency            Arrival complaint   weakness           Chief Complaint   Patient presents with   • Weakness - Generalized     Pt sent for weakness, was unable to ambulate  From memory care facility  Pt not following commands  URI symptoms  Initial Presentation: 82 y o  female who presented by EMS to [address] [address] ED  Admitted in observation status for evaluation and treatment of syncopal episode  PMHx: afib, CAD, CKD S2, HTN, dementia  Presented w/ syncopal episode at nursing facility  On exam, irregular rhythm, disoriented  EKG afib w/ slow ventricular response  Plan: telemetry, DW, I&O, continue PTA meds  Cardiology consulted  01/22/23 Changed to Inpatient Status  Cardiology: Telemetry w/ afib and slow ventricular response, HR in 40s  Agree w/ pacemaker placement in light of hx of pauses, this had initially been schedule for February, but will plan for this admission      ED Triage Vitals   Temperature Pulse Respirations Blood Pressure SpO2   01/21/23 1344 01/21/23 1335 01/21/23 1335 01/21/23 1335 01/21/23 1335   98 8 °F (37 1 °C) 59 (!) 25 135/71 96 %      Temp Source Heart Rate Source Patient Position - Orthostatic VS BP Location FiO2 (%)   01/21/23 1344 01/21/23 1335 01/21/23 1335 01/21/23 1335 --   Axillary Monitor Lying Right arm       Pain Score       --                 Wt Readings from Last 1 Encounters:   01/10/23 77 6 kg (171 lb)     Additional Vital Signs:   Date/Time Temp Pulse Resp BP MAP (mmHg) SpO2 O2 Device Patient Position - Orthostatic VS   01/22/23 0722 97 °F (36 1 °C) Abnormal  73 20 108/63 78 94 % None (Room air) Lying   01/22/23 0257 97 2 °F (36 2 °C) Abnormal  62 20 106/57 68 96 % None (Room air) Lying   01/21/23 2337 97 7 °F (36 5 °C) 68 20 108/75 80 92 % None (Room air) Lying   01/21/23 1853 98 7 °F (37 1 °C) 64 32 Abnormal  146/81 106 94 % None (Room air) Lying   01/21/23 1615 -- 80 41 Abnormal  138/62 89 94 % None (Room air) --   01/21/23 1600 -- 56 30 Abnormal  -- -- 94 % --      Pertinent Labs/Diagnostic Test Results:   XR chest 1 view portable    (Results Pending)     Results from last 7 days   Lab Units 01/21/23  1414   SARS-COV-2  Negative     Results from last 7 days   Lab Units 01/22/23  0600 01/21/23  1414   WBC Thousand/uL 5 17 7 92   HEMOGLOBIN g/dL 14 4 15 9*   HEMATOCRIT % 42 1 48 3*   PLATELETS Thousands/uL 112* 117*   NEUTROS ABS Thousands/µL 2 53 4 40         Results from last 7 days   Lab Units 01/22/23  0600 01/21/23  1414   SODIUM mmol/L 136 137   POTASSIUM mmol/L 3 9 4 2   CHLORIDE mmol/L 109* 106   CO2 mmol/L 23 21   ANION GAP mmol/L 4 10   BUN mg/dL 19 18   CREATININE mg/dL 0 82 0 91   EGFR ml/min/1 73sq m 65 57   CALCIUM mg/dL 9 3 10 3*     Results from last 7 days   Lab Units 01/22/23  0600 01/21/23  1414   AST U/L 18 21   ALT U/L 9 10   ALK PHOS U/L 60 83   TOTAL PROTEIN g/dL 6 5 8 0   ALBUMIN g/dL 3 6 4 3   TOTAL BILIRUBIN mg/dL 0 82 0 75         Results from last 7 days   Lab Units 01/22/23  0600 01/21/23  1414   GLUCOSE RANDOM mg/dL 91 108       Results from last 7 days   Lab Units 01/22/23  0153 01/21/23  1654 01/21/23  1414   HS TNI 0HR ng/L  --   --  64*   HS TNI 2HR ng/L  --  71*  --    HSTNI D2 ng/L  --  7  --    HS TNI 4HR ng/L 82*  --   --    HSTNI D4 ng/L 18  --   --          Results from last 7 days   Lab Units 01/21/23  1414   PROTIME seconds 16 0*   INR  1 29*   PTT seconds 30         Results from last 7 days   Lab Units 01/21/23  1414   PROCALCITONIN ng/ml <0 05     Results from last 7 days   Lab Units 01/21/23  1414   LACTIC ACID mmol/L 2 0             Results from last 7 days   Lab Units 01/21/23  1414   BNP pg/mL 577*       Results from last 7 days   Lab Units 01/21/23  1826   CLARITY UA  Clear   COLOR UA  Yellow   SPEC GRAV UA  1 025   PH UA  6 0   GLUCOSE UA mg/dl Negative   KETONES UA mg/dl Negative   BLOOD UA  Small*   PROTEIN UA mg/dl Negative   NITRITE UA  Negative   BILIRUBIN UA  Negative   UROBILINOGEN UA E U /dl 0 2   LEUKOCYTES UA  Trace*   WBC UA /hpf 4-10*   RBC UA /hpf 0-1*   BACTERIA UA /hpf Occasional   EPITHELIAL CELLS WET PREP /hpf Moderate*     Results from last 7 days   Lab Units 01/21/23  1414   INFLUENZA A PCR  Negative   INFLUENZA B PCR  Negative   RSV PCR  Negative       Results from last 7 days   Lab Units 01/21/23  1414   BLOOD CULTURE  Received in Microbiology Lab  Culture in Progress  Received in Microbiology Lab  Culture in Progress           Past Medical History:   Diagnosis Date   • A-fib [facility])    • CAD (coronary artery disease)    • CKD (chronic kidney disease) stage 2, GFR 60-89 ml/min    • Hypertension    • Memory loss    • Urinary tract infection      Present on Admission:  • Chronic diastolic heart failure (HCC)  • Benign essential hypertension  • Atrial fibrillation (HCC)  • Sick sinus syndrome [facility])      Admitting Diagnosis: Sick sinus syndrome (HCC) [I49 5]  UTI (urinary tract infection) [N39 0]  Weakness [R53 1]  Age/Sex: 82 y o  female  Admission Orders:  Cardiac Diet w/ 2 gm sodium restriction  Telemetry  SCDs  Scheduled Medications:  amoxicillin-clavulanate, 1 tablet, Oral, Q12H FRANKIE  apixaban, 5 mg, Oral, BID  atorvastatin, 10 mg, Oral, Daily  calcium carbonate, 500 mg, Oral, Daily  docusate sodium, 100 mg, Oral, BID  fish oil, 1,000 mg, Oral, Daily  furosemide, 40 mg, Oral, Daily  polyethylene glycol, 17 g, Oral, Daily  potassium chloride, 20 mEq, Oral, Daily  QUEtiapine, 12 5 mg, Oral, HS  thiamine, 100 mg, Oral, Daily  zonisamide, 100 mg, Oral, Daily    Continuous IV Infusions: none    PRN Meds:  acetaminophen, 650 mg, Oral, Q6H PRN  bisacodyl, 5 mg, Oral, Daily PRN  ondansetron, 4 mg, Intravenous, Q6H PRN        IP CONSULT TO CARDIOLOGY    Network Utilization Review Department  ATTENTION: Please call with any questions or concerns to [phone] and carefully listen to the prompts so that you are directed to the right person  All voicemails are confidential   Eric Edwarsd all requests for admission clinical reviews, approved or denied determinations and any other requests to dedicated fax number below belonging to the campus where the patient is receiving treatment   List of dedicated fax numbers for the Facilities:  [address] DENIALS (Administrative/Medical Necessity) [phone]   [address] [place] [address] (Maternity/NICU/Pediatrics) [phone]   [address] [facility] [address] [place] [address] [place] [address] [id number] [place] [address] [address] [facility]  [address] 750 [facility] [phone]

## 2023-01-22 NOTE — TRANSPORTATION MEDICAL NECESSITY
Section I - General Information    Name of Patient: Edd Thomas                 : 1935    Medicare #: [id number]  Transport Date: 23 (PCS is valid for round trips on this date and for all repetitive trips in the 60-day range as noted below )  Origin: [address]                                                         Destination: [facility]  Is the pt's stay covered under Medicare Part A (PPS/DRG)   [x]     Closest appropriate facility? If no, why is transport to more distant facility required? Yes  If hospice pt, is this transport related to pt's terminal illness? NA       Section II - Medical Necessity Questionnaire  Ambulance transportation is medically necessary only if other means of transport are contraindicated or would be potentially harmful to the patient  To meet this requirement, the patient must either be "bed confined" or suffer from a condition such that transport by means other than ambulance is contraindicated by the patient's condition  The following questions must be answered by the medical professional signing below for this form to be valid:    1)  Describe the MEDICAL CONDITION (physical and/or mental) of this patient AT [address] [place] [address] that requires the patient to be transported in an ambulance and why transport by other means is contraindicated by the patient's condition: Acute hospital to hospital transfer for higher level of care     2) Is the patient "bed confined" as defined below? Yes  To be "be confined" the patient must satisfy all three of the following conditions: (1) unable to get up from bed without Assistance; AND (2) unable to ambulate; AND (3) unable to sit in a chair or wheelchair  3) Can this patient safely be transported by car or wheelchair van (i e , seated during transport without a medical attendant or monitoring)?    No    4) In addition to completing questions 1-3 above, please check any of the following conditions that apply*:   *Note: supporting documentation for any boxes checked must be maintained in the patient's medical records  If hosp-hosp transfer, describe services needed at 2nd facility not available at 1st facility? Patient is confused  Medical attendant required   Unable to tolerate seated position for time needed to transport   Other(specify) fall risk      Section III - Signature of Physician or Healthcare Professional  I certify that the above information is true and correct based on my evaluation of this patient, and represent that the patient requires transport by ambulance and that other forms of transport are contraindicated  I understand that this information will be used by the Centers for Medicare and Medicaid Services (CMS) to support the determination of medical necessity for ambulance services, and I represent that I have personal knowledge of the patient's condition at time of transport  []  If this box is checked, I also certify that the patient is physically or mentally incapable of signing the ambulance service's claim and that the institution with which I am affiliated has furnished care, services, or assistance to the patient  My signature below is made on behalf of the patient pursuant to 42 CFR §424 36(b)(4)  In accordance with 42 CFR §424 37, the specific reason(s) that the patient is physically or mentally incapable of signing the claim form is as follows: Birdie Doyle of Physician* or Healthcare Professional___[employer] MICAELA CM________________  Signature Date 01/22/23 (For scheduled repetitive transports, this form is not valid for transports performed more than 60 days after this date)    Printed Name & Credentials of Physician or Healthcare Professional (MD, DO, RN, etc )_[employer]quel [employer]LLIE CM_______________________________  *Form must be signed by patient's attending physician for scheduled, repetitive transports   For non-repetitive, unscheduled ambulance transports, if unable to obtain the signature of the attending physician, any of the following may sign (choose appropriate option below)  [] Physician Assistant []  Clinical Nurse Specialist [x]  Registered Nurse  []  Nurse Practitioner  [x] Discharge Planner

## 2023-01-22 NOTE — ASSESSMENT & PLAN NOTE
· History of A  fib with slow ventricular response/sick sinus syndrome with previously recorded 3 to 4-second pauses and recommendations for pacemaker placement  · Discussed with patient's son on the phone who would be interested in pacemaker placement this admission  · Cardiology consult requested  · Continue Eliquis for anticoagulation

## 2023-01-23 LAB — MRSA NOSE QL CULT: NORMAL

## 2023-01-23 RX ADMIN — QUETIAPINE FUMARATE 12.5 MG: 25 TABLET ORAL at 21:37

## 2023-01-23 RX ADMIN — DOCUSATE SODIUM 100 MG: 100 CAPSULE, LIQUID FILLED ORAL at 10:41

## 2023-01-23 RX ADMIN — ATORVASTATIN CALCIUM 10 MG: 10 TABLET, FILM COATED ORAL at 10:40

## 2023-01-23 RX ADMIN — POLYETHYLENE GLYCOL 3350 17 G: 17 POWDER, FOR SOLUTION ORAL at 10:41

## 2023-01-23 RX ADMIN — POTASSIUM CHLORIDE 20 MEQ: 1500 TABLET, EXTENDED RELEASE ORAL at 10:41

## 2023-01-23 RX ADMIN — OMEGA-3 FATTY ACIDS CAP 1000 MG 1000 MG: 1000 CAP at 10:40

## 2023-01-23 RX ADMIN — ZONISAMIDE 100 MG: 100 CAPSULE ORAL at 10:42

## 2023-01-23 RX ADMIN — CALCIUM CARBONATE (ANTACID) CHEW TAB 500 MG 500 MG: 500 CHEW TAB at 10:41

## 2023-01-23 RX ADMIN — APIXABAN 5 MG: 5 TABLET, FILM COATED ORAL at 10:41

## 2023-01-23 RX ADMIN — THIAMINE HCL TAB 100 MG 100 MG: 100 TAB at 10:40

## 2023-01-23 RX ADMIN — APIXABAN 5 MG: 5 TABLET, FILM COATED ORAL at 17:07

## 2023-01-23 NOTE — ASSESSMENT & PLAN NOTE
Wt Readings from Last 3 Encounters:   01/10/23 77 6 kg (171 lb)   01/09/23 77 8 kg (171 lb 8 3 oz)   11/14/22 77 8 kg (171 lb 8 3 oz)   · Appears euvolemic   · Continue home Lasix 40 mg daily

## 2023-01-23 NOTE — PLAN OF CARE
Problem: PAIN - ADULT  Goal: Verbalizes/displays adequate comfort level or baseline comfort level  Description: Interventions:  - Encourage patient to monitor pain and request assistance  - Assess pain using appropriate pain scale  - Administer analgesics based on type and severity of pain and evaluate response  - Implement non-pharmacological measures as appropriate and evaluate response  - Consider cultural and social influences on pain and pain management  - Notify physician/advanced practitioner if interventions unsuccessful or patient reports new pain  Outcome: Progressing     Problem: INFECTION - ADULT  Goal: Absence or prevention of progression during hospitalization  Description: INTERVENTIONS:  - Assess and monitor for signs and symptoms of infection  - Monitor lab/diagnostic results  - Monitor all insertion sites, i e  indwelling lines, tubes, and drains  - Monitor endotracheal if appropriate and nasal secretions for changes in amount and color  - [place] appropriate cooling/warming therapies per order  - Administer medications as ordered  - Instruct and encourage patient and family to use good hand hygiene technique  - Identify and instruct in appropriate isolation precautions for identified infection/condition  Outcome: Progressing  Goal: Absence of fever/infection during neutropenic period  Description: INTERVENTIONS:  - Monitor WBC    Outcome: Progressing     Problem: SAFETY ADULT  Goal: Patient will remain free of falls  Description: INTERVENTIONS:  - Educate patient/family on patient safety including physical limitations  - Instruct patient to call for assistance with activity   - Consult OT/PT to assist with strengthening/mobility   - Keep Call bell within reach  - Keep bed low and locked with side rails adjusted as appropriate  - Keep care items and personal belongings within reach  - Initiate and maintain comfort rounds  - Make Fall Risk Sign visible to staff  - Offer Toileting every 2 Hours, in advance of need  - Initiate/Maintain bed alarm  - Obtain necessary fall risk management equipment:   - Apply yellow socks and bracelet for high fall risk patients  - Consider moving patient to room near nurses station  Outcome: Progressing  Goal: Maintain or return to baseline ADL function  Description: INTERVENTIONS:  -  Assess patient's ability to carry out ADLs; assess patient's baseline for ADL function and identify physical deficits which impact ability to perform ADLs (bathing, care of mouth/teeth, toileting, grooming, dressing, etc )  - Assess/evaluate cause of self-care deficits   - Assess range of motion  - Assess patient's mobility; develop plan if impaired  - Assess patient's need for assistive devices and provide as appropriate  - Encourage maximum independence but intervene and supervise when necessary  - Involve family in performance of ADLs  - Assess for home care needs following discharge   - Consider OT consult to assist with ADL evaluation and planning for discharge  - Provide patient education as appropriate  Outcome: Progressing  Goal: Maintains/Returns to pre admission functional level  Description: INTERVENTIONS:  - Perform BMAT or MOVE assessment daily    - Set and communicate daily mobility goal to care team and patient/family/caregiver  - Collaborate with rehabilitation services on mobility goals if consulted  - Perform Range of Motion 3 times a day  - Reposition patient every 3 hours    - Dangle patient 3 times a day  - Stand patient 3 times a day  - Ambulate patient 3 times a day  - Out of bed to chair 3 times a day   - Out of bed for meals 3 times a day  - Out of bed for toileting  - Record patient progress and toleration of activity level   Outcome: Progressing     Problem: DISCHARGE PLANNING  Goal: Discharge to home or other facility with appropriate resources  Description: INTERVENTIONS:  - Identify barriers to discharge w/patient and caregiver  - Arrange for needed discharge resources and transportation as appropriate  - Identify discharge learning needs (meds, wound care, etc )  - Arrange for interpretive services to assist at discharge as needed  - Refer to Case Management Department for coordinating discharge planning if the patient needs post-hospital services based on physician/advanced practitioner order or complex needs related to functional status, cognitive ability, or social support system  Outcome: Progressing     Problem: Knowledge Deficit  Goal: Patient/family/caregiver demonstrates understanding of disease process, treatment plan, medications, and discharge instructions  Description: Complete learning assessment and assess knowledge base  Interventions:  - Provide teaching at level of understanding  - Provide teaching via preferred learning methods  Outcome: Progressing     Problem: MOBILITY - ADULT  Goal: Maintain or return to baseline ADL function  Description: INTERVENTIONS:  -  Assess patient's ability to carry out ADLs; assess patient's baseline for ADL function and identify physical deficits which impact ability to perform ADLs (bathing, care of mouth/teeth, toileting, grooming, dressing, etc )  - Assess/evaluate cause of self-care deficits   - Assess range of motion  - Assess patient's mobility; develop plan if impaired  - Assess patient's need for assistive devices and provide as appropriate  - Encourage maximum independence but intervene and supervise when necessary  - Involve family in performance of ADLs  - Assess for home care needs following discharge   - Consider OT consult to assist with ADL evaluation and planning for discharge  - Provide patient education as appropriate  Outcome: Progressing  Goal: Maintains/Returns to pre admission functional level  Description: INTERVENTIONS:  - Perform BMAT or MOVE assessment daily    - Set and communicate daily mobility goal to care team and patient/family/caregiver     - Collaborate with rehabilitation services on mobility goals if consulted  - Perform Range of Motion 3 times a day  - Reposition patient every 3 hours    - Dangle patient 3 times a day  - Stand patient 3 times a day  - Ambulate patient 3 times a day  - Out of bed to chair 3 times a day   - Out of bed for meals 3 times a day  - Out of bed for toileting  - Record patient progress and toleration of activity level   Outcome: Progressing     Problem: Prexisting or High Potential for Compromised Skin Integrity  Goal: Skin integrity is maintained or improved  Description: INTERVENTIONS:  - Identify patients at risk for skin breakdown  - Assess and monitor skin integrity  - Assess and monitor nutrition and hydration status  - Monitor labs   - Assess for incontinence   - Turn and reposition patient  - Assist with mobility/ambulation  - Relieve pressure over bony prominences  - Avoid friction and shearing  - Provide appropriate hygiene as needed including keeping skin clean and dry  - Evaluate need for skin moisturizer/barrier cream  - Collaborate with interdisciplinary team   - Patient/family teaching  - Consider wound care consult   Outcome: Progressing

## 2023-01-23 NOTE — UTILIZATION REVIEW
NOTIFICATION OF INPATIENT ADMISSION   AUTHORIZATION REQUEST   SERVICING FACILITY:   [address] [place] [address], [address]   Tax ID: [id number]  NPI: [id number] ATTENDING PROVIDER:  Attending Name and NPI#: Aster Pedraza [[id number]]  Address: [address] [place] [address], [address] [facility]  Phone: [phone]     ADMISSION INFORMATION:  Place of Service: Inpatient [address] [facility]  [address]  Place of Service Code: 21  Inpatient Admission Date/Time: 1/22/23 11:47 AM  Discharge Date/Time: No discharge date for patient encounter  Admitting Diagnosis Code/Description:  Sick sinus syndrome ([address] ) [I49 5]  UTI (urinary tract infection) [N39 0]  Weakness [R53 1]     UTILIZATION REVIEW CONTACT:  Teagan Herrmann Utilization   Network Utilization Review Department  Phone: [phone]  Fax: [phone]  Email: [email or web address]  Contact for approvals/pending authorizations, clinical reviews, and discharge  PHYSICIAN ADVISORY SERVICES:  Medical Necessity Denial & [employer] Review  Phone: [phone]  Fax: [phone]  Email: [email or web address]

## 2023-01-23 NOTE — UTILIZATION REVIEW
Continued Stay Review    Date:  1/23                     Current Patient Class: IP Current Level of Care: MS     HPI:87 y o  female initially admitted on 1/22 with Syncope, Alzheimers; acute cystitis, SSS, Chronic dCHF, A fib w/ SVR  Assessment/Plan:  Pt is in need of PPM insertion for A fib with SVR with 3-4 second pauses  Can continue diet until we hear word of transfer time  Waiting for transfer to [facility] for insertion PPM when bed available  On exam she remains bradycardic and irregular        Vital Signs:   01/23/23 1050 -- 43 Abnormal  -- 107/74 81 -- -- Lying   01/23/23 0735 97 °F (36 1 °C) Abnormal  58 18 108/71 85 98 % None (Room air) Lying   01/22/23 2253 97 7 °F (36 5 °C) 61 18 123/59 83 94 % None (Room air) Lying   01/22/23 1900 97 1 °F (36 2 °C) Abnormal  62 16 112/62 -- 100 % None (Room air) --   01/22/23 1453 97 8 °F (36 6 °C) 61 18 110/60 78 93 % None (Room air) Lying   01/22/23 1156 97 3 °F (36 3 °C) Abnormal  63 18 109/59 80 93 % None (Room air) Lying   01/22/23 1023 -- 63 -- 121/71 -- -- -- --   01/22/23 0722 97 °F (36 1 °C) Abnormal  73 20 108/63 78 94 % None (Room air) Lying   01/22/23 0257 97 2 °F (36 2 °C) Abnormal  62 20 106/57 68 96 % None (Room air) Lying   01/22/23 0030 -- -- -- -- -- -- None (Room air) --   01/21/23 2337 97 7 °F (36 5 °C) 68 20 108/75 80 92 % None (Room air) Lying   01/21/23 1853 98 7 °F (37 1 °C) 64 32 Abnormal  146/81 106 94 % None (Room air) Lying   01/21/23 1615 -- 80 41 Abnormal  138/62 89 94 % None (Room air) --   01/21/23 1600 -- 56 30 Abnormal  -- -- 94 % -- --   01/21/23 1344 98 8 °F (37 1 °C) -- -- -- -- -- -- --   01/21/23 1335 -- 59 25 Abnormal  135/71 -- 96 % None (Room air) Lying     Pertinent Labs/Diagnostic Results:   Results from last 7 days   Lab Units 01/21/23  1414   SARS-COV-2  Negative     Results from last 7 days   Lab Units 01/22/23  0600 01/21/23  1414   WBC Thousand/uL 5 17 7 92   HEMOGLOBIN g/dL 14 4 15 9*   HEMATOCRIT % 42 1 48 3* PLATELETS Thousands/uL 112* 117*   NEUTROS ABS Thousands/µL 2 53 4 40         Results from last 7 days   Lab Units 01/22/23  0600 01/21/23  1414   SODIUM mmol/L 136 137   POTASSIUM mmol/L 3 9 4 2   CHLORIDE mmol/L 109* 106   CO2 mmol/L 23 21   ANION GAP mmol/L 4 10   BUN mg/dL 19 18   CREATININE mg/dL 0 82 0 91   EGFR ml/min/1 73sq m 65 57   CALCIUM mg/dL 9 3 10 3*     Results from last 7 days   Lab Units 01/22/23  0600 01/21/23  1414   AST U/L 18 21   ALT U/L 9 10   ALK PHOS U/L 60 83   TOTAL PROTEIN g/dL 6 5 8 0   ALBUMIN g/dL 3 6 4 3   TOTAL BILIRUBIN mg/dL 0 82 0 75         Results from last 7 days   Lab Units 01/22/23  0600 01/21/23  1414   GLUCOSE RANDOM mg/dL 91 108     Results from last 7 days   Lab Units 01/22/23  0153 01/21/23  1654 01/21/23  1414   HS TNI 0HR ng/L  --   --  64*   HS TNI 2HR ng/L  --  71*  --    HSTNI D2 ng/L  --  7  --    HS TNI 4HR ng/L 82*  --   --    HSTNI D4 ng/L 18  --   --          Results from last 7 days   Lab Units 01/21/23  1414   PROTIME seconds 16 0*   INR  1 29*   PTT seconds 30         Results from last 7 days   Lab Units 01/21/23  1414   PROCALCITONIN ng/ml <0 05     Results from last 7 days   Lab Units 01/21/23  1414   LACTIC ACID mmol/L 2 0             Results from last 7 days   Lab Units 01/21/23  1414   BNP pg/mL 577*         Results from last 7 days   Lab Units 01/21/23  1826   CLARITY UA  Clear   COLOR UA  Yellow   SPEC GRAV UA  1 025   PH UA  6 0   GLUCOSE UA mg/dl Negative   KETONES UA mg/dl Negative   BLOOD UA  Small*   PROTEIN UA mg/dl Negative   NITRITE UA  Negative   BILIRUBIN UA  Negative   UROBILINOGEN UA E U /dl 0 2   LEUKOCYTES UA  Trace*   WBC UA /hpf 4-10*   RBC UA /hpf 0-1*   BACTERIA UA /hpf Occasional   EPITHELIAL CELLS WET PREP /hpf Moderate*     Results from last 7 days   Lab Units 01/21/23  1414   INFLUENZA A PCR  Negative   INFLUENZA B PCR  Negative   RSV PCR  Negative     Results from last 7 days   Lab Units 01/21/23  1414   BLOOD CULTURE  No Growth at 24 hrs  No Growth at 24 hrs  Medications:   Scheduled Medications:  apixaban, 5 mg, Oral, BID  atorvastatin, 10 mg, Oral, Daily  calcium carbonate, 500 mg, Oral, Daily  docusate sodium, 100 mg, Oral, BID  fish oil, 1,000 mg, Oral, Daily  furosemide, 40 mg, Oral, Daily  polyethylene glycol, 17 g, Oral, Daily  potassium chloride, 20 mEq, Oral, Daily  QUEtiapine, 12 5 mg, Oral, HS  thiamine, 100 mg, Oral, Daily  zonisamide, 100 mg, Oral, Daily      Continuous IV Infusions:     PRN Meds:  acetaminophen, 650 mg, Oral, Q6H PRN  bisacodyl, 5 mg, Oral, Daily PRN  ondansetron, 4 mg, Intravenous, Q6H PRN    Discharge Plan: transfer to [facility] for insertion PPM when bed available    Network Utilization Review Department  ATTENTION: Please call with any questions or concerns to [phone] and carefully listen to the prompts so that you are directed to the right person  All voicemails are confidential   Fadia Russell all requests for admission clinical reviews, approved or denied determinations and any other requests to dedicated fax number below belonging to the campus where the patient is receiving treatment   List of dedicated fax numbers for the Facilities:  [address] DENIALS (Administrative/Medical Necessity) [phone]   [address] (Maternity/NICU/Pediatrics) [phone]   [address] [phone]   [facility] [address] [phone]   [address] [place] [address] [address] [phone]   [address] [place] [address]p [facility] [address] [phone]

## 2023-01-23 NOTE — PLAN OF CARE
Problem: PAIN - ADULT  Goal: Verbalizes/displays adequate comfort level or baseline comfort level  Description: Interventions:  - Encourage patient to monitor pain and request assistance  - Assess pain using appropriate pain scale  - Administer analgesics based on type and severity of pain and evaluate response  - Implement non-pharmacological measures as appropriate and evaluate response  - Consider cultural and social influences on pain and pain management  - Notify physician/advanced practitioner if interventions unsuccessful or patient reports new pain  Outcome: Progressing     Problem: INFECTION - ADULT  Goal: Absence or prevention of progression during hospitalization  Description: INTERVENTIONS:  - Assess and monitor for signs and symptoms of infection  - Monitor lab/diagnostic results  - Monitor all insertion sites, i e  indwelling lines, tubes, and drains  - Monitor endotracheal if appropriate and nasal secretions for changes in amount and color  - [place] appropriate cooling/warming therapies per order  - Administer medications as ordered  - Instruct and encourage patient and family to use good hand hygiene technique  - Identify and instruct in appropriate isolation precautions for identified infection/condition  Outcome: Progressing  Goal: Absence of fever/infection during neutropenic period  Description: INTERVENTIONS:  - Monitor WBC    Outcome: Progressing     Problem: SAFETY ADULT  Goal: Patient will remain free of falls  Description: INTERVENTIONS:  - Educate patient/family on patient safety including physical limitations  - Instruct patient to call for assistance with activity   - Consult OT/PT to assist with strengthening/mobility   - Keep Call bell within reach  - Keep bed low and locked with side rails adjusted as appropriate  - Keep care items and personal belongings within reach  - Initiate and maintain comfort rounds  - Make Fall Risk Sign visible to staff  - Offer Toileting every  Hours, in advance of need  - Initiate/Maintain alarm  - Obtain necessary fall risk management equipment:   - Apply yellow socks and bracelet for high fall risk patients  - Consider moving patient to room near nurses station  Outcome: Progressing  Goal: Maintain or return to baseline ADL function  Description: INTERVENTIONS:  -  Assess patient's ability to carry out ADLs; assess patient's baseline for ADL function and identify physical deficits which impact ability to perform ADLs (bathing, care of mouth/teeth, toileting, grooming, dressing, etc )  - Assess/evaluate cause of self-care deficits   - Assess range of motion  - Assess patient's mobility; develop plan if impaired  - Assess patient's need for assistive devices and provide as appropriate  - Encourage maximum independence but intervene and supervise when necessary  - Involve family in performance of ADLs  - Assess for home care needs following discharge   - Consider OT consult to assist with ADL evaluation and planning for discharge  - Provide patient education as appropriate  Outcome: Progressing  Goal: Maintains/Returns to pre admission functional level  Description: INTERVENTIONS:  - Perform BMAT or MOVE assessment daily    - Set and communicate daily mobility goal to care team and patient/family/caregiver  - Collaborate with rehabilitation services on mobility goals if consulted  - Perform Range of Motion  times a day  - Reposition patient every  hours    - Dangle patient  times a day  - Stand patient  times a day  - Ambulate patient  times a day  - Out of bed to chair  times a day   - Out of bed for meals  times a day  - Out of bed for toileting  - Record patient progress and toleration of activity level   Outcome: Progressing     Problem: DISCHARGE PLANNING  Goal: Discharge to home or other facility with appropriate resources  Description: INTERVENTIONS:  - Identify barriers to discharge w/patient and caregiver  - Arrange for needed discharge resources and transportation as appropriate  - Identify discharge learning needs (meds, wound care, etc )  - Arrange for interpretive services to assist at discharge as needed  - Refer to Case Management Department for coordinating discharge planning if the patient needs post-hospital services based on physician/advanced practitioner order or complex needs related to functional status, cognitive ability, or social support system  Outcome: Progressing     Problem: Knowledge Deficit  Goal: Patient/family/caregiver demonstrates understanding of disease process, treatment plan, medications, and discharge instructions  Description: Complete learning assessment and assess knowledge base  Interventions:  - Provide teaching at level of understanding  - Provide teaching via preferred learning methods  Outcome: Progressing     Problem: MOBILITY - ADULT  Goal: Maintain or return to baseline ADL function  Description: INTERVENTIONS:  -  Assess patient's ability to carry out ADLs; assess patient's baseline for ADL function and identify physical deficits which impact ability to perform ADLs (bathing, care of mouth/teeth, toileting, grooming, dressing, etc )  - Assess/evaluate cause of self-care deficits   - Assess range of motion  - Assess patient's mobility; develop plan if impaired  - Assess patient's need for assistive devices and provide as appropriate  - Encourage maximum independence but intervene and supervise when necessary  - Involve family in performance of ADLs  - Assess for home care needs following discharge   - Consider OT consult to assist with ADL evaluation and planning for discharge  - Provide patient education as appropriate  Outcome: Progressing  Goal: Maintains/Returns to pre admission functional level  Description: INTERVENTIONS:  - Perform BMAT or MOVE assessment daily    - Set and communicate daily mobility goal to care team and patient/family/caregiver     - Collaborate with rehabilitation services on mobility goals if consulted  - Perform Range of Motion  times a day  - Reposition patient every  hours    - Dangle patient  times a day  - Stand patient  times a day  - Ambulate patient  times a day  - Out of bed to chair  times a day   - Out of bed for meals  times a day  - Out of bed for toileting  - Record patient progress and toleration of activity level   Outcome: Progressing     Problem: Prexisting or High Potential for Compromised Skin Integrity  Goal: Skin integrity is maintained or improved  Description: INTERVENTIONS:  - Identify patients at risk for skin breakdown  - Assess and monitor skin integrity  - Assess and monitor nutrition and hydration status  - Monitor labs   - Assess for incontinence   - Turn and reposition patient  - Assist with mobility/ambulation  - Relieve pressure over bony prominences  - Avoid friction and shearing  - Provide appropriate hygiene as needed including keeping skin clean and dry  - Evaluate need for skin moisturizer/barrier cream  - Collaborate with interdisciplinary team   - Patient/family teaching  - Consider wound care consult   Outcome: Progressing

## 2023-01-23 NOTE — PHYSICAL THERAPY NOTE
PHYSICAL THERAPY NOTE          Patient Name: Cheo Maharaj  RPCMM'H Date: 1/23/2023 01/23/23 1005   PT Last Visit   PT Visit Date 01/23/23   Note Type   Note type Cancelled Session   Cancel Reasons Medical status   Additional Comments PT consult received  Chart reviewed  Plan for patient to transfer to [facility] for pacer placement       Adriano Vasquez, PT

## 2023-01-23 NOTE — ASSESSMENT & PLAN NOTE
· 82 year female patient with PMH of afib with slow ventricular response, sick sinus syndrome    Patient presented with syncope likely due to bradycardia  She has dementia, so her family makes decisions for her  Family has agreed to pacemaker placement  Patient is being transferred to [facility] for pacer

## 2023-01-23 NOTE — PROGRESS NOTES
Progress Note - Cardiology   Sofy Morales 82 y o  female MRN: [id number]  Unit/Bed#: E4 -01 Encounter: [id number]    Assessment:    • Syncope  • Atrial fibrillation with a slow ventricular response/bradycardia  • Moderate to late onset Alzheimer's dementia  • Acute cystitis without hematuria  • Chronic diastolic congestive heart failure, euvolemic    Plan:    • Patient waiting for transfer to [place] for placement of a permanent single-channel pacemaker  • Continue to observe on monitor  • Continue diet until we hear of date of scheduled pacemaker placement      PROBLEM LIST:    Patient Active Problem List    Diagnosis Date Noted   • Syncope 01/21/2023   • Acute cystitis without hematuria 01/21/2023   • 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 behavioral disturbance, psychotic disturbance, mood disturbance, or anxiety ([address] ) 01/21/2023   • Sick sinus syndrome ([address] ) 01/11/2023   • Stroke-like symptoms 01/11/2023   • History of stroke 01/10/2023   • New onset seizure ([address] )    • Seizure ([address] ) 11/14/2022   • Urinary retention 11/14/2022   • Venous stasis dermatitis of both lower extremities 09/29/2022   • Gait instability 06/22/2022   • Other fatigue 05/23/2022   • Atypical pneumonia 05/10/2022   • Respiratory insufficiency 05/10/2022   • Acute metabolic encephalopathy 42/18/3867   • Chronic diastolic heart failure ([address] ) 05/10/2022   • Thrombocytopenia ([address] ) 05/10/2022   • Other hyperlipidemia 10/06/2021   • 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   • Other insomnia 10/06/2021   • Skin lesion 10/04/2021   • Action tremor 04/01/2020   • Age-related osteoporosis without fracture 04/01/2020   • Atrial fibrillation ([address] ) 04/10/2015   • Chronic anticoagulation 04/10/2015   • Benign essential hypertension 04/11/2011       Vitals: /74 (BP Location: Right arm)   Pulse (!) 43   Temp (!) 97 °F (36 1 °C) (Temporal)   Resp 18   SpO2 98%   PREVIOUS WEIGHTS:   Weight (last 2 days) None          Wt Readings from Last 2 Encounters:   01/10/23 77 6 kg (171 lb)   01/09/23 77 8 kg (171 lb 8 3 oz)       Labs/Data:        Results from last 7 days   Lab Units 01/22/23  0600 01/21/23  1414   WBC Thousand/uL 5 17 7 92   HEMOGLOBIN g/dL 14 4 15 9*   HEMATOCRIT % 42 1 48 3*   MCV fL 91 94   MCH pg 31 1 30 9   MCHC g/dL 34 2 32 9   PLATELETS Thousands/uL 112* 117*     Results from last 7 days   Lab Units 01/22/23  0600 01/21/23  1414   EGFR ml/min/1 73sq m 65 57   SODIUM mmol/L 136 137   POTASSIUM mmol/L 3 9 4 2   CHLORIDE mmol/L 109* 106   CO2 mmol/L 23 21   BUN mg/dL 19 18   CREATININE mg/dL 0 82 0 91     Results from last 7 days   Lab Units 01/22/23  0600 01/21/23  1414   CALCIUM mg/dL 9 3 10 3*   AST U/L 18 21   ALT U/L 9 10   ALK PHOS U/L 60 83     Lab Results   Component Value Date    NTBNP 3,821 (H) 01/09/2023    NTBNP 7,504 (H) 05/10/2022     Lab Results   Component Value Date    CHOLESTEROL 135 01/09/2023    TRIG 78 01/09/2023    HDL 52 01/09/2023    LDLCALC 67 01/09/2023    HGBA1C 5 4 01/09/2023     Results from last 7 days   Lab Units 01/21/23  1414   INR  1 29*   PROTIME seconds 16 0*          Current Facility-Administered Medications:   •  acetaminophen (TYLENOL) tablet 650 mg, 650 mg, Oral, Q6H PRN, Hyun Riojas MD  •  apixaban (ELIQUIS) tablet 5 mg, 5 mg, Oral, BID, Diana Kruger MD, 5 mg at 01/23/23 1041  •  atorvastatin (LIPITOR) tablet 10 mg, 10 mg, Oral, Daily, Diana Kruger MD, 10 mg at 01/23/23 1040  •  bisacodyl (DULCOLAX) EC tablet 5 mg, 5 mg, Oral, Daily PRN, Hyun Riojas MD  •  calcium carbonate (TUMS) chewable tablet 500 mg, 500 mg, Oral, Daily, Diana Kruger MD, 500 mg at 01/23/23 1041  •  docusate sodium (COLACE) capsule 100 mg, 100 mg, Oral, BID, Diana Kruger MD, 100 mg at 01/23/23 1041  •  fish oil capsule 1,000 mg, 1,000 mg, Oral, Daily, Diana Kruger MD, 1,000 mg at 01/23/23 1040  •  furosemide (LASIX) tablet 40 mg, 40 mg, Oral, Daily, Diana Kruger MD, 40 mg at 01/22/23 1024  •  ondansetron (ZOFRAN) injection 4 mg, 4 mg, Intravenous, Q6H PRN, Marino Cedillo MD  •  polyethylene glycol (MIRALAX) packet 17 g, 17 g, Oral, Daily, Diana Kruger MD, 17 g at 01/23/23 1041  •  potassium chloride (K-DUR,KLOR-CON) CR tablet 20 mEq, 20 mEq, Oral, Daily, Diana Kruger MD, 20 mEq at 01/23/23 1041  •  QUEtiapine (SEROquel) tablet 12 5 mg, 12 5 mg, Oral, HS, Marino Cedillo MD, 12 5 mg at 01/22/23 2207  •  thiamine tablet 100 mg, 100 mg, Oral, Daily, Diana Kruger MD, 100 mg at 01/23/23 1040  •  zonisamide (ZONEGRAN) capsule 100 mg, 100 mg, Oral, Daily, Diana Kruger MD, 100 mg at 01/23/23 1042  Allergies   Allergen Reactions   • Morphine        No intake or output data in the 24 hours ending 01/23/23 1115    Invasive Devices     Peripheral Intravenous Line  Duration           Peripheral IV 01/21/23 Right Antecubital 1 day                Review of Systems   Unable to perform ROS: Dementia       Physical Exam  Constitutional:       General: She is not in acute distress  Appearance: She is well-developed  HENT:      Head: Normocephalic and atraumatic  Neck:      Thyroid: No thyromegaly  Vascular: No carotid bruit or JVD  Trachea: No tracheal deviation  Cardiovascular:      Rate and Rhythm: Bradycardia present  Rhythm irregularly irregular  Pulses: Normal pulses  Heart sounds: Normal heart sounds  No murmur heard  No friction rub  No gallop  Pulmonary:      Effort: Pulmonary effort is normal  No respiratory distress  Breath sounds: Normal breath sounds  No wheezing, rhonchi or rales  Chest:      Chest wall: No tenderness  Musculoskeletal:         General: Normal range of motion  Cervical back: Normal range of motion and neck supple  Right lower leg: No edema  Left lower leg: No edema  Skin:     General: Skin is warm and dry  Neurological:      General: No focal deficit present  Mental Status: She is alert and oriented to person, place, and time  Psychiatric:         Mood and Affect: Mood normal          Behavior: Behavior normal          Thought Content: Thought content normal          Judgment: Judgment normal          ======================================================     Imaging:   I have personally reviewed pertinent reports  EKG: Atrial fibrillation with a slow ventricular response      Portions of the record may have been created with voice recognition software  Occasional wrong word or "sound a like" substitutions may have occurred due to the inherent limitations of voice recognition software  Read the chart carefully and recognize, using context, where substitutions have occurred

## 2023-01-23 NOTE — ASSESSMENT & PLAN NOTE
· Anticoagulated on elquis   · History of A  fib with slow ventricular response/sick sinus syndrome with previously recorded 3 to 4-second pauses  · Previous recommendations for pacemaker placement  · Patient's son now interested in pacemaker placement this admission  · Cardiology agrees and recommends [facility] [place] [facility]acer insertion

## 2023-01-23 NOTE — DISCHARGE SUMMARY
[facility]  Discharge- Edd Farnsworthch 1935, 82 y o  female MRN: [id number]  Unit/Bed#: E4 -01 Encounter: [id number]  Primary Care Provider: Silva Galeana DO   Date and time admitted to hospital: 1/21/2023  1:27 PM    * Syncope  Assessment & Plan  · 82 year female patient with PMH of afib with slow ventricular response, sick sinus syndrome    Patient presented with syncope likely due to bradycardia  She has dementia, so her family makes decisions for her  Family has agreed to pacemaker placement  Patient is being transferred to [place] for pacer      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 behavioral disturbance, psychotic disturbance, mood disturbance, or anxiety (HCC)  Assessment & Plan  · Patient's family makes decisions for her    Acute cystitis without hematuria  Assessment & Plan  · Diagnosed prior to admission  · Urine cultures and sensitivities from 1/11/2023 reviewed  · Continue Augmentin through 1/22/2023    Sick sinus syndrome (HCC)  Assessment & Plan  · Needs pacemaker insertion    Chronic diastolic heart failure (HCC)  Assessment & Plan  Wt Readings from Last 3 Encounters:   01/10/23 77 6 kg (171 lb)   01/09/23 77 8 kg (171 lb 8 3 oz)   11/14/22 77 8 kg (171 lb 8 3 oz)   · Appears euvolemic   · Continue home Lasix 40 mg daily      Atrial fibrillation (HCC)  Assessment & Plan  · Anticoagulated on elquis   · History of A  fib with slow ventricular response/sick sinus syndrome with previously recorded 3 to 4-second pauses  · Previous recommendations for pacemaker placement  · Patient's son now interested in pacemaker placement this admission  · Cardiology agrees and recommends transfer to [facility] [place] for pacer insertion          Discharging Physician / Practitioner: Chinedu Melton DO  PCP: Silva Galeana DO  Admission Date:   Admission Orders (From admission, onward)     Ordered        01/22/23 1147  Inpatient Admission  Once            01/21/23 0694 Place in Observation  Once                      Discharge Date: 01/23/23    Medical Problems     Resolved Problems  Date Reviewed: 1/21/2023   None           Consultations During Hospital Stay:  · cardiology        Reason for Admission:  syncope      Hospital Course:     Damion Moore is a 82 y o  female patient who originally presented to the hospital on 1/21/2023 due to  syncope  Patient has longstanding sick sinus syndrome with slow afib  This is thought to be the cause for the syncope  Although hesitant in the past, family has agreed to pacemaker placement  Patient is being transferred to [place] for pacemaker placement  Please see above list of diagnoses and related plan for additional information  Condition at Discharge: stable       Discharge Day Visit / Exam:     Subjective:  Feels well  Ok with going to [place] for pacer  No light headedness      Vitals: Blood Pressure: 108/71 (01/23/23 0735)  Pulse: 58 (01/23/23 0735)  Temperature: (!) 97 °F (36 1 °C) (01/23/23 0735)  Temp Source: Temporal (01/23/23 0735)  Respirations: 18 (01/23/23 0735)  SpO2: 98 % (01/23/23 0735)    Exam:     Physical Exam  Vitals and nursing note reviewed  HENT:      Head: Normocephalic and atraumatic  Eyes:      Pupils: Pupils are equal, round, and reactive to light  Cardiovascular:      Rate and Rhythm: Normal rate and regular rhythm  Heart sounds: No murmur heard  No friction rub  No gallop  Pulmonary:      Effort: Pulmonary effort is normal       Breath sounds: Normal breath sounds  No wheezing or rales  Abdominal:      General: Bowel sounds are normal       Palpations: Abdomen is soft  Tenderness: There is no abdominal tenderness  Musculoskeletal:      Right lower leg: No edema  Left lower leg: No edema            Discharge instructions/Information to patient and family:   See after visit summary for information provided to patient and family        Provisions for Follow-Up Care:  See after visit summary for information related to follow-up care and any pertinent home health orders  Disposition:          Discharge Statement:  I spent 45 minutes discharging the patient  This time was spent on the day of discharge  I had direct contact with the patient on the day of discharge  Greater than 50% of the total time was spent examining patient, answering all patient questions, arranging and discussing plan of care with patient as well as directly providing post-discharge instructions  Additional time then spent on discharge activities  Discharge Medications:  See after visit summary for reconciled discharge medications provided to patient and family        ** Please Note: This note has been constructed using a voice recognition system **

## 2023-01-23 NOTE — OCCUPATIONAL THERAPY NOTE
Occupational Therapy Cancellation Note        Patient Name: Jad Young  GQTBH'[id number] Date: 1/23/2023      OT consult received  Pt to transfer to [facility] for pacemaker placement  Will hold on evaluation at this time      Abby Reilly MS, OTR/L

## 2023-01-24 ENCOUNTER — HOSPITAL ENCOUNTER (INPATIENT)
Facility: HOSPITAL | Age: 88
LOS: 3 days | Discharge: HOME | End: 2023-01-27
Attending: INTERNAL MEDICINE | Admitting: INTERNAL MEDICINE

## 2023-01-24 VITALS
OXYGEN SATURATION: 95 % | RESPIRATION RATE: 18 BRPM | HEART RATE: 60 BPM | SYSTOLIC BLOOD PRESSURE: 130 MMHG | TEMPERATURE: 97.9 F | DIASTOLIC BLOOD PRESSURE: 80 MMHG

## 2023-01-24 DIAGNOSIS — I48.91 ATRIAL FIBRILLATION, UNSPECIFIED TYPE (HCC): ICD-10-CM

## 2023-01-24 DIAGNOSIS — I49.5 SICK SINUS SYNDROME (HCC): ICD-10-CM

## 2023-01-24 DIAGNOSIS — R55 SYNCOPE, UNSPECIFIED SYNCOPE TYPE: Primary | ICD-10-CM

## 2023-01-24 RX ORDER — ZONISAMIDE 100 MG/1
100 CAPSULE ORAL DAILY
Status: DISCONTINUED | OUTPATIENT
Start: 2023-01-25 | End: 2023-01-27 | Stop reason: HOSPADM

## 2023-01-24 RX ORDER — DOCUSATE SODIUM 100 MG/1
100 CAPSULE, LIQUID FILLED ORAL 2 TIMES DAILY
Status: DISCONTINUED | OUTPATIENT
Start: 2023-01-25 | End: 2023-01-27 | Stop reason: HOSPADM

## 2023-01-24 RX ORDER — FUROSEMIDE 40 MG/1
40 TABLET ORAL DAILY
Status: DISCONTINUED | OUTPATIENT
Start: 2023-01-25 | End: 2023-01-27 | Stop reason: HOSPADM

## 2023-01-24 RX ORDER — CALCIUM CARBONATE 200(500)MG
500 TABLET,CHEWABLE ORAL DAILY
Status: DISCONTINUED | OUTPATIENT
Start: 2023-01-25 | End: 2023-01-27 | Stop reason: HOSPADM

## 2023-01-24 RX ORDER — POLYETHYLENE GLYCOL 3350 17 G/17G
17 POWDER, FOR SOLUTION ORAL DAILY
Status: DISCONTINUED | OUTPATIENT
Start: 2023-01-25 | End: 2023-01-27 | Stop reason: HOSPADM

## 2023-01-24 RX ORDER — ONDANSETRON 2 MG/ML
4 INJECTION INTRAMUSCULAR; INTRAVENOUS EVERY 6 HOURS PRN
Status: DISCONTINUED | OUTPATIENT
Start: 2023-01-24 | End: 2023-01-26

## 2023-01-24 RX ORDER — ATORVASTATIN CALCIUM 10 MG/1
10 TABLET, FILM COATED ORAL DAILY
Status: DISCONTINUED | OUTPATIENT
Start: 2023-01-25 | End: 2023-01-27 | Stop reason: HOSPADM

## 2023-01-24 RX ORDER — ACETAMINOPHEN 325 MG/1
650 TABLET ORAL EVERY 6 HOURS PRN
Status: DISCONTINUED | OUTPATIENT
Start: 2023-01-24 | End: 2023-01-27 | Stop reason: HOSPADM

## 2023-01-24 RX ORDER — POTASSIUM CHLORIDE 20 MEQ/1
20 TABLET, EXTENDED RELEASE ORAL DAILY
Status: DISCONTINUED | OUTPATIENT
Start: 2023-01-25 | End: 2023-01-27 | Stop reason: HOSPADM

## 2023-01-24 RX ORDER — LANOLIN ALCOHOL/MO/W.PET/CERES
100 CREAM (GRAM) TOPICAL DAILY
Status: DISCONTINUED | OUTPATIENT
Start: 2023-01-25 | End: 2023-01-27 | Stop reason: HOSPADM

## 2023-01-24 RX ORDER — QUETIAPINE FUMARATE 25 MG/1
12.5 TABLET, FILM COATED ORAL
Status: DISCONTINUED | OUTPATIENT
Start: 2023-01-24 | End: 2023-01-26

## 2023-01-24 RX ORDER — AMOXICILLIN AND CLAVULANATE POTASSIUM 875; 125 MG/1; MG/1
1 TABLET, FILM COATED ORAL EVERY 12 HOURS SCHEDULED
Status: COMPLETED | OUTPATIENT
Start: 2023-01-24 | End: 2023-01-24

## 2023-01-24 RX ORDER — CHLORAL HYDRATE 500 MG
1000 CAPSULE ORAL DAILY
Status: DISCONTINUED | OUTPATIENT
Start: 2023-01-25 | End: 2023-01-25

## 2023-01-24 RX ADMIN — ATORVASTATIN CALCIUM 10 MG: 10 TABLET, FILM COATED ORAL at 08:15

## 2023-01-24 RX ADMIN — DOCUSATE SODIUM 100 MG: 100 CAPSULE, LIQUID FILLED ORAL at 08:14

## 2023-01-24 RX ADMIN — APIXABAN 5 MG: 5 TABLET, FILM COATED ORAL at 08:14

## 2023-01-24 RX ADMIN — POTASSIUM CHLORIDE 20 MEQ: 1500 TABLET, EXTENDED RELEASE ORAL at 08:14

## 2023-01-24 RX ADMIN — THIAMINE HCL TAB 100 MG 100 MG: 100 TAB at 08:14

## 2023-01-24 RX ADMIN — AMOXICILLIN AND CLAVULANATE POTASSIUM 1 TABLET: 875; 125 TABLET, FILM COATED ORAL at 21:29

## 2023-01-24 RX ADMIN — OMEGA-3 FATTY ACIDS CAP 1000 MG 1000 MG: 1000 CAP at 08:14

## 2023-01-24 RX ADMIN — ZONISAMIDE 100 MG: 100 CAPSULE ORAL at 08:15

## 2023-01-24 RX ADMIN — QUETIAPINE FUMARATE 12.5 MG: 25 TABLET ORAL at 21:29

## 2023-01-24 RX ADMIN — CALCIUM CARBONATE (ANTACID) CHEW TAB 500 MG 500 MG: 500 CHEW TAB at 08:15

## 2023-01-24 RX ADMIN — DOCUSATE SODIUM 100 MG: 100 CAPSULE, LIQUID FILLED ORAL at 17:30

## 2023-01-24 RX ADMIN — APIXABAN 5 MG: 5 TABLET, FILM COATED ORAL at 17:30

## 2023-01-24 RX ADMIN — POLYETHYLENE GLYCOL 3350 17 G: 17 POWDER, FOR SOLUTION ORAL at 08:16

## 2023-01-24 RX ADMIN — FUROSEMIDE 40 MG: 40 TABLET ORAL at 08:14

## 2023-01-24 NOTE — Clinical Note
Defib pad site: left lower flank  Defib pad site: right upper chest Defib pad site assessment: skin integrity intact

## 2023-01-24 NOTE — PLAN OF CARE
Problem: PAIN - ADULT  Goal: Verbalizes/displays adequate comfort level or baseline comfort level  Description: Interventions:  - Encourage patient to monitor pain and request assistance  - Assess pain using appropriate pain scale  - Administer analgesics based on type and severity of pain and evaluate response  - Implement non-pharmacological measures as appropriate and evaluate response  - Consider cultural and social influences on pain and pain management  - Notify physician/advanced practitioner if interventions unsuccessful or patient reports new pain  Outcome: Progressing     Problem: INFECTION - ADULT  Goal: Absence or prevention of progression during hospitalization  Description: INTERVENTIONS:  - Assess and monitor for signs and symptoms of infection  - Monitor lab/diagnostic results  - Monitor all insertion sites, i e  indwelling lines, tubes, and drains  - Monitor endotracheal if appropriate and nasal secretions for changes in amount and color  - [place] appropriate cooling/warming therapies per order  - Administer medications as ordered  - Instruct and encourage patient and family to use good hand hygiene technique  - Identify and instruct in appropriate isolation precautions for identified infection/condition  Outcome: Progressing  Goal: Absence of fever/infection during neutropenic period  Description: INTERVENTIONS:  - Monitor WBC    Outcome: Progressing     Problem: SAFETY ADULT  Goal: Patient will remain free of falls  Description: INTERVENTIONS:  - Educate patient/family on patient safety including physical limitations  - Instruct patient to call for assistance with activity   - Consult OT/PT to assist with strengthening/mobility   - Keep Call bell within reach  - Keep bed low and locked with side rails adjusted as appropriate  - Keep care items and personal belongings within reach  - Initiate and maintain comfort rounds  - Make Fall Risk Sign visible to staff  - Offer Toileting every 2 Hours, in advance of need  - Initiate/Maintain bed alarm  - Obtain necessary fall risk management equipment: alarm  - Apply yellow socks and bracelet for high fall risk patients  - Consider moving patient to room near nurses station  Outcome: Progressing  Goal: Maintain or return to baseline ADL function  Description: INTERVENTIONS:  -  Assess patient's ability to carry out ADLs; assess patient's baseline for ADL function and identify physical deficits which impact ability to perform ADLs (bathing, care of mouth/teeth, toileting, grooming, dressing, etc )  - Assess/evaluate cause of self-care deficits   - Assess range of motion  - Assess patient's mobility; develop plan if impaired  - Assess patient's need for assistive devices and provide as appropriate  - Encourage maximum independence but intervene and supervise when necessary  - Involve family in performance of ADLs  - Assess for home care needs following discharge   - Consider OT consult to assist with ADL evaluation and planning for discharge  - Provide patient education as appropriate  Outcome: Progressing  Goal: Maintains/Returns to pre admission functional level  Description: INTERVENTIONS:  - Perform BMAT or MOVE assessment daily    - Set and communicate daily mobility goal to care team and patient/family/caregiver  - Collaborate with rehabilitation services on mobility goals if consulted  - Perform Range of Motion 3 times a day  - Reposition patient every 3 hours    - Dangle patient 3 times a day  - Stand patient 3 times a day  - Ambulate patient 3 times a day  - Out of bed to chair 3 times a day   - Out of bed for meals 3 times a day  - Out of bed for toileting  - Record patient progress and toleration of activity level   Outcome: Progressing     Problem: DISCHARGE PLANNING  Goal: Discharge to home or other facility with appropriate resources  Description: INTERVENTIONS:  - Identify barriers to discharge w/patient and caregiver  - Arrange for needed discharge resources and transportation as appropriate  - Identify discharge learning needs (meds, wound care, etc )  - Arrange for interpretive services to assist at discharge as needed  - Refer to Case Management Department for coordinating discharge planning if the patient needs post-hospital services based on physician/advanced practitioner order or complex needs related to functional status, cognitive ability, or social support system  Outcome: Progressing     Problem: Knowledge Deficit  Goal: Patient/family/caregiver demonstrates understanding of disease process, treatment plan, medications, and discharge instructions  Description: Complete learning assessment and assess knowledge base  Interventions:  - Provide teaching at level of understanding  - Provide teaching via preferred learning methods  Outcome: Progressing     Problem: MOBILITY - ADULT  Goal: Maintain or return to baseline ADL function  Description: INTERVENTIONS:  -  Assess patient's ability to carry out ADLs; assess patient's baseline for ADL function and identify physical deficits which impact ability to perform ADLs (bathing, care of mouth/teeth, toileting, grooming, dressing, etc )  - Assess/evaluate cause of self-care deficits   - Assess range of motion  - Assess patient's mobility; develop plan if impaired  - Assess patient's need for assistive devices and provide as appropriate  - Encourage maximum independence but intervene and supervise when necessary  - Involve family in performance of ADLs  - Assess for home care needs following discharge   - Consider OT consult to assist with ADL evaluation and planning for discharge  - Provide patient education as appropriate  Outcome: Progressing  Goal: Maintains/Returns to pre admission functional level  Description: INTERVENTIONS:  - Perform BMAT or MOVE assessment daily    - Set and communicate daily mobility goal to care team and patient/family/caregiver     - Collaborate with rehabilitation services on mobility goals if consulted  - Perform Range of Motion 3 times a day  - Reposition patient every 3 hours    - Dangle patient 3 times a day  - Stand patient 33 times a day  - Ambulate patient 3 times a day  - Out of bed to chair 3 times a day   - Out of bed for meals 3 times a day  - Out of bed for toileting  - Record patient progress and toleration of activity level   Outcome: Progressing     Problem: Prexisting or High Potential for Compromised Skin Integrity  Goal: Skin integrity is maintained or improved  Description: INTERVENTIONS:  - Identify patients at risk for skin breakdown  - Assess and monitor skin integrity  - Assess and monitor nutrition and hydration status  - Monitor labs   - Assess for incontinence   - Turn and reposition patient  - Assist with mobility/ambulation  - Relieve pressure over bony prominences  - Avoid friction and shearing  - Provide appropriate hygiene as needed including keeping skin clean and dry  - Evaluate need for skin moisturizer/barrier cream  - Collaborate with interdisciplinary team   - Patient/family teaching  - Consider wound care consult   Outcome: Progressing

## 2023-01-24 NOTE — PROGRESS NOTES
Progress Note - Cardiology   Adriel Horner 82 y o  female MRN: [id number]  Unit/Bed#: E4 -01 Encounter: [id number]    Assessment:    • Syncope  • Atrial fibrillation with a slow ventricular response/bradycardia and known pauses up to 4 seconds  • Moderate to late onset Alzheimer's dementia  • Chronic diastolic congestive heart failure, euvolemic    Plan:    • Waiting for transfer to [place] for placement of a permanent single lead pacemaker  • Continue to observe on monitor  • Continue diet until we hear of date of scheduled pacemaker placement      Interval history:  No change in patient's status    PROBLEM LIST:    Patient Active Problem List    Diagnosis Date Noted   • Syncope 01/21/2023   • Acute cystitis without hematuria 01/21/2023   • 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 behavioral disturbance, psychotic disturbance, mood disturbance, or anxiety ([address] ) 01/21/2023   • Sick sinus syndrome ([address] ) 01/11/2023   • Stroke-like symptoms 01/11/2023   • History of stroke 01/10/2023   • New onset seizure ([address] )    • Seizure ([address] ) 11/14/2022   • Urinary retention 11/14/2022   • Venous stasis dermatitis of both lower extremities 09/29/2022   • Gait instability 06/22/2022   • Other fatigue 05/23/2022   • Atypical pneumonia 05/10/2022   • Respiratory insufficiency 05/10/2022   • Acute metabolic encephalopathy 75/97/4647   • Chronic diastolic heart failure ([address] ) 05/10/2022   • Thrombocytopenia ([address] ) 05/10/2022   • Other hyperlipidemia 10/06/2021   • 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   • Other insomnia 10/06/2021   • Skin lesion 10/04/2021   • Action tremor 04/01/2020   • Age-related osteoporosis without fracture 04/01/2020   • Atrial fibrillation ([address] ) 04/10/2015   • Chronic anticoagulation 04/10/2015   • Benign essential hypertension 04/11/2011       Vitals: /63 (BP Location: Left arm)   Pulse 60   Temp (!) 97 4 °F (36 3 °C) (Temporal)   Resp 20   SpO2 96% PREVIOUS WEIGHTS:   Weight (last 2 days)     None          Wt Readings from Last 2 Encounters:   01/10/23 77 6 kg (171 lb)   01/09/23 77 8 kg (171 lb 8 3 oz)       Labs/Data:        Results from last 7 days   Lab Units 01/22/23  0600 01/21/23  1414   WBC Thousand/uL 5 17 7 92   HEMOGLOBIN g/dL 14 4 15 9*   HEMATOCRIT % 42 1 48 3*   MCV fL 91 94   MCH pg 31 1 30 9   MCHC g/dL 34 2 32 9   PLATELETS Thousands/uL 112* 117*     Results from last 7 days   Lab Units 01/22/23  0600 01/21/23  1414   EGFR ml/min/1 73sq m 65 57   SODIUM mmol/L 136 137   POTASSIUM mmol/L 3 9 4 2   CHLORIDE mmol/L 109* 106   CO2 mmol/L 23 21   BUN mg/dL 19 18   CREATININE mg/dL 0 82 0 91     Results from last 7 days   Lab Units 01/22/23  0600 01/21/23  1414   CALCIUM mg/dL 9 3 10 3*   AST U/L 18 21   ALT U/L 9 10   ALK PHOS U/L 60 83     Lab Results   Component Value Date    NTBNP 3,821 (H) 01/09/2023    NTBNP 7,504 (H) 05/10/2022     Lab Results   Component Value Date    CHOLESTEROL 135 01/09/2023    TRIG 78 01/09/2023    HDL 52 01/09/2023    LDLCALC 67 01/09/2023    HGBA1C 5 4 01/09/2023       Results from last 7 days   Lab Units 01/21/23  1414   INR  1 29*   PROTIME seconds 16 0*          Current Facility-Administered Medications:   •  acetaminophen (TYLENOL) tablet 650 mg, 650 mg, Oral, Q6H PRN, Diana Kruger MD  •  apixaban (ELIQUIS) tablet 5 mg, 5 mg, Oral, BID, Diana Kruger MD, 5 mg at 01/24/23 6145  •  atorvastatin (LIPITOR) tablet 10 mg, 10 mg, Oral, Daily, Diana Kruger MD, 10 mg at 01/24/23 0815  •  bisacodyl (DULCOLAX) EC tablet 5 mg, 5 mg, Oral, Daily PRN, Namita Pino MD  •  calcium carbonate (TUMS) chewable tablet 500 mg, 500 mg, Oral, Daily, Diana Kruger MD, 500 mg at 01/24/23 0815  •  docusate sodium (COLACE) capsule 100 mg, 100 mg, Oral, BID, Diana Kruger MD, 100 mg at 01/24/23 3124  •  fish oil capsule 1,000 mg, 1,000 mg, Oral, Daily, Diana Kruger MD, 1,000 mg at 01/24/23 0814  •  furosemide (LASIX) tablet 40 mg, 40 mg, Oral, Daily, Diana Kruger MD, 40 mg at 01/24/23 0814  •  ondansetron (ZOFRAN) injection 4 mg, 4 mg, Intravenous, Q6H PRN, Diana Kruger MD  •  polyethylene glycol (MIRALAX) packet 17 g, 17 g, Oral, Daily, Diana Kruger MD, 17 g at 01/24/23 0816  •  potassium chloride (K-DUR,KLOR-CON) CR tablet 20 mEq, 20 mEq, Oral, Daily, Diana Kruger MD, 20 mEq at 01/24/23 8213  •  QUEtiapine (SEROquel) tablet 12 5 mg, 12 5 mg, Oral, HS, Ludmila Maguire MD, 12 5 mg at 01/23/23 2137  •  thiamine tablet 100 mg, 100 mg, Oral, Daily, Diana Kruger MD, 100 mg at 01/24/23 9716  •  zonisamide (ZONEGRAN) capsule 100 mg, 100 mg, Oral, Daily, Diana Kruger MD, 100 mg at 01/24/23 0815  Allergies   Allergen Reactions   • Morphine          Intake/Output Summary (Last 24 hours) at 1/24/2023 1138  Last data filed at 1/24/2023 0201  Gross per 24 hour   Intake 200 ml   Output --   Net 200 ml       Invasive Devices     Peripheral Intravenous Line  Duration           Peripheral IV 01/21/23 Right Antecubital 2 days                Review of Systems   Unable to perform ROS: Dementia       Physical Exam  Constitutional:       General: She is not in acute distress  Appearance: She is well-developed  HENT:      Head: Normocephalic and atraumatic  Neck:      Thyroid: No thyromegaly  Vascular: No carotid bruit or JVD  Trachea: No tracheal deviation  Cardiovascular:      Rate and Rhythm: Bradycardia present  Rhythm irregularly irregular  Pulses: Normal pulses  Heart sounds: Normal heart sounds  No murmur heard  No friction rub  No gallop  Pulmonary:      Effort: Pulmonary effort is normal  No respiratory distress  Breath sounds: Normal breath sounds  No wheezing, rhonchi or rales  Chest:      Chest wall: No tenderness  Musculoskeletal:         General: Normal range of motion        Cervical back: Normal range of motion and neck supple  Right lower leg: No edema  Left lower leg: No edema  Skin:     General: Skin is warm and dry  Neurological:      General: No focal deficit present  Mental Status: She is alert and oriented to person, place, and time  Psychiatric:         Mood and Affect: Mood normal          Behavior: Behavior normal          Thought Content: Thought content normal          Judgment: Judgment normal          ======================================================     Imaging:   I have personally reviewed pertinent reports  EKG: Atrial fibrillation with a slow ventricular response      Portions of the record may have been created with voice recognition software  Occasional wrong word or "sound a like" substitutions may have occurred due to the inherent limitations of voice recognition software  Read the chart carefully and recognize, using context, where substitutions have occurred

## 2023-01-24 NOTE — DISCHARGE SUMMARY
* Syncope  Assessment & Plan  • 82 year female patient with PMH of afib with slow ventricular response, sick sinus syndrome     Patient presented with syncope likely due to bradycardia  She has dementia, so her family makes decisions for her  Family has agreed to pacemaker placement    Patient is being transferred to [place] for pacer      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 behavioral disturbance, psychotic disturbance, mood disturbance, or anxiety ([facility] )  Assessment & Plan  • Patient's family makes decisions for her     Acute cystitis without hematuria  Assessment & Plan  • Diagnosed prior to admission  • Urine cultures and sensitivities from 1/11/2023 reviewed  • Continue Augmentin through 1/22/2023     Sick sinus syndrome [facility])  Assessment & Plan  • Needs pacemaker insertion     Chronic diastolic heart failure ([facility] )  Assessment & Plan      • Wt Readings from Last 3 Encounters:   • 01/10/23 • 77 6 kg (171 lb)   • 01/09/23 • 77 8 kg (171 lb 8 3 oz)   • 11/14/22 • 77 8 kg (171 lb 8 3 oz)   • Appears euvolemic   • Continue home Lasix 40 mg daily        Atrial fibrillation (HCC)  Assessment & Plan  • Anticoagulated on elquis   • History of A  fib with slow ventricular response/sick sinus syndrome with previously recorded 3 to 4-second pauses  • Previous recommendations for pacemaker placement  • Patient's son now interested in pacemaker placement this admission  • Cardiology agrees and recommends transfer to [facility] for pacer insertion              Discharging Physician / Practitioner: Di Dennis DO  PCP: Mica Lomas DO  Admission Date:       Admission Orders (From admission, onward)       Ordered         01/22/23 1147   Inpatient Admission  Once             01/21/23 1744   Place in Observation  Once                         Discharge Date: 01/23/23         Medical Problems      Resolved Problems  Date Reviewed: 1/21/2023   None               Consultations During [facility] Stay:  • cardiology           Reason for Admission:  syncope        Hospital Course:      Selena Wright is a 82 y o  female patient who originally presented to the hospital on 1/21/2023 due to  syncope  Patient has longstanding sick sinus syndrome with slow afib  This is thought to be the cause for the syncope  Although hesitant in the past, family has agreed to pacemaker placement  Patient is being transferred to [facility] for pacemaker placement      Please see above list of diagnoses and related plan for additional information          Condition at Discharge: stable         Discharge Day Visit / Exam:      Subjective:  Feels well  Ok with going to [facility] for pacer  No light headedness        Vitals: Blood Pressure: 108/71 (01/23/23 0735)  Pulse: 58 (01/23/23 0735)  Temperature: (!) 97 °F (36 1 °C) (01/23/23 0735)  Temp Source: Temporal (01/23/23 0735)  Respirations: 18 (01/23/23 0735)  SpO2: 98 % (01/23/23 0735)     Exam:      Physical Exam  Vitals and nursing note reviewed  HENT:      Head: Normocephalic and atraumatic  Eyes:      Pupils: Pupils are equal, round, and reactive to light  Cardiovascular:      Rate and Rhythm: Normal rate and regular rhythm  Heart sounds: No murmur heard  No friction rub  No gallop  Pulmonary:      Effort: Pulmonary effort is normal       Breath sounds: Normal breath sounds  No wheezing or rales  Abdominal:      General: Bowel sounds are normal       Palpations: Abdomen is soft  Tenderness: There is no abdominal tenderness  Musculoskeletal:      Right lower leg: No edema        Left lower leg: No edema                      Discharge instructions/Information to patient and family:   See after visit summary for information provided to patient and family        Provisions for Follow-Up Care:  See after visit summary for information related to follow-up care and any pertinent home health orders        Disposition:          Waiting on transfer to [place]    Discharge time less than 30 minutes

## 2023-01-24 NOTE — NURSING NOTE
Transport picked up Pt  Patient still has a 20 gauge on RAC  Report was attempted before she left per unit they will call back for report

## 2023-01-24 NOTE — OCCUPATIONAL THERAPY NOTE
Occupational Therapy         Patient Name: Kenrick Crystal  [id number] Date: 1/24/2023        MD's orders received  Pt to be transferred to [facility] for pacemaker insertion  Will d/c OT orders   Harrison Medina

## 2023-01-24 NOTE — Clinical Note
The PACER GENERATOR ALBA XT SR MRI SURESCAN device was inserted  The leads were placed into the connector and visually verified to be in correct position  Injury current obtained

## 2023-01-24 NOTE — PLAN OF CARE
Problem: PAIN - ADULT  Goal: Verbalizes/displays adequate comfort level or baseline comfort level  Description: Interventions:  - Encourage patient to monitor pain and request assistance  - Assess pain using appropriate pain scale  - Administer analgesics based on type and severity of pain and evaluate response  - Implement non-pharmacological measures as appropriate and evaluate response  - Consider cultural and social influences on pain and pain management  - Notify physician/advanced practitioner if interventions unsuccessful or patient reports new pain  Outcome: Progressing     Problem: INFECTION - ADULT  Goal: Absence or prevention of progression during hospitalization  Description: INTERVENTIONS:  - Assess and monitor for signs and symptoms of infection  - Monitor lab/diagnostic results  - Monitor all insertion sites, i e  indwelling lines, tubes, and drains  - Monitor endotracheal if appropriate and nasal secretions for changes in amount and color  - Katy appropriate cooling/warming therapies per order  - Administer medications as ordered  - Instruct and encourage patient and family to use good hand hygiene technique  - Identify and instruct in appropriate isolation precautions for identified infection/condition  Outcome: Progressing  Goal: Absence of fever/infection during neutropenic period  Description: INTERVENTIONS:  - Monitor WBC    Outcome: Progressing     Problem: SAFETY ADULT  Goal: Patient will remain free of falls  Description: INTERVENTIONS:  - Educate patient/family on patient safety including physical limitations  - Instruct patient to call for assistance with activity   - Consult OT/PT to assist with strengthening/mobility   - Keep Call bell within reach  - Keep bed low and locked with side rails adjusted as appropriate  - Keep care items and personal belongings within reach  - Initiate and maintain comfort rounds  - Make Fall Risk Sign visible to staff  - Offer Toileting every  Hours, in advance of need  - Initiate/Maintain alarm  - Obtain necessary fall risk management equipment:   - Apply yellow socks and bracelet for high fall risk patients  - Consider moving patient to room near nurses station  Outcome: Progressing  Goal: Maintain or return to baseline ADL function  Description: INTERVENTIONS:  -  Assess patient's ability to carry out ADLs; assess patient's baseline for ADL function and identify physical deficits which impact ability to perform ADLs (bathing, care of mouth/teeth, toileting, grooming, dressing, etc )  - Assess/evaluate cause of self-care deficits   - Assess range of motion  - Assess patient's mobility; develop plan if impaired  - Assess patient's need for assistive devices and provide as appropriate  - Encourage maximum independence but intervene and supervise when necessary  - Involve family in performance of ADLs  - Assess for home care needs following discharge   - Consider OT consult to assist with ADL evaluation and planning for discharge  - Provide patient education as appropriate  Outcome: Progressing  Goal: Maintains/Returns to pre admission functional level  Description: INTERVENTIONS:  - Perform BMAT or MOVE assessment daily    - Set and communicate daily mobility goal to care team and patient/family/caregiver  - Collaborate with rehabilitation services on mobility goals if consulted  - Perform Range of Motion times a day  - Reposition patient every  hours    - Dangle patient  times a day  - Stand patient  times a day  - Ambulate patient  times a day  - Out of bed to chair  times a day   - Out of bed for meals  times a day  - Out of bed for toileting  - Record patient progress and toleration of activity level   Outcome: Progressing     Problem: DISCHARGE PLANNING  Goal: Discharge to home or other facility with appropriate resources  Description: INTERVENTIONS:  - Identify barriers to discharge w/patient and caregiver  - Arrange for needed discharge resources and transportation as appropriate  - Identify discharge learning needs (meds, wound care, etc )  - Arrange for interpretive services to assist at discharge as needed  - Refer to Case Management Department for coordinating discharge planning if the patient needs post-hospital services based on physician/advanced practitioner order or complex needs related to functional status, cognitive ability, or social support system  Outcome: Progressing     Problem: Knowledge Deficit  Goal: Patient/family/caregiver demonstrates understanding of disease process, treatment plan, medications, and discharge instructions  Description: Complete learning assessment and assess knowledge base  Interventions:  - Provide teaching at level of understanding  - Provide teaching via preferred learning methods  Outcome: Progressing     Problem: MOBILITY - ADULT  Goal: Maintain or return to baseline ADL function  Description: INTERVENTIONS:  -  Assess patient's ability to carry out ADLs; assess patient's baseline for ADL function and identify physical deficits which impact ability to perform ADLs (bathing, care of mouth/teeth, toileting, grooming, dressing, etc )  - Assess/evaluate cause of self-care deficits   - Assess range of motion  - Assess patient's mobility; develop plan if impaired  - Assess patient's need for assistive devices and provide as appropriate  - Encourage maximum independence but intervene and supervise when necessary  - Involve family in performance of ADLs  - Assess for home care needs following discharge   - Consider OT consult to assist with ADL evaluation and planning for discharge  - Provide patient education as appropriate  Outcome: Progressing  Goal: Maintains/Returns to pre admission functional level  Description: INTERVENTIONS:  - Perform BMAT or MOVE assessment daily    - Set and communicate daily mobility goal to care team and patient/family/caregiver     - Collaborate with rehabilitation services on mobility goals if consulted  - Perform Range of Motion  times a day  - Reposition patient every  hours    - Dangle patient times a day  - Stand patient  times a day  - Ambulate patient  times a day  - Out of bed to chair  times a day   - Out of bed for meals  times a day  - Out of bed for toileting  - Record patient progress and toleration of activity level   Outcome: Progressing     Problem: Prexisting or High Potential for Compromised Skin Integrity  Goal: Skin integrity is maintained or improved  Description: INTERVENTIONS:  - Identify patients at risk for skin breakdown  - Assess and monitor skin integrity  - Assess and monitor nutrition and hydration status  - Monitor labs   - Assess for incontinence   - Turn and reposition patient  - Assist with mobility/ambulation  - Relieve pressure over bony prominences  - Avoid friction and shearing  - Provide appropriate hygiene as needed including keeping skin clean and dry  - Evaluate need for skin moisturizer/barrier cream  - Collaborate with interdisciplinary team   - Patient/family teaching  - Consider wound care consult   Outcome: Progressing

## 2023-01-25 LAB
ANION GAP SERPL CALCULATED.3IONS-SCNC: 4 MMOL/L (ref 4–13)
BUN SERPL-MCNC: 23 MG/DL (ref 5–25)
CALCIUM SERPL-MCNC: 9.4 MG/DL (ref 8.3–10.1)
CHLORIDE SERPL-SCNC: 111 MMOL/L (ref 96–108)
CO2 SERPL-SCNC: 24 MMOL/L (ref 21–32)
CREAT SERPL-MCNC: 0.8 MG/DL (ref 0.6–1.3)
ERYTHROCYTE [DISTWIDTH] IN BLOOD BY AUTOMATED COUNT: 16.2 % (ref 11.6–15.1)
GFR SERPL CREATININE-BSD FRML MDRD: 67 ML/MIN/1.73SQ M
GLUCOSE SERPL-MCNC: 79 MG/DL (ref 65–140)
HCT VFR BLD AUTO: 43.9 % (ref 34.8–46.1)
HGB BLD-MCNC: 14.8 G/DL (ref 11.5–15.4)
MAGNESIUM SERPL-MCNC: 2.4 MG/DL (ref 1.6–2.6)
MCH RBC QN AUTO: 31.1 PG (ref 26.8–34.3)
MCHC RBC AUTO-ENTMCNC: 33.7 G/DL (ref 31.4–37.4)
MCV RBC AUTO: 92 FL (ref 82–98)
PLATELET # BLD AUTO: 131 THOUSANDS/UL (ref 149–390)
PMV BLD AUTO: 12.1 FL (ref 8.9–12.7)
POTASSIUM SERPL-SCNC: 4 MMOL/L (ref 3.5–5.3)
RBC # BLD AUTO: 4.76 MILLION/UL (ref 3.81–5.12)
SODIUM SERPL-SCNC: 139 MMOL/L (ref 135–147)
WBC # BLD AUTO: 4.64 THOUSAND/UL (ref 4.31–10.16)

## 2023-01-25 RX ORDER — CEFAZOLIN SODIUM 1 G/50ML
1000 SOLUTION INTRAVENOUS ONCE
Status: COMPLETED | OUTPATIENT
Start: 2023-01-26 | End: 2023-01-26

## 2023-01-25 RX ADMIN — DOCUSATE SODIUM 100 MG: 100 CAPSULE, LIQUID FILLED ORAL at 17:38

## 2023-01-25 RX ADMIN — FUROSEMIDE 40 MG: 40 TABLET ORAL at 09:19

## 2023-01-25 RX ADMIN — DOCUSATE SODIUM 100 MG: 100 CAPSULE, LIQUID FILLED ORAL at 09:19

## 2023-01-25 RX ADMIN — APIXABAN 5 MG: 5 TABLET, FILM COATED ORAL at 09:19

## 2023-01-25 RX ADMIN — ATORVASTATIN CALCIUM 10 MG: 10 TABLET, FILM COATED ORAL at 09:18

## 2023-01-25 RX ADMIN — ZONISAMIDE 100 MG: 100 CAPSULE ORAL at 09:20

## 2023-01-25 RX ADMIN — CALCIUM CARBONATE (ANTACID) CHEW TAB 500 MG 500 MG: 500 CHEW TAB at 09:19

## 2023-01-25 RX ADMIN — OMEGA-3 FATTY ACIDS CAP 1000 MG 1000 MG: 1000 CAP at 09:19

## 2023-01-25 RX ADMIN — APIXABAN 5 MG: 5 TABLET, FILM COATED ORAL at 17:38

## 2023-01-25 RX ADMIN — POTASSIUM CHLORIDE 20 MEQ: 1500 TABLET, EXTENDED RELEASE ORAL at 09:21

## 2023-01-25 RX ADMIN — QUETIAPINE FUMARATE 12.5 MG: 25 TABLET ORAL at 20:37

## 2023-01-25 RX ADMIN — THIAMINE HCL TAB 100 MG 100 MG: 100 TAB at 09:19

## 2023-01-25 RX ADMIN — POLYETHYLENE GLYCOL 3350 17 G: 17 POWDER, FOR SOLUTION ORAL at 09:20

## 2023-01-25 NOTE — PHYSICAL THERAPY NOTE
Physical Therapy Evaluation    Patient's Name: Jak Plata    Admitting Diagnosis  Syncope [R55]    Problem List  Patient Active Problem List   Diagnosis    Action tremor    Age-related osteoporosis without fracture    Skin lesion    Benign essential hypertension    Other hyperlipidemia    Atrial fibrillation (HCC)    Amnestic MCI (mild cognitive impairment with memory loss)    Other insomnia    Atypical pneumonia    Respiratory insufficiency    Acute metabolic encephalopathy    Chronic diastolic heart failure (HCC)    Thrombocytopenia (HCC)    Chronic anticoagulation    Other fatigue    Gait instability    Venous stasis dermatitis of both lower extremities    Seizure ([facility] [address] )    Urinary retention    New onset seizure ([facility] [address] )    History of stroke    Sick sinus syndrome ([facility] )    Stroke-like symptoms    Syncope    Acute cystitis without hematuria    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 behavioral disturbance, psychotic disturbance, mood disturbance, or anxiety ([facility] )       Past Medical History  Past Medical History:   Diagnosis Date    A-fib ([place] [address] )     CAD (coronary artery disease)     CKD (chronic kidney disease) stage 2, GFR 60-89 ml/min     Hypertension     Memory loss     Urinary tract infection        Past Surgical History  Past Surgical History:   Procedure Laterality Date    CHOLECYSTECTOMY      REPLACEMENT TOTAL KNEE Left 2008 01/25/23 6420   PT Last Visit   PT Visit Date 01/25/23   Note Type   Note type Evaluation   Pain Assessment   Pain Assessment Tool 0-10   Pain Score No Pain   Restrictions/Precautions   Other Precautions Cognitive; Chair Alarm;Telemetry; Fall Risk   Home Living   Type of Home Assisted living   Home Layout One level   [address] [facility]   Additional Comments Pt poor historian  Per EMR pt resides at [facility], ambulates with RW   Prior Function   Level of [place] Needs assistance with ADLs; Independent with functional mobility; Needs assistance with IADLS   Lives With Facility staff   Comments will need to clarify with Cm   Cognition   Overall Cognitive Status Impaired   Attention Difficulty attending to directions   Orientation Level Oriented to person;Disoriented to place; Disoriented to time;Disoriented to situation   Memory Decreased recall of precautions;Decreased recall of recent events;Decreased short term memory   Following Commands Follows one step commands with increased time or repetition   Comments pt is pleasantly confused   RLE Assessment   RLE Assessment WFL   LLE Assessment   LLE Assessment WFL   Bed Mobility   Supine to Sit 4  Minimal assistance   Additional items Assist x 1;HOB elevated; Increased time required;LE management   Transfers   Sit to Stand 4  Minimal assistance   Additional items Assist x 1; Increased time required;Verbal cues   Stand to Sit 4  Minimal assistance   Additional items Assist x 1; Increased time required;Verbal cues   Additional Comments transfers with RW   Ambulation/Elevation   Gait pattern Excessively slow; Short stride; Foward flexed   Gait Assistance   (CGA)   Additional items Assist x 1   Assistive Device Rolling walker   Distance 120ft   Balance   Static Sitting Fair +   Dynamic Sitting Fair   Static Standing Fair -   Dynamic Standing Fair -   Ambulatory Fair -   Activity Tolerance   Activity Tolerance Patient tolerated treatment well   Medical Staff Made Aware Seen with OT 2* pt's medical complexity and multiple comorbidities  PT focus on functional transfers, gait, and endurance   Nurse Made Aware ok to see per RN   Assessment   Prognosis Good   Assessment Pt is a 82 y o  female seen for PT evaluation s/p admit to [facility] on 1/24/2023  Pt was admitted with a primary dx of: syncope  PT now consulted for assessment of mobility and d/c needs  Pt with PT evaluation orders  Pts current comorbidities effecting treatment include: a-fib, CAD, CKD, HTN, memory loss   Pts current clinical presentation is Evolving (medium complexity) due to Ongoing medical management for primary dx, Decreased activity tolerance compared to baseline, Increased assistance needed from caregiver at current time, Ongoing telemetry monitoring  Pt poor historian, hx of dementia, will need to confirm PLOF/home setup with CM  Per previous notes, pt [facility] at [facility] where she ambulates with RW  Upon evaluation, pt currently is requiring Sapna for bed mobility; Sapna for transfers; and CGA for ambulation 120 ft w/ RW  Pt presents at PT eval functioning at/near her anticipate baseline mobility level  No acute PT needs identified, PT signing off  Pt would benefit from continued ambulation with nursing and restorative staff while admitted  At conclusion of PT session pt returned back in chair and chair alarm engaged with phone and call bell within reach  Pt denies any further questions at this time  The patient's AM-PAC Basic Mobility Inpatient Short Form Raw Score is 18  A Raw score of greater than 16 suggests the patient may benefit from discharge to home  Please also refer to the recommendation of the Physical Therapist for safe discharge planning  Recommend return to facility with PT services upon hospital D/C     Goals   Patient Goals to feel better   Plan   PT Frequency Other (Comment)  (no acute PT needs, D/C PT)   Recommendation   PT Discharge Recommendation Return to facility with rehabilitation services   Equipment Recommended Walker   AM-PAC Basic Mobility Inpatient   Turning in Flat Bed Without Bedrails 3   Lying on Back to Sitting on Edge of Flat Bed Without Bedrails 3   Moving Bed to Chair 3   Standing Up From Chair Using Arms 3   Walk in Room 3   Climb 3-5 Stairs With Railing 3   Basic Mobility Inpatient Raw Score 18   Basic Mobility Standardized Score 41 05   Highest Level Of Mobility   JH-HLM Goal 6: Walk 10 steps or more   JH-HLM Achieved 7: Walk 25 feet or more   Modified Travis Scale   Modified Travis Scale 3   End of Consult   Patient Position at End of Consult Bedside chair;Bed/Chair alarm activated; All needs within reach         Will Pang, PT, DPT

## 2023-01-25 NOTE — CASE MANAGEMENT
Case Management Assessment & Discharge Planning Note    Patient name Elif Winter  Location [facility] 405/[facility] 698-91 MRN [id number]  : 1935 Date 2023       Current Admission Date: 2023  Current Admission Diagnosis:Syncope   Patient Active Problem List    Diagnosis Date Noted   • Syncope 2023   • Acute cystitis without hematuria 2023   • 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 behavioral disturbance, psychotic disturbance, mood disturbance, or anxiety ([address] ) 2023   • Sick sinus syndrome ([address] ) 2023   • Stroke-like symptoms 2023   • History of stroke 01/10/2023   • New onset seizure ([address] )    • Seizure ([address] ) 2022   • Urinary retention 2022   • Venous stasis dermatitis of both lower extremities 2022   • Gait instability 2022   • Other fatigue 2022   • Atypical pneumonia 05/10/2022   • Respiratory insufficiency 05/10/2022   • Acute metabolic encephalopathy    • Chronic diastolic heart failure ([address] ) 05/10/2022   • Thrombocytopenia ([address] ) 05/10/2022   • Other hyperlipidemia 10/06/2021   • 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   • Other insomnia 10/06/2021   • Skin lesion 10/04/2021   • Action tremor 2020   • Age-related osteoporosis without fracture 2020   • Atrial fibrillation ([facility] [address] ) 04/10/2015   • Chronic anticoagulation 04/10/2015   • Benign essential hypertension 2011      LOS (days): 1  Geometric Mean LOS (GMLOS) (days): 2 30  Days to GMLOS:1 5     OBJECTIVE:  PATIENT READMITTED TO HOSPITAL  Risk of Unplanned Readmission Score: 23 1         Current admission status: Inpatient       Preferred Pharmacy:   [address], [address] [place] [address]  Phone: [phone] Fax: [phone] [place], [place] - [address] [place] [address] Dr [address]  [facility] [address]  Phone: [phone] Fax: [phone] [facility], [address] [facility]  [address] [facility] [address] [facility]  Phone: [phone] Fax: [phone]    Primary Care Provider: No primary care provider on file  Primary Insurance: [employer] [facility] REP  Secondary Insurance:     ASSESSMENT:  [address], [address] Representative - Son   Primary Phone: [phone] (Mobile)               Advance Directives  Does patient have a [address]?: No (son Albina Lema is financial POA only)  Was patient offered paperwork?: Yes (declined)  Does patient currently have a Health Care decision maker?: Yes, please see Health Care Proxy section  Does patient have Advance Directives?: Yes  Advance Directives: Living will, Power of  for finance  Was patient offered paperwork?: Yes (declined)  Primary Contact: son Kika Najera         Readmission Root Cause  30 Day Readmission: Yes  Who directed you to return to the hospital?: Other (comment) (indep  living facility)  Did you understand whom to contact if you had questions or problems?: No (dementia)  Did you get your prescriptions before you left the hospital?: No  Reason[de-identified] Preference for own pharmacy (facility gets Rx)  Were you able to get your prescriptions filled when you left the hospital?: Yes  Did you take your medications as prescribed?: Yes  Were you able to get to your follow-up appointments?: Yes  During previous admission, was a post-acute recommendation made?: Yes  What post-acute resources were offered?: Other (pt needed PPM but son and wife declined)  Patient was readmitted due to: syncope  Action Plan: will receive PPM    Patient Information  Admitted from[de-identified] Facility ([facility]dependent living memory care [address] [place] [address])  Mental Status: Confused  During Assessment patient was accompanied by: Son  Assessment information provided by[de-identified] Son  Primary Caregiver:  Other (Comment) ([address] [place] [address] care)  Caregiver's Name[de-identified] lives at independent living memory care at [facility] Relationship to Patient[de-identified] Other (Specify) (memory care facility)  Caregiver's Telephone Number[de-identified] [phone]  Support Systems: Son, Family members, Other (Comment) ([facility] indep  living memory care)  [place] of Residence: [address] do you live in?: [place] facility in [address] entry access options   Select all that apply : No steps to enter home  Type of Current Residence: Facility  Upon entering residence, is there a bedroom on the main floor (no further steps)?: Yes  Upon entering residence, is there a bathroom on the main floor (no further steps)?: Yes  In the last 12 months, was there a time when you were not able to pay the mortgage or rent on time?: No  In the last 12 months, how many places have you lived?: 1  In the last 12 months, was there a time when you did not have a steady place to sleep or slept in a shelter (including now)?: No  Homeless/housing insecurity resource given?: N/A  Living Arrangements: Other (Comment) ([facility] with memory care)  Is patient a ?: No    Activities of Daily Living Prior to Admission  Functional Status: Assistance  Completes ADLs independently?: Yes  Ambulates independently?: Yes  Does patient use assisted devices?: Yes  Assisted Devices (DME) used: Bill Hulbert, Wheelchair  Does patient currently own DME?: Yes  What DME does the patient currently own?: Bill Hulbert, Wheelchair  Does patient have a history of Outpatient Therapy (PT/OT)?: Yes Iván Santana)  Does the patient have a history of Short-Term Rehab?: Yes ([facility] [place])  Does patient have a history of [address]?: No  Does patient currently have [address]?: No    [facility]  Type of Current Home Care Services: Home OT, Home PT, Other (Comment) ([facility] at facility)  [address][de-identified] Other (please enter name in comment) Iván Santana PT/OT)  Current Home Health Follow-Up Provider[de-identified] PCP    Patient Information Continued  Income Source: Pension/senior care  Does patient have prescription coverage?: Yes  Within the past 12 months, you worried that your food would run out before you got the money to buy more : Never true  Within the past 12 months, the food you bought just didn't last and you didn't have money to get more : Never true  Food insecurity resource given?: N/A  Does patient receive dialysis treatments?: No  Does patient have a history of substance abuse?: No  Does patient have a history of Mental Health Diagnosis?: No (does have Alzheimer's)         Means of Transportation  Means of Transport to Appts[de-identified] Family transport  In the past 12 months, has lack of transportation kept you from medical appointments or from getting medications?: No  In the past 12 months, has lack of transportation kept you from meetings, work, or from getting things needed for daily living?: No        DISCHARGE DETAILS:    Discharge planning discussed with[de-identified] son [place] of Choice: Yes     CM contacted family/caregiver?: Yes  Were Treatment Team discharge recommendations reviewed with patient/caregiver?: Yes  Did patient/caregiver verbalize understanding of patient care needs?: Yes  Were patient/caregiver advised of the risks associated with not following Treatment Team discharge recommendations?: Yes    Contacts  Patient Contacts: son Eladio Doll  Relationship to Patient[de-identified] Family  Contact Method: Phone  Phone Number: [phone]  Reason/Outcome: Continuity of Care, Emergency Contact, Discharge Planning    Requested [address]         Is the patient interested in [facility] at discharge?: No    DME Referral Provided  Referral made for DME?: No    Other Referral/Resources/Interventions Provided:  Interventions: Outpatient OT, Outpatient PT  Referral Comments: Has [facility] PT/OT at [facility]    Would you like to participate in our [address] service program?  : No - Declined    Treatment Team Recommendation: Other, [place] ([facility])  Discharge Destination Plan[de-identified] Facility Return  [facility] (son prefers wheelchair Ravindra Sprung for transport)                    Transfer Mode: Wheelchair              Family notified[de-identified] son aware of discharge plan  Additional Comments: 83yo female recommended for PPM one week ago but family denied, now returns for PPM after syncope, received today  Resides at [facility] facility  Receives PT/OT there from [facility] (OUTPATIENT [facility])  Son prefers w/c Ravindra Sprung to return

## 2023-01-25 NOTE — TREATMENT PLAN
Case with discuss further with attending and decision was made to undergo transvenous single-chamber pacemaker  This plan was relayed to patient's son who was in agreement  Consent was obtained over the phone and did ask the patient son to remain around his phone as he will not be coming in tomorrow so that anesthesia can call to obtain consent as well  As she has had 2 prior strokes in the past, will just hold the morning dose of Eliquis  Antibiotic on-call only to EP lab not to be given on the floors

## 2023-01-25 NOTE — ASSESSMENT & PLAN NOTE
Hx of afib with slow ventricular response, pauses  Likely etiology of syncope  Cardiology on board at [facility]; recommending transfer to [facility] for EP eval for PPM placement   Son in agreement with plan  Cont telemetry monitoring  Unsure of timing of PPM but will make NPO after midnight just in case

## 2023-01-25 NOTE — ASSESSMENT & PLAN NOTE
· W/ slow VR and pauses  · Presently rate controlled  · Cont to hold AV sandro agents  · A/c with eliquis -- currently held due to plan for PPM

## 2023-01-25 NOTE — H&P
[facility]  H&P- Al[place] Ill 1935, 82 y o  female MRN: [id number]  Unit/Bed#: [facility] 405-01 Encounter: [id number]  Primary Care Provider: Mica Lomas DO   Date and time admitted to hospital: 1/24/2023  6:54 PM    * Syncope  Assessment & Plan  Hx of afib with slow ventricular response, pauses  Likely etiology of syncope  Cardiology on board at [facility]; recommending transfer to [facility] for EP eval for PPM placement  Son in agreement with plan  Cont telemetry monitoring  Unsure of timing of PPM but will make NPO after midnight just in case      Atrial fibrillation [facility])  Assessment & Plan  W/ slow VR and pauses  Presently rate controlled  Cont to hold AV sandro agents  A/c with eliquis    Chronic diastolic heart failure (HCC)  Assessment & Plan  Wt Readings from Last 3 Encounters:   01/10/23 77 6 kg (171 lb)   01/09/23 77 8 kg (171 lb 8 3 oz)   11/14/22 77 8 kg (171 lb 8 3 oz)   Clinically compensated  Cont chronically dosed lasix          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 behavioral disturbance, psychotic disturbance, mood disturbance, or anxiety (HCC)  Assessment & Plan  MS at baseline  Cont seroquel    Acute cystitis without hematuria  Assessment & Plan  ESBL ecoli  On augmentin to finish dose tonight  Non toxic appearing    Thrombocytopenia (HCC)  Assessment & Plan  Chronic hx  No sign of acute blood loss    VTE Prophylaxis: Apixaban (Eliquis)  / sequential compression device   Code Status: DNR/DNI  POLST: There is no POLST form on file for this patient (pre-hospital)  Discussion with family: Called her son Mercedes Pacheco    Anticipated Length of Stay:  Patient will be admitted on an Inpatient basis with an anticipated length of stay of  > 2 midnights  Justification for Hospital Stay: for PPM    Total Time for Visit, including Counseling / Coordination of Care: 60 minutes    Greater than 50% of this total time spent on direct patient counseling and coordination of care  Chief Complaint:  Transfer from [facility] for PPM    History of Present Illness:  History from patient limited due to hx of advanced dementia therefore obtained via chart review  Dante Amaya is a 82 y o  female med hx significant for dementia, Afib with bradycardia/pauses, cad, ckd, admitted to [facility] secondary to syncope  Pt was was seen by cardiology  Noted with HR in low 40's  Was then recommended for transfer to [facility] for PPM for which pt's son is agreement  Pt was also been txt for acute cystitis prior to admission and continued on already prescribed abx  Review of Systems:    Review of Systems   Unable to perform ROS: Dementia       Past Medical and Surgical History:     Past Medical History:   Diagnosis Date   • A-fib ([facility] [address] )    • CAD (coronary artery disease)    • CKD (chronic kidney disease) stage 2, GFR 60-89 ml/min    • Hypertension    • Memory loss    • Urinary tract infection        Past Surgical History:   Procedure Laterality Date   • CHOLECYSTECTOMY     • REPLACEMENT TOTAL KNEE Left 2008       Meds/Allergies:    Prior to Admission medications    Medication Sig Start Date End Date Taking?  Authorizing Provider   acetaminophen (TYLENOL) 500 mg tablet Take 500 mg by mouth every 6 (six) hours as needed for mild pain    Historical Provider, MD   apixaban (ELIQUIS) 5 mg Take 5 mg by mouth 2 (two) times a day    Historical Provider, MD   Ascorbic Acid (VITAMIN C) 500 MG CAPS Take 500 mg by mouth daily    Historical Provider, MD   atorvastatin (LIPITOR) 10 mg tablet Take 10 mg by mouth daily    Historical Provider, MD   bisacodyl (DULCOLAX) 5 mg EC tablet Take 5 mg by mouth daily as needed for constipation    Historical Provider, MD   calcium carbonate (TUMS) 500 mg chewable tablet Chew 1 tablet daily    Historical Provider, MD   Cholecalciferol (Vitamin D3) 1 25 MG (64703 UT) CAPS     Historical Provider, MD   Cranberry 450 MG CAPS Take 1 capsule by mouth in the morning    Historical Provider, MD docusate sodium (COLACE) 100 mg capsule Take 100 mg by mouth 2 (two) times a day    Historical Provider, MD   furosemide (LASIX) 40 mg tablet Take 1 tablet by mouth daily 2/20/20   Historical Provider, MD   Omega-3 Fatty Acids (fish oil) 1,000 mg     Historical Provider, MD   polyethylene glycol (MIRALAX) 17 g packet Take 17 g by mouth daily    Historical Provider, MD   potassium chloride (K-DUR,KLOR-CON) 20 mEq tablet Take 20 mEq by mouth daily    Historical Provider, MD   QUEtiapine (SEROquel) 25 mg tablet Take 0 5 tablets (12 5 mg total) by mouth daily at bedtime Hold if sedated 11/18/22   Baldomero Shoemaker DO   thiamine (VITAMIN B1) 100 mg tablet     Historical Provider, MD   zonisamide (ZONEGRAN) 100 mg capsule Take 1 capsule (100 mg total) by mouth daily Do not start before November 19, 2022 11/19/22   Baldomero Shoemaker,      I have reviewed home medications using allscripts  Allergies: Allergies   Allergen Reactions   • Morphine        Social History:     Marital Status:    Occupation:   Patient Pre-hospital Living Situation: NH  Patient Pre-hospital Level of Mobility:   Patient Pre-hospital Diet Restrictions: none  Substance Use History:   Social History     Substance and Sexual Activity   Alcohol Use Never     Social History     Tobacco Use   Smoking Status Never   Smokeless Tobacco Never     Social History     Substance and Sexual Activity   Drug Use Never       Family History:    non-contributory    Physical Exam:     Vitals:   Blood Pressure: 136/87 (01/24/23 1841)  Pulse: 63 (01/24/23 1841)  Temperature: 98 °F (36 7 °C) (01/24/23 1841)  SpO2: 95 % (01/24/23 1841)    Physical Exam  Constitutional:       Comments: Oriented x 0   Cardiovascular:      Rate and Rhythm: Normal rate  Rhythm irregular  Pulses: Normal pulses  Heart sounds: Normal heart sounds  No murmur heard  Pulmonary:      Effort: Pulmonary effort is normal  No respiratory distress        Breath sounds: Normal breath sounds  No wheezing or rales  Abdominal:      General: Abdomen is flat  Bowel sounds are normal  There is no distension  Palpations: Abdomen is soft  Tenderness: There is no abdominal tenderness  Musculoskeletal:         General: Normal range of motion  Cervical back: Normal range of motion and neck supple  Right lower leg: No edema  Left lower leg: No edema  Skin:     General: Skin is warm and dry  Neurological:      General: No focal deficit present  Mental Status: She is alert  Mental status is at baseline  Cranial Nerves: No cranial nerve deficit  Motor: No weakness  Comments: Baseline cognitive impairment         Additional Data:     Lab Results: I have personally reviewed pertinent reports  Results from last 7 days   Lab Units 01/22/23  0600   WBC Thousand/uL 5 17   HEMOGLOBIN g/dL 14 4   HEMATOCRIT % 42 1   PLATELETS Thousands/uL 112*   NEUTROS PCT % 49   LYMPHS PCT % 30   MONOS PCT % 16*   EOS PCT % 4     Results from last 7 days   Lab Units 01/22/23  0600   SODIUM mmol/L 136   POTASSIUM mmol/L 3 9   CHLORIDE mmol/L 109*   CO2 mmol/L 23   BUN mg/dL 19   CREATININE mg/dL 0 82   ANION GAP mmol/L 4   CALCIUM mg/dL 9 3   ALBUMIN g/dL 3 6   TOTAL BILIRUBIN mg/dL 0 82   ALK PHOS U/L 60   ALT U/L 9   AST U/L 18   GLUCOSE RANDOM mg/dL 91     Results from last 7 days   Lab Units 01/21/23  1414   INR  1 29*             Results from last 7 days   Lab Units 01/21/23  1414   LACTIC ACID mmol/L 2 0   PROCALCITONIN ng/ml <0 05       Imaging: no imaging to view    No orders to display       EKG, Pathology, and Other Studies Reviewed on Admission:   · EKG:     Allscripts / Epic Records Reviewed: Yes     ** Please Note: This note has been constructed using a voice recognition system   **

## 2023-01-25 NOTE — TELEPHONE ENCOUNTER
01/25/23 11:26 AM        The office's request has been received, reviewed, and the patient chart updated  The PCP has successfully been removed with a patient attribution note  This message will now be completed          Thank you  Parminder Silva

## 2023-01-25 NOTE — CONSULTS
Consultation - Electrophysiology - Cardiology  Edd Thomas 82 y o  female MRN: [id number]  Unit/Bed#: [facility] 405-01 Encounter: [id number]      Consults    History of Present Illness   Physician Requesting Consult: Gilford Drivers, DO  Reason for Consult / Principal Problem: Bradycardia    Assessment/Plan   Assessment:  1  Sick sinus syndrome  - afib with slow ventricular response even without AV sandro blocking agents with pauses 3 5-4 seconds  2  Recent cerebral infarct of left parietal lobe 1/2023 with prior 6/2022  3  Permanent atrial fibrillation with slow ventricular response  - anticoagulated with Eliquis / [id number] score of 5 (CVA, age, sex)  - EF of 67% per echo 1/2023 / severe dilation of left atrium noted  - rate control: none  - antiarrhythmic therapy: none  - no sinus rhythm noted on EKG's as far back as 5/2022  4  Chronic diastolic congestive heart failure  - diuretic regimen of furosemide 40mg daily   5  Essential hypertension  6  Alzheimer's dementia  - does live in memory unit    Plan:  Patient has sick sinus syndrome and would benefit from a pacemaker  As she has permanent atrial fibrillation with preserved LVEF would require only RV lead, will discuss with attending whether transvenous or leadless pacemaker would be beneficial   She does have dementia and when discussing with her son, Gustavo Triana, it was felt that less restrictions would be better in her case  Either way, procedure would be scheduled for tomorrow so we will make n p o  after midnight  We will hold either just morning dose of Eliquis or tonight's dose as well  HPI: Edd Thomas is a 82y o  year old female with sick sinus syndrome, recent syncope, permanent atrial fibrillation with slow ventricular response anticoagulated with Eliquis, chronic diastolic congestive heart failure, essential hypertension, and Alzheimer's dementia  Patient has been seen by [facility] but more recently in September 2022 established with [facility]  She does carry the diagnosis of persistent/permanent atrial fibrillation  She is also been known to have prior CVA in July 2022 and again when she presented to the [facility] earlier this month  During that hospitalization [place] she was found to have atrial fibrillation with slow ventricular response with R to R interval close to 3 5 to 4 seconds and therefore was felt that she would benefit from a pacemaker  She was scheduled for this but family at the last minute did not feel comfortable with this  Therefore, she was discharged without 1  However, when they went home and after consideration they were agreeable to pacemaker  She presented back to the [facility] and was transferred for this procedure  Offers no complaints today but again there is a component of dementia      TELE: Atrial fibrillation with slow ventricular response    EKG:       Historical Information   Past Medical History:   Diagnosis Date   • A-fib (HCC)    • CAD (coronary artery disease)    • CKD (chronic kidney disease) stage 2, GFR 60-89 ml/min    • Hypertension    • Memory loss    • Urinary tract infection      Past Surgical History:   Procedure Laterality Date   • CHOLECYSTECTOMY     • REPLACEMENT TOTAL KNEE Left 2008     Social History     Substance and Sexual Activity   Alcohol Use Never     Social History     Substance and Sexual Activity   Drug Use Never     Social History     Tobacco Use   Smoking Status Never   Smokeless Tobacco Never     Family History:   Family History   Problem Relation Age of Onset   • Lung cancer Father    • Leukemia Brother    • Breast cancer Daughter        Meds/Allergies   Hospital Medications:   Current Facility-Administered Medications   Medication Dose Route Frequency   • acetaminophen (TYLENOL) tablet 650 mg  650 mg Oral Q6H PRN   • atorvastatin (LIPITOR) tablet 10 mg  10 mg Oral Daily   • bisacodyl (DULCOLAX) EC tablet 5 mg  5 mg Oral Daily PRN   • calcium carbonate (TUMS) chewable tablet 500 mg  500 mg Oral Daily   • [START ON 1/26/2023] ceFAZolin (ANCEF) IVPB (premix in dextrose) 1,000 mg 50 mL  1,000 mg Intravenous Once   • docusate sodium (COLACE) capsule 100 mg  100 mg Oral BID   • furosemide (LASIX) tablet 40 mg  40 mg Oral Daily   • ondansetron (ZOFRAN) injection 4 mg  4 mg Intravenous Q6H PRN   • polyethylene glycol (MIRALAX) packet 17 g  17 g Oral Daily   • potassium chloride (K-DUR,KLOR-CON) CR tablet 20 mEq  20 mEq Oral Daily   • QUEtiapine (SEROquel) tablet 12 5 mg  12 5 mg Oral HS   • thiamine tablet 100 mg  100 mg Oral Daily   • zonisamide (ZONEGRAN) capsule 100 mg  100 mg Oral Daily     Home Medications:   Medications Prior to Admission   Medication   • acetaminophen (TYLENOL) 500 mg tablet   • apixaban (ELIQUIS) 5 mg   • Ascorbic Acid (VITAMIN C) 500 MG CAPS   • atorvastatin (LIPITOR) 10 mg tablet   • bisacodyl (DULCOLAX) 5 mg EC tablet   • calcium carbonate (TUMS) 500 mg chewable tablet   • Cholecalciferol (Vitamin D3) 1 25 MG (88857 UT) CAPS   • Cranberry 450 MG CAPS   • docusate sodium (COLACE) 100 mg capsule   • furosemide (LASIX) 40 mg tablet   • Omega-3 Fatty Acids (fish oil) 1,000 mg   • polyethylene glycol (MIRALAX) 17 g packet   • potassium chloride (K-DUR,KLOR-CON) 20 mEq tablet   • QUEtiapine (SEROquel) 25 mg tablet   • thiamine (VITAMIN B1) 100 mg tablet   • zonisamide (ZONEGRAN) 100 mg capsule       Allergies   Allergen Reactions   • Morphine        Objective   Vitals: Blood pressure 109/70, pulse 60, temperature 97 5 °F (36 4 °C), resp  rate 15, SpO2 95 %    Orthostatic Blood Pressures    Flowsheet Row Most Recent Value   Blood Pressure 109/70 filed at 01/25/2023 1223            Intake/Output Summary (Last 24 hours) at 1/25/2023 1453  Last data filed at 1/25/2023 1245  Gross per 24 hour   Intake 210 ml   Output 655 ml   Net -445 ml       Invasive Devices     Peripheral Intravenous Line  Duration           Peripheral IV 01/24/23 Right;Ventral (anterior) Forearm <1 day          Drain  Duration           External Urinary Catheter <1 day                Review of Systems:  Review of Systems   Unable to perform ROS: dementia     ROS as noted above, otherwise 12 point review of systems was performed and is negative  Physical Exam:   Physical Exam  Vitals and nursing note reviewed  Constitutional:       General: She is not in acute distress  Appearance: She is well-developed  She is not diaphoretic  HENT:      Head: Normocephalic and atraumatic  Eyes:      Pupils: Pupils are equal, round, and reactive to light  Neck:      Vascular: No JVD  Cardiovascular:      Rate and Rhythm: Bradycardia present  Rhythm irregular  Heart sounds: No murmur heard  No friction rub  No gallop  Pulmonary:      Effort: Pulmonary effort is normal  No respiratory distress  Breath sounds: Normal breath sounds  No wheezing  Abdominal:      Palpations: Abdomen is soft  Musculoskeletal:         General: Normal range of motion  Cervical back: Normal range of motion  Skin:     General: Skin is warm and dry  Neurological:      Mental Status: She is alert  Comments: Only oriented to self         Lab Results: I have personally reviewed pertinent lab results  Results from last 7 days   Lab Units 01/25/23  0433 01/22/23  0600 01/21/23  1414   WBC Thousand/uL 4 64 5 17 7 92   HEMOGLOBIN g/dL 14 8 14 4 15 9*   HEMATOCRIT % 43 9 42 1 48 3*   PLATELETS Thousands/uL 131* 112* 117*     Results from last 7 days   Lab Units 01/25/23  0433 01/22/23  0600 01/21/23  1414   POTASSIUM mmol/L 4 0 3 9 4 2   CHLORIDE mmol/L 111* 109* 106   CO2 mmol/L 24 23 21   BUN mg/dL 23 19 18   CREATININE mg/dL 0 80 0 82 0 91   CALCIUM mg/dL 9 4 9 3 10 3*     Results from last 7 days   Lab Units 01/21/23  1414   INR  1 29*   PTT seconds 30     Results from last 7 days   Lab Units 01/25/23  0433   MAGNESIUM mg/dL 2 4       Imaging: I have personally reviewed pertinent reports  ECHO: No results found for this or any previous visit  Cardiac testing:   ECHO:   No results found for this or any previous visit  No results found for this or any previous visit  CATH:  No results found for this or any previous visit  STRESS TEST:  No results found for this or any previous visit        VTE Prophylaxis: Sequential compression device (Venodyne)

## 2023-01-25 NOTE — PROGRESS NOTES
[facility]  Progress Note - Chikis Able 1935, 82 y o  female MRN: [id number]  Unit/Bed#: [facility] 405-01 Encounter: [id number]  Primary Care Provider: Shwetha Soni DO   Date and time admitted to hospital: 1/24/2023  6:54 PM    * Syncope  Assessment & Plan  · Hx of afib with slow ventricular response, pauses  Likely etiology of syncope  · Cardiology on board at [facility]; [facility] for [facility]PM placement  Son in agreement with plan  · Cont telemetry monitoring  · EP consult pending, timing of PPM TBD     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 behavioral disturbance, psychotic disturbance, mood disturbance, or anxiety (HCC)  Assessment & Plan  · MS at baseline  · Cont seroquel    Acute cystitis without hematuria  Assessment & Plan  · ESBL ecoli  · Completed course of abx with Augmentin on  1/24    Thrombocytopenia (HCC)  Assessment & Plan  · Chronic hx  · No sign of acute blood loss    Chronic diastolic heart failure (HCC)  Assessment & Plan  Wt Readings from Last 3 Encounters:   01/10/23 77 6 kg (171 lb)   01/09/23 77 8 kg (171 lb 8 3 oz)   11/14/22 77 8 kg (171 lb 8 3 oz)     · Clinically compensated  · Cont chronically dosed lasix          Atrial fibrillation (HCC)  Assessment & Plan  · W/ slow VR and pauses  · Presently rate controlled  · Cont to hold AV sandro agents  · A/c with eliquis -- currently held due to plan for PPM           VTE Pharmacologic Prophylaxis:   Moderate Risk (Score 3-4) - Pharmacological DVT Prophylaxis Contraindicated  Sequential Compression Devices Ordered  Patient Centered Rounds: I evaluated the patient without nursing staff present due to w/ other pt  Discussions with Specialists or Other Care Team Provider: CM     Education and Discussions with Family / Patient: Updated  (son) via phone  Matt    Time Spent for Care: 30 minutes   More than 50% of total time spent on counseling and coordination of care as described above  Current Length of Stay: 1 day(s)  Current Patient Status: Inpatient   Certification Statement: The patient will continue to require additional inpatient hospital stay due to syncope with bradycardia, pauses requiring PPM  Discharge Plan: Anticipate discharge in 48-72 hrs to prior assisted or independent living facility  Code Status: Level 3 - DNAR and DNI    Subjective:   Pt has no acute complaints, doesn't think she has lightheadedness or dizziness, poor historian w/ dementia  Objective:     Vitals:   Temp (24hrs), Av 7 °F (36 5 °C), Min:97 3 °F (36 3 °C), Max:98 °F (36 7 °C)    Temp:  [97 3 °F (36 3 °C)-98 °F (36 7 °C)] 97 8 °F (36 6 °C)  HR:  [42-63] 50  Resp:  [18-20] 18  BP: (102-152)/(63-87) 148/69  SpO2:  [95 %-96 %] 96 %  There is no height or weight on file to calculate BMI  Input and Output Summary (last 24 hours): Intake/Output Summary (Last 24 hours) at 2023 1034  Last data filed at 2023 0800  Gross per 24 hour   Intake 0 ml   Output 500 ml   Net -500 ml       Physical Exam:   Physical Exam  Vitals reviewed  Constitutional:       General: She is not in acute distress  Appearance: She is not toxic-appearing  HENT:      Head: Normocephalic and atraumatic  Eyes:      Extraocular Movements: Extraocular movements intact  Cardiovascular:      Rate and Rhythm: Normal rate and regular rhythm  Pulmonary:      Effort: Pulmonary effort is normal  No respiratory distress  Abdominal:      General: Bowel sounds are normal  There is no distension  Palpations: Abdomen is soft  Tenderness: There is no abdominal tenderness  Musculoskeletal:      Cervical back: Normal range of motion  Neurological:      General: No focal deficit present  Mental Status: She is alert     Psychiatric:         Mood and Affect: Mood normal          Behavior: Behavior normal           Additional Data:     Labs:  Results from last 7 days Lab Units 01/25/23  0433 01/22/23  0600   WBC Thousand/uL 4 64 5 17   HEMOGLOBIN g/dL 14 8 14 4   HEMATOCRIT % 43 9 42 1   PLATELETS Thousands/uL 131* 112*   NEUTROS PCT %  --  49   LYMPHS PCT %  --  30   MONOS PCT %  --  16*   EOS PCT %  --  4     Results from last 7 days   Lab Units 01/25/23  0433 01/22/23  0600   SODIUM mmol/L 139 136   POTASSIUM mmol/L 4 0 3 9   CHLORIDE mmol/L 111* 109*   CO2 mmol/L 24 23   BUN mg/dL 23 19   CREATININE mg/dL 0 80 0 82   ANION GAP mmol/L 4 4   CALCIUM mg/dL 9 4 9 3   ALBUMIN g/dL  --  3 6   TOTAL BILIRUBIN mg/dL  --  0 82   ALK PHOS U/L  --  60   ALT U/L  --  9   AST U/L  --  18   GLUCOSE RANDOM mg/dL 79 91     Results from last 7 days   Lab Units 01/21/23  1414   INR  1 29*             Results from last 7 days   Lab Units 01/21/23  1414   LACTIC ACID mmol/L 2 0   PROCALCITONIN ng/ml <0 05       Lines/Drains:  Invasive Devices     Peripheral Intravenous Line  Duration           Peripheral IV 01/24/23 Right;Ventral (anterior) Forearm <1 day          Drain  Duration           External Urinary Catheter <1 day                  Telemetry:  Telemetry Orders (From admission, onward)             48 Hour Telemetry Monitoring  Continuous x 48 hours        References:    Telemetry Guidelines   Question:  Reason for 48 Hour Telemetry  Answer:  Arrhythmias Requiring Medical Therapy (eg  SVT, Vtach/fib, Bradycardia, Uncontrolled A-fib)                 Telemetry Reviewed: Sinus Bradycardia and Pause(s)  Indication for Continued Telemetry Use: Arrthymias requiring medical therapy             Imaging: No pertinent imaging reviewed  Recent Cultures (last 7 days):   Results from last 7 days   Lab Units 01/21/23  1414   BLOOD CULTURE  No Growth at 72 hrs  No Growth at 72 hrs         Last 24 Hours Medication List:   Current Facility-Administered Medications   Medication Dose Route Frequency Provider Last Rate   • acetaminophen  650 mg Oral Q6H PRN Leeann Rojas DO     • atorvastatin 10 mg Oral Daily Odella Waldemar, DO     • bisacodyl  5 mg Oral Daily PRN Odella Waldemar, DO     • calcium carbonate  500 mg Oral Daily Odella Waldemar, DO     • docusate sodium  100 mg Oral BID Odella Waldemar, DO     • fish oil  1,000 mg Oral Daily Odella Waldemar, DO     • furosemide  40 mg Oral Daily Odella Waldemar, DO     • ondansetron  4 mg Intravenous Q6H PRN Odella Waldemar, DO     • polyethylene glycol  17 g Oral Daily Odella Waldemar, DO     • potassium chloride  20 mEq Oral Daily Odella Waldemar, DO     • QUEtiapine  12 5 mg Oral HS Odella Waldemar, DO     • thiamine  100 mg Oral Daily Odella Waldemar, DO     • zonisamide  100 mg Oral Daily Odella Waldemar, DO          Today, Patient Was Seen By: Rey Mays PA-C    **Please Note: This note may have been constructed using a voice recognition system  **

## 2023-01-25 NOTE — UTILIZATION REVIEW
NOTIFICATION OF ADMISSION DISCHARGE   This is a Notification of Discharge from [address]  Please be advised that this patient has been discharge from our facility  Below you will find the admission and discharge date and time including the patient’s disposition  UTILIZATION REVIEW CONTACT:  Torri Fink  Utilization   Network Utilization Review Department  Phone: [phone] x carefully listen to the prompts  All voicemails are confidential   Email: [email or web address]     ADMISSION INFORMATION  PRESENTATION DATE: 1/21/2023  1:27 PM  OBERVATION ADMISSION DATE:   INPATIENT ADMISSION DATE: 1/22/23 11:47 AM   DISCHARGE DATE: 1/24/2023  6:04 PM   DISPOSITION:[facility]    IMPORTANT INFORMATION:  Send all requests for admission clinical reviews, approved or denied determinations and any other requests to dedicated fax number below belonging to the campus where the patient is receiving treatment   List of dedicated fax numbers:  [address] DENIALS (Administrative/Medical Necessity) [phone]   [address] (Maternity/NICU/Pediatrics) [phone]   [facility] [phone]   [facility] 87 [phone]   [address] [phone]   220 [facility] [phone]   90 [facility] [phone]   [address] [place] [address] 119 [phone]   [facility]  [phone]   4053 [facility] [phone]   412 [facility] 850 [address] [facility] [phone]

## 2023-01-25 NOTE — ASSESSMENT & PLAN NOTE
Wt Readings from Last 3 Encounters:   01/10/23 77 6 kg (171 lb)   01/09/23 77 8 kg (171 lb 8 3 oz)   11/14/22 77 8 kg (171 lb 8 3 oz)   Clinically compensated  Cont chronically dosed lasix

## 2023-01-25 NOTE — ASSESSMENT & PLAN NOTE
· Hx of afib with slow ventricular response, pauses  Likely etiology of syncope  · Cardiology on board at [facility]; recommending transfer to [facility] for EP eval for PPM placement   Son in agreement with plan  · Cont telemetry monitoring  · EP consult pending, timing of PPM TBD

## 2023-01-25 NOTE — OCCUPATIONAL THERAPY NOTE
Occupational Therapy Evaluation     Patient Name: Ansley Wagner  Today's Date: 1/25/2023  Problem List  Principal Problem:    Syncope  Active Problems:    Atrial fibrillation ([facility] [address] )    Chronic diastolic heart failure (HCC)    Thrombocytopenia ([facility] )    Acute cystitis without hematuria    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 behavioral disturbance, psychotic disturbance, mood disturbance, or anxiety ([facility] )    Past Medical History  Past Medical History:   Diagnosis Date    A-fib ([place] [address] )     CAD (coronary artery disease)     CKD (chronic kidney disease) stage 2, GFR 60-89 ml/min     Hypertension     Memory loss     Urinary tract infection      Past Surgical History  Past Surgical History:   Procedure Laterality Date    CHOLECYSTECTOMY      REPLACEMENT TOTAL KNEE Left 2008 01/25/23 0854   OT Last Visit   OT Visit Date 01/25/23   Note Type   Note type Evaluation   Pain Assessment   Pain Assessment Tool 0-10   Pain Score No Pain   Restrictions/Precautions   Other Precautions Cognitive; Chair Alarm; Fall Risk;Telemetry   Home Living   Type of Home Assisted living   Home Layout   (0 KRISTEN)   Additional Comments   (Patient is a poor historian  Per the chart: lives at [facility]  0 KRISTEN)   Prior Function   Level of [place] Independent with ADLs; Needs assistance with IADLS;Needs assistance with functional mobility  (Used a walker prior to admission)   Lives With Facility staff   Receives Help From Family; Other (Comment)  (Facility Staff  Pt reports having 2 sons and 1 daughter)   IADLs Family/Friend/Other provides transportation; Family/Friend/Other provides meals; Family/Friend/Other provides medication management   Falls in the last 6 months   (Unable to assess)   Vocational Retired   Lifestyle   Autonomy Requires assistance for ADLs, IADLs, functional mobility, and driving   Reciprocal Relationships Recieves help from facility staff  (0 KRISTEN)   Service to Others Retired   Intrinsic Gratification Spending time with her family   ADL   Where Assessed Supine, bed   Eating Assistance 5  Supervision/Setup   Grooming Assistance 5  Supervision/Setup   UB [address] [facility]  4  Minimal Assistance   Bed Mobility   Supine to Sit 4  Minimal assistance   Additional Comments Patient greeted supine in bed with all needs within reach  Patient left seated in bedside chair w/ chair alarm with all needs within reach  Transfers   Sit to Stand 4  Minimal assistance   Stand to Sit 4  Minimal assistance   Functional Mobility   Functional Mobility 5  Supervision   Additional Comments Pt performed functional mobility within typical household distance using a RW  Balance   Static Sitting Fair   Dynamic Sitting Fair   Static Standing Fair -   Dynamic Standing Fair -   Activity Tolerance   Activity Tolerance Patient tolerated treatment well   Medical Staff Made Aware Seen with PT Criss due to Pt medical complexity and multiple comorbidities  Nurse Made Aware RN ok with eval   RUE Assessment   RUE Assessment WFL   LUE Assessment   LUE Assessment WFL   Hand Function   Gross Motor Coordination Functional   Fine Motor Coordination Functional   Cognition   Overall Cognitive Status Impaired   Arousal/Participation Responsive; Cooperative   Attention Attends with cues to redirect  (Pleasantly confused, requires additional time to attend to task  Simultaneous filing  User may not have seen previous data )   Orientation Level Oriented to person;Disoriented to place; Disoriented to time;Disoriented to situation   Memory Decreased recall of precautions;Decreased recall of recent events  (Simultaneous filing   User may not have seen previous data )   Following Commands Follows one step commands with increased time or repetition   Comments Pt pleasant to work with  Unable to assess attention and memory due to her dementia  Pleasantly confused, requires increased processing time  Assessment   Assessment Pt is a 82 y o  female admitted to [facility] on 1/24/2023 w/ syncope  Pt has plan to receive pacemaker  Pt  has a past medical history of A-fib ([address] ), CAD (coronary artery disease), CKD (chronic kidney disease) stage 2, GFR 60-89 ml/min, Hypertension, Memory loss, and Urinary tract infection  Pt with active OT orders  Patient precautions include contact isolation  Pt currently lives in an assisted living facility called [facility]  Pt was independent with ADLs but required assistance w/  IADLS, driving, & used a walker prior to admission  Patient participated in OT evaluation but was disoriented to place, time, and situation  Patient participated in functional bed mobility and functional transfers sit<>stand with Min A, functional mobility with supervision with use of a RW  Patient has notable cognitive impairments due to her dementia  She requires supervision for eating and grooming, and Ethan for bathing, dressing, and toileting  Based on the OT evaluation, functional performance deficits, and assessments, pt has been identified as a mod complexity evaluation  From OT standpoint, anticipate d/c pt to facility w/ increased assistance  Pt to continue to benefit from skilled OT services     Goals   Patient Goals To go home   Recommendation   OT Discharge Recommendation Return to facility with rehabilitation services  (Recommend increased assistance at facility)   AM-PAC Daily Activity Inpatient   Lower Body Dressing 2   Bathing 2   Toileting 2   Upper Body Dressing 2   Grooming 2   Eating 2   Daily Activity Raw Score 12   Daily Activity Standardized Score (Calc for Raw Score >=11) 30 6   AM-PAC Applied Cognition Inpatient   Following a Speech/Presentation 2   Understanding Ordinary Conversation 2   Taking Medications 2   Remembering Where Things Are Placed or Put Away 2   Remembering List of 4-5 Errands 2   Taking Care of Complicated Tasks 2   Applied Cognition Raw Score 12   Applied Cognition Standardized Score 28 82   Modified [place] Scale   Modified [place] Scale 4   End of Consult   Patient Position at End of Consult Bed/Chair alarm activated; All needs within reach   Nurse Communication Nurse aware of consult     Monroe Romero, OTS

## 2023-01-25 NOTE — ASSESSMENT & PLAN NOTE
Wt Readings from Last 3 Encounters:   01/10/23 77 6 kg (171 lb)   01/09/23 77 8 kg (171 lb 8 3 oz)   11/14/22 77 8 kg (171 lb 8 3 oz)     · Clinically compensated  · Cont chronically dosed lasix

## 2023-01-25 NOTE — PROGRESS NOTES
Pastoral Care Progress Note    2023  Patient: Damion Moore : 1935  Admission Date & Time: 2023 1854  MRN: [id number] [facility]: [id number]        Introduced myself and pastoral care services to Benjamín Yee, who graciously thanked me, yet got confused/a tad overwhelmed when I asked a few questions  I backed off, reassured her that all was well, and left the room with her happy folding her several washcloths   remains available

## 2023-01-25 NOTE — UTILIZATION REVIEW
Initial Clinical Review    Admission: Date/Time/Statement:   Admission Orders (From admission, onward)     Ordered        01/24/23 1913  Inpatient Admission  Once                      Orders Placed This Encounter   Procedures   • Inpatient Admission     Standing Status:   Standing     Number of Occurrences:   1     Order Specific Question:   Level of Care     Answer:   Med Surg [16]     Order Specific Question:   Estimated length of stay     Answer:   More than 2 Midnights     Order Specific Question:   Certification     Answer:   I certify that inpatient services are medically necessary for this patient for a duration of greater than two midnights  See H&P and MD Progress Notes for additional information about the patient's course of treatment  Initial Presentation: 82 y o  female , presented to the ED @ [address] [place] [address], Admitted, Transferred to General acute hospital, higher level of care, via EMS  Admitted as Inpatient due to Syncope  Date: 01/24/2023  Admitted to [address] [place] [address] secondary to syncope  Pt was was seen by cardiology  Noted with HR in low 40's  Was then recommended for transfer to General acute hospital for PPM   Pt was also been txt for acute cystitis prior to admission and continued on already prescribed abx  H/O A  Fiv with Slow Ventricular Response  Continue to monitor on telemetry  NPO after MN  Continue to hole AV blocking agents  AC with Eliquis  VTE Prophylaxis: Apixaban (Eliquis)  / sequential compression device     Day 2: 01/25/2023  Continue to monitor telemetry  Consult EP    Eliquis on hole due to plan for PPM       ED Triage Vitals   Temperature Pulse Resp Blood Pressure SpO2   01/24/23 1841 01/24/23 1841 -- 01/24/23 1841 01/24/23 1841   98 °F (36 7 °C) 63  136/87 95 %      Temp src Heart Rate Source Patient Position - Orthostatic VS BP Location FiO2 (%)   -- -- -- 01/25/23 0051 --      Left arm       Pain Score       01/24/23 2000       No Pain          Wt Readings from Last 1 Encounters:   01/10/23 77 6 kg (171 lb)     Additional Vital Signs:   Date/Time Temp Pulse BP MAP (mmHg) SpO2 O2 Device   23 0900 -- -- -- -- -- None (Room air)   23 07:37:14 97 8 °F (36 6 °C) 50 Abnormal  148/69 95 96 % --   23 02:30:45 97 3 °F (36 3 °C) Abnormal  42 Abnormal  152/68 96 96 % --   23 0051 -- -- 102/80 -- -- --   23 22:55:45 97 8 °F (36 6 °C) 51 Abnormal  115/69 84 95 % --     Date and Time Eye Opening Best Verbal Response Best Motor Response Victor M Coma Scale Score   23 0900 4 4 6 14   23 4 4 6 14   23 0820 4 4 6 14   23 4 4 6 14   23 4 4 6 14   23 0330 4 5 5 14   23 0030 4 4 5 13   23 1338 4 4 5 13       2023  EC, A  Fib with Slow Ventricular Response    Pertinent Labs/Diagnostic Test Results:   No orders to display     Results from last 7 days   Lab Units 23  1414   SARS-COV-2  Negative     Results from last 7 days   Lab Units 23  0433 23  0600 23  1414   WBC Thousand/uL 4 64 5 17 7 92   HEMOGLOBIN g/dL 14 8 14 4 15 9*   HEMATOCRIT % 43 9 42 1 48 3*   PLATELETS Thousands/uL 131* 112* 117*   NEUTROS ABS Thousands/µL  --  2 53 4 40     Results from last 7 days   Lab Units 23  0433 23  0600 23  1414   SODIUM mmol/L 139 136 137   POTASSIUM mmol/L 4 0 3 9 4 2   CHLORIDE mmol/L 111* 109* 106   CO2 mmol/L 24 23 21   ANION GAP mmol/L 4 4 10   BUN mg/dL 23 19 18   CREATININE mg/dL 0 80 0 82 0 91   EGFR ml/min/1 73sq m 67 65 57   CALCIUM mg/dL 9 4 9 3 10 3*   MAGNESIUM mg/dL 2 4  --   --      Results from last 7 days   Lab Units 23  0600 23  1414   AST U/L 18 21   ALT U/L 9 10   ALK PHOS U/L 60 83   TOTAL PROTEIN g/dL 6 5 8 0   ALBUMIN g/dL 3 6 4 3   TOTAL BILIRUBIN mg/dL 0 82 0 75     Results from last 7 days   Lab Units 23  0433 23  0600 23  1414   GLUCOSE RANDOM mg/dL 79 91 108     Results from last 7 days   Lab Units 01/22/23  0153 01/21/23  1654 01/21/23  1414   HS TNI 0HR ng/L  --   --  64*   HS TNI 2HR ng/L  --  71*  --    HSTNI D2 ng/L  --  7  --    HS TNI 4HR ng/L 82*  --   --    HSTNI D4 ng/L 18  --   --      Results from last 7 days   Lab Units 01/21/23  1414   PROTIME seconds 16 0*   INR  1 29*   PTT seconds 30     Results from last 7 days   Lab Units 01/21/23  1414   PROCALCITONIN ng/ml <0 05     Results from last 7 days   Lab Units 01/21/23  1414   LACTIC ACID mmol/L 2 0     Results from last 7 days   Lab Units 01/21/23  1414   BNP pg/mL 577*     Results from last 7 days   Lab Units 01/21/23  1826   CLARITY UA  Clear   COLOR UA  Yellow   SPEC GRAV UA  1 025   PH UA  6 0   GLUCOSE UA mg/dl Negative   KETONES UA mg/dl Negative   BLOOD UA  Small*   PROTEIN UA mg/dl Negative   NITRITE UA  Negative   BILIRUBIN UA  Negative   UROBILINOGEN UA E U /dl 0 2   LEUKOCYTES UA  Trace*   WBC UA /hpf 4-10*   RBC UA /hpf 0-1*   BACTERIA UA /hpf Occasional   EPITHELIAL CELLS WET PREP /hpf Moderate*     Results from last 7 days   Lab Units 01/21/23  1414   INFLUENZA A PCR  Negative   INFLUENZA B PCR  Negative   RSV PCR  Negative     Results from last 7 days   Lab Units 01/21/23  1414   BLOOD CULTURE  No Growth at 72 hrs  No Growth at 72 hrs       Past Medical History:   Diagnosis Date   • A-fib ([facility] [address] )    • CAD (coronary artery disease)    • CKD (chronic kidney disease) stage 2, GFR 60-89 ml/min    • Hypertension    • Memory loss    • Urinary tract infection      Present on Admission:  • Atrial fibrillation (HCC)  • Syncope  • Acute cystitis without hematuria  • 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 behavioral disturbance, psychotic disturbance, mood disturbance, or anxiety (HCC)  • Thrombocytopenia (HCC)  • Chronic diastolic heart failure (HCC)      Admitting Diagnosis: Syncope [R55]  Age/Sex: 82 y o  female  Admission Orders:  CV Diet  ESBL - Contact Irolation  Consult PT/OT  Telemetry  Ace SCDs    Scheduled Medications:  atorvastatin, 10 mg, Oral, Daily  calcium carbonate, 500 mg, Oral, Daily  docusate sodium, 100 mg, Oral, BID  fish oil, 1,000 mg, Oral, Daily  furosemide, 40 mg, Oral, Daily  polyethylene glycol, 17 g, Oral, Daily  potassium chloride, 20 mEq, Oral, Daily  QUEtiapine, 12 5 mg, Oral, HS  thiamine, 100 mg, Oral, Daily  zonisamide, 100 mg, Oral, Daily      Continuous IV Infusions:     PRN Meds:  acetaminophen, 650 mg, Oral, Q6H PRN  bisacodyl, 5 mg, Oral, Daily PRN  ondansetron, 4 mg, Intravenous, Q6H PRN        IP CONSULT TO ELECTROPHYSIOLOGY    Network Utilization Review Department  ATTENTION: Please call with any questions or concerns to [phone] and carefully listen to the prompts so that you are directed to the right person  All voicemails are confidential   Aminata Osorio all requests for admission clinical reviews, approved or denied determinations and any other requests to dedicated fax number below belonging to the campus where the patient is receiving treatment   List of dedicated fax numbers for the Facilities:  [address] DENIALS (Administrative/Medical Necessity) [phone]   [address] [place] [address] (Maternity/NICU/Pediatrics) [phone]   911 Winnie Fink [phone]   [facility]  [phone]   13[address] [place] [address] [facility] K[facility]on [facility] 28 [phone]   [facility] [place] Alex Darnell Davis 134 815 [facility] [phone]

## 2023-01-26 ENCOUNTER — ANESTHESIA (INPATIENT)
Dept: NON INVASIVE DIAGNOSTICS | Facility: HOSPITAL | Age: 88
End: 2023-01-26

## 2023-01-26 ENCOUNTER — APPOINTMENT (OUTPATIENT)
Dept: RADIOLOGY | Facility: HOSPITAL | Age: 88
End: 2023-01-26

## 2023-01-26 ENCOUNTER — ANESTHESIA EVENT (INPATIENT)
Dept: NON INVASIVE DIAGNOSTICS | Facility: HOSPITAL | Age: 88
End: 2023-01-26

## 2023-01-26 LAB
ANION GAP SERPL CALCULATED.3IONS-SCNC: 3 MMOL/L (ref 4–13)
ATRIAL RATE: 250 BPM
BACTERIA BLD CULT: NORMAL
BACTERIA BLD CULT: NORMAL
BASOPHILS # BLD AUTO: 0.05 THOUSANDS/ÂΜL (ref 0–0.1)
BASOPHILS NFR BLD AUTO: 1 % (ref 0–1)
BUN SERPL-MCNC: 25 MG/DL (ref 5–25)
CALCIUM SERPL-MCNC: 9.8 MG/DL (ref 8.3–10.1)
CHLORIDE SERPL-SCNC: 112 MMOL/L (ref 96–108)
CO2 SERPL-SCNC: 25 MMOL/L (ref 21–32)
CREAT SERPL-MCNC: 0.86 MG/DL (ref 0.6–1.3)
EOSINOPHIL # BLD AUTO: 0.27 THOUSAND/ÂΜL (ref 0–0.61)
EOSINOPHIL NFR BLD AUTO: 5 % (ref 0–6)
ERYTHROCYTE [DISTWIDTH] IN BLOOD BY AUTOMATED COUNT: 16.1 % (ref 11.6–15.1)
GFR SERPL CREATININE-BSD FRML MDRD: 61 ML/MIN/1.73SQ M
GLUCOSE SERPL-MCNC: 89 MG/DL (ref 65–140)
GLUCOSE SERPL-MCNC: 90 MG/DL (ref 65–140)
HCT VFR BLD AUTO: 43.5 % (ref 34.8–46.1)
HGB BLD-MCNC: 14.4 G/DL (ref 11.5–15.4)
IMM GRANULOCYTES # BLD AUTO: 0.03 THOUSAND/UL (ref 0–0.2)
IMM GRANULOCYTES NFR BLD AUTO: 1 % (ref 0–2)
LYMPHOCYTES # BLD AUTO: 1.99 THOUSANDS/ÂΜL (ref 0.6–4.47)
LYMPHOCYTES NFR BLD AUTO: 37 % (ref 14–44)
MCH RBC QN AUTO: 30.7 PG (ref 26.8–34.3)
MCHC RBC AUTO-ENTMCNC: 33.1 G/DL (ref 31.4–37.4)
MCV RBC AUTO: 93 FL (ref 82–98)
MONOCYTES # BLD AUTO: 0.6 THOUSAND/ÂΜL (ref 0.17–1.22)
MONOCYTES NFR BLD AUTO: 11 % (ref 4–12)
NEUTROPHILS # BLD AUTO: 2.48 THOUSANDS/ÂΜL (ref 1.85–7.62)
NEUTS SEG NFR BLD AUTO: 45 % (ref 43–75)
NRBC BLD AUTO-RTO: 0 /100 WBCS
PLATELET # BLD AUTO: 133 THOUSANDS/UL (ref 149–390)
PMV BLD AUTO: 11.6 FL (ref 8.9–12.7)
POTASSIUM SERPL-SCNC: 4.1 MMOL/L (ref 3.5–5.3)
QRS AXIS: 136 DEGREES
QRSD INTERVAL: 132 MS
QT INTERVAL: 512 MS
QTC INTERVAL: 512 MS
RBC # BLD AUTO: 4.69 MILLION/UL (ref 3.81–5.12)
SODIUM SERPL-SCNC: 140 MMOL/L (ref 135–147)
T WAVE AXIS: 152 DEGREES
VENTRICULAR RATE: 60 BPM
WBC # BLD AUTO: 5.42 THOUSAND/UL (ref 4.31–10.16)

## 2023-01-26 PROCEDURE — 02H63JZ INSERTION OF PACEMAKER LEAD INTO RIGHT ATRIUM, PERCUTANEOUS APPROACH: ICD-10-PCS | Performed by: INTERNAL MEDICINE

## 2023-01-26 PROCEDURE — 0JH604Z INSERTION OF PACEMAKER, SINGLE CHAMBER INTO CHEST SUBCUTANEOUS TISSUE AND FASCIA, OPEN APPROACH: ICD-10-PCS | Performed by: INTERNAL MEDICINE

## 2023-01-26 DEVICE — LEAD 383069 MRI US
Type: IMPLANTABLE DEVICE | Site: HEART | Status: FUNCTIONAL
Brand: SELECTSECURE™ MRI SURESCAN™

## 2023-01-26 DEVICE — IPG W1SR01 AZURE XT SR MRI USA
Type: IMPLANTABLE DEVICE | Site: CHEST | Status: FUNCTIONAL
Brand: AZURE™ XT SR MRI SURESCAN™

## 2023-01-26 RX ORDER — PROPOFOL 10 MG/ML
INJECTION, EMULSION INTRAVENOUS CONTINUOUS PRN
Status: DISCONTINUED | OUTPATIENT
Start: 2023-01-26 | End: 2023-01-26

## 2023-01-26 RX ORDER — LIDOCAINE HYDROCHLORIDE 10 MG/ML
INJECTION, SOLUTION EPIDURAL; INFILTRATION; INTRACAUDAL; PERINEURAL AS NEEDED
Status: DISCONTINUED | OUTPATIENT
Start: 2023-01-26 | End: 2023-01-26

## 2023-01-26 RX ORDER — LIDOCAINE HYDROCHLORIDE 10 MG/ML
INJECTION, SOLUTION EPIDURAL; INFILTRATION; INTRACAUDAL; PERINEURAL CODE/TRAUMA/SEDATION MEDICATION
Status: DISCONTINUED | OUTPATIENT
Start: 2023-01-26 | End: 2023-01-26 | Stop reason: HOSPADM

## 2023-01-26 RX ORDER — SODIUM CHLORIDE 9 MG/ML
INJECTION, SOLUTION INTRAVENOUS CONTINUOUS PRN
Status: DISCONTINUED | OUTPATIENT
Start: 2023-01-26 | End: 2023-01-26

## 2023-01-26 RX ORDER — PROPOFOL 10 MG/ML
INJECTION, EMULSION INTRAVENOUS AS NEEDED
Status: DISCONTINUED | OUTPATIENT
Start: 2023-01-26 | End: 2023-01-26

## 2023-01-26 RX ORDER — GENTAMICIN SULFATE 40 MG/ML
INJECTION, SOLUTION INTRAMUSCULAR; INTRAVENOUS CODE/TRAUMA/SEDATION MEDICATION
Status: DISCONTINUED | OUTPATIENT
Start: 2023-01-26 | End: 2023-01-26 | Stop reason: HOSPADM

## 2023-01-26 RX ORDER — LANOLIN ALCOHOL/MO/W.PET/CERES
3 CREAM (GRAM) TOPICAL
Status: DISCONTINUED | OUTPATIENT
Start: 2023-01-26 | End: 2023-01-27 | Stop reason: HOSPADM

## 2023-01-26 RX ORDER — QUETIAPINE FUMARATE 25 MG/1
12.5 TABLET, FILM COATED ORAL EVERY EVENING
Status: DISCONTINUED | OUTPATIENT
Start: 2023-01-26 | End: 2023-01-27

## 2023-01-26 RX ORDER — MAGNESIUM SULFATE HEPTAHYDRATE 40 MG/ML
2 INJECTION, SOLUTION INTRAVENOUS ONCE
Status: COMPLETED | OUTPATIENT
Start: 2023-01-26 | End: 2023-01-27

## 2023-01-26 RX ORDER — EPHEDRINE SULFATE 50 MG/ML
INJECTION INTRAVENOUS AS NEEDED
Status: DISCONTINUED | OUTPATIENT
Start: 2023-01-26 | End: 2023-01-26

## 2023-01-26 RX ADMIN — DOCUSATE SODIUM 100 MG: 100 CAPSULE, LIQUID FILLED ORAL at 10:08

## 2023-01-26 RX ADMIN — PROPOFOL 30 MG: 10 INJECTION, EMULSION INTRAVENOUS at 12:08

## 2023-01-26 RX ADMIN — SODIUM CHLORIDE: 0.9 INJECTION, SOLUTION INTRAVENOUS at 11:51

## 2023-01-26 RX ADMIN — BISACODYL 5 MG: 5 TABLET, COATED ORAL at 10:06

## 2023-01-26 RX ADMIN — PROPOFOL 50 MCG/KG/MIN: 10 INJECTION, EMULSION INTRAVENOUS at 12:08

## 2023-01-26 RX ADMIN — CALCIUM CARBONATE (ANTACID) CHEW TAB 500 MG 500 MG: 500 CHEW TAB at 10:06

## 2023-01-26 RX ADMIN — PROPOFOL 30 MG: 10 INJECTION, EMULSION INTRAVENOUS at 12:13

## 2023-01-26 RX ADMIN — LIDOCAINE HYDROCHLORIDE 50 MG: 10 INJECTION, SOLUTION EPIDURAL; INFILTRATION; INTRACAUDAL; PERINEURAL at 12:08

## 2023-01-26 RX ADMIN — ZONISAMIDE 100 MG: 100 CAPSULE ORAL at 10:11

## 2023-01-26 RX ADMIN — APIXABAN 5 MG: 5 TABLET, FILM COATED ORAL at 17:27

## 2023-01-26 RX ADMIN — DOCUSATE SODIUM 100 MG: 100 CAPSULE, LIQUID FILLED ORAL at 17:27

## 2023-01-26 RX ADMIN — ATORVASTATIN CALCIUM 10 MG: 10 TABLET, FILM COATED ORAL at 10:07

## 2023-01-26 RX ADMIN — MELATONIN 3 MG: at 22:20

## 2023-01-26 RX ADMIN — ACETAMINOPHEN 650 MG: 325 TABLET ORAL at 22:21

## 2023-01-26 RX ADMIN — MAGNESIUM SULFATE HEPTAHYDRATE 2 G: 40 INJECTION, SOLUTION INTRAVENOUS at 22:20

## 2023-01-26 RX ADMIN — QUETIAPINE FUMARATE 12.5 MG: 25 TABLET ORAL at 17:27

## 2023-01-26 RX ADMIN — FUROSEMIDE 40 MG: 40 TABLET ORAL at 10:07

## 2023-01-26 RX ADMIN — THIAMINE HCL TAB 100 MG 100 MG: 100 TAB at 10:08

## 2023-01-26 RX ADMIN — EPHEDRINE SULFATE 5 MG: 50 INJECTION INTRAVENOUS at 12:40

## 2023-01-26 RX ADMIN — POTASSIUM CHLORIDE 20 MEQ: 1500 TABLET, EXTENDED RELEASE ORAL at 10:07

## 2023-01-26 RX ADMIN — ACETAMINOPHEN 650 MG: 325 TABLET ORAL at 14:04

## 2023-01-26 RX ADMIN — CEFAZOLIN SODIUM 1000 MG: 1 SOLUTION INTRAVENOUS at 12:04

## 2023-01-26 NOTE — ANESTHESIA POSTPROCEDURE EVALUATION
Post-Op Assessment Note    CV Status:  Stable  Pain Score: 0    Pain management: adequate     Mental Status:  Somnolent   PONV Controlled:  Controlled   Airway Patency:  Patent      Post Op Vitals Reviewed: Yes      Staff: CRNA         No notable events documented      BP   121/82   Temp  97 9   Pulse  88   Resp   14   SpO2   98

## 2023-01-26 NOTE — PROGRESS NOTES
1425 [facility]  Progress Note - Desiree Bhatt 1935, 82 y o  female MRN: [id number]  Unit/Bed#: [facility] 405-01 Encounter: [id number]  Primary Care Provider: No primary care provider on file  Date and time admitted to hospital: 1/24/2023  6:54 PM    * Syncope  Assessment & Plan  · Hx of afib with slow ventricular response, pauses  Likely etiology of syncope  · Cardiology on board at [facility]; recommending transfer to [facility] for EP eval for PPM placement  Son in agreement with plan  · Cont telemetry monitoring  · EP consulted, plan for pacemaker placement on Thursday 1/26  AM Eliquis dose held     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 behavioral disturbance, psychotic disturbance, mood disturbance, or anxiety (HCC)  Assessment & Plan  · MS at baseline  · Cont seroquel    Acute cystitis without hematuria  Assessment & Plan  · ESBL ecoli  · Completed course of abx with Augmentin on 1/24    Thrombocytopenia (HCC)  Assessment & Plan  · Chronic hx  · No sign of acute blood loss    Chronic diastolic heart failure (HCC)  Assessment & Plan  Wt Readings from Last 3 Encounters:   01/10/23 77 6 kg (171 lb)   01/09/23 77 8 kg (171 lb 8 3 oz)   11/14/22 77 8 kg (171 lb 8 3 oz)     · Clinically compensated  · Cont chronically dosed lasix        Atrial fibrillation (HCC)  Assessment & Plan  · W/ slow VR and pauses  · Presently rate controlled  · Cont to hold AV sandro agents  · A/c with eliquis -- currently held due to plan for PPM Thursday, 1/26          VTE Pharmacologic Prophylaxis:   Moderate Risk (Score 3-4) - Pharmacological DVT Prophylaxis Contraindicated  Sequential Compression Devices Ordered  resume Eliquis when cleared by EP     Patient Centered Rounds: I performed bedside rounds with nursing staff today  Discussions with Specialists or Other Care Team Provider: RN     Education and Discussions with Family / Patient: Updated  (son) via phone   Reinaldo Kim Spent for Care: 20 minutes  More than 50% of total time spent on counseling and coordination of care as described above  Current Length of Stay: 2 day(s)  Current Patient Status: Inpatient   Certification Statement: The patient will continue to require additional inpatient hospital stay due to plan for PPM today  Discharge Plan: Anticipate discharge in 24-48 hrs to prior assisted or independent living facility  Code Status: Level 3 - DNAR and DNI    Subjective:   Patient has no acute complaints  Pleasantly confused  Doesn't think she feels bad  Poor historian 2/2 dementia  Objective:     Vitals:   Temp (24hrs), Av 6 °F (36 4 °C), Min:97 3 °F (36 3 °C), Max:98 °F (36 7 °C)    Temp:  [97 3 °F (36 3 °C)-98 °F (36 7 °C)] 97 4 °F (36 3 °C)  HR:  [58-60] 58  Resp:  [15-18] 18  BP: ()/() 112/62  SpO2:  [93 %-95 %] 93 %  There is no height or weight on file to calculate BMI  Input and Output Summary (last 24 hours): Intake/Output Summary (Last 24 hours) at 2023 4390  Last data filed at 2023 0800  Gross per 24 hour   Intake 210 ml   Output 155 ml   Net 55 ml       Physical Exam:   Physical Exam  Vitals reviewed  Constitutional:       General: She is not in acute distress  Appearance: She is not toxic-appearing  HENT:      Head: Normocephalic and atraumatic  Eyes:      Extraocular Movements: Extraocular movements intact  Cardiovascular:      Rate and Rhythm: Regular rhythm  Bradycardia present  Pulmonary:      Effort: Pulmonary effort is normal       Breath sounds: Normal breath sounds  Abdominal:      General: Bowel sounds are normal  There is no distension  Palpations: Abdomen is soft  Tenderness: There is no abdominal tenderness  Musculoskeletal:         General: Normal range of motion  Cervical back: Normal range of motion  Neurological:      General: No focal deficit present  Mental Status: She is alert        Comments: Pleasantly confused   Psychiatric:         Mood and Affect: Mood normal          Behavior: Behavior normal           Additional Data:     Labs:  Results from last 7 days   Lab Units 01/26/23  0617   WBC Thousand/uL 5 42   HEMOGLOBIN g/dL 14 4   HEMATOCRIT % 43 5   PLATELETS Thousands/uL 133*   NEUTROS PCT % 45   LYMPHS PCT % 37   MONOS PCT % 11   EOS PCT % 5     Results from last 7 days   Lab Units 01/26/23  0617 01/25/23  0433 01/22/23  0600   SODIUM mmol/L 140   < > 136   POTASSIUM mmol/L 4 1   < > 3 9   CHLORIDE mmol/L 112*   < > 109*   CO2 mmol/L 25   < > 23   BUN mg/dL 25   < > 19   CREATININE mg/dL 0 86   < > 0 82   ANION GAP mmol/L 3*   < > 4   CALCIUM mg/dL 9 8   < > 9 3   ALBUMIN g/dL  --   --  3 6   TOTAL BILIRUBIN mg/dL  --   --  0 82   ALK PHOS U/L  --   --  60   ALT U/L  --   --  9   AST U/L  --   --  18   GLUCOSE RANDOM mg/dL 90   < > 91    < > = values in this interval not displayed  Results from last 7 days   Lab Units 01/21/23  1414   INR  1 29*             Results from last 7 days   Lab Units 01/21/23  1414   LACTIC ACID mmol/L 2 0   PROCALCITONIN ng/ml <0 05       Lines/Drains:  Invasive Devices     Peripheral Intravenous Line  Duration           Peripheral IV 01/24/23 Right;Ventral (anterior) Forearm 1 day          Drain  Duration           External Urinary Catheter 1 day                  Telemetry:  Telemetry Orders (From admission, onward)             48 Hour Telemetry Monitoring  Continuous x 48 hours        References:    Telemetry Guidelines   Question:  Reason for 48 Hour Telemetry  Answer:  Arrhythmias Requiring Medical Therapy (eg  SVT, Vtach/fib, Bradycardia, Uncontrolled A-fib)                 Telemetry Reviewed: Sinus Bradycardia and Pause(s)  Indication for Continued Telemetry Use: Arrthymias requiring medical therapy             Imaging: No pertinent imaging reviewed      Recent Cultures (last 7 days):   Results from last 7 days   Lab Units 01/21/23  1414   BLOOD CULTURE  No Growth After 4 Days   No Growth After 4 Days  Last 24 Hours Medication List:   Current Facility-Administered Medications   Medication Dose Route Frequency Provider Last Rate   • acetaminophen  650 mg Oral Q6H PRN Eleno , DO     • atorvastatin  10 mg Oral Daily Eleno , DO     • bisacodyl  5 mg Oral Daily PRN Eleno , DO     • calcium carbonate  500 mg Oral Daily Eleno , DO     • cefazolin  1,000 mg Intravenous Once Godfrey Lewis PA-C     • docusate sodium  100 mg Oral BID Eleno , DO     • furosemide  40 mg Oral Daily Eleno , DO     • ondansetron  4 mg Intravenous Q6H PRN Eleno , DO     • polyethylene glycol  17 g Oral Daily Eleno , DO     • potassium chloride  20 mEq Oral Daily Eleno , DO     • QUEtiapine  12 5 mg Oral HS Eleno , DO     • thiamine  100 mg Oral Daily Eleno , DO     • zonisamide  100 mg Oral Daily Eleno , DO          Today, Patient Was Seen By: Es Carrasco PA-C    **Please Note: This note may have been constructed using a voice recognition system  **

## 2023-01-26 NOTE — ASSESSMENT & PLAN NOTE
· W/ slow VR and pauses  · Presently rate controlled  · Cont to hold AV sandro agents  · A/c with eliquis -- currently held due to plan for [facility] Thursday, 1/26

## 2023-01-26 NOTE — ANESTHESIA PREPROCEDURE EVALUATION
Procedure:  Cardiac pacer implant (Chest)    Relevant Problems   CARDIO   (+) Atrial fibrillation (HCC)   (+) Benign essential hypertension   (+) Other hyperlipidemia   (+) Sick sinus syndrome (HCC)      HEMATOLOGY   (+) Thrombocytopenia (HCC)      NEURO/PSYCH   (+) History of stroke   (+) 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 behavioral disturbance, psychotic disturbance, mood disturbance, or anxiety (HCC)   (+) New onset seizure (HCC)   (+) Seizure (HCC)        Physical Exam    Airway    Mallampati score: II  TM Distance: >3 FB  Neck ROM: full     Dental   No notable dental hx     Cardiovascular  Cardiovascular exam normal    Pulmonary  Pulmonary exam normal     Other Findings        Anesthesia Plan  ASA Score- 4     Anesthesia Type- IV sedation with anesthesia with ASA Monitors  Additional Monitors:   Airway Plan:           Plan Factors-Exercise tolerance (METS): >4 METS  Chart reviewed  EKG reviewed  Imaging results reviewed  Existing labs reviewed  Patient summary reviewed  Patient is not a current smoker  Patient not instructed to abstain from smoking on day of procedure  Patient did not smoke on day of surgery  Induction- intravenous  Postoperative Plan-     Informed Consent- Anesthetic plan and risks discussed with son  I personally reviewed this patient with the CRNA  Discussed and agreed on the Anesthesia Plan with the CRNA  Nic Mims

## 2023-01-26 NOTE — ASSESSMENT & PLAN NOTE
· Hx of afib with slow ventricular response, pauses  Likely etiology of syncope  · Cardiology on board at [facility]; recommending transfer to [facility] for EP eval for PPM placement  Son in agreement with plan  · Cont telemetry monitoring  · EP consulted, plan for pacemaker placement on Thursday 1/26    AM Eliquis dose held

## 2023-01-26 NOTE — DISCHARGE INSTR - AVS FIRST PAGE
Please refer to post pacemaker implantation discharge instructions and restrictions and your pacemaker booklet/temporary card  Keep incision dry for one week  Do not use lotions/powders/creams on incision  Leave outer bandage in place for 1 week - it is water proof, and as long as it is fully adhered to your skin you may shower with it  If it appears as though the bandage is coming off and/or there is any communication to the area of device incision, please then keep the whole area dry for the remaining week  After 1 week, please remove by pulling all edges away from the center of the bandage  No overhead reaching/pushing/pulling/lifting greater than 5-10lbs with left arm for six weeks  Please call the office if you notice redness, swelling, bleeding, or drainage from incision or if you develop fevers  Do not take fish oil supplements for 2 weeks after discharge from the hospital     AFTER PACEMAKER CARE:    If you have any questions, please call [phone] to speak with a nurse (8:30am-4pm, or [phone] after hours)  For appointments, please call [phone]  WHAT YOU SHOULD KNOW:   A pacemaker is a small, battery-powered device that is placed under your skin in your upper chest area with wires placed through a vein that lead directly into the heart  It helps regulate your heart rate and prevent your heart from beating too slowly  AFTER YOU LEAVE:     Medicines:     Pain medicine: You may need medicine to take away or decrease pain  Learn how to take your medicine  Ask what medicine and how much you should take  Be sure you know how, when, and how often to take it  Usually Over the counter pain medicine is sufficient to control pain (Acetominophen or Ibuprofen) Ask your doctor if you may take these  If this does not control your pain, narcotic pain killers may be prescribed, please call if you need prescription       Do not wait until the pain is severe before you take your medicine  Tell caregivers if your pain does not decrease  Pain medicine can make you dizzy or sleepy  Prevent falls by calling someone when you get out of bed or if you need help  Take your medicine as directed  Call your healthcare provider if you think your medicine is not helping or if you have side effects  Tell him if you are allergic to any medicine  Follow up with your cardiologist after your procedure: You will need a follow-up visit approximately 2 weeks after you leave the hospital  Your cardiologist will check your wound and make sure that your pacemaker is working correctly  Follow the instructions to check your pacemaker: Your cardiologist or primary healthcare provider will check your pacemaker and the battery regularly  He will use a computer to check your pacemaker over the telephone or wireless device which will be given to you  Pacemaker batteries usually last 8 to 10 years  The pacemaker unit will be replaced when the battery gets low  This is a simpler procedure than the original one to implant your pacemaker  Wound care:  Keep your incision dry for one week  Do not use lotions/powders/creams on incision  Leave outer bandage in place for 1 week - it is water proof, and as long as it is fully adhered to your skin you may shower with it  If it appears as though the bandage is coming off and/or there is any communication to the area of device incision, please then keep the whole area dry for the remaining week  After 1 week, please remove by pulling all edges away from the center of the bandage  Please call the office if you notice redness, swelling, bleeding, or drainage from incision or if you develop fevers  Activity:   Arm movement and lifting:  Be careful using the arm on the side of your pacemaker  Do not move your arm for the first 24 hours after your procedure   Do not  lift your arm above your shoulder or lift more than 10 pounds for one month after your procedure  Avoid pushing, pulling, or repetitive arm movements for one month  This helps the leads stay in place and helps your wound heal  Ask your caregiver when you can drive after your procedure  You may move your arm side to side without lifting above your shoulder, and do not need to wear a sling at home  Driving: you are ok to drive 48 hours after pacemaker is implanted   Sports:  Ask your caregiver when it is okay to play tennis, golf, basketball, or any sport that requires you to lift your arms  Do not play full contact sports, such as football, that could damage your pacemaker  Ask your cardiologist or primary healthcare provider how much and what kinds of physical activity are safe for you  Living with a pacemaker:   Tell all caregivers you have a pacemaker: This includes surgeons, radiologists, and medical technicians  You may want to wear a medical alert ID bracelet or necklace that states that you have a pacemaker  Carry your pacemaker ID card: Make sure you receive a pacemaker ID card  Carry it with you at all times  It lists important information about your pacemaker  Show it to airport security if you travel  Avoid electrical interference:  Avoid welding equipment and other equipment with large magnets or electric fields  These things could interfere with how your pacemaker works  Use your cell phone on the ear opposite from your pacemaker  Do not carry your cell phone in your shirt pocket over your chest      Some Pacemakers are MRI safe  Ask you doctor if it is safe to proceed with MRI and let the radiologist and staff know you have a pacemaker  Do not touch the skin around your pacemaker: This can cause damage to the lead wires or move the pacemaker unit from where it should be  Contact your cardiologist or primary healthcare provider if:   The area around your pacemaker has increasing amount of pain after surgery   The pain should improve over first few days after implantation  The skin around your stitches has increasing redness, swelling, or has drainage  This may mean that you have an infection  You have a fever  You have chills, a cough, and feel weak or achy  These are also signs of infection  Your feet or ankles are more swollen than your baseline  Your Heart rate is less than 50 beats per minute     Seek care immediately if:   Your bandage becomes soaked with blood  Your pacemaker is swelling rapidly    Your stitches open up  You feel your heart suddenly beating very slowly or quickly  You become too weak or dizzy to stand, or you pass out  Your arm or leg feels warm, tender, and painful  It may look swollen and red  You have chest pain that does not go away with rest or medicine  You feel lightheaded, short of breath, and have chest pain  You cough up blood  © 2014 [address] is for End User's use only and may not be sold, redistributed or otherwise used for commercial purposes  All illustrations and images included in CareNotes® are the copyrighted property of [employer] , [employer]  or Lupillo Mckeon  The above information is an  only  It is not intended as medical advice for individual conditions or treatments  Talk to your doctor, nurse or pharmacist before following any medical regimen to see if it is safe and effective for you

## 2023-01-26 NOTE — PLAN OF CARE
Problem: SAFETY,RESTRAINT: NV/NON-SELF DESTRUCTIVE BEHAVIOR  Goal: Remains free of harm/injury (restraint for non violent/non self-detsructive behavior)  Description: INTERVENTIONS:  - Instruct patient/family regarding restraint use   - Assess and monitor physiologic and psychological status   - Provide interventions and comfort measures to meet assessed patient needs   - Identify and implement measures to help patient regain control  - Assess readiness for release of restraint   1/26/2023 1336 by Magali Antonio RN  Outcome: Progressing  1/26/2023 1336 by Magali Antonio RN  Outcome: Progressing  Goal: Returns to optimal restraint-free functioning  Description: INTERVENTIONS:  - Assess the patient's behavior and symptoms that indicate continued need for restraint  - Identify and implement measures to help patient regain control  - Assess readiness for release of restraint   1/26/2023 1336 by Magali Antonio RN  Outcome: Progressing  1/26/2023 1336 by Magali Antonio RN  Outcome: Progressing

## 2023-01-27 ENCOUNTER — APPOINTMENT (OUTPATIENT)
Dept: RADIOLOGY | Facility: HOSPITAL | Age: 88
End: 2023-01-27

## 2023-01-27 VITALS
DIASTOLIC BLOOD PRESSURE: 68 MMHG | RESPIRATION RATE: 16 BRPM | HEART RATE: 64 BPM | OXYGEN SATURATION: 91 % | SYSTOLIC BLOOD PRESSURE: 110 MMHG | TEMPERATURE: 97.9 F

## 2023-01-27 PROBLEM — R94.31 PROLONGED Q-T INTERVAL ON ECG: Status: ACTIVE | Noted: 2023-01-27

## 2023-01-27 LAB
ANION GAP SERPL CALCULATED.3IONS-SCNC: 9 MMOL/L (ref 4–13)
BUN SERPL-MCNC: 27 MG/DL (ref 5–25)
CALCIUM SERPL-MCNC: 9.8 MG/DL (ref 8.3–10.1)
CHLORIDE SERPL-SCNC: 110 MMOL/L (ref 96–108)
CO2 SERPL-SCNC: 19 MMOL/L (ref 21–32)
CREAT SERPL-MCNC: 0.92 MG/DL (ref 0.6–1.3)
ERYTHROCYTE [DISTWIDTH] IN BLOOD BY AUTOMATED COUNT: 16.1 % (ref 11.6–15.1)
GFR SERPL CREATININE-BSD FRML MDRD: 56 ML/MIN/1.73SQ M
GLUCOSE SERPL-MCNC: 76 MG/DL (ref 65–140)
HCT VFR BLD AUTO: 46.3 % (ref 34.8–46.1)
HGB BLD-MCNC: 15.5 G/DL (ref 11.5–15.4)
MCH RBC QN AUTO: 31.1 PG (ref 26.8–34.3)
MCHC RBC AUTO-ENTMCNC: 33.5 G/DL (ref 31.4–37.4)
MCV RBC AUTO: 93 FL (ref 82–98)
PLATELET # BLD AUTO: 147 THOUSANDS/UL (ref 149–390)
PMV BLD AUTO: 12.2 FL (ref 8.9–12.7)
POTASSIUM SERPL-SCNC: 4.9 MMOL/L (ref 3.5–5.3)
RBC # BLD AUTO: 4.99 MILLION/UL (ref 3.81–5.12)
SODIUM SERPL-SCNC: 138 MMOL/L (ref 135–147)
WBC # BLD AUTO: 6.02 THOUSAND/UL (ref 4.31–10.16)

## 2023-01-27 RX ADMIN — DOCUSATE SODIUM 100 MG: 100 CAPSULE, LIQUID FILLED ORAL at 08:43

## 2023-01-27 RX ADMIN — POLYETHYLENE GLYCOL 3350 17 G: 17 POWDER, FOR SOLUTION ORAL at 08:43

## 2023-01-27 RX ADMIN — ATORVASTATIN CALCIUM 10 MG: 10 TABLET, FILM COATED ORAL at 08:43

## 2023-01-27 RX ADMIN — CALCIUM CARBONATE (ANTACID) CHEW TAB 500 MG 500 MG: 500 CHEW TAB at 08:43

## 2023-01-27 RX ADMIN — POTASSIUM CHLORIDE 20 MEQ: 1500 TABLET, EXTENDED RELEASE ORAL at 08:44

## 2023-01-27 RX ADMIN — APIXABAN 5 MG: 5 TABLET, FILM COATED ORAL at 08:43

## 2023-01-27 RX ADMIN — ZONISAMIDE 100 MG: 100 CAPSULE ORAL at 08:54

## 2023-01-27 RX ADMIN — THIAMINE HCL TAB 100 MG 100 MG: 100 TAB at 08:44

## 2023-01-27 NOTE — ASSESSMENT & PLAN NOTE
· W/ slow VR and pauses  · Presently rate controlled  · EP recommends no rate controlling medications on discharge  · A/c with Eliquis, resume on discharge

## 2023-01-27 NOTE — ASSESSMENT & PLAN NOTE
· Hx of afib with slow ventricular response, pauses  Likely etiology of syncope  · Cardiology on board at [facility]; recommending transfer to [facility] for EP eval for PPM placement   Son in agreement with plan  · Cont telemetry monitoring  · EP consulted  · status post pacemaker placement on Thursday 1/26  · Per EP, site evaluated, interrogation completed, chest x-ray normal; patient is stable for discharge  · Discharge instructions per EP  · Continue medications with only change to hold fish oil pills for 2 weeks after discharge

## 2023-01-27 NOTE — PROGRESS NOTES
Progress Note - Electrophysiology  Roseline Brown 82 y o  female MRN: [id number]  Unit/Bed#: [facility] 405-01 Encounter: [id number]      Assessment:  1  Medtronic single chamber pacemaker in situ  - implanted by Dr Sam Girffin on 1/26/2023 due to sick sinus syndrome  - afib with slow ventricular response even without AV sandro blocking agents with pauses 3 5-4 seconds  2  Recent cerebral infarct of left parietal lobe 1/2023 with prior 6/2022  3  Permanent atrial fibrillation with slow ventricular response  - anticoagulated with Eliquis / [id number] score of 5 (CVA, age, sex)  - EF of 67% per echo 1/2023 / severe dilation of left atrium noted  - rate control: none  - antiarrhythmic therapy: none  - no sinus rhythm noted on EKG's as far back as 5/2022  4  Chronic diastolic congestive heart failure  - diuretic regimen of furosemide 40mg daily   5  Essential hypertension  6  Alzheimer's dementia  - does live in memory unit    Plan:  1  Device - Patient is doing well status post pacemaker implantation  Her incision is clean, dry, and intact without evidence of hematoma or ecchymosis or signs of infection  Vital signs and labs were reviewed  Assessment of chest x-rays show proper lead placement without evidence of pneumothorax  Device interrogation showed appropriate device function with lead parameters such as sensing, threshold, and impedance being tested  2  Post care - Discharge instructions and restrictions were discussed with the patient -as there are some concerns for adhering to the restrictions given dementia, we have left the sling on  this can be taken off after couple weeks if she is adhering to the restriction, otherwise can consider keeping it on for the full 6 weeks  She will also be provided with written instructions detailing what was discussed  Patient also requires a 2 week device and site check which has already been arranged and is in Epic  Her son Umer Stoll was updated over the phone      3  Medications - In terms of medications, she has been restarted on her Eliquis which she does qualify for full dose and there is no signs of hematoma at her incision site  Would advise holding fish oil for 2 weeks just to prevent further oozing  Otherwise, will not add any AV sandro blocking agents as she is rate controlled predominantly in atrial fibrillation  4  Patient is stable from an EP standpoint for discharge at this time  Subjective/Objective   Subjective: Iván Wilkes is a 82y o  year old female with past medical history as stated above  She is hospital stay day 4 and is status post pacemaker implantation  She reports no issues overnight, denies chest pain, shortness of breath, palpitations, lightheadedness or dizziness  Telemetry shows atrial fibrillation with intermittent ventricular pacing    Objective:  Vitals: /68 (BP Location: Right arm)   Pulse 60   Temp (!) 97 2 °F (36 2 °C) (Oral)   Resp 17   SpO2 90%     There were no vitals filed for this visit    Orthostatic Blood Pressures    Flowsheet Row Most Recent Value   Blood Pressure 103/68 filed at 01/27/2023 0847   Patient Position - Orthostatic VS Sitting filed at 01/27/2023 0847            Intake/Output Summary (Last 24 hours) at 1/27/2023 0941  Last data filed at 1/27/2023 0545  Gross per 24 hour   Intake 360 ml   Output 1459 ml   Net -1099 ml       Invasive Devices     Peripheral Intravenous Line  Duration           Peripheral IV 01/24/23 Right;Ventral (anterior) Forearm 2 days          Drain  Duration           External Urinary Catheter 2 days                          Scheduled Meds:  Current Facility-Administered Medications   Medication Dose Route Frequency Provider Last Rate   • acetaminophen  650 mg Oral Q6H PRN Ruth Salinas DO     • apixaban  5 mg Oral BID Brunilda Villegas PA-C     • atorvastatin  10 mg Oral Daily Ruth Salinas DO     • bisacodyl  5 mg Oral Daily PRN Ruth Salinas DO     • calcium carbonate  500 mg Oral Daily Lindia Seneca-Cayuga, DO     • docusate sodium  100 mg Oral BID Lindia Seneca-Cayuga, DO     • furosemide  40 mg Oral Daily Lindia Seneca-Cayuga, DO     • melatonin  3 mg Oral HS CRISTO Feliz     • polyethylene glycol  17 g Oral Daily Lindia Seneca-Cayuga, DO     • potassium chloride  20 mEq Oral Daily Lindia Seneca-Cayuga, DO     • thiamine  100 mg Oral Daily Lindia Seneca-Cayuga, DO     • zonisamide  100 mg Oral Daily Julianna Duron DO       Continuous Infusions:   PRN Meds: •  acetaminophen  •  bisacodyl    Review of Systems:  ROS as noted above, otherwise 12 point review of systems was performed and is negative  Physical Exam:   GEN: NAD, alert and oriented to self, well appearing  SKIN: dry without significant lesions or rashes  HEENT: NCAT, PERRL, EOMs intact  NECK: No JVD or carotid bruits appreciated  CARDIOVASCULAR: irregular, normal S1, S2 without murmurs, rubs, or gallops appreciated  LUNGS: Clear to auscultation bilaterally without wheezes, rhonchi, or rales  ABDOMEN: Soft, nontender, nondistended  EXTREMITIES/VASCULAR: perfused without clubbing, cyanosis, or edema b/l  PSYCH: Normal mood and affect  NEURO: CN ll-Xll grossly intact              Lab Results: I have personally reviewed pertinent lab results      Results from last 7 days   Lab Units 01/27/23  0503 01/26/23  0617 01/25/23  0433   WBC Thousand/uL 6 02 5 42 4 64   HEMOGLOBIN g/dL 15 5* 14 4 14 8   HEMATOCRIT % 46 3* 43 5 43 9   PLATELETS Thousands/uL 147* 133* 131*     Results from last 7 days   Lab Units 01/27/23  0503 01/26/23  0617 01/25/23  0433   POTASSIUM mmol/L 4 9 4 1 4 0   CHLORIDE mmol/L 110* 112* 111*   CO2 mmol/L 19* 25 24   BUN mg/dL 27* 25 23   CREATININE mg/dL 0 92 0 86 0 80   CALCIUM mg/dL 9 8 9 8 9 4     Results from last 7 days   Lab Units 01/21/23  1414   INR  1 29*   PTT seconds 30     Results from last 7 days   Lab Units 01/25/23  0433   MAGNESIUM mg/dL 2 4       Imaging: I have personally reviewed pertinent reports  No results found for this or any previous visit        VTE Pharmacologic Prophylaxis: Eliquis  VTE Mechanical Prophylaxis: sequential compression device

## 2023-01-27 NOTE — RESTORATIVE TECHNICIAN NOTE
Restorative Technician Note      Patient Name: Scarlett Brower     Restorative Tech Visit Date: 01/27/23  Note Type: Mobility  Patient Position Upon Consult: Supine  Activity Performed: Transferred  Assistive Device: Roller walker  Patient Position at End of Consult: All needs within reach; Bed/Chair alarm activated;  Bedside chair

## 2023-01-27 NOTE — UTILIZATION REVIEW
Continued Stay Review    Date: 1/25-1/26/23                          Current Patient Class: Inpatient  Current Level of Care: Med Surg    HPI:87 y o  female initially admitted on 1/24     Assessment/Plan:     1/25: continue telemetry  Timiing of PPM to be determined  Augmentin completed 1/24  Continue home lasix and Seroquel  Eliquis on hold for PPM placement  1/26: Continue telemetry  EP consulted, plan for pacemaker placement on Thursday 1/26  AM Eliquis dose held  Per EP, site evaluated, interrogation completed, chest x-ray normal     Vital Signs:     Date/Time Temp Pulse Resp BP MAP (mmHg) SpO2 O2 Device   01/27/23 11:47:51 97 9 °F (36 6 °C) 64 16 110/68 82 91 % --   01/27/23 08:47:07 97 2 °F (36 2 °C) Abnormal  60 17 103/68 80 90 % --   01/27/23 02:40:32 -- 60 -- 101/69 80 94 % --   01/26/23 23:07:23 97 2 °F (36 2 °C) Abnormal  60 -- 101/70 80 94 % --   01/26/23 20:16:17 97 6 °F (36 4 °C) 63 -- 133/79 97 94 % --   01/26/23 2000 -- -- -- -- -- 94 % None (Room air)   01/26/23 15:04:22 97 4 °F (36 3 °C) Abnormal  60 -- 130/79 96 96 % --   01/26/23 1315 -- -- -- -- -- -- None (Room air)   01/26/23 0900 -- -- -- -- -- 97 % None (Room air)   01/26/23 07:00:13 97 4 °F (36 3 °C) Abnormal  -- 18 112/62 79 -- --   01/25/23 22:08:27 97 3 °F (36 3 °C) Abnormal  58 16 99/58 72 93 % None (Room air)   01/25/23 19:39:33 98 °F (36 7 °C) 59 16 114/65 81 94 % None (Room air)   01/25/23 16:15:24 -- -- -- 167/143 Abnormal  151 -- --   01/25/23 12:23:19 97 5 °F (36 4 °C) 60 15 109/70 83 95 % --   01/25/23 0900 -- -- -- -- -- -- None (Room air)   01/25/23 07:37:14 97 8 °F (36 6 °C) 50 Abnormal  -- 148/69 95 96 % --   01/25/23 02:30:45 97 3 °F (36 3 °C) Abnormal  42 Abnormal  -- 152/68 96 96 % --   01/25/23 0051 -- -- -- 102/80 -- -- --       Pertinent Labs/Diagnostic Results:     1/26 CXR:  IMPRESSION:     No acute cardiopulmonary disease      Left subclavian pacemaker lead in right ventricle with no pneumothorax      1/26 EKG:  Ventricular-paced rhythm  Abnormal ECG  When compared with ECG of 21-JAN-2023 16:37,  Electronic ventricular pacemaker Present    Results from last 7 days   Lab Units 01/21/23  1414   SARS-COV-2  Negative     Results from last 7 days   Lab Units 01/27/23  0503 01/26/23  0617 01/25/23  0433 01/22/23  0600 01/21/23  1414   WBC Thousand/uL 6 02 5 42 4 64 5 17 7 92   HEMOGLOBIN g/dL 15 5* 14 4 14 8 14 4 15 9*   HEMATOCRIT % 46 3* 43 5 43 9 42 1 48 3*   PLATELETS Thousands/uL 147* 133* 131* 112* 117*   NEUTROS ABS Thousands/µL  --  2 48  --  2 53 4 40         Results from last 7 days   Lab Units 01/27/23  0503 01/26/23  0617 01/25/23  0433 01/22/23  0600 01/21/23  1414   SODIUM mmol/L 138 140 139 136 137   POTASSIUM mmol/L 4 9 4 1 4 0 3 9 4 2   CHLORIDE mmol/L 110* 112* 111* 109* 106   CO2 mmol/L 19* 25 24 23 21   ANION GAP mmol/L 9 3* 4 4 10   BUN mg/dL 27* 25 23 19 18   CREATININE mg/dL 0 92 0 86 0 80 0 82 0 91   EGFR ml/min/1 73sq m 56 61 67 65 57   CALCIUM mg/dL 9 8 9 8 9 4 9 3 10 3*   MAGNESIUM mg/dL  --   --  2 4  --   --      Results from last 7 days   Lab Units 01/22/23  0600 01/21/23  1414   AST U/L 18 21   ALT U/L 9 10   ALK PHOS U/L 60 83   TOTAL PROTEIN g/dL 6 5 8 0   ALBUMIN g/dL 3 6 4 3   TOTAL BILIRUBIN mg/dL 0 82 0 75     Results from last 7 days   Lab Units 01/26/23  1006   POC GLUCOSE mg/dl 89     Results from last 7 days   Lab Units 01/27/23  0503 01/26/23  0617 01/25/23  0433 01/22/23  0600 01/21/23  1414   GLUCOSE RANDOM mg/dL 76 90 79 91 108           Results from last 7 days   Lab Units 01/22/23  0153 01/21/23  1654 01/21/23  1414   HS TNI 0HR ng/L  --   --  64*   HS TNI 2HR ng/L  --  71*  --    HSTNI D2 ng/L  --  7  --    HS TNI 4HR ng/L 82*  --   --    HSTNI D4 ng/L 18  --   --          Results from last 7 days   Lab Units 01/21/23  1414   PROTIME seconds 16 0*   INR  1 29*   PTT seconds 30         Results from last 7 days   Lab Units 01/21/23  1414   PROCALCITONIN ng/ml <0 05     Results from last 7 days   Lab Units 01/21/23  1414   LACTIC ACID mmol/L 2 0             Results from last 7 days   Lab Units 01/21/23  1414   BNP pg/mL 577*           Results from last 7 days   Lab Units 01/21/23  1826   CLARITY UA  Clear   COLOR UA  Yellow   SPEC GRAV UA  1 025   PH UA  6 0   GLUCOSE UA mg/dl Negative   KETONES UA mg/dl Negative   BLOOD UA  Small*   PROTEIN UA mg/dl Negative   NITRITE UA  Negative   BILIRUBIN UA  Negative   UROBILINOGEN UA E U /dl 0 2   LEUKOCYTES UA  Trace*   WBC UA /hpf 4-10*   RBC UA /hpf 0-1*   BACTERIA UA /hpf Occasional   EPITHELIAL CELLS WET PREP /hpf Moderate*     Results from last 7 days   Lab Units 01/21/23  1414   INFLUENZA A PCR  Negative   INFLUENZA B PCR  Negative   RSV PCR  Negative           Results from last 7 days   Lab Units 01/21/23  1414   BLOOD CULTURE  No Growth After 5 Days  No Growth After 5 Days  Medications:   Scheduled Medications:  apixaban, 5 mg, Oral, BID  atorvastatin, 10 mg, Oral, Daily  calcium carbonate, 500 mg, Oral, Daily  docusate sodium, 100 mg, Oral, BID  furosemide, 40 mg, Oral, Daily  melatonin, 3 mg, Oral, HS  polyethylene glycol, 17 g, Oral, Daily  potassium chloride, 20 mEq, Oral, Daily  thiamine, 100 mg, Oral, Daily  zonisamide, 100 mg, Oral, Daily      Continuous IV Infusions:     PRN Meds:  acetaminophen, 650 mg, Oral, Q6H PRN  bisacodyl, 5 mg, Oral, Daily PRN          Network Utilization Review Department  ATTENTION: Please call with any questions or concerns to [phone] and carefully listen to the prompts so that you are directed to the right person  All voicemails are confidential   Eloina Vidal all requests for admission clinical reviews, approved or denied determinations and any other requests to dedicated fax number below belonging to the campus where the patient is receiving treatment   List of dedicated fax numbers for the Facilities:  FACILITY NAME UR FAX NUMBER   ADMISSION DENIALS (Administrative/Medical Necessity) [address] [facility] (Maternity/NICU/Pediatrics) Cecilia Pina [address] [facility]  [phone]   [address] [place] [address] [facility] [address] [facility] [address] [facility] [address] [phone]

## 2023-01-27 NOTE — DISCHARGE SUMMARY
[facility]  Discharge- Sofy Morales 1935, 82 y o  female MRN: [id number]  Unit/Bed#: [facility] 405-01 Encounter: [id number]  Primary Care Provider: No primary care provider on file  Date and time admitted to hospital: 1/24/2023  6:54 PM    * Syncope  Assessment & Plan  · Hx of afib with slow ventricular response, pauses  Likely etiology of syncope  · Cardiology on board at [facility]; recommending transfer to [facility] for EP eval for PPM placement   Son in agreement with plan  · Cont telemetry monitoring  · EP consulted  · status post pacemaker placement on Thursday 1/26  · Per EP, site evaluated, interrogation completed, chest x-ray normal; patient is stable for discharge  · Discharge instructions per EP  · Continue medications with only change to hold fish oil pills for 2 weeks after discharge    Prolonged Q-T interval on ECG  Assessment & Plan  · Noted  · Zofran and seroquel discontinued   · Avoid QT prolonging agents    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 behavioral disturbance, psychotic disturbance, mood disturbance, or anxiety (HCC)  Assessment & Plan  · MS at baseline  · Cont seroquel    Acute cystitis without hematuria  Assessment & Plan  · ESBL ecoli  · Completed course of abx with Augmentin on 1/24    Thrombocytopenia ([address] )  Assessment & Plan  · Chronic hx  · No sign of acute blood loss    Chronic diastolic heart failure (HCC)  Assessment & Plan  Wt Readings from Last 3 Encounters:   01/10/23 77 6 kg (171 lb)   01/09/23 77 8 kg (171 lb 8 3 oz)   11/14/22 77 8 kg (171 lb 8 3 oz)     · Clinically compensated  · Cont chronically dosed lasix        Atrial fibrillation (HCC)  Assessment & Plan  · W/ slow VR and pauses  · Presently rate controlled  · EP recommends no rate controlling medications on discharge  · A/c with Eliquis, resume on discharge            Medical Problems     Resolved Problems  Date Reviewed: 1/26/2023   None         Admission Date:   Admission Orders (From admission, onward)     Ordered        01/24/23 1913  Inpatient Admission  Once                        Admitting Diagnosis: Syncope [R55]    HPI: 82-year-old female with a history of dementia, A  fib, CAD, CKD presented 1/21 to [address] [place] [address] with syncope  Procedures Performed:   Orders Placed This Encounter   Procedures   • Cardiac ep lab eps/ablations       Summary of Hospital Course: She is evaluated by cardiology/EP  Noted to be bradycardic with heart rates in the low 40s  She was subsequently transferred to [facility] for permanent pacemaker placement  Patient underwent permanent pacemaker placement on 1/26  Postoperatively, uncomplicated with normal evaluation via chest x-ray, site evaluation  EP evaluated on 1/27 and cleared patient for discharge  She will resume medications aside from holding fish oil pills for 2 weeks after discharge  She will continue anticoagulation with Eliquis and they do not recommend additional rate controlling medications on discharge  Significant Findings, Care, Treatment and Services Provided: As above    Complications: None    Condition at Discharge: good      Physical Exam:  General: Frail  Overweight  No apparent distress, resting comfortably   Head: Normocephalic, atraumatic  Eyes: Anicteric, no conjunctival erythema  ENT: External ear normal, no nasal discharge  Neck: Trachea midline, no visible lymphadenopathy or goiter  Respiratory: Non-labored respirations, symmetric thorax expansion  Cardiovascular: Regular rate and rhythm  Extremities appear well-perfused  Abdomen: Non-distended  Extremities: Trace lower extremity edema  Moves extremities spontaneously  Neuro:  no gross focal deficits, no aphasia    Discharge instructions/Information to patient and family:   See after visit summary for information provided to patient and family        Provisions for Follow-Up Care:  See after visit summary for information related to follow-up care and any pertinent home health orders  PCP: No primary care provider on file  Disposition: See After Visit Summary for discharge disposition information  Planned Readmission: No    Discharge Statement   I spent 20 minutes discharging the patient  This time was spent on the day of discharge  I had direct contact with the patient on the day of discharge  Additional documentation is required if more than 30 minutes were spent on discharge  Discharge Medications:  See after visit summary for reconciled discharge medications provided to patient and family        KEERTHI Mojica   PGY-2 Internal Medicine   [facility]

## 2023-01-27 NOTE — CASE MANAGEMENT
Case Management Discharge Planning Note    [facility] Morales  [facility] [id number]  : 1935 Date 2023       Current Admission Date: 2023  Current Admission Diagnosis:Syncope   Patient Active Problem List    Diagnosis Date Noted   • Prolonged Q-T interval on ECG 2023   • Syncope 2023   • Acute cystitis without hematuria 2023   • 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 behavioral disturbance, psychotic disturbance, mood disturbance, or anxiety ([facility] ) 2023   • Sick sinus syndrome ([facility] ) 2023   • Stroke-like symptoms 2023   • History of stroke 01/10/2023   • New onset seizure ([address] )    • Seizure ([facility] ) 2022   • Urinary retention 2022   • Venous stasis dermatitis of both lower extremities 2022   • Gait instability 2022   • Other fatigue 2022   • Atypical pneumonia 05/10/2022   • Respiratory insufficiency 05/10/2022   • Acute metabolic encephalopathy    • Chronic diastolic heart failure ([facility] ) 05/10/2022   • Thrombocytopenia ([facility] ) 05/10/2022   • Other hyperlipidemia 10/06/2021   • 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   • Other insomnia 10/06/2021   • Skin lesion 10/04/2021   • Action tremor 2020   • Age-related osteoporosis without fracture 2020   • Atrial fibrillation ([facility] ) 04/10/2015   • Chronic anticoagulation 04/10/2015   • Benign essential hypertension 2011      LOS (days): 3  Geometric Mean LOS (GMLOS) (days): 2 90  Days to GMLOS:0 2     OBJECTIVE:  Risk of Unplanned Readmission Score: 25 93         Current admission status: Inpatient   Preferred Pharmacy:   [facility] , [address] [place] [address]  Phone: [phone] Fax: [phone] [address] [place]  [facility] [address]  Phone: [phone] Fax: [phone] [facility], [address] 6125 [facility]  361 3600 [facility] [address]  Phone: [phone] Fax: [phone]    Primary Care Provider: No primary care provider on file  [facility]  Secondary Insurance:     DISCHARGE DETAILS:                                          Other Referral/Resources/Interventions Provided:  Programs[de-identified] CHF    Would you like to participate in our [address] service program?  : No - Declined                                        IMM Given (Date):: 01/27/23 (discussed with son over phone, he agrees and has good understanding )  IMM Given to[de-identified] Family (son Umer Stoll)  Family notified[de-identified] Spoke with son, agrees with transport today back to [facility] memory care  Additional Comments: Pt received PPM yesterday, doing very well, cooperative, oriented x1 and wants to go home  Spoke with [place], will fax AVS when ready   Awaiting time of w/c jayleen---just received, pickup time of 1430 with [place] w/c Pauline Badillo

## 2023-01-29 LAB — MRSA NOSE QL CULT: ABNORMAL

## 2023-01-31 NOTE — UTILIZATION REVIEW
NOTIFICATION OF ADMISSION DISCHARGE   This is a Notification of Discharge from [address]  Please be advised that this patient has been discharge from our facility  Below you will find the admission and discharge date and time including the patient’s disposition  UTILIZATION REVIEW CONTACT:  Ilean Babinski  Utilization   Network Utilization Review Department  Phone: [phone] x carefully listen to the prompts  All voicemails are confidential   Email: [email or web address]     ADMISSION INFORMATION  PRESENTATION DATE: 1/24/2023  6:54 PM  OBERVATION ADMISSION DATE:   INPATIENT ADMISSION DATE: 1/24/23  6:54 PM   DISCHARGE DATE: 1/27/2023  2:25 PM   DISPOSITION:Home/Self Care    IMPORTANT INFORMATION:  Send all requests for admission clinical reviews, approved or denied determinations and any other requests to dedicated fax number below belonging to the campus where the patient is receiving treatment   List of dedicated fax numbers:  [address] DENIALS (Administrative/Medical Necessity) [phone]   [address] (Maternity/NICU/Pediatrics) [phone]   [facility] [phone]   [place] [address] [phone]   [address] [phone]   220 [facility] [phone] 90 [facility] [phone]   [address] [place], [place] [address] 119 [phone]   [facility]  [phone]   405 [facility] [phone]   412 [facility] 850 [address] [facility] [phone]

## 2023-02-10 ENCOUNTER — IN-CLINIC DEVICE VISIT (OUTPATIENT)
Dept: CARDIOLOGY CLINIC | Facility: CLINIC | Age: 88
End: 2023-02-10

## 2023-02-10 DIAGNOSIS — Z95.0 PRESENCE OF CARDIAC PACEMAKER: Primary | ICD-10-CM

## 2023-02-10 NOTE — PROGRESS NOTES
Results for orders placed or performed in visit on 02/10/23   Cardiac EP device report    Narrative    DEVICE INTERROGATED IN THE [place] OFFICE  WOUND CHECK: INCISION CLEAN AND DRY WITH EDGES APPROXIMATED; WOUND CARE AND RESTRICTIONS REVIEWED WITH PATIENT  BATTERY VOLTAGE ADEQUATE (12 YRS)  : 78 9% (>40%~VVIR/60~AVB)  ALL LEAD PARAMETERS WITHIN NORMAL LIMITS  NO SIGNIFICANT HIGH RATE EPISODES  NO PROGRAMMING CHANGES MADE TO DEVICE PARAMETERS  PACEMAKER FUNCTIONING APPROPRIATELY    [address] [place] [address] Street

## 2023-02-15 ENCOUNTER — APPOINTMENT (EMERGENCY)
Dept: CT IMAGING | Facility: HOSPITAL | Age: 88
End: 2023-02-15

## 2023-02-15 ENCOUNTER — HOSPITAL ENCOUNTER (INPATIENT)
Facility: HOSPITAL | Age: 88
LOS: 2 days | Discharge: NURSING, REHAB OR LONG-TERM CARE | End: 2023-02-17
Attending: EMERGENCY MEDICINE | Admitting: INTERNAL MEDICINE

## 2023-02-15 DIAGNOSIS — I63.9 STROKE (CEREBRUM) (HCC): Primary | ICD-10-CM

## 2023-02-15 LAB
2HR DELTA HS TROPONIN: 1 NG/L
4HR DELTA HS TROPONIN: 9 NG/L
ANION GAP SERPL CALCULATED.3IONS-SCNC: 6 MMOL/L (ref 4–13)
APTT PPP: 29 SECONDS (ref 23–37)
BUN SERPL-MCNC: 23 MG/DL (ref 5–25)
CALCIUM SERPL-MCNC: 9.2 MG/DL (ref 8.4–10.2)
CARDIAC TROPONIN I PNL SERPL HS: 40 NG/L
CARDIAC TROPONIN I PNL SERPL HS: 41 NG/L
CARDIAC TROPONIN I PNL SERPL HS: 49 NG/L
CHLORIDE SERPL-SCNC: 112 MMOL/L (ref 96–108)
CO2 SERPL-SCNC: 20 MMOL/L (ref 21–32)
CREAT SERPL-MCNC: 0.76 MG/DL (ref 0.6–1.3)
ERYTHROCYTE [DISTWIDTH] IN BLOOD BY AUTOMATED COUNT: 15.9 % (ref 11.6–15.1)
FLUAV RNA RESP QL NAA+PROBE: NEGATIVE
FLUBV RNA RESP QL NAA+PROBE: NEGATIVE
GFR SERPL CREATININE-BSD FRML MDRD: 71 ML/MIN/1.73SQ M
GLUCOSE SERPL-MCNC: 101 MG/DL (ref 65–140)
GLUCOSE SERPL-MCNC: 105 MG/DL (ref 65–140)
HCT VFR BLD AUTO: 42.2 % (ref 34.8–46.1)
HGB BLD-MCNC: 13.8 G/DL (ref 11.5–15.4)
INR PPP: 1.33 (ref 0.84–1.19)
MCH RBC QN AUTO: 31.2 PG (ref 26.8–34.3)
MCHC RBC AUTO-ENTMCNC: 32.7 G/DL (ref 31.4–37.4)
MCV RBC AUTO: 96 FL (ref 82–98)
PLATELET # BLD AUTO: 100 THOUSANDS/UL (ref 149–390)
PMV BLD AUTO: 12 FL (ref 8.9–12.7)
POTASSIUM SERPL-SCNC: 3.6 MMOL/L (ref 3.5–5.3)
PROTHROMBIN TIME: 16.4 SECONDS (ref 11.6–14.5)
RBC # BLD AUTO: 4.42 MILLION/UL (ref 3.81–5.12)
RSV RNA RESP QL NAA+PROBE: NEGATIVE
SARS-COV-2 RNA RESP QL NAA+PROBE: NEGATIVE
SODIUM SERPL-SCNC: 138 MMOL/L (ref 135–147)
WBC # BLD AUTO: 4.42 THOUSAND/UL (ref 4.31–10.16)

## 2023-02-15 RX ORDER — ASPIRIN 81 MG/1
81 TABLET, CHEWABLE ORAL DAILY
Status: DISCONTINUED | OUTPATIENT
Start: 2023-02-15 | End: 2023-02-17 | Stop reason: HOSPADM

## 2023-02-15 RX ORDER — POLYETHYLENE GLYCOL 3350 17 G/17G
17 POWDER, FOR SOLUTION ORAL DAILY
Status: DISCONTINUED | OUTPATIENT
Start: 2023-02-16 | End: 2023-02-17 | Stop reason: HOSPADM

## 2023-02-15 RX ORDER — ZONISAMIDE 100 MG/1
100 CAPSULE ORAL 2 TIMES DAILY
Status: DISCONTINUED | OUTPATIENT
Start: 2023-02-15 | End: 2023-02-17 | Stop reason: HOSPADM

## 2023-02-15 RX ORDER — DOCUSATE SODIUM 100 MG/1
100 CAPSULE, LIQUID FILLED ORAL 2 TIMES DAILY
Status: DISCONTINUED | OUTPATIENT
Start: 2023-02-15 | End: 2023-02-17 | Stop reason: HOSPADM

## 2023-02-15 RX ORDER — QUETIAPINE FUMARATE 25 MG/1
12.5 TABLET, FILM COATED ORAL
Status: DISCONTINUED | OUTPATIENT
Start: 2023-02-15 | End: 2023-02-17 | Stop reason: HOSPADM

## 2023-02-15 RX ORDER — ATORVASTATIN CALCIUM 40 MG/1
40 TABLET, FILM COATED ORAL EVERY EVENING
Status: DISCONTINUED | OUTPATIENT
Start: 2023-02-15 | End: 2023-02-17 | Stop reason: HOSPADM

## 2023-02-15 RX ADMIN — APIXABAN 5 MG: 5 TABLET, FILM COATED ORAL at 21:05

## 2023-02-15 RX ADMIN — ASPIRIN 81 MG: 81 TABLET, CHEWABLE ORAL at 21:05

## 2023-02-15 RX ADMIN — IOHEXOL 85 ML: 350 INJECTION, SOLUTION INTRAVENOUS at 13:17

## 2023-02-15 RX ADMIN — ATORVASTATIN CALCIUM 40 MG: 40 TABLET, FILM COATED ORAL at 21:05

## 2023-02-15 RX ADMIN — QUETIAPINE FUMARATE 12.5 MG: 25 TABLET ORAL at 21:05

## 2023-02-15 RX ADMIN — ZONISAMIDE 100 MG: 100 CAPSULE ORAL at 21:06

## 2023-02-15 RX ADMIN — DOCUSATE SODIUM 100 MG: 100 CAPSULE, LIQUID FILLED ORAL at 21:05

## 2023-02-15 NOTE — ASSESSMENT & PLAN NOTE
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, presented with right gaze preference, left facial droop, and left upper extremity weakness on 2/15/2023  NIHSS 5 on presentation  Not TNK candidate due to Eliquis and not felt to be a thrombectomy candidate due to minimal symptoms and rapid improvement  CTA head/neck demonstrated new focal density within right M2 branch  CT head demonstrated no acute ischemic changes or hemorrhage but did demonstrate chronic microangiopathy and stable chronic left MCA infarct  Stable small calcified lesion anterior foramen magnum is favored to represent calcified meningioma  Repeat CTH on 2/16/23 demonstrated no acute changes/evolving infarcts (unable to have MRI brain due to recent PPM placement)  LDL 32  A1c 5 4  New right M2 occlusion felt possibly related to recent GI illness with incomplete absorption of her Eliquis  Patient appears to be at her baseline as of 2/17/2023 except for some diminished pin appreciation of the left side  Plan:  -Continue Eliquis 5 mg twice daily and aspirin 81 mg daily (aspirin added this admission)  -Continue atorvastatin 40 mg daily   -Patient should continue to receive PT/OT/ST at her facility   -Continue zonisamide home dose of 100 mg twice daily   -Patient had to cancel her Neurology appointment late last month  Will ask office to contact her family to reschedule     -As discussed with primary team, okay for discharge from a neurologic standpoint

## 2023-02-15 NOTE — ASSESSMENT & PLAN NOTE
12 PM day of admission patient was noted to be less responsive with right gaze preference left-sided facial droop and left upper extremity weakness  NIH stroke scale was 5 on admission  CT head with new focal density in right M2 branch correlating with thrombus seen on CTA  Patient was not a TNK candidate or thrombectomy candidate  Been under stroke way  Appreciate neurology recommendations  MRI brain if able with current pacemaker or CT head in 48 hours  Hold off on echo patient had one 1 month ago  Continue telemetry  Continue Eliquis per allergy recommendations and add aspirin  Permissive hypotension  PT/OT speech eval Consent (Scalp)/Introductory Paragraph: The rationale for Mohs was explained to the patient and consent was obtained. The risks, benefits and alternatives to therapy were discussed in detail. Specifically, the risks of changes in hair growth pattern secondary to repair, infection, scarring, bleeding, prolonged wound healing, incomplete removal, allergy to anesthesia, nerve injury and recurrence were addressed. Prior to the procedure, the treatment site was clearly identified and confirmed by the patient. All components of Universal Protocol/PAUSE Rule completed.

## 2023-02-15 NOTE — ED ATTENDING ATTESTATION
2/15/2023  IDaphne DO, saw and evaluated the patient  I have discussed the patient with the resident/non-physician practitioner and agree with the resident's/non-physician practitioner's findings, Plan of Care, and MDM as documented in the resident's/non-physician practitioner's note, except where noted  All available labs and Radiology studies were reviewed  I was present for key portions of any procedure(s) performed by the resident/non-physician practitioner and I was immediately available to provide assistance  At this point I agree with the current assessment done in the Emergency Department  I have conducted an independent evaluation of this patient a history and physical is as follows:    81 yo F presenting with stroke sx  Brought in by ambulance from dementia unit at [facility] facility  EMS report onset of sx around 12:00 this afternoon  Pt with dysarthria, R gaze deviation, L facial droop and L sided weakness  EMS report some improvement of sx en route, but still present on arrival  Stroke alert was called prehospital    Pt was taken immediately to CT scan and Neurology evaluated at bedside     She is anticoagulated on Eliquis    MDM: 81 yo F with new onset neuro sx, stroke alert/workup      ED Course         Critical Care Time  Procedures

## 2023-02-15 NOTE — CONSULTS
Consultation - Neurology   Nathan Parents 82 y o  female MRN: [id number]  Unit/Bed#: ED-01 Encounter: [id number]    Assessment/Plan    * Stroke (cerebrum) [facility])  Assessment & Plan  82year old female with past medical hx of afib on anticoagulation eliquis, CAD, CKD, hypertension, dementia (lives in a dementia unit), prior stroke left hemisphere (baseline aphasia) and a hx of seizures on Zonegran at baseline  The patient was reported normal state of health last night, this a m  reported to be normal state of health, she was watching TV, at around noon (12 p m  today), she was noted to have be less responsive, with right gaze preference and reported left sided facial droop and left upper extremity weakness  EMS was contacted, it was reported by EMS the patient had a right gaze preference with left facial droop, they did not notice any focal weakness, but rigidity in the uppers  The patient was not following commands  The patient was a pre-hospital stroke alert, upon arrival her NIH was 5  CT of head completed - New focal density within right M2 branch correlating with thrombus on CTA  No acute ischemic changes or hemorrhage  Chronic microangiopathy  Stable chronic left MCA infarct  Stable small calcified lesion anterior foramen magnum is favored to represent calcified meningioma  CTA of head and neck - Right M2 occlusion  No other significant intracranial stenosis or large vessel occlusion  No significant stenosis of the cervical carotid or vertebral arteries      The patient is not a tnk or thrombectomy candidate      - Stroke pathway, admit to medicine team  • MRI brain with out contrast if able, as she just recently had a pacemaker placed, if unable would repeat CT of head in 24-48 hours  • Echo  • Lipid Panel  • Hemoglobin A1c  • Eliquis, continue home dosing  • Aspirin 81 mg    • Atorvastatin 40 mg  • Permissive blood pressures, post stroke for 24 hours, avoid hypotension for cerebral perfusion  • Continue home dose Zonegran, monitor for seizure activity, seizure precautions  • Continue telemetry  • PT/OT/ST - therapies, dysphagia screen prior to po intake  • Frequent neuro checks  Continue to monitor and notify neurology with any changes  - Medical management and supportive care per primary team  Correction of any metabolic or infectious disturbances  - Please see neurology attestation    Recommendations for outpatient neurological follow up have yet to be determined  History of Present Illness     Reason for Consult / Principal Problem: Stroke Alert    HPI: Jak Plata is a 82 y o   female with past medical hx of afib on anticoagulation eliquis, CAD, CKD, hypertension, prior stroke, dementia (lives in a dementia unit) and a hx of seizures on Zonegran at baseline  The patient was reported normal state of health last night, this a m  reported to be normal state of health, she was watching TV, at around noon (12 p m  today), she was noted to have be less responsive, with right gaze preference and reported left sided facial droop and left upper extremity weakness  EMS was contacted, it was reported by EMS the patient had a right gaze preference with left facial droop, they did not notice any focal weakness, but rigidity in the uppers  The patient was not following commands  The patient was a pre-hospital stroke alert, upon arrival his NIH was 5  Neurology arrival was immediate by phone, and was in person within 5 minutes of stroke alert  CT of head completed - New focal density within right M2 branch correlating with thrombus on CTA  No acute ischemic changes or hemorrhage  Chronic microangiopathy  Stable chronic left MCA infarct  Stable small calcified lesion anterior foramen magnum is favored to represent calcified meningioma  CTA of head and neck - Right M2 occlusion  No other significant intracranial stenosis or large vessel occlusion    No significant stenosis of the cervical carotid or vertebral arteries  The patient is not a tnk candidate, as she was on Eliquis  Case was reviewed with neurosurgery physician, the patient will not be a thrombectomy candidate  The patient resides in a dementia unit, she is requires support with ADLs, is able to walk with walker and communicate with staff  Per record review she does take Zonegran with her hx of seizures  Per record review the patient was seen at [facility], as she was more confused, it is reported she has baseline aphasia  Per record review she was diagnosed with viral gastroenteritis SERA/dehydration, SIRS, and altered mental status  CT of the head was negative for any acute pathology  The patient was discharged back to her dementia unit  The hospital course from the discharge summary at [facility] on 2/12/2023 [facility]  This is a brief description of the patient's hospital stay; please refer to medical chart for further details  Jad Young is a 82 y o  female with past medical history as mentioned above who presented to [facility] on 2/12/2023 8:19 PM with complaint of seeming more altered than her baseline which is mainly aphasic at baseline but seemed less interactive w/ profound N/V/D at [facility] and send in for evaluation  U/A and labs bland other than mild SERA in setting poor PO intake  Afebrile, no leukocytosis  CT Head NAD but noted left posterior superior skin lesion measuring 15 mm skin CA not excluded w/ son reporting he was supposed to have mom follow up but given her medical issues w/ more urgent provider appointments has not been able to arrange  CT A/P Mildly prominent fluid-filled loops of large bowel which can be seen with underlying diarrhea  No evidence of bowel obstruction  Initially very fatigued but no further episodes of N/V/D  ADAT and returned to general diet   Slight dip in PLT to 89 w/ plans recheck in a week likely in setting poor PO intake while on Eliquis w/ no bleeding issues noted  RVP negative  No further diarrhea to send for stool sample, tolerating pO well, PT/OT cleared and felt close to baseline though a little fatigued  BC NGTD, Ucx no growth  Stable d/c to [facility], strict RTED instructions provided, all questions addressed   "    The patient presented to the hospital in Jan Of 2023, secondary to lethargy - work up revealed at that time a CT of the head showed a left parietal region stroke, CTA of head and neck showed no lvo, stable 1 cm foramen magnum extra-axial densely calcified mass most likely consistent with meningioma  MRI of brain was completed and showed no acute ischemia, did show chronic left parietal lobe infarct thought to be chronic c/w stroke in July of 2022  EEG was completed and showed some slowing but no epileptiform activity  There was no change in her zonegran dose  The patient was noted to have sick sinus syndrome - but family did not want to have pacemaker at that time  The patient was readmitted to the hospital for syncope and eventually had a pacemaker implanted on 1/26  Inpatient consult to Neurology  Consult performed by: Maricruz Serrano PA-C  Consult ordered by: Ji Centeno DO          Review of Systems   Limited with aphasia and loc  Historical Information   Past Medical History:   Diagnosis Date   • A-fib [facility])    • CAD (coronary artery disease)    • CKD (chronic kidney disease) stage 2, GFR 60-89 ml/min    • Hypertension    • Memory loss    • Urinary tract infection      Past Surgical History:   Procedure Laterality Date   • CARDIAC ELECTROPHYSIOLOGY PROCEDURE N/A 1/26/2023    Procedure: Cardiac pacer implant;  Surgeon: Yanet Chaidez DO;  Location: [facility]AC [facility];   Service: Cardiology   • CHOLECYSTECTOMY     • REPLACEMENT TOTAL KNEE Left 2008     Social History   Social History     Substance and Sexual Activity   Alcohol Use Never     Social History     Substance and Sexual Activity   Drug Use Never     E-Cigarette/Vaping   • E-Cigarette Use Never User      E-Cigarette/Vaping Substances   • Nicotine No    • THC No    • CBD No    • Flavoring No    • Other No    • Unknown No      Social History     Tobacco Use   Smoking Status Never   Smokeless Tobacco Never     Family History:   Family History   Problem Relation Age of Onset   • Lung cancer Father    • Leukemia Brother    • Breast cancer Daughter      Meds/Allergies   all current active meds have been reviewed, current meds:   No current facility-administered medications for this encounter  and PTA meds:   Prior to Admission Medications   Prescriptions Last Dose Informant Patient Reported? Taking?    Ascorbic Acid (VITAMIN C) 500 MG CAPS   Yes No   Sig: Take 500 mg by mouth daily   Cholecalciferol (Vitamin D3) 1 25 MG (72609 UT) CAPS   Yes No   Cranberry 450 MG CAPS   Yes No   Sig: Take 1 capsule by mouth in the morning   QUEtiapine (SEROquel) 25 mg tablet   No No   Sig: Take 0 5 tablets (12 5 mg total) by mouth daily at bedtime Hold if sedated   acetaminophen (TYLENOL) 500 mg tablet   Yes No   Sig: Take 500 mg by mouth every 6 (six) hours as needed for mild pain   apixaban (ELIQUIS) 5 mg   Yes No   Sig: Take 5 mg by mouth 2 (two) times a day   atorvastatin (LIPITOR) 10 mg tablet   Yes No   Sig: Take 10 mg by mouth daily   bisacodyl (DULCOLAX) 5 mg EC tablet   Yes No   Sig: Take 5 mg by mouth daily as needed for constipation   calcium carbonate (TUMS) 500 mg chewable tablet   Yes No   Sig: Chew 1 tablet daily   docusate sodium (COLACE) 100 mg capsule   Yes No   Sig: Take 100 mg by mouth 2 (two) times a day   furosemide (LASIX) 40 mg tablet   Yes No   Sig: Take 1 tablet by mouth daily   polyethylene glycol (MIRALAX) 17 g packet   Yes No   Sig: Take 17 g by mouth daily   potassium chloride (K-DUR,KLOR-CON) 20 mEq tablet   Yes No   Sig: Take 20 mEq by mouth daily   thiamine (VITAMIN B1) 100 mg tablet   Yes No   zonisamide (ZONEGRAN) 100 mg capsule   No No   Sig: Take 1 capsule (100 mg total) by mouth daily Do not start before November 19, 2022  Facility-Administered Medications: None     Allergies   Allergen Reactions   • Morphine      Objective   Vitals:Blood pressure 130/83, pulse 61, temperature 97 6 °F (36 4 °C), resp  rate 20, weight 82 8 kg (182 lb 8 7 oz), SpO2 97 %  ,Body mass index is 32 34 kg/m²  No intake or output data in the 24 hours ending 02/15/23 1604    Invasive Devices: Invasive Devices     Peripheral Intravenous Line  Duration           Peripheral IV 02/15/23 Dorsal (posterior); Left;Proximal Forearm <1 day    Peripheral IV 02/15/23 Right Antecubital <1 day          Drain  Duration           External Urinary Catheter 21 days              Physical Exam  Constitutional:       General: She is not in acute distress  Appearance: She is not ill-appearing, toxic-appearing or diaphoretic  HENT:      Head: Normocephalic  Nose: No congestion  Mouth/Throat:      Pharynx: No oropharyngeal exudate or posterior oropharyngeal erythema  Comments: No tongue bite noted  Eyes:      General:         Right eye: No discharge  Left eye: No discharge  Extraocular Movements: Extraocular movements intact and EOM normal       Pupils: Pupils are equal, round, and reactive to light  Cardiovascular:      Rate and Rhythm: Normal rate  Pulmonary:      Effort: No respiratory distress  Abdominal:      General: There is no distension  Musculoskeletal:      Cervical back: Neck supple  Skin:     General: Skin is warm and dry  Neurological:      Mental Status: She is alert  Coordination: Finger-Nose-Finger Test normal        Neurologic Exam     Mental Status   The patient is awake  She is able to follow simple commands, difficulty with complex commands  She was able to read some words on NIH stroke scale, however, there was evidence of aphasia (expressive and receptive) and dysarthria   Slow to respond at times and deliberate speech, would only speak if spoken too, limited verbal out put  She was oriented to person, not place or time, unable to tell me president or current events  Poor historian and poor insight into current medical condition  Cranial Nerves     CN II   Visual fields full to confrontation  CN III, IV, VI   Pupils are equal, round, and reactive to light  Extraocular motions are normal    Nystagmus: none   Diplopia: none  Ophthalmoparesis: none    CN V   Facial sensation intact  CN VIII   CN VIII normal      CN IX, X   CN IX normal    CN X normal      CN XI   CN XI normal      CN XII   CN XII normal    There was a slight decrease in left nlf  Motor Exam No drift in the uppers or lowers to testing, non focal examination  She was able to hold uppers off bed with out drift  (for 10 seconds)  She was able to hold lowers off bed with out drift   (for 10 seconds)    Nl tone, no focal motor examination  Sensory Exam   Light touch normal    Pinprick normal    Non focal sensory examination     Gait, Coordination, and Reflexes     Coordination   Finger to nose coordination: normal    Tremor   Resting tremor: absent  Intention tremor: absent    Reflexes   Right plantar: normal  Left plantar: normalNo seizure like activity observed  NIHSS:  1a Level of Consciousness: 0 = Alert   1b  LOC Questions: 2 = Answers neither correctly   1c  LOC Commands: 0 = Obeys both correctly   2  Best Gaze: 0 = Normal   3  Visual: 0 = No visual field loss   4  Facial Palsy: 1   5a  Motor Right Arm: 0=No drift, limb holds 90 (or 45) degrees for full 10 seconds   5b  Motor Left Arm: 0=No drift, limb holds 90 (or 45) degrees for full 10 seconds   6a  Motor Right Le=No drift, limb holds 90 (or 45) degrees for full 10 seconds   6b  Motor Left Le=No drift, limb holds 90 (or 45) degrees for full 10 seconds   7  Limb Ataxia:  0=Absent   8  Sensory: 0=Normal; no sensory loss   9   Best Language:  1=Mild to moderate aphasia; some obvious loss of fluency or facility of comprehension without significant limitation on ideas expressed or form of expression  10  Dysarthria: 1=Mild to moderate, patient slurs at least some words and at worst, can be understood with some difficulty   11   Extinction and Inattention (formerly Neglect): 0=No abnormality   Total Score: 5     Lab Results:  Recent Results (from the past 24 hour(s))   Fingerstick Glucose (POCT)    Collection Time: 02/15/23  1:23 PM   Result Value Ref Range    POC Glucose 101 65 - 140 mg/dl   ECG 12 lead    Collection Time: 02/15/23  1:28 PM   Result Value Ref Range    Ventricular Rate 66 BPM    Atrial Rate 241 BPM    ID Interval  ms    QRSD Interval 132 ms    QT Interval 494 ms    QTC Interval 517 ms    P Axis  degrees    QRS Axis -40 degrees    T Wave Axis 230 degrees   Basic metabolic panel    Collection Time: 02/15/23  1:35 PM   Result Value Ref Range    Sodium 138 135 - 147 mmol/L    Potassium 3 6 3 5 - 5 3 mmol/L    Chloride 112 (H) 96 - 108 mmol/L    CO2 20 (L) 21 - 32 mmol/L    ANION GAP 6 4 - 13 mmol/L    BUN 23 5 - 25 mg/dL    Creatinine 0 76 0 60 - 1 30 mg/dL    Glucose 105 65 - 140 mg/dL    Calcium 9 2 8 4 - 10 2 mg/dL    eGFR 71 ml/min/1 73sq m   CBC and Platelet    Collection Time: 02/15/23  1:35 PM   Result Value Ref Range    WBC 4 42 4 31 - 10 16 Thousand/uL    RBC 4 42 3 81 - 5 12 Million/uL    Hemoglobin 13 8 11 5 - 15 4 g/dL    Hematocrit 42 2 34 8 - 46 1 %    MCV 96 82 - 98 fL    MCH 31 2 26 8 - 34 3 pg    MCHC 32 7 31 4 - 37 4 g/dL    RDW 15 9 (H) 11 6 - 15 1 %    Platelets 994 (L) 114 - 390 Thousands/uL    MPV 12 0 8 9 - 12 7 fL   Protime-INR    Collection Time: 02/15/23  1:37 PM   Result Value Ref Range    Protime 16 4 (H) 11 6 - 14 5 seconds    INR 1 33 (H) 0 84 - 1 19   APTT    Collection Time: 02/15/23  1:37 PM   Result Value Ref Range    PTT 29 23 - 37 seconds   HS Troponin 0hr (reflex protocol)    Collection Time: 02/15/23  1:37 PM   Result Value Ref Range    hs TnI 0hr 40 "Refer to ACS Flowchart"- see link ng/L   FLU/RSV/COVID - if FLU/RSV clinically relevant    Collection Time: 02/15/23  1:37 PM    Specimen: Nose; Nares   Result Value Ref Range    SARS-CoV-2 Negative Negative    INFLUENZA A PCR Negative Negative    INFLUENZA B PCR Negative Negative    RSV PCR Negative Negative     Procedure: CT stroke alert brain    Result Date: 2/15/2023  Narrative: CT BRAIN - STROKE ALERT PROTOCOL INDICATION:   Stroke Alert  COMPARISON:  MRI dated 1/11/2023  TECHNIQUE:  CT examination of the brain was performed  In addition to axial images, coronal reformatted images were created and submitted for interpretation  Radiation dose length product (DLP) for this visit:  922 mGy-cm   This examination, like all CT scans performed in the [facility], was performed utilizing techniques to minimize radiation dose exposure, including the use of iterative reconstruction and automated exposure control  IMAGE QUALITY:  Diagnostic  FINDINGS:  PARENCHYMA:  Stable encephalomalacia due to remote infarct in the left parietal lobe extending into the posterior temporal lobe  Stable diminished attenuation in the periventricular and subcortical white matter due to chronic microangiopathy  No acute territorial infarct, hemorrhage, mass effect, shift or herniation  Stable small calcified mass in the anterior foramen magnum measuring 6 mm in AP dimension by 1 cm in transverse dimension that likely represents calcified meningioma  No significant mass effect  Focal increased density within right M2 branch correlating with thrombus on CTA  VENTRICLES AND EXTRA-AXIAL SPACES:  Volume loss  No hydrocephalus  VISUALIZED ORBITS: Normal visualized orbits  PARANASAL SINUSES: Normal visualized paranasal sinuses  CALVARIUM AND EXTRACRANIAL SOFT TISSUES:   Normal      Impression: New focal density within right M2 branch correlating with thrombus on CTA   No acute ischemic changes or hemorrhage    Chronic microangiopathy  Stable chronic left MCA infarct  Stable small calcified lesion anterior foramen magnum is favored to represent calcified meningioma  Findings were reported to [facility]  via HIPAA compliant secure electronic messaging at 1:33 PM  Workstation performed: [id number]     Procedure: CTA stroke alert (head/neck)    Result Date: 2/15/2023  Narrative: CTA NECK AND BRAIN WITH CONTRAST INDICATION: Stroke Alert COMPARISON:   CTA dated 1/10/2023  TECHNIQUE:   Post contrast imaging was performed after administration of iodinated contrast through the neck and brain  Post contrast axial 0 625 mm images timed to opacify the arterial system  3D rendering was performed on an independent workstation  MIP reconstructions performed  Coronal reconstructions were performed of the noncontrast portion of the brain  Radiation dose length product (DLP) for this visit:  772 mGy-cm   This examination, like all CT scans performed in the [facility], was performed utilizing techniques to minimize radiation dose exposure, including the use of iterative reconstruction and automated exposure control  IV Contrast:  85 mL of iohexol (OMNIPAQUE)  IMAGE QUALITY:   Diagnostic FINDINGS: CERVICAL VASCULATURE AORTIC ARCH AND GREAT VESSELS:  Mild atherosclerotic disease of the arch, proximal great vessels and visualized subclavian vessels  No significant stenosis  RIGHT VERTEBRAL ARTERY CERVICAL SEGMENT:  Normal origin  The vessel is normal in caliber throughout the neck  LEFT VERTEBRAL ARTERY CERVICAL SEGMENT:  Normal origin  The vessel is normal in caliber throughout the neck  RIGHT EXTRACRANIAL CAROTID SEGMENT:  Minimal calcified plaque at the bifurcation  No stenosis or dissection  LEFT EXTRACRANIAL CAROTID SEGMENT:  Normal caliber common carotid artery  Normal bifurcation and cervical internal carotid artery  No stenosis or dissection   NASCET criteria was used to determine the degree of internal carotid artery diameter stenosis  INTRACRANIAL VASCULATURE INTERNAL CAROTID ARTERIES:  Mild atherosclerotic disease in the cavernous and supraclinoid internal carotid arteries without significant stenosis  ANTERIOR CIRCULATION:  Right A1 segment is hypoplastic  Anterior cerebral arteries enhance normally  Normal anterior communicating artery  MIDDLE CEREBRAL ARTERY CIRCULATION:  New occlusion of superior right M2 division  Right M1 segment is unremarkable  No significant stenosis or occlusion of the left M1 or M2 segments  Mild calcified plaque in the left M1 segment  DISTAL VERTEBRAL ARTERIES:  Normal distal vertebral arteries  Posterior inferior cerebellar artery origins are normal  Normal vertebral basilar junction  BASILAR ARTERY:  Basilar artery is normal in caliber  Normal superior cerebellar arteries  POSTERIOR CEREBRAL ARTERIES: There is fetal origin of the right posterior cerebral artery  The left posterior cerebral artery arises from the basilar tip  Both demonstrate no focal stenosis  Normal posterior communicating arteries  VENOUS STRUCTURES:  Normal  NON VASCULAR ANATOMY BONY STRUCTURES:  No acute osseous abnormality  SOFT TISSUES OF THE NECK:  Normal  THORACIC INLET:  New left chest wall pacemaker  Impression: Right M2 occlusion  No other significant intracranial stenosis or large vessel occlusion  No significant stenosis of the cervical carotid or vertebral arteries  Findings were reported to [facility]  via HIPAA compliant secure electronic messaging at 1:33 PM   Workstation performed: [id number]     Imaging Studies: I have personally reviewed pertinent reports  EKG, Pathology, and Other Studies: I have personally reviewed pertinent reports  Code Status: Prior  Advance Directive and Living Will:      Power of : Yes    Examined alongside attending physician

## 2023-02-15 NOTE — ED PROVIDER NOTES
History  Chief Complaint   Patient presents with   • STROKE Alert     Pt arrives via EMS from [facility] dementia unit, last seen normal at 1200, developed unilateral weakness and gaze, had similar episode earlier this week  Pt on eliquis for afib     Patient is an 22-year-old female, past medical history of A-fib on Eliquis, dementia, coronary artery disease, CKD hypertension, status post recent pacemaker implant, who presents to the emergency department as a prehospital stroke alert  Per EMS, around 12 PM this afternoon patient developed right-sided gaze preference, left facial droop, left upper extremity weakness  Nursing home staff called 911 and patient was brought to the emergency department for further evaluation  Fingerstick glucose was normal     On arrival, patient with obvious right-sided gaze preference  Mild left-sided facial droop  No appreciable left upper extremity weakness  Further history limited secondary to dementia  Prior to Admission Medications   Prescriptions Last Dose Informant Patient Reported? Taking?    Ascorbic Acid (VITAMIN C) 500 MG CAPS   Yes No   Sig: Take 500 mg by mouth daily   Cholecalciferol (Vitamin D3) 1 25 MG (50798 UT) CAPS   Yes No   Cranberry 450 MG CAPS   Yes No   Sig: Take 1 capsule by mouth in the morning   QUEtiapine (SEROquel) 25 mg tablet   No No   Sig: Take 0 5 tablets (12 5 mg total) by mouth daily at bedtime Hold if sedated   acetaminophen (TYLENOL) 500 mg tablet   Yes No   Sig: Take 500 mg by mouth every 6 (six) hours as needed for mild pain   apixaban (ELIQUIS) 5 mg   Yes No   Sig: Take 5 mg by mouth 2 (two) times a day   atorvastatin (LIPITOR) 10 mg tablet   Yes No   Sig: Take 10 mg by mouth daily   bisacodyl (DULCOLAX) 5 mg EC tablet   Yes No   Sig: Take 5 mg by mouth daily as needed for constipation   calcium carbonate (TUMS) 500 mg chewable tablet   Yes No   Sig: Chew 1 tablet daily   docusate sodium (COLACE) 100 mg capsule   Yes No   Sig: Take 100 mg by mouth 2 (two) times a day   furosemide (LASIX) 40 mg tablet   Yes No   Sig: Take 1 tablet by mouth daily   polyethylene glycol (MIRALAX) 17 g packet   Yes No   Sig: Take 17 g by mouth daily   potassium chloride (K-DUR,KLOR-CON) 20 mEq tablet   Yes No   Sig: Take 20 mEq by mouth daily   thiamine (VITAMIN B1) 100 mg tablet   Yes No   zonisamide (ZONEGRAN) 100 mg capsule   No No   Sig: Take 1 capsule (100 mg total) by mouth daily Do not start before November 19, 2022  Facility-Administered Medications: None       Past Medical History:   Diagnosis Date   • A-fib [facility])    • CAD (coronary artery disease)    • CKD (chronic kidney disease) stage 2, GFR 60-89 ml/min    • Hypertension    • Memory loss    • Urinary tract infection        Past Surgical History:   Procedure Laterality Date   • CARDIAC ELECTROPHYSIOLOGY PROCEDURE N/A 1/26/2023    Procedure: Cardiac pacer implant;  Surgeon: Chance Ornelas DO;  Location: [facility]; Service: Cardiology   • CHOLECYSTECTOMY     • REPLACEMENT TOTAL KNEE Left 2008       Family History   Problem Relation Age of Onset   • Lung cancer Father    • Leukemia Brother    • Breast cancer Daughter      I have reviewed and agree with the history as documented      E-Cigarette/Vaping   • E-Cigarette Use Never User      E-Cigarette/Vaping Substances   • Nicotine No    • THC No    • CBD No    • Flavoring No    • Other No    • Unknown No      Social History     Tobacco Use   • Smoking status: Never   • Smokeless tobacco: Never   Vaping Use   • Vaping Use: Never used   Substance Use Topics   • Alcohol use: Never   • Drug use: Never        Review of Systems   Unable to perform ROS: Dementia       Physical Exam  ED Triage Vitals   Temperature Pulse Respirations Blood Pressure SpO2   02/15/23 1348 02/15/23 1330 02/15/23 1330 02/15/23 1330 02/15/23 1330   97 6 °F (36 4 °C) 62 20 125/69 96 %      Temp src Heart Rate Source Patient Position - Orthostatic VS BP Location FiO2 (%) -- 02/15/23 1330 02/15/23 1330 02/15/23 1430 --    Monitor Lying Right arm       Pain Score       --                    Orthostatic Vital Signs  Vitals:    02/15/23 1330 02/15/23 1335 02/15/23 1340 02/15/23 1430   BP: 125/69   130/83   Pulse: 62 61 60 61   Patient Position - Orthostatic VS: Lying   Lying       Physical Exam  Vitals and nursing note reviewed  Constitutional:       General: She is not in acute distress  Appearance: She is well-developed  She is not diaphoretic  HENT:      Head: Normocephalic and atraumatic  Right Ear: External ear normal       Left Ear: External ear normal       Nose: Nose normal    Eyes:      General: Lids are normal  No scleral icterus  Cardiovascular:      Rate and Rhythm: Normal rate and regular rhythm  Pulmonary:      Effort: Pulmonary effort is normal  No respiratory distress  Musculoskeletal:         General: No deformity  Normal range of motion  Cervical back: Normal range of motion and neck supple  Skin:     General: Skin is warm and dry  Neurological:      General: No focal deficit present  Mental Status: She is alert  Mental status is at baseline  GCS: GCS eye subscore is 4  GCS verbal subscore is 4  GCS motor subscore is 6  Cranial Nerves: Facial asymmetry present  Sensory: No sensory deficit  Motor: Motor function is intact  No weakness  Comments: Mild left lower facial droop    Right gaze preference noted but able to overcome on command   Psychiatric:         Mood and Affect: Mood normal          Behavior: Behavior normal          ED Medications  Medications   iohexol (OMNIPAQUE) 350 MG/ML injection (SINGLE-DOSE) 85 mL (85 mL Intravenous Given 2/15/23 1317)       Diagnostic Studies  Results Reviewed     Procedure Component Value Units Date/Time    CBC and Platelet [[id number]]  (Abnormal) Collected: 02/15/23 1335    Lab Status: Final result Specimen: Blood from Arm, Left Updated: 02/15/23 1451     WBC 4 42 Thousand/uL      RBC 4 42 Million/uL      Hemoglobin 13 8 g/dL      Hematocrit 42 2 %      MCV 96 fL      MCH 31 2 pg      MCHC 32 7 g/dL      RDW 15 9 %      Platelets 889 Thousands/uL      MPV 12 0 fL     FLU/RSV/COVID - if FLU/RSV clinically relevant [[id number]]  (Normal) Collected: 02/15/23 1337    Lab Status: Final result Specimen: Nares from Nose Updated: 02/15/23 1422     SARS-CoV-2 Negative     INFLUENZA A PCR Negative     INFLUENZA B PCR Negative     RSV PCR Negative    Narrative:      FOR PEDIATRIC PATIENTS - copy/paste COVID Guidelines URL to browser: [email or web address]  [employer]    SARS-CoV-2 assay is a Nucleic Acid Amplification assay intended for the  qualitative detection of nucleic acid from SARS-CoV-2 in nasopharyngeal  swabs  Results are for the presumptive identification of SARS-CoV-2 RNA  Positive results are indicative of infection with SARS-CoV-2, the virus  causing COVID-19, but do not rule out bacterial infection or co-infection  with other viruses  Laboratories within the [place] and its  territories are required to report all positive results to the appropriate  public health authorities  Negative results do not preclude SARS-CoV-2  infection and should not be used as the sole basis for treatment or other  patient management decisions  Negative results must be combined with  clinical observations, patient history, and epidemiological information  This test has not been FDA cleared or approved  This test has been authorized by FDA under an Emergency Use Authorization  (EUA)  This test is only authorized for the duration of time the  declaration that circumstances exist justifying the authorization of the  emergency use of an in vitro diagnostic tests for detection of SARS-CoV-2  virus and/or diagnosis of COVID-19 infection under section 564(b)(1) of  the Act, 21 U  S C  420JAM-0(T)(9), unless the authorization is terminated  or revoked sooner  The test has been validated but independent review by FDA  and CLIA is pending  Test performed using [employer] GeneXpert: This RT-PCR assay targets N2,  a region unique to SARS-CoV-2  A conserved region in the E-gene was chosen  for pan-Sarbecovirus detection which includes SARS-CoV-2  According to CMS-2020-01-R, this platform meets the definition of high-throughput technology      Protime-INR [[id number]]  (Abnormal) Collected: 02/15/23 1337    Lab Status: Final result Specimen: Blood from Arm, Right Updated: 02/15/23 1411     Protime 16 4 seconds      INR 1 33    APTT [[id number]]  (Normal) Collected: 02/15/23 1337    Lab Status: Final result Specimen: Blood from Arm, Right Updated: 02/15/23 1411     PTT 29 seconds     HS Troponin I 2hr [[id number]]     Lab Status: No result Specimen: Blood     HS Troponin 0hr (reflex protocol) [[id number]]  (Normal) Collected: 02/15/23 1337    Lab Status: Final result Specimen: Blood from Arm, Right Updated: 02/15/23 1409     hs TnI 0hr 40 ng/L     Basic metabolic panel [[id number]]  (Abnormal) Collected: 02/15/23 1335    Lab Status: Final result Specimen: Blood from Arm, Left Updated: 02/15/23 1404     Sodium 138 mmol/L      Potassium 3 6 mmol/L      Chloride 112 mmol/L      CO2 20 mmol/L      ANION GAP 6 mmol/L      BUN 23 mg/dL      Creatinine 0 76 mg/dL      Glucose 105 mg/dL      Calcium 9 2 mg/dL      eGFR 71 ml/min/1 73sq m     Narrative:      [place] guidelines for Chronic Kidney Disease (CKD):   •  Stage 1 with normal or high GFR (GFR > 90 mL/min/1 73 square meters)  •  Stage 2 Mild CKD (GFR = 60-89 mL/min/1 73 square meters)  •  Stage 3A Moderate CKD (GFR = 45-59 mL/min/1 73 square meters)  •  Stage 3B Moderate CKD (GFR = 30-44 mL/min/1 73 square meters)  •  Stage 4 Severe CKD (GFR = 15-29 mL/min/1 73 square meters)  •  Stage 5 End Stage CKD (GFR <15 mL/min/1 73 square meters)  Note: GFR calculation is accurate only with a steady state creatinine    Fingerstick Glucose (POCT) [[id number]]  (Normal) Collected: 02/15/23 1323    Lab Status: Final result Updated: 02/15/23 1330     POC Glucose 101 mg/dl                  CTA stroke alert (head/neck)   Final Result by Pedro Pablo Friedman MD (02/15 1342)      Right M2 occlusion  No other significant intracranial stenosis or large vessel occlusion  No significant stenosis of the cervical carotid or vertebral arteries  Findings were reported to [facility]  via HIPAA compliant secure electronic messaging at 1:33 PM                                Workstation performed: [id number]         CT stroke alert brain   Final Result by Pedro Pablo Friedman MD (02/15 1344)   New focal density within right M2 branch correlating with thrombus on CTA  No acute ischemic changes or hemorrhage          Chronic microangiopathy  Stable chronic left MCA infarct  Stable small calcified lesion anterior foramen magnum is favored to represent calcified meningioma  Findings were reported to [facility]  via HIPAA compliant secure electronic messaging at 1:33 PM       Workstation performed: [id number]               Procedures  ECG 12 Lead Documentation Only    Date/Time: 2/15/2023 2:50 PM  Performed by: Scar Gregory DO  Authorized by: Scar Gregory DO     ECG reviewed by me, the ED Provider: yes    Patient location:  ED  Interpretation:     Interpretation: abnormal    Rate:     ECG rate:  66    ECG rate assessment: normal    Rhythm:     Rhythm: paced    Pacing:     Capture:  Complete    Type of pacing:  Ventricular  Ectopy:     Ectopy: none    QRS:     QRS axis:  Left    QRS intervals: Wide          ED Course  ED Course as of 02/15/23 1458   Wed Feb 15, 2023   1343 Spoke with neuro  ACute m2 occlusion   1349 CT stroke alert brain  New focal density within right M2 branch correlating with thrombus on CTA  No acute ischemic changes or hemorrhage         Chronic microangiopathy   Stable chronic left MCA infarct  Stable small calcified lesion anterior foramen magnum is favored to represent calcified meningioma  80 CTA stroke alert (head/neck)  Right M2 occlusion  No other significant intracranial stenosis or large vessel occlusion  No significant stenosis of the cervical carotid or vertebral arteries  0589 Basic metabolic panel(!)  No actionable deranagements   1409 hs TnI 0hr: 40   1413 Spoke with neuro  After their discussion with interventional, decision was made to forego embolectomy as risks outweigh benefits  Admit to medicine under stroke pathway  1418 Discussed with [facility]  [address] admission                  Stroke Assessment     Row Name 02/15/23 1320             NIH Stroke Scale    Interval Baseline      Level of Consciousness (1a ) 0      LOC Questions (1b ) 2  Dementia      LOC Commands (1c ) 0      Best Gaze (2 ) 1      Visual (3 ) 0      Facial Palsy (4 ) 1      Motor Arm, Left (5a ) 0      Motor Arm, Right (5b ) 0      Motor Leg, Left (6a ) 0      Motor Leg, Right (6b ) 0      Limb Ataxia (7 ) 0      Sensory (8 ) 0      Best Language (9 ) 0      Dysarthria (10 ) 0      Extinction and Inattention (11 ) (Formerly Neglect) 0      Total 4              Flowsheet Row Most Recent Value   Thrombolytic Decision Options    Thrombolytic Decision Patient not a candidate  Patient is not a candidate options Bleeding risk  , Symptoms resolved/clearly non disabling  , comment  [On AC]                            Medical Decision Making  Patient is a 82 y o  female who presents to the ED for left facial droop, right gaze preference  Patient is nontoxic, well-appearing  Vitals are stable  Symptoms concerning for acute CVA versus TIA  EKG without evidence of STEMI or ischemia, fingerstick BS not hypoglycemic, and clinical picture does not suggest other stroke mimic   I considered, but think less likely that symptoms are secondary to a dissection, AMI, hypoglycemia, other metabolic derangement including hepatic/uremic encephalopathy, medication side effect, or post-ictal Marquez's paralysis  Plan: CT CTA Head and Neck w/ and w/out contrast, stroke labs, EKG, Neurology stroke consult, admit                 Portions of the record may have been created with voice recognition software  Occasional wrong word or "sound a like" substitutions may have occurred due to the inherent limitations of voice recognition software  Read the chart carefully and recognize, using context, where substitutions have occurred  Stroke (cerebrum) [facility]): acute illness or injury  Amount and/or Complexity of Data Reviewed  Independent Historian: EMS     Details: Discussed with EMS  Onset at 12pm  Labs: ordered  Decision-making details documented in ED Course  Radiology: ordered  Decision-making details documented in ED Course  ECG/medicine tests: ordered and independent interpretation performed  Risk  Prescription drug management  Decision regarding hospitalization  Disposition  Final diagnoses:   Stroke (cerebrum) ([facility] [address] )     Time reflects when diagnosis was documented in both MDM as applicable and the Disposition within this note     Time User Action Codes Description Comment    2/15/2023  1:13 PM Nohemy Pena Add [I63 9] Stroke (cerebrum) [facility])       ED Disposition     ED Disposition   Admit    Condition   Stable    Date/Time   Wed Feb 15, 2023  2:55 PM    Comment   Case was discussed with Dr Lonnie Mcdermott  and the patient's admission status was agreed to be Admission Status: inpatient status to the service of Dr Lonnie Mcdermott    Follow-up Information    None         Patient's Medications   Discharge Prescriptions    No medications on file     No discharge procedures on file  PDMP Review     None           ED Provider  Attending physically available and evaluated Adonay Molina I managed the patient along with the ED Attending      Electronically Signed by         Dinesh Roman DO  02/15/23 3517

## 2023-02-15 NOTE — H&P
[facility]  H&P- Pratik Hernandez 1935, 82 y o  female MRN: [id number]  Unit/Bed#: ED-01 Encounter: [id number]  Primary Care Provider: No primary care provider on file  Date and time admitted to hospital: 2/15/2023  1:12 PM    Kishor Quintanilla E4 -76 had a mri conditional pacemaker inserted less than 3 weeks ago 1/26/23  Per safety guidelines MRI has to be performed at 6 weeks post op   MRI cannot be done yet unfortunately      * Stroke (cerebrum) ([facility] [address] )  Assessment & Plan  12 PM day of admission patient was noted to be less responsive with right gaze preference left-sided facial droop and left upper extremity weakness  NIH stroke scale was 5 on admission  CT head with new focal density in right M2 branch correlating with thrombus seen on CTA  Patient was not a TNK candidate or thrombectomy candidate  Been under stroke way  Appreciate neurology recommendations  MRI brain if able with current pacemaker or CT head in 48 hours  Hold off on echo patient had one 1 month ago  Continue telemetry  Continue Eliquis per allergy recommendations and add aspirin  Permissive hypotension  PT/OT speech eval    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 behavioral disturbance, psychotic disturbance, mood disturbance, or anxiety (HCC)  Assessment & Plan  Resides in dementia unit  Continue Seroquel nightly    Sick sinus syndrome ([facility] [address] )  Assessment & Plan  Newly placed pacemaker  Need to see if it is MRI compatible    Seizure [facility])  Assessment & Plan  Continue Zonegran    Chronic diastolic heart failure ([facility] [address] )  Assessment & Plan  Wt Readings from Last 3 Encounters:   02/15/23 82 8 kg (182 lb 8 7 oz)   01/10/23 77 6 kg (171 lb)   01/09/23 77 8 kg (171 lb 8 3 oz)       Hold diuretics now given acute stroke and permissive hypertension      Atrial fibrillation (HCC)  Assessment & Plan  A-fib currently rate controlled  Continue Eliquis    Other hyperlipidemia  Assessment & Plan  Atorvastatin 40 mg nightly    Benign essential hypertension  Assessment & Plan  Hold diuretics      VTE Prophylaxis: Eliquis  Code Status: Level 3 DNR/DNI confirmed with son    Anticipated Length of Stay:  Patient will be admitted on an Inpatient basis with an anticipated length of stay of greater than 2 midnights  Justification for Hospital Stay: inPatient work-up for acute stroke    Total Time for Visit, including Counseling / Coordination of Care: 60 minutes  Greater than 50% of this total time spent on direct patient counseling and coordination of care  Chief Complaint:   Acute stroke    History of Present Illness:    Adriel Horner is a 82 y o  female With past medical history of dementia, CVA, A-fib, chronic diastolic heart failure, seizure disorder who presents to the hospital with altered mental status, gaze preference, facial droop  Patient was a out of hospital stroke alert  She was found to have acute M2 occlusion  She was not a TNK or thrombectomy candidate  Patient unable to provide further history given dementia and acute CVA  I updated son and confirmed CODE STATUS is level 3 DNR/DNI  Review of Systems:    Review of Systems   Unable to perform ROS: Dementia       Past Medical and Surgical History:     Past Medical History:   Diagnosis Date   • A-fib ([facility] [address] )    • CAD (coronary artery disease)    • CKD (chronic kidney disease) stage 2, GFR 60-89 ml/min    • Hypertension    • Memory loss    • Urinary tract infection        Past Surgical History:   Procedure Laterality Date   • CARDIAC ELECTROPHYSIOLOGY PROCEDURE N/A 1/26/2023    Procedure: Cardiac pacer implant;  Surgeon: Kenneth Lin DO;  [facility]; Service: Cardiology   • CHOLECYSTECTOMY     • REPLACEMENT TOTAL KNEE Left 2008       Meds/Allergies:    Prior to Admission medications    Medication Sig Start Date End Date Taking?  Authorizing Provider   acetaminophen (TYLENOL) 500 mg tablet Take 500 mg by mouth every 6 (six) hours as needed for mild pain    Historical Provider, MD   apixaban (ELIQUIS) 5 mg Take 5 mg by mouth 2 (two) times a day    Historical Provider, MD   Ascorbic Acid (VITAMIN C) 500 MG CAPS Take 500 mg by mouth daily    Historical Provider, MD   atorvastatin (LIPITOR) 10 mg tablet Take 10 mg by mouth daily    Historical Provider, MD   bisacodyl (DULCOLAX) 5 mg EC tablet Take 5 mg by mouth daily as needed for constipation    Historical Provider, MD   calcium carbonate (TUMS) 500 mg chewable tablet Chew 1 tablet daily    Historical Provider, MD   Cholecalciferol (Vitamin D3) 1 25 MG (06991 UT) CAPS     Historical Provider, MD   Cranberry 450 MG CAPS Take 1 capsule by mouth in the morning    Historical Provider, MD   docusate sodium (COLACE) 100 mg capsule Take 100 mg by mouth 2 (two) times a day    Historical Provider, MD   furosemide (LASIX) 40 mg tablet Take 1 tablet by mouth daily 2/20/20   Historical Provider, MD   polyethylene glycol (MIRALAX) 17 g packet Take 17 g by mouth daily    Historical Provider, MD   potassium chloride (K-DUR,KLOR-CON) 20 mEq tablet Take 20 mEq by mouth daily    Historical Provider, MD   QUEtiapine (SEROquel) 25 mg tablet Take 0 5 tablets (12 5 mg total) by mouth daily at bedtime Hold if sedated 11/18/22   Coco Shoemaker DO   thiamine (VITAMIN B1) 100 mg tablet     Historical Provider, MD   zonisamide (ZONEGRAN) 100 mg capsule Take 1 capsule (100 mg total) by mouth daily Do not start before November 19, 2022 11/19/22   Coco Shoemaker DO       Allergies: Allergies   Allergen Reactions   • Morphine        Social History:     Marital Status:     Substance Use History:   Social History     Substance and Sexual Activity   Alcohol Use Never     Social History     Tobacco Use   Smoking Status Never   Smokeless Tobacco Never     Social History     Substance and Sexual Activity   Drug Use Never       Family History:    Pertinent family history reviewed    Physical Exam:     Vitals:   Blood Pressure: 130/83 (02/15/23 1430)  Pulse: 61 (02/15/23 1430)  Temperature: 97 6 °F (36 4 °C) (02/15/23 1348)  Respirations: 20 (02/15/23 1430)  Weight - Scale: 82 8 kg (182 lb 8 7 oz) (02/15/23 1334)  SpO2: 97 % (02/15/23 1430)    Physical Exam  Vitals reviewed  Constitutional:       General: She is not in acute distress  Appearance: She is well-developed  She is not toxic-appearing or diaphoretic  Comments: Chronically ill-appearing   HENT:      Head: Normocephalic and atraumatic  Eyes:      General: No scleral icterus  Extraocular Movements: Extraocular movements intact  Comments: Left-sided neglect   Cardiovascular:      Rate and Rhythm: Normal rate and regular rhythm  Heart sounds: Normal heart sounds  Pulmonary:      Effort: Pulmonary effort is normal  No respiratory distress  Breath sounds: Normal breath sounds  No wheezing or rales  Abdominal:      General: There is no distension  Palpations: Abdomen is soft  Tenderness: There is no abdominal tenderness  There is no guarding or rebound  Musculoskeletal:         General: No swelling, tenderness or deformity  Skin:     General: Skin is warm and dry  Neurological:      Mental Status: She is alert        Comments: Expressive and receptive aphasia, garbled words intermittently, left upper and left lower extremity weakness, some left-sided neglect          Additional Data:     Lab Results: I have reviewed pertinent results     Results from last 7 days   Lab Units 02/15/23  1335   WBC Thousand/uL 4 42   HEMOGLOBIN g/dL 13 8   HEMATOCRIT % 42 2   PLATELETS Thousands/uL 100*     Results from last 7 days   Lab Units 02/15/23  1335   SODIUM mmol/L 138   POTASSIUM mmol/L 3 6   CHLORIDE mmol/L 112*   CO2 mmol/L 20*   BUN mg/dL 23   CREATININE mg/dL 0 76   ANION GAP mmol/L 6   CALCIUM mg/dL 9 2   GLUCOSE RANDOM mg/dL 105     Results from last 7 days   Lab Units 02/15/23  1337   INR  1 33*     Results from last 7 days   Lab Units 02/15/23  1323   POC GLUCOSE mg/dl 101               Imaging: I have reviewed pertinent imaging     CTA stroke alert (head/neck)   Final Result by Benjie Olszewski, MD (02/15 1342)      Right M2 occlusion  No other significant intracranial stenosis or large vessel occlusion  No significant stenosis of the cervical carotid or vertebral arteries  Findings were reported to [facility]  via HIPAA compliant secure electronic messaging at 1:33 PM                                Workstation performed: [id number]         CT stroke alert brain   Final Result by Benjie Olszewski, MD (02/15 1344)   New focal density within right M2 branch correlating with thrombus on CTA  No acute ischemic changes or hemorrhage          Chronic microangiopathy  Stable chronic left MCA infarct  Stable small calcified lesion anterior foramen magnum is favored to represent calcified meningioma  Findings were reported to [facility]  via HIPAA compliant secure electronic messaging at 1:33 PM       Workstation performed: [id number]             EKG, Pathology, and Other Studies Reviewed on Admission:   · EKG: Reviewed     Allscripts / Epic Records Reviewed    ** Please Note: This note has been constructed using a voice recognition system   **

## 2023-02-15 NOTE — ASSESSMENT & PLAN NOTE
Wt Readings from Last 3 Encounters:   02/15/23 82 8 kg (182 lb 8 7 oz)   01/10/23 77 6 kg (171 lb)   01/09/23 77 8 kg (171 lb 8 3 oz)       Hold diuretics now given acute stroke and permissive hypertension

## 2023-02-16 ENCOUNTER — APPOINTMENT (INPATIENT)
Dept: CT IMAGING | Facility: HOSPITAL | Age: 88
End: 2023-02-16

## 2023-02-16 LAB
ANION GAP SERPL CALCULATED.3IONS-SCNC: 7 MMOL/L (ref 4–13)
ATRIAL RATE: 208 BPM
ATRIAL RATE: 241 BPM
BUN SERPL-MCNC: 18 MG/DL (ref 5–25)
CALCIUM SERPL-MCNC: 9.3 MG/DL (ref 8.4–10.2)
CHLORIDE SERPL-SCNC: 110 MMOL/L (ref 96–108)
CHOLEST SERPL-MCNC: 81 MG/DL
CO2 SERPL-SCNC: 23 MMOL/L (ref 21–32)
CREAT SERPL-MCNC: 0.75 MG/DL (ref 0.6–1.3)
ERYTHROCYTE [DISTWIDTH] IN BLOOD BY AUTOMATED COUNT: 15.7 % (ref 11.6–15.1)
EST. AVERAGE GLUCOSE BLD GHB EST-MCNC: 108 MG/DL
GFR SERPL CREATININE-BSD FRML MDRD: 72 ML/MIN/1.73SQ M
GLUCOSE SERPL-MCNC: 79 MG/DL (ref 65–140)
HBA1C MFR BLD: 5.4 %
HCT VFR BLD AUTO: 41.9 % (ref 34.8–46.1)
HDLC SERPL-MCNC: 29 MG/DL
HGB BLD-MCNC: 14.1 G/DL (ref 11.5–15.4)
LDLC SERPL CALC-MCNC: 37 MG/DL (ref 0–100)
MCH RBC QN AUTO: 31.4 PG (ref 26.8–34.3)
MCHC RBC AUTO-ENTMCNC: 33.7 G/DL (ref 31.4–37.4)
MCV RBC AUTO: 93 FL (ref 82–98)
PLATELET # BLD AUTO: 95 THOUSANDS/UL (ref 149–390)
PMV BLD AUTO: 12.1 FL (ref 8.9–12.7)
POTASSIUM SERPL-SCNC: 3.5 MMOL/L (ref 3.5–5.3)
QRS AXIS: -40 DEGREES
QRS AXIS: -41 DEGREES
QRSD INTERVAL: 130 MS
QRSD INTERVAL: 132 MS
QT INTERVAL: 494 MS
QT INTERVAL: 504 MS
QTC INTERVAL: 504 MS
QTC INTERVAL: 517 MS
RBC # BLD AUTO: 4.49 MILLION/UL (ref 3.81–5.12)
SODIUM SERPL-SCNC: 140 MMOL/L (ref 135–147)
T WAVE AXIS: 222 DEGREES
T WAVE AXIS: 230 DEGREES
TRIGL SERPL-MCNC: 76 MG/DL
VENTRICULAR RATE: 60 BPM
VENTRICULAR RATE: 66 BPM
WBC # BLD AUTO: 4.74 THOUSAND/UL (ref 4.31–10.16)

## 2023-02-16 RX ADMIN — APIXABAN 5 MG: 5 TABLET, FILM COATED ORAL at 17:36

## 2023-02-16 RX ADMIN — ASPIRIN 81 MG: 81 TABLET, CHEWABLE ORAL at 08:21

## 2023-02-16 RX ADMIN — QUETIAPINE FUMARATE 12.5 MG: 25 TABLET ORAL at 21:52

## 2023-02-16 RX ADMIN — DOCUSATE SODIUM 100 MG: 100 CAPSULE, LIQUID FILLED ORAL at 17:36

## 2023-02-16 RX ADMIN — DOCUSATE SODIUM 100 MG: 100 CAPSULE, LIQUID FILLED ORAL at 08:21

## 2023-02-16 RX ADMIN — ZONISAMIDE 100 MG: 100 CAPSULE ORAL at 17:36

## 2023-02-16 RX ADMIN — ATORVASTATIN CALCIUM 40 MG: 40 TABLET, FILM COATED ORAL at 17:36

## 2023-02-16 RX ADMIN — POLYETHYLENE GLYCOL 3350 17 G: 17 POWDER, FOR SOLUTION ORAL at 08:21

## 2023-02-16 RX ADMIN — APIXABAN 5 MG: 5 TABLET, FILM COATED ORAL at 08:21

## 2023-02-16 RX ADMIN — ZONISAMIDE 100 MG: 100 CAPSULE ORAL at 08:22

## 2023-02-16 NOTE — OCCUPATIONAL THERAPY NOTE
Occupational Therapy Evaluation     Patient Name: Sofy Morales  Today's Date: 2/16/2023  Problem List  Principal Problem:    Stroke (cerebrum) [facility])  Active Problems:    Benign essential hypertension    Other hyperlipidemia    Atrial fibrillation (HCC)    Chronic diastolic heart failure (HCC)    Seizure (HCC)    Sick sinus syndrome (HCC)    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 behavioral disturbance, psychotic disturbance, mood disturbance, or anxiety ([facility] [address] )    Past Medical History  Past Medical History:   Diagnosis Date    A-fib ([facility] [address] )     CAD (coronary artery disease)     CKD (chronic kidney disease) stage 2, GFR 60-89 ml/min     Hypertension     Memory loss     Urinary tract infection      Past Surgical History  Past Surgical History:   Procedure Laterality Date    CARDIAC ELECTROPHYSIOLOGY PROCEDURE N/A 1/26/2023    Procedure: Cardiac pacer implant;  Surgeon: Sherryle Bream, DO;  Location: [facility]; Service: Cardiology    CHOLECYSTECTOMY      REPLACEMENT TOTAL KNEE Left 2008 02/16/23 [id number]   OT Last Visit   OT Visit Date 02/16/23   Note Type   Note type Evaluation   Pain Assessment   Pain Assessment Tool FLACC   Pain Rating: FLACC (Rest) - Face 0   Pain Rating: FLACC (Rest) - Legs 0   Pain Rating: FLACC (Rest) - Activity 0   Pain Rating: FLACC (Rest) - Cry 0   Pain Rating: FLACC (Rest) - Consolability 0   Score: FLACC (Rest) 0   Pain Rating: FLACC (Activity) - Face 1   Pain Rating: FLACC (Activity) - Legs 1   Pain Rating: FLACC (Activity) - Activity 0   Pain Rating: FLACC (Activity) - Cry 1   Pain Rating: FLACC (Activity) - Consolability 1   Score: FLACC (Activity) 4   Restrictions/Precautions   Weight Bearing Precautions Per Order No   Other Precautions Contact/isolation;Cognitive; Chair Alarm; Bed Alarm;Telemetry; Fall Risk;Pain   Home Living   Type of Home Other (Comment)   Home Equipment Walker   Additional Comments pt poor historian  information obtained via chart review   pt from [facility] dementia unit   Prior Function   Level of [place] Needs assistance with ADLs; Needs assistance with [address] staff   Receives Help From Personal care attendant   ADL   Where Assessed Edge of bed   Eating Assistance 4  Minimal Assistance   Grooming Assistance 4  Minimal Assistance   UB Bathing Assistance 3  Moderate Assistance   LB Bathing Assistance 2  Maximal Assistance   UB Dressing Assistance 3  Moderate Assistance   LB Dressing Assistance 1  Total [address]  1  Total Assistance   Bed Mobility   Supine to Sit 3  Moderate assistance   Additional items Assist x 2;HOB elevated; Bedrails; Increased time required;Verbal cues; Other;LE management   Sit to Supine 2  Maximal assistance   Additional items Assist x 2; Increased time required;Verbal cues;LE management; Other   Transfers   Sit to Stand 3  Moderate assistance   Additional items Assist x 2; Increased time required;Verbal cues; Other  (RW)   Stand to Sit 3  Moderate assistance   Additional items Assist x 1; Increased time required;Verbal cues; Other  (RW)   Functional Mobility   Functional Mobility 3  Moderate assistance   Additional Comments Ax2 sidestepping, RW   Balance   Static Sitting Fair -   Dynamic Sitting Poor +   Static Standing Poor +   Dynamic Standing Poor   Ambulatory Poor   Activity Tolerance   Activity Tolerance Treatment limited secondary to medical complications (Comment)   Medical Staff Made Aware Bernarda PT   Nurse Made Aware Cleared for therapy   RUE Assessment   RUE Assessment X  (ROM WFL; difficult to assess MMT 2* cognition, at least grossly 3+/5 2* ability to assist in own transfer)   LUE Assessment   LUE Assessment X  (ROM WFL; difficult to assess MMT 2* cognition, at least grossly 3+/5 2* ability to assist in own transfer)   Hand Function   Gross Motor Coordination Impaired   Fine Motor Coordination Impaired   Sensation   Light Touch   (difficult to assess)   Vision-Basic Assessment   Current Vision Other (Comment)  (Difficult to asses 2* cog)   Perception   Inattention/Neglect Cues to maintain midline in sitting;Cues to maintain midline in standing   Cognition   Overall Cognitive Status Impaired   Arousal/Participation Alert; Responsive; Cooperative   Attention Difficulty attending to directions   Orientation Level Oriented to person;Oriented to place; Disoriented to time;Disoriented to situation   Memory Decreased recall of precautions;Decreased recall of recent events;Decreased short term memory   Following Commands Follows one step commands with increased time or repetition   Comments hx dementia  expressive aphasia noted   Assessment   Limitation Decreased ADL status; Decreased UE strength;Decreased Safe judgement during ADL;Decreased cognition;Decreased endurance;Decreased self-care trans;Decreased high-level ADLs   Prognosis Fair   Assessment Patient is a 82y o  year old female seen for OT eval s/p admit to [facility] on 2/15/2023 with stroke (cerebrum)  Comorbidities affecting pt’s functional performance include a significant PMH of: HTN, HLD, a-fib, CHF, seizure, sick sinus syndrome, moderate-late onset Alzheimer's dementia, action tremor, osteoporosis, acute metabolic encephalopathy, hx stroke, syncope  Patient with active OT orders and activity orders for Activity as tolerated  Personal factors affecting pt at time of IE include: difficulty performing ADLs and IADLs, difficulty with functional mobility/transfers  Prior to admission, unsure of pt baseline given pt limited hx; from a memory care unit   Upon evaluation, patient’s functional status as follows: eating: MIN A , grooming: MIN A , UB bathing: MOD A, LB bathing: MAX A, UB dressing: MOD A, LB dressing: dependent, toileting: dependent; functional transfers: MOD Ax2, bed mobility: MAX Ax2, functional mobility: MOD Ax2 with RW, sitting/standing tolerance: ~40 seconds with b/u UE support, posterior/R sided lean present- due to the following deficits impacting occupational performance: decreased B UE strength, decreased static/dynamic sitting/standing balance, decreased activity tolerance, decreased safety awareness, and increased pain  Patient would benefit from continued skilled OT therapy while in acute setting to address deficits as defined above and maximize (I) with ADLs and functional mobility  Occupational performance areas to address include: grooming, bathing, toileting, UB/LB dressing, functional mobility, household maintenance, and medication management  Based on the aforementioned OT evaluation, functional performance deficits, and assessments, pt has been identified as a high complexity evaluation    Co treatment with PT secondary to complex medical condition of pt, possible A of 2 required to achieve and maintain transitional movements, requiring the need of skilled therapeutic intervention of 2 therapists to achieve delivery of services  Goals   Patient Goals none stated dt aphasia, cognition   LTG Time Frame 10-14   Plan   Treatment Interventions ADL retraining;Functional transfer training;UE strengthening/ROM; Endurance training;Patient/family training;Equipment evaluation/education; Neuromuscular reeducation; Compensatory technique education;Continued evaluation; Energy conservation; Activityengagement   Goal Expiration Date 03/02/23   OT Treatment Day 0   OT Frequency 3-5x/wk   Recommendation   OT Discharge Recommendation Return to facility with rehabilitation services   AM-PAC Daily Activity Inpatient   Lower Body Dressing 2   Bathing 2   Toileting 1   Upper Body Dressing 2   Grooming 3   Eating 3   Daily Activity Raw Score 13   Daily Activity Standardized Score (Calc for Raw Score >=11) 32 03   AM-PAC Applied Cognition Inpatient   Following a Speech/Presentation 3   Understanding Ordinary Conversation 3   Taking Medications 1   Remembering Where Things Are Placed or Put Away 2   Remembering List of 4-5 Errands 2 Taking Care of Complicated Tasks 2   Applied Cognition Raw Score 13   Applied Cognition Standardized Score 30 46     Occupational Therapy goals: In 7-14 days:     1- Pt will complete bed mobility at a S level w/ G balance/safety demonstrated to decrease caregiver assistance required     2- Patient will increase OOB/ sitting tolerance to 2-4 hours per day for increased participation in self care and leisure tasks with no s/s of exertion  3-Patient will increase standing tolerance time to 5 minutes with unilateral UE support to complete sink level ADLs@ mod I level      4- Patient will increase sitting tolerance at edge of bed to 20 minutes to complete UB ADLs @ set up assist level       5- Pt will improve functional transfers to [place] on/off all surfaces using DME as needed w/ G balance/safety     6- Patient will complete UB ADLs with S utilizing appropriate DME/AE PRN     7- Patient will complete LB ADLs with [place] utilizing appropriate DME/AE PRN     8- Patient will complete toileting hygiene with [place] with G hygiene/thoroughness utilizing appropriate DME/AE PRN     9- Pt will improve functional mobility during ADL/IADL/leisure tasks to [place] using DME as needed w/ G balance/safety      10- Pt will be attentive 100% of the time during ongoing cognitive assessment w/ G participation to assist w/ safe d/c planning/recommendations     11- Pt will increase BUE strength by 1MM grade via AROM/AAROM/PROM exercises to increase independence in ADLs and transfers       KORY Mayorga/HERMINIO

## 2023-02-16 NOTE — ASSESSMENT & PLAN NOTE
Wt Readings from Last 3 Encounters:   02/15/23 82 8 kg (182 lb 8 7 oz)   01/10/23 77 6 kg (171 lb)   01/09/23 77 8 kg (171 lb 8 3 oz)   Maintained on Lasix 40 mg daily  Held diuretics given acute stroke and need for permissive hypertension

## 2023-02-16 NOTE — PLAN OF CARE
Problem: PHYSICAL THERAPY ADULT  Goal: Performs mobility at highest level of function for planned discharge setting  See evaluation for individualized goals  Description: Treatment/Interventions: Functional transfer training, LE strengthening/ROM, Therapeutic exercise, Cognitive reorientation, Patient/family training, Equipment eval/education, Bed mobility, Gait training, Compensatory technique education, Continued evaluation, Spoke to nursing, OT          See flowsheet documentation for full assessment, interventions and recommendations  Note: Prognosis: Fair  Problem List: Decreased strength, Impaired balance, Decreased mobility, Decreased cognition, Impaired judgement, Decreased safety awareness, Decreased skin integrity, Pain  Assessment: Mikhail Nicholson is a 82 y o  female admitted to [facility] on 2/15/2023 for Stroke (cerebrum) ([facility] [address] )  PT was consulted and pt was seen on 2/16/2023 for mobility assessment and d/c planning  Pt presents w readmission, high fall risk, contact isolation, telemetry monitoring  Poor historian secondary to hx of dementia; unable to obtain PLOF at this time  Pt is currently functioning at a mod- max Ax2 for bed mobility, mod Ax1-2 for transf and ambulation  Pt w significant L lean in unsupported sitting  Continues to present w heavy R lean in standing and during ambulation as well  Require tactile input to correct to midline; difficulty sustaining midline during session due to fatigue, weakness, cognition  Noted TTP when assisting in moving BLE L>R during bed mobility however did not complain of increased pain w WB  Pt will benefit from continued skilled IP PT to address the above mentioned impairments  in order to maximize recovery and increase functional independence when completing mobility and ADLs  At this time PT recommendations for d/c are return to facility w PT services; would consider STR pending facility mobility requirements    Barriers to Discharge: None     PT Discharge Recommendation: Return to facility with rehabilitation services    See flowsheet documentation for full assessment

## 2023-02-16 NOTE — PROGRESS NOTES
Progress Note - Neurology   Sofy Morales 82 y o  female MRN: [id number]  Unit/Bed#: E4 -01 Encounter: [id number]    Assessment/Plan   * Stroke (cerebrum) [facility])  Assessment & Plan  82year old female with past medical hx of afib on anticoagulation eliquis, CAD, CKD, hypertension, dementia (lives in a dementia unit), prior stroke left hemisphere (baseline aphasia) and a hx of seizures on Zonegran at baseline  The patient was reported normal state of health the night prior to arrival, the a m  of arrival she was reported to be normal state of health, she was watching TV, at around noon she was noted to have be less responsive, with right gaze preference and reported left sided facial droop and left upper extremity weakness  EMS was contacted, it was reported by EMS the patient had a right gaze preference with left facial droop, they did not notice any focal weakness, but rigidity in the uppers  The patient was not following commands  The patient was a pre-hospital stroke alert, upon arrival her NIH was 5  CT of head completed - New focal density within right M2 branch correlating with thrombus on CTA  No acute ischemic changes or hemorrhage  Chronic microangiopathy  Stable chronic left MCA infarct  Stable small calcified lesion anterior foramen magnum is favored to represent calcified meningioma  CTA of head and neck - Right M2 occlusion  No other significant intracranial stenosis or large vessel occlusion  No significant stenosis of the cervical carotid or vertebral arteries      The patient was not a tnk or thrombectomy candidate      -The patient was admitted on the stroke pathway and is under the medicine service  · CT of head in 24-48 hours, patient is unable to get MRI at this time  • Echo  • Lipid Panel - LDL 32  • Hemoglobin A1c - pending  • Eliquis  • Aspirin 81 mg    • Atorvastatin 40 mg  • Avoid hypotension for cerebral perfusion  • Continue home dose Zonegran, monitor for seizure activity, seizure precautions  No evidence of seizure activity on examination  • Continue telemetry  • PT/OT/ST - therapies, dysphagia screen prior to po intake  • Frequent neuro checks  Continue to monitor and notify neurology with any changes  - Medical management and supportive care per primary team  Correction of any metabolic or infectious disturbances  - Please see neurology attestation    Scarlett Brower will need follow up in in 6 weeks with neurovascular attending  She will not require outpatient neurological testing  Subjective:   Neurological follow up, the patient was a stroke alert yesterday and was found to have new right M2 occlusion on CTA, the patient was not a tnk candidate or thrombectomy candidate  Further hx obtained -    The patient has a hx of afib and is on Elquis, as well recently had a pacemaker placed, for syncope and SSS  The patient was seen by neurology in early January, he was reported that the patient had a prior embolic stroke in 0455, she was taken off of AC following a fall, she was placed on Eliquis after this  It is reported her initial presentation for seizure was on 11/14/2022 with right gaze deviation and tonic-clonic activity in the bilateral uppers and bilateral lower, the patient had a EEG completed which did show excessive diffuse theta and beta activity during wakefulness consistent with nonspecific moderate diffuse cerebral changes no epileptiform discharges or electrographic seizures identified, the patient was placed on Keppra initially, but than transitioned to [address]      (please refer to neurology notes from November of 2022 and Jan of 2023 for further details)  The patient cannot provide a history, she has poor insight into current mental condition and is a poor historian  She is eating breakfast without difficulty or coughing  12 point review of symptoms are limited secondary to her underlying dementia and mental status       Vitals: Blood pressure 119/79, pulse 60, temperature (!) 97 1 °F (36 2 °C), temperature source Tympanic, resp  rate 16, weight 82 8 kg (182 lb 8 7 oz), SpO2 95 %  ,Body mass index is 32 34 kg/m²  Current Facility-Administered Medications   Medication Dose Route Frequency Provider Last Rate   • apixaban  5 mg Oral BID [facility], DO     • aspirin  81 mg Oral Daily [facility], DO     • atorvastatin  40 mg Oral QPM [facility], DO     • bisacodyl  5 mg Oral Daily PRN [facility], DO     • docusate sodium  100 mg Oral BID [facility], DO     • polyethylene glycol  17 g Oral Daily [facility], DO     • QUEtiapine  12 5 mg Oral HS [facility], DO     • zonisamide  100 mg Oral BID [facility], DO         Physical Exam:   Neurological  Mental status -patient is awake and alert she is eating breakfast without any evidence of coughing or choking  She was able to tell me her name, that she was in the hospital but not the exact hospital   She was unable to tell me why she was in the hospital or provide any insight or history into her past medical condition  She had slight decrease in attention and concentration  She was not oriented to time, not oriented to current events, month, year  She was able to follow one-step commands but had more difficulty with multiple step commands  He was able to repeat, simple phrases  She was able to recognize some objects, but had difficulty with others  There was evidence of expressive and receptive aphasia/paraphasic errors  Slight dysarthria  The patient did not speak unless spoken to with limited verbal output    Cranial nerves -pupils are equal and reactive, he does appear to have a slight right gaze preference but was able to overcome and look to the left, gaze was not disconjugate, she does have a left facial droop and asymmetry, tongue was midline, visual fields were intact to confrontation, hearing was intact to conversation, sternocleidomastoids were equal    Motor -the patient was 5 out of 5 strength in the uppers except for slightly decreased in the left upper 5- out of 5, there was a slight pronator drift on the left side noted today  In the lowers she was at least 4 out of 5 strength, bilaterally, however does appear slightly weaker on the left side compared to the right  Toe tap was equal in the lowers, there is limited rom with dorsi and plantar flexion bilaterally  Sensation - nonfocal to touch, no neglect noted, she does have pain to touch in the lower extremities  Coordination - no ataxia noted on finger-to-nose   Toes are equivocal bilaterally  No evidence of seizure activity, observed  Lab, Imaging and other studies:   I have personally reviewed pertinent reports  , CBC:   Results from last 7 days   Lab Units 02/16/23  0449 02/15/23  1335   WBC Thousand/uL 4 74 4 42   RBC Million/uL 4 49 4 42   HEMOGLOBIN g/dL 14 1 13 8   HEMATOCRIT % 41 9 42 2   MCV fL 93 96   PLATELETS Thousands/uL 95* 100*   , BMP/CMP:   Results from last 7 days   Lab Units 02/16/23  0449 02/15/23  1335   SODIUM mmol/L 140 138   POTASSIUM mmol/L 3 5 3 6   CHLORIDE mmol/L 110* 112*   CO2 mmol/L 23 20*   BUN mg/dL 18 23   CREATININE mg/dL 0 75 0 76   CALCIUM mg/dL 9 3 9 2   EGFR ml/min/1 73sq m 72 71   , Vitamin B12:   , HgBA1C:   , TSH:   , Coagulation:   Results from last 7 days   Lab Units 02/15/23  1337   INR  1 33*   , Lipid Profile:   Results from last 7 days   Lab Units 02/16/23  0449   HDL mg/dL 29*   LDL CALC mg/dL 37   TRIGLYCERIDES mg/dL 76   , Ammonia:   , Urinalysis:       Invalid input(s): URIBILINOGEN, Drug Screen:   , Medication Drug Levels:       Invalid input(s): CARBAMAZEPINE,  PHENOBARB, LACOSAMIDE, OXCARBAZEPINE     Procedure: CT stroke alert brain    Result Date: 2/15/2023  Narrative: CT BRAIN - STROKE ALERT PROTOCOL INDICATION:   Stroke Alert  COMPARISON:  MRI dated 1/11/2023   TECHNIQUE:  CT examination of the brain was performed  In addition to axial images, coronal reformatted images were created and submitted for interpretation  Radiation dose length product (DLP) for this visit:  922 mGy-cm   This examination, like all CT scans performed in the [facility], was performed utilizing techniques to minimize radiation dose exposure, including the use of iterative reconstruction and automated exposure control  IMAGE QUALITY:  Diagnostic  FINDINGS:  PARENCHYMA:  Stable encephalomalacia due to remote infarct in the left parietal lobe extending into the posterior temporal lobe  Stable diminished attenuation in the periventricular and subcortical white matter due to chronic microangiopathy  No acute territorial infarct, hemorrhage, mass effect, shift or herniation  Stable small calcified mass in the anterior foramen magnum measuring 6 mm in AP dimension by 1 cm in transverse dimension that likely represents calcified meningioma  No significant mass effect  Focal increased density within right M2 branch correlating with thrombus on CTA  VENTRICLES AND EXTRA-AXIAL SPACES:  Volume loss  No hydrocephalus  VISUALIZED ORBITS: Normal visualized orbits  PARANASAL SINUSES: Normal visualized paranasal sinuses  CALVARIUM AND EXTRACRANIAL SOFT TISSUES:   Normal      Impression: New focal density within right M2 branch correlating with thrombus on CTA  No acute ischemic changes or hemorrhage    Chronic microangiopathy  Stable chronic left MCA infarct  Stable small calcified lesion anterior foramen magnum is favored to represent calcified meningioma  Findings were reported to [facility]  via HIPAA compliant secure electronic messaging at 1:33 PM  Workstation performed: [id number]     Procedure: CTA stroke alert (head/neck)    Result Date: 2/15/2023  Narrative: CTA NECK AND BRAIN WITH CONTRAST INDICATION: Stroke Alert COMPARISON:   CTA dated 1/10/2023   TECHNIQUE:   Post contrast imaging was performed after administration of iodinated contrast through the neck and brain  Post contrast axial 0 625 mm images timed to opacify the arterial system  3D rendering was performed on an independent workstation  MIP reconstructions performed  Coronal reconstructions were performed of the noncontrast portion of the brain  Radiation dose length product (DLP) for this visit:  772 mGy-cm   This examination, like all CT scans performed in the [facility], was performed utilizing techniques to minimize radiation dose exposure, including the use of iterative reconstruction and automated exposure control  IV Contrast:  85 mL of iohexol (OMNIPAQUE)  IMAGE QUALITY:   Diagnostic FINDINGS: CERVICAL VASCULATURE AORTIC ARCH AND GREAT VESSELS:  Mild atherosclerotic disease of the arch, proximal great vessels and visualized subclavian vessels  No significant stenosis  RIGHT VERTEBRAL ARTERY CERVICAL SEGMENT:  Normal origin  The vessel is normal in caliber throughout the neck  LEFT VERTEBRAL ARTERY CERVICAL SEGMENT:  Normal origin  The vessel is normal in caliber throughout the neck  RIGHT EXTRACRANIAL CAROTID SEGMENT:  Minimal calcified plaque at the bifurcation  No stenosis or dissection  LEFT EXTRACRANIAL CAROTID SEGMENT:  Normal caliber common carotid artery  Normal bifurcation and cervical internal carotid artery  No stenosis or dissection  NASCET criteria was used to determine the degree of internal carotid artery diameter stenosis  INTRACRANIAL VASCULATURE INTERNAL CAROTID ARTERIES:  Mild atherosclerotic disease in the cavernous and supraclinoid internal carotid arteries without significant stenosis  ANTERIOR CIRCULATION:  Right A1 segment is hypoplastic  Anterior cerebral arteries enhance normally  Normal anterior communicating artery  MIDDLE CEREBRAL ARTERY CIRCULATION:  New occlusion of superior right M2 division  Right M1 segment is unremarkable  No significant stenosis or occlusion of the left M1 or M2 segments   Mild calcified plaque in the left M1 segment  DISTAL VERTEBRAL ARTERIES:  Normal distal vertebral arteries  Posterior inferior cerebellar artery origins are normal  Normal vertebral basilar junction  BASILAR ARTERY:  Basilar artery is normal in caliber  Normal superior cerebellar arteries  POSTERIOR CEREBRAL ARTERIES: There is fetal origin of the right posterior cerebral artery  The left posterior cerebral artery arises from the basilar tip  Both demonstrate no focal stenosis  Normal posterior communicating arteries  VENOUS STRUCTURES:  Normal  NON VASCULAR ANATOMY BONY STRUCTURES:  No acute osseous abnormality  SOFT TISSUES OF THE NECK:  Normal  THORACIC INLET:  New left chest wall pacemaker  Impression: Right M2 occlusion  No other significant intracranial stenosis or large vessel occlusion  No significant stenosis of the cervical carotid or vertebral arteries  Findings were reported to [facility]  via HIPAA compliant secure electronic messaging at 1:33 PM   Workstation performed: [id number]     Counseling / Coordination of Care  Total time spent today 30 minutes  Greater than 50% of total time was spent with the patient and / or family counseling and / or coordination of care  A description of the counseling / coordination of care: floor time, reviewing records, physical examination     Reviewed case with neurology attending, history and physical examination, labs and imaging completed, plan of care as per attending physician  Please see attestation for further details  Examined alongside attending physician

## 2023-02-16 NOTE — PHYSICAL THERAPY NOTE
PHYSICAL THERAPY EVALUATION          Patient Name: Jak GUERRA Date: 2/16/2023  PT EVALUATION    82 y o     [id number]    Stroke [facility]) [I63 9]  Stroke (cerebrum) ([facility] ) [I63 9]    Past Medical History:   Diagnosis Date    A-fib ([facility] )     CAD (coronary artery disease)     CKD (chronic kidney disease) stage 2, GFR 60-89 ml/min     Hypertension     Memory loss     Urinary tract infection      Past Surgical History:   Procedure Laterality Date    CARDIAC ELECTROPHYSIOLOGY PROCEDURE N/A 1/26/2023    Procedure: Cardiac pacer implant;  Surgeon: Geovanny Cerna DO;  Location: [facility]; Service: Cardiology    CHOLECYSTECTOMY      REPLACEMENT TOTAL KNEE Left 2008 02/16/23 6740   PT Last Visit   PT Visit Date 02/16/23   Note Type   Note type Evaluation   Pain Assessment   Pain Assessment Tool FLACC   Pain Location/Orientation Orientation: Bilateral;Location: Leg  (L>R)   Pain Rating: FLACC (Rest) - Face 0   Pain Rating: FLACC (Rest) - Legs 0   Pain Rating: FLACC (Rest) - Activity 0   Pain Rating: FLACC (Rest) - Cry 0   Pain Rating: FLACC (Rest) - Consolability 0   Score: FLACC (Rest) 0   Pain Rating: FLACC (Activity) - Face 1   Pain Rating: FLACC (Activity) - Legs 1   Pain Rating: FLACC (Activity) - Activity 0   Pain Rating: FLACC (Activity) - Cry 1   Pain Rating: FLACC (Activity) - Consolability 1   Score: FLACC (Activity) 4   Restrictions/Precautions   Weight Bearing Precautions Per Order No   Other Precautions Contact/isolation;Cognitive; Chair Alarm; Bed Alarm;Telemetry; Fall Risk;Pain   Home Living   Type of Home Other (Comment)   Home Equipment Walker   Additional Comments pt poor historian  information obtained via chart review  pt from [facility] dementia unit   Prior Function   Level of [place] Needs assistance with ADLs; Needs assistance with [address] staff   Receives Help From Personal care attendant   Comments pt poor historian   assumed given baseline dementia  amb w RW at baseline? General   Additional Pertinent History pt admitted 2/15/23 for stroke  prev admitted to [address] from 1/9-13/23, 1/21-24 and transf to B for PM  at [facility] from 1/24-27 and [facility] from 2/12-14   Cognition   Overall Cognitive Status Impaired   Arousal/Participation Cooperative   Orientation Level Oriented to person;Oriented to place; Disoriented to time;Disoriented to situation   Memory Decreased recall of precautions;Decreased recall of recent events;Decreased short term memory   Following Commands Follows one step commands with increased time or repetition   Comments hx dementia  expressive aphasia noted   RLE Assessment   RLE Assessment X  (3- knee ext)   LLE Assessment   LLE Assessment X  (2+ knee ext)   Bed Mobility   Supine to Sit 3  Moderate assistance   Additional items Assist x 2;HOB elevated; Bedrails; Increased time required;Verbal cues; Other;LE management   Sit to Supine 2  Maximal assistance   Additional items Assist x 2; Increased time required;Verbal cues;LE management; Other   Transfers   Sit to Stand 3  Moderate assistance   Additional items Assist x 2; Increased time required;Verbal cues; Other  (RW)   Stand to Sit 3  Moderate assistance   Additional items Assist x 1; Increased time required;Verbal cues; Other  (RW)   Ambulation/Elevation   Gait pattern Improper Weight shift; Forward Flexion;Decreased foot clearance; Short stride; Excessively slow  (L lean)   Gait Assistance 3  Moderate assist   Additional items Assist x 2;Verbal cues; Tactile cues  (cues to wt shift, manage RW and maintain midline balance)   Assistive Device Rolling walker   Distance 2' side step   Balance   Static Sitting Fair -   Dynamic Sitting Poor +   Static Standing Poor +   Dynamic Standing Poor   Ambulatory Poor   Activity Tolerance   Activity Tolerance Treatment limited secondary to medical complications (Comment)   Medical Staff Made Aware [place] OT   Nurse Made Aware cleared for therapy   Assessment Prognosis Fair   Problem List Decreased strength; Impaired balance;Decreased mobility; Decreased cognition; Impaired judgement;Decreased safety awareness;Decreased skin integrity;Pain   Assessment Desiree Bhatt is a 82 y o  female admitted to [facility] on 2/15/2023 for Stroke (cerebrum) ([address] )  PT was consulted and pt was seen on 2/16/2023 for mobility assessment and d/c planning  Pt presents w readmission, high fall risk, contact isolation, telemetry monitoring  Poor historian secondary to hx of dementia; unable to obtain PLOF at this time  Pt is currently functioning at a mod- max Ax2 for bed mobility, mod Ax1-2 for transf and ambulation  Pt w significant L lean in unsupported sitting  Vitals seated /77, 61  Continues to present w heavy R lean in standing and during ambulation as well  Require tactile input to correct to midline; difficulty sustaining midline during session due to fatigue, weakness, cognition  Noted TTP when assisting in moving BLE L>R during bed mobility however did not complain of increased pain w WB  Pt will benefit from continued skilled IP PT to address the above mentioned impairments  in order to maximize recovery and increase functional independence when completing mobility and ADLs  At this time PT recommendations for d/c are return to facility w PT services; would consider STR pending facility mobility requirements  Barriers to Discharge None   Goals   Patient Goals none stated dt aphasia, cognition   STG Expiration Date 03/02/23   Short Term Goal #1 1)  Pt will perform bed mobility with mod A demonstrating appropriate technique 100% of the time in order to improve function  2)  Perform all transfers with mod A demonstrating safe and appropriate technique 100% of the time in order to improve ability to negotiate safely in home environment  3) Amb with least restrictive AD > 15'x2 with mod A in order to demonstrate ability to negotiate in home environment  4)  Improve overall strength and balance 1/2 grade in order to optimize ability to perform functional tasks and reduce fall risk  5) Increase activity tolerance to 45 minutes in order to improve endurance to functional tasks  6) PT for ongoing patient and family/caregiver education, DME needs and d/c planning in order to promote highest level of function in least restrictive environment  Plan   Treatment/Interventions Functional transfer training;LE strengthening/ROM; Therapeutic exercise;Cognitive reorientation;Patient/family training;Equipment eval/education; Bed mobility;Gait training; Compensatory technique education;Continued evaluation;Spoke to nursing;OT   PT Frequency 3-5x/wk   Recommendation   PT Discharge Recommendation Return to facility with rehabilitation services   AM-PAC Basic Mobility Inpatient   Turning in Flat Bed Without Bedrails 2   Lying on Back to Sitting on Edge of Flat Bed Without Bedrails 1   Moving Bed to Chair 2   Standing Up From Chair Using Arms 1   Walk in Room 2   Climb 3-5 Stairs With Railing 1   Basic Mobility Inpatient Raw Score 9   Turning Head Towards Sound 2   Follow Simple Instructions 2   Low Function Basic Mobility Raw Score 13   Low Function Basic Mobility Standardized Score 20 14   Highest Level Of Mobility   -HL Goal 3: Sit at edge of bed   JH-HLM Achieved 4: Move to chair/commode   End of Consult   Patient Position at End of Consult Supine;Bed/Chair alarm activated; All needs within reach   History: co - morbidities, past experience, fall risk, assumed use of assistive device, assumed assist for adl's, cognition  Exam: impairments in systems including musculoskeletal (strength, posture), neuromuscular (balance, gait, transfers, motor function), am-pac, integumentary (skin integrity, presence of swelling, discoloration BLE L>R), cardiopulmonary, cognition  Clinical: unstable/unpredictable  Complexity:high      Maciej Hodges, PT

## 2023-02-16 NOTE — SPEECH THERAPY NOTE
Speech Language/Pathology  Speech/Language Pathology  Assessment    Patient Name: Alton Minor  Today's Date: 2/16/2023     Problem List  Principal Problem:    Stroke (cerebrum) [facility])  Active Problems:    Benign essential hypertension    Other hyperlipidemia    Atrial fibrillation (HCC)    Chronic diastolic heart failure (HCC)    Seizure (HCC)    Sick sinus syndrome (HCC)    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 behavioral disturbance, psychotic disturbance, mood disturbance, or anxiety ([facility] [address] )    Past Medical History  Past Medical History:   Diagnosis Date   • A-fib ([facility] [address] )    • CAD (coronary artery disease)    • CKD (chronic kidney disease) stage 2, GFR 60-89 ml/min    • Hypertension    • Memory loss    • Urinary tract infection      Past Surgical History  Past Surgical History:   Procedure Laterality Date   • CARDIAC ELECTROPHYSIOLOGY PROCEDURE N/A 1/26/2023    Procedure: Cardiac pacer implant;  Surgeon: Yas Salgado DO;  Location: [facility] CARDIAC CATH LAB; Service: Cardiology   • CHOLECYSTECTOMY     • REPLACEMENT TOTAL KNEE Left 2008        Bedside Swallow Evaluation:    Summary:  Pt presented w/ adequate mastication bolus control and transfer of thin liquids and solid foods  Patient completed her entire lunch  [place] ([facility]) sandwich with lettuce tomato and onion  Took thin liquids and grapes as well  Prompt swallows  No cough or wet vocal quality  History of expressive aphasia  Receptively WFL during the session with basic information and requests  Daughter-in-law at bedside did not feel her speech was any different than it was prior  No facial asymmetry observed  Oral/pharyngeal stages appeared WNL    Recommendations:  Diet: Regular  Liquid: Thin  Meds: As tolerated  Supervision: Distant  Positioning:Upright  Strategies: Assist with set up if needed  Pt to take PO/Meds only when fully alert and upright  Oral care  Aspiration precautions  Reflux precautions  Eval only, No f/u tx indicated  Consider consult w/:  Rehab  Neurology  Nutrition    H&P/Admit info/ pertinent provider notes: (PMH noted above)  Chief Complaint:   Acute stroke  History of Present Illness:  Selena Wright is a 82 y o  female With past medical history of dementia, CVA, A-fib, chronic diastolic heart failure, seizure disorder who presents to the hospital with altered mental status, gaze preference, facial droop  Patient was a out of hospital stroke alert  She was found to have acute M2 occlusion  She was not a TNK or thrombectomy candidate  Patient unable to provide further history given dementia and acute CVA  I updated son and confirmed CODE STATUS is level 3 DNR/DNI  Special Studies:  Ct 2/15  New focal density within right M2 branch correlating with thrombus on CTA  No acute ischemic changes or hemorrhage      Chronic microangiopathy  Stable chronic left MCA infarct  Stable small calcified lesion anterior foramen magnum is favored to represent calcified meningioma  cta 2/15  Right M2 occlusion  No other significant intracranial stenosis or large vessel occlusion  No significant stenosis of the cervical carotid or vertebral arteries  Results of CT done earlier today are still pending    Previous MBS:  None known    Patient's goal:    Did the pt report pain? If yes, was nursing notified/was it addressed?     Reason for consult:  R/o aspiration  Determine safest and least restrictive diet  Change in mental status  New neuro event  Stroke protocol  H/o neurological disease    Precautions:  Contact    Food Allergies:  None   Current Diet:  Regular   Premorbid diet:  Regular   O2 requirement:  None   Social/Prior living  [employer]   Voice/Speech:  Mostly laughter responded with yes now   Follows commands:  Basic   Cognitive status:  Alert and pleasant     Oral mech exam:  Dentition: Natural  Lips (VII): +  Tongue (XII): +  Mandible (V): +  Secretion management: +    Esophageal stage:  No s/s reported    Results d/w:  Pt, nursing

## 2023-02-16 NOTE — PLAN OF CARE
Problem: OCCUPATIONAL THERAPY ADULT  Goal: Performs self-care activities at highest level of function for planned discharge setting  See evaluation for individualized goals  Description: Treatment Interventions: ADL retraining, Functional transfer training, UE strengthening/ROM, Endurance training, Patient/family training, Equipment evaluation/education, Neuromuscular reeducation, Compensatory technique education, Continued evaluation, Energy conservation, Activityengagement          See flowsheet documentation for full assessment, interventions and recommendations  Note: Limitation: Decreased ADL status, Decreased UE strength, Decreased Safe judgement during ADL, Decreased cognition, Decreased endurance, Decreased self-care trans, Decreased high-level ADLs  Prognosis: Fair  Assessment: Patient is a 82y o  year old female seen for OT eval s/p admit to [facility] on 2/15/2023 with stroke (cerebrum)  Comorbidities affecting pt’s functional performance include a significant PMH of: HTN, HLD, a-fib, CHF, seizure, sick sinus syndrome, moderate-late onset Alzheimer's dementia, action tremor, osteoporosis, acute metabolic encephalopathy, hx stroke, syncope  Patient with active OT orders and activity orders for Activity as tolerated  Personal factors affecting pt at time of IE include: difficulty performing ADLs and IADLs, difficulty with functional mobility/transfers  Prior to admission, unsure of pt baseline given pt limited hx; from a memory care unit   Upon evaluation, patient’s functional status as follows: eating: MIN A , grooming: MIN A , UB bathing: MOD A, LB bathing: MAX A, UB dressing: MOD A, LB dressing: dependent, toileting: dependent; functional transfers: MOD Ax2, bed mobility: MAX Ax2, functional mobility: MOD Ax2 with RW, sitting/standing tolerance: ~40 seconds with b/u UE support, posterior/R sided lean present- due to the following deficits impacting occupational performance: decreased B UE strength, decreased static/dynamic sitting/standing balance, decreased activity tolerance, decreased safety awareness, and increased pain  Patient would benefit from continued skilled OT therapy while in acute setting to address deficits as defined above and maximize (I) with ADLs and functional mobility  Occupational performance areas to address include: grooming, bathing, toileting, UB/LB dressing, functional mobility, household maintenance, and medication management  Based on the aforementioned OT evaluation, functional performance deficits, and assessments, pt has been identified as a high complexity evaluation    Co treatment with PT secondary to complex medical condition of pt, possible A of 2 required to achieve and maintain transitional movements, requiring the need of skilled therapeutic intervention of 2 therapists to achieve delivery of services       OT Discharge Recommendation: Return to facility with rehabilitation services

## 2023-02-16 NOTE — PROGRESS NOTES
[facility]  Progress Note - Jad First 1935, 82 y o  female MRN: [id number]  Unit/Bed#: E4 -01 Encounter: [id number]  Primary Care Provider: No primary care provider on file  Date and time admitted to hospital: 2/15/2023  1:12 PM    * Stroke (cerebrum) [facility])  Assessment & Plan  Presented from [facility] patient is currently residing on the dementia unit  On 12 PM day of admission patient was noted to be less responsive with right gaze preference left-sided facial droop and left upper extremity weakness    NIH stroke scale was 5 on admission  CT head: New focal density in right M2 branch correlating with thrombus seen on CTA  Patient was not a TNK candidate or thrombectomy candidate  Admitted under stroke way  Seen and evaluated by neurology  Unable to get MRI of brain given recent pacemaker placement - pt did have a MRI conditional pacemaker inserseted but it was less than 3 weeks ago on 1/26/23  Per safety guidelines MRI can be performed at 6 weeks post op    Will need repeat CT of head in 24 to 48 hours per neuro  Hold off on echo- patient had one 1 month ago  Continue Eliquis and statin per neurology recommendations  Aspirin 81 mg was added  Therapy consults in place  Will need outpatient follow-up in 6 weeks with neurovascular attending    Atrial fibrillation ([facility] [address] )  Assessment & Plan  A-fib currently rate controlled  Continue Eliquis    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 behavioral disturbance, psychotic disturbance, mood disturbance, or anxiety ([facility] [address] )  Assessment & Plan  Resides in dementia unit  Continue Seroquel nightly    Sick sinus syndrome [facility])  Assessment & Plan  Newly placed pacemaker 1/26/23    Seizure [facility])  Assessment & Plan  Continue Zonegran    Chronic diastolic heart failure (HCC)  Assessment & Plan  Wt Readings from Last 3 Encounters:   02/15/23 82 8 kg (182 lb 8 7 oz)   01/10/23 77 6 kg (171 lb)   01/09/23 77 8 kg (171 lb 8 3 oz) Maintained on Lasix 40 mg daily  Held diuretics given acute stroke and need for permissive hypertension    Other hyperlipidemia  Assessment & Plan  Atorvastatin 40 mg nightly    Benign essential hypertension  Assessment & Plan  Held diuretics on admission  Consider resuming in the next day or so    VTE Pharmacologic Prophylaxis:   Pharmacologic: Apixaban (Eliquis)  Mechanical VTE Prophylaxis in Place: Yes    Discharge Plan: With need for continued inpatient stay for CVA     Discussions with Specialists or Other Care Team Provider: nursing    Education and Discussions with Family / Patient: patient, attempted to call granddaughter Jose M Urbina but I was unable to reach here    Time Spent for Care: 30 minutes  More than 50% of total time spent on counseling and coordination of care as described above  Current Length of Stay: 1 day(s)  Current Patient Status: Inpatient   Code Status: Level 3 - DNAR and DNI    Subjective:   Resting in bed comfortably upon arrival  Has some difficulty with speech  Ate breakfast this am  Attempted to call granddaughter to update - unable to reach  Objective:     Vitals:   Temp (24hrs), Av 4 °F (36 3 °C), Min:96 9 °F (36 1 °C), Max:98 °F (36 7 °C)    Temp:  [96 9 °F (36 1 °C)-98 °F (36 7 °C)] 97 °F (36 1 °C)  HR:  [57-74] 57  Resp:  [16-27] 16  BP: ()/(57-83) 139/65  SpO2:  [94 %-97 %] 97 %  Body mass index is 32 34 kg/m²  Input and Output Summary (last 24 hours): Intake/Output Summary (Last 24 hours) at 2023 1255  Last data filed at 2/15/2023 2101  Gross per 24 hour   Intake 300 ml   Output --   Net 300 ml       Physical Exam:     Physical Exam  Vitals and nursing note reviewed  Constitutional:       General: She is not in acute distress  Appearance: She is normal weight  She is not ill-appearing, toxic-appearing or diaphoretic  HENT:      Head: Normocephalic and atraumatic  Eyes:      General: No scleral icterus    Cardiovascular:      Rate and Rhythm: Normal rate and regular rhythm  Abdominal:      General: Bowel sounds are normal  There is no distension  Palpations: Abdomen is soft  There is no mass  Tenderness: There is no abdominal tenderness  Hernia: No hernia is present  Musculoskeletal:         General: No swelling  Cervical back: Neck supple  Skin:     General: Skin is warm and dry  Neurological:      Mental Status: She is alert  Comments: Oriented to person and hospital   Psychiatric:         Mood and Affect: Mood normal          Additional Data:     Labs:    Results from last 7 days   Lab Units 02/16/23  0449   WBC Thousand/uL 4 74   HEMOGLOBIN g/dL 14 1   HEMATOCRIT % 41 9   PLATELETS Thousands/uL 95*     Results from last 7 days   Lab Units 02/16/23  0449   POTASSIUM mmol/L 3 5   CHLORIDE mmol/L 110*   CO2 mmol/L 23   BUN mg/dL 18   CREATININE mg/dL 0 75   CALCIUM mg/dL 9 3     Results from last 7 days   Lab Units 02/15/23  1337   INR  1 33*       * I Have Reviewed All Lab Data Listed Above  * Additional Pertinent Lab Tests Reviewed:  [facility] Admission Reviewed    Imaging:    Imaging Reports Reviewed Today Include: ct head  Imaging Personally Reviewed by Myself Includes:      Recent Cultures (last 7 days):           Last 24 Hours Medication List:   Current Facility-Administered Medications   Medication Dose Route Frequency Provider Last Rate   • apixaban  5 mg Oral BID Texie Verden, DO     • aspirin  81 mg Oral Daily Texie Verden, DO     • atorvastatin  40 mg Oral QPM Texie Peg, DO     • bisacodyl  5 mg Oral Daily PRN Texie Verden, DO     • docusate sodium  100 mg Oral BID Texie Peg, DO     • polyethylene glycol  17 g Oral Daily Texie Verden, DO     • QUEtiapine  12 5 mg Oral HS Texie Verden, DO     • zonisamide  100 mg Oral BID Texie Verden, DO          Today, Patient Was Seen By: Mariela Quarles PA-C    ** Please Note: This note has been constructed using a voice recognition system   **

## 2023-02-16 NOTE — ASSESSMENT & PLAN NOTE
Presented from [facility] patient is currently residing on the dementia unit  On 12 PM day of admission patient was noted to be less responsive with right gaze preference left-sided facial droop and left upper extremity weakness    NIH stroke scale was 5 on admission  CT head: New focal density in right M2 branch correlating with thrombus seen on CTA  Patient was not a TNK candidate or thrombectomy candidate  Admitted under stroke way  Seen and evaluated by neurology  Unable to get MRI of brain given recent pacemaker placement - pt did have a MRI conditional pacemaker inserseted but it was less than 3 weeks ago on 1/26/23  Per safety guidelines MRI can be performed at 6 weeks post op    Will need repeat CT of head in 24 to 48 hours per neuro  Hold off on echo- patient had one 1 month ago  Continue Eliquis and statin per neurology recommendations  Aspirin 81 mg was added  Therapy consults in place  Will need outpatient follow-up in 6 weeks with neurovascular attending

## 2023-02-16 NOTE — UTILIZATION REVIEW
Initial Clinical Review    Admission: Date/Time/Statement:   Admission Orders (From admission, onward)     Ordered        02/15/23 1421  INPATIENT ADMISSION  Once                      Orders Placed This Encounter   Procedures   • INPATIENT ADMISSION     Standing Status:   Standing     Number of Occurrences:   1     Order Specific Question:   Level of Care     Answer:   Med Surg [16]     Order Specific Question:   Estimated length of stay     Answer:   More than 2 Midnights     Order Specific Question:   Certification     Answer:   I certify that inpatient services are medically necessary for this patient for a duration of greater than two midnights  See H&P and MD Progress Notes for additional information about the patient's course of treatment  ED Arrival Information     Expected   -    Arrival   2/15/2023 13:12    Acuity   Emergent            Means of arrival   Ambulance    Escorted by   [employer]    Service   Hospitalist    Admission type   Emergency            Arrival complaint   Possible Stroke           Chief Complaint   Patient presents with   • STROKE Alert     Pt arrives via EMS from [facility] unit, last seen normal at 1200, developed unilateral weakness and gaze, had similar episode earlier this week  Pt on eliquis for afib       Initial Presentation: 82 y o  female presents to the ED via EMS from nursing facility with altered mental status, c/o right-sided gaze preference, left facial droop, left upper extremity weakness at noon  She had normal POC glucose  She was out of hospital stroke alert  PMH: A-fib on Eliquis, dementia, CKD, HTN, CAD, chronic dCHF, seizure disorder, s/p recent pacemaker implant  Of note: pt has MRI compatible PPM but was only inserted 3 wks ago and cannot be used yet  Must be > 6 wks since insertion  In the ED pt had obvious right-sided gaze preference, mild L sided facial droop, no appreciable LUE weakness    Imaging - M2 occlusion and focal density (thrombus) at R M2 branch  Labs - elevated chloride, CO2  On admission exam E and R aphasia, barbled words, LUE and LLE weakness, L side neglect, chronically ill-appearing  She is admitted to INPATIENT status with with Stroke - NIHSS 5, had recent Echo, tele, Eliquis, permissive HTN, therapy evals  Seizure - continue Zonegran  Chronic dCHF - hold diuretics  A fib - continue Eliquis  2/15 Neuro Consult - right gaze preference, left facial droop and left hemiparesis, which was already partially improved on her arrival in the emergency department  When we evaluated her, her gaze preference was resolved, she was still somewhat dysarthric (although that also improved on repeat examination), and appeared to have largely symmetric movements of her limbs (antigravity at least x4)  She has a known prior left posterior frontal/parietal infarct with residual partial aphasia though seems to have fairly preserved comprehension  She did make frequent paraphasic errors when first interacting, which again were somewhat less notable on repeat exam   Sensory testing was perhaps not entirely unreliable given her dementia and aphasia, but without asymmetric responses to pinprick  Unable to reliably assess neglect  NIHSS as calculated below  Head CT showed the known prior infarct, CTA did reveal a new right M2 occlusion  NIHSS 5, no TPA, continue Eliquis, baby ASA  Date: 2/16   Day 2:   Right M2 occlusion  No other significant intracranial stenosis or large vessel occlusion  No significant stenosis of the cervical carotid or vertebral arteries  Still with slight R preference gaze but could look to L, + L facial droop, aysmmetry, slight pronator drift on L, strength LUE 5/5, LLE 4/5, no observed seizure        ED Triage Vitals   Temperature Pulse Respirations Blood Pressure SpO2   02/15/23 1348 02/15/23 1330 02/15/23 1330 02/15/23 1330 02/15/23 1330   97 6 °F (36 4 °C) 62 20 125/69 96 %      Temp Source Heart Rate Source Patient Position - Orthostatic VS BP Location FiO2 (%)   02/15/23 1800 02/15/23 1330 02/15/23 1330 02/15/23 1430 --   Temporal Monitor Lying Right arm       Pain Score       02/15/23 1700       No Pain          Wt Readings from Last 1 Encounters:   02/15/23 82 8 kg (182 lb 8 7 oz)     Additional Vital Signs:   02/16/23 0727 97 1 °F (36 2 °C) Abnormal  60 16 119/79 96 95 % -- --   02/16/23 0400 96 9 °F (36 1 °C) Abnormal  66 20 99/57 74 94 % None (Room air) Lying   02/16/23 0200 -- 60 18 101/68 79 95 % -- Lying   02/16/23 0000 -- 74 18 110/64 76 95 % -- Lying   02/15/23 2200 -- 60 18 127/70 91 96 % -- Lying   02/15/23 2000 -- 60 20 141/71 96 97 % None (Room air) Lying   02/15/23 1900 97 8 °F (36 6 °C) 60 20 119/80 -- 95 % None (Room air) Lying   02/15/23 1800 98 °F (36 7 °C) 58 20 123/83 -- 96 % None (Room air) Lying   02/15/23 1700 -- 60 20 148/69 -- 97 % None (Room air) Lying   02/15/23 1630 -- 61 20 139/69 -- 96 % None (Room air) Lying   02/15/23 1530 -- 60 20 159/70 101 96 % None (Room air) Sitting   02/15/23 1430 -- 61 20 130/83 -- 97 % None (Room air) Lying   02/15/23 1348 97 6 °F (36 4 °C) -- -- -- -- -- -- --   02/15/23 1340 -- 60 25 Abnormal  -- -- 96 % -- --   02/15/23 1336 -- -- -- -- -- 96 % -- --   02/15/23 1335 -- 61 27 Abnormal  -- -- -- -- --   02/15/23 13:30:39 -- 62 16 125/69 -- 96 % None (Room air) Lying     Pertinent Labs/Diagnostic Test Results:     2/15 ECG - Ventricular-paced rhythm  Abnormal ECG    CTA stroke alert (head/neck)   Final Result by Cuco Killian MD (02/15 0786)      Right M2 occlusion  No other significant intracranial stenosis or large vessel occlusion  No significant stenosis of the cervical carotid or vertebral arteries  CT stroke alert brain   Final Result by Cuco Killian MD (02/15 5707)   New focal density within right M2 branch correlating with thrombus on CTA  No acute ischemic changes or hemorrhage          Chronic microangiopathy   Stable chronic left MCA infarct  Stable small calcified lesion anterior foramen magnum is favored to represent calcified meningioma       Results from last 7 days   Lab Units 02/15/23  1337   SARS-COV-2  Negative     Results from last 7 days   Lab Units 02/16/23  0449 02/15/23  1335   WBC Thousand/uL 4 74 4 42   HEMOGLOBIN g/dL 14 1 13 8   HEMATOCRIT % 41 9 42 2   PLATELETS Thousands/uL 95* 100*         Results from last 7 days   Lab Units 02/16/23  0449 02/15/23  1335   SODIUM mmol/L 140 138   POTASSIUM mmol/L 3 5 3 6   CHLORIDE mmol/L 110* 112*   CO2 mmol/L 23 20*   ANION GAP mmol/L 7 6   BUN mg/dL 18 23   CREATININE mg/dL 0 75 0 76   EGFR ml/min/1 73sq m 72 71   CALCIUM mg/dL 9 3 9 2         Results from last 7 days   Lab Units 02/15/23  1323   POC GLUCOSE mg/dl 101     Results from last 7 days   Lab Units 02/16/23  0449 02/15/23  1335   GLUCOSE RANDOM mg/dL 79 105     Results from last 7 days   Lab Units 02/15/23  2155 02/15/23  1620 02/15/23  1337   HS TNI 0HR ng/L  --   --  40   HS TNI 2HR ng/L  --  41  --    HSTNI D2 ng/L  --  1  --    HS TNI 4HR ng/L 49  --   --    HSTNI D4 ng/L 9  --   --          Results from last 7 days   Lab Units 02/15/23  1337   PROTIME seconds 16 4*   INR  1 33*   PTT seconds 29       Results from last 7 days   Lab Units 02/15/23  1337   INFLUENZA A PCR  Negative   INFLUENZA B PCR  Negative   RSV PCR  Negative     ED Treatment:   Medication Administration from 02/15/2023 1312 to 02/15/2023 1704       Date/Time Order Dose Route Action     02/15/2023 1317 EST iohexol (OMNIPAQUE) 350 MG/ML injection (SINGLE-DOSE) 85 mL 85 mL Intravenous Given        Past Medical History:   Diagnosis Date   • A-fib (HCC)    • CAD (coronary artery disease)    • CKD (chronic kidney disease) stage 2, GFR 60-89 ml/min    • Hypertension    • Memory loss    • Urinary tract infection      Present on Admission:  • Benign essential hypertension  • Other hyperlipidemia  • Atrial fibrillation (HCC)  • Chronic diastolic heart failure ([facility] )  • Seizure ([facility] )  • Sick sinus syndrome (HCC)  • 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 behavioral disturbance, psychotic disturbance, mood disturbance, or anxiety (HCC)      Admitting Diagnosis: Stroke ([facility] ) [I63 9]  Stroke (cerebrum) ([place] [address] ) [I63 9]  Age/Sex: 82 y o  female  Admission Orders:  Scheduled Medications:  apixaban, 5 mg, Oral, BID  aspirin, 81 mg, Oral, Daily  atorvastatin, 40 mg, Oral, QPM  docusate sodium, 100 mg, Oral, BID  polyethylene glycol, 17 g, Oral, Daily  QUEtiapine, 12 5 mg, Oral, HS  zonisamide, 100 mg, Oral, BID      Continuous IV Infusions:     PRN Meds:  bisacodyl, 5 mg, Oral, Daily PRN    MRSA culture  A1C  NC oxygen  Neuro checks Every 1 hour x 4 hours, then every 2 hours x 4, then every 4 hours x 72 hours                  Tele  NIHSS  IP CONSULT TO NEUROLOGY  IP CONSULT TO PHYSICAL MEDICINE REHAB  IP CONSULT TO CASE MANAGEMENT  IP CONSULT TO NUTRITION SERVICES    Network Utilization Review Department  ATTENTION: Please call with any questions or concerns to [phone] and carefully listen to the prompts so that you are directed to the right person  All voicemails are confidential   Scot Mathis all requests for admission clinical reviews, approved or denied determinations and any other requests to dedicated fax number below belonging to the campus where the patient is receiving treatment   List of dedicated fax numbers for the Facilities:  [address] [place] [address] DENIALS (Administrative/Medical Necessity) [phone]   [address] [place] [address] (Maternity/NICU/Pediatrics) [phone]   [address] [phone]   [address] [place] [address] Medical [address] 2000 [facility] Emergency Department [address] [facility] [phone]

## 2023-02-17 VITALS
WEIGHT: 182.54 LBS | HEART RATE: 66 BPM | SYSTOLIC BLOOD PRESSURE: 160 MMHG | DIASTOLIC BLOOD PRESSURE: 72 MMHG | TEMPERATURE: 98.1 F | BODY MASS INDEX: 32.34 KG/M2 | OXYGEN SATURATION: 98 % | RESPIRATION RATE: 18 BRPM

## 2023-02-17 PROBLEM — I69.398 EPILEPSY AFTER STROKE (HCC): Status: ACTIVE | Noted: 2022-11-14

## 2023-02-17 PROBLEM — G40.909 EPILEPSY AFTER STROKE (HCC): Status: ACTIVE | Noted: 2022-11-14

## 2023-02-17 LAB
FLUAV RNA RESP QL NAA+PROBE: NEGATIVE
FLUBV RNA RESP QL NAA+PROBE: NEGATIVE
RSV RNA RESP QL NAA+PROBE: NEGATIVE
SARS-COV-2 RNA RESP QL NAA+PROBE: NEGATIVE

## 2023-02-17 RX ORDER — ASPIRIN 81 MG/1
81 TABLET, CHEWABLE ORAL DAILY
Qty: 30 TABLET | Refills: 0
Start: 2023-02-18

## 2023-02-17 RX ADMIN — ATORVASTATIN CALCIUM 40 MG: 40 TABLET, FILM COATED ORAL at 17:04

## 2023-02-17 RX ADMIN — DOCUSATE SODIUM 100 MG: 100 CAPSULE, LIQUID FILLED ORAL at 17:04

## 2023-02-17 RX ADMIN — ASPIRIN 81 MG: 81 TABLET, CHEWABLE ORAL at 08:15

## 2023-02-17 RX ADMIN — ZONISAMIDE 100 MG: 100 CAPSULE ORAL at 08:15

## 2023-02-17 RX ADMIN — DOCUSATE SODIUM 100 MG: 100 CAPSULE, LIQUID FILLED ORAL at 08:15

## 2023-02-17 RX ADMIN — ZONISAMIDE 100 MG: 100 CAPSULE ORAL at 17:05

## 2023-02-17 RX ADMIN — APIXABAN 5 MG: 5 TABLET, FILM COATED ORAL at 08:15

## 2023-02-17 RX ADMIN — APIXABAN 5 MG: 5 TABLET, FILM COATED ORAL at 17:04

## 2023-02-17 NOTE — CASE MANAGEMENT
Case Management Discharge Planning Note    Patient name Desiree Bhatt  Location East 4 /E4 MS 36-* MRN [id number]  : 1935 Date 2023       Current Admission Date: 2/15/2023  Current Admission Diagnosis:Stroke (cerebrum) [facility])   Patient Active Problem List    Diagnosis Date Noted   • Stroke (cerebrum) ([facility] [address] [address] ) 02/15/2023   • Prolonged Q-T interval on ECG 2023   • Syncope 2023   • Acute cystitis without hematuria 2023   • 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 behavioral disturbance, psychotic disturbance, mood disturbance, or anxiety ([address] ) 2023   • Sick sinus syndrome ([address] ) 2023   • Stroke-like symptoms 2023   • History of stroke 01/10/2023   • New onset seizure ([address] )    • Post-stroke epilepsy 2022   • Urinary retention 2022   • Venous stasis dermatitis of both lower extremities 2022   • Gait instability 2022   • Other fatigue 2022   • Atypical pneumonia 05/10/2022   • Respiratory insufficiency 05/10/2022   • Acute metabolic encephalopathy    • Chronic diastolic heart failure ([address] ) 05/10/2022   • Thrombocytopenia ([facility] ) 05/10/2022   • Other hyperlipidemia 10/06/2021   • 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   • Other insomnia 10/06/2021   • Skin lesion 10/04/2021   • Action tremor 2020   • Age-related osteoporosis without fracture 2020   • Atrial fibrillation ([address] ) 04/10/2015   • Chronic anticoagulation 04/10/2015   • Benign essential hypertension 2011      LOS (days): 2  Geometric Mean LOS (GMLOS) (days): 2 90  Days to GMLOS:0 9     OBJECTIVE:  Risk of Unplanned Readmission Score: 28 34         Current admission status: Inpatient   Preferred Pharmacy:   [facility] , [address] [place] [address]  Phone: [phone] Fax: [address] [place] [address] [id number]  [address] [place] [address]  Phone: [phone] Fax: [phone]    [facility], [address] [facility]  [address]  [facility] [address]  Phone: [phone] Fax: [phone]    Primary Care Provider: No primary care provider on file  Primary Insurance: [employer]  Secondary Insurance:     DISCHARGE DETAILS:    Discharge planning discussed with[de-identified] Son and MACIEJ  Freedom of Choice: Yes  Comments - Freedom of Choice: Back to [facility]  CM contacted family/caregiver?: Yes  Were Treatment Team discharge recommendations reviewed with patient/caregiver?: Yes  Did patient/caregiver verbalize understanding of patient care needs?: Yes  Were patient/caregiver advised of the risks associated with not following Treatment Team discharge recommendations?: Yes                             Treatment Team Recommendation: Facility Return  Discharge Destination Plan[de-identified] Facility Return  Transport at Discharge : [facility]  Dispatcher Contacted: Yes     Transported by [employer] and Unit #): [facility]  ETA of Transport (Date): 02/17/23  ETA of Transport (Time): 1700                       Additional Comments: [facility] can accept pt back today  MEEK wrote scripts for PT/OT and this LSW fax to [phone] today  RN will fax AVS and give report  Completed Med Nec and put on chart  Order BLS and  time is 1700  MEEK, MACIEJ and RN aware of  time  Left a message for son      Accepting Facility Name, [address] : [facility]  Receiving Facility/Agency Phone Number: [phone]  Facility/Agency Fax Number: [phone]

## 2023-02-17 NOTE — ASSESSMENT & PLAN NOTE
Started on [address] after new onset seizure in November 2022 but transitioned to zonisamide 100 mg twice daily due to concern for Keppra contributing to behavioral difficulties  -continue home zonisamide

## 2023-02-17 NOTE — DISCHARGE SUMMARY
[facility]  Discharge- Dontae Araiza 1935, 82 y o  female MRN: [id number]  Unit/Bed#: E4 -01 Encounter: [id number]  Primary Care Provider: No primary care provider on file  Date and time admitted to hospital: 2/15/2023  1:12 PM    * Stroke (cerebrum) [facility])  Assessment & Plan  [facility] [place] [facility] on the dementia unit  On 12 PM day of admission patient was noted to be less responsive with right gaze preference left-sided facial droop and left upper extremity weakness    NIH stroke scale was 5 on admission  CT head: New focal density in right M2 branch correlating with thrombus seen on CTA  Patient was not a TNK candidate or thrombectomy candidate  Admitted under stroke way  Seen and evaluated by neurology  Unable to get MRI of brain given recent pacemaker placement - pt did have a MRI conditional pacemaker inserseted but it was less than 3 weeks ago on 1/26/23  Per safety guidelines MRI can be performed at 6 weeks post op    Had a repeat head CT which showed stable CVA without any new ischemia  Hold off on echo- patient had one 1 month ago  Continue Eliquis and statin per neurology recommendations  Aspirin 81 mg was added in addition to "eliquis failure"  Will need outpatient follow-up in 6 weeks with neurovascular attending    Atrial fibrillation ([facility] )  Assessment & Plan  A-fib currently rate controlled  Continue Eliquis    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 behavioral disturbance, psychotic disturbance, mood disturbance, or anxiety ([facility] )  Assessment & Plan  Resides in dementia unit  Continue Seroquel nightly    Sick sinus syndrome [facility])  Assessment & Plan  Newly placed pacemaker 1/26/23    Post-stroke epilepsy  Assessment & Plan  Continue Zonegran    Chronic diastolic heart failure (HCC)  Assessment & Plan  Wt Readings from Last 3 Encounters:   02/15/23 82 8 kg (182 lb 8 7 oz)   01/10/23 77 6 kg (171 lb)   01/09/23 77 8 kg (171 lb 8 3 oz)   Maintained on Lasix 40 mg daily  Held diuretics given acute stroke and need for permissive hypertension  Resume on discharge    Other hyperlipidemia  Assessment & Plan  Atorvastatin 40 mg nightly    Benign essential hypertension  Assessment & Plan  Continue lasix          Consultations During Hospital Stay:  · Neurology    Procedures Performed:   CT head wo contrast  Result Date: 2/16/2023  Impression: No CT signs of evolving acute infarct particularly in the right MCA territory  No acute intracranial hemorrhage  Unchanged chronic infarct in left parietotemporal lobe  Unchanged 1 1 cm dural calcification in anterior foramen magnum, likely calcified meningioma  Workstation performed: [id number]     CT stroke alert brain  Result Date: 2/15/2023  Impression: New focal density within right M2 branch correlating with thrombus on CTA  No acute ischemic changes or hemorrhage    Chronic microangiopathy  Stable chronic left MCA infarct  Stable small calcified lesion anterior foramen magnum is favored to represent calcified meningioma  Findings were reported to [facility]  via HIPAA compliant secure electronic messaging at 1:33 PM  Workstation performed: [id number]     CTA stroke alert (head/neck)  Result Date: 2/15/2023  · Impression: Right M2 occlusion  No other significant intracranial stenosis or large vessel occlusion  No significant stenosis of the cervical carotid or vertebral arteries  Findings were reported to [facility]  via HIPAA compliant secure electronic messaging at 1:33 PM   Workstation performed: [id number]     Significant Findings / Test Results:   · Acute CVA-new right M2 occlusion    Incidental Findings:   · None    Test Results Pending at Discharge (will require follow up):    · None     Outpatient follow-up requested:  · Neurology-6 weeks  · PCP-1 week    Complications: None    Hospital Course:     Jad Young is a 82 y o  female patient with a past medical history of atrial fibrillation anticoagulated on Eliquis, CAD, CKD, sent hypertension, dementia, 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, Po stroke epilepsy on Zonegran  Patient currently resides at [facility] in a locked dementia unit  She originally presented to the hospital on 2/15/2023 due to being less responsive with right gaze and reported left-sided facial droop/left upper extremity weakness  EMS was contacted and patient was a stroke alert prior to her hospital arrival   She was seen in consultation by neurology  CT head revealed new focal density within right M2 branch correlating with thrombus on CTA  She was placed on stroke pathway  She was not deemed a TNK or thrombectomy candidate  Unfortunately given her recent placement of pacemaker on 1/26/2023, she was unable to have MRI completed  She underwent repeat head imaging which revealed stable infarct without any new territorial ischemia  She was considered a Eliquis failure by neurology however given absence of reliable date to direct alternate treatment strategy, she was continued on Eliquis and aspirin 81 mg daily was added  Family understands risks that medications are designed to reduce but not necessarily limit stroke risk  Patient remained stable during her hospitalization and her speech remained baseline aphasic  In conclusion, patient is stable for discharge back to her facility of [facility] with PT OT therapy  Condition at Discharge: fair     Discharge Day Visit / Exam:     Subjective: Feels okay  Speech at baseline  Ate breakfast this morning  Stable for discharge  Vitals: Blood Pressure: 156/97 (02/17/23 0749)  Pulse: 62 (02/17/23 0749)  Temperature: (!) 97 3 °F (36 3 °C) (02/17/23 0749)  Temp Source: Temporal (02/17/23 0749)  Respirations: 16 (02/17/23 0749)  Weight - Scale: 82 8 kg (182 lb 8 7 oz) (02/15/23 1334)  SpO2: 97 % (02/17/23 0749)     Exam:   Physical Exam  Vitals and nursing note reviewed  Constitutional:       General: She is not in acute distress  Appearance: Normal appearance  She is normal weight  She is not ill-appearing, toxic-appearing or diaphoretic  HENT:      Head: Normocephalic and atraumatic  Eyes:      General: No scleral icterus  Cardiovascular:      Rate and Rhythm: Normal rate and regular rhythm  Pulmonary:      Effort: Pulmonary effort is normal  No respiratory distress  Breath sounds: Normal breath sounds  No stridor  No wheezing or rhonchi  Abdominal:      General: Bowel sounds are normal  There is no distension  Palpations: There is no mass  Tenderness: There is no abdominal tenderness  Hernia: No hernia is present  Musculoskeletal:         General: No swelling  Cervical back: Neck supple  Skin:     General: Skin is warm and dry  Neurological:      Mental Status: She is oriented to person, place, and time  Mental status is at baseline  Comments: Speech aphasic   Psychiatric:         Mood and Affect: Mood normal          Behavior: Behavior normal        Discussion with Family: son     Discharge instructions/Information to patient and family:   See after visit summary for information provided to patient and family  Provisions for Follow-Up Care:  See after visit summary for information related to follow-up care and any pertinent home health orders  Planned Readmission: no     Discharge Statement:  I spent 50 minutes discharging the patient  This time was spent on the day of discharge  I had direct contact with the patient on the day of discharge  Greater than 50% of the total time was spent examining patient, answering all patient questions, arranging and discussing plan of care with patient as well as directly providing post-discharge instructions  Additional time then spent on discharge activities  Discharge Medications:  See after visit summary for reconciled discharge medications provided to patient and family  ** Please Note: This note has been constructed using a voice recognition system **

## 2023-02-17 NOTE — PROGRESS NOTES
Progress Note - Neurology   Iván Wilkes 82 y o  female MRN: [id number]  Unit/Bed#: E4 -01 Encounter: [id number]      Assessment/Plan     * Stroke (cerebrum) [facility])  Assessment & Plan  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, presented with right gaze preference, left facial droop, and left upper extremity weakness on 2/15/2023  NIHSS 5 on presentation  Not TNK candidate due to Eliquis and not felt to be a thrombectomy candidate due to minimal symptoms and rapid improvement  CTA head/neck demonstrated new focal density within right M2 branch  CT head demonstrated no acute ischemic changes or hemorrhage but did demonstrate chronic microangiopathy and stable chronic left MCA infarct  Stable small calcified lesion anterior foramen magnum is favored to represent calcified meningioma  Repeat CTH on 2/16/23 demonstrated no acute changes/evolving infarcts (unable to have MRI brain due to recent PPM placement)  LDL 32  A1c 5 4  New right M2 occlusion felt possibly related to recent GI illness with incomplete absorption of her Eliquis  Patient appears to be at her baseline as of 2/17/2023 except for some diminished pin appreciation of the left side  Plan:  -Continue Eliquis 5 mg twice daily and aspirin 81 mg daily (aspirin added this admission)  -Continue atorvastatin 40 mg daily   -Patient should continue to receive PT/OT/ST at her facility   -Continue zonisamide home dose of 100 mg twice daily   -Patient had to cancel her Neurology appointment late last month  Will ask office to contact her family to reschedule     -As discussed with primary team, okay for discharge from a neurologic standpoint      Post-stroke epilepsy  Assessment & Plan  Started on Keppra after new onset seizure in November 2022 but transitioned to zonisamide 100 mg twice daily due to concern for Keppra contributing to behavioral difficulties  -continue home zonisamide  Sydni Horn will need follow up in in 6 weeks with neurovascular attending  She will not require outpatient neurological testing  Subjective:   Patient denies complaints at this time  She is quite talkative today (though still aphasic) and at times with inappropriate laughter  No gaze preference or lateralized weakness observed  ROS: 12 point review of systems performed and negative except as indicated above  Vitals: Blood pressure 156/97, pulse 62, temperature (!) 97 3 °F (36 3 °C), temperature source Temporal, resp  rate 16, weight 82 8 kg (182 lb 8 7 oz), SpO2 97 %  ,Body mass index is 32 34 kg/m²  Physical Exam:   General:  Patient is well-developed, obese BMI 32 34, and in no acute distress  HEENT:  Head normocephalic  Eyes anicteric  Cardiovascular:  With regular rhythm  Lungs:  Normal effort  Nonlabored breathing  Extremities:  With no significant edema  Skin: No rashes  Neurologic:  Patient is alert, pleasantly interactive, and appropriately conversational   At times inappropriately laughing  Does have expressive aphasia with neologisms and paraphasic errors, which appears to be her baseline  Appears oriented to self only  Gait deferred for safety  Cranial Nerves:   II: Visual fields full to confrontation  Cannot visualize optic fundi  III,IV,VI: extraocular movements intact with no nystagmus  V: Sensation in the V1 through V3 distributions intact to light touch bilaterally  VII: Face is symmetric with no weakness noted  XII: Tongue midline with no atrophy or fasciculations with appropriate movement  Coordination:  Accurate with finger-to-nose maneuvers bilaterally  Motor testing with no upper or lower extremity drift      Diminished pinprick in the left upper extremity as compared to right but patient reports symmetrical pinprick in the bilateral lower extremities  Lab, Imaging and other studies:   CBC:   Results from last 7 days   Lab Units 02/16/23  0449 02/15/23  1335   WBC Thousand/uL 4 74 4 42   RBC Million/uL 4 49 4 42   HEMOGLOBIN g/dL 14 1 13 8   HEMATOCRIT % 41 9 42 2   MCV fL 93 96   PLATELETS Thousands/uL 95* 100*   , BMP/CMP:   Results from last 7 days   Lab Units 02/16/23  0449 02/15/23  1335   SODIUM mmol/L 140 138   POTASSIUM mmol/L 3 5 3 6   CHLORIDE mmol/L 110* 112*   CO2 mmol/L 23 20*   BUN mg/dL 18 23   CREATININE mg/dL 0 75 0 76   CALCIUM mg/dL 9 3 9 2   EGFR ml/min/1 73sq m 72 71   , HgBA1C:   Results from last 7 days   Lab Units 02/16/23  0449   HEMOGLOBIN A1C % 5 4   , Lipid Profile:   Results from last 7 days   Lab Units 02/16/23  0449   HDL mg/dL 29*   LDL CALC mg/dL 37   TRIGLYCERIDES mg/dL 76     VTE Prophylaxis: Eliquis    Counseling / Coordination of Care  I have spent a total time of 45 minutes on 02/17/23 in caring for this patient including Diagnostic results, Patient and family education, Risk factor reductions, Impressions, Counseling / Coordination of care, Documenting in the medical record, Reviewing / ordering tests, medicine, procedures   and Communicating with other healthcare professionals

## 2023-02-17 NOTE — ASSESSMENT & PLAN NOTE
[facility]  On 12 PM day of admission patient was noted to be less responsive with right gaze preference left-sided facial droop and left upper extremity weakness    NIH stroke scale was 5 on admission  CT head: New focal density in right M2 branch correlating with thrombus seen on CTA  Patient was not a TNK candidate or thrombectomy candidate  Admitted under stroke way  Seen and evaluated by neurology  Unable to get MRI of brain given recent pacemaker placement - pt did have a MRI conditional pacemaker inserseted but it was less than 3 weeks ago on 1/26/23  Per safety guidelines MRI can be performed at 6 weeks post op    Had a repeat head CT which showed stable CVA without any new ischemia  Hold off on echo- patient had one 1 month ago  Continue Eliquis and statin per neurology recommendations  Aspirin 81 mg was added in addition to "eliquis failure"  Will need outpatient follow-up in 6 weeks with neurovascular attending

## 2023-02-17 NOTE — CASE MANAGEMENT
Case Management Assessment & Discharge Planning Note    Patient name Ashtyn Sabillon  Location East 4 /E4 [facility] [facility]-* MRN [id number]  : 1935 Date 2023       Current Admission Date: 2/15/2023  Current Admission Diagnosis:Stroke (cerebrum) [facility])   Patient Active Problem List    Diagnosis Date Noted   • Stroke (cerebrum) ([facility] [address] ) 02/15/2023   • Prolonged Q-T interval on ECG 2023   • Syncope 2023   • Acute cystitis without hematuria 2023   • 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 behavioral disturbance, psychotic disturbance, mood disturbance, or anxiety ([address] ) 2023   • Sick sinus syndrome ([address] ) 2023   • Stroke-like symptoms 2023   • History of stroke 01/10/2023   • New onset seizure ([address] )    • Seizure ([address] ) 2022   • Urinary retention 2022   • Venous stasis dermatitis of both lower extremities 2022   • Gait instability 2022   • Other fatigue 2022   • Atypical pneumonia 05/10/2022   • Respiratory insufficiency 05/10/2022   • Acute metabolic encephalopathy    • Chronic diastolic heart failure ([address] ) 05/10/2022   • Thrombocytopenia ([address] ) 05/10/2022   • Other hyperlipidemia 10/06/2021   • 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   • Other insomnia 10/06/2021   • Skin lesion 10/04/2021   • Action tremor 2020   • Age-related osteoporosis without fracture 2020   • Atrial fibrillation ([address] ) 04/10/2015   • Chronic anticoagulation 04/10/2015   • Benign essential hypertension 2011      LOS (days): 2  Geometric Mean LOS (GMLOS) (days): 2 90  Days to GMLOS:0 9     OBJECTIVE:  PATIENT READMITTED TO HOSPITAL  Risk of Unplanned Readmission Score: 28 34         Current admission status: Inpatient       Preferred Pharmacy:   [facility] , [address] [address]  Phone: [phone] Fax: [phone] [facility] [address]  Phone: [phone] Fax: 6234 Rut Talbot, [address] 6[address] [facility]  [address]  [facility] [address]  Phone: [phone] Fax: [phone]    Primary Care Provider: No primary care provider on file      Primary Insurance: [facility]  Secondary Insurance:     ASSESSMENT:  [address] Representative - Son   Primary Phone: [phone] (Mobile)               Advance Directives  Does patient have a [address]?: No (Son Polina Luevano is financial POA only)  Was patient offered paperwork?: Yes (decline)  Does patient currently have a Health Care decision maker?: Yes, please see Health Care Proxy section Lena Rai [facility])   [phone] (M))  Does patient have Advance Directives?: No  Was patient offered paperwork?: Yes (delcine)  Primary Contact: Lena Rai (Son)   [phone] (M)         Readmission Root Cause  30 Day Readmission: Yes  Who directed you to return to the hospital?: Other (comment) (staff at [facility])  Did you understand whom to contact if you had questions or problems?: Yes (staff)  Did you get your prescriptions before you left the hospital?: Yes  Were you able to get your prescriptions filled when you left the hospital?: Yes  Did you take your medications as prescribed?: Yes  Were you able to get to your follow-up appointments?: No  Reason[de-identified] Readmitted prior to appointment  During previous admission, was a post-acute recommendation made?: No  Patient was readmitted due to: Stroke  Action Plan: Waiting on therapy notes    Patient Information  Admitted from[de-identified] Facility ([address])  Mental Status: Confused (Alzheimer's Dementia)  During Assessment patient was accompanied by: Not accompanied during assessment  Assessment information provided by[de-identified] Alvaro, Other - please comment ([facility])  Support Systems: Alvaro Family members  [place] of Residence: [address] do you live in?: [address] entry access options   Select all that apply : No steps to enter home  Type of Current Residence: Facility ([address] [place] [address] memory care unit)  Upon entering residence, is there a bedroom on the main floor (no further steps)?: Yes  Upon entering residence, is there a bathroom on the main floor (no further steps)?: Yes  In the last 12 months, was there a time when you were not able to pay the mortgage or rent on time?: No  In the last 12 months, how many places have you lived?: 1  In the last 12 months, was there a time when you did not have a steady place to sleep or slept in a shelter (including now)?: No  Homeless/housing insecurity resource given?: N/A  Living Arrangements: Other (Comment) (Memory Care Unit)  Is patient a ?: No    Activities of Daily Living Prior to Admission  Functional Status: Assistance  Completes ADLs independently?: No  Level of ADL dependence: Assistance  Ambulates independently?: Yes  Does patient use assisted devices?: Yes  Assisted Devices (DME) used: Ibsi Shady, Wheelchair  Does patient currently own DME?: Yes  What DME does the patient currently own?: Bisi Shady, Wheelchair  Does patient have a history of Outpatient Therapy (PT/OT)?: Yes ([facility])  Does the patient have a history of Short-Term Rehab?: Yes  Does patient have a history of HHC?: No  Does patient currently have [address]?: No         Patient Information Continued  Income Source: Pension/detention  Does patient have prescription coverage?: Yes  Within the past 12 months, you worried that your food would run out before you got the money to buy more : Never true  Within the past 12 months, the food you bought just didn't last and you didn't have money to get more : Never true  Food insecurity resource given?: N/A  Does patient receive dialysis treatments?: No  Does patient have a history of substance abuse?: No  Does patient have a history of Mental Health Diagnosis?: No         Means of Transportation  Means of Transport to Appts[de-identified] Family transport  In the past 12 months, has lack of transportation kept you from medical appointments or from getting medications?: No  In the past 12 months, has lack of transportation kept you from meetings, work, or from getting things needed for daily living?: No  Was application for public transport provided?: N/A        DISCHARGE DETAILS:    Discharge planning discussed with[de-identified] Son and MACIEJ  Freedom of Choice: Yes  Comments - Freedom of Choice: Back to [facility]  CM contacted family/caregiver?: Yes  Were Treatment Team discharge recommendations reviewed with patient/caregiver?: Yes  Did patient/caregiver verbalize understanding of patient care needs?: Yes  Were patient/caregiver advised of the risks associated with not following Treatment Team discharge recommendations?: Yes                             Treatment Team Recommendation: Facility Return  Discharge Destination Plan[de-identified] Facility Return  [facility] at Discharge [facility]  Dispatcher Contacted: Yes        ETA of Transport (Date): 02/17/23                          Additional Comments: MEEK wants to discharge today and asked for COVID test  PT/OT recommendation return to memory care facility  Son agreeable to return to [facility]

## 2023-02-17 NOTE — ASSESSMENT & PLAN NOTE
Wt Readings from Last 3 Encounters:   02/15/23 82 8 kg (182 lb 8 7 oz)   01/10/23 77 6 kg (171 lb)   01/09/23 77 8 kg (171 lb 8 3 oz)   Maintained on Lasix 40 mg daily  Held diuretics given acute stroke and need for permissive hypertension  Resume on discharge

## 2023-02-19 LAB — MRSA NOSE QL CULT: ABNORMAL

## 2023-02-20 NOTE — UTILIZATION REVIEW
NOTIFICATION OF ADMISSION DISCHARGE   This is a Notification of Discharge from [address]  Please be advised that this patient has been discharge from our facility  Below you will find the admission and discharge date and time including the patient’s disposition  UTILIZATION REVIEW CONTACT:  Surinder Almanza  Utilization   Network Utilization Review Department  Phone: [phone] x carefully listen to the prompts  All voicemails are confidential   Email: [email or web address]     ADMISSION INFORMATION  PRESENTATION DATE: 2/15/2023  1:12 PM  OBERVATION ADMISSION DATE:   INPATIENT ADMISSION DATE: 2/15/23  2:21 PM   DISCHARGE DATE: 2/17/2023  5:30 PM   DISPOSITION:Released to SNF/TCU/SNU Facility    IMPORTANT INFORMATION:  Send all requests for admission clinical reviews, approved or denied determinations and any other requests to dedicated fax number below belonging to the campus where the patient is receiving treatment   List of dedicated fax numbers:  [address] DENIALS (Administrative/Medical Necessity) [phone]   [address] (Maternity/NICU/Pediatrics) [phone]   [facility] [phone]   [facility] [phone]   [address] [phone]   [address] [facility] [phone] 90 [facility] [phone]   [facility] [phone]   [facility]  [phone]   4053 [facility] [phone]   412 [facility] [address] [facility] [phone]

## 2023-02-24 ENCOUNTER — TELEPHONE (OUTPATIENT)
Dept: NEUROLOGY | Facility: CLINIC | Age: 88
End: 2023-02-24

## 2023-02-24 NOTE — TELEPHONE ENCOUNTER
Post CVA Discharge Follow Up  Hospitalization: 2/15/23-2/17/23    According to chart, patient discharged to [facility]  Spoke with the   She reports how the patient's nurse is during a medication pass and is unavailable to come to the phone  The nurse will call back this RN  Provided contact information  Will await for a call back

## 2023-02-27 ENCOUNTER — TELEPHONE (OUTPATIENT)
Dept: NEUROLOGY | Facility: CLINIC | Age: 88
End: 2023-02-27

## 2023-02-27 NOTE — TELEPHONE ENCOUNTER
ALL SCHEDULED: Appointment was accepted by patient's son, he will be bringing her to this appointment  5/3/23 at 3 pm with Dr Emma Wolfe in [place] [address], address was given  HFU/SLA/STROKE        NOTES: Ansley Wagner will need follow up in in 6 weeks with neurovascular attending  She will not require outpatient neurological testing  Graft Donor Site Bandage (Optional-Leave Blank If You Don't Want In Note): Steri-strips and a pressure bandage were applied to the donor site.

## 2023-02-28 NOTE — TELEPHONE ENCOUNTER
Post CVA Discharge Follow Up  Hospitalization: 2/15/23-2/17/23    Karen from [place] returned the call  She reports the patient is doing well  No new or worsening stroke-like symptoms since discharge  Ambulation / ADLs:  Patient mobilizing via assist X 1 with walker  She continues to require assistance with ADLs and transfers  Patient maintained on a regular consistency diet  She reports episodes of incontinence  She is at her baseline  Medication Review:  I reviewed medications with them  Confirmed the patient is taking Eliquis 5 mg PO two times daily and aspirin 81 mg PO daily  She inquired if the hospital sent a script for valerian root  Reviewed chart, do not see any mention for valerian root  She will review with the provider at the facility  Per policy, they check vital signs once monthly or if there is a change in patient's status  Last BP reading on file is from before the hospitalization  Appointments:  Stroke hospital follow up was previously scheduled by the scheduling call center  There is no estimated discharge date at this time

## 2023-02-28 NOTE — TELEPHONE ENCOUNTER
Post CVA Discharge Follow Up  Hospitalization: 2/15/23-2/17/23     According to chart, patient discharged to [facility]  Spoke with the   She reports the nurse is on a phone call now  Held on the phone for a few minutes, nurse is still unavailable  Provided this RN's contact information for the nurse to return the call once she is available  Will await for phone call

## 2023-03-25 PROBLEM — N30.00 ACUTE CYSTITIS WITHOUT HEMATURIA: Status: RESOLVED | Noted: 2023-01-21 | Resolved: 2023-03-25

## 2023-03-29 ENCOUNTER — OFFICE VISIT (OUTPATIENT)
Dept: CARDIOLOGY CLINIC | Facility: CLINIC | Age: 88
End: 2023-03-29

## 2023-03-29 VITALS
DIASTOLIC BLOOD PRESSURE: 70 MMHG | BODY MASS INDEX: 29.76 KG/M2 | HEART RATE: 64 BPM | WEIGHT: 168 LBS | SYSTOLIC BLOOD PRESSURE: 124 MMHG

## 2023-03-29 DIAGNOSIS — I87.2 VENOUS STASIS DERMATITIS OF BOTH LOWER EXTREMITIES: ICD-10-CM

## 2023-03-29 DIAGNOSIS — I63.9 CEREBROVASCULAR ACCIDENT (CVA), UNSPECIFIED MECHANISM (HCC): ICD-10-CM

## 2023-03-29 DIAGNOSIS — I49.5 SICK SINUS SYNDROME (HCC): ICD-10-CM

## 2023-03-29 DIAGNOSIS — I50.32 CHRONIC DIASTOLIC HEART FAILURE (HCC): Primary | ICD-10-CM

## 2023-03-29 DIAGNOSIS — I10 BENIGN ESSENTIAL HYPERTENSION: ICD-10-CM

## 2023-03-29 RX ORDER — VALERIAN ROOT 500 MG
CAPSULE ORAL DAILY
COMMUNITY

## 2023-03-29 RX ORDER — LOPERAMIDE HYDROCHLORIDE 2 MG/1
CAPSULE ORAL
COMMUNITY
Start: 2023-02-15

## 2023-03-29 NOTE — PATIENT INSTRUCTIONS
CARDIOLOGY ASSESSMENT & PLAN     1  Chronic diastolic heart failure ([facility] [address] )        2  Sick sinus syndrome ([facility] [address] )        3  Cerebrovascular accident (CVA), unspecified mechanism [facility]        4  Benign essential hypertension        5  Venous stasis dermatitis of both lower extremities          Chronic diastolic heart failure (HCC)  Wt Readings from Last 3 Encounters:   03/29/23 76 2 kg (168 lb)   02/15/23 82 8 kg (182 lb 8 7 oz)   01/10/23 77 6 kg (171 lb)     Ms Ruthanne Hatchet overall doing well from cardiac perspective with improvement in her symptoms of heart failure and of bradycardia  She has lost significant weight since last hospitalization  She has chronic lower extremity edema and venous dermatitis but she is not symptomatic  She is on good medical regimen  She has normal functioning pacemaker device  She is on chronic anticoagulation plus antiplatelet therapy  -- At this time I am advising her to continue current medications  From a cardiac perspective we can discontinue aspirin therapy as she is on Eliquis however we will defer this until her appointment with neurologist in early May   -- Am encouraging her to stay active and continue normal activities  -- She will need close monitoring for symptoms of heart failure including increasing shortness of breath or lower extremity swelling or inability to lie in bed because of shortness of breath  We should continue 2 g sodium restricted diet and her cardiac medications  -- She will be followed in the device clinic as scheduled  Cardiology office visit will be arranged for 6 months time  -- Dietary and medical compliance are reinforced  -- She is advised  to report any concerning symptoms such as chest pain, shortness of breath, decline in exercise tolerance or presyncope/syncope

## 2023-03-29 NOTE — PROGRESS NOTES
[facility]  [id number] [facility], [address]  Phone#  [phone]  Fax#  [phone]  *-*-*-*-*-*-*-*-*-*-*-*-*-*-*-*-*-*-*-*-*-*-*-*-*-*-*-*-*-*-*-*-*-*-*-*-*-*-*-*-*-*-*-*-*-*-*-*-*-*-*-*-*-*  Kathie Brass DATE: 03/29/23 11:18 AM  PATIENT NAME: Leonardo Lemons   1935    [id number]  AGE:87 y o  SEX: female  Cynthia Wynn MD     PRIMARY CARE PHYSICIAN: No primary care provider on file  DIAGNOSES:  1  Primary hypertension  2  Permanent atrial fibrillation, with slow ventricular response, status post [employer] single-chamber pacemaker implantation 1/26/2023  3  Chronic diastolic heart failure  4  Venous dermatitis of lower extremities  5  Asymmetric left ventricular hypertrophy with severe hypertrophy of apical region  6  Mild pulmonary hypertension  7  History of CVA July 2022, recurrent left parietal stroke in January 2023  8  Degenerative joint disease with osteoarthritis  9   History of knee injuries with hemarthrosis, of left knee July 2022  10  Mild tremors dementia  11  Vitamin D deficiency  12  Moderate to marked biatrial enlargement  13  Peripheral venous insufficiency with venous dermatitis of lower extremities  14  Post-stroke epilepsy  15  Mild pulmonary hypertension    ECHOCARDIOGRAM 1/10/2023  LVEF 60% with moderate to severe biatrial enlargement, aortic valve sclerosis, mild mitral regurgitation and mild to moderate tricuspid regurgitation, mild pulmonary hypertension, PASP 44 mmHg on furosemide 40 mg daily    ECHOCARDIOGRAM May 2022:  Marked asymmetric left ventricular hypertrophy involving the apex and surrounding regions, normal left ventricular systolic function, indeterminate diastolic function, moderate to marked biatrial enlargement, aortic valve sclerosis, mild pulmonary hypertension with estimated RVSP/PASP 44 mmHg, no pericardial effusion  CURRENT ECG   No results found for this visit on 03/29/23    Pacemaker check 2/10/2023:  DEVICE INTERROGATED IN THE [place] OFFICE  WOUND CHECK: INCISION CLEAN AND DRY WITH EDGES APPROXIMATED; WOUND CARE AND RESTRICTIONS REVIEWED WITH PATIENT  BATTERY VOLTAGE ADEQUATE (12 YRS)  : 78 9% (>40%~VVIR/60~AVB)  ALL LEAD PARAMETERS WITHIN NORMAL LIMITS  NO SIGNIFICANT HIGH RATE EPISODES  NO PROGRAMMING CHANGES MADE TO DEVICE PARAMETERS  PACEMAKER FUNCTIONING APPROPRIATELY  [facility], [place], operated by [facility]      CARDIOLOGY ASSESSMENT & PLAN     1  Chronic diastolic heart failure ([facility] )        2  Sick sinus syndrome ([facility] )        3  Cerebrovascular accident (CVA), unspecified mechanism ([facility] )        4  Benign essential hypertension        5  Venous stasis dermatitis of both lower extremities          Chronic diastolic heart failure (HCC)  Wt Readings from Last 3 Encounters:   03/29/23 76 2 kg (168 lb)   02/15/23 82 8 kg (182 lb 8 7 oz)   01/10/23 77 6 kg (171 lb)     Ms Norman Grullno overall doing well from cardiac perspective with improvement in her symptoms of heart failure and of bradycardia  She has lost significant weight since last hospitalization  She has chronic lower extremity edema and venous dermatitis but she is not symptomatic  She is on good medical regimen  She has normal functioning pacemaker device  She is on chronic anticoagulation plus antiplatelet therapy  -- At this time I am advising her to continue current medications  From a cardiac perspective we can discontinue aspirin therapy as she is on Eliquis however we will defer this until her appointment with neurologist in early May   -- Am encouraging her to stay active and continue normal activities  -- She will need close monitoring for symptoms of heart failure including increasing shortness of breath or lower extremity swelling or inability to lie in bed because of shortness of breath  We should continue 2 g sodium restricted diet and her cardiac medications  -- She will be followed in the device clinic as scheduled    Cardiology office visit will be arranged for 6 months time  -- Dietary and medical compliance are reinforced  -- She is advised  to report any concerning symptoms such as chest pain, shortness of breath, decline in exercise tolerance or presyncope/syncope  INTERVAL HISTORY & HISTORY OF PRESENT ILLNESS     Martine Gross is here for follow-up regarding her cardiac comorbidities which include: Permanent atrial fibrillation, status post single-chamber pacemaker, asymmetric left ventricle hypertrophy, hypertensive heart disease, chronic diastolic heart failure and other comorbidities  She was last seen by me in September 2022  In January 2023 she was hospitalized with CVA symptoms and was diagnosed with left parietal lobe infarction  During the hospitalization she was noted to have A-fib with slow ventricular response and subsequently underwent pacemaker implantation  She is here for follow-up visit accompanied with her son Mir Jacinto  She is currently a resident of [facility]  She mentions that she has been overall feeling well  She has completed in-home physical therapy at the [facility] and according to her son she was told that her exercise tolerance has improved remarkably  Patient herself denies feeling any chest pain or shortness of breath or palpitations or dizziness  She has had no falls  Her lower extremity edema has not become worse  She is using a walker for assistance  Denies any orthopnea PND or presyncope/syncope events  Functional capacity status: Moderate   (Excellent- >10 METs; Good: (7-10 METs); Moderate (4-7 METs); Poor (<= 4 METs)    Any chronic stressors: None   (feeling of poor health, financial problems, and social isolation etc)  Tobacco or alcohol dependence: Does not smoke and she does not drink      Current cardiac medications: Aspirin 81 mg daily apixaban 5 mg twice daily atorvastatin 10 mg daily furosemide 40 mg daily potassium chloride 20 mg daily  Last blood work from 2/16/2023 significant for sodium 140 potassium 3 5 chloride 110 bicarb 23 BUN 18 creatinine 0 75 normal LFTs, BNP was 577 platelet count was 95 TSH in January was 1 937 recent hemoglobin A1c was 5 4    REVIEW OF SYSTEMS   Positive for: As noted above in HPI  Negative for: All remaining as reviewed below and in HPI  SYSTEM SYMPTOMS REVIEWED:  General--weight change, fever, night sweats  Respiratory--cough, wheezing, shortness of breath, sputum production  Cardiovascular--chest pain, syncope, dyspnea on exertion, edema, decline in exercise tolerance, claudication   Gastrointestinal--persistent vomiting, diarrhea, abdominal distention, blood in stool   Muscular or skeletal--joint pain or swelling   Neurologic--headaches, syncope, abnormal movement  Hematologic--history of easy bruising and bleeding   Endocrine--thyroid enlargement, heat or cold intolerance, polyuria   Psychiatric--anxiety, depression     *-*-*-*-*-*-*-*-*-*-*-*-*-*-*-*-*-*-*-*-*-*-*-*-*-*-*-*-*-*-*-*-*-*-*-*-*-*-*-*-*-*-*-*-*-*-*-*-*-*-*-*-*-*-  VITAL SIGNS     CURRENT VITAL SIGNS:   Vitals:    03/29/23 1028   BP: 124/70   BP Location: Right arm   Patient Position: Sitting   Cuff Size: Adult   Pulse: 64   Weight: 76 2 kg (168 lb)       BMI: Body mass index is 29 76 kg/m²      WEIGHTS:   Wt Readings from Last 25 Encounters:   03/29/23 76 2 kg (168 lb)   02/15/23 82 8 kg (182 lb 8 7 oz)   01/10/23 77 6 kg (171 lb)   01/09/23 77 8 kg (171 lb 8 3 oz)   11/14/22 77 8 kg (171 lb 8 3 oz)   09/29/22 77 7 kg (171 lb 3 2 oz)   06/22/22 76 7 kg (169 lb 3 2 oz)   05/25/22 76 2 kg (168 lb)   05/23/22 76 4 kg (168 lb 6 4 oz)   05/12/22 76 5 kg (168 lb 10 4 oz)   11/11/21 72 2 kg (159 lb 3 2 oz)   10/04/21 72 7 kg (160 lb 4 oz)   08/26/21 71 8 kg (158 lb 6 4 oz)   04/14/21 69 9 kg (154 lb)   02/24/21 73 4 kg (161 lb 14 4 oz)   04/01/20 73 8 kg (162 lb 9 6 oz) *-*-*-*-*-*-*-*-*-*-*-*-*-*-*-*-*-*-*-*-*-*-*-*-*-*-*-*-*-*-*-*-*-*-*-*-*-*-*-*-*-*-*-*-*-*-*-*-*-*-*-*-*-*-  PHYSICAL EXAM     General Appearance:    Alert, cooperative, no distress, appears stated age   Head, Eyes, ENT:    No obvious abnormality, moist mucous mebranes  Neck:   Supple, no carotid bruit or JVD   Back:     Symmetric, no curvature  Lungs:     Respirations unlabored  Clear to auscultation bilaterally,    Chest wall:    No tenderness or deformity   Heart:    Regular rate and rhythm, S1 and S2 normal, no murmur, rub  or gallop  Abdomen:     Soft, non-tender,    Extremities:   Extremities warm, no cyanosis, 1+ bilateral lower extremity edema   Skin:   Mild to moderate venostatic changes in lower extremities  Normal skin color, texture, and turgor   No rashes or lesions     *-*-*-*-*-*-*-*-*-*-*-*-*-*-*-*-*-*-*-*-*-*-*-*-*-*-*-*-*-*-*-*-*-*-*-*-*-*-*-*-*-*-*-*-*-*-*-*-*-*-*-*-*-*-  CURRENT MEDICATIONS LIST     Current Outpatient Medications:   •  acetaminophen (TYLENOL) 500 mg tablet, Take 500 mg by mouth every 6 (six) hours as needed for mild pain, Disp: , Rfl:   •  apixaban (ELIQUIS) 5 mg, Take 5 mg by mouth 2 (two) times a day, Disp: , Rfl:   •  Ascorbic Acid (VITAMIN C) 500 MG CAPS, Take 500 mg by mouth daily, Disp: , Rfl:   •  aspirin 81 mg chewable tablet, Chew 1 tablet (81 mg total) daily Do not start before February 18, 2023 , Disp: 30 tablet, Rfl: 0  •  atorvastatin (LIPITOR) 10 mg tablet, Take 10 mg by mouth daily, Disp: , Rfl:   •  bisacodyl (DULCOLAX) 5 mg EC tablet, Take 5 mg by mouth daily as needed for constipation, Disp: , Rfl:   •  calcium carbonate (TUMS) 500 mg chewable tablet, Chew 1 tablet daily, Disp: , Rfl:   •  Cholecalciferol (Vitamin D3) 1 25 MG (11875 UT) CAPS, 2,000U daily, Disp: , Rfl:   •  Cranberry 450 MG CAPS, Take 1 capsule by mouth in the morning, Disp: , Rfl:   •  docusate sodium (COLACE) 100 mg capsule, Take 100 mg by mouth 2 (two) times a day, Disp: , Rfl:   • furosemide (LASIX) 40 mg tablet, Take 1 tablet by mouth daily, Disp: , Rfl:   •  loperamide (IMODIUM) 2 mg capsule, , Disp: , Rfl:   •  polyethylene glycol (MIRALAX) 17 g packet, Take 17 g by mouth daily, Disp: , Rfl:   •  potassium chloride (K-DUR,KLOR-CON) 20 mEq tablet, Take 20 mEq by mouth daily, Disp: , Rfl:   •  QUEtiapine (SEROquel) 25 mg tablet, Take 0 5 tablets (12 5 mg total) by mouth daily at bedtime Hold if sedated, Disp: 30 tablet, Rfl: 0  •  thiamine (VITAMIN B1) 100 mg tablet, , Disp: , Rfl:   •  Valerian 500 MG CAPS, Take by mouth in the morning, Disp: , Rfl:   •  zonisamide (ZONEGRAN) 100 mg capsule, Take 1 capsule (100 mg total) by mouth daily Do not start before November 19, 2022   (Patient taking differently: Take 100 mg by mouth every 12 (twelve) hours), Disp: , Rfl: 0       ALLERGIES     Allergies   Allergen Reactions   • Morphine        *-*-*-*-*-*-*-*-*-*-*-*-*-*-*-*-*-*-*-*-*-*-*-*-*-*-*-*-*-*-*-*-*-*-*-*-*-*-*-*-*-*-*-*-*-*-*-*-*-*-*-*-*-*-  LABORATORY DATA   No results found for: NA  Potassium   Date Value Ref Range Status   02/16/2023 3 5 3 5 - 5 3 mmol/L Final   02/15/2023 3 6 3 5 - 5 3 mmol/L Final   01/27/2023 4 9 3 5 - 5 3 mmol/L Final     Chloride   Date Value Ref Range Status   02/16/2023 110 (H) 96 - 108 mmol/L Final   02/15/2023 112 (H) 96 - 108 mmol/L Final   01/27/2023 110 (H) 96 - 108 mmol/L Final     CO2   Date Value Ref Range Status   02/16/2023 23 21 - 32 mmol/L Final   02/15/2023 20 (L) 21 - 32 mmol/L Final   01/27/2023 19 (L) 21 - 32 mmol/L Final     BUN   Date Value Ref Range Status   02/16/2023 18 5 - 25 mg/dL Final   02/15/2023 23 5 - 25 mg/dL Final   01/27/2023 27 (H) 5 - 25 mg/dL Final     Creatinine   Date Value Ref Range Status   02/16/2023 0 75 0 60 - 1 30 mg/dL Final     Comment:     Standardized to IDMS reference method   02/15/2023 0 76 0 60 - 1 30 mg/dL Final     Comment:     Standardized to IDMS reference method   01/27/2023 0 92 0 60 - 1 30 mg/dL Final Comment:     Standardized to IDMS reference method     eGFR   Date Value Ref Range Status   02/16/2023 72 ml/min/1 73sq m Final   02/15/2023 71 ml/min/1 73sq m Final   01/27/2023 56 ml/min/1 73sq m Final     Calcium   Date Value Ref Range Status   02/16/2023 9 3 8 4 - 10 2 mg/dL Final   02/15/2023 9 2 8 4 - 10 2 mg/dL Final   01/27/2023 9 8 8 3 - 10 1 mg/dL Final     AST   Date Value Ref Range Status   01/22/2023 18 13 - 39 U/L Final     Comment:     Specimen collection should occur prior to Sulfasalazine administration due to the potential for falsely depressed results  01/21/2023 21 13 - 39 U/L Final     Comment:     Specimen collection should occur prior to Sulfasalazine administration due to the potential for falsely depressed results  01/10/2023 22 5 - 45 U/L Final     Comment:     Specimen collection should occur prior to Sulfasalazine administration due to the potential for falsely depressed results  ALT   Date Value Ref Range Status   01/22/2023 9 7 - 52 U/L Final     Comment:     Specimen collection should occur prior to Sulfasalazine administration due to the potential for falsely depressed results  01/21/2023 10 7 - 52 U/L Final     Comment:     Specimen collection should occur prior to Sulfasalazine administration due to the potential for falsely depressed results  01/10/2023 17 12 - 78 U/L Final     Comment:     Specimen collection should occur prior to Sulfasalazine administration due to the potential for falsely depressed results  Alkaline Phosphatase   Date Value Ref Range Status   01/22/2023 60 34 - 104 U/L Final   01/21/2023 83 34 - 104 U/L Final   01/10/2023 79 46 - 116 U/L Final     Magnesium   Date Value Ref Range Status   01/25/2023 2 4 1 6 - 2 6 mg/dL Final     Comment:     Slightly Hemolyzed;  Results May be Affected     WBC   Date Value Ref Range Status   02/16/2023 4 74 4 31 - 10 16 Thousand/uL Final   02/15/2023 4 42 4 31 - 10 16 Thousand/uL Final   01/27/2023 6 02 4 31 - 10 16 Thousand/uL Final     Hemoglobin   Date Value Ref Range Status   02/16/2023 14 1 11 5 - 15 4 g/dL Final   02/15/2023 13 8 11 5 - 15 4 g/dL Final   01/27/2023 15 5 (H) 11 5 - 15 4 g/dL Final     Platelets   Date Value Ref Range Status   02/16/2023 95 (L) 149 - 390 Thousands/uL Final   02/15/2023 100 (L) 149 - 390 Thousands/uL Final   01/27/2023 147 (L) 149 - 390 Thousands/uL Final     PTT   Date Value Ref Range Status   02/15/2023 29 23 - 37 seconds Final     Comment:     Therapeutic Heparin Range =  60-90 seconds   01/21/2023 30 23 - 37 seconds Final     Comment:     Therapeutic Heparin Range =  60-90 seconds     INR   Date Value Ref Range Status   02/15/2023 1 33 (H) 0 84 - 1 19 Final   01/21/2023 1 29 (H) 0 84 - 1 19 Final     No results found for: CKMB, DIGOXIN  No results found for: TSH  No results found for: CHOL   Hemoglobin A1C   Date Value Ref Range Status   02/16/2023 5 4 Normal 3 8-5 6%; PreDiabetic 5 7-6 4%; Diabetic >=6 5%; Glycemic control for adults with diabetes <7 0% % Final   07/16/2022 5 2 4 0 - 5 6 % Final     Comment:     The use of HbA1c to monitor glycemic status is based on normal hemoglobin and HbA composition  This test should not be used in patients with abnormal hemoglobin that affects the half life of the red blood cell or the in vivo glycation rates  Blood Culture   Date Value Ref Range Status   01/21/2023 No Growth After 5 Days  Final   01/21/2023 No Growth After 5 Days  Final   05/10/2022 No Growth After 5 Days  Final   05/10/2022 No Growth After 5 Days  Final     Urine Culture   Date Value Ref Range Status   01/11/2023 >100,000 cfu/ml Escherichia coli ESBL (A)  Final     Comment:     An Extended-Spectrum Beta-Lactamase is being produced by this organism (requires contact precautions)  Cephalosporins are NOT effective for treating these organisms  For SEVERE infections (i e  bacteremia, septic shock, etc ), Carbapenems are the drug of choice    For MILD infections (i e  isolated urinary or biliary infection), high-dose Zosyn may be used  This organism has been edited  The previous result was Gram Negative Brian Enteric Like on 1/12/2023 at 1058 EST    01/11/2023 10,000-19,000 cfu/ml  Final     Comment:     Mixed Contaminants X2     MRSA Culture Only   Date Value Ref Range Status   02/16/2023 (A)  Final    Methicillin Resistant Staphylococcus aureus isolated   01/26/2023 (A)  Final    Methicillin Resistant Staphylococcus aureus isolated   01/22/2023   Final    No Methicillin Resistant Staphlyococcus aureus (MRSA) isolated   01/10/2023   Final    No Methicillin Resistant Staphlyococcus aureus (MRSA) isolated     Legionella Urinary Antigen   Date Value Ref Range Status   05/10/2022 Negative Negative Final       *-*-*-*-*-*-*-*-*-*-*-*-*-*-*-*-*-*-*-*-*-*-*-*-*-*-*-*-*-*-*-*-*-*-*-*-*-*-*-*-*-*-*-*-*-*-*-*-*-*-*-*-*-*-  PREVIOUS CARDIOLOGY & RADIOLOGY TEST RESULTS   I have personally reviewed pertinent results of cardiovascular tests noted below  No results found for this or any previous visit  No results found for this or any previous visit  No results found for this or any previous visit  No results found for this or any previous visit  CT head wo contrast  Narrative: CT BRAIN - WITHOUT CONTRAST    INDICATION:   Stroke, follow up  Right hemispheric stroke, M2 occlusion seen on CT imaging, unable to get MRI at this time  COMPARISON:  CT stroke alert brain and CTA stroke alert head neck February 15, 2023  TECHNIQUE:  CT examination of the brain was performed  In addition to axial images, sagittal and coronal 2D reformatted images were created and submitted for interpretation  Radiation dose length product (DLP) for this visit:  882 mGy-cm     This examination, like all CT scans performed in the [facility], was performed utilizing techniques to minimize radiation dose exposure, including the use of iterative   reconstruction and automated exposure control  IMAGE QUALITY:  Diagnostic  FINDINGS:    PARENCHYMA:     No CT signs of evolving acute infarct particularly in the right MCA territory  Decreased attenuation is noted in periventricular and subcortical white matter demonstrating an appearance that is statistically most likely to represent moderate microangiopathic change  Unchanged chronic infarct in left parietotemporal lobe  Unchanged 1 1 cm dural calcification in anterior foramen magnum (2:5), likely calcified meningioma  No mass effect or midline shift  No acute parenchymal hemorrhage  Arterial calcifications of carotid siphons and left MCA M1 segment  Previously noted   dense vessel sign in right MCA M2 branch is not as prominent  VENTRICLES AND EXTRA-AXIAL SPACES:  Normal for the patient's age  VISUALIZED ORBITS: Normal visualized orbits  PARANASAL SINUSES: Small frothy secretions in right posterior ethmoid and bilateral sphenoid sinuses (left worse than right)  Cayla Layer AND EXTRACRANIAL SOFT TISSUES:  Normal   Impression: No CT signs of evolving acute infarct particularly in the right MCA territory  No acute intracranial hemorrhage  Unchanged chronic infarct in left parietotemporal lobe  Unchanged 1 1 cm dural calcification in anterior foramen magnum, likely calcified meningioma      Workstation performed: [id number]        *-*-*-*-*-*-*-*-*-*-*-*-*-*-*-*-*-*-*-*-*-*-*-*-*-*-*-*-*-*-*-*-*-*-*-*-*-*-*-*-*-*-*-*-*-*-*-*-*-*-*-*-*-*-  SIGNATURES:   @QJH@   Reece Cortez MD; [facility]    *-*-*-*-*-*-*-*-*-*-*-*-*-*-*-*-*-*-*-*-*-*-*-*-*-*-*-*-*-*-*-*-*-*-*-*-*-*-*-*-*-*-*-*-*-*-*-*-*-*-*-*-*-*-  PAST MEDICAL HISTORY:  Past Medical History:   Diagnosis Date   • A-fib ([address] )    • CAD (coronary artery disease)    • CKD (chronic kidney disease) stage 2, GFR 60-89 ml/min    • Hypertension    • Memory loss    • Urinary tract infection     PAST SURGICAL HISTORY:  Past Surgical History:   Procedure Laterality Date • CARDIAC ELECTROPHYSIOLOGY PROCEDURE N/A 1/26/2023    Procedure: Cardiac pacer implant;  Surgeon: Isaak Ponce DO;  Location: [facility];   Service: Cardiology   • CHOLECYSTECTOMY     • REPLACEMENT TOTAL KNEE Left 2008         FAMILY HISTORY:  Family History   Problem Relation Age of Onset   • Lung cancer Father    • Leukemia Brother    • Breast cancer Daughter     SOCIAL HISTORY:  Social History     Tobacco Use   Smoking Status Never   Smokeless Tobacco Never      Social History     Substance and Sexual Activity   Alcohol Use Never     Social History     Substance and Sexual Activity   Drug Use Never    [unfilled]     *-*-*-*-*-*-*-*-*-*-*-*-*-*-*-*-*-*-*-*-*-*-*-*-*-*-*-*-*-*-*-*-*-*-*-*-*-*-*-*-*-*-*-*-*-*-*-*-*-*-*-*-*-*  ALLERGIES:  Allergies   Allergen Reactions   • Morphine     CURRENT SCHEDULED MEDICATIONS:    Current Outpatient Medications:   •  acetaminophen (TYLENOL) 500 mg tablet, Take 500 mg by mouth every 6 (six) hours as needed for mild pain, Disp: , Rfl:   •  apixaban (ELIQUIS) 5 mg, Take 5 mg by mouth 2 (two) times a day, Disp: , Rfl:   •  Ascorbic Acid (VITAMIN C) 500 MG CAPS, Take 500 mg by mouth daily, Disp: , Rfl:   •  aspirin 81 mg chewable tablet, Chew 1 tablet (81 mg total) daily Do not start before February 18, 2023 , Disp: 30 tablet, Rfl: 0  •  atorvastatin (LIPITOR) 10 mg tablet, Take 10 mg by mouth daily, Disp: , Rfl:   •  bisacodyl (DULCOLAX) 5 mg EC tablet, Take 5 mg by mouth daily as needed for constipation, Disp: , Rfl:   •  calcium carbonate (TUMS) 500 mg chewable tablet, Chew 1 tablet daily, Disp: , Rfl:   •  Cholecalciferol (Vitamin D3) 1 25 MG (51567 UT) CAPS, 2,000U daily, Disp: , Rfl:   •  Cranberry 450 MG CAPS, Take 1 capsule by mouth in the morning, Disp: , Rfl:   •  docusate sodium (COLACE) 100 mg capsule, Take 100 mg by mouth 2 (two) times a day, Disp: , Rfl:   •  furosemide (LASIX) 40 mg tablet, Take 1 tablet by mouth daily, Disp: , Rfl:   •  loperamide (IMODIUM) 2 mg capsule, , Disp: , Rfl:   •  polyethylene glycol (MIRALAX) 17 g packet, Take 17 g by mouth daily, Disp: , Rfl:   •  potassium chloride (K-DUR,KLOR-CON) 20 mEq tablet, Take 20 mEq by mouth daily, Disp: , Rfl:   •  QUEtiapine (SEROquel) 25 mg tablet, Take 0 5 tablets (12 5 mg total) by mouth daily at bedtime Hold if sedated, Disp: 30 tablet, Rfl: 0  •  thiamine (VITAMIN B1) 100 mg tablet, , Disp: , Rfl:   •  Valerian 500 MG CAPS, Take by mouth in the morning, Disp: , Rfl:   •  zonisamide (ZONEGRAN) 100 mg capsule, Take 1 capsule (100 mg total) by mouth daily Do not start before November 19, 2022   (Patient taking differently: Take 100 mg by mouth every 12 (twelve) hours), Disp: , Rfl: 0     *-*-*-*-*-*-*-*-*-*-*-*-*-*-*-*-*-*-*-*-*-*-*-*-*-*-*-*-*-*-*-*-*-*-*-*-*-*-*-*-*-*-*-*-*-*-*-*-*-*-*-*-*-*

## 2023-03-29 NOTE — ASSESSMENT & PLAN NOTE
Wt Readings from Last 3 Encounters:   03/29/23 76 2 kg (168 lb)   02/15/23 82 8 kg (182 lb 8 7 oz)   01/10/23 77 6 kg (171 lb)     Ms Norman Grullon overall doing well from cardiac perspective with improvement in her symptoms of heart failure and of bradycardia  She has lost significant weight since last hospitalization  She has chronic lower extremity edema and venous dermatitis but she is not symptomatic  She is on good medical regimen  She has normal functioning pacemaker device  She is on chronic anticoagulation plus antiplatelet therapy  -- At this time I am advising her to continue current medications  From a cardiac perspective we can discontinue aspirin therapy as she is on Eliquis however we will defer this until her appointment with neurologist in early May   -- Am encouraging her to stay active and continue normal activities  -- She will need close monitoring for symptoms of heart failure including increasing shortness of breath or lower extremity swelling or inability to lie in bed because of shortness of breath  We should continue 2 g sodium restricted diet and her cardiac medications  -- She will be followed in the device clinic as scheduled  Cardiology office visit will be arranged for 6 months time  -- Dietary and medical compliance are reinforced  -- She is advised  to report any concerning symptoms such as chest pain, shortness of breath, decline in exercise tolerance or presyncope/syncope

## 2023-03-29 NOTE — LETTER
March 29, 2023     Kelsey Jackson  [address] [facility]  [place] [address] [address]    Patient: Sylwia Jimenez   YOB: 1935   Date of Visit: 3/29/2023       Dear Dr Maris Jackson: Thank you for referring Greyson Jiménez to me for evaluation  Below are my notes for this consultation  If you have questions, please do not hesitate to call me  I look forward to following your patient along with you  Sincerely,        Reece Cortez MD        CC: No Recipients  Reece Cortez MD  3/29/2023 11:18 AM  [facility]   [facility]  [address] [place] [address] [address] [address]  Phone#  [phone]  Fax#  [phone]  *-*-*-*-*-*-*-*-*-*-*-*-*-*-*-*-*-*-*-*-*-*-*-*-*-*-*-*-*-*-*-*-*-*-*-*-*-*-*-*-*-*-*-*-*-*-*-*-*-*-*-*-*-*  Glenn Fields DATE: 03/29/23 11:18 AM  PATIENT NAME: Sylwia Jimenez   1935    [id number]  AGE:87 y o  SEX: female  Cynthia Wynn MD     PRIMARY CARE PHYSICIAN: No primary care provider on file  DIAGNOSES:  1  Primary hypertension  2  Permanent atrial fibrillation, with slow ventricular response, status post Medtronic single-chamber pacemaker implantation 1/26/2023  3  Chronic diastolic heart failure  4  Venous dermatitis of lower extremities  5  Asymmetric left ventricular hypertrophy with severe hypertrophy of apical region  6  Mild pulmonary hypertension  7  History of CVA July 2022, recurrent left parietal stroke in January 2023  8  Degenerative joint disease with osteoarthritis  9   History of knee injuries with hemarthrosis, of left knee July 2022  10  Mild tremors dementia  11  Vitamin D deficiency  12  Moderate to marked biatrial enlargement  13  Peripheral venous insufficiency with venous dermatitis of lower extremities  14  Post-stroke epilepsy  15    Mild pulmonary hypertension    ECHOCARDIOGRAM 1/10/2023  LVEF 60% with moderate to severe biatrial enlargement, aortic valve sclerosis, mild mitral regurgitation and mild to moderate tricuspid regurgitation, mild pulmonary hypertension, PASP 44 mmHg on furosemide 40 mg daily    ECHOCARDIOGRAM May 2022:  Marked asymmetric left ventricular hypertrophy involving the apex and surrounding regions, normal left ventricular systolic function, indeterminate diastolic function, moderate to marked biatrial enlargement, aortic valve sclerosis, mild pulmonary hypertension with estimated RVSP/PASP 44 mmHg, no pericardial effusion  CURRENT ECG   No results found for this visit on 03/29/23  Pacemaker check 2/10/2023:  DEVICE INTERROGATED IN THE [place] OFFICE  WOUND CHECK: INCISION CLEAN AND DRY WITH EDGES APPROXIMATED; WOUND CARE AND RESTRICTIONS REVIEWED WITH PATIENT  BATTERY VOLTAGE ADEQUATE (12 YRS)  : 78 9% (>40%~VVIR/60~AVB)  ALL LEAD PARAMETERS WITHIN NORMAL LIMITS  NO SIGNIFICANT HIGH RATE EPISODES  NO PROGRAMMING CHANGES MADE TO DEVICE PARAMETERS  PACEMAKER FUNCTIONING APPROPRIATELY  [facility]      CARDIOLOGY ASSESSMENT & PLAN     1  Chronic diastolic heart failure ([facility] [address] )        2  Sick sinus syndrome ([facility] [address] )        3  Cerebrovascular accident (CVA), unspecified mechanism ([facility] [address] )        4  Benign essential hypertension        5  Venous stasis dermatitis of both lower extremities          Chronic diastolic heart failure (HCC)  Wt Readings from Last 3 Encounters:   03/29/23 76 2 kg (168 lb)   02/15/23 82 8 kg (182 lb 8 7 oz)   01/10/23 77 6 kg (171 lb)     Ms Ирина Carrero overall doing well from cardiac perspective with improvement in her symptoms of heart failure and of bradycardia  She has lost significant weight since last hospitalization  She has chronic lower extremity edema and venous dermatitis but she is not symptomatic  She is on good medical regimen  She has normal functioning pacemaker device  She is on chronic anticoagulation plus antiplatelet therapy  -- At this time I am advising her to continue current medications    From a cardiac perspective we can discontinue aspirin therapy as she is on Eliquis however we will defer this until her appointment with neurologist in early May   -- Am encouraging her to stay active and continue normal activities  -- She will need close monitoring for symptoms of heart failure including increasing shortness of breath or lower extremity swelling or inability to lie in bed because of shortness of breath  We should continue 2 g sodium restricted diet and her cardiac medications  -- She will be followed in the device clinic as scheduled  Cardiology office visit will be arranged for 6 months time  -- Dietary and medical compliance are reinforced  -- She is advised  to report any concerning symptoms such as chest pain, shortness of breath, decline in exercise tolerance or presyncope/syncope  INTERVAL HISTORY & HISTORY OF PRESENT ILLNESS     Mariia Clarke is here for follow-up regarding her cardiac comorbidities which include: Permanent atrial fibrillation, status post single-chamber pacemaker, asymmetric left ventricle hypertrophy, hypertensive heart disease, chronic diastolic heart failure and other comorbidities  She was last seen by me in September 2022  In January 2023 she was hospitalized with CVA symptoms and was diagnosed with left parietal lobe infarction  During the hospitalization she was noted to have A-fib with slow ventricular response and subsequently underwent pacemaker implantation  She is here for follow-up visit accompanied with her son Paula Butler  She is currently a resident of [facility]  She mentions that she has been overall feeling well  She has completed in-home physical therapy at the [facility] and according to her son she was told that her exercise tolerance has improved remarkably  Patient herself denies feeling any chest pain or shortness of breath or palpitations or dizziness  She has had no falls  Her lower extremity edema has not become worse    She is using a walker for assistance  Denies any orthopnea PND or presyncope/syncope events  Functional capacity status: Moderate   (Excellent- >10 METs; Good: (7-10 METs); Moderate (4-7 METs); Poor (<= 4 METs)    Any chronic stressors: None   (feeling of poor health, financial problems, and social isolation etc)  Tobacco or alcohol dependence: Does not smoke and she does not drink  Current cardiac medications: Aspirin 81 mg daily apixaban 5 mg twice daily atorvastatin 10 mg daily furosemide 40 mg daily potassium chloride 20 mg daily  Last blood work from 2/16/2023 significant for sodium 140 potassium 3 5 chloride 110 bicarb 23 BUN 18 creatinine 0 75 normal LFTs, BNP was 577 platelet count was 95 TSH in January was 1 937 recent hemoglobin A1c was 5 4    REVIEW OF SYSTEMS   Positive for: As noted above in HPI  Negative for: All remaining as reviewed below and in HPI  SYSTEM SYMPTOMS REVIEWED:  General--weight change, fever, night sweats  Respiratory--cough, wheezing, shortness of breath, sputum production  Cardiovascular--chest pain, syncope, dyspnea on exertion, edema, decline in exercise tolerance, claudication   Gastrointestinal--persistent vomiting, diarrhea, abdominal distention, blood in stool   Muscular or skeletal--joint pain or swelling   Neurologic--headaches, syncope, abnormal movement  Hematologic--history of easy bruising and bleeding   Endocrine--thyroid enlargement, heat or cold intolerance, polyuria   Psychiatric--anxiety, depression     *-*-*-*-*-*-*-*-*-*-*-*-*-*-*-*-*-*-*-*-*-*-*-*-*-*-*-*-*-*-*-*-*-*-*-*-*-*-*-*-*-*-*-*-*-*-*-*-*-*-*-*-*-*-  VITAL SIGNS     CURRENT VITAL SIGNS:   Vitals:    03/29/23 1028   BP: 124/70   BP Location: Right arm   Patient Position: Sitting   Cuff Size: Adult   Pulse: 64   Weight: 76 2 kg (168 lb)       BMI: Body mass index is 29 76 kg/m²      WEIGHTS:   Wt Readings from Last 25 Encounters:   03/29/23 76 2 kg (168 lb)   02/15/23 82 8 kg (182 lb 8 7 oz)   01/10/23 77 6 kg (171 lb) 01/09/23 77 8 kg (171 lb 8 3 oz)   11/14/22 77 8 kg (171 lb 8 3 oz)   09/29/22 77 7 kg (171 lb 3 2 oz)   06/22/22 76 7 kg (169 lb 3 2 oz)   05/25/22 76 2 kg (168 lb)   05/23/22 76 4 kg (168 lb 6 4 oz)   05/12/22 76 5 kg (168 lb 10 4 oz)   11/11/21 72 2 kg (159 lb 3 2 oz)   10/04/21 72 7 kg (160 lb 4 oz)   08/26/21 71 8 kg (158 lb 6 4 oz)   04/14/21 69 9 kg (154 lb)   02/24/21 73 4 kg (161 lb 14 4 oz)   04/01/20 73 8 kg (162 lb 9 6 oz)        *-*-*-*-*-*-*-*-*-*-*-*-*-*-*-*-*-*-*-*-*-*-*-*-*-*-*-*-*-*-*-*-*-*-*-*-*-*-*-*-*-*-*-*-*-*-*-*-*-*-*-*-*-*-  PHYSICAL EXAM     General Appearance:    Alert, cooperative, no distress, appears stated age   Head, Eyes, ENT:    No obvious abnormality, moist mucous mebranes  Neck:   Supple, no carotid bruit or JVD   Back:     Symmetric, no curvature  Lungs:     Respirations unlabored  Clear to auscultation bilaterally,    Chest wall:    No tenderness or deformity   Heart:    Regular rate and rhythm, S1 and S2 normal, no murmur, rub  or gallop  Abdomen:     Soft, non-tender,    Extremities:   Extremities warm, no cyanosis, 1+ bilateral lower extremity edema   Skin:   Mild to moderate venostatic changes in lower extremities  Normal skin color, texture, and turgor   No rashes or lesions     *-*-*-*-*-*-*-*-*-*-*-*-*-*-*-*-*-*-*-*-*-*-*-*-*-*-*-*-*-*-*-*-*-*-*-*-*-*-*-*-*-*-*-*-*-*-*-*-*-*-*-*-*-*-  CURRENT MEDICATIONS LIST     Current Outpatient Medications:   •  acetaminophen (TYLENOL) 500 mg tablet, Take 500 mg by mouth every 6 (six) hours as needed for mild pain, Disp: , Rfl:   •  apixaban (ELIQUIS) 5 mg, Take 5 mg by mouth 2 (two) times a day, Disp: , Rfl:   •  Ascorbic Acid (VITAMIN C) 500 MG CAPS, Take 500 mg by mouth daily, Disp: , Rfl:   •  aspirin 81 mg chewable tablet, Chew 1 tablet (81 mg total) daily Do not start before February 18, 2023 , Disp: 30 tablet, Rfl: 0  •  atorvastatin (LIPITOR) 10 mg tablet, Take 10 mg by mouth daily, Disp: , Rfl:   •  bisacodyl (DULCOLAX) 5 mg EC tablet, Take 5 mg by mouth daily as needed for constipation, Disp: , Rfl:   •  calcium carbonate (TUMS) 500 mg chewable tablet, Chew 1 tablet daily, Disp: , Rfl:   •  Cholecalciferol (Vitamin D3) 1 25 MG (21452 UT) CAPS, 2,000U daily, Disp: , Rfl:   •  Cranberry 450 MG CAPS, Take 1 capsule by mouth in the morning, Disp: , Rfl:   •  docusate sodium (COLACE) 100 mg capsule, Take 100 mg by mouth 2 (two) times a day, Disp: , Rfl:   •  furosemide (LASIX) 40 mg tablet, Take 1 tablet by mouth daily, Disp: , Rfl:   •  loperamide (IMODIUM) 2 mg capsule, , Disp: , Rfl:   •  polyethylene glycol (MIRALAX) 17 g packet, Take 17 g by mouth daily, Disp: , Rfl:   •  potassium chloride (K-DUR,KLOR-CON) 20 mEq tablet, Take 20 mEq by mouth daily, Disp: , Rfl:   •  QUEtiapine (SEROquel) 25 mg tablet, Take 0 5 tablets (12 5 mg total) by mouth daily at bedtime Hold if sedated, Disp: 30 tablet, Rfl: 0  •  thiamine (VITAMIN B1) 100 mg tablet, , Disp: , Rfl:   •  Valerian 500 MG CAPS, Take by mouth in the morning, Disp: , Rfl:   •  zonisamide (ZONEGRAN) 100 mg capsule, Take 1 capsule (100 mg total) by mouth daily Do not start before November 19, 2022   (Patient taking differently: Take 100 mg by mouth every 12 (twelve) hours), Disp: , Rfl: 0       ALLERGIES     Allergies   Allergen Reactions   • Morphine        *-*-*-*-*-*-*-*-*-*-*-*-*-*-*-*-*-*-*-*-*-*-*-*-*-*-*-*-*-*-*-*-*-*-*-*-*-*-*-*-*-*-*-*-*-*-*-*-*-*-*-*-*-*-  LABORATORY DATA   No results found for: NA  Potassium   Date Value Ref Range Status   02/16/2023 3 5 3 5 - 5 3 mmol/L Final   02/15/2023 3 6 3 5 - 5 3 mmol/L Final   01/27/2023 4 9 3 5 - 5 3 mmol/L Final     Chloride   Date Value Ref Range Status   02/16/2023 110 (H) 96 - 108 mmol/L Final   02/15/2023 112 (H) 96 - 108 mmol/L Final   01/27/2023 110 (H) 96 - 108 mmol/L Final     CO2   Date Value Ref Range Status   02/16/2023 23 21 - 32 mmol/L Final   02/15/2023 20 (L) 21 - 32 mmol/L Final   01/27/2023 19 (L) 21 - 32 mmol/L Final     BUN   Date Value Ref Range Status   02/16/2023 18 5 - 25 mg/dL Final   02/15/2023 23 5 - 25 mg/dL Final   01/27/2023 27 (H) 5 - 25 mg/dL Final     Creatinine   Date Value Ref Range Status   02/16/2023 0 75 0 60 - 1 30 mg/dL Final     Comment:     Standardized to IDMS reference method   02/15/2023 0 76 0 60 - 1 30 mg/dL Final     Comment:     Standardized to IDMS reference method   01/27/2023 0 92 0 60 - 1 30 mg/dL Final     Comment:     Standardized to IDMS reference method     eGFR   Date Value Ref Range Status   02/16/2023 72 ml/min/1 73sq m Final   02/15/2023 71 ml/min/1 73sq m Final   01/27/2023 56 ml/min/1 73sq m Final     Calcium   Date Value Ref Range Status   02/16/2023 9 3 8 4 - 10 2 mg/dL Final   02/15/2023 9 2 8 4 - 10 2 mg/dL Final   01/27/2023 9 8 8 3 - 10 1 mg/dL Final     AST   Date Value Ref Range Status   01/22/2023 18 13 - 39 U/L Final     Comment:     Specimen collection should occur prior to Sulfasalazine administration due to the potential for falsely depressed results  01/21/2023 21 13 - 39 U/L Final     Comment:     Specimen collection should occur prior to Sulfasalazine administration due to the potential for falsely depressed results  01/10/2023 22 5 - 45 U/L Final     Comment:     Specimen collection should occur prior to Sulfasalazine administration due to the potential for falsely depressed results  ALT   Date Value Ref Range Status   01/22/2023 9 7 - 52 U/L Final     Comment:     Specimen collection should occur prior to Sulfasalazine administration due to the potential for falsely depressed results  01/21/2023 10 7 - 52 U/L Final     Comment:     Specimen collection should occur prior to Sulfasalazine administration due to the potential for falsely depressed results  01/10/2023 17 12 - 78 U/L Final     Comment:     Specimen collection should occur prior to Sulfasalazine administration due to the potential for falsely depressed results  Alkaline Phosphatase   Date Value Ref Range Status   01/22/2023 60 34 - 104 U/L Final   01/21/2023 83 34 - 104 U/L Final   01/10/2023 79 46 - 116 U/L Final     Magnesium   Date Value Ref Range Status   01/25/2023 2 4 1 6 - 2 6 mg/dL Final     Comment:     Slightly Hemolyzed; Results May be Affected     WBC   Date Value Ref Range Status   02/16/2023 4 74 4 31 - 10 16 Thousand/uL Final   02/15/2023 4 42 4 31 - 10 16 Thousand/uL Final   01/27/2023 6 02 4 31 - 10 16 Thousand/uL Final     Hemoglobin   Date Value Ref Range Status   02/16/2023 14 1 11 5 - 15 4 g/dL Final   02/15/2023 13 8 11 5 - 15 4 g/dL Final   01/27/2023 15 5 (H) 11 5 - 15 4 g/dL Final     Platelets   Date Value Ref Range Status   02/16/2023 95 (L) 149 - 390 Thousands/uL Final   02/15/2023 100 (L) 149 - 390 Thousands/uL Final   01/27/2023 147 (L) 149 - 390 Thousands/uL Final     PTT   Date Value Ref Range Status   02/15/2023 29 23 - 37 seconds Final     Comment:     Therapeutic Heparin Range =  60-90 seconds   01/21/2023 30 23 - 37 seconds Final     Comment:     Therapeutic Heparin Range =  60-90 seconds     INR   Date Value Ref Range Status   02/15/2023 1 33 (H) 0 84 - 1 19 Final   01/21/2023 1 29 (H) 0 84 - 1 19 Final     No results found for: CKMB, DIGOXIN  No results found for: TSH  No results found for: CHOL   Hemoglobin A1C   Date Value Ref Range Status   02/16/2023 5 4 Normal 3 8-5 6%; PreDiabetic 5 7-6 4%; Diabetic >=6 5%; Glycemic control for adults with diabetes <7 0% % Final   07/16/2022 5 2 4 0 - 5 6 % Final     Comment:     The use of HbA1c to monitor glycemic status is based on normal hemoglobin and HbA composition  This test should not be used in patients with abnormal hemoglobin that affects the half life of the red blood cell or the in vivo glycation rates  Blood Culture   Date Value Ref Range Status   01/21/2023 No Growth After 5 Days  Final   01/21/2023 No Growth After 5 Days  Final   05/10/2022 No Growth After 5 Days  Final   05/10/2022 No Growth After 5 Days  Final     Urine Culture   Date Value Ref Range Status   01/11/2023 >100,000 cfu/ml Escherichia coli ESBL (A)  Final     Comment:     An Extended-Spectrum Beta-Lactamase is being produced by this organism (requires contact precautions)  Cephalosporins are NOT effective for treating these organisms  For SEVERE infections (i e  bacteremia, septic shock, etc ), Carbapenems are the drug of choice  For MILD infections (i e  isolated urinary or biliary infection), high-dose Zosyn may be used  This organism has been edited  The previous result was Gram Negative Brian Enteric Like on 1/12/2023 at 1058 EST    01/11/2023 10,000-19,000 cfu/ml  Final     Comment:     Mixed Contaminants X2     MRSA Culture Only   Date Value Ref Range Status   02/16/2023 (A)  Final    Methicillin Resistant Staphylococcus aureus isolated   01/26/2023 (A)  Final    Methicillin Resistant Staphylococcus aureus isolated   01/22/2023   Final    No Methicillin Resistant Staphlyococcus aureus (MRSA) isolated   01/10/2023   Final    No Methicillin Resistant Staphlyococcus aureus (MRSA) isolated     Legionella Urinary Antigen   Date Value Ref Range Status   05/10/2022 Negative Negative Final       *-*-*-*-*-*-*-*-*-*-*-*-*-*-*-*-*-*-*-*-*-*-*-*-*-*-*-*-*-*-*-*-*-*-*-*-*-*-*-*-*-*-*-*-*-*-*-*-*-*-*-*-*-*-  PREVIOUS CARDIOLOGY & RADIOLOGY TEST RESULTS   I have personally reviewed pertinent results of cardiovascular tests noted below  No results found for this or any previous visit  No results found for this or any previous visit  No results found for this or any previous visit  No results found for this or any previous visit  CT head wo contrast  Narrative: CT BRAIN - WITHOUT CONTRAST    INDICATION:   Stroke, follow up  Right hemispheric stroke, M2 occlusion seen on CT imaging, unable to get MRI at this time      COMPARISON:  CT stroke alert brain and CTA stroke alert head neck February 15, 2023     TECHNIQUE:  CT examination of the brain was performed  In addition to axial images, sagittal and coronal 2D reformatted images were created and submitted for interpretation  Radiation dose length product (DLP) for this visit:  882 mGy-cm   This examination, like all CT scans performed in the [facility], was performed utilizing techniques to minimize radiation dose exposure, including the use of iterative   reconstruction and automated exposure control  IMAGE QUALITY:  Diagnostic  FINDINGS:    PARENCHYMA:     No CT signs of evolving acute infarct particularly in the right MCA territory  Decreased attenuation is noted in periventricular and subcortical white matter demonstrating an appearance that is statistically most likely to represent moderate microangiopathic change  Unchanged chronic infarct in left parietotemporal lobe  Unchanged 1 1 cm dural calcification in anterior foramen magnum (2:5), likely calcified meningioma  No mass effect or midline shift  No acute parenchymal hemorrhage  Arterial calcifications of carotid siphons and left MCA M1 segment  Previously noted   dense vessel sign in right MCA M2 branch is not as prominent  VENTRICLES AND EXTRA-AXIAL SPACES:  Normal for the patient's age  VISUALIZED ORBITS: Normal visualized orbits  PARANASAL SINUSES: Small frothy secretions in right posterior ethmoid and bilateral sphenoid sinuses (left worse than right)  Franc [place] AND EXTRACRANIAL SOFT TISSUES:  Normal   Impression: No CT signs of evolving acute infarct particularly in the right MCA territory  No acute intracranial hemorrhage  Unchanged chronic infarct in left parietotemporal lobe  Unchanged 1 1 cm dural calcification in anterior foramen magnum, likely calcified meningioma      Workstation performed: [id number]        *-*-*-*-*-*-*-*-*-*-*-*-*-*-*-*-*-*-*-*-*-*-*-*-*-*-*-*-*-*-*-*-*-*-*-*-*-*-*-*-*-*-*-*-*-*-*-*-*-*-*-*-*-*-  SIGNATURES: @SIG@   Reece Cortez MD; MHA    *-*-*-*-*-*-*-*-*-*-*-*-*-*-*-*-*-*-*-*-*-*-*-*-*-*-*-*-*-*-*-*-*-*-*-*-*-*-*-*-*-*-*-*-*-*-*-*-*-*-*-*-*-*-  PAST MEDICAL HISTORY:  Past Medical History:   Diagnosis Date   • A-fib ([address] )    • CAD (coronary artery disease)    • CKD (chronic kidney disease) stage 2, GFR 60-89 ml/min    • Hypertension    • Memory loss    • Urinary tract infection     PAST SURGICAL HISTORY:  Past Surgical History:   Procedure Laterality Date   • CARDIAC ELECTROPHYSIOLOGY PROCEDURE N/A 1/26/2023    Procedure: Cardiac pacer implant;  Surgeon: Aristides Sinha DO;  Location: [facility];   Service: Cardiology   • CHOLECYSTECTOMY     • REPLACEMENT TOTAL KNEE Left 2008         FAMILY HISTORY:  Family History   Problem Relation Age of Onset   • Lung cancer Father    • Leukemia Brother    • Breast cancer Daughter     SOCIAL HISTORY:  Social History     Tobacco Use   Smoking Status Never   Smokeless Tobacco Never      Social History     Substance and Sexual Activity   Alcohol Use Never     Social History     Substance and Sexual Activity   Drug Use Never    [unfilled]     *-*-*-*-*-*-*-*-*-*-*-*-*-*-*-*-*-*-*-*-*-*-*-*-*-*-*-*-*-*-*-*-*-*-*-*-*-*-*-*-*-*-*-*-*-*-*-*-*-*-*-*-*-*  ALLERGIES:  Allergies   Allergen Reactions   • Morphine     CURRENT SCHEDULED MEDICATIONS:    Current Outpatient Medications:   •  acetaminophen (TYLENOL) 500 mg tablet, Take 500 mg by mouth every 6 (six) hours as needed for mild pain, Disp: , Rfl:   •  apixaban (ELIQUIS) 5 mg, Take 5 mg by mouth 2 (two) times a day, Disp: , Rfl:   •  Ascorbic Acid (VITAMIN C) 500 MG CAPS, Take 500 mg by mouth daily, Disp: , Rfl:   •  aspirin 81 mg chewable tablet, Chew 1 tablet (81 mg total) daily Do not start before February 18, 2023 , Disp: 30 tablet, Rfl: 0  •  atorvastatin (LIPITOR) 10 mg tablet, Take 10 mg by mouth daily, Disp: , Rfl:   •  bisacodyl (DULCOLAX) 5 mg EC tablet, Take 5 mg by mouth daily as needed for constipation, Disp: , Rfl:   • calcium carbonate (TUMS) 500 mg chewable tablet, Chew 1 tablet daily, Disp: , Rfl:   •  Cholecalciferol (Vitamin D3) 1 25 MG (95575 UT) CAPS, 2,000U daily, Disp: , Rfl:   •  Cranberry 450 MG CAPS, Take 1 capsule by mouth in the morning, Disp: , Rfl:   •  docusate sodium (COLACE) 100 mg capsule, Take 100 mg by mouth 2 (two) times a day, Disp: , Rfl:   •  furosemide (LASIX) 40 mg tablet, Take 1 tablet by mouth daily, Disp: , Rfl:   •  loperamide (IMODIUM) 2 mg capsule, , Disp: , Rfl:   •  polyethylene glycol (MIRALAX) 17 g packet, Take 17 g by mouth daily, Disp: , Rfl:   •  potassium chloride (K-DUR,KLOR-CON) 20 mEq tablet, Take 20 mEq by mouth daily, Disp: , Rfl:   •  QUEtiapine (SEROquel) 25 mg tablet, Take 0 5 tablets (12 5 mg total) by mouth daily at bedtime Hold if sedated, Disp: 30 tablet, Rfl: 0  •  thiamine (VITAMIN B1) 100 mg tablet, , Disp: , Rfl:   •  Valerian 500 MG CAPS, Take by mouth in the morning, Disp: , Rfl:   •  zonisamide (ZONEGRAN) 100 mg capsule, Take 1 capsule (100 mg total) by mouth daily Do not start before November 19, 2022  (Patient taking differently: Take 100 mg by mouth every 12 (twelve) hours), Disp: , Rfl: 0     *-*-*-*-*-*-*-*-*-*-*-*-*-*-*-*-*-*-*-*-*-*-*-*-*-*-*-*-*-*-*-*-*-*-*-*-*-*-*-*-*-*-*-*-*-*-*-*-*-*-*-*-*-*        Vern Marc MD  3/29/2023 11:05 AM  Sign when Signing Visit  DIAGNOSES:  1  Primary hypertension  2  Permanent atrial fibrillation, with slow ventricular response, status post Medtronic single-chamber pacemaker implantation 1/26/2023  3  Chronic diastolic heart failure  4  Venous dermatitis of lower extremities  5  Asymmetric left ventricular hypertrophy with severe hypertrophy of apical region  6  Mild pulmonary hypertension  7  History of CVA July 2022, recurrent left parietal stroke in January 2023  8  Degenerative joint disease with osteoarthritis  9   History of knee injuries with hemarthrosis, of left knee July 2022  10    Mild tremors dementia  11  Vitamin D deficiency  12  Moderate to marked biatrial enlargement  13  Peripheral venous insufficiency with venous dermatitis of lower extremities  14  Seizure disorder  15    Mild pulmonary hypertension    Echocardiogram 1/10/2023  • LVEF 60% with moderate to severe biatrial enlargement, aortic valve sclerosis, mild mitral regurgitation and mild to moderate tricuspid regurgitation, mild pulmonary hypertension, PASP 44 mmHg on furosemide 40 mg daily

## 2023-03-30 ENCOUNTER — HOSPITAL ENCOUNTER (EMERGENCY)
Facility: HOSPITAL | Age: 88
Discharge: HOME | End: 2023-03-30
Attending: EMERGENCY MEDICINE

## 2023-03-30 ENCOUNTER — APPOINTMENT (EMERGENCY)
Dept: CT IMAGING | Facility: HOSPITAL | Age: 88
End: 2023-03-30

## 2023-03-30 VITALS
RESPIRATION RATE: 17 BRPM | TEMPERATURE: 98.9 F | DIASTOLIC BLOOD PRESSURE: 72 MMHG | SYSTOLIC BLOOD PRESSURE: 138 MMHG | HEART RATE: 55 BPM | OXYGEN SATURATION: 96 %

## 2023-03-30 DIAGNOSIS — R91.1 NODULE OF MIDDLE LOBE OF RIGHT LUNG: ICD-10-CM
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
## 2023-03-30 LAB
ANION GAP SERPL CALCULATED.3IONS-SCNC: 10 MMOL/L (ref 4–13)
APTT PPP: 30 SECONDS (ref 23–37)
ATRIAL RATE: 71 BPM
ATRIAL RATE: 85 BPM
BASOPHILS # BLD AUTO: 0.05 THOUSANDS/ÂΜL (ref 0–0.1)
BASOPHILS NFR BLD AUTO: 1 % (ref 0–1)
BUN SERPL-MCNC: 24 MG/DL (ref 5–25)
CALCIUM SERPL-MCNC: 9.7 MG/DL (ref 8.4–10.2)
CHLORIDE SERPL-SCNC: 112 MMOL/L (ref 96–108)
CO2 SERPL-SCNC: 21 MMOL/L (ref 21–32)
CREAT SERPL-MCNC: 1.07 MG/DL (ref 0.6–1.3)
EOSINOPHIL # BLD AUTO: 0.24 THOUSAND/ÂΜL (ref 0–0.61)
EOSINOPHIL NFR BLD AUTO: 4 % (ref 0–6)
ERYTHROCYTE [DISTWIDTH] IN BLOOD BY AUTOMATED COUNT: 14.7 % (ref 11.6–15.1)
GFR SERPL CREATININE-BSD FRML MDRD: 47 ML/MIN/1.73SQ M
GLUCOSE SERPL-MCNC: 96 MG/DL (ref 65–140)
HCT VFR BLD AUTO: 42.8 % (ref 34.8–46.1)
HGB BLD-MCNC: 14.2 G/DL (ref 11.5–15.4)
IMM GRANULOCYTES # BLD AUTO: 0.01 THOUSAND/UL (ref 0–0.2)
IMM GRANULOCYTES NFR BLD AUTO: 0 % (ref 0–2)
INR PPP: 1.2 (ref 0.84–1.19)
LYMPHOCYTES # BLD AUTO: 1 THOUSANDS/ÂΜL (ref 0.6–4.47)
LYMPHOCYTES NFR BLD AUTO: 16 % (ref 14–44)
MCH RBC QN AUTO: 32.3 PG (ref 26.8–34.3)
MCHC RBC AUTO-ENTMCNC: 33.2 G/DL (ref 31.4–37.4)
MCV RBC AUTO: 97 FL (ref 82–98)
MONOCYTES # BLD AUTO: 0.69 THOUSAND/ÂΜL (ref 0.17–1.22)
MONOCYTES NFR BLD AUTO: 11 % (ref 4–12)
NEUTROPHILS # BLD AUTO: 4.31 THOUSANDS/ÂΜL (ref 1.85–7.62)
NEUTS SEG NFR BLD AUTO: 68 % (ref 43–75)
NRBC BLD AUTO-RTO: 0 /100 WBCS
PLATELET # BLD AUTO: 123 THOUSANDS/UL (ref 149–390)
PMV BLD AUTO: 12 FL (ref 8.9–12.7)
POTASSIUM SERPL-SCNC: 4.1 MMOL/L (ref 3.5–5.3)
PROTHROMBIN TIME: 15.2 SECONDS (ref 11.6–14.5)
QRS AXIS: 193 DEGREES
QRS AXIS: 91 DEGREES
QRSD INTERVAL: 108 MS
QRSD INTERVAL: 124 MS
QT INTERVAL: 448 MS
QT INTERVAL: 476 MS
QTC INTERVAL: 469 MS
QTC INTERVAL: 521 MS
RBC # BLD AUTO: 4.4 MILLION/UL (ref 3.81–5.12)
SODIUM SERPL-SCNC: 143 MMOL/L (ref 135–147)
T WAVE AXIS: 211 DEGREES
T WAVE AXIS: 238 DEGREES
VENTRICULAR RATE: 66 BPM
VENTRICULAR RATE: 72 BPM
WBC # BLD AUTO: 6.3 THOUSAND/UL (ref 4.31–10.16)

## 2023-03-30 RX ADMIN — IOHEXOL 100 ML: 350 INJECTION, SOLUTION INTRAVENOUS at 03:26

## 2023-03-30 NOTE — ED PROVIDER NOTES
History  Chief Complaint   Patient presents with   • Fall     Per EMS pt coming from memory care unit at [facility] ([place], [place])  Unwitnessed fall, +thinners -LOC or headstrike  Pt reports no pain at this time  VSS  Dementia at baseline  This is an 72-year-old female presenting via EMS from memory care unit at [facility] ([place], [place]) presenting for evaluation of fall  According to nursing home staff, patient was found on the floor with sheets tucked underneath her  Patient is unaware of how she fell  Patient not complaining of any pain  Patient is on Eliquis, unsure of head strike  History provided by:  EMS personnel and nursing home  History limited by:  Dementia      Prior to Admission Medications   Prescriptions Last Dose Informant Patient Reported? Taking? Ascorbic Acid (VITAMIN C) 500 MG CAPS   Yes No   Sig: Take 500 mg by mouth daily   Cholecalciferol (Vitamin D3) 1 25 MG (73332 UT) CAPS   Yes No   Si,000U daily   Cranberry 450 MG CAPS   Yes No   Sig: Take 1 capsule by mouth in the morning   QUEtiapine (SEROquel) 25 mg tablet   No No   Sig: Take 0 5 tablets (12 5 mg total) by mouth daily at bedtime Hold if sedated   Valerian 500 MG CAPS   Yes No   Sig: Take by mouth in the morning   acetaminophen (TYLENOL) 500 mg tablet   Yes No   Sig: Take 500 mg by mouth every 6 (six) hours as needed for mild pain   apixaban (ELIQUIS) 5 mg   Yes No   Sig: Take 5 mg by mouth 2 (two) times a day   aspirin 81 mg chewable tablet   No No   Sig: Chew 1 tablet (81 mg total) daily Do not start before 2023     atorvastatin (LIPITOR) 10 mg tablet   Yes No   Sig: Take 10 mg by mouth daily   bisacodyl (DULCOLAX) 5 mg EC tablet   Yes No   Sig: Take 5 mg by mouth daily as needed for constipation   calcium carbonate (TUMS) 500 mg chewable tablet   Yes No   Sig: Chew 1 tablet daily   docusate sodium (COLACE) 100 mg capsule   Yes No   Sig: Take 100 mg by mouth 2 (two) times a day   furosemide (LASIX) 40 mg tablet   Yes No   Sig: Take 1 tablet by mouth daily   loperamide (IMODIUM) 2 mg capsule   Yes No   polyethylene glycol (MIRALAX) 17 g packet   Yes No   Sig: Take 17 g by mouth daily   potassium chloride (K-DUR,KLOR-CON) 20 mEq tablet   Yes No   Sig: Take 20 mEq by mouth daily   thiamine (VITAMIN B1) 100 mg tablet   Yes No   zonisamide (ZONEGRAN) 100 mg capsule   No No   Sig: Take 1 capsule (100 mg total) by mouth daily Do not start before November 19, 2022  Patient taking differently: Take 100 mg by mouth every 12 (twelve) hours      Facility-Administered Medications: None       Past Medical History:   Diagnosis Date   • A-fib ([facility] [address] )    • CAD (coronary artery disease)    • CKD (chronic kidney disease) stage 2, GFR 60-89 ml/min    • Hypertension    • Memory loss    • Urinary tract infection        Past Surgical History:   Procedure Laterality Date   • CARDIAC ELECTROPHYSIOLOGY PROCEDURE N/A 1/26/2023    Procedure: Cardiac pacer implant;  Surgeon: Jeff Venegas DO;  Location: [facility]; Service: Cardiology   • CHOLECYSTECTOMY     • REPLACEMENT TOTAL KNEE Left 2008       Family History   Problem Relation Age of Onset   • Lung cancer Father    • Leukemia Brother    • Breast cancer Daughter      I have reviewed and agree with the history as documented  E-Cigarette/Vaping   • E-Cigarette Use Never User      E-Cigarette/Vaping Substances   • Nicotine No    • THC No    • CBD No    • Flavoring No    • Other No    • Unknown No      Social History     Tobacco Use   • Smoking status: Never   • Smokeless tobacco: Never   Vaping Use   • Vaping Use: Never used   Substance Use Topics   • Alcohol use: Never   • Drug use: Never       Review of Systems   Unable to perform ROS: Dementia       Physical Exam  Physical Exam  Vitals reviewed  Constitutional:       General: She is not in acute distress  Appearance: Normal appearance  She is well-developed and well-groomed  She is not ill-appearing, toxic-appearing or diaphoretic     HENT: Head: Normocephalic and atraumatic  No raccoon eyes, Oquendo's sign, contusion, right periorbital erythema or left periorbital erythema  Right Ear: Tympanic membrane, ear canal and external ear normal       Left Ear: Tympanic membrane, ear canal and external ear normal       Nose: Nose normal  No signs of injury, congestion or rhinorrhea  Mouth/Throat:      Lips: Pink  Mouth: Mucous membranes are moist       Pharynx: Oropharynx is clear  No oropharyngeal exudate or posterior oropharyngeal erythema  Eyes:      General: Lids are normal  No scleral icterus  Right eye: No discharge  Left eye: No discharge  Extraocular Movements: Extraocular movements intact  Right eye: Normal extraocular motion  Left eye: Normal extraocular motion  Conjunctiva/sclera: Conjunctivae normal       Pupils: Pupils are equal, round, and reactive to light  Cardiovascular:      Rate and Rhythm: Normal rate and regular rhythm  Pulses: Normal pulses  Heart sounds: No murmur heard  No friction rub  No gallop  Pulmonary:      Effort: Pulmonary effort is normal  No respiratory distress  Breath sounds: Normal breath sounds  No wheezing, rhonchi or rales  Abdominal:      General: Abdomen is flat  There is no distension  Palpations: Abdomen is soft  Tenderness: There is no abdominal tenderness  There is no right CVA tenderness, left CVA tenderness, guarding or rebound  Musculoskeletal:         General: No deformity  Normal range of motion  Cervical back: Normal, normal range of motion and neck supple  Thoracic back: Normal       Lumbar back: Normal       Right hip: Normal       Left hip: Normal       Right lower leg: No edema  Left lower leg: No edema  Skin:     General: Skin is warm and dry  [place]: Skin is not jaundiced or pale  Findings: No rash  Neurological:      General: No focal deficit present  Mental Status: She is alert  Cranial Nerves: Cranial nerves 2-12 are intact  Sensory: Sensation is intact  Motor: Motor function is intact  Coordination: Coordination is intact  Gait: Gait is intact  Comments: Pt oriented to self and place, not time  Psychiatric:         Mood and Affect: Mood normal          Behavior: Behavior normal  Behavior is cooperative           Vital Signs  ED Triage Vitals [03/30/23 0106]   Temperature Pulse Respirations Blood Pressure SpO2   100 °F (37 8 °C) 71 22 148/76 95 %      Temp Source Heart Rate Source Patient Position - Orthostatic VS BP Location FiO2 (%)   Oral Monitor Lying Right arm --      Pain Score       --           Vitals:    03/30/23 0106 03/30/23 0401 03/30/23 0623   BP: 148/76 124/58 145/70   Pulse: 71 62 60   Patient Position - Orthostatic VS: Lying Lying Lying         Visual Acuity      ED Medications  Medications   iohexol (OMNIPAQUE) 350 MG/ML injection (SINGLE-DOSE) 100 mL (100 mL Intravenous Given 3/30/23 0326)       Diagnostic Studies  Results Reviewed     Procedure Component Value Units Date/Time    Basic metabolic panel [[id number]]  (Abnormal) Collected: 03/30/23 0212    Lab Status: Final result Specimen: Blood from Arm, Right Updated: 03/30/23 0235     Sodium 143 mmol/L      Potassium 4 1 mmol/L      Chloride 112 mmol/L      CO2 21 mmol/L      ANION GAP 10 mmol/L      BUN 24 mg/dL      Creatinine 1 07 mg/dL      Glucose 96 mg/dL      Calcium 9 7 mg/dL      eGFR 47 ml/min/1 73sq m     Narrative:      [place] guidelines for Chronic Kidney Disease (CKD):   •  Stage 1 with normal or high GFR (GFR > 90 mL/min/1 73 square meters)  •  Stage 2 Mild CKD (GFR = 60-89 mL/min/1 73 square meters)  •  Stage 3A Moderate CKD (GFR = 45-59 mL/min/1 73 square meters)  •  Stage 3B Moderate CKD (GFR = 30-44 mL/min/1 73 square meters)  •  Stage 4 Severe CKD (GFR = 15-29 mL/min/1 73 square meters)  •  Stage 5 End Stage CKD (GFR <15 mL/min/1 73 square meters)  Note: GFR calculation is accurate only with a steady state creatinine    Protime-INR [[id number]]  (Abnormal) Collected: 03/30/23 0212    Lab Status: Final result Specimen: Blood from Arm, Right Updated: 03/30/23 0234     Protime 15 2 seconds      INR 1 20    APTT [[id number]]  (Normal) Collected: 03/30/23 0212    Lab Status: Final result Specimen: Blood from Arm, Right Updated: 03/30/23 0234     PTT 30 seconds     CBC and differential [[id number]]  (Abnormal) Collected: 03/30/23 0212    Lab Status: Final result Specimen: Blood from Arm, Right Updated: 03/30/23 0219     WBC 6 30 Thousand/uL      RBC 4 40 Million/uL      Hemoglobin 14 2 g/dL      Hematocrit 42 8 %      MCV 97 fL      MCH 32 3 pg      MCHC 33 2 g/dL      RDW 14 7 %      MPV 12 0 fL      Platelets 607 Thousands/uL      nRBC 0 /100 WBCs      Neutrophils Relative 68 %      Immat GRANS % 0 %      Lymphocytes Relative 16 %      Monocytes Relative 11 %      Eosinophils Relative 4 %      Basophils Relative 1 %      Neutrophils Absolute 4 31 Thousands/µL      Immature Grans Absolute 0 01 Thousand/uL      Lymphocytes Absolute 1 00 Thousands/µL      Monocytes Absolute 0 69 Thousand/µL      Eosinophils Absolute 0 24 Thousand/µL      Basophils Absolute 0 05 Thousands/µL                  CT head without contrast   Final Result by Elise Taylor MD (03/30 3180)      No acute intracranial abnormality  Workstation performed: [id number]         CT cervical spine without contrast   Final Result by Elise Taylor MD (03/30 4952)      No cervical spine fracture or traumatic malalignment  Workstation performed: [id number]         CT chest abdomen pelvis w contrast   Final Result by Elise Taylor MD (03/30 9720)      1  No evidence of traumatic injury  2   Patchy airspace consolidation in the posterior aspects of the left upper and left lower lobes may represent aspiration or infection     3   Subsolid nodule in the right middle lobe with solid appearing component measuring up to 8 mm  Based on current Fleischner Society 2017 Guidelines on incidental pulmonary nodule, presence of solid component of 6 mm or greater in size within a    groundglass nodule is highly suspicious for malignancy  Pulmonology consultation is recommended  4   Colonic diverticulosis without evidence of acute diverticulitis  The study was marked in [facility] for immediate notification  Workstation performed: [id number]                    Procedures  Procedures         ED Course  ED Course as of 03/30/23 0643   Thu Mar 30, 2023   0408 CT cervical spine without contrast  No cervical spine fracture or traumatic malalignment  9417 CT head without contrast  No acute intracranial abnormality  9809 CT chest abdomen pelvis w contrast  1  No evidence of traumatic injury  2   Patchy airspace consolidation in the posterior aspects of the left upper and left lower lobes may represent aspiration or infection  3   Subsolid nodule in the right middle lobe with solid appearing component measuring up to 8 mm  Based on current Fleischner Society 2017 Guidelines on incidental pulmonary nodule, presence of solid component of 6 mm or greater in size within a   groundglass nodule is highly suspicious for malignancy  Pulmonology consultation is recommended  4   Colonic diverticulosis without evidence of acute diverticulitis      The study was marked in EPIC for immediate notification  SBIRT 20yo+    Flowsheet Row Most Recent Value   SBIRT (25 yo +)    In order to provide better care to our patients, we are screening all of our patients for alcohol and drug use  Would it be okay to ask you these screening questions?  Unable to answer at this time Filed at: 03/30/2023 0122                Medical Decision Making      DDx including but not limited to: intracranial injury, concussion, cervical injury, intrathoracic injury, intraabdominal injury, extremity injury--fracture, dislocation, strain, sprain, contusion  Pt presenting for eval of fall on Eliquis, fall was unwitnessed  Pt coming from nursing home  Pt does have dementia, is at baseline according to staff  Patient is unaware of how she fell  Patient not complaining of any pain  No obvious traumatic injuries on exam  Will order CTH, CT cspine and CT C/A/P for trauma  CBC for eval of anemia, BMP for eval of kidney function and PT/PTT/INR for coags in the event of bleed  CT C-spine: No cervical spine fracture or traumatic malalignment  CTH: No acute intracranial abnormality  CT C/A/P: 1  No evidence of traumatic injury  2   Patchy airspace consolidation in the posterior aspects of the left upper and left lower lobes may represent aspiration or infection  3   Subsolid nodule in the right middle lobe with solid appearing component measuring up to 8 mm  Based on current Fleischner Society 2017 Guidelines on incidental pulmonary nodule, presence of solid component of 6 mm or greater in size within a  groundglass nodule is highly suspicious for malignancy  Pulmonology consultation is recommended  4   Colonic diverticulosis without evidence of acute diverticulitis  Referral for pulmonology placed at this time  Dispo: discharge nursing home with follow up to PCP  Patient stable, in no acute distress and non-toxic at discharge  Fall, initial encounter: acute illness or injury  Nodule of middle lobe of right lung: acute illness or injury  Amount and/or Complexity of Data Reviewed  Labs: ordered  Radiology: ordered  Decision-making details documented in ED Course  Risk  Prescription drug management            Disposition  Final diagnoses:   Fall, initial encounter   Nodule of middle lobe of right lung     Time reflects when diagnosis was documented in both MDM as applicable and the Disposition within this note     Time User Action Codes Description Comment    3/30/2023  4:37 AM Prasanna Nieves Darya Add [A87  [facility] Fall, initial encounter     3/30/2023  4:38 AM Maddisonshai Coyle Add [R91 1] Nodule of middle lobe of right lung       ED Disposition     ED Disposition   Discharge    Condition   Stable    Date/Time   Thu Mar 30, 2023  4:37 AM    Comment   Mele Grimaldo discharge to home/self care                 Follow-up Information     Follow up With Specialties Details Why Contact Info Additional [facility] Schedule an appointment as soon as possible for a visit   [address] [address]  [address], [address], [place], [place], [address] [phone]          Patient's Medications   Discharge Prescriptions    No medications on file           PDMP Review     None          ED Provider  Electronically Signed by [facility]  03/30/23 1592

## 2023-03-30 NOTE — DISCHARGE INSTRUCTIONS
The following findings require follow up:  Radiographic finding   Finding: Subsolid nodule in the right middle lobe with solid appearing component measuring up to 8 mm     Follow up required: Pulmonology (referral placed)    Please notify the following clinician to assist with the follow up:   Family Medicine

## 2023-03-30 NOTE — ED NOTES
Transport back to facility setup via [employer] at this time   Pickup is set for 7:00am       Faraz Pittman  03/30/23 0456

## 2023-05-02 ENCOUNTER — CONSULT (OUTPATIENT)
Dept: PULMONOLOGY | Facility: CLINIC | Age: 88
End: 2023-05-02

## 2023-05-02 VITALS
WEIGHT: 173.2 LBS | HEIGHT: 63 IN | BODY MASS INDEX: 30.69 KG/M2 | HEART RATE: 66 BPM | SYSTOLIC BLOOD PRESSURE: 122 MMHG | DIASTOLIC BLOOD PRESSURE: 78 MMHG | OXYGEN SATURATION: 97 %

## 2023-05-02 DIAGNOSIS — G30.1 MODERATE LATE ONSET ALZHEIMER'S DEMENTIA WITHOUT BEHAVIORAL DISTURBANCE, PSYCHOTIC DISTURBANCE, MOOD DISTURBANCE, OR ANXIETY (HCC): ICD-10-CM

## 2023-05-02 DIAGNOSIS — I49.5 SICK SINUS SYNDROME (HCC): ICD-10-CM

## 2023-05-02 DIAGNOSIS — I10 BENIGN ESSENTIAL HYPERTENSION: ICD-10-CM

## 2023-05-02 DIAGNOSIS — R91.1 NODULE OF MIDDLE LOBE OF RIGHT LUNG: Primary | ICD-10-CM

## 2023-05-02 DIAGNOSIS — E66.09 CLASS 1 OBESITY DUE TO EXCESS CALORIES WITHOUT SERIOUS COMORBIDITY WITH BODY MASS INDEX (BMI) OF 30.0 TO 30.9 IN ADULT: ICD-10-CM

## 2023-05-02 DIAGNOSIS — I48.20 CHRONIC ATRIAL FIBRILLATION (HCC): ICD-10-CM

## 2023-05-02 DIAGNOSIS — F02.B0 MODERATE LATE ONSET ALZHEIMER'S DEMENTIA WITHOUT BEHAVIORAL DISTURBANCE, PSYCHOTIC DISTURBANCE, MOOD DISTURBANCE, OR ANXIETY (HCC): ICD-10-CM

## 2023-05-02 PROBLEM — E66.811 CLASS 1 OBESITY DUE TO EXCESS CALORIES WITHOUT SERIOUS COMORBIDITY WITH BODY MASS INDEX (BMI) OF 30.0 TO 30.9 IN ADULT: Status: ACTIVE | Noted: 2023-05-02

## 2023-05-02 NOTE — PROGRESS NOTES
Pulmonary Outpatient Note   Sb Long 82 y o  female MRN: [id number]  5/2/2023      Referring Physician: Ian Greene PA-C    Reason for Consultation:    Chief Complaint   Patient presents with    Lung Nodule         Assessment/Plan:    1  Nodule of middle lobe of right lung  Assessment & Plan:  Nodule RML 3/30/23 CT done after a fall/trauma  There is an 8 mm solid component  This was done around the time she also had COVID- she had patchy airspace opacities elswhere in her lung  She is a never smoker, did have second-hand smoke exposure  I recommend a CT chest in 2 months (3 months from the CT scan)  If it resolves- no follow-up needed  If it persists or enlarges- can discuss PET scan/biopsy and she can return to clinic  Discussed with patient and her son who agree with the plan  Orders:  -     Ambulatory Referral to Pulmonology  -     CT chest wo contrast; Future; Expected date: 07/02/2023    2  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 behavioral disturbance, psychotic disturbance, mood disturbance, or anxiety ([facility] [address] )    3  Benign essential hypertension  Assessment & Plan:  /78  Medications reviewed      4  Chronic atrial fibrillation [facility])  Assessment & Plan:  Meds reviewed  On Eliquis  She has a PPM      5  Sick sinus syndrome (HCC)  Assessment & Plan:  S/p PPM      6  Class 1 obesity due to excess calories without serious comorbidity with body mass index (BMI) of 30 0 to 30 9 in adult  Assessment & Plan:  Would benefit from weight loss          Health Maintenance  Immunization History   Administered Date(s) Administered    COVID-19 PFIZER VACCINE 0 3 ML IM 02/13/2021, 03/06/2021, 12/11/2021    Influenza, high dose seasonal 0 7 mL 11/16/2022    Pneumococcal Conjugate 13-Valent 12/28/2021    Tdap 04/03/2019          Return in about 3 months (around 8/2/2023)      History of Present Illness   HPI:  Sb Long is a 82 y o  female who presents for pulmonary evaluation after CT Chest in setting of trauma showed a RML lung nodule  She has a past medical history of a fib on eliquis, sick-sinus syndrome s/p PPM 0/6592, chronic diastolic heart failure, mild Group 2 pulmonary hypertension, stroke, seizure disorder, osteoarthritis, venous insufficiency, hypertension  She has dementia  She does not have lung disease  Not on inhalers  She is a resident at [facility]  Went to ED after a fall on 3/30  CT showed RML nodule  After that was hospitalized at [facility] with TIA  Found to have incidental COVID  Did not require oxygen  Had a course of decadron and remdesivir  No shortness of breath  No cough  She ambulates with a walker  Getting around [facility]  Pulmonary history:    Childhood lung disease/premature birth: No    Family hx of lung disease: Father had lung cancer    Exposure to pets/birds/farm animals: No    Social history:    Smoking: Never smoker  Second-hand smoke from her father and her  in the past    Alcohol, ilicit drugs (inhalational/ IV): No    Worked as: Retired- mostly a homemaker        Review of Systems   Constitutional: Negative for chills and fever  HENT: Negative for ear pain and sore throat  Eyes: Negative for pain and visual disturbance  Respiratory: Negative for cough and shortness of breath  Cardiovascular: Negative for chest pain and palpitations  Gastrointestinal: Negative for abdominal pain and vomiting  Genitourinary: Negative for dysuria and hematuria  Musculoskeletal: Negative for arthralgias and back pain  Skin: Negative for color change and rash  Neurological: Negative for seizures and syncope  All other systems reviewed and are negative            Historical Information   Past Medical History:   Diagnosis Date    A-fib [facility])     CAD (coronary artery disease)     CKD (chronic kidney disease) stage 2, GFR 60-89 ml/min     Hypertension     Memory loss     Urinary tract infection      Past Surgical History:   Procedure Laterality Date    CARDIAC ELECTROPHYSIOLOGY PROCEDURE N/A 1/26/2023    Procedure: Cardiac pacer implant;  Surgeon: Marisa Angelucci, DO;  [facility];   Service: Cardiology    CHOLECYSTECTOMY      REPLACEMENT TOTAL KNEE Left 2008     Family History   Problem Relation Age of Onset    Lung cancer Father     Leukemia Brother     Breast cancer Daughter          Meds/Allergies     Current Outpatient Medications:     acetaminophen (TYLENOL) 500 mg tablet, Take 500 mg by mouth every 6 (six) hours as needed for mild pain, Disp: , Rfl:     apixaban (ELIQUIS) 5 mg, Take 5 mg by mouth 2 (two) times a day, Disp: , Rfl:     Ascorbic Acid (VITAMIN C) 500 MG CAPS, Take 500 mg by mouth daily, Disp: , Rfl:     aspirin 81 mg chewable tablet, Chew 1 tablet (81 mg total) daily Do not start before February 18, 2023 , Disp: 30 tablet, Rfl: 0    atorvastatin (LIPITOR) 10 mg tablet, Take 10 mg by mouth daily, Disp: , Rfl:     bisacodyl (DULCOLAX) 5 mg EC tablet, Take 5 mg by mouth daily as needed for constipation, Disp: , Rfl:     calcium carbonate (TUMS) 500 mg chewable tablet, Chew 1 tablet daily, Disp: , Rfl:     Cholecalciferol (Vitamin D3) 1 25 MG (89007 UT) CAPS, 2,000U daily, Disp: , Rfl:     Cranberry 450 MG CAPS, Take 1 capsule by mouth in the morning, Disp: , Rfl:     docusate sodium (COLACE) 100 mg capsule, Take 100 mg by mouth 2 (two) times a day, Disp: , Rfl:     furosemide (LASIX) 40 mg tablet, Take 1 tablet by mouth daily, Disp: , Rfl:     loperamide (IMODIUM) 2 mg capsule, , Disp: , Rfl:     polyethylene glycol (MIRALAX) 17 g packet, Take 17 g by mouth daily, Disp: , Rfl:     potassium chloride (K-DUR,KLOR-CON) 20 mEq tablet, Take 20 mEq by mouth daily, Disp: , Rfl:     QUEtiapine (SEROquel) 25 mg tablet, Take 0 5 tablets (12 5 mg total) by mouth daily at bedtime Hold if sedated, Disp: 30 tablet, Rfl: 0    thiamine (VITAMIN B1) 100 mg "tablet, , Disp: , Rfl:     Valerian 500 MG CAPS, Take by mouth in the morning, Disp: , Rfl:     zonisamide (ZONEGRAN) 100 mg capsule, Take 1 capsule (100 mg total) by mouth daily Do not start before November 19, 2022  (Patient taking differently: Take 100 mg by mouth every 12 (twelve) hours), Disp: , Rfl: 0  Allergies   Allergen Reactions    Morphine        Vitals: Blood pressure 122/78, pulse 66, height 5' 3\" (1 6 m), weight 78 6 kg (173 lb 3 2 oz), SpO2 97 %  Body mass index is 30 68 kg/m²  Oxygen Therapy  SpO2: 97 %  Oxygen Therapy: None (Room air)      Physical Exam  Physical Exam  Vitals and nursing note reviewed  Constitutional:       General: She is not in acute distress  Appearance: She is well-developed  She is obese  HENT:      Head: Normocephalic and atraumatic  Eyes:      Conjunctiva/sclera: Conjunctivae normal    Cardiovascular:      Rate and Rhythm: Normal rate and regular rhythm  Heart sounds: No murmur heard  Pulmonary:      Effort: Pulmonary effort is normal  No respiratory distress  Breath sounds: Normal breath sounds  Abdominal:      Palpations: Abdomen is soft  Tenderness: There is no abdominal tenderness  Musculoskeletal:         General: No swelling  Cervical back: Neck supple  Right lower leg: Edema present  Left lower leg: Edema present  Skin:     General: Skin is warm and dry  Capillary Refill: Capillary refill takes less than 2 seconds  Neurological:      Mental Status: She is alert  Psychiatric:         Mood and Affect: Mood normal          Labs: I have personally reviewed pertinent lab results      ABG: No results found for: PHART, XEG7GDH, PO2ART, YFY7CCF, B0ZTVVEK, BEART, SOURCE,   BNP:   Lab Results   Component Value Date     (H) 01/21/2023   ,   CBC:  Lab Results   Component Value Date    WBC 6 30 03/30/2023    HGB 14 2 03/30/2023    HCT 42 8 03/30/2023    MCV 97 03/30/2023     (L) 03/30/2023    EOSPCT 4 " 03/30/2023    EOSABS 0 24 03/30/2023    NEUTOPHILPCT 68 03/30/2023    LYMPHOPCT 16 03/30/2023   ,   CMP:   Lab Results   Component Value Date    SODIUM 143 03/30/2023    K 4 1 03/30/2023     (H) 03/30/2023    CO2 21 03/30/2023    BUN 24 03/30/2023    CREATININE 1 07 03/30/2023    CALCIUM 9 7 03/30/2023    AST 18 01/22/2023    ALT 9 01/22/2023    ALKPHOS 60 01/22/2023    EGFR 47 03/30/2023   ,   PT/INR:   Lab Results   Component Value Date    INR 1 20 (H) 03/30/2023   ,   Troponin: No results found for: TROPONINI      Imaging and other studies: I have personally reviewed pertinent reports  and I have personally reviewed pertinent films in PACS    CT Chest 3/30/23  1  No evidence of traumatic injury  2   Patchy airspace consolidation in the posterior aspects of the left upper and left lower lobes may represent aspiration or infection  3   Subsolid nodule in the right middle lobe with solid appearing component measuring up to 8 mm  4   Colonic diverticulosis without evidence of acute diverticulitis  CT Chest 5/10/22  Lungs clear  No nodules    Pulmonary function testing:   Pulmonary Functions Testing Results:    No results found for: FEV1, FVC, GDD1VFH, TLC, DLCO      EKG, Pathology, and Other Studies: I have personally reviewed pertinent reports  TTE 1/10/23    Left Ventricle: Left ventricular cavity size is normal  Wall thickness is moderately increased  There is moderate concentric hypertrophy  There is severe asymmetric hypertrophy of the apical wall  There is cavity obliteration of the apex in systole  The left ventricular ejection fraction is 67% by single dimension measurement  Systolic function is vigorous  Wall motion is normal     Right Ventricle: Right ventricular cavity size is mildly dilated  Systolic function is mildly reduced  Abnormal tricuspid annular plane systolic excursion (TAPSE) =1 7 cm    Left Atrium: The atrium is severely dilated (>48 mL/m2)    Right Atrium:  The atrium is mildly dilated    Mitral Valve: There is mild to moderate regurgitation    Tricuspid Valve: There is moderate regurgitation    Pulmonary Artery: The estimated pulmonary artery systolic pressure is 41 8 mmHg  The pulmonary artery systolic pressure is moderately increased  NICOLA Mo [facility]

## 2023-05-02 NOTE — ASSESSMENT & PLAN NOTE
Nodule RML 3/30/23 CT done after a fall/trauma  There is an 8 mm solid component  This was done around the time she also had COVID- she had patchy airspace opacities elswhere in her lung  She is a never smoker, did have second-hand smoke exposure  I recommend a CT chest in 2 months (3 months from the CT scan)  If it resolves- no follow-up needed  If it persists or enlarges- can discuss PET scan/biopsy and she can return to clinic  Discussed with patient and her son who agree with the plan

## 2023-05-10 ENCOUNTER — APPOINTMENT (EMERGENCY)
Dept: RADIOLOGY | Facility: HOSPITAL | Age: 88
End: 2023-05-10

## 2023-05-10 ENCOUNTER — HOSPITAL ENCOUNTER (EMERGENCY)
Facility: HOSPITAL | Age: 88
Discharge: HOME | End: 2023-05-10
Attending: EMERGENCY MEDICINE | Admitting: EMERGENCY MEDICINE

## 2023-05-10 ENCOUNTER — APPOINTMENT (EMERGENCY)
Dept: CT IMAGING | Facility: HOSPITAL | Age: 88
End: 2023-05-10

## 2023-05-10 VITALS
OXYGEN SATURATION: 97 % | HEART RATE: 84 BPM | DIASTOLIC BLOOD PRESSURE: 77 MMHG | TEMPERATURE: 98.2 F | SYSTOLIC BLOOD PRESSURE: 160 MMHG | RESPIRATION RATE: 17 BRPM

## 2023-05-10 DIAGNOSIS — S80.02XA CONTUSION OF LEFT KNEE, INITIAL ENCOUNTER: Primary | ICD-10-CM

## 2023-05-10 DIAGNOSIS — S00.432A CONTUSION OF LEFT EAR, INITIAL ENCOUNTER: ICD-10-CM

## 2023-05-10 LAB
ALBUMIN SERPL BCP-MCNC: 4.1 G/DL (ref 3.5–5)
ALP SERPL-CCNC: 90 U/L (ref 34–104)
ALT SERPL W P-5'-P-CCNC: 18 U/L (ref 7–52)
ANION GAP SERPL CALCULATED.3IONS-SCNC: 5 MMOL/L (ref 4–13)
APTT PPP: 32 SECONDS (ref 23–37)
AST SERPL W P-5'-P-CCNC: 21 U/L (ref 13–39)
ATRIAL RATE: 288 BPM
BASOPHILS # BLD AUTO: 0.05 THOUSANDS/ÂΜL (ref 0–0.1)
BASOPHILS NFR BLD AUTO: 1 % (ref 0–1)
BILIRUB SERPL-MCNC: 0.77 MG/DL (ref 0.2–1)
BUN SERPL-MCNC: 24 MG/DL (ref 5–25)
CALCIUM SERPL-MCNC: 9.7 MG/DL (ref 8.4–10.2)
CHLORIDE SERPL-SCNC: 112 MMOL/L (ref 96–108)
CO2 SERPL-SCNC: 25 MMOL/L (ref 21–32)
CREAT SERPL-MCNC: 0.84 MG/DL (ref 0.6–1.3)
EOSINOPHIL # BLD AUTO: 0.11 THOUSAND/ÂΜL (ref 0–0.61)
EOSINOPHIL NFR BLD AUTO: 2 % (ref 0–6)
ERYTHROCYTE [DISTWIDTH] IN BLOOD BY AUTOMATED COUNT: 14.8 % (ref 11.6–15.1)
GFR SERPL CREATININE-BSD FRML MDRD: 63 ML/MIN/1.73SQ M
GLUCOSE SERPL-MCNC: 89 MG/DL (ref 65–140)
HCT VFR BLD AUTO: 40.8 % (ref 34.8–46.1)
HGB BLD-MCNC: 13.5 G/DL (ref 11.5–15.4)
IMM GRANULOCYTES # BLD AUTO: 0.02 THOUSAND/UL (ref 0–0.2)
IMM GRANULOCYTES NFR BLD AUTO: 0 % (ref 0–2)
INR PPP: 1.18 (ref 0.84–1.19)
LACTATE SERPL-SCNC: 1.1 MMOL/L (ref 0.5–2)
LYMPHOCYTES # BLD AUTO: 1.41 THOUSANDS/ÂΜL (ref 0.6–4.47)
LYMPHOCYTES NFR BLD AUTO: 23 % (ref 14–44)
MCH RBC QN AUTO: 32.6 PG (ref 26.8–34.3)
MCHC RBC AUTO-ENTMCNC: 33.1 G/DL (ref 31.4–37.4)
MCV RBC AUTO: 99 FL (ref 82–98)
MONOCYTES # BLD AUTO: 0.62 THOUSAND/ÂΜL (ref 0.17–1.22)
MONOCYTES NFR BLD AUTO: 10 % (ref 4–12)
NEUTROPHILS # BLD AUTO: 3.97 THOUSANDS/ÂΜL (ref 1.85–7.62)
NEUTS SEG NFR BLD AUTO: 64 % (ref 43–75)
NRBC BLD AUTO-RTO: 0 /100 WBCS
PLATELET # BLD AUTO: 139 THOUSANDS/UL (ref 149–390)
PMV BLD AUTO: 11.4 FL (ref 8.9–12.7)
POTASSIUM SERPL-SCNC: 4.1 MMOL/L (ref 3.5–5.3)
PROCALCITONIN SERPL-MCNC: <0.05 NG/ML
PROT SERPL-MCNC: 6.8 G/DL (ref 6.4–8.4)
PROTHROMBIN TIME: 15 SECONDS (ref 11.6–14.5)
QRS AXIS: 133 DEGREES
QRSD INTERVAL: 126 MS
QT INTERVAL: 400 MS
QTC INTERVAL: 412 MS
RBC # BLD AUTO: 4.14 MILLION/UL (ref 3.81–5.12)
SODIUM SERPL-SCNC: 142 MMOL/L (ref 135–147)
T WAVE AXIS: -41 DEGREES
VENTRICULAR RATE: 64 BPM
WBC # BLD AUTO: 6.18 THOUSAND/UL (ref 4.31–10.16)

## 2023-05-10 NOTE — ED PROVIDER NOTES
History  Chief Complaint   Patient presents with   • Fall     Patient had unwitnessed fall at nursing home yesterday  Staff noticed bruising to left knee this morning  Patient c/o b/l knee pain  Patient does not remember fall  Hx of dementia  Unknown loc or head strike  +thinners     83 yo F sent to ED from care facility with concern for unwitnessed fall, based on staff observing significant ecchymosis of her left knee and some bruising on the pinna of left ear  This was based on last known normal yesterday when she was not observed to have these findings  However, there was no witness injury or fall  The patient cannot provide any details of injury  She is alert, and follows commands, will answer questions, but is not oriented to time, has no short term recollection, and confabulates answers  She is not c/o any specifici localizing pain  History limited by:  Dementia      Prior to Admission Medications   Prescriptions Last Dose Informant Patient Reported? Taking? Ascorbic Acid (VITAMIN C) 500 MG CAPS   Yes No   Sig: Take 500 mg by mouth daily   Cholecalciferol (Vitamin D3) 1 25 MG (83899 UT) CAPS   Yes No   Si,000U daily   Cranberry 450 MG CAPS   Yes No   Sig: Take 1 capsule by mouth in the morning   QUEtiapine (SEROquel) 25 mg tablet   No No   Sig: Take 0 5 tablets (12 5 mg total) by mouth daily at bedtime Hold if sedated   Valerian 500 MG CAPS   Yes No   Sig: Take by mouth in the morning   acetaminophen (TYLENOL) 500 mg tablet   Yes No   Sig: Take 500 mg by mouth every 6 (six) hours as needed for mild pain   apixaban (ELIQUIS) 5 mg   Yes No   Sig: Take 5 mg by mouth 2 (two) times a day   aspirin 81 mg chewable tablet   No No   Sig: Chew 1 tablet (81 mg total) daily Do not start before 2023     atorvastatin (LIPITOR) 10 mg tablet   Yes No   Sig: Take 10 mg by mouth daily   bisacodyl (DULCOLAX) 5 mg EC tablet   Yes No   Sig: Take 5 mg by mouth daily as needed for constipation   calcium carbonate (TUMS) 500 mg chewable tablet   Yes No   Sig: Chew 1 tablet daily   docusate sodium (COLACE) 100 mg capsule   Yes No   Sig: Take 100 mg by mouth 2 (two) times a day   furosemide (LASIX) 40 mg tablet   Yes No   Sig: Take 1 tablet by mouth daily   loperamide (IMODIUM) 2 mg capsule   Yes No   polyethylene glycol (MIRALAX) 17 g packet   Yes No   Sig: Take 17 g by mouth daily   potassium chloride (K-DUR,KLOR-CON) 20 mEq tablet   Yes No   Sig: Take 20 mEq by mouth daily   thiamine (VITAMIN B1) 100 mg tablet   Yes No   zonisamide (ZONEGRAN) 100 mg capsule   No No   Sig: Take 1 capsule (100 mg total) by mouth daily Do not start before November 19, 2022  Patient taking differently: Take 100 mg by mouth every 12 (twelve) hours      Facility-Administered Medications: None       Past Medical History:   Diagnosis Date   • A-fib ([facility] )    • CAD (coronary artery disease)    • CHF (congestive heart failure) (HCC)    • CKD (chronic kidney disease) stage 2, GFR 60-89 ml/min    • Dementia ([facility] [address] )    • Hypertension    • Hypokalemia    • Knee effusion    • Memory loss    • Seizure ([facility] [address] )    • Urinary tract infection        Past Surgical History:   Procedure Laterality Date   • CARDIAC ELECTROPHYSIOLOGY PROCEDURE N/A 1/26/2023    Procedure: Cardiac pacer implant;  Surgeon: Shaun Vail DO;  Location: [facility]; Service: Cardiology   • CHOLECYSTECTOMY     • REPLACEMENT TOTAL KNEE Left 2008       Family History   Problem Relation Age of Onset   • Lung cancer Father    • Leukemia Brother    • Breast cancer Daughter      I have reviewed and agree with the history as documented      E-Cigarette/Vaping   • E-Cigarette Use Never User      E-Cigarette/Vaping Substances   • Nicotine No    • THC No    • CBD No    • Flavoring No    • Other No    • Unknown No      Social History     Tobacco Use   • Smoking status: Never   • Smokeless tobacco: Never   Vaping Use   • Vaping Use: Never used   Substance Use Topics   • Alcohol use: Never   • Drug use: Never       Review of Systems    Physical Exam  Physical Exam  Vitals and nursing note reviewed  Constitutional:       General: She is not in acute distress  Appearance: She is well-developed  She is not diaphoretic  HENT:      Head: Normocephalic and atraumatic  No Oquendo's sign or laceration  Jaw: There is normal jaw occlusion  Right Ear: External ear normal       Left Ear: External ear normal       Nose: Nose normal    Eyes:      Conjunctiva/sclera: Conjunctivae normal       Pupils: Pupils are equal, round, and reactive to light  Cardiovascular:      Rate and Rhythm: Normal rate and regular rhythm  Pulses:           Dorsalis pedis pulses are 1+ on the right side and 1+ on the left side  Posterior tibial pulses are 1+ on the right side and 1+ on the left side  Pulmonary:      Effort: Pulmonary effort is normal    Abdominal:      Palpations: Abdomen is soft  Musculoskeletal:         General: Normal range of motion  Cervical back: Normal range of motion and neck supple  Left knee: Swelling and ecchymosis (deep purple hematoma over the patella, this knee has a prosthetic) present  Tenderness present  Right lower leg: Tenderness present  No deformity  1+ Edema present  Left lower le+ Edema present  Skin:     General: Skin is warm and dry  Findings: No rash  Neurological:      Mental Status: She is alert  She is disoriented  Cranial Nerves: Cranial nerves 2-12 are intact  Motor: No abnormal muscle tone or pronator drift  Psychiatric:         Speech: Speech is delayed  Behavior: Behavior is slowed  Cognition and Memory: Memory is impaired           Vital Signs  ED Triage Vitals [05/10/23 0830]   Temperature Pulse Respirations Blood Pressure SpO2   98 2 °F (36 8 °C) 62 16 (!) 174/82 98 %      Temp Source Heart Rate Source Patient Position - Orthostatic VS BP Location FiO2 (%)   Oral Monitor Lying Right arm --      Pain Score       --           Vitals:    05/10/23 0830 05/10/23 1030 05/10/23 1100   BP: (!) 174/82 145/67 160/77   Pulse: 62 62 84   Patient Position - Orthostatic VS: Lying Lying          Visual Acuity      ED Medications  Medications - No data to display    Diagnostic Studies  Results Reviewed     Procedure Component Value Units Date/Time    CBC and differential [[id number]]  (Abnormal) Collected: 05/10/23 0852    Lab Status: Final result Specimen: Blood from Arm, Left Updated: 05/10/23 1012     WBC 6 18 Thousand/uL      RBC 4 14 Million/uL      Hemoglobin 13 5 g/dL      Hematocrit 40 8 %      MCV 99 fL      MCH 32 6 pg      MCHC 33 1 g/dL      RDW 14 8 %      MPV 11 4 fL      Platelets 718 Thousands/uL      nRBC 0 /100 WBCs      Neutrophils Relative 64 %      Immat GRANS % 0 %      Lymphocytes Relative 23 %      Monocytes Relative 10 %      Eosinophils Relative 2 %      Basophils Relative 1 %      Neutrophils Absolute 3 97 Thousands/µL      Immature Grans Absolute 0 02 Thousand/uL      Lymphocytes Absolute 1 41 Thousands/µL      Monocytes Absolute 0 62 Thousand/µL      Eosinophils Absolute 0 11 Thousand/µL      Basophils Absolute 0 05 Thousands/µL     Procalcitonin [[id number]]  (Normal) Collected: 05/10/23 0852    Lab Status: Final result Specimen: Blood from Arm, Left Updated: 05/10/23 0933     Procalcitonin <0 05 ng/ml     Protime-INR [[id number]]  (Abnormal) Collected: 05/10/23 0852    Lab Status: Final result Specimen: Blood from Arm, Left Updated: 05/10/23 0929     Protime 15 0 seconds      INR 1 18    APTT [[id number]]  (Normal) Collected: 05/10/23 0852    Lab Status: Final result Specimen: Blood from Arm, Left Updated: 05/10/23 0929     PTT 32 seconds     Comprehensive metabolic panel [[id number]]  (Abnormal) Collected: 05/10/23 0852    Lab Status: Final result Specimen: Blood from Arm, Left Updated: 05/10/23 0920     Sodium 142 mmol/L      Potassium 4 1 mmol/L      Chloride 112 mmol/L      CO2 25 mmol/L      ANION GAP 5 mmol/L      BUN 24 mg/dL      Creatinine 0 84 mg/dL      Glucose 89 mg/dL      Calcium 9 7 mg/dL      AST 21 U/L      ALT 18 U/L      Alkaline Phosphatase 90 U/L      Total Protein 6 8 g/dL      Albumin 4 1 g/dL      Total Bilirubin 0 77 mg/dL      eGFR 63 ml/min/1 73sq m     Narrative:      [place] guidelines for Chronic Kidney Disease (CKD):   •  Stage 1 with normal or high GFR (GFR > 90 mL/min/1 73 square meters)  •  Stage 2 Mild CKD (GFR = 60-89 mL/min/1 73 square meters)  •  Stage 3A Moderate CKD (GFR = 45-59 mL/min/1 73 square meters)  •  Stage 3B Moderate CKD (GFR = 30-44 mL/min/1 73 square meters)  •  Stage 4 Severe CKD (GFR = 15-29 mL/min/1 73 square meters)  •  Stage 5 End Stage CKD (GFR <15 mL/min/1 73 square meters)  Note: GFR calculation is accurate only with a steady state creatinine    Lactic acid, plasma (w/reflex if result > 2 0) [[id number]]  (Normal) Collected: 05/10/23 0852    Lab Status: Final result Specimen: Blood from Arm, Left Updated: 05/10/23 0919     LACTIC ACID 1 1 mmol/L     Narrative:      Result may be elevated if tourniquet was used during collection  CT head without contrast   Final Result by Karen Bain MD (05/10 0921)      No acute intracranial abnormality                    Workstation performed: [id number]         XR knee 4+ views left injury    (Results Pending)   XR tibia fibula 2 views RIGHT    (Results Pending)              Procedures  ECG 12 Lead Documentation Only    Date/Time: 5/10/2023 8:35 AM  Performed by: Mandi Huitron MD  Authorized by: Mandi Huitron MD     Rate:     ECG rate:  64  Rhythm:     Rhythm: paced               ED Course  ED Course as of 05/10/23 1327   Wed May 10, 2023   0919 CT head without contrast  My independent interpretation of imaging:   no acute traumatic findings   0920 XR knee 4+ views left injury  My independent interpretation of imaging:   no fracture, hardware intact   0920 XR tibia fibula 2 views RIGHT  My independent interpretation of imaging:   DJD, no fracture   1019 ED labwork is reassuring, no findings requiring intervention nor suggestive of provoking factor  No concern for infection at this time  1029 I updated her son Dona Domínguez who was aware she was sent to the ED for evaluation  I let him know her ED findings are reassuring, and for now, I think she can go back and observe for increasing swelling, or other new concerns  Medical Decision Making  Based on history, physical exam, and clinical acumen I am considering or excluding the following differential diagnoses:  Fall is possible but can't be confirmed due to patient's inability to recall, secondary injury, provoking conditions anemia, volume depletion, electrolyte abnormalities, traumatic ICH, left knee fracture, hardware failure, right tib/fib injury  I am less concerned about sepsis or ischemia of the limb, but I am using labwork and serial exam to exclude    Amount and/or Complexity of Data Reviewed  Labs: ordered  Radiology: ordered  Decision-making details documented in ED Course  Disposition  Final diagnoses:   Contusion of left knee, initial encounter   Contusion of left ear, initial encounter     Time reflects when diagnosis was documented in both MDM as applicable and the Disposition within this note     Time User Action Codes Description Comment    5/10/2023 10:19 AM Susana DURHAM Add [S80 [id number]] Contusion of left knee, initial encounter     5/10/2023 10:19 AM Danyel Toure Add [[id number]] Contusion of left ear, initial encounter       ED Disposition     ED Disposition   Discharge    Condition   Good    Date/Time   Wed May 10, 2023 10:32 AM    Comment   Say Rodriges discharge to home/self care                 Follow-up Information    None         Current Discharge Medication List      CONTINUE these medications which have NOT CHANGED    Details   acetaminophen (TYLENOL) 500 mg tablet Take 500 mg by mouth every 6 (six) hours as needed for mild pain      apixaban (ELIQUIS) 5 mg Take 5 mg by mouth 2 (two) times a day      Ascorbic Acid (VITAMIN C) 500 MG CAPS Take 500 mg by mouth daily      aspirin 81 mg chewable tablet Chew 1 tablet (81 mg total) daily Do not start before February 18, 2023  Qty: 30 tablet, Refills: 0    Associated Diagnoses: Stroke (cerebrum) (HCC)      atorvastatin (LIPITOR) 10 mg tablet Take 10 mg by mouth daily      bisacodyl (DULCOLAX) 5 mg EC tablet Take 5 mg by mouth daily as needed for constipation      calcium carbonate (TUMS) 500 mg chewable tablet Chew 1 tablet daily      Cholecalciferol (Vitamin D3) 1 25 MG (05107 UT) CAPS 2,000U daily      Cranberry 450 MG CAPS Take 1 capsule by mouth in the morning      docusate sodium (COLACE) 100 mg capsule Take 100 mg by mouth 2 (two) times a day      furosemide (LASIX) 40 mg tablet Take 1 tablet by mouth daily      loperamide (IMODIUM) 2 mg capsule       polyethylene glycol (MIRALAX) 17 g packet Take 17 g by mouth daily      potassium chloride (K-DUR,KLOR-CON) 20 mEq tablet Take 20 mEq by mouth daily      QUEtiapine (SEROquel) 25 mg tablet Take 0 5 tablets (12 5 mg total) by mouth daily at bedtime Hold if sedated  Qty: 30 tablet, Refills: 0    Associated Diagnoses: New onset seizure (HCC)      thiamine (VITAMIN B1) 100 mg tablet       Valerian 500 MG CAPS Take by mouth in the morning      zonisamide (ZONEGRAN) 100 mg capsule Take 1 capsule (100 mg total) by mouth daily Do not start before November 19, 2022  Refills: 0    Associated Diagnoses: New onset seizure ([address] )             No discharge procedures on file      PDMP Review     None          ED Provider  Electronically Signed by           Edith Gill MD  05/10/23 1691

## 2023-05-10 NOTE — DISCHARGE INSTRUCTIONS
The knee discoloration appears to be a hematoma that will resolve on its own  She can bear weight as she tolerates, but she should remain under fall precautions because she is always at high risk

## 2023-06-02 ENCOUNTER — IN-CLINIC DEVICE VISIT (OUTPATIENT)
Dept: CARDIOLOGY CLINIC | Facility: CLINIC | Age: 88
End: 2023-06-02

## 2023-06-02 DIAGNOSIS — Z95.0 CARDIAC PACEMAKER IN SITU: Primary | ICD-10-CM

## 2023-06-02 NOTE — PROGRESS NOTES
Results for orders placed or performed in visit on 06/02/23   Cardiac EP device report    Narrative    MDT SC PM/ACTIVE SYSTEM IS MRI CONDITIONAL  DEVICE INTERROGATED IN THE [facility] OFFICE: BATTERY VOLTAGE 41 [facility]   93%  ALL AVAILABLE LEAD PARAMETERS & TRENDS WITHIN NORMAL LIMITS  NO SIGNIFICANT HIGH RATE EPISODES  NO PROGRAMMING CHANGES MADE TO DEVICE PARAMETERS  NORMAL DEVICE FUNCTION   NC

## 2023-06-08 ENCOUNTER — OFFICE VISIT (OUTPATIENT)
Dept: NEUROLOGY | Facility: CLINIC | Age: 88
End: 2023-06-08
Payer: COMMERCIAL

## 2023-06-08 VITALS — HEART RATE: 75 BPM | SYSTOLIC BLOOD PRESSURE: 130 MMHG | DIASTOLIC BLOOD PRESSURE: 80 MMHG

## 2023-06-08 DIAGNOSIS — G40.909 EPILEPSY AFTER STROKE (HCC): Primary | ICD-10-CM

## 2023-06-08 DIAGNOSIS — I63.9 EMBOLIC STROKE (HCC): ICD-10-CM

## 2023-06-08 DIAGNOSIS — I69.398 EPILEPSY AFTER STROKE (HCC): Primary | ICD-10-CM

## 2023-06-08 PROCEDURE — 99215 OFFICE O/P EST HI 40 MIN: CPT | Performed by: PSYCHIATRY & NEUROLOGY

## 2023-06-08 NOTE — PROGRESS NOTES
Patient ID: Shelby Alcaraz is a 82 y o  female  Assessment/Plan:    No problem-specific Assessment & Plan notes found for this encounter  {Assess/PlanSmartLinks:54371}       Subjective:    HPI    {[facility] HPI texts:82539}    {Common ambulatory SmartLinks:46634}         Objective:    Blood pressure 130/80, pulse 75  Physical Exam    Neurological Exam      ROS:    Review of Systems   Constitutional: Negative  Negative for appetite change and fever  HENT: Negative  Negative for hearing loss, tinnitus, trouble swallowing and voice change  Eyes: Negative  Negative for photophobia, pain and visual disturbance  Respiratory: Negative  Negative for shortness of breath  Cardiovascular: Negative  Negative for palpitations  Gastrointestinal: Negative  Negative for nausea and vomiting  Endocrine: Negative  Negative for cold intolerance  Genitourinary: Negative  Negative for dysuria, frequency and urgency  Musculoskeletal: Positive for gait problem  Negative for myalgias and neck pain  Skin: Negative  Negative for rash  Allergic/Immunologic: Negative  Neurological: Negative for dizziness, tremors, seizures, syncope, facial asymmetry, speech difficulty, weakness, light-headedness, numbness and headaches  Hematological: Negative  Does not bruise/bleed easily  Psychiatric/Behavioral: Negative  Negative for confusion, hallucinations and sleep disturbance

## 2023-06-08 NOTE — PATIENT INSTRUCTIONS
Continue zonegran 100mg two times daily    Get EEG for seizure followup before 6 months    Continue aspirin 81mg daily and Eliquis 5mg twice daily    Followup at office in 6 months

## 2023-06-08 NOTE — ASSESSMENT & PLAN NOTE
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 on Zonegran for hospital followup from 2/15/2023 and 4/1/2023  Patient has had multiple strokes since 7/2022  Patient had a left MCA stroke while off eliquis for Afib and was placed on eliquis  Patient had admission 2/15/2023 for left facial droop and left upper extremity weakness with right M2 occlusion and was thought to be Eliquis failure vs potential lowered GI absorption of Eliquis with previous GI infection  Patient also had admission 11/2022 for 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 which she was initially on keppra and then placed on zonegran  Another admission 4/2023 at [facility] for right facial droop but deemed not likely stroke and most likely in setting of COVID  Since these hospital visits, patient has been stable with stable memory impairment, stable aphasia, and residual left arm and left leg weakness  Exam showed continued aphasia, subtle right facial nasolabial flattening, residual left arm and left leg weakness  Patient ambulates via walker and is able to make short steps without any assistance  Workup:  MRI brain wo contrast 7/16/2022: Left MCA infarct  CTA head and neck w and wo contrast 2/15/2023: Right M2 occlusion; no significant LVO, stenosis otherwise  CT head 2/15/2023: New focal density within right M2 branch; Stable chronic left MCA infarct  CTA head and neck 4/1/2023: no new findings compared to above  CT head 5/10/2023: No acute intracranial abnormality  Hem A1C 2/2023: 5 4%  LDL 2/15/2023: 37    Impression: Patient has had new embolic infarction in setting of Afib 2/15/2023 possibly either Eliquis failure vs reduced GI absorption   Previous strokes as well most likely in setting of Afib    Plan:  Continue aspirin 81mg daily and eliquis 5mg BID  Patient to followup in 6 months at the office (perferably after EEG routine done)  Patient and son agreeable to above

## 2023-06-08 NOTE — ASSESSMENT & PLAN NOTE
Patient had an admission for 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/2022 with right gaze deviation and tonic clonic activity with possible postictal aphasia  Patient initially was on keppra in the hospital which was transitioned to zonegran due to agitation  Patient has had no seizure like activity since  EEG as reported below did not show electrographic seizures or areas of highly epileptogenic potential     EEG routine 12/13/2022: moderate cerebral dysfunction findings but no seizures or areas of high epileptogenic potential  EEG routine  1/11/2023: Moderate cerebral nonspecific dysfunction  EEG 4/4/2023: diffuse encephalopathy    Plan:  Continue zonegran 100mg BID for now  Would recheck EEG in 6 months to assess for seizures/ areas of highly epileptogenic potential  Patient and son agreeable to above

## 2023-06-08 NOTE — PROGRESS NOTES
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 on Zonegran for hospital followup from 2/15/2023 and 4/1/2023  Patient has had multiple strokes since 7/2022  Patient had a left MCA stroke while off eliquis for Afib and was placed on eliquis  Patient had admission 2/15/2023 for left facial droop and left upper extremity weakness with right M2 occlusion and was thought to be Eliquis failure vs potential lowered GI absorption of Eliquis with previous GI infection  Patient also had admission 11/2022 for 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 which she was initially on keppra and then placed on zonegran  Another admission 4/2023 at [facility] for right facial droop but deemed not likely stroke and most likely in setting of COVID  Since these hospital visits, patient has been stable with stable memory impairment, stable aphasia, and residual left arm and left leg weakness  Exam showed continued aphasia, subtle right facial nasolabial flattening, residual left arm and left leg weakness  Patient ambulates via walker and is able to make short steps without any assistance  Workup:  MRI brain wo contrast 7/16/2022: Left MCA infarct  CTA head and neck w and wo contrast 2/15/2023: Right M2 occlusion; no significant LVO, stenosis otherwise  CT head 2/15/2023: New focal density within right M2 branch; Stable chronic left MCA infarct  CTA head and neck 4/1/2023: no new findings compared to above  CT head 5/10/2023: No acute intracranial abnormality  Hem A1C 2/2023: 5 4%  LDL 2/15/2023: 37    Impression: Patient has had new embolic infarction in setting of Afib 2/15/2023 possibly either Eliquis failure vs reduced GI absorption   Previous strokes as well most likely in setting of Afib    Plan:  Continue aspirin 81mg daily and eliquis 5mg BID  Patient to followup in 6 months at the office (perferably after EEG routine done)  Patient and son agreeable to above        Post-stroke epilepsy  Patient had an admission for 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/2022 with right gaze deviation and tonic clonic activity with possible postictal aphasia  Patient initially was on keppra in the hospital which was transitioned to zonegran due to agitation  Patient has had no seizure like activity since  EEG as reported below did not show electrographic seizures or areas of highly epileptogenic potential     EEG routine 12/13/2022: moderate cerebral dysfunction findings but no seizures or areas of high epileptogenic potential  EEG routine  1/11/2023: Moderate cerebral nonspecific dysfunction  EEG 4/4/2023: diffuse encephalopathy    Plan:  Continue zonegran 100mg BID for now  Would recheck EEG in 6 months to assess for seizures/ areas of highly epileptogenic potential  Patient and son agreeable to above  Diagnoses and all orders for this visit:    Post-stroke epilepsy  -     EEG awake or drowsy routine; Future    Embolic stroke [facility])           Subjective:    HPI    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, Afib on Eliquis, CAD, CKD, htn, dementia, 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 on Zonegran for hospital followup from 2/15/2023 and 4/1/2023  Patient presented to hospital on 2/15/2023 due to being less responsive with right gaze and reported left sided facial droop and left upper extremity weakness for a stroke alert  Patient not TNK or thrombectomy candidate  CT head showed new focal density within right M2 branch and CTA head and neck showed Right M2 occlusion  MRI unable to be done due to recent pacemaker placement   Clinically felt not large stroke and thus aspirin added with Eliquis and felt recent GI illness led to decreased absorption/efficacy of eliquis  Patient also had a TIA admission 4/1/2023 for right sided facial droop and expressive aphasia  MRI not completed as not suspective of acute stroke  Patient was found to be COVID positive on this admission  CTA head and neck showed no large vessel occlusion and CT head and neck showed subacute or old infarct in left parietal lobe  EEG routine 1/20/2023 showed diffuse theta and occasional delta frequency slowing suggesting moderate nonspecific diffuse cerebral dysfunction  Patient also admitted 11/14/2023 for post stroke epilepsy initially on keppra and then on Zonegran to be on zonegran 100mg BID due to agitation  Initial presentation was right gaze deviation, tonic clonic activity and potential postictal aphasia before resolution  Cardiac pacer implant for Afib placed 1/13/2023  Patient also had initial stroke 7/2022 after she stopped eliquis for hematoma from fall  Loss consciousness and then restarted Eliquis  Left MCA territory stroke  Since the hospital visits, speech is at baseline and feels is pretty fluent  She has baseline memory impairment  No numbness no weakness, and uses a walker to walk and loses balance  Patient on Eliquis 5mg BID, aspirin 81mg dialy and on lipitor 40mg daily  Hem a1c 5 4% and LDL 37  Patient is tolerating the medication with clear side effects  The following portions of the patient's history were reviewed and updated as appropriate:   She  has a past medical history of A-fib ([address] ), CAD (coronary artery disease), CHF (congestive heart failure) ([address] ), CKD (chronic kidney disease) stage 2, GFR 60-89 ml/min, Dementia ([address] ), Hypertension, Hypokalemia, Knee effusion, Memory loss, Seizure ([address] ), and Urinary tract infection    She   Patient Active Problem List    Diagnosis Date Noted   • Class 1 obesity due to excess calories without serious comorbidity with body mass index (BMI) of 30 0 to 30 9 in adult 05/02/2023   • Nodule of middle lobe of right lung 28/62/6380   • Embolic stroke ([facility] ) 12/25/3203   • Prolonged Q-T interval on ECG 01/27/2023   • Syncope 01/21/2023   • 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 behavioral disturbance, psychotic disturbance, mood disturbance, or anxiety ([facility] ) 01/21/2023   • Sick sinus syndrome ([facility] ) 01/11/2023   • Stroke-like symptoms 01/11/2023   • History of stroke 01/10/2023   • New onset seizure ([facility] )    • 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   • Urinary retention 11/14/2022   • Venous stasis dermatitis of both lower extremities 09/29/2022   • Gait instability 06/22/2022   • Other fatigue 05/23/2022   • Atypical pneumonia 05/10/2022   • Respiratory insufficiency 05/10/2022   • Acute metabolic encephalopathy 66/88/6205   • Chronic diastolic heart failure ([facility] ) 05/10/2022   • Thrombocytopenia ([facility] ) 05/10/2022   • Other hyperlipidemia 10/06/2021   • 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   • Other insomnia 10/06/2021   • Skin lesion 10/04/2021   • Action tremor 04/01/2020   • Age-related osteoporosis without fracture 04/01/2020   • Atrial fibrillation ([facility] ) 04/10/2015   • Chronic anticoagulation 04/10/2015   • Benign essential hypertension 04/11/2011     She  has a past surgical history that includes Replacement total knee (Left, 2008); Cholecystectomy; and Cardiac electrophysiology procedure (N/A, 1/26/2023)  Her family history includes Breast cancer in her daughter; Leukemia in her brother; Lung cancer in her father  She  reports that she has never smoked  She has never used smokeless tobacco  She reports that she does not drink alcohol and does not use drugs    Current Outpatient Medications   Medication Sig Dispense Refill   • acetaminophen (TYLENOL) 500 mg tablet Take 500 mg by mouth every 6 (six) hours as needed for mild pain     • apixaban (ELIQUIS) 5 mg Take 5 mg by mouth 2 (two) times a day     • Ascorbic Acid (VITAMIN C) 500 MG CAPS Take 500 mg by mouth daily • aspirin 81 mg chewable tablet Chew 1 tablet (81 mg total) daily Do not start before February 18, 2023  30 tablet 0   • atorvastatin (LIPITOR) 10 mg tablet Take 10 mg by mouth daily     • bisacodyl (DULCOLAX) 5 mg EC tablet Take 5 mg by mouth daily as needed for constipation     • calcium carbonate (TUMS) 500 mg chewable tablet Chew 1 tablet daily     • Cholecalciferol (Vitamin D3) 1 25 MG (43671 UT) CAPS 2,000U daily     • Cranberry 450 MG CAPS Take 1 capsule by mouth in the morning     • docusate sodium (COLACE) 100 mg capsule Take 100 mg by mouth 2 (two) times a day     • furosemide (LASIX) 40 mg tablet Take 1 tablet by mouth daily     • loperamide (IMODIUM) 2 mg capsule      • polyethylene glycol (MIRALAX) 17 g packet Take 17 g by mouth daily     • potassium chloride (K-DUR,KLOR-CON) 20 mEq tablet Take 20 mEq by mouth daily     • QUEtiapine (SEROquel) 25 mg tablet Take 0 5 tablets (12 5 mg total) by mouth daily at bedtime Hold if sedated 30 tablet 0   • thiamine (VITAMIN B1) 100 mg tablet      • Valerian 500 MG CAPS Take by mouth in the morning     • zonisamide (ZONEGRAN) 100 mg capsule Take 1 capsule (100 mg total) by mouth daily Do not start before November 19, 2022  (Patient taking differently: Take 100 mg by mouth every 12 (twelve) hours)  0     No current facility-administered medications for this visit  Current Outpatient Medications on File Prior to Visit   Medication Sig   • acetaminophen (TYLENOL) 500 mg tablet Take 500 mg by mouth every 6 (six) hours as needed for mild pain   • apixaban (ELIQUIS) 5 mg Take 5 mg by mouth 2 (two) times a day   • Ascorbic Acid (VITAMIN C) 500 MG CAPS Take 500 mg by mouth daily   • aspirin 81 mg chewable tablet Chew 1 tablet (81 mg total) daily Do not start before February 18, 2023     • atorvastatin (LIPITOR) 10 mg tablet Take 10 mg by mouth daily   • bisacodyl (DULCOLAX) 5 mg EC tablet Take 5 mg by mouth daily as needed for constipation   • calcium carbonate (TUMS) 500 mg chewable tablet Chew 1 tablet daily   • Cholecalciferol (Vitamin D3) 1 25 MG (26998 UT) CAPS 2,000U daily   • Cranberry 450 MG CAPS Take 1 capsule by mouth in the morning   • docusate sodium (COLACE) 100 mg capsule Take 100 mg by mouth 2 (two) times a day   • furosemide (LASIX) 40 mg tablet Take 1 tablet by mouth daily   • loperamide (IMODIUM) 2 mg capsule    • polyethylene glycol (MIRALAX) 17 g packet Take 17 g by mouth daily   • potassium chloride (K-DUR,KLOR-CON) 20 mEq tablet Take 20 mEq by mouth daily   • QUEtiapine (SEROquel) 25 mg tablet Take 0 5 tablets (12 5 mg total) by mouth daily at bedtime Hold if sedated   • thiamine (VITAMIN B1) 100 mg tablet    • Valerian 500 MG CAPS Take by mouth in the morning   • zonisamide (ZONEGRAN) 100 mg capsule Take 1 capsule (100 mg total) by mouth daily Do not start before November 19, 2022  (Patient taking differently: Take 100 mg by mouth every 12 (twelve) hours)     No current facility-administered medications on file prior to visit  She is allergic to morphine            Objective:    Blood pressure 130/80, pulse 75  Physical Exam  Constitutional:       General: She is awake  Eyes:      Extraocular Movements: Extraocular movements intact  Neurological:      Mental Status: She is alert  Deep Tendon Reflexes:      Reflex Scores:       Bicep reflexes are 2+ on the right side and 2+ on the left side  Brachioradialis reflexes are 2+ on the right side and 2+ on the left side  Patellar reflexes are 2+ on the right side and 2+ on the left side  Psychiatric:         Speech: Speech normal          Neurological Exam  Mental Status  Awake and alert  Oriented only to person and place  Baseline not oriented to date  Speech is normal  Mixed aphasia present  Able to read  Word substitutions, incorrect pronunciations of words  Cranial Nerves  CN II: Visual fields full to confrontation  CN III, IV, VI: Extraocular movements intact bilaterally    CN V: Facial sensation is normal   CN VII:  Right: Residual right sided facial droop with mild nasolabial flattening  CN XI: Shoulder shrug strength is normal   CN XII: Tongue midline without atrophy or fasciculations  Motor  Normal muscle bulk throughout  Left upper extremity deltoid and hand  4+/5 strength residual    Right upper extremity deltoid and hand  5/5 strength    Left lower extremity hip flexion 4+/5  Right lower extremity hip flexion 5-/5    Bilateral dorsiflexion at least 4+/5 but patient has difficulty understanding the command  Sensory  Sensation is intact to light touch, pinprick, vibration and proprioception in all four extremities  Reflexes                                            Right                      Left  Brachioradialis                    2+                         2+  Biceps                                 2+                         2+  Patellar                                2+                         2+  Achilles                                Tr                         Tr        ROS:    Review of Systems  Review of Systems   Constitutional: Negative  Negative for appetite change and fever  HENT: Negative  Negative for hearing loss, tinnitus, trouble swallowing and voice change  Eyes: Negative  Negative for photophobia, pain and visual disturbance  Respiratory: Negative  Negative for shortness of breath  Cardiovascular: Negative  Negative for palpitations  Gastrointestinal: Negative  Negative for nausea and vomiting  Endocrine: Negative  Negative for cold intolerance  Genitourinary: Negative  Negative for dysuria, frequency and urgency  Musculoskeletal: Positive for gait problem  Negative for myalgias and neck pain  Skin: Negative  Negative for rash  Allergic/Immunologic: Negative  Neurological: Negative for dizziness, tremors, seizures, syncope, facial asymmetry, speech difficulty, weakness, light-headedness, numbness and headaches  Hematological: Negative  Does not bruise/bleed easily  Psychiatric/Behavioral: Negative  Negative for confusion, hallucinations and sleep disturbance

## 2023-07-03 ENCOUNTER — HOSPITAL ENCOUNTER (OUTPATIENT)
Dept: CT IMAGING | Facility: HOSPITAL | Age: 88
Discharge: HOME | End: 2023-07-03
Attending: INTERNAL MEDICINE
Payer: COMMERCIAL

## 2023-07-03 DIAGNOSIS — R91.1 NODULE OF MIDDLE LOBE OF RIGHT LUNG: ICD-10-CM

## 2023-07-03 PROCEDURE — 71250 CT THORAX DX C-: CPT

## 2023-07-03 PROCEDURE — G1004 CDSM NDSC: HCPCS

## 2023-07-10 NOTE — RESULT ENCOUNTER NOTE
Please call the patient's son. Let him know that her mother's CT chest showed that the area of concern had resolved. This is very reassuring. There is some other mild scarring in the lung which is improved from previous CT. No further CT images are needed. She can follow-up her primary care doctor from here on out.

## 2023-09-07 ENCOUNTER — REMOTE DEVICE CLINIC VISIT (OUTPATIENT)
Dept: CARDIOLOGY CLINIC | Facility: CLINIC | Age: 88
End: 2023-09-07
Payer: COMMERCIAL

## 2023-09-07 DIAGNOSIS — Z95.0 CARDIAC PACEMAKER IN SITU: Primary | ICD-10-CM

## 2023-09-07 PROCEDURE — 93294 REM INTERROG EVL PM/LDLS PM: CPT | Performed by: INTERNAL MEDICINE

## 2023-09-07 PROCEDURE — 93296 REM INTERROG EVL PM/IDS: CPT | Performed by: INTERNAL MEDICINE

## 2023-09-07 NOTE — PROGRESS NOTES
Results for orders placed or performed in visit on 09/07/23   Cardiac EP device report    Narrative    MDT SC PM/ACTIVE SYSTEM IS MRI CONDITIONAL  CARELINK TRANSMISSION: BATTERY STATUS "12 YRS."  98%. ALL AVAILABLE LEAD PARAMETERS WITHIN NORMAL LIMITS. NO SIGNIFICANT HIGH RATE EPISODES. NORMAL DEVICE FUNCTION.  NC

## 2023-09-14 ENCOUNTER — APPOINTMENT (EMERGENCY)
Dept: CT IMAGING | Facility: HOSPITAL | Age: 88
End: 2023-09-14
Payer: COMMERCIAL

## 2023-09-14 ENCOUNTER — APPOINTMENT (EMERGENCY)
Dept: RADIOLOGY | Facility: HOSPITAL | Age: 88
End: 2023-09-14
Payer: COMMERCIAL

## 2023-09-14 ENCOUNTER — HOSPITAL ENCOUNTER (EMERGENCY)
Facility: HOSPITAL | Age: 88
Discharge: HOME | End: 2023-09-15
Attending: EMERGENCY MEDICINE
Payer: COMMERCIAL

## 2023-09-14 VITALS
HEART RATE: 60 BPM | RESPIRATION RATE: 20 BRPM | SYSTOLIC BLOOD PRESSURE: 164 MMHG | DIASTOLIC BLOOD PRESSURE: 75 MMHG | OXYGEN SATURATION: 96 % | TEMPERATURE: 97.8 F

## 2023-09-14 DIAGNOSIS — N39.0 URINARY TRACT INFECTION: ICD-10-CM

## 2023-09-14 DIAGNOSIS — R40.4 TRANSIENT ALTERATION OF AWARENESS: Primary | ICD-10-CM

## 2023-09-14 DIAGNOSIS — R47.01 APHASIA: ICD-10-CM

## 2023-09-14 LAB
2HR DELTA HS TROPONIN: -11 NG/L
4HR DELTA HS TROPONIN: 10 NG/L
ALBUMIN SERPL BCP-MCNC: 4.1 G/DL (ref 3.5–5)
ALP SERPL-CCNC: 90 U/L (ref 34–104)
ALT SERPL W P-5'-P-CCNC: 20 U/L (ref 7–52)
AMORPH URATE CRY URNS QL MICRO: ABNORMAL
ANION GAP SERPL CALCULATED.3IONS-SCNC: 8 MMOL/L
AST SERPL W P-5'-P-CCNC: 29 U/L (ref 13–39)
ATRIAL RATE: 220 BPM
BACTERIA UR QL AUTO: ABNORMAL /HPF
BASOPHILS # BLD AUTO: 0.05 THOUSANDS/ÂΜL (ref 0–0.1)
BASOPHILS NFR BLD AUTO: 1 % (ref 0–1)
BILIRUB SERPL-MCNC: 0.5 MG/DL (ref 0.2–1)
BILIRUB UR QL STRIP: NEGATIVE
BNP SERPL-MCNC: 512 PG/ML (ref 0–100)
BUN SERPL-MCNC: 27 MG/DL (ref 5–25)
CALCIUM SERPL-MCNC: 9.9 MG/DL (ref 8.4–10.2)
CARDIAC TROPONIN I PNL SERPL HS: 29 NG/L
CARDIAC TROPONIN I PNL SERPL HS: 40 NG/L
CARDIAC TROPONIN I PNL SERPL HS: 50 NG/L
CHLORIDE SERPL-SCNC: 111 MMOL/L (ref 96–108)
CLARITY UR: CLEAR
CO2 SERPL-SCNC: 23 MMOL/L (ref 21–32)
COLOR UR: YELLOW
CREAT SERPL-MCNC: 0.86 MG/DL (ref 0.6–1.3)
EOSINOPHIL # BLD AUTO: 0.25 THOUSAND/ÂΜL (ref 0–0.61)
EOSINOPHIL NFR BLD AUTO: 4 % (ref 0–6)
ERYTHROCYTE [DISTWIDTH] IN BLOOD BY AUTOMATED COUNT: 14.5 % (ref 11.6–15.1)
GFR SERPL CREATININE-BSD FRML MDRD: 61 ML/MIN/1.73SQ M
GLUCOSE SERPL-MCNC: 106 MG/DL (ref 65–140)
GLUCOSE SERPL-MCNC: 89 MG/DL (ref 65–140)
GLUCOSE UR STRIP-MCNC: NEGATIVE MG/DL
HCT VFR BLD AUTO: 43.4 % (ref 34.8–46.1)
HGB BLD-MCNC: 14.2 G/DL (ref 11.5–15.4)
HGB UR QL STRIP.AUTO: ABNORMAL
HYALINE CASTS #/AREA URNS LPF: ABNORMAL /LPF
IMM GRANULOCYTES # BLD AUTO: 0.03 THOUSAND/UL (ref 0–0.2)
IMM GRANULOCYTES NFR BLD AUTO: 1 % (ref 0–2)
KETONES UR STRIP-MCNC: NEGATIVE MG/DL
LEUKOCYTE ESTERASE UR QL STRIP: ABNORMAL
LYMPHOCYTES # BLD AUTO: 1.93 THOUSANDS/ÂΜL (ref 0.6–4.47)
LYMPHOCYTES NFR BLD AUTO: 31 % (ref 14–44)
MCH RBC QN AUTO: 31.3 PG (ref 26.8–34.3)
MCHC RBC AUTO-ENTMCNC: 32.7 G/DL (ref 31.4–37.4)
MCV RBC AUTO: 96 FL (ref 82–98)
MONOCYTES # BLD AUTO: 0.7 THOUSAND/ÂΜL (ref 0.17–1.22)
MONOCYTES NFR BLD AUTO: 11 % (ref 4–12)
MUCOUS THREADS UR QL AUTO: ABNORMAL
NEUTROPHILS # BLD AUTO: 3.28 THOUSANDS/ÂΜL (ref 1.85–7.62)
NEUTS SEG NFR BLD AUTO: 52 % (ref 43–75)
NITRITE UR QL STRIP: NEGATIVE
NON-SQ EPI CELLS URNS QL MICRO: ABNORMAL /HPF
NRBC BLD AUTO-RTO: 0 /100 WBCS
PH UR STRIP.AUTO: 6 [PH] (ref 4.5–8)
PLATELET # BLD AUTO: 133 THOUSANDS/UL (ref 149–390)
PMV BLD AUTO: 12.3 FL (ref 8.9–12.7)
POTASSIUM SERPL-SCNC: 4.3 MMOL/L (ref 3.5–5.3)
PROT SERPL-MCNC: 7.4 G/DL (ref 6.4–8.4)
PROT UR STRIP-MCNC: ABNORMAL MG/DL
QRS AXIS: -38 DEGREES
QRSD INTERVAL: 130 MS
QT INTERVAL: 478 MS
QTC INTERVAL: 481 MS
RBC # BLD AUTO: 4.53 MILLION/UL (ref 3.81–5.12)
RBC #/AREA URNS AUTO: ABNORMAL /HPF
SODIUM SERPL-SCNC: 142 MMOL/L (ref 135–147)
SP GR UR STRIP.AUTO: >=1.03 (ref 1–1.03)
T WAVE AXIS: 164 DEGREES
UROBILINOGEN UR QL STRIP.AUTO: 2 E.U./DL
VENTRICULAR RATE: 61 BPM
WBC # BLD AUTO: 6.24 THOUSAND/UL (ref 4.31–10.16)
WBC #/AREA URNS AUTO: ABNORMAL /HPF

## 2023-09-14 PROCEDURE — 70450 CT HEAD/BRAIN W/O DYE: CPT

## 2023-09-14 PROCEDURE — 87086 URINE CULTURE/COLONY COUNT: CPT

## 2023-09-14 PROCEDURE — 84484 ASSAY OF TROPONIN QUANT: CPT | Performed by: EMERGENCY MEDICINE

## 2023-09-14 PROCEDURE — 93010 ELECTROCARDIOGRAM REPORT: CPT | Performed by: INTERNAL MEDICINE

## 2023-09-14 PROCEDURE — 83880 ASSAY OF NATRIURETIC PEPTIDE: CPT | Performed by: EMERGENCY MEDICINE

## 2023-09-14 PROCEDURE — 36415 COLL VENOUS BLD VENIPUNCTURE: CPT | Performed by: EMERGENCY MEDICINE

## 2023-09-14 PROCEDURE — 80053 COMPREHEN METABOLIC PANEL: CPT | Performed by: EMERGENCY MEDICINE

## 2023-09-14 PROCEDURE — 71046 X-RAY EXAM CHEST 2 VIEWS: CPT

## 2023-09-14 PROCEDURE — 99285 EMERGENCY DEPT VISIT HI MDM: CPT | Performed by: EMERGENCY MEDICINE

## 2023-09-14 PROCEDURE — 85025 COMPLETE CBC W/AUTO DIFF WBC: CPT | Performed by: EMERGENCY MEDICINE

## 2023-09-14 PROCEDURE — 82948 REAGENT STRIP/BLOOD GLUCOSE: CPT

## 2023-09-14 PROCEDURE — 93005 ELECTROCARDIOGRAM TRACING: CPT

## 2023-09-14 PROCEDURE — 81001 URINALYSIS AUTO W/SCOPE: CPT

## 2023-09-14 PROCEDURE — G1004 CDSM NDSC: HCPCS

## 2023-09-14 PROCEDURE — 99284 EMERGENCY DEPT VISIT MOD MDM: CPT

## 2023-09-14 PROCEDURE — 96372 THER/PROPH/DIAG INJ SC/IM: CPT

## 2023-09-14 RX ORDER — NITROFURANTOIN 25; 75 MG/1; MG/1
100 CAPSULE ORAL 2 TIMES DAILY
Qty: 10 CAPSULE | Refills: 0 | Status: SHIPPED | OUTPATIENT
Start: 2023-09-14 | End: 2023-09-19

## 2023-09-14 RX ORDER — NITROFURANTOIN 25; 75 MG/1; MG/1
100 CAPSULE ORAL 2 TIMES DAILY WITH MEALS
Status: DISCONTINUED | OUTPATIENT
Start: 2023-09-15 | End: 2023-09-15

## 2023-09-14 RX ORDER — HALOPERIDOL 5 MG/ML
5 INJECTION INTRAMUSCULAR ONCE
Status: COMPLETED | OUTPATIENT
Start: 2023-09-14 | End: 2023-09-14

## 2023-09-14 RX ADMIN — HALOPERIDOL LACTATE 5 MG: 5 INJECTION, SOLUTION INTRAMUSCULAR at 18:11

## 2023-09-14 NOTE — ED NOTES
Pt's daughter in law leaves info d/t her  being away. Requests updates as needed. Pt agreeable.    Melani Standing- [phone]     Matheus Beltre RN  09/14/23 [id number]

## 2023-09-14 NOTE — ED PROVIDER NOTES
History  Chief Complaint   Patient presents with   • Syncope     EMS reports staff claiming pt passed out for 5 min. 46-year-old male presents for evaluation of witnessed unresponsive episode. Patient appeared to slump over and be unresponsive for a matter of several minutes. No seizure-like activity, no loss of bowel or bladder, no tongue biting. History provided by:  Relative and EMS personnel  History limited by: aphasia. Syncope      Prior to Admission Medications   Prescriptions Last Dose Informant Patient Reported? Taking? Ascorbic Acid (VITAMIN C) 500 MG CAPS  Outside Facility (Specify) Yes No   Sig: Take 500 mg by mouth daily   Cholecalciferol (Vitamin D3) 1.25 MG (44181 UT) CAPS  Outside Facility (Specify) Yes No   Si,000U daily   Cranberry 450 MG CAPS  Outside Facility (Specify) Yes No   Sig: Take 1 capsule by mouth in the morning   QUEtiapine (SEROquel) 25 mg tablet   No No   Sig: Take 0.5 tablets (12.5 mg total) by mouth daily at bedtime Hold if sedated   Valerian 500 MG CAPS   Yes No   Sig: Take by mouth in the morning   acetaminophen (TYLENOL) 500 mg tablet  Outside Facility (Specify) Yes No   Sig: Take 500 mg by mouth every 6 (six) hours as needed for mild pain   apixaban (ELIQUIS) 5 mg  Outside Facility (Specify) Yes No   Sig: Take 5 mg by mouth 2 (two) times a day   aspirin 81 mg chewable tablet   No No   Sig: Chew 1 tablet (81 mg total) daily Do not start before 2023.    atorvastatin (LIPITOR) 10 mg tablet  Outside Facility (Specify) Yes No   Sig: Take 10 mg by mouth daily   bisacodyl (DULCOLAX) 5 mg EC tablet   Yes No   Sig: Take 5 mg by mouth daily as needed for constipation   calcium carbonate (TUMS) 500 mg chewable tablet  Outside Facility (Specify) Yes No   Sig: Chew 1 tablet daily   docusate sodium (COLACE) 100 mg capsule  Outside Facility (Specify) Yes No   Sig: Take 100 mg by mouth 2 (two) times a day   furosemide (LASIX) 40 mg tablet  Outside Facility (Specify) Yes No   Sig: Take 1 tablet by mouth daily   loperamide (IMODIUM) 2 mg capsule   Yes No   polyethylene glycol (MIRALAX) 17 g packet  Outside Facility (Specify) Yes No   Sig: Take 17 g by mouth daily   potassium chloride (K-DUR,KLOR-CON) 20 mEq tablet  Outside Facility (Specify) Yes No   Sig: Take 20 mEq by mouth daily   thiamine (VITAMIN B1) 100 mg tablet  Outside Facility (Specify) Yes No   zonisamide (ZONEGRAN) 100 mg capsule   No No   Sig: Take 1 capsule (100 mg total) by mouth daily Do not start before November 19, 2022. Patient taking differently: Take 100 mg by mouth every 12 (twelve) hours      Facility-Administered Medications: None       Past Medical History:   Diagnosis Date   • A-fib ([address])    • CAD (coronary artery disease)    • CHF (congestive heart failure) (HCC)    • CKD (chronic kidney disease) stage 2, GFR 60-89 ml/min    • Dementia ([address])    • Hypertension    • Hypokalemia    • Knee effusion    • Memory loss    • Seizure ([address])    • Urinary tract infection        Past Surgical History:   Procedure Laterality Date   • CARDIAC ELECTROPHYSIOLOGY PROCEDURE N/A 1/26/2023    Procedure: Cardiac pacer implant;  Surgeon: Rachael Mcduffie DO;  Location: [facility]; Service: Cardiology   • CHOLECYSTECTOMY     • REPLACEMENT TOTAL KNEE Left 2008       Family History   Problem Relation Age of Onset   • Lung cancer Father    • Leukemia Brother    • Breast cancer Daughter      I have reviewed and agree with the history as documented.     E-Cigarette/Vaping   • E-Cigarette Use Never User      E-Cigarette/Vaping Substances   • Nicotine No    • THC No    • CBD No    • Flavoring No    • Other No    • Unknown No      Social History     Tobacco Use   • Smoking status: Never   • Smokeless tobacco: Never   Vaping Use   • Vaping Use: Never used   Substance Use Topics   • Alcohol use: Never   • Drug use: Never       Review of Systems   Unable to perform ROS: Other (aphasia)   Cardiovascular: Positive for syncope. Physical Exam  Physical Exam  Vitals and nursing note reviewed. Constitutional:       Appearance: Normal appearance. Eyes:      Extraocular Movements: Extraocular movements intact. Pupils: Pupils are equal, round, and reactive to light. Cardiovascular:      Rate and Rhythm: Normal rate and regular rhythm. Pulmonary:      Effort: Pulmonary effort is normal.      Breath sounds: Normal breath sounds. Abdominal:      General: There is no distension. Palpations: There is no mass. Tenderness: There is no abdominal tenderness. Hernia: No hernia is present. Musculoskeletal:      Cervical back: Normal range of motion. No rigidity or tenderness. Right lower leg: No edema. Left lower leg: No edema. Lymphadenopathy:      Cervical: No cervical adenopathy. Neurological:      General: No focal deficit present. Mental Status: She is alert. Mental status is at baseline. Vital Signs  ED Triage Vitals   Temperature Pulse Respirations Blood Pressure SpO2   09/14/23 1512 09/14/23 1512 09/14/23 1512 09/14/23 1512 09/14/23 1512   97.8 °F (36.6 °C) 87 16 143/80 98 %      Temp src Heart Rate Source Patient Position - Orthostatic VS BP Location FiO2 (%)   -- 09/14/23 2130 09/14/23 1512 09/14/23 1512 --    Monitor Sitting Right arm       Pain Score       --                  Vitals:    09/14/23 1512 09/14/23 1830 09/14/23 2130   BP: 143/80 115/57 134/60   Pulse: 87 61 60   Patient Position - Orthostatic VS: Sitting Lying Lying         Visual Acuity      ED Medications  Medications   nitrofurantoin (MACROBID) extended-release capsule 100 mg (has no administration in time range)   haloperidol lactate (HALDOL) injection 5 mg (5 mg Intramuscular Given by Other 9/14/23 1811)       Diagnostic Studies  Results Reviewed     Procedure Component Value Units Date/Time    Urine Microscopic [[id number]] Collected: 09/14/23 2151    Lab Status:  In process Specimen: Urine, Clean Catch Updated: 09/14/23 2158    Urine Macroscopic, POC [[id number]]  (Abnormal) Collected: 09/14/23 2151    Lab Status: Final result Specimen: Urine Updated: 09/14/23 2152     Color, UA Yellow     Clarity, UA Clear     pH, UA 6.0     Leukocytes, UA Small     Nitrite, UA Negative     Protein, UA Trace mg/dl      Glucose, UA Negative mg/dl      Ketones, UA Negative mg/dl      Urobilinogen, UA 2.0 E.U./dl      Bilirubin, UA Negative     Occult Blood, UA Trace     Specific Gravity, UA >=1.030    Narrative:      CLINITEK RESULT    HS Troponin I 2hr [[id number]]  (Normal) Collected: 09/14/23 1814    Lab Status: Final result Specimen: Blood from Arm, Left Updated: 09/14/23 1903     hs TnI 2hr 29 ng/L      Delta 2hr hsTnI -11 ng/L     HS Troponin I 4hr [[id number]]     Lab Status: No result Specimen: Blood     B-Type Natriuretic Peptide(BNP) [[id number]]  (Abnormal) Collected: 09/14/23 1615    Lab Status: Final result Specimen: Blood from Arm, Left Updated: 09/14/23 1656      pg/mL     Comprehensive metabolic panel [[id number]]  (Abnormal) Collected: 09/14/23 1615    Lab Status: Final result Specimen: Blood from Arm, Left Updated: 09/14/23 1650     Sodium 142 mmol/L      Potassium 4.3 mmol/L      Chloride 111 mmol/L      CO2 23 mmol/L      ANION GAP 8 mmol/L      BUN 27 mg/dL      Creatinine 0.86 mg/dL      Glucose 89 mg/dL      Calcium 9.9 mg/dL      AST 29 U/L      ALT 20 U/L      Alkaline Phosphatase 90 U/L      Total Protein 7.4 g/dL      Albumin 4.1 g/dL      Total Bilirubin 0.50 mg/dL      eGFR 61 ml/min/1.73sq m     Narrative:      [place] guidelines for Chronic Kidney Disease (CKD):   •  Stage 1 with normal or high GFR (GFR > 90 mL/min/1.73 square meters)  •  Stage 2 Mild CKD (GFR = 60-89 mL/min/1.73 square meters)  •  Stage 3A Moderate CKD (GFR = 45-59 mL/min/1.73 square meters)  •  Stage 3B Moderate CKD (GFR = 30-44 mL/min/1.73 square meters)  •  Stage 4 Severe CKD (GFR = 15-29 mL/min/1.73 square meters)  •  Stage 5 End Stage CKD (GFR <15 mL/min/1.73 square meters)  Note: GFR calculation is accurate only with a steady state creatinine    HS Troponin 0hr (reflex protocol) [[id number]]  (Normal) Collected: 09/14/23 1615    Lab Status: Final result Specimen: Blood from Arm, Left Updated: 09/14/23 1646     hs TnI 0hr 40 ng/L     CBC and differential [[id number]]  (Abnormal) Collected: 09/14/23 1615    Lab Status: Final result Specimen: Blood from Arm, Left Updated: 09/14/23 1625     WBC 6.24 Thousand/uL      RBC 4.53 Million/uL      Hemoglobin 14.2 g/dL      Hematocrit 43.4 %      MCV 96 fL      MCH 31.3 pg      MCHC 32.7 g/dL      RDW 14.5 %      MPV 12.3 fL      Platelets 862 Thousands/uL      nRBC 0 /100 WBCs      Neutrophils Relative 52 %      Immat GRANS % 1 %      Lymphocytes Relative 31 %      Monocytes Relative 11 %      Eosinophils Relative 4 %      Basophils Relative 1 %      Neutrophils Absolute 3.28 Thousands/µL      Immature Grans Absolute 0.03 Thousand/uL      Lymphocytes Absolute 1.93 Thousands/µL      Monocytes Absolute 0.70 Thousand/µL      Eosinophils Absolute 0.25 Thousand/µL      Basophils Absolute 0.05 Thousands/µL     Fingerstick Glucose (POCT) [[id number]]  (Normal) Collected: 09/14/23 1516    Lab Status: Final result Updated: 09/14/23 1517     POC Glucose 106 mg/dl                  CT head without contrast   Final Result by Alexey Jones MD (09/14 1920)      No acute intracranial abnormality. Workstation performed: [id number]         XR chest 2 views   ED Interpretation by Ashlee Lopez MD (09/14 1701)   Primary reviewed: no acute abnormality      Final Result by Bel Coley MD (09/14 1646)      No acute cardiopulmonary disease. Stable cardiomegaly.                   Workstation performed: [id number]                    Procedures  ECG 12 Lead Documentation Only    Date/Time: 9/14/2023 6:26 PM    Performed by: Ashlee Lopez MD  Authorized by: Chary Berg MD Paty    ECG reviewed by me, the ED Provider: yes    Patient location:  ED  Rate:     ECG rate assessment: normal    Rhythm:     Rhythm: atrial fibrillation and paced    Pacing:     Capture:  Complete    Type of pacing:  Ventricular  ECG 12 Lead Documentation Only    Date/Time: 9/14/2023 7:24 PM    Performed by: Gaby Zhang MD  Authorized by: Gaby Zhang MD    ECG reviewed by me, the ED Provider: yes    Patient location:  ED  Rate:     ECG rate:  1525    ECG rate assessment: normal    Rhythm:     Rhythm: atrial fibrillation and paced    Pacing:     Capture:  Complete    Type of pacing:  Ventricular             ED Course  ED Course as of 09/14/23 2158   Thu Sep 14, 2023   1839 hs TnI 0hr: 40  Chronic elevation     1927 Work-up reviewed and benign. Patient's daughter-in-law states that patient does not require admission and they feel comfortable with her being discharged back to the nursing home given negative work-up. 2156 Leukocytes, UA(!): Small  We will treat for UTI. SBIRT 20yo+    Flowsheet Row Most Recent Value   Initial Alcohol Screen: US AUDIT-C     1. How often do you have a drink containing alcohol? 0 Filed at: 09/14/2023 1513   2. How many drinks containing alcohol do you have on a typical day you are drinking? 0 Filed at: 09/14/2023 1513   3a. Male UNDER 65: How often do you have five or more drinks on one occasion? 0 Filed at: 09/14/2023 1513   3b. FEMALE Any Age, or MALE 65+: How often do you have 4 or more drinks on one occassion? 0 Filed at: 09/14/2023 1513   Audit-C Score 0 Filed at: 09/14/2023 1513   MIN: How many times in the past year have you. .. Used an illegal drug or used a prescription medication for non-medical reasons? Never Filed at: 09/14/2023 1513                    Medical Decision Making  Brief unresponsive episode which resolved and patient is back to her baseline.   We will do CT of head to rule acute CNS pathology to stroke, bleed, cardiac work-up to rule out atypical presentation acute coronary syndrome, dysrhythmia, electrolyte abnormality, urine dip for urinary tract infection      Amount and/or Complexity of Data Reviewed  Labs: ordered. Decision-making details documented in ED Course. Radiology: ordered. Risk  Prescription drug management. Disposition  Final diagnoses:   Transient alteration of awareness   Urinary tract infection   Aphasia     Time reflects when diagnosis was documented in both MDM as applicable and the Disposition within this note     Time User Action Codes Description Comment    9/14/2023  9:57 PM Rupert PASCUAL Add [R40.4] Transient alteration of awareness     9/14/2023  9:57 PM Pancho Peace Add [N39.0] Urinary tract infection     9/14/2023  9:57 PM Pancho Peace Add [R47.01] Aphasia       ED Disposition     ED Disposition   Discharge    Condition   Stable    Date/Time   Thu Sep 14, 2023  7:28 PM    Comment   Denise Guallpa discharge to home/self care. Follow-up Information     Follow up With Specialties Details Why Contact Info Additional [address] [facility] Schedule an appointment as soon as possible for a visit in 2 days  [address] [id number]  [address] [place] [address], [address] [place] [address], [address] [place] [address]          Patient's Medications   Discharge Prescriptions    NITROFURANTOIN (MACROBID) 100 MG CAPSULE    Take 1 capsule (100 mg total) by mouth 2 (two) times a day for 5 days       Start Date: 9/14/2023 End Date: 9/19/2023       Order Dose: 100 mg       Quantity: 10 capsule    Refills: 0       No discharge procedures on file.     PDMP Review     None          ED Provider  Electronically Signed by           Lotus Merino MD  09/14/23 2792

## 2023-09-14 NOTE — ED NOTES
Bed alarm alerted primary RN and ED tech to room. Patient found attempting to get out of bed. Patient reminded to stay in bed while we are waiting for test results to come back. Patient able to be successfully redirected. Patient boosted and repositioned in bed, sheets straightened out, and blanket/pillow provided. Patient reminded to ring call bell for assistance. Patient now resting comfortably in bed with no signs of distress.      Karen Momin  09/14/23 2291

## 2023-09-14 NOTE — ED NOTES
CT scan staff alerted nursing staff to agitated and confused pt. Pt yelling out and kicking legs. Pt reoriented to environment but still agitated.  5MG haldol ordered per Dr. Zunilda Shahid and administered IM to R itzoid     Gigi Saeed RN  09/14/23 4767

## 2023-09-15 LAB
ATRIAL RATE: 59 BPM
ATRIAL RATE: 61 BPM
QRS AXIS: -41 DEGREES
QRS AXIS: -49 DEGREES
QRSD INTERVAL: 126 MS
QRSD INTERVAL: 128 MS
QT INTERVAL: 486 MS
QT INTERVAL: 504 MS
QTC INTERVAL: 493 MS
QTC INTERVAL: 504 MS
T WAVE AXIS: 147 DEGREES
T WAVE AXIS: 152 DEGREES
VENTRICULAR RATE: 60 BPM
VENTRICULAR RATE: 62 BPM

## 2023-09-15 PROCEDURE — 93010 ELECTROCARDIOGRAM REPORT: CPT | Performed by: INTERNAL MEDICINE

## 2023-09-15 RX ORDER — NITROFURANTOIN 25; 75 MG/1; MG/1
100 CAPSULE ORAL ONCE
Status: COMPLETED | OUTPATIENT
Start: 2023-09-15 | End: 2023-09-15

## 2023-09-15 RX ADMIN — NITROFURANTOIN MONOHYDRATE/MACROCRYSTALS 100 MG: 25; 75 CAPSULE ORAL at 00:34

## 2023-09-15 NOTE — ED NOTES
Patient OOB to use with commode with x2 nurse and tech assist. Urine sample obtained at this time.      Colby Llamas  09/14/23 7509

## 2023-09-15 NOTE — ED NOTES
[facility] contacted at this time with an update on patient's disposition and transportation status.      Oscar Loge Short  09/14/23 4554

## 2023-09-15 NOTE — ED NOTES
Patient's daughter-in-law Marilyn contacted at this time with update on patient's plan of care.       Kaylah Avalos  09/14/23 8691

## 2023-09-15 NOTE — ED NOTES
Pickup pushed back to 0145 per [facility] with [facility].      Noy Party Short  09/15/23 0119

## 2023-09-16 LAB — BACTERIA UR CULT: NORMAL

## 2023-12-07 ENCOUNTER — REMOTE DEVICE CLINIC VISIT (OUTPATIENT)
Dept: CARDIOLOGY CLINIC | Facility: CLINIC | Age: 88
End: 2023-12-07
Payer: COMMERCIAL

## 2023-12-07 DIAGNOSIS — Z95.0 CARDIAC PACEMAKER IN SITU: Primary | ICD-10-CM

## 2023-12-07 PROCEDURE — 93296 REM INTERROG EVL PM/IDS: CPT | Performed by: INTERNAL MEDICINE

## 2023-12-07 PROCEDURE — 93294 REM INTERROG EVL PM/LDLS PM: CPT | Performed by: INTERNAL MEDICINE

## 2023-12-07 NOTE — PROGRESS NOTES
Results for orders placed or performed in visit on 12/07/23   Cardiac EP device report    Narrative    MDT SC PM/ACTIVE SYSTEM IS MRI CONDITIONAL  CARELINK TRANSMISSION: BATTERY STATUS "11 YRS."  96%. ALL AVAILABLE LEAD PARAMETERS WITHIN NORMAL LIMITS. NO SIGNIFICANT HIGH RATE EPISODES. NORMAL DEVICE FUNCTION.  NC

## 2024-03-07 ENCOUNTER — REMOTE DEVICE CLINIC VISIT (OUTPATIENT)
Dept: CARDIOLOGY CLINIC | Facility: CLINIC | Age: 89
End: 2024-03-07
Payer: COMMERCIAL

## 2024-03-07 DIAGNOSIS — Z95.0 CARDIAC PACEMAKER IN SITU: Primary | ICD-10-CM

## 2024-03-07 PROCEDURE — 93296 REM INTERROG EVL PM/IDS: CPT | Performed by: INTERNAL MEDICINE

## 2024-03-07 PROCEDURE — 93294 REM INTERROG EVL PM/LDLS PM: CPT | Performed by: INTERNAL MEDICINE

## 2024-03-07 NOTE — PROGRESS NOTES
"Results for orders placed or performed in visit on 03/07/24   Cardiac EP device report    Narrative    MDT SC PM/ACTIVE SYSTEM IS MRI CONDITIONAL  CARELINK TRANSMISSION: BATTERY STATUS \"11 YRS.\"  90%. ALL AVAILABLE LEAD PARAMETERS WITHIN NORMAL LIMITS. NO SIGNIFICANT HIGH RATE EPISODES. NORMAL DEVICE FUNCTION. NC         "

## 2024-03-20 ENCOUNTER — HOSPITAL ENCOUNTER (EMERGENCY)
Facility: HOSPITAL | Age: 89
Discharge: HOME | End: 2024-03-20
Attending: EMERGENCY MEDICINE | Admitting: EMERGENCY MEDICINE
Payer: COMMERCIAL

## 2024-03-20 ENCOUNTER — APPOINTMENT (EMERGENCY)
Dept: CT IMAGING | Facility: HOSPITAL | Age: 89
End: 2024-03-20
Payer: COMMERCIAL

## 2024-03-20 VITALS
DIASTOLIC BLOOD PRESSURE: 58 MMHG | RESPIRATION RATE: 41 BRPM | TEMPERATURE: 97.3 F | OXYGEN SATURATION: 94 % | SYSTOLIC BLOOD PRESSURE: 125 MMHG | HEART RATE: 60 BPM

## 2024-03-20 DIAGNOSIS — S00.83XA TRAUMATIC HEMATOMA OF FOREHEAD, INITIAL ENCOUNTER: ICD-10-CM
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
## 2024-03-20 DIAGNOSIS — S09.90XA INJURY OF HEAD, INITIAL ENCOUNTER: ICD-10-CM

## 2024-03-20 LAB
ANION GAP SERPL CALCULATED.3IONS-SCNC: 6 MMOL/L (ref 4–13)
ATRIAL RATE: 59 BPM
ATRIAL RATE: 90 BPM
BASOPHILS # BLD AUTO: 0.06 THOUSANDS/ÂΜL (ref 0–0.1)
BASOPHILS NFR BLD AUTO: 1 % (ref 0–1)
BUN SERPL-MCNC: 25 MG/DL (ref 5–25)
CALCIUM SERPL-MCNC: 9.4 MG/DL (ref 8.4–10.2)
CHLORIDE SERPL-SCNC: 116 MMOL/L (ref 96–108)
CO2 SERPL-SCNC: 22 MMOL/L (ref 21–32)
CREAT SERPL-MCNC: 0.84 MG/DL (ref 0.6–1.3)
EOSINOPHIL # BLD AUTO: 0.2 THOUSAND/ÂΜL (ref 0–0.61)
EOSINOPHIL NFR BLD AUTO: 3 % (ref 0–6)
ERYTHROCYTE [DISTWIDTH] IN BLOOD BY AUTOMATED COUNT: 15 % (ref 11.6–15.1)
GFR SERPL CREATININE-BSD FRML MDRD: 63 ML/MIN/1.73SQ M
GLUCOSE SERPL-MCNC: 103 MG/DL (ref 65–140)
HCT VFR BLD AUTO: 38.2 % (ref 34.8–46.1)
HGB BLD-MCNC: 12.4 G/DL (ref 11.5–15.4)
IMM GRANULOCYTES # BLD AUTO: 0.02 THOUSAND/UL (ref 0–0.2)
IMM GRANULOCYTES NFR BLD AUTO: 0 % (ref 0–2)
LYMPHOCYTES # BLD AUTO: 2.06 THOUSANDS/ÂΜL (ref 0.6–4.47)
LYMPHOCYTES NFR BLD AUTO: 33 % (ref 14–44)
MCH RBC QN AUTO: 31.6 PG (ref 26.8–34.3)
MCHC RBC AUTO-ENTMCNC: 32.5 G/DL (ref 31.4–37.4)
MCV RBC AUTO: 97 FL (ref 82–98)
MONOCYTES # BLD AUTO: 0.68 THOUSAND/ÂΜL (ref 0.17–1.22)
MONOCYTES NFR BLD AUTO: 11 % (ref 4–12)
NEUTROPHILS # BLD AUTO: 3.15 THOUSANDS/ÂΜL (ref 1.85–7.62)
NEUTS SEG NFR BLD AUTO: 52 % (ref 43–75)
NRBC BLD AUTO-RTO: 0 /100 WBCS
PLATELET # BLD AUTO: 113 THOUSANDS/UL (ref 149–390)
PMV BLD AUTO: 11.8 FL (ref 8.9–12.7)
POTASSIUM SERPL-SCNC: 4.3 MMOL/L (ref 3.5–5.3)
QRS AXIS: -39 DEGREES
QRS AXIS: 47 DEGREES
QRSD INTERVAL: 108 MS
QRSD INTERVAL: 130 MS
QT INTERVAL: 452 MS
QT INTERVAL: 482 MS
QTC INTERVAL: 470 MS
QTC INTERVAL: 516 MS
RBC # BLD AUTO: 3.92 MILLION/UL (ref 3.81–5.12)
SODIUM SERPL-SCNC: 144 MMOL/L (ref 135–147)
T WAVE AXIS: 168 DEGREES
T WAVE AXIS: 222 DEGREES
VENTRICULAR RATE: 65 BPM
VENTRICULAR RATE: 69 BPM
WBC # BLD AUTO: 6.17 THOUSAND/UL (ref 4.31–10.16)

## 2024-03-20 PROCEDURE — 74177 CT ABD & PELVIS W/CONTRAST: CPT

## 2024-03-20 PROCEDURE — 72125 CT NECK SPINE W/O DYE: CPT

## 2024-03-20 PROCEDURE — 93010 ELECTROCARDIOGRAM REPORT: CPT | Performed by: INTERNAL MEDICINE

## 2024-03-20 PROCEDURE — 71260 CT THORAX DX C+: CPT

## 2024-03-20 PROCEDURE — 85025 COMPLETE CBC W/AUTO DIFF WBC: CPT | Performed by: PHYSICIAN ASSISTANT

## 2024-03-20 PROCEDURE — 93005 ELECTROCARDIOGRAM TRACING: CPT

## 2024-03-20 PROCEDURE — 70450 CT HEAD/BRAIN W/O DYE: CPT

## 2024-03-20 PROCEDURE — 99285 EMERGENCY DEPT VISIT HI MDM: CPT | Performed by: PHYSICIAN ASSISTANT

## 2024-03-20 PROCEDURE — 80048 BASIC METABOLIC PNL TOTAL CA: CPT | Performed by: PHYSICIAN ASSISTANT

## 2024-03-20 PROCEDURE — 36415 COLL VENOUS BLD VENIPUNCTURE: CPT | Performed by: PHYSICIAN ASSISTANT

## 2024-03-20 PROCEDURE — 99284 EMERGENCY DEPT VISIT MOD MDM: CPT

## 2024-03-20 RX ADMIN — IOHEXOL 80 ML: 350 INJECTION, SOLUTION INTRAVENOUS at 02:56

## 2024-03-20 NOTE — ED NOTES
Report was called to [place] by night shift RN, who also stated they were aware of pickup time.     Whit Wilson RN  03/20/24 9013

## 2024-03-20 NOTE — ED PROVIDER NOTES
History  Chief Complaint   Patient presents with    Fall     BIBA - witnessed fall by facility staff, +headstrike, +thinners.     This is an 88-year-old female with past medical history of A-fib on Eliquis, CAD, CHF, CKD, HTN, hypokalemia, dementia BIBA for evaluation after a fall.  Per nursing home staff, patient was asleep in bed, staff went to do rounds and the patient appeared to be startled, rolled out of bed landing on her head.  Patient has a hematoma to the right forehead.  Patient offers no complaints at this time.  Patient denies any chest pain, lightheadedness, dizziness, shortness of breath, visual disturbance, numbness, tingling or weakness.  Denies any nausea or vomiting, abdominal pain, back pain, neck pain.  However patient is poor historian given history of dementia.      History provided by:  Patient   used: No        Prior to Admission Medications   Prescriptions Last Dose Informant Patient Reported? Taking?   Ascorbic Acid (VITAMIN C) 500 MG CAPS  Outside Facility (Specify) Yes No   Sig: Take 500 mg by mouth daily   Cholecalciferol (Vitamin D3) 1.25 MG (71524 UT) CAPS  Outside Facility (Specify) Yes No   Si,000U daily   Cranberry 450 MG CAPS  Outside Facility (Specify) Yes No   Sig: Take 1 capsule by mouth in the morning   QUEtiapine (SEROquel) 25 mg tablet   No No   Sig: Take 0.5 tablets (12.5 mg total) by mouth daily at bedtime Hold if sedated   Valerian 500 MG CAPS   Yes No   Sig: Take by mouth in the morning   acetaminophen (TYLENOL) 500 mg tablet  Outside Facility (Specify) Yes No   Sig: Take 500 mg by mouth every 6 (six) hours as needed for mild pain   apixaban (ELIQUIS) 5 mg  Outside Facility (Specify) Yes No   Sig: Take 5 mg by mouth 2 (two) times a day   aspirin 81 mg chewable tablet   No No   Sig: Chew 1 tablet (81 mg total) daily Do not start before 2023.   atorvastatin (LIPITOR) 10 mg tablet  Outside Facility (Specify) Yes No   Sig: Take 10 mg by  mouth daily   bisacodyl (DULCOLAX) 5 mg EC tablet   Yes No   Sig: Take 5 mg by mouth daily as needed for constipation   calcium carbonate (TUMS) 500 mg chewable tablet  Outside Facility (Specify) Yes No   Sig: Chew 1 tablet daily   docusate sodium (COLACE) 100 mg capsule  Outside Facility (Specify) Yes No   Sig: Take 100 mg by mouth 2 (two) times a day   furosemide (LASIX) 40 mg tablet  Outside Facility (Specify) Yes No   Sig: Take 1 tablet by mouth daily   loperamide (IMODIUM) 2 mg capsule   Yes No   polyethylene glycol (MIRALAX) 17 g packet  Outside Facility (Specify) Yes No   Sig: Take 17 g by mouth daily   potassium chloride (K-DUR,KLOR-CON) 20 mEq tablet  Outside Facility (Specify) Yes No   Sig: Take 20 mEq by mouth daily   thiamine (VITAMIN B1) 100 mg tablet  Outside Facility (Specify) Yes No   zonisamide (ZONEGRAN) 100 mg capsule   No No   Sig: Take 1 capsule (100 mg total) by mouth daily Do not start before November 19, 2022.   Patient taking differently: Take 100 mg by mouth every 12 (twelve) hours      Facility-Administered Medications: None       Past Medical History:   Diagnosis Date    A-fib ([facility])     CAD (coronary artery disease)     CHF (congestive heart failure) ([facility])     CKD (chronic kidney disease) stage 2, GFR 60-89 ml/min     Dementia ([facility])     Hypertension     Hypokalemia     Knee effusion     Memory loss     Seizure ([facility])     Urinary tract infection        Past Surgical History:   Procedure Laterality Date    CARDIAC ELECTROPHYSIOLOGY PROCEDURE N/A 1/26/2023    Procedure: Cardiac pacer implant;  Surgeon: Francisco Georges DO;  Location: [facility];  Service: Cardiology    CHOLECYSTECTOMY      REPLACEMENT TOTAL KNEE Left 2008       Family History   Problem Relation Age of Onset    Lung cancer Father     Leukemia Brother     Breast cancer Daughter      I have reviewed and agree with the history as documented.    E-Cigarette/Vaping    E-Cigarette Use Never User      E-Cigarette/Vaping  Substances    Nicotine No     THC No     CBD No     Flavoring No     Other No     Unknown No      Social History     Tobacco Use    Smoking status: Never    Smokeless tobacco: Never   Vaping Use    Vaping status: Never Used   Substance Use Topics    Alcohol use: Never    Drug use: Never       Review of Systems   Unable to perform ROS: Dementia       Physical Exam  Physical Exam  Vitals reviewed.   Constitutional:       General: She is not in acute distress.     Appearance: Normal appearance. She is well-developed and well-groomed. She is not ill-appearing, toxic-appearing or diaphoretic.      Interventions: Cervical collar in place.      Comments: C-collar in place.  Patient in no acute distress.   HENT:      Head: Normocephalic. Contusion present. No raccoon eyes, Oquendo's sign, right periorbital erythema or left periorbital erythema.        Comments: Contusion to the right forehead.  No bony deformity.     Right Ear: External ear normal.      Left Ear: External ear normal.      Nose: Nose normal. No congestion or rhinorrhea.      Mouth/Throat:      Mouth: Mucous membranes are moist.      Pharynx: Oropharynx is clear. No oropharyngeal exudate or posterior oropharyngeal erythema.   Eyes:      General: No scleral icterus.        Right eye: No discharge.         Left eye: No discharge.      Extraocular Movements: Extraocular movements intact.      Conjunctiva/sclera: Conjunctivae normal.   Cardiovascular:      Rate and Rhythm: Normal rate and regular rhythm.      Pulses: Normal pulses.      Heart sounds: No murmur heard.     No friction rub. No gallop.   Pulmonary:      Effort: Pulmonary effort is normal. No respiratory distress.      Breath sounds: Normal breath sounds. No wheezing, rhonchi or rales.   Chest:      Chest wall: No tenderness.   Abdominal:      General: Abdomen is flat. There is no distension.      Palpations: Abdomen is soft.      Tenderness: There is no abdominal tenderness. There is no right CVA  tenderness, left CVA tenderness, guarding or rebound.   Musculoskeletal:         General: No deformity. Normal range of motion.      Cervical back: Normal range of motion and neck supple.   Skin:     General: Skin is warm and dry.      Coloration: Skin is not jaundiced or pale.      Findings: No rash.   Neurological:      General: No focal deficit present.      Mental Status: She is alert.      GCS: GCS eye subscore is 4. GCS verbal subscore is 5. GCS motor subscore is 6.      Comments: Patient oriented to place, self but not time or situation.   Psychiatric:         Mood and Affect: Mood normal.         Behavior: Behavior normal. Behavior is cooperative.         Vital Signs  ED Triage Vitals   Temperature Pulse Respirations Blood Pressure SpO2   03/20/24 0208 03/20/24 0204 03/20/24 0204 03/20/24 0204 03/20/24 0204   (!) 97.3 °F (36.3 °C) 88 20 143/51 98 %      Temp Source Heart Rate Source Patient Position - Orthostatic VS BP Location FiO2 (%)   03/20/24 0208 03/20/24 0204 03/20/24 0204 03/20/24 0204 --   Oral Monitor Lying Right arm       Pain Score       --                  Vitals:    03/20/24 0215 03/20/24 0345 03/20/24 0415 03/20/24 0500   BP: 143/81 139/67 152/67 137/63   Pulse: 66 60 73 60   Patient Position - Orthostatic VS:  Lying Lying Lying         Visual Acuity  Visual Acuity      Flowsheet Row Most Recent Value   L Pupil Size (mm) 2   R Pupil Size (mm) 2            ED Medications  Medications   iohexol (OMNIPAQUE) 350 MG/ML injection (MULTI-DOSE) 100 mL (80 mL Intravenous Given 3/20/24 0256)       Diagnostic Studies  Results Reviewed       Procedure Component Value Units Date/Time    Basic metabolic panel [[id number]]  (Abnormal) Collected: 03/20/24 0228    Lab Status: Final result Specimen: Blood from Arm, Left Updated: 03/20/24 0247     Sodium 144 mmol/L      Potassium 4.3 mmol/L      Chloride 116 mmol/L      CO2 22 mmol/L      ANION GAP 6 mmol/L      BUN 25 mg/dL      Creatinine 0.84 mg/dL       Glucose 103 mg/dL      Calcium 9.4 mg/dL      eGFR 63 ml/min/1.73sq m     Narrative:      National Kid[employer] Disease Foundation guidelines for Chronic Kidney Disease (CKD):     Stage 1 with normal or high GFR (GFR > 90 mL/min/1.73 square meters)    Stage 2 Mild CKD (GFR = 60-89 mL/min/1.73 square meters)    Stage 3A Moderate CKD (GFR = 45-59 mL/min/1.73 square meters)    Stage 3B Moderate CKD (GFR = 30-44 mL/min/1.73 square meters)    Stage 4 Severe CKD (GFR = 15-29 mL/min/1.73 square meters)    Stage 5 End Stage CKD (GFR <15 mL/min/1.73 square meters)  Note: GFR calculation is accurate only with a steady state creatinine    CBC and differential [[id number]]  (Abnormal) Collected: 03/20/24 0228    Lab Status: Final result Specimen: Blood from Arm, Left Updated: 03/20/24 0236     WBC 6.17 Thousand/uL      RBC 3.92 Million/uL      Hemoglobin 12.4 g/dL      Hematocrit 38.2 %      MCV 97 fL      MCH 31.6 pg      MCHC 32.5 g/dL      RDW 15.0 %      MPV 11.8 fL      Platelets 113 Thousands/uL      nRBC 0 /100 WBCs      Neutrophils Relative 52 %      Immature Grans % 0 %      Lymphocytes Relative 33 %      Monocytes Relative 11 %      Eosinophils Relative 3 %      Basophils Relative 1 %      Neutrophils Absolute 3.15 Thousands/µL      Absolute Immature Grans 0.02 Thousand/uL      Absolute Lymphocytes 2.06 Thousands/µL      Absolute Monocytes 0.68 Thousand/µL      Eosinophils Absolute 0.20 Thousand/µL      Basophils Absolute 0.06 Thousands/µL                    CT head without contrast   Final Result by Sandra Lorenzo MD (03/20 0526)      No acute intracranial abnormality.  Chronic microangiopathic changes.                  Workstation performed: [id number]         CT chest abdomen pelvis w contrast   Final Result by Sandra Lorenzo MD (03/20 0516)      No evidence of traumatic injury in the chest, abdomen or pelvis.      Colonic diverticulosis without acute diverticulitis.               Workstation performed: [id number]          CT cervical spine without contrast   Final Result by Sandra Lorenzo MD (03/20 0513)      No cervical spine fracture or traumatic malalignment.                  Workstation performed: [id number]                    Procedures  Procedures         ED Course  ED Course as of 03/20/24 0540   Wed Mar 20, 2024   0516 CT cervical spine without contrast  IMPRESSION:     No cervical spine fracture or traumatic malalignment.   0523 CT chest abdomen pelvis w contrast  IMPRESSION:     No evidence of traumatic injury in the chest, abdomen or pelvis.     Colonic diverticulosis without acute diverticulitis.   0530 CT head without contrast  IMPRESSION:     No acute intracranial abnormality.  Chronic microangiopathic changes.                               SBIRT 22yo+      Flowsheet Row Most Recent Value   Initial Alcohol Screen: US AUDIT-C     1. How often do you have a drink containing alcohol? 0 Filed at: 03/20/2024 0204   2. How many drinks containing alcohol do you have on a typical day you are drinking?  0 Filed at: 03/20/2024 0204   3a. Male UNDER 65: How often do you have five or more drinks on one occasion? 0 Filed at: 03/20/2024 0204   3b. FEMALE Any Age, or MALE 65+: How often do you have 4 or more drinks on one occassion? 0 Filed at: 03/20/2024 0204   Audit-C Score 0 Filed at: 03/20/2024 0204   MIN: How many times in the past year have you...    Used an illegal drug or used a prescription medication for non-medical reasons? Never Filed at: 03/20/2024 0204                      Medical Decision Making      DDx including but not limited to: intracranial injury, concussion, cervical injury, intrathoracic injury, intraabdominal injury, extremity injury--fracture, dislocation, strain, sprain, contusion.      Patient presenting to ED from nursing facility for evaluation after fall out of bed.  Patient has a hematoma to the right front forehead.  Patient does take blood thinners.  No loss of consciousness at the nursing  home reports.  Patient denies any complaints.  CT head, C-spine, chest abdomen pelvis ordered for evaluation of traumatic injuries.    CT w/o evidence of traumatic injury. Will discharge back to nursing facility. Pt c/o to feel well and offer not complaints.    Problems Addressed:  Fall, initial encounter: acute illness or injury  Injury of head, initial encounter: acute illness or injury  Traumatic hematoma of forehead, initial encounter: acute illness or injury    Amount and/or Complexity of Data Reviewed  Labs: ordered.  Radiology: ordered. Decision-making details documented in ED Course.    Risk  Prescription drug management.             Disposition  Final diagnoses:   Fall, initial encounter   Injury of head, initial encounter   Traumatic hematoma of forehead, initial encounter     Time reflects when diagnosis was documented in both MDM as applicable and the Disposition within this note       Time User Action Codes Description Comment    3/20/2024  5:31 AM Darya Akbar [W19.XXXA] Fall, initial encounter     3/20/2024  5:31 AM Darya Akbar Add [S09.90XA] Injury of head, initial encounter     3/20/2024  5:31 AM Darya Akbar Add [S00.83XA] Traumatic hematoma of forehead, initial encounter           ED Disposition       ED Disposition   Discharge    Condition   Stable    Date/Time   Wed Mar 20, 2024 0531    Comment   Eusebio Tate discharge to home/self care.                   Follow-up Information       Follow up With Specialties Details Why Contact Info Additional Information    [facility] Schedule an appointment as soon as possible for a visit   [address]  [facility] [id number] [phone] [facility], [address], [place], [id number] [phone]            Current Discharge Medication List        CONTINUE these medications which have NOT CHANGED    Details   acetaminophen (TYLENOL) 500 mg tablet Take 500 mg by mouth  every 6 (six) hours as needed for mild pain      apixaban (ELIQUIS) 5 mg Take 5 mg by mouth 2 (two) times a day      Ascorbic Acid (VITAMIN C) 500 MG CAPS Take 500 mg by mouth daily      aspirin 81 mg chewable tablet Chew 1 tablet (81 mg total) daily Do not start before February 18, 2023.  Qty: 30 tablet, Refills: 0    Associated Diagnoses: Stroke (cerebrum) (HCC)      atorvastatin (LIPITOR) 10 mg tablet Take 10 mg by mouth daily      bisacodyl (DULCOLAX) 5 mg EC tablet Take 5 mg by mouth daily as needed for constipation      calcium carbonate (TUMS) 500 mg chewable tablet Chew 1 tablet daily      Cholecalciferol (Vitamin D3) 1.25 MG (14025 UT) CAPS 2,000U daily      Cranberry 450 MG CAPS Take 1 capsule by mouth in the morning      docusate sodium (COLACE) 100 mg capsule Take 100 mg by mouth 2 (two) times a day      furosemide (LASIX) 40 mg tablet Take 1 tablet by mouth daily      loperamide (IMODIUM) 2 mg capsule       polyethylene glycol (MIRALAX) 17 g packet Take 17 g by mouth daily      potassium chloride (K-DUR,KLOR-CON) 20 mEq tablet Take 20 mEq by mouth daily      QUEtiapine (SEROquel) 25 mg tablet Take 0.5 tablets (12.5 mg total) by mouth daily at bedtime Hold if sedated  Qty: 30 tablet, Refills: 0    Associated Diagnoses: New onset seizure (HCC)      thiamine (VITAMIN B1) 100 mg tablet       Valerian 500 MG CAPS Take by mouth in the morning      zonisamide (ZONEGRAN) 100 mg capsule Take 1 capsule (100 mg total) by mouth daily Do not start before November 19, 2022.  Refills: 0    Associated Diagnoses: New onset seizure (HCC)             No discharge procedures on file.    PDMP Review       None            ED Provider  Electronically Signed by MEEK Villarreal PA-C  03/20/24 5004

## 2024-03-20 NOTE — ED NOTES
Roundtrip transportation back to facility, set up by RN at this time. Pick-up time tbd.     Bri Diamond  03/20/24 0520

## 2024-03-25 ENCOUNTER — HOSPITAL ENCOUNTER (EMERGENCY)
Facility: HOSPITAL | Age: 89
Discharge: HOME | End: 2024-03-25
Attending: EMERGENCY MEDICINE | Admitting: EMERGENCY MEDICINE
Payer: COMMERCIAL

## 2024-03-25 ENCOUNTER — APPOINTMENT (EMERGENCY)
Dept: CT IMAGING | Facility: HOSPITAL | Age: 89
End: 2024-03-25
Payer: COMMERCIAL

## 2024-03-25 ENCOUNTER — APPOINTMENT (EMERGENCY)
Dept: NON INVASIVE DIAGNOSTICS | Facility: HOSPITAL | Age: 89
End: 2024-03-25
Payer: COMMERCIAL

## 2024-03-25 VITALS
OXYGEN SATURATION: 98 % | DIASTOLIC BLOOD PRESSURE: 65 MMHG | SYSTOLIC BLOOD PRESSURE: 137 MMHG | TEMPERATURE: 97.9 F | RESPIRATION RATE: 20 BRPM | HEART RATE: 60 BPM

## 2024-03-25 DIAGNOSIS — S80.11XA: ICD-10-CM

## 2024-03-25 DIAGNOSIS — M79.604 ACUTE PAIN OF RIGHT LOWER EXTREMITY: ICD-10-CM

## 2024-03-25 DIAGNOSIS — L03.115 CELLULITIS OF RIGHT FOOT: Primary | ICD-10-CM

## 2024-03-25 LAB
ANION GAP SERPL CALCULATED.3IONS-SCNC: 5 MMOL/L (ref 4–13)
ATRIAL RATE: 93 BPM
BASOPHILS # BLD AUTO: 0.06 THOUSANDS/ÂΜL (ref 0–0.1)
BASOPHILS NFR BLD AUTO: 1 % (ref 0–1)
BUN SERPL-MCNC: 24 MG/DL (ref 5–25)
CALCIUM SERPL-MCNC: 9.5 MG/DL (ref 8.4–10.2)
CHLORIDE SERPL-SCNC: 112 MMOL/L (ref 96–108)
CO2 SERPL-SCNC: 24 MMOL/L (ref 21–32)
CREAT SERPL-MCNC: 0.75 MG/DL (ref 0.6–1.3)
EOSINOPHIL # BLD AUTO: 0.2 THOUSAND/ÂΜL (ref 0–0.61)
EOSINOPHIL NFR BLD AUTO: 3 % (ref 0–6)
ERYTHROCYTE [DISTWIDTH] IN BLOOD BY AUTOMATED COUNT: 14.8 % (ref 11.6–15.1)
GFR SERPL CREATININE-BSD FRML MDRD: 72 ML/MIN/1.73SQ M
GLUCOSE SERPL-MCNC: 95 MG/DL (ref 65–140)
HCT VFR BLD AUTO: 34.3 % (ref 34.8–46.1)
HGB BLD-MCNC: 11.3 G/DL (ref 11.5–15.4)
IMM GRANULOCYTES # BLD AUTO: 0.01 THOUSAND/UL (ref 0–0.2)
IMM GRANULOCYTES NFR BLD AUTO: 0 % (ref 0–2)
LYMPHOCYTES # BLD AUTO: 1.67 THOUSANDS/ÂΜL (ref 0.6–4.47)
LYMPHOCYTES NFR BLD AUTO: 27 % (ref 14–44)
MCH RBC QN AUTO: 31.6 PG (ref 26.8–34.3)
MCHC RBC AUTO-ENTMCNC: 32.9 G/DL (ref 31.4–37.4)
MCV RBC AUTO: 96 FL (ref 82–98)
MONOCYTES # BLD AUTO: 0.78 THOUSAND/ÂΜL (ref 0.17–1.22)
MONOCYTES NFR BLD AUTO: 12 % (ref 4–12)
NEUTROPHILS # BLD AUTO: 3.55 THOUSANDS/ÂΜL (ref 1.85–7.62)
NEUTS SEG NFR BLD AUTO: 57 % (ref 43–75)
NRBC BLD AUTO-RTO: 0 /100 WBCS
PLATELET # BLD AUTO: 125 THOUSANDS/UL (ref 149–390)
PMV BLD AUTO: 11.9 FL (ref 8.9–12.7)
POTASSIUM SERPL-SCNC: 4.3 MMOL/L (ref 3.5–5.3)
QRS AXIS: -40 DEGREES
QRSD INTERVAL: 124 MS
QT INTERVAL: 470 MS
QTC INTERVAL: 535 MS
RBC # BLD AUTO: 3.58 MILLION/UL (ref 3.81–5.12)
SODIUM SERPL-SCNC: 141 MMOL/L (ref 135–147)
T WAVE AXIS: -86 DEGREES
VENTRICULAR RATE: 78 BPM
WBC # BLD AUTO: 6.27 THOUSAND/UL (ref 4.31–10.16)

## 2024-03-25 PROCEDURE — 73701 CT LOWER EXTREMITY W/DYE: CPT

## 2024-03-25 PROCEDURE — 99285 EMERGENCY DEPT VISIT HI MDM: CPT | Performed by: EMERGENCY MEDICINE

## 2024-03-25 PROCEDURE — 93971 EXTREMITY STUDY: CPT | Performed by: SURGERY

## 2024-03-25 PROCEDURE — 99284 EMERGENCY DEPT VISIT MOD MDM: CPT

## 2024-03-25 PROCEDURE — 93010 ELECTROCARDIOGRAM REPORT: CPT | Performed by: INTERNAL MEDICINE

## 2024-03-25 PROCEDURE — 80048 BASIC METABOLIC PNL TOTAL CA: CPT | Performed by: EMERGENCY MEDICINE

## 2024-03-25 PROCEDURE — 85025 COMPLETE CBC W/AUTO DIFF WBC: CPT | Performed by: EMERGENCY MEDICINE

## 2024-03-25 PROCEDURE — 96374 THER/PROPH/DIAG INJ IV PUSH: CPT

## 2024-03-25 PROCEDURE — 36415 COLL VENOUS BLD VENIPUNCTURE: CPT | Performed by: EMERGENCY MEDICINE

## 2024-03-25 PROCEDURE — 93005 ELECTROCARDIOGRAM TRACING: CPT

## 2024-03-25 PROCEDURE — 93971 EXTREMITY STUDY: CPT

## 2024-03-25 RX ORDER — CEPHALEXIN 500 MG/1
500 CAPSULE ORAL EVERY 6 HOURS SCHEDULED
Qty: 28 CAPSULE | Refills: 0 | Status: SHIPPED | OUTPATIENT
Start: 2024-03-25 | End: 2024-04-01

## 2024-03-25 RX ORDER — CEPHALEXIN 250 MG/1
500 CAPSULE ORAL ONCE
Status: COMPLETED | OUTPATIENT
Start: 2024-03-25 | End: 2024-03-25

## 2024-03-25 RX ORDER — FENTANYL CITRATE 50 UG/ML
50 INJECTION, SOLUTION INTRAMUSCULAR; INTRAVENOUS ONCE
Status: COMPLETED | OUTPATIENT
Start: 2024-03-25 | End: 2024-03-25

## 2024-03-25 RX ADMIN — FENTANYL CITRATE 50 MCG: 50 INJECTION INTRAMUSCULAR; INTRAVENOUS at 10:43

## 2024-03-25 RX ADMIN — IOHEXOL 100 ML: 350 INJECTION, SOLUTION INTRAVENOUS at 11:23

## 2024-03-25 RX ADMIN — CEPHALEXIN 500 MG: 250 CAPSULE ORAL at 13:24

## 2024-03-25 NOTE — ED PROVIDER NOTES
History  Chief Complaint   Patient presents with    Leg Swelling     Patient fell 5 days ago, presents with R leg swelling & bruising, seen here previously for fall with increased swelling, pt on thinners.     This is an 88-year-old female with past medical history of A-fib on Eliquis, CAD, CHF, CKD, HTN, hypokalemia, dementia BIBA for evaluation of worsening right leg pain and swelling. Pt seen 3/20/24 for a fall and upon record review it doesn't appear that there was mention of leg injury at that time. No reported additional falls and pt unable to provide any history regarding the bruising / swelling to the leg.     No reported new falls, no reported f/c/s, no reported complaints of cp, sob.        History provided by:  Patient and nursing home  History limited by:  Dementia   used: No        Prior to Admission Medications   Prescriptions Last Dose Informant Patient Reported? Taking?   Ascorbic Acid (VITAMIN C) 500 MG CAPS Not Taking Outside Facility (Specify) Yes No   Sig: Take 500 mg by mouth daily   Patient not taking: Reported on 3/25/2024   Cholecalciferol (Vitamin D3) 1.25 MG (41579 UT) CAPS  Outside Facility (Specify) Yes Yes   Sig: Take 2,000 Units by mouth daily 2,000U daily   Cranberry 450 MG CAPS  Outside Facility (Specify) Yes Yes   Sig: Take 1 capsule by mouth in the morning   Diclofenac Sodium (VOLTAREN) 1 %   Yes Yes   Sig: Apply 2 g topically 3 (three) times a day as needed (pain)   QUEtiapine (SEROquel) 25 mg tablet   No Yes   Sig: Take 0.5 tablets (12.5 mg total) by mouth daily at bedtime Hold if sedated   Valerian 500 MG CAPS Not Taking  Yes No   Sig: Take by mouth in the morning   Patient not taking: Reported on 3/25/2024   acetaminophen (TYLENOL) 500 mg tablet  Outside Facility (Specify) Yes Yes   Sig: Take 500 mg by mouth every 6 (six) hours as needed for mild pain   apixaban (ELIQUIS) 5 mg  Outside Facility (Specify) Yes Yes   Sig: Take 5 mg by mouth 2 (two) times a day    aspirin 81 mg chewable tablet   No Yes   Sig: Chew 1 tablet (81 mg total) daily Do not start before February 18, 2023.   atorvastatin (LIPITOR) 10 mg tablet  Outside Facility (Specify) Yes Yes   Sig: Take 40 mg by mouth daily   bisacodyl (DULCOLAX) 5 mg EC tablet   Yes Yes   Sig: Take 5 mg by mouth daily as needed for constipation   calcium carbonate (TUMS) 500 mg chewable tablet  Outside Facility (Specify) Yes Yes   Sig: Chew 1 tablet daily   docusate sodium (COLACE) 100 mg capsule  Outside Facility (Specify) Yes Yes   Sig: Take 100 mg by mouth 2 (two) times a day   furosemide (LASIX) 40 mg tablet  Outside Facility (Specify) Yes Yes   Sig: Take 1 tablet by mouth daily   loperamide (IMODIUM) 2 mg capsule   Yes Yes   polyethylene glycol (MIRALAX) 17 g packet  Outside Facility (Specify) Yes Yes   Sig: Take 17 g by mouth daily   potassium chloride (K-DUR,KLOR-CON) 20 mEq tablet  Outside Facility (Specify) Yes Yes   Sig: Take 20 mEq by mouth daily   thiamine (VITAMIN B1) 100 mg tablet  Outside Facility (Specify) Yes Yes   zonisamide (ZONEGRAN) 100 mg capsule   No Yes   Sig: Take 1 capsule (100 mg total) by mouth daily Do not start before November 19, 2022.   Patient taking differently: Take 100 mg by mouth every 12 (twelve) hours      Facility-Administered Medications: None       Past Medical History:   Diagnosis Date    A-fib (HCC)     CAD (coronary artery disease)     CHF (congestive heart failure) (HCC)     CKD (chronic kidney disease) stage 2, GFR 60-89 ml/min     Dementia (HCC)     Hypertension     Hypokalemia     Knee effusion     Memory loss     Seizure (HCC)     Urinary tract infection        Past Surgical History:   Procedure Laterality Date    CARDIAC ELECTROPHYSIOLOGY PROCEDURE N/A 1/26/2023    Procedure: Cardiac pacer implant;  Surgeon: Francisco Georges DO;  Location: [facility];  Service: Cardiology    CHOLECYSTECTOMY      REPLACEMENT TOTAL KNEE Left 2008       Family History   Problem Relation Age  of Onset    Lung cancer Father     Leukemia Brother     Breast cancer Daughter      I have reviewed and agree with the history as documented.    E-Cigarette/Vaping    E-Cigarette Use Never User      E-Cigarette/Vaping Substances    Nicotine No     THC No     CBD No     Flavoring No     Other No     Unknown No      Social History     Tobacco Use    Smoking status: Never    Smokeless tobacco: Never   Vaping Use    Vaping status: Never Used   Substance Use Topics    Alcohol use: Never    Drug use: Never       Review of Systems   Unable to perform ROS: Dementia       Physical Exam  Physical Exam  Vitals and nursing note reviewed.   Constitutional:       Appearance: Normal appearance.   HENT:      Head: Normocephalic and atraumatic.      Comments: Fading ecchymosis to the right side of the face     Nose: Nose normal.      Mouth/Throat:      Mouth: Mucous membranes are moist.   Eyes:      Conjunctiva/sclera: Conjunctivae normal.   Cardiovascular:      Rate and Rhythm: Normal rate and regular rhythm.   Pulmonary:      Effort: Pulmonary effort is normal.      Breath sounds: Normal breath sounds.   Abdominal:      Palpations: Abdomen is soft.   Musculoskeletal:         General: Swelling and tenderness (diffuse tenderness to the right lateral calf.) present.      Cervical back: Normal range of motion.   Skin:     General: Skin is warm.      Findings: Bruising (diffuse ecchymossi to the RLE extending from thigh down to foot) and erythema (mild erythema to the right foot w/ mild increased warmth) present.   Neurological:      Mental Status: She is alert. Mental status is at baseline.   Psychiatric:         Mood and Affect: Mood normal.         Vital Signs  ED Triage Vitals   Temperature Pulse Respirations Blood Pressure SpO2   03/25/24 0959 03/25/24 1004 03/25/24 1043 03/25/24 1004 03/25/24 1004   97.9 °F (36.6 °C) 84 22 138/72 95 %      Temp Source Heart Rate Source Patient Position - Orthostatic VS BP Location FiO2 (%)    03/25/24 0959 03/25/24 1004 03/25/24 1004 03/25/24 1004 --   Oral Monitor Sitting Right arm       Pain Score       03/25/24 1043       10 - Worst Possible Pain           Vitals:    03/25/24 1004 03/25/24 1326   BP: 138/72 137/65   Pulse: 84 60   Patient Position - Orthostatic VS: Sitting Lying         Visual Acuity      ED Medications  Medications   fentaNYL injection 50 mcg (50 mcg Intravenous Given 3/25/24 1043)   iohexol (OMNIPAQUE) 350 MG/ML injection (SINGLE-DOSE) 100 mL (100 mL Intravenous Given 3/25/24 1123)   cephalexin (KEFLEX) capsule 500 mg (500 mg Oral Given 3/25/24 1324)       Diagnostic Studies  Results Reviewed       Procedure Component Value Units Date/Time    Basic metabolic panel [[id number]]  (Abnormal) Collected: 03/25/24 1014    Lab Status: Final result Specimen: Blood from Arm, Right Updated: 03/25/24 1049     Sodium 141 mmol/L      Potassium 4.3 mmol/L      Chloride 112 mmol/L      CO2 24 mmol/L      ANION GAP 5 mmol/L      BUN 24 mg/dL      Creatinine 0.75 mg/dL      Glucose 95 mg/dL      Calcium 9.5 mg/dL      eGFR 72 ml/min/1.73sq m     Narrative:      [employer] guidelines for Chronic Kidney Disease (CKD):     Stage 1 with normal or high GFR (GFR > 90 mL/min/1.73 square meters)    Stage 2 Mild CKD (GFR = 60-89 mL/min/1.73 square meters)    Stage 3A Moderate CKD (GFR = 45-59 mL/min/1.73 square meters)    Stage 3B Moderate CKD (GFR = 30-44 mL/min/1.73 square meters)    Stage 4 Severe CKD (GFR = 15-29 mL/min/1.73 square meters)    Stage 5 End Stage CKD (GFR <15 mL/min/1.73 square meters)  Note: GFR calculation is accurate only with a steady state creatinine    CBC and differential [[id number]]  (Abnormal) Collected: 03/25/24 1014    Lab Status: Final result Specimen: Blood from Arm, Right Updated: 03/25/24 1020     WBC 6.27 Thousand/uL      RBC 3.58 Million/uL      Hemoglobin 11.3 g/dL      Hematocrit 34.3 %      MCV 96 fL      MCH 31.6 pg      MCHC 32.9 g/dL      RDW  14.8 %      MPV 11.9 fL      Platelets 125 Thousands/uL      nRBC 0 /100 WBCs      Neutrophils Relative 57 %      Immature Grans % 0 %      Lymphocytes Relative 27 %      Monocytes Relative 12 %      Eosinophils Relative 3 %      Basophils Relative 1 %      Neutrophils Absolute 3.55 Thousands/µL      Absolute Immature Grans 0.01 Thousand/uL      Absolute Lymphocytes 1.67 Thousands/µL      Absolute Monocytes 0.78 Thousand/µL      Eosinophils Absolute 0.20 Thousand/µL      Basophils Absolute 0.06 Thousands/µL                    CT lower extremity w contrast left   ED Interpretation by Selam Benitez DO (03/25 1251)   See below      Final Result by María Elena Lake MD (03/25 1246)      No acute displaced fractures      There is no intramuscular hematoma      Soft tissue swelling and edema at the dorsum of the foot may be due to cellulitis      Chronic subcutaneous calcification as seen on the previous radiograph      Varices in the medial aspect of the thigh and leg      Workstation performed: [id number]         VAS lower limb venous duplex study, unilateral/limited   Final Result by Tevin Wright MD (03/25 8540)                 Procedures  ECG 12 Lead Documentation Only    Date/Time: 3/25/2024 10:06 AM    Performed by: Selam Benitez DO  Authorized by: Selam Benitez DO    Indications / Diagnosis:  1st nurse protocol  ECG reviewed by me, the ED Provider: yes    Patient location:  ED  Previous ECG:     Previous ECG:  Compared to current    Similarity:  No change  Interpretation:     Interpretation: non-specific    Rate:     ECG rate:  78    ECG rate assessment: normal    Rhythm:     Rhythm: paced    Pacing:     Capture:  Complete  QRS:     QRS axis:  Left  ST segments:     ST segments:  Non-specific  T waves:     T waves: non-specific             ED Course  ED Course as of 03/25/24 1809   Mon Mar 25, 2024   1050 Hemoglobin(!): 11.3  12.4 five days ago   1159 VAS lower limb venous duplex study,  unilateral/limited  Verbal report - negative for DVT   1300 CT lower extremity w contrast left  Results noted, no deep tissue fluid collection, ?signs of cellulitis to right foot.  Pt w/ no white count but will likely start on abx                               SBIRT 22yo+      Flowsheet Row Most Recent Value   Initial Alcohol Screen: US AUDIT-C     1. How often do you have a drink containing alcohol? 0 Filed at: 03/25/2024 1018   2. How many drinks containing alcohol do you have on a typical day you are drinking?  0 Filed at: 03/25/2024 1018   3b. FEMALE Any Age, or MALE 65+: How often do you have 4 or more drinks on one occassion? 0 Filed at: 03/25/2024 1018   Audit-C Score 0 Filed at: 03/25/2024 1018   MIN: How many times in the past year have you...    Used an illegal drug or used a prescription medication for non-medical reasons? Never Filed at: 03/25/2024 1018                      Medical Decision Making  Will get venous duplex to r/o dvt at this time.  Will likely get CT RLE to eval for any acute bony injury, evidence of cellulitis or fluid collections given RLE was not evaluated at the time of the original visit on 3/20.  Will ck basic labs to eval for any evidence of acute blood loss anemia and chemistries to r/o any acute life threatening metabolic abnl, renal dysfunction.    Problems Addressed:  Acute pain of right lower extremity: acute illness or injury  Cellulitis of right foot: acute illness or injury that poses a threat to life or bodily functions  Traumatic ecchymosis of right lower leg: acute illness or injury that poses a threat to life or bodily functions     Details: No evidence of DVT, active extrav, bony injury or fluid collections    Amount and/or Complexity of Data Reviewed  Independent Historian:      Details: Nursing home    External Data Reviewed: notes.     Details: Prior visit notes and imaging studies reviewed  Labs: ordered. Decision-making details documented in ED Course.  Radiology:  ordered. Decision-making details documented in ED Course.  ECG/medicine tests: ordered and independent interpretation performed.     Details: Order initiated by nursing prior to my initial evaluation    Risk  Prescription drug management.             Disposition  Final diagnoses:   Cellulitis of right foot   Acute pain of right lower extremity   Traumatic ecchymosis of right lower leg     Time reflects when diagnosis was documented in both MDM as applicable and the Disposition within this note       Time User Action Codes Description Comment    3/25/2024  1:01 PM Selam Benitez [L03.115] Cellulitis of right foot     3/25/2024  1:01 PM Selam Benitez [M79.604] Acute pain of right lower extremity     3/25/2024  1:06 PM Selam Benitez [S80.11XA] Traumatic ecchymosis of right lower leg           ED Disposition       ED Disposition   Discharge    Condition   Stable    Date/Time   Mon Mar 25, 2024 1301    Comment   Euseboi Tate discharge to home/self care.                   Follow-up Information    None         Discharge Medication List as of 3/25/2024  1:17 PM        START taking these medications    Details   cephalexin (KEFLEX) 500 mg capsule Take 1 capsule (500 mg total) by mouth every 6 (six) hours for 7 days, Starting Mon 3/25/2024, Until Mon 4/1/2024, Normal           CONTINUE these medications which have NOT CHANGED    Details   acetaminophen (TYLENOL) 500 mg tablet Take 500 mg by mouth every 6 (six) hours as needed for mild pain, Historical Med      apixaban (ELIQUIS) 5 mg Take 5 mg by mouth 2 (two) times a day, Historical Med      aspirin 81 mg chewable tablet Chew 1 tablet (81 mg total) daily Do not start before February 18, 2023., Starting Sat 2/18/2023, No Print      atorvastatin (LIPITOR) 10 mg tablet Take 40 mg by mouth daily, Historical Med      bisacodyl (DULCOLAX) 5 mg EC tablet Take 5 mg by mouth daily as needed for constipation, Historical Med      calcium carbonate (TUMS) 500 mg  chewable tablet Chew 1 tablet daily, Historical Med      Cholecalciferol (Vitamin D3) 1.25 MG (53603 UT) CAPS Take 2,000 Units by mouth daily 2,000U daily, Historical Med      Cranberry 450 MG CAPS Take 1 capsule by mouth in the morning, Historical Med      Diclofenac Sodium (VOLTAREN) 1 % Apply 2 g topically 3 (three) times a day as needed (pain), Historical Med      docusate sodium (COLACE) 100 mg capsule Take 100 mg by mouth 2 (two) times a day, Historical Med      furosemide (LASIX) 40 mg tablet Take 1 tablet by mouth daily, Starting Thu 2/20/2020, Historical Med      loperamide (IMODIUM) 2 mg capsule Starting Wed 2/15/2023, Historical Med      polyethylene glycol (MIRALAX) 17 g packet Take 17 g by mouth daily, Historical Med      potassium chloride (K-DUR,KLOR-CON) 20 mEq tablet Take 20 mEq by mouth daily, Historical Med      QUEtiapine (SEROquel) 25 mg tablet Take 0.5 tablets (12.5 mg total) by mouth daily at bedtime Hold if sedated, Starting Fri 11/18/2022, No Print      thiamine (VITAMIN B1) 100 mg tablet Historical Med      zonisamide (ZONEGRAN) 100 mg capsule Take 1 capsule (100 mg total) by mouth daily Do not start before November 19, 2022., Starting Sat 11/19/2022, No Print      Ascorbic Acid (VITAMIN C) 500 MG CAPS Take 500 mg by mouth daily, Historical Med      Valerian 500 MG CAPS Take by mouth in the morning, Historical Med             No discharge procedures on file.    PDMP Review       None            ED Provider  Electronically Signed by             Selam Benitez DO  03/25/24 3044

## 2024-05-02 ENCOUNTER — OFFICE VISIT (OUTPATIENT)
Dept: CARDIOLOGY CLINIC | Facility: CLINIC | Age: 89
End: 2024-05-02
Payer: COMMERCIAL

## 2024-05-02 VITALS — SYSTOLIC BLOOD PRESSURE: 126 MMHG | DIASTOLIC BLOOD PRESSURE: 70 MMHG | OXYGEN SATURATION: 99 % | HEART RATE: 66 BPM

## 2024-05-02 DIAGNOSIS — I49.5 SICK SINUS SYNDROME (HCC): Primary | ICD-10-CM

## 2024-05-02 DIAGNOSIS — Z86.73 HISTORY OF STROKE: ICD-10-CM

## 2024-05-02 DIAGNOSIS — N18.32 STAGE 3B CHRONIC KIDNEY DISEASE (HCC): ICD-10-CM

## 2024-05-02 DIAGNOSIS — I51.7 SEVERE CONCENTRIC LEFT VENTRICULAR HYPERTROPHY: ICD-10-CM

## 2024-05-02 DIAGNOSIS — I87.2 VENOUS STASIS DERMATITIS OF BOTH LOWER EXTREMITIES: ICD-10-CM

## 2024-05-02 DIAGNOSIS — I48.21 PERMANENT ATRIAL FIBRILLATION (HCC): ICD-10-CM

## 2024-05-02 DIAGNOSIS — I10 BENIGN ESSENTIAL HYPERTENSION: ICD-10-CM

## 2024-05-02 DIAGNOSIS — I73.9 PERIPHERAL VASCULAR DISEASE, UNSPECIFIED (HCC): ICD-10-CM

## 2024-05-02 DIAGNOSIS — I51.7 BIATRIAL ENLARGEMENT: ICD-10-CM

## 2024-05-02 DIAGNOSIS — I50.32 CHRONIC DIASTOLIC HEART FAILURE (HCC): ICD-10-CM

## 2024-05-02 PROCEDURE — 1160F RVW MEDS BY RX/DR IN RCRD: CPT | Performed by: INTERNAL MEDICINE

## 2024-05-02 PROCEDURE — G2211 COMPLEX E/M VISIT ADD ON: HCPCS | Performed by: INTERNAL MEDICINE

## 2024-05-02 PROCEDURE — 99215 OFFICE O/P EST HI 40 MIN: CPT | Performed by: INTERNAL MEDICINE

## 2024-05-02 PROCEDURE — 1159F MED LIST DOCD IN RCRD: CPT | Performed by: INTERNAL MEDICINE

## 2024-05-02 RX ORDER — OXYCODONE HYDROCHLORIDE 5 MG/1
5 TABLET ORAL EVERY 4 HOURS PRN
COMMUNITY

## 2024-05-02 NOTE — PROGRESS NOTES
CARD[facility]OLOG[facility]OCI[facility]  [facility]  Primary Office: [address] [place] [id number]  Phone: [phone]; Fax: [phone]  *-*-*-*-*-*-*-*-*-*-*-*-*-*-*-*-*-*-*-*-*-*-*-*-*-*-*-*-*-*-*-*-*-*-*-*-*-*-*-*-*-*-*-*-*-*-*-*-*-*-*-*-*-*  ENCOUNTER DATE: 05/02/24 10:13 AM  PATIENT NAME: Eusebio Tate   1935    [id number]  AGE:89 y.o.      SEX: female  ENCOUNTER PROVIDER: Reece Cortez MD, [facility], [facility]  PRIMARY CARE PHYSICIAN: Daniel Birch MD    DIAGNOSES:  1.  Primary hypertension  2.  Permanent atrial fibrillation, with slow ventricular response, status post Medtronic single-chamber pacemaker implantation 1/26/2023  3.  Chronic diastolic heart failure  4.  Venous dermatitis of lower extremities  5.  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  Mild pulmonary hypertension  7.  History of CVA July 2022, recurrent left parietal stroke in January 2023  8.  Degenerative joint disease with osteoarthritis.  9.  History of knee injuries with hemarthrosis, of left knee July 2022  10.  Mild tremors   11.  Vitamin D deficiency  12.  Moderate to marked biatrial enlargement  13.  Peripheral venous insufficiency with venous dermatitis of lower extremities  14.  Post-stroke epilepsy  15.  Mild pulmonary hypertension  16. Dementia    ECHOCARDIOGRAM 16 December 2023 [facility]: Severe concentric left ventricular hypertrophy suggestive of infiltrative cardiomyopathy, LVEF 55 to 60% indeterminate diastolic function.  Normal right ventricle size and function.  Severe left atrial enlargement, mild right atrial enlargement  Mildly calcified aortic valve without stenosis or regurgitation.  Mitral valve annular calcification trace mitral valve regurgitation.  Trace tricuspid valve regurgitation  No obvious pulmonary hypertension  No pericardial effusion  Mildly dilated aortic root.    ECHOCARDIOGRAM 1/10/2023  LVEF 60% with moderate to severe biatrial enlargement, aortic  "valve sclerosis, mild mitral regurgitation and mild to moderate tricuspid regurgitation, mild pulmonary hypertension, PASP 44 mmHg on furosemide 40 mg daily    ECHOCARDIOGRAM May 2022:  Marked asymmetric left ventricular hypertrophy involving the apex and surrounding regions, normal left ventricular systolic function, indeterminate diastolic function, moderate to marked biatrial enlargement, aortic valve sclerosis, mild pulmonary hypertension with estimated RVSP/PASP 44 mmHg, no pericardial effusion.       CURRENT ECG   No results found for this visit on 05/02/24.     Pacemaker remote transmission:  MDT SC PM/ACTIVE SYSTEM IS MRI CONDITIONAL   CARELINK TRANSMISSION: BATTERY STATUS \"11 YRS.\"  90%. ALL AVAILABLE LEAD PARAMETERS WITHIN NORMAL LIMITS. NO SIGNIFICANT HIGH RATE EPISODES. NORMAL DEVICE FUNCTION. NC     CARDIOLOGY ASSESSMENT & PLAN      Diagnosis ICD-10-CM Associated Orders   1. Sick sinus syndrome (HCC)  I49.5       2. Chronic diastolic heart failure (HCC)  I50.32 Elastic Bandages & Supports (Medical Compression Stockings) MISC      3. Venous stasis dermatitis of both lower extremities  I87.2 Elastic Bandages & Supports (Medical Compression Stockings) MISC      4. Permanent atrial fibrillation (HCC)  I48.21       5. Benign essential hypertension  I10       6. History of stroke  Z86.73       7. Biatrial enlargement  I51.7       8. Severe concentric left ventricular hypertrophy  I51.7       9. Peripheral vascular disease, unspecified (HCC)  I73.9       10. Stage 3b chronic kidney disease (HCC)  N18.32          1. Sick sinus syndrome (HCC)  Assessment & Plan:  Normal function pacemaker device.  She will continue to be followed in the device clinic.      2. Chronic diastolic heart failure (HCC)  Assessment & Plan:  Wt Readings from Last 3 Encounters:   05/02/23 78.6 kg (173 lb 3.2 oz)   03/29/23 76.2 kg (168 lb)   02/15/23 82.8 kg (182 lb 8.7 oz)     Weight is slightly up compared to March although improved " compared to last year.  Has chronic peripheral vascular condition.  No pulmonary vascular congestion noted on exam.  -- Request daily breathing exercises and incentive spirometry.  -- Needs close follow-up of volume status with at least weekly weight measurement and following up with changes in diuretic as needed.  Some tips are provided below.  -- Can consider addition of ARNI if she has another exacerbation of heart failure.  -- Should have follow-up basic metabolic panel checked at least every 3 to 6 months.  Can be ordered by the visiting clinician at the nursing facility.  -- She will benefit with addition of compression stockings, knee-high or thigh-high with 15 to 30 mmHg pressure which should be applied during the daytime only.    The following routine measures are being advised to prevent exacerbation of congestive heart failure:     - Daily or atleast weekly checking of weight.    Notify your clinicians if greater than 2 lb is gained in 1 day or greater than 5 lb is gained in 1 wk.    - Checking for lower extremity swelling.    Examine legs for swelling (new or increase in existing swelling) and describe if swelling reaches ankle, shin, or knee.    - Monitoring for decrease in exercise tolerance.    Check your self for unusual shortness of breath at rest, or with mild exertion, moderate exertion, or none at all.    - Monitoring for worsening shortness of breath on lying down.    Check for increase in number of pillows used at night and for increase in waking at night with shortness of breath.    - Salt/sodium restriction to less than 2 g a day.    Calculate total sodium intake in a day from nutrition labels and food charts. Understand hidden sources of salt intake.  Eliminate the salt shaker. (Remember, One teaspoon of table salt = 2,300 mg of sodium)   Avoid using garlic salt, onion salt, MSG, meat tenderizers, broth mixes, Chinese food, soy sauce, teriyaki sauce, barbeque sauce, sauerkraut, olives,  pickles, pickle relish, smith bits, and croutons.   Use fresh ingredients and/or foods with no added salt.  Try orange, lemon, lime, pineapple juice, or vinegar as a base for meat marinades or to add tart flavor.  Avoid convenience foods such as canned soups, entrees, vegetables, pasta and rice mixes, frozen dinners, instant cereal and puddings, and gravy sauce mixes.   Select frozen meals that contain around 600 mg sodium or less.  Use fresh, frozen, no-added-salt canned vegetables, low-sodium soups, and low-sodium lunchmeats.  Look for seasoning or spice blends with no salt, or try fresh herbs, onions, or garlic.    You are advised to inform us for any concerns regarding above measures.          Orders:  -     Elastic Bandages & Supports (Medical Compression Stockings) MISC; Use in the morning    3. Venous stasis dermatitis of both lower extremities  Assessment & Plan:  Has chronic venous dermatitis of lower extremities.  No open wounds noted today.  -- Request daily application of moisturizer to the legs.  -- Request raising of the feet whenever she is sitting for more than a few minutes and placement of wedge under her legs when she is lying down in bed.  -- Will continue current dose of furosemide.  She should get additional furosemide if she gains more than 2 pounds a day or 3 to 4 pounds a week.  -- She should be on a heart failure restricted diet with tips as outlined below.    Orders:  -     Elastic Bandages & Supports (Medical Compression Stockings) MISC; Use in the morning    4. Permanent atrial fibrillation (HCC)  Assessment & Plan:  No recent high rates noted on pacemaker device.  Is on chronic anticoagulation with Eliquis.  From a cardiac perspective can discontinue aspirin unless it is also prescribed for neurologic reason.      5. Benign essential hypertension  Assessment & Plan:  Blood pressure well-controlled on current regimen.  -- Will continue current medications.      6. History of  stroke  Assessment & Plan:  Has no history of recurrence in the recent years.  He is on chronic anticoagulation and single antiplatelet therapy with aspirin.  -- Consider discontinuing aspirin therapy if it is okay from neurologic perspective.  Continue Eliquis therapy.      7. Biatrial enlargement  Assessment & Plan:  Severe biatrial enlargement chronic.  Is on chronic anticoagulation.  -- Patient possibly has infiltrative cardiomyopathy and amyloid disease however at this age goal would be to keep even volume status and prevent exacerbation.  -- Plan as outlined above.      8. Severe concentric left ventricular hypertrophy  Assessment & Plan:  Severe left ventricular hypertrophy noted on last echocardiogram at [facility] in December.  Likely related to chronic atrial fibrillation and heart failure.  Should avoid overdiuresis.      9. Peripheral vascular disease, unspecified (HCC)    10. Stage 3b chronic kidney disease (HCC)          INTERVAL HISTORY, HISTORY OF PRESENT ILLNESS & COMPREHENSIVE VISIT INFORMATION     Eusebio Tate is here for follow-up regarding her cardiac comorbidities which include:  Permanent atrial fibrillation, status post single-chamber pacemaker, asymmetric left ventricle hypertrophy, hypertensive heart disease, chronic diastolic heart failure and other comorbidities.  She was seen March 2023.    She is currently a resident at [facility] in the memory care unit.  She is here accompanied with son Matt.  Since last year she has had several emergency room visits and hospitalizations primarily due to cellulitis and at times heart failure.  Most recently she was hospitalized at [facility] in March 2024.      Today she mentions that she has been overall feeling all right.  She does get short of breath upon activity.  Denies dizziness or chest pain.  Right lower extremity pain is better.  Denies orthopnea or PND.  She  is starting to walk again a little with the help of a walker. She has had no falls since her last hospitalization in March.     Functional capacity status: poor   (Excellent- >10 METs; Good: (7-10 METs); Moderate (4-7 METs); Poor (<= 4 METs)     Any chronic stressors: None   (feeling of poor health, financial problems, and social isolation etc).     Tobacco or alcohol dependence: Does not smoke and she does not drink.     Current cardiac medications: Aspirin 81 mg daily apixaban 5 mg twice daily atorvastatin 10 mg daily furosemide 40 mg daily potassium chloride 20 mg daily.     Current cardiac medications: Aspirin 81 mg daily apixaban 5 mg twice daily furosemide 40 mg daily potassium chloride 20 mg daily    Last recent comprehensive blood work available:   Blood work 4/16/2024 sodium 141 potassium 4.2 chloride 113 bicarb 21 BUN 21 creatinine 0.80 GFR 71  Normal liver function test  BNP level 512 on 9/14/2023  Lipid profile 2/16/2023 total cholesterol 81 triglyceride 76 HDL 29 calculated LDL 37  TSH 4.62 on 3/26/2024  Hemoglobin A1c 5.4 in February 2023    REVIEW OF SYSTEMS   Positive for: As noted above in HPI  Negative for: All remaining as reviewed below and in HPI.    SYSTEM SYMPTOMS REVIEWED:  General--weight change, fever, night sweats  Respiratory--cough, wheezing, shortness of breath, sputum production  Cardiovascular--chest pain, syncope, dyspnea on exertion, edema, decline in exercise tolerance, claudication   Gastrointestinal--persistent vomiting, diarrhea, abdominal distention, blood in stool   Muscular or skeletal--joint pain or swelling   Neurologic--headaches, syncope, abnormal movement  Hematologic--history of easy bruising and bleeding   Endocrine--thyroid enlargement, heat or cold intolerance, polyuria   Psychiatric--anxiety, depression     *-*-*-*-*-*-*-*-*-*-*-*-*-*-*-*-*-*-*-*-*-*-*-*-*-*-*-*-*-*-*-*-*-*-*-*-*-*-*-*-*-*-*-*-*-*-*-*-*-*-*-*-*-*-  VITAL SIGNS     CURRENT VITAL SIGNS:   Vitals:     05/02/24 0936   BP: 126/70   BP Location: Left arm   Patient Position: Sitting   Cuff Size: Large   Pulse: 66   SpO2: 99%       BMI: There is no height or weight on file to calculate BMI.    WEIGHTS:   Wt Readings from Last 25 Encounters:   05/02/23 78.6 kg (173 lb 3.2 oz)   03/29/23 76.2 kg (168 lb)   02/15/23 82.8 kg (182 lb 8.7 oz)   01/10/23 77.6 kg (171 lb)   01/09/23 77.8 kg (171 lb 8.3 oz)   11/14/22 77.8 kg (171 lb 8.3 oz)   09/29/22 77.7 kg (171 lb 3.2 oz)   06/22/22 76.7 kg (169 lb 3.2 oz)   05/25/22 76.2 kg (168 lb)   05/23/22 76.4 kg (168 lb 6.4 oz)   05/12/22 76.5 kg (168 lb 10.4 oz)   11/11/21 72.2 kg (159 lb 3.2 oz)   10/04/21 72.7 kg (160 lb 4 oz)   08/26/21 71.8 kg (158 lb 6.4 oz)   04/14/21 69.9 kg (154 lb)   02/24/21 73.4 kg (161 lb 14.4 oz)   04/01/20 73.8 kg (162 lb 9.6 oz)        *-*-*-*-*-*-*-*-*-*-*-*-*-*-*-*-*-*-*-*-*-*-*-*-*-*-*-*-*-*-*-*-*-*-*-*-*-*-*-*-*-*-*-*-*-*-*-*-*-*-*-*-*-*-  PHYSICAL EXAM     General Appearance:    Alert, cooperative, no distress, appears stated age, slightly obese   Head, Eyes, ENT:    No obvious abnormality, moist mucous mebranes.   Neck:   Supple, no carotid bruit. No JVD, no obvious lymphadenoapthy   Back:     Symmetric, no curvature.   Lungs:     Respirations unlabored. Clear to auscultation bilaterally,    Chest wall:    No tenderness or deformity   Heart:    Regular rate and rhythm, S1 and S2 normal, no murmur, rub  or gallop.   Abdomen:     Soft, non-tender,    Extremities:   Extremities warm, no cyanosis, bilateral grade 1-2 pedal edema.   Skin: Severe venostatic changes in lower extremities.   Severe dryness of the skin noted in bilateral lower extremities   Neuro: Pt is alert and oriented time 2.   Psych/ behavioral: Slightly abnormal affect     *-*-*-*-*-*-*-*-*-*-*-*-*-*-*-*-*-*-*-*-*-*-*-*-*-*-*-*-*-*-*-*-*-*-*-*-*-*-*-*-*-*-*-*-*-*-*-*-*-*-*-*-*-*-  CURRENT MEDICATIONS LIST     Current Outpatient Medications:     acetaminophen (TYLENOL) 500 mg  tablet, Take 500 mg by mouth every 6 (six) hours as needed for mild pain, Disp: , Rfl:     apixaban (ELIQUIS) 5 mg, Take 5 mg by mouth 2 (two) times a day, Disp: , Rfl:     aspirin 81 mg chewable tablet, Chew 1 tablet (81 mg total) daily Do not start before February 18, 2023., Disp: 30 tablet, Rfl: 0    atorvastatin (LIPITOR) 10 mg tablet, Take 40 mg by mouth daily, Disp: , Rfl:     bisacodyl (DULCOLAX) 5 mg EC tablet, Take 5 mg by mouth daily as needed for constipation, Disp: , Rfl:     calcium carbonate (TUMS) 500 mg chewable tablet, Chew 1 tablet daily, Disp: , Rfl:     Cholecalciferol (Vitamin D3) 1.25 MG (41965 UT) CAPS, Take 2,000 Units by mouth daily 2,000U daily, Disp: , Rfl:     Cranberry 450 MG CAPS, Take 1 capsule by mouth in the morning, Disp: , Rfl:     Diclofenac Sodium (VOLTAREN) 1 %, Apply 2 g topically 3 (three) times a day as needed (pain), Disp: , Rfl:     docusate sodium (COLACE) 100 mg capsule, Take 100 mg by mouth 2 (two) times a day, Disp: , Rfl:     Elastic Bandages & Supports (Medical Compression Stockings) MISC, Use in the morning, Disp: 4 each, Rfl: 1    furosemide (LASIX) 40 mg tablet, Take 1 tablet by mouth daily, Disp: , Rfl:     loperamide (IMODIUM) 2 mg capsule, , Disp: , Rfl:     oxyCODONE (ROXICODONE) 5 immediate release tablet, Take 5 mg by mouth every 4 (four) hours as needed for moderate pain Take 1/2 tablet q6h as needed for pain, Disp: , Rfl:     polyethylene glycol (MIRALAX) 17 g packet, Take 17 g by mouth daily, Disp: , Rfl:     potassium chloride (K-DUR,KLOR-CON) 20 mEq tablet, Take 20 mEq by mouth 2 (two) times a day, Disp: , Rfl:     QUEtiapine (SEROquel) 25 mg tablet, Take 0.5 tablets (12.5 mg total) by mouth daily at bedtime Hold if sedated, Disp: 30 tablet, Rfl: 0    thiamine (VITAMIN B1) 100 mg tablet, , Disp: , Rfl:     zonisamide (ZONEGRAN) 100 mg capsule, Take 1 capsule (100 mg total) by mouth daily Do not start before November 19, 2022. (Patient taking differently:  Take 100 mg by mouth every 12 (twelve) hours), Disp: , Rfl: 0    Ascorbic Acid (VITAMIN C) 500 MG CAPS, Take 500 mg by mouth daily (Patient not taking: Reported on 3/25/2024), Disp: , Rfl:     Valerian 500 MG CAPS, Take by mouth in the morning (Patient not taking: Reported on 3/25/2024), Disp: , Rfl:        ALLERGIES     Allergies   Allergen Reactions    Morphine        *-*-*-*-*-*-*-*-*-*-*-*-*-*-*-*-*-*-*-*-*-*-*-*-*-*-*-*-*-*-*-*-*-*-*-*-*-*-*-*-*-*-*-*-*-*-*-*-*-*-*-*-*-*-  LABORATORY DATA     Reviewed.  As summarized above.   *-*-*-*-*-*-*-*-*-*-*-*-*-*-*-*-*-*-*-*-*-*-*-*-*-*-*-*-*-*-*-*-*-*-*-*-*-*-*-*-*-*-*-*-*-*-*-*-*-*-*-*-*-*-  SIGNATURES:   @SIG@   Reece Cortez MD; [facility]    *-*-*-*-*-*-*-*-*-*-*-*-*-*-*-*-*-*-*-*-*-*-*-*-*-*-*-*-*-*-*-*-*-*-*-*-*-*-*-*-*-*-*-*-*-*-*-*-*-*-*-*-*-*-  PAST MEDICAL HISTORY:  Past Medical History:   Diagnosis Date    A-fib ([facility])     CAD (coronary artery disease)     CHF (congestive heart failure) ([facility])     CKD (chronic kidney disease) stage 2, GFR 60-89 ml/min     Dementia ([facility])     Hypertension     Hypokalemia     Knee effusion     Memory loss     Seizure (HCC)     Urinary tract infection     PAST SURGICAL HISTORY:  Past Surgical History:   Procedure Laterality Date    CARDIAC ELECTROPHYSIOLOGY PROCEDURE N/A 1/26/2023    Procedure: Cardiac pacer implant;  Surgeon: Francisco Georges DO;  Location: [facility];  Service: Cardiology    CHOLECYSTECTOMY      REPLACEMENT TOTAL KNEE Left 2008         FAMILY HISTORY:  Family History   Problem Relation Age of Onset    Lung cancer Father     Leukemia Brother     Breast cancer Daughter     SOCIAL HISTORY:  Social History     Tobacco Use   Smoking Status Never   Smokeless Tobacco Never      Social History     Substance and Sexual Activity   Alcohol Use Never     Social History     Substance and Sexual Activity   Drug Use Never    [unfilled]      *-*-*-*-*-*-*-*-*-*-*-*-*-*-*-*-*-*-*-*-*-*-*-*-*-*-*-*-*-*-*-*-*-*-*-*-*-*-*-*-*-*-*-*-*-*-*-*-*-*-*-*-*-*  ALLERGIES:  Allergies   Allergen Reactions    Morphine     CURRENT SCHEDULED MEDICATIONS:    Current Outpatient Medications:     acetaminophen (TYLENOL) 500 mg tablet, Take 500 mg by mouth every 6 (six) hours as needed for mild pain, Disp: , Rfl:     apixaban (ELIQUIS) 5 mg, Take 5 mg by mouth 2 (two) times a day, Disp: , Rfl:     aspirin 81 mg chewable tablet, Chew 1 tablet (81 mg total) daily Do not start before February 18, 2023., Disp: 30 tablet, Rfl: 0    atorvastatin (LIPITOR) 10 mg tablet, Take 40 mg by mouth daily, Disp: , Rfl:     bisacodyl (DULCOLAX) 5 mg EC tablet, Take 5 mg by mouth daily as needed for constipation, Disp: , Rfl:     calcium carbonate (TUMS) 500 mg chewable tablet, Chew 1 tablet daily, Disp: , Rfl:     Cholecalciferol (Vitamin D3) 1.25 MG (05305 UT) CAPS, Take 2,000 Units by mouth daily 2,000U daily, Disp: , Rfl:     Cranberry 450 MG CAPS, Take 1 capsule by mouth in the morning, Disp: , Rfl:     Diclofenac Sodium (VOLTAREN) 1 %, Apply 2 g topically 3 (three) times a day as needed (pain), Disp: , Rfl:     docusate sodium (COLACE) 100 mg capsule, Take 100 mg by mouth 2 (two) times a day, Disp: , Rfl:     Elastic Bandages & Supports (Medical Compression Stockings) MISC, Use in the morning, Disp: 4 each, Rfl: 1    furosemide (LASIX) 40 mg tablet, Take 1 tablet by mouth daily, Disp: , Rfl:     loperamide (IMODIUM) 2 mg capsule, , Disp: , Rfl:     oxyCODONE (ROXICODONE) 5 immediate release tablet, Take 5 mg by mouth every 4 (four) hours as needed for moderate pain Take 1/2 tablet q6h as needed for pain, Disp: , Rfl:     polyethylene glycol (MIRALAX) 17 g packet, Take 17 g by mouth daily, Disp: , Rfl:     potassium chloride (K-DUR,KLOR-CON) 20 mEq tablet, Take 20 mEq by mouth 2 (two) times a day, Disp: , Rfl:     QUEtiapine (SEROquel) 25 mg tablet, Take 0.5 tablets (12.5 mg total) by  mouth daily at bedtime Hold if sedated, Disp: 30 tablet, Rfl: 0    thiamine (VITAMIN B1) 100 mg tablet, , Disp: , Rfl:     zonisamide (ZONEGRAN) 100 mg capsule, Take 1 capsule (100 mg total) by mouth daily Do not start before November 19, 2022. (Patient taking differently: Take 100 mg by mouth every 12 (twelve) hours), Disp: , Rfl: 0    Ascorbic Acid (VITAMIN C) 500 MG CAPS, Take 500 mg by mouth daily (Patient not taking: Reported on 3/25/2024), Disp: , Rfl:     Valerian 500 MG CAPS, Take by mouth in the morning (Patient not taking: Reported on 3/25/2024), Disp: , Rfl:      *-*-*-*-*-*-*-*-*-*-*-*-*-*-*-*-*-*-*-*-*-*-*-*-*-*-*-*-*-*-*-*-*-*-*-*-*-*-*-*-*-*-*-*-*-*-*-*-*-*-*-*-*-*

## 2024-05-02 NOTE — ASSESSMENT & PLAN NOTE
Severe biatrial enlargement chronic.  Is on chronic anticoagulation.  -- Patient possibly has infiltrative cardiomyopathy and amyloid disease however at this age goal would be to keep even volume status and prevent exacerbation.  -- Plan as outlined above.

## 2024-05-02 NOTE — PATIENT INSTRUCTIONS
CARDIOLOGY ASSESSMENT & PLAN      Diagnosis ICD-10-CM Associated Orders   1. Sick sinus syndrome (HCC)  I49.5       2. Chronic diastolic heart failure (HCC)  I50.32 Elastic Bandages & Supports (Medical Compression Stockings) MISC      3. Venous stasis dermatitis of both lower extremities  I87.2 Elastic Bandages & Supports (Medical Compression Stockings) MISC      4. Permanent atrial fibrillation (HCC)  I48.21       5. Benign essential hypertension  I10       6. History of stroke  Z86.73       7. Biatrial enlargement  I51.7       8. Severe concentric left ventricular hypertrophy  I51.7       9. Peripheral vascular disease, unspecified (HCC)  I73.9       10. Stage 3b chronic kidney disease (HCC)  N18.32          1. Sick sinus syndrome (HCC)  Assessment & Plan:  Normal function pacemaker device.  She will continue to be followed in the device clinic.      2. Chronic diastolic heart failure (HCC)  Assessment & Plan:  Wt Readings from Last 3 Encounters:   05/02/23 78.6 kg (173 lb 3.2 oz)   03/29/23 76.2 kg (168 lb)   02/15/23 82.8 kg (182 lb 8.7 oz)     Weight is slightly up compared to March although improved compared to last year.  Has chronic peripheral vascular condition.  No pulmonary vascular congestion noted on exam.  -- Request daily breathing exercises and incentive spirometry.  -- Needs close follow-up of volume status with at least weekly weight measurement and following up with changes in diuretic as needed.  Some tips are provided below.  -- Can consider addition of ARNI if she has another exacerbation of heart failure.  -- Should have follow-up basic metabolic panel checked at least every 3 to 6 months.  Can be ordered by the visiting clinician at the nursing facility.  -- She will benefit with addition of compression stockings, knee-high or thigh-high with 15 to 30 mmHg pressure which should be applied during the daytime only.    The following routine measures are being advised to prevent exacerbation of  congestive heart failure:     - Daily or atleast weekly checking of weight.    Notify your clinicians if greater than 2 lb is gained in 1 day or greater than 5 lb is gained in 1 wk.    - Checking for lower extremity swelling.    Examine legs for swelling (new or increase in existing swelling) and describe if swelling reaches ankle, shin, or knee.    - Monitoring for decrease in exercise tolerance.    Check your self for unusual shortness of breath at rest, or with mild exertion, moderate exertion, or none at all.    - Monitoring for worsening shortness of breath on lying down.    Check for increase in number of pillows used at night and for increase in waking at night with shortness of breath.    - Salt/sodium restriction to less than 2 g a day.    Calculate total sodium intake in a day from nutrition labels and food charts. Understand hidden sources of salt intake.  Eliminate the salt shaker. (Remember, One teaspoon of table salt = 2,300 mg of sodium)   Avoid using garlic salt, onion salt, MSG, meat tenderizers, broth mixes, Chinese food, soy sauce, teriyaki sauce, barbeque sauce, sauerkraut, olives, pickles, pickle relish, smith bits, and croutons.   Use fresh ingredients and/or foods with no added salt.  Try orange, lemon, lime, pineapple juice, or vinegar as a base for meat marinades or to add tart flavor.  Avoid convenience foods such as canned soups, entrees, vegetables, pasta and rice mixes, frozen dinners, instant cereal and puddings, and gravy sauce mixes.   Select frozen meals that contain around 600 mg sodium or less.  Use fresh, frozen, no-added-salt canned vegetables, low-sodium soups, and low-sodium lunchmeats.  Look for seasoning or spice blends with no salt, or try fresh herbs, onions, or garlic.    You are advised to inform us for any concerns regarding above measures.          Orders:  -     Elastic Bandages & Supports (Medical Compression Stockings) MISC; Use in the morning    3. Venous stasis  dermatitis of both lower extremities  Assessment & Plan:  Has chronic venous dermatitis of lower extremities.  No open wounds noted today.  -- Request daily application of moisturizer to the legs.  -- Request raising of the feet whenever she is sitting for more than a few minutes and placement of wedge under her legs when she is lying down in bed.  -- Will continue current dose of furosemide.  She should get additional furosemide if she gains more than 2 pounds a day or 3 to 4 pounds a week.  -- She should be on a heart failure restricted diet with tips as outlined below.    Orders:  -     Elastic Bandages & Supports (Medical Compression Stockings) MISC; Use in the morning    4. Permanent atrial fibrillation (HCC)  Assessment & Plan:  No recent high rates noted on pacemaker device.  Is on chronic anticoagulation with Eliquis.  From a cardiac perspective can discontinue aspirin unless it is also prescribed for neurologic reason.      5. Benign essential hypertension  Assessment & Plan:  Blood pressure well-controlled on current regimen.  -- Will continue current medications.      6. History of stroke  Assessment & Plan:  Has no history of recurrence in the recent years.  He is on chronic anticoagulation and single antiplatelet therapy with aspirin.  -- Consider discontinuing aspirin therapy if it is okay from neurologic perspective.  Continue Eliquis therapy.      7. Biatrial enlargement  Assessment & Plan:  Severe biatrial enlargement chronic.  Is on chronic anticoagulation.  -- Patient possibly has infiltrative cardiomyopathy and amyloid disease however at this age goal would be to keep even volume status and prevent exacerbation.  -- Plan as outlined above.      8. Severe concentric left ventricular hypertrophy  Assessment & Plan:  Severe left ventricular hypertrophy noted on last echocardiogram at [facility] in December.  Likely related to chronic atrial fibrillation and heart failure.  Should  avoid overdiuresis.      9. Peripheral vascular disease, unspecified (HCC)    10. Stage 3b chronic kidney disease (HCC)

## 2024-05-02 NOTE — ASSESSMENT & PLAN NOTE
Severe left ventricular hypertrophy noted on last echocardiogram at [facility] in December.  Likely related to chronic atrial fibrillation and heart failure.  Should avoid overdiuresis.

## 2024-05-02 NOTE — ASSESSMENT & PLAN NOTE
No recent high rates noted on pacemaker device.  Is on chronic anticoagulation with Eliquis.  From a cardiac perspective can discontinue aspirin unless it is also prescribed for neurologic reason.

## 2024-05-02 NOTE — ASSESSMENT & PLAN NOTE
Has no history of recurrence in the recent years.  He is on chronic anticoagulation and single antiplatelet therapy with aspirin.  -- Consider discontinuing aspirin therapy if it is okay from neurologic perspective.  Continue Eliquis therapy.

## 2024-05-02 NOTE — ASSESSMENT & PLAN NOTE
Wt Readings from Last 3 Encounters:   05/02/23 78.6 kg (173 lb 3.2 oz)   03/29/23 76.2 kg (168 lb)   02/15/23 82.8 kg (182 lb 8.7 oz)     Weight is slightly up compared to March although improved compared to last year.  Has chronic peripheral vascular condition.  No pulmonary vascular congestion noted on exam.  -- Request daily breathing exercises and incentive spirometry.  -- Needs close follow-up of volume status with at least weekly weight measurement and following up with changes in diuretic as needed.  Some tips are provided below.  -- Can consider addition of ARNI if she has another exacerbation of heart failure.  -- Should have follow-up basic metabolic panel checked at least every 3 to 6 months.  Can be ordered by the visiting clinician at the nursing facility.  -- She will benefit with addition of compression stockings, knee-high or thigh-high with 15 to 30 mmHg pressure which should be applied during the daytime only.    The following routine measures are being advised to prevent exacerbation of congestive heart failure:     - Daily or atleast weekly checking of weight.    Notify your clinicians if greater than 2 lb is gained in 1 day or greater than 5 lb is gained in 1 wk.    - Checking for lower extremity swelling.    Examine legs for swelling (new or increase in existing swelling) and describe if swelling reaches ankle, shin, or knee.    - Monitoring for decrease in exercise tolerance.    Check your self for unusual shortness of breath at rest, or with mild exertion, moderate exertion, or none at all.    - Monitoring for worsening shortness of breath on lying down.    Check for increase in number of pillows used at night and for increase in waking at night with shortness of breath.    - Salt/sodium restriction to less than 2 g a day.    Calculate total sodium intake in a day from nutrition labels and food charts. Understand hidden sources of salt intake.  Eliminate the salt shaker. (Remember, One  teaspoon of table salt = 2,300 mg of sodium)   Avoid using garlic salt, onion salt, MSG, meat tenderizers, broth mixes, Chinese food, soy sauce, teriyaki sauce, barbeque sauce, sauerkraut, olives, pickles, pickle relish, smith bits, and croutons.   Use fresh ingredients and/or foods with no added salt.  Try orange, lemon, lime, pineapple juice, or vinegar as a base for meat marinades or to add tart flavor.  Avoid convenience foods such as canned soups, entrees, vegetables, pasta and rice mixes, frozen dinners, instant cereal and puddings, and gravy sauce mixes.   Select frozen meals that contain around 600 mg sodium or less.  Use fresh, frozen, no-added-salt canned vegetables, low-sodium soups, and low-sodium lunchmeats.  Look for seasoning or spice blends with no salt, or try fresh herbs, onions, or garlic.    You are advised to inform us for any concerns regarding above measures.

## 2024-05-02 NOTE — ASSESSMENT & PLAN NOTE
Has chronic venous dermatitis of lower extremities.  No open wounds noted today.  -- Request daily application of moisturizer to the legs.  -- Request raising of the feet whenever she is sitting for more than a few minutes and placement of wedge under her legs when she is lying down in bed.  -- Will continue current dose of furosemide.  She should get additional furosemide if she gains more than 2 pounds a day or 3 to 4 pounds a week.  -- She should be on a heart failure restricted diet with tips as outlined below.

## 2024-05-02 NOTE — LETTER
May 2, 2024     Daniel Birch MD  [address] [address]    Patient: Eusebio Tate   YOB: 1935   Date of Visit: 5/2/2024       Dear Dr. Birch:    Thank you for referring Eusebio Tate to me for evaluation. Below are my notes for this consultation.    If you have questions, please do not hesitate to call me. I look forward to following your patient along with you.         Sincerely,        Reece Cortez MD        CC: No Recipients    Reece Cortez MD  5/2/2024 10:11 AM  [facility]   [facility]  [facility]  Primary Office: [address] [place] [address]  Phone: [phone]; Fax: [phone]  *-*-*-*-*-*-*-*-*-*-*-*-*-*-*-*-*-*-*-*-*-*-*-*-*-*-*-*-*-*-*-*-*-*-*-*-*-*-*-*-*-*-*-*-*-*-*-*-*-*-*-*-*-*  ENCOUNTER DATE: 05/02/24 10:11 AM  PATIENT NAME: Eusebio Tate   1935    [id number]  AGE:89 y.o.      SEX: female  ENCOUNTER PROVIDER: Reece Cortez MD, [facility], [facility]  PRIMARY CARE PHYSICIAN: No primary care provider on file.    DIAGNOSES:  1.  Primary hypertension  2.  Permanent atrial fibrillation, with slow ventricular response, status post Medtronic single-chamber pacemaker implantation 1/26/2023  3.  Chronic diastolic heart failure  4.  Venous dermatitis of lower extremities  5.  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  Mild pulmonary hypertension  7.  History of CVA July 2022, recurrent left parietal stroke in January 2023  8.  Degenerative joint disease with osteoarthritis.  9.  History of knee injuries with hemarthrosis, of left knee July 2022  10.  Mild tremors   11.  Vitamin D deficiency  12.  Moderate to marked biatrial enlargement  13.  Peripheral venous insufficiency with venous dermatitis of lower extremities  14.  Post-stroke epilepsy  15.  Mild pulmonary hypertension  16. Dementia    ECHOCARDIOGRAM 16 December 2023 [facility]: Severe concentric left ventricular hypertrophy  "suggestive of infiltrative cardiomyopathy, LVEF 55 to 60% indeterminate diastolic function.  Normal right ventricle size and function.  Severe left atrial enlargement, mild right atrial enlargement  Mildly calcified aortic valve without stenosis or regurgitation.  Mitral valve annular calcification trace mitral valve regurgitation.  Trace tricuspid valve regurgitation  No obvious pulmonary hypertension  No pericardial effusion  Mildly dilated aortic root.    ECHOCARDIOGRAM 1/10/2023  LVEF 60% with moderate to severe biatrial enlargement, aortic valve sclerosis, mild mitral regurgitation and mild to moderate tricuspid regurgitation, mild pulmonary hypertension, PASP 44 mmHg on furosemide 40 mg daily    ECHOCARDIOGRAM May 2022:  Marked asymmetric left ventricular hypertrophy involving the apex and surrounding regions, normal left ventricular systolic function, indeterminate diastolic function, moderate to marked biatrial enlargement, aortic valve sclerosis, mild pulmonary hypertension with estimated RVSP/PASP 44 mmHg, no pericardial effusion.       CURRENT ECG   No results found for this visit on 05/02/24.     Pacemaker remote transmission:  MDT SC PM/ACTIVE SYSTEM IS MRI CONDITIONAL   CARELINK TRANSMISSION: BATTERY STATUS \"11 YRS.\"  90%. ALL AVAILABLE LEAD PARAMETERS WITHIN NORMAL LIMITS. NO SIGNIFICANT HIGH RATE EPISODES. NORMAL DEVICE FUNCTION. NC     CARDIOLOGY ASSESSMENT & PLAN      Diagnosis ICD-10-CM Associated Orders   1. Sick sinus syndrome (HCC)  I49.5       2. Chronic diastolic heart failure (HCC)  I50.32 Elastic Bandages & Supports (Medical Compression Stockings) MISC      3. Venous stasis dermatitis of both lower extremities  I87.2 Elastic Bandages & Supports (Medical Compression Stockings) MISC      4. Permanent atrial fibrillation (HCC)  I48.21       5. Benign essential hypertension  I10       6. History of stroke  Z86.73       7. Biatrial enlargement  I51.7       8. Severe concentric left ventricular " hypertrophy  I51.7       9. Peripheral vascular disease, unspecified (HCC)  I73.9       10. Stage 3b chronic kidney disease ([facility])  N18.32          1. Sick sinus syndrome (HCC)  Assessment & Plan:  Normal function pacemaker device.  She will continue to be followed in the device clinic.      2. Chronic diastolic heart failure (HCC)  Assessment & Plan:  Wt Readings from Last 3 Encounters:   05/02/23 78.6 kg (173 lb 3.2 oz)   03/29/23 76.2 kg (168 lb)   02/15/23 82.8 kg (182 lb 8.7 oz)     Weight is slightly up compared to March although improved compared to last year.  Has chronic peripheral vascular condition.  No pulmonary vascular congestion noted on exam.  -- Request daily breathing exercises and incentive spirometry.  -- Needs close follow-up of volume status with at least weekly weight measurement and following up with changes in diuretic as needed.  Some tips are provided below.  -- Can consider addition of ARNI if she has another exacerbation of heart failure.  -- Should have follow-up basic metabolic panel checked at least every 3 to 6 months.  Can be ordered by the visiting clinician at the nursing facility.  -- She will benefit with addition of compression stockings, knee-high or thigh-high with 15 to 30 mmHg pressure which should be applied during the daytime only.    The following routine measures are being advised to prevent exacerbation of congestive heart failure:     - Daily or atleast weekly checking of weight.    Notify your clinicians if greater than 2 lb is gained in 1 day or greater than 5 lb is gained in 1 wk.    - Checking for lower extremity swelling.    Examine legs for swelling (new or increase in existing swelling) and describe if swelling reaches ankle, shin, or knee.    - Monitoring for decrease in exercise tolerance.    Check your self for unusual shortness of breath at rest, or with mild exertion, moderate exertion, or none at all.    - Monitoring for worsening shortness of breath on  lying down.    Check for increase in number of pillows used at night and for increase in waking at night with shortness of breath.    - Salt/sodium restriction to less than 2 g a day.    Calculate total sodium intake in a day from nutrition labels and food charts. Understand hidden sources of salt intake.  Eliminate the salt shaker. (Remember, One teaspoon of table salt = 2,300 mg of sodium)   Avoid using garlic salt, onion salt, MSG, meat tenderizers, broth mixes, Chinese food, soy sauce, teriyaki sauce, barbeque sauce, sauerkraut, olives, pickles, pickle relish, smith bits, and croutons.   Use fresh ingredients and/or foods with no added salt.  Try orange, lemon, lime, pineapple juice, or vinegar as a base for meat marinades or to add tart flavor.  Avoid convenience foods such as canned soups, entrees, vegetables, pasta and rice mixes, frozen dinners, instant cereal and puddings, and gravy sauce mixes.   Select frozen meals that contain around 600 mg sodium or less.  Use fresh, frozen, no-added-salt canned vegetables, low-sodium soups, and low-sodium lunchmeats.  Look for seasoning or spice blends with no salt, or try fresh herbs, onions, or garlic.    You are advised to inform us for any concerns regarding above measures.          Orders:  -     Elastic Bandages & Supports (Medical Compression Stockings) MISC; Use in the morning    3. Venous stasis dermatitis of both lower extremities  Assessment & Plan:  Has chronic venous dermatitis of lower extremities.  No open wounds noted today.  -- Request daily application of moisturizer to the legs.  -- Request raising of the feet whenever she is sitting for more than a few minutes and placement of wedge under her legs when she is lying down in bed.  -- Will continue current dose of furosemide.  She should get additional furosemide if she gains more than 2 pounds a day or 3 to 4 pounds a week.  -- She should be on a heart failure restricted diet with tips as outlined  below.    Orders:  -     Elastic Bandages & Supports (Medical Compression Stockings) MISC; Use in the morning    4. Permanent atrial fibrillation (HCC)  Assessment & Plan:  No recent high rates noted on pacemaker device.  Is on chronic anticoagulation with Eliquis.  From a cardiac perspective can discontinue aspirin unless it is also prescribed for neurologic reason.      5. Benign essential hypertension  Assessment & Plan:  Blood pressure well-controlled on current regimen.  -- Will continue current medications.      6. History of stroke  Assessment & Plan:  Has no history of recurrence in the recent years.  He is on chronic anticoagulation and single antiplatelet therapy with aspirin.  -- Consider discontinuing aspirin therapy if it is okay from neurologic perspective.  Continue Eliquis therapy.      7. Biatrial enlargement  Assessment & Plan:  Severe biatrial enlargement chronic.  Is on chronic anticoagulation.  -- Patient possibly has infiltrative cardiomyopathy and amyloid disease however at this age goal would be to keep even volume status and prevent exacerbation.  -- Plan as outlined above.      8. Severe concentric left ventricular hypertrophy  Assessment & Plan:  Severe left ventricular hypertrophy noted on last echocardiogram at [facility] in December.  Likely related to chronic atrial fibrillation and heart failure.  Should avoid overdiuresis.      9. Peripheral vascular disease, unspecified (HCC)    10. Stage 3b chronic kidney disease (HCC)          INTERVAL HISTORY, HISTORY OF PRESENT ILLNESS & COMPREHENSIVE VISIT INFORMATION     Eusebio Tate is here for follow-up regarding her cardiac comorbidities which include:  Permanent atrial fibrillation, status post single-chamber pacemaker, asymmetric left ventricle hypertrophy, hypertensive heart disease, chronic diastolic heart failure and other comorbidities.  She was seen March 2023.    She is currently a resident at [facility]  at [facility] in the memory care unit.  She is here accompanied with son Matt.  Since last year she has had several emergency room visits and hospitalizations primarily due to cellulitis and at times heart failure.  Most recently she was hospitalized at [facility] in March 2024.      Today she mentions that she has been overall feeling all right.  She does get short of breath upon activity.  Denies dizziness or chest pain.  Right lower extremity pain is better.  Denies orthopnea or PND.  She is starting to walk again a little with the help of a walker. She has had no falls since her last hospitalization in March.     Functional capacity status: poor   (Excellent- >10 METs; Good: (7-10 METs); Moderate (4-7 METs); Poor (<= 4 METs)     Any chronic stressors: None   (feeling of poor health, financial problems, and social isolation etc).     Tobacco or alcohol dependence: Does not smoke and she does not drink.     Current cardiac medications: Aspirin 81 mg daily apixaban 5 mg twice daily atorvastatin 10 mg daily furosemide 40 mg daily potassium chloride 20 mg daily.     Current cardiac medications: Aspirin 81 mg daily apixaban 5 mg twice daily furosemide 40 mg daily potassium chloride 20 mg daily    Last recent comprehensive blood work available:   Blood work 4/16/2024 sodium 141 potassium 4.2 chloride 113 bicarb 21 BUN 21 creatinine 0.80 GFR 71  Normal liver function test  BNP level 512 on 9/14/2023  Lipid profile 2/16/2023 total cholesterol 81 triglyceride 76 HDL 29 calculated LDL 37  TSH 4.62 on 3/26/2024  Hemoglobin A1c 5.4 in February 2023    REVIEW OF SYSTEMS   Positive for: As noted above in HPI  Negative for: All remaining as reviewed below and in HPI.    SYSTEM SYMPTOMS REVIEWED:  General--weight change, fever, night sweats  Respiratory--cough, wheezing, shortness of breath, sputum production  Cardiovascular--chest pain, syncope, dyspnea on exertion, edema, decline in  exercise tolerance, claudication   Gastrointestinal--persistent vomiting, diarrhea, abdominal distention, blood in stool   Muscular or skeletal--joint pain or swelling   Neurologic--headaches, syncope, abnormal movement  Hematologic--history of easy bruising and bleeding   Endocrine--thyroid enlargement, heat or cold intolerance, polyuria   Psychiatric--anxiety, depression     *-*-*-*-*-*-*-*-*-*-*-*-*-*-*-*-*-*-*-*-*-*-*-*-*-*-*-*-*-*-*-*-*-*-*-*-*-*-*-*-*-*-*-*-*-*-*-*-*-*-*-*-*-*-  VITAL SIGNS     CURRENT VITAL SIGNS:   Vitals:    05/02/24 0936   BP: 126/70   BP Location: Left arm   Patient Position: Sitting   Cuff Size: Large   Pulse: 66   SpO2: 99%       BMI: There is no height or weight on file to calculate BMI.    WEIGHTS:   Wt Readings from Last 25 Encounters:   05/02/23 78.6 kg (173 lb 3.2 oz)   03/29/23 76.2 kg (168 lb)   02/15/23 82.8 kg (182 lb 8.7 oz)   01/10/23 77.6 kg (171 lb)   01/09/23 77.8 kg (171 lb 8.3 oz)   11/14/22 77.8 kg (171 lb 8.3 oz)   09/29/22 77.7 kg (171 lb 3.2 oz)   06/22/22 76.7 kg (169 lb 3.2 oz)   05/25/22 76.2 kg (168 lb)   05/23/22 76.4 kg (168 lb 6.4 oz)   05/12/22 76.5 kg (168 lb 10.4 oz)   11/11/21 72.2 kg (159 lb 3.2 oz)   10/04/21 72.7 kg (160 lb 4 oz)   08/26/21 71.8 kg (158 lb 6.4 oz)   04/14/21 69.9 kg (154 lb)   02/24/21 73.4 kg (161 lb 14.4 oz)   04/01/20 73.8 kg (162 lb 9.6 oz)        *-*-*-*-*-*-*-*-*-*-*-*-*-*-*-*-*-*-*-*-*-*-*-*-*-*-*-*-*-*-*-*-*-*-*-*-*-*-*-*-*-*-*-*-*-*-*-*-*-*-*-*-*-*-  PHYSICAL EXAM     General Appearance:    Alert, cooperative, no distress, appears stated age, slightly obese   Head, Eyes, ENT:    No obvious abnormality, moist mucous mebranes.   Neck:   Supple, no carotid bruit. No JVD, no obvious lymphadenoapthy   Back:     Symmetric, no curvature.   Lungs:     Respirations unlabored. Clear to auscultation bilaterally,    Chest wall:    No tenderness or deformity   Heart:    Regular rate and rhythm, S1 and S2 normal, no murmur, rub  or gallop.    Abdomen:     Soft, non-tender,    Extremities:   Extremities warm, no cyanosis, bilateral grade 1-2 pedal edema.   Skin: Severe venostatic changes in lower extremities.   Severe dryness of the skin noted in bilateral lower extremities   Neuro: Pt is alert and oriented time 2.   Psych/ behavioral: Slightly abnormal affect     *-*-*-*-*-*-*-*-*-*-*-*-*-*-*-*-*-*-*-*-*-*-*-*-*-*-*-*-*-*-*-*-*-*-*-*-*-*-*-*-*-*-*-*-*-*-*-*-*-*-*-*-*-*-  CURRENT MEDICATIONS LIST     Current Outpatient Medications:   •  acetaminophen (TYLENOL) 500 mg tablet, Take 500 mg by mouth every 6 (six) hours as needed for mild pain, Disp: , Rfl:   •  apixaban (ELIQUIS) 5 mg, Take 5 mg by mouth 2 (two) times a day, Disp: , Rfl:   •  aspirin 81 mg chewable tablet, Chew 1 tablet (81 mg total) daily Do not start before February 18, 2023., Disp: 30 tablet, Rfl: 0  •  atorvastatin (LIPITOR) 10 mg tablet, Take 40 mg by mouth daily, Disp: , Rfl:   •  bisacodyl (DULCOLAX) 5 mg EC tablet, Take 5 mg by mouth daily as needed for constipation, Disp: , Rfl:   •  calcium carbonate (TUMS) 500 mg chewable tablet, Chew 1 tablet daily, Disp: , Rfl:   •  Cholecalciferol (Vitamin D3) 1.25 MG (26040 UT) CAPS, Take 2,000 Units by mouth daily 2,000U daily, Disp: , Rfl:   •  Cranberry 450 MG CAPS, Take 1 capsule by mouth in the morning, Disp: , Rfl:   •  Diclofenac Sodium (VOLTAREN) 1 %, Apply 2 g topically 3 (three) times a day as needed (pain), Disp: , Rfl:   •  docusate sodium (COLACE) 100 mg capsule, Take 100 mg by mouth 2 (two) times a day, Disp: , Rfl:   •  Elastic Bandages & Supports (Medical Compression Stockings) MISC, Use in the morning, Disp: 4 each, Rfl: 1  •  furosemide (LASIX) 40 mg tablet, Take 1 tablet by mouth daily, Disp: , Rfl:   •  loperamide (IMODIUM) 2 mg capsule, , Disp: , Rfl:   •  oxyCODONE (ROXICODONE) 5 immediate release tablet, Take 5 mg by mouth every 4 (four) hours as needed for moderate pain Take 1/2 tablet q6h as needed for pain, Disp: ,  "Rfl:   •  polyethylene glycol (MIRALAX) 17 g packet, Take 17 g by mouth daily, Disp: , Rfl:   •  potassium chloride (K-DUR,KLOR-CON) 20 mEq tablet, Take 20 mEq by mouth 2 (two) times a day, Disp: , Rfl:   •  QUEtiapine (SEROquel) 25 mg tablet, Take 0.5 tablets (12.5 mg total) by mouth daily at bedtime Hold if sedated, Disp: 30 tablet, Rfl: 0  •  thiamine (VITAMIN B1) 100 mg tablet, , Disp: , Rfl:   •  zonisamide (ZONEGRAN) 100 mg capsule, Take 1 capsule (100 mg total) by mouth daily Do not start before November 19, 2022. (Patient taking differently: Take 100 mg by mouth every 12 (twelve) hours), Disp: , Rfl: 0  •  Ascorbic Acid (VITAMIN C) 500 MG CAPS, Take 500 mg by mouth daily (Patient not taking: Reported on 3/25/2024), Disp: , Rfl:   •  Valerian 500 MG CAPS, Take by mouth in the morning (Patient not taking: Reported on 3/25/2024), Disp: , Rfl:        ALLERGIES     Allergies   Allergen Reactions   • Morphine        *-*-*-*-*-*-*-*-*-*-*-*-*-*-*-*-*-*-*-*-*-*-*-*-*-*-*-*-*-*-*-*-*-*-*-*-*-*-*-*-*-*-*-*-*-*-*-*-*-*-*-*-*-*-  LABORATORY DATA   No results found for: \"NA\"  Potassium   Date Value Ref Range Status   04/16/2024 4.2 3.5 - 5.2 mmol/L Final   03/27/2024 3.9 3.5 - 5.2 mmol/L Final   03/26/2024 3.9 3.5 - 5.2 mmol/L Final     Comment:     Specimen slightly hemolyzed, results may be affected.     Chloride   Date Value Ref Range Status   04/16/2024 113 (H) 100 - 109 mmol/L Final   03/27/2024 111 (H) 100 - 109 mmol/L Final   03/26/2024 113 (H) 100 - 109 mmol/L Final     Carbon Dioxide   Date Value Ref Range Status   04/16/2024 21 21 - 31 mmol/L Final   03/27/2024 20 (L) 21 - 31 mmol/L Final   03/26/2024 18 (L) 21 - 31 mmol/L Final     BUN   Date Value Ref Range Status   04/16/2024 21 7 - 25 mg/dL Final   03/27/2024 20 7 - 25 mg/dL Final   03/26/2024 21 7 - 25 mg/dL Final     Creatinine   Date Value Ref Range Status   04/16/2024 0.80 0.40 - 1.10 mg/dL Final   03/27/2024 0.69 0.40 - 1.10 mg/dL Final   03/26/2024 " 0.61 0.40 - 1.10 mg/dL Final     GFR, Calculated   Date Value Ref Range Status   12/19/2018 59 (L) >60 mL/min/1.73m2 Final     Comment:     mL/min per 1.73 square meters                                            Normal Function or Mild Renal    Disease (if clinically at risk):  >or=60  Moderately Decreased:                30-59  Severely Decreased:                  15-29  Renal Failure:                         <15                                            -American GFR: multiply reported GFR by 1.16    Please note that the eGFR is based on the CKD-EPI calculation, and is not intended to be used for drug dosing.                                            Note: Calculated GFR may not be an accurate indicator of renal function if the patient's renal function is not in a steady state.     EGFR   Date Value Ref Range Status   07/20/2022 87 >=60 mL/min Final     Comment:     eGFR is calculated based on the CKD-EPI 2021 equation   07/16/2022 77 >=60 mL/min Final     Comment:     eGFR is calculated based on the CKD-EPI 2021 equation   07/15/2022 76 >=60 mL/min Final     Comment:     eGFR is calculated based on the CKD-EPI 2021 equation     eGFRcr   Date Value Ref Range Status   04/16/2024 71 >59 Final   03/27/2024 84 >59 Final   03/26/2024 86 >59 Final     Calcium   Date Value Ref Range Status   04/16/2024 9.8 8.5 - 10.1 mg/dL Final   03/27/2024 9.7 8.5 - 10.1 mg/dL Final   03/26/2024 8.3 (L) 8.5 - 10.1 mg/dL Final     AST   Date Value Ref Range Status   04/16/2024 24 <41 U/L Final   03/25/2024 40 <41 U/L Final   12/17/2023 128 (H) <41 U/L Final     ALT   Date Value Ref Range Status   04/16/2024 14 <56 U/L Final   03/25/2024 32 <56 U/L Final   12/17/2023 130 (H) <56 U/L Final     Alkaline Phosphatase   Date Value Ref Range Status   04/16/2024 94 35 - 120 U/L Final   03/25/2024 95 35 - 120 U/L Final   12/17/2023 69 35 - 120 U/L Final     Magnesium   Date Value Ref Range Status   01/25/2023 2.4 1.6 - 2.6 mg/dL Final  "    Comment:     Slightly Hemolyzed; Results May be Affected     WBC   Date Value Ref Range Status   03/25/2024 6.27 4.31 - 10.16 Thousand/uL Final   03/20/2024 6.17 4.31 - 10.16 Thousand/uL Final   09/14/2023 6.24 4.31 - 10.16 Thousand/uL Final     Hemoglobin   Date Value Ref Range Status   03/25/2024 11.3 (L) 11.5 - 15.4 g/dL Final   03/20/2024 12.4 11.5 - 15.4 g/dL Final   09/14/2023 14.2 11.5 - 15.4 g/dL Final     Platelets   Date Value Ref Range Status   03/25/2024 125 (L) 149 - 390 Thousands/uL Final   03/20/2024 113 (L) 149 - 390 Thousands/uL Final   09/14/2023 133 (L) 149 - 390 Thousands/uL Final     Prothrombin Time   Date Value Ref Range Status   03/25/2024 17.1 (H) 12.0 - 14.6 sec Final   12/15/2023 19.9 (H) 12.0 - 14.6 sec Final     PTT   Date Value Ref Range Status   05/10/2023 32 23 - 37 seconds Final     Comment:     Therapeutic Heparin Range =  60-90 seconds   03/30/2023 30 23 - 37 seconds Final     Comment:     Therapeutic Heparin Range =  60-90 seconds     INR   Date Value Ref Range Status   03/25/2024 1.4  Final     Comment:     Interpretation  Suggested INR ranges for various  clinical conditions:                                           INR  Ambulatory Surgery          <1.5  Coumadinized Patients             (DVT,PE,MI or A.Fib)     2.0-3.0  Mechanical Heart Valve   2.5-3.5  Cardiogenic Embolus      2.5-3.5   12/15/2023 1.7  Final     Comment:     Interpretation  Suggested INR ranges for various  clinical conditions:                                           INR  Ambulatory Surgery          <1.5  Coumadinized Patients             (DVT,PE,MI or A.Fib)     2.0-3.0  Mechanical Heart Valve   2.5-3.5  Cardiogenic Embolus      2.5-3.5     No results found for: \"CK\", \"CKMB\", \"DIGOXIN\"  TSH   Date Value Ref Range Status   03/26/2024 4.62 0.45 - 5.33 uIU/mL Final   08/11/2023 2.36 0.45 - 5.33 uIU/mL Final     No results found for: \"CHOL\"   Hemoglobin A1C   Date Value Ref Range Status   02/16/2023 5.4 " Normal 3.8-5.6%; PreDiabetic 5.7-6.4%; Diabetic >=6.5%; Glycemic control for adults with diabetes <7.0% % Final   07/16/2022 5.2 4.0 - 5.6 % Final     Comment:     The use of HbA1c to monitor glycemic status is based on normal hemoglobin and HbA composition. This test should not be used in patients with abnormal hemoglobin that affects the half life of the red blood cell or the in vivo glycation rates.      Blood Culture   Date Value Ref Range Status   01/21/2023 No Growth After 5 Days.  Final   01/21/2023 No Growth After 5 Days.  Final   05/10/2022 No Growth After 5 Days.  Final   05/10/2022 No Growth After 5 Days.  Final     Urine Culture   Date Value Ref Range Status   09/14/2023 20,000-29,000 cfu/ml  Final     Comment:     Mixed Contaminants X4   01/11/2023 >100,000 cfu/ml Escherichia coli ESBL (A)  Final     Comment:     An Extended-Spectrum Beta-Lactamase is being produced by this organism (requires contact precautions).  Cephalosporins are NOT effective for treating these organisms.  For SEVERE infections (i.e. bacteremia, septic shock, etc.), Carbapenems are the drug of choice.  For MILD infections (i.e. isolated urinary or biliary infection), high-dose Zosyn may be used.  This organism has been edited. The previous result was Gram Negative Brian Enteric Like on 1/12/2023 at 1058 EST.   01/11/2023 10,000-19,000 cfu/ml  Final     Comment:     Mixed Contaminants X2     MRSA Culture Only   Date Value Ref Range Status   02/16/2023 (A)  Final    Methicillin Resistant Staphylococcus aureus isolated   01/26/2023 (A)  Final    Methicillin Resistant Staphylococcus aureus isolated   01/22/2023   Final    No Methicillin Resistant Staphlyococcus aureus (MRSA) isolated   01/10/2023   Final    No Methicillin Resistant Staphlyococcus aureus (MRSA) isolated     Legionella Urinary Antigen   Date Value Ref Range Status   05/10/2022 Negative Negative Final         *-*-*-*-*-*-*-*-*-*-*-*-*-*-*-*-*-*-*-*-*-*-*-*-*-*-*-*-*-*-*-*-*-*-*-*-*-*-*-*-*-*-*-*-*-*-*-*-*-*-*-*-*-*-  SIGNATURES:   @SIG@   Reece Cortez MD; [facility]    *-*-*-*-*-*-*-*-*-*-*-*-*-*-*-*-*-*-*-*-*-*-*-*-*-*-*-*-*-*-*-*-*-*-*-*-*-*-*-*-*-*-*-*-*-*-*-*-*-*-*-*-*-*-  PAST MEDICAL HISTORY:  Past Medical History:   Diagnosis Date   • A-fib ([facility])    • CAD (coronary artery disease)    • CHF (congestive heart failure) ([facility])    • CKD (chronic kidney disease) stage 2, GFR 60-89 ml/min    • Dementia ([facility] [facility])    • Hypertension    • Hypokalemia    • Knee effusion    • Memory loss    • Seizure ([facility])    • Urinary tract infection     PAST SURGICAL HISTORY:  Past Surgical History:   Procedure Laterality Date   • CARDIAC ELECTROPHYSIOLOGY PROCEDURE N/A 1/26/2023    Procedure: Cardiac pacer implant;  Surgeon: Francisco Georges DO;  Location: [facility];  Service: Cardiology   • CHOLECYSTECTOMY     • REPLACEMENT TOTAL KNEE Left 2008         FAMILY HISTORY:  Family History   Problem Relation Age of Onset   • Lung cancer Father    • Leukemia Brother    • Breast cancer Daughter     SOCIAL HISTORY:  Social History     Tobacco Use   Smoking Status Never   Smokeless Tobacco Never      Social History     Substance and Sexual Activity   Alcohol Use Never     Social History     Substance and Sexual Activity   Drug Use Never    [unfilled]     *-*-*-*-*-*-*-*-*-*-*-*-*-*-*-*-*-*-*-*-*-*-*-*-*-*-*-*-*-*-*-*-*-*-*-*-*-*-*-*-*-*-*-*-*-*-*-*-*-*-*-*-*-*  ALLERGIES:  Allergies   Allergen Reactions   • Morphine     CURRENT SCHEDULED MEDICATIONS:    Current Outpatient Medications:   •  acetaminophen (TYLENOL) 500 mg tablet, Take 500 mg by mouth every 6 (six) hours as needed for mild pain, Disp: , Rfl:   •  apixaban (ELIQUIS) 5 mg, Take 5 mg by mouth 2 (two) times a day, Disp: , Rfl:   •  aspirin 81 mg chewable tablet, Chew 1 tablet (81 mg total) daily Do not start before February 18, 2023., Disp: 30 tablet, Rfl: 0  •  atorvastatin (LIPITOR) 10 mg  tablet, Take 40 mg by mouth daily, Disp: , Rfl:   •  bisacodyl (DULCOLAX) 5 mg EC tablet, Take 5 mg by mouth daily as needed for constipation, Disp: , Rfl:   •  calcium carbonate (TUMS) 500 mg chewable tablet, Chew 1 tablet daily, Disp: , Rfl:   •  Cholecalciferol (Vitamin D3) 1.25 MG (58993 UT) CAPS, Take 2,000 Units by mouth daily 2,000U daily, Disp: , Rfl:   •  Cranberry 450 MG CAPS, Take 1 capsule by mouth in the morning, Disp: , Rfl:   •  Diclofenac Sodium (VOLTAREN) 1 %, Apply 2 g topically 3 (three) times a day as needed (pain), Disp: , Rfl:   •  docusate sodium (COLACE) 100 mg capsule, Take 100 mg by mouth 2 (two) times a day, Disp: , Rfl:   •  Elastic Bandages & Supports (Medical Compression Stockings) MISC, Use in the morning, Disp: 4 each, Rfl: 1  •  furosemide (LASIX) 40 mg tablet, Take 1 tablet by mouth daily, Disp: , Rfl:   •  loperamide (IMODIUM) 2 mg capsule, , Disp: , Rfl:   •  oxyCODONE (ROXICODONE) 5 immediate release tablet, Take 5 mg by mouth every 4 (four) hours as needed for moderate pain Take 1/2 tablet q6h as needed for pain, Disp: , Rfl:   •  polyethylene glycol (MIRALAX) 17 g packet, Take 17 g by mouth daily, Disp: , Rfl:   •  potassium chloride (K-DUR,KLOR-CON) 20 mEq tablet, Take 20 mEq by mouth 2 (two) times a day, Disp: , Rfl:   •  QUEtiapine (SEROquel) 25 mg tablet, Take 0.5 tablets (12.5 mg total) by mouth daily at bedtime Hold if sedated, Disp: 30 tablet, Rfl: 0  •  thiamine (VITAMIN B1) 100 mg tablet, , Disp: , Rfl:   •  zonisamide (ZONEGRAN) 100 mg capsule, Take 1 capsule (100 mg total) by mouth daily Do not start before November 19, 2022. (Patient taking differently: Take 100 mg by mouth every 12 (twelve) hours), Disp: , Rfl: 0  •  Ascorbic Acid (VITAMIN C) 500 MG CAPS, Take 500 mg by mouth daily (Patient not taking: Reported on 3/25/2024), Disp: , Rfl:   •  Valerian 500 MG CAPS, Take by mouth in the morning (Patient not taking: Reported on 3/25/2024), Disp: , Rfl:       *-*-*-*-*-*-*-*-*-*-*-*-*-*-*-*-*-*-*-*-*-*-*-*-*-*-*-*-*-*-*-*-*-*-*-*-*-*-*-*-*-*-*-*-*-*-*-*-*-*-*-*-*-*        Reece Cortez MD  5/2/2024  9:54 AM  Sign when Signing Visit   [facility]  [facility]  Primary Office: [address] [place] [address]  Phone: [phone]; Fax: [phone]  *-*-*-*-*-*-*-*-*-*-*-*-*-*-*-*-*-*-*-*-*-*-*-*-*-*-*-*-*-*-*-*-*-*-*-*-*-*-*-*-*-*-*-*-*-*-*-*-*-*-*-*-*-*  ENCOUNTER DATE: 05/02/24 9:43 AM  PATIENT NAME: Eusebio Tate   1935    [id number]  AGE:89 y.o.      SEX: female  ENCOUNTER PROVIDER: Reece Cortez MD, [facility], [facility]  PRIMARY CARE PHYSICIAN: No primary care provider on file.    DIAGNOSES:  1.  Primary hypertension  2.  Permanent atrial fibrillation, with slow ventricular response, status post [employer]tronic single-chamber pacemaker implantation 1/26/2023  3.  Chronic diastolic heart failure  4.  Venous dermatitis of lower extremities  5.  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  Mild pulmonary hypertension  7.  History of CVA July 2022, recurrent left parietal stroke in January 2023  8.  Degenerative joint disease with osteoarthritis.  9.  History of knee injuries with hemarthrosis, of left knee July 2022  10.  Mild tremors dementia  11.  Vitamin D deficiency  12.  Moderate to marked biatrial enlargement  13.  Peripheral venous insufficiency with venous dermatitis of lower extremities  14.  Post-stroke epilepsy  15.  Mild pulmonary hypertension    ECHOCARDIOGRAM 1/10/2023  LVEF 60% with moderate to severe biatrial enlargement, aortic valve sclerosis, mild mitral regurgitation and mild to moderate tricuspid regurgitation, mild pulmonary hypertension, PASP 44 mmHg on furosemide 40 mg daily    ECHOCARDIOGRAM May 2022:  Marked asymmetric left ventricular hypertrophy involving the apex and surrounding regions, normal left ventricular systolic function, indeterminate diastolic function,  "moderate to marked biatrial enlargement, aortic valve sclerosis, mild pulmonary hypertension with estimated RVSP/PASP 44 mmHg, no pericardial effusion.       CURRENT ECG   No results found for this visit on 05/02/24.     Pacemaker remote transmission:  TALISHA LINDSAY PM/ACTIVE SYSTEM IS MRI CONDITIONAL   CARELINK TRANSMISSION: BATTERY STATUS \"11 YRS.\"  90%. ALL AVAILABLE LEAD PARAMETERS WITHIN NORMAL LIMITS. NO SIGNIFICANT HIGH RATE EPISODES. NORMAL DEVICE FUNCTION. NC     CARDIOLOGY ASSESSMENT & PLAN   No diagnosis found.   {There are no diagnoses linked to this encounter. (Refresh or delete this SmartLink)}      INTERVAL HISTORY, HISTORY OF PRESENT ILLNESS & COMPREHENSIVE VISIT INFORMATION     Eusebio Tate is here for follow-up regarding her cardiac comorbidities which include:  Permanent atrial fibrillation, status post single-chamber pacemaker, asymmetric left ventricle hypertrophy, hypertensive heart disease, chronic diastolic heart failure and other comorbidities.  She was seen March 2023.     She is here for follow-up visit accompanied with her son Matt.  She is currently a resident of [facility] at [facility].  She mentions that she has been overall feeling well.  She has completed in-home physical therapy at the [facility] and according to her son she was told that her exercise tolerance has improved remarkably.  Patient herself denies feeling any chest pain or shortness of breath or palpitations or dizziness.  She has had no falls.  Her lower extremity edema has not become worse.  She is using a walker for assistance.  Denies any orthopnea PND or presyncope/syncope events.     Functional capacity status: Moderate   (Excellent- >10 METs; Good: (7-10 METs); Moderate (4-7 METs); Poor (<= 4 METs)     Any chronic stressors: None   (feeling of poor health, financial problems, and social isolation etc).     Tobacco or alcohol dependence: Does not smoke and she does not " drink.     Current cardiac medications: Aspirin 81 mg daily apixaban 5 mg twice daily atorvastatin 10 mg daily furosemide 40 mg daily potassium chloride 20 mg daily.     Last blood work from 2/16/2023 significant for sodium 140 potassium 3.5 chloride 110 bicarb 23 BUN 18 creatinine 0.75 normal LFTs, BNP was 577 platelet count was 95 TSH in January was 1.937 recent hemoglobin A1c was 5.4    Current cardiac medications: Aspirin 81 mg daily apixaban 5 mg twice daily furosemide 40 mg daily potassium chloride 20 mg daily    Last recent comprehensive blood work available:   Blood work 4/16/2024 sodium 141 potassium 4.2 chloride 113 bicarb 21 BUN 21 creatinine 0.80 GFR 71  Normal liver function test  BNP level 512 on 9/14/2023  Lipid profile 2/16/2023 total cholesterol 81 triglyceride 76 HDL 29 calculated LDL 37  TSH 4.62 on 3/26/2024  Hemoglobin A1c 5.4 in February 2023    REVIEW OF SYSTEMS   Positive for: As noted above in HPI  Negative for: All remaining as reviewed below and in HPI.    SYSTEM SYMPTOMS REVIEWED:  General--weight change, fever, night sweats  Respiratory--cough, wheezing, shortness of breath, sputum production  Cardiovascular--chest pain, syncope, dyspnea on exertion, edema, decline in exercise tolerance, claudication   Gastrointestinal--persistent vomiting, diarrhea, abdominal distention, blood in stool   Muscular or skeletal--joint pain or swelling   Neurologic--headaches, syncope, abnormal movement  Hematologic--history of easy bruising and bleeding   Endocrine--thyroid enlargement, heat or cold intolerance, polyuria   Psychiatric--anxiety, depression     *-*-*-*-*-*-*-*-*-*-*-*-*-*-*-*-*-*-*-*-*-*-*-*-*-*-*-*-*-*-*-*-*-*-*-*-*-*-*-*-*-*-*-*-*-*-*-*-*-*-*-*-*-*-  VITAL SIGNS     CURRENT VITAL SIGNS:   Vitals:    05/02/24 0936   BP: 126/70   BP Location: Left arm   Patient Position: Sitting   Cuff Size: Large   Pulse: 66   SpO2: 99%       BMI: There is no height or weight on file to calculate  BMI.    WEIGHTS:   Wt Readings from Last 25 Encounters:   05/02/23 78.6 kg (173 lb 3.2 oz)   03/29/23 76.2 kg (168 lb)   02/15/23 82.8 kg (182 lb 8.7 oz)   01/10/23 77.6 kg (171 lb)   01/09/23 77.8 kg (171 lb 8.3 oz)   11/14/22 77.8 kg (171 lb 8.3 oz)   09/29/22 77.7 kg (171 lb 3.2 oz)   06/22/22 76.7 kg (169 lb 3.2 oz)   05/25/22 76.2 kg (168 lb)   05/23/22 76.4 kg (168 lb 6.4 oz)   05/12/22 76.5 kg (168 lb 10.4 oz)   11/11/21 72.2 kg (159 lb 3.2 oz)   10/04/21 72.7 kg (160 lb 4 oz)   08/26/21 71.8 kg (158 lb 6.4 oz)   04/14/21 69.9 kg (154 lb)   02/24/21 73.4 kg (161 lb 14.4 oz)   04/01/20 73.8 kg (162 lb 9.6 oz)        *-*-*-*-*-*-*-*-*-*-*-*-*-*-*-*-*-*-*-*-*-*-*-*-*-*-*-*-*-*-*-*-*-*-*-*-*-*-*-*-*-*-*-*-*-*-*-*-*-*-*-*-*-*-  PHYSICAL EXAM     General Appearance:    Alert, cooperative, no distress, appears stated age***   Head, Eyes, ENT:    No obvious abnormality, moist mucous mebranes.   Neck:   Supple, no carotid bruit. No JVD, no obvious lymphadenoapthy   Back:     Symmetric, no curvature.   Lungs:     Respirations unlabored. Clear to auscultation bilaterally,    Chest wall:    No tenderness or deformity   Heart:    Regular rate and rhythm, S1 and S2 normal, no murmur, rub  or gallop.***   Abdomen:     Soft, non-tender, No obvious masses, or organomegaly, no abdominal bruit   Extremities:   Extremities warm, no cyanosis or edema ***   Skin:   *** venostatic changes in lower extremities.   Normal skin color, texture, and turgor. No rashes or lesions   Neuro: Pt is alert and oriented time 3 with no gross motor deficits.   Psych/ behavioral: Mood is normal. Behavior is normal.     *-*-*-*-*-*-*-*-*-*-*-*-*-*-*-*-*-*-*-*-*-*-*-*-*-*-*-*-*-*-*-*-*-*-*-*-*-*-*-*-*-*-*-*-*-*-*-*-*-*-*-*-*-*-  CURRENT MEDICATIONS LIST     Current Outpatient Medications:   •  acetaminophen (TYLENOL) 500 mg tablet, Take 500 mg by mouth every 6 (six) hours as needed for mild pain, Disp: , Rfl:   •  apixaban (ELIQUIS) 5 mg, Take 5 mg by  mouth 2 (two) times a day, Disp: , Rfl:   •  Ascorbic Acid (VITAMIN C) 500 MG CAPS, Take 500 mg by mouth daily (Patient not taking: Reported on 3/25/2024), Disp: , Rfl:   •  aspirin 81 mg chewable tablet, Chew 1 tablet (81 mg total) daily Do not start before February 18, 2023., Disp: 30 tablet, Rfl: 0  •  atorvastatin (LIPITOR) 10 mg tablet, Take 40 mg by mouth daily, Disp: , Rfl:   •  bisacodyl (DULCOLAX) 5 mg EC tablet, Take 5 mg by mouth daily as needed for constipation, Disp: , Rfl:   •  calcium carbonate (TUMS) 500 mg chewable tablet, Chew 1 tablet daily, Disp: , Rfl:   •  Cholecalciferol (Vitamin D3) 1.25 MG (40684 UT) CAPS, Take 2,000 Units by mouth daily 2,000U daily, Disp: , Rfl:   •  Cranberry 450 MG CAPS, Take 1 capsule by mouth in the morning, Disp: , Rfl:   •  Diclofenac Sodium (VOLTAREN) 1 %, Apply 2 g topically 3 (three) times a day as needed (pain), Disp: , Rfl:   •  docusate sodium (COLACE) 100 mg capsule, Take 100 mg by mouth 2 (two) times a day, Disp: , Rfl:   •  furosemide (LASIX) 40 mg tablet, Take 1 tablet by mouth daily, Disp: , Rfl:   •  loperamide (IMODIUM) 2 mg capsule, , Disp: , Rfl:   •  polyethylene glycol (MIRALAX) 17 g packet, Take 17 g by mouth daily, Disp: , Rfl:   •  potassium chloride (K-DUR,KLOR-CON) 20 mEq tablet, Take 20 mEq by mouth daily, Disp: , Rfl:   •  QUEtiapine (SEROquel) 25 mg tablet, Take 0.5 tablets (12.5 mg total) by mouth daily at bedtime Hold if sedated, Disp: 30 tablet, Rfl: 0  •  thiamine (VITAMIN B1) 100 mg tablet, , Disp: , Rfl:   •  Valerian 500 MG CAPS, Take by mouth in the morning (Patient not taking: Reported on 3/25/2024), Disp: , Rfl:   •  zonisamide (ZONEGRAN) 100 mg capsule, Take 1 capsule (100 mg total) by mouth daily Do not start before November 19, 2022. (Patient taking differently: Take 100 mg by mouth every 12 (twelve) hours), Disp: , Rfl: 0       ALLERGIES     Allergies   Allergen Reactions   • Morphine   "      *-*-*-*-*-*-*-*-*-*-*-*-*-*-*-*-*-*-*-*-*-*-*-*-*-*-*-*-*-*-*-*-*-*-*-*-*-*-*-*-*-*-*-*-*-*-*-*-*-*-*-*-*-*-  LABORATORY DATA   No results found for: \"NA\"  Potassium   Date Value Ref Range Status   04/16/2024 4.2 3.5 - 5.2 mmol/L Final   03/27/2024 3.9 3.5 - 5.2 mmol/L Final   03/26/2024 3.9 3.5 - 5.2 mmol/L Final     Comment:     Specimen slightly hemolyzed, results may be affected.     Chloride   Date Value Ref Range Status   04/16/2024 113 (H) 100 - 109 mmol/L Final   03/27/2024 111 (H) 100 - 109 mmol/L Final   03/26/2024 113 (H) 100 - 109 mmol/L Final     Carbon Dioxide   Date Value Ref Range Status   04/16/2024 21 21 - 31 mmol/L Final   03/27/2024 20 (L) 21 - 31 mmol/L Final   03/26/2024 18 (L) 21 - 31 mmol/L Final     BUN   Date Value Ref Range Status   04/16/2024 21 7 - 25 mg/dL Final   03/27/2024 20 7 - 25 mg/dL Final   03/26/2024 21 7 - 25 mg/dL Final     Creatinine   Date Value Ref Range Status   04/16/2024 0.80 0.40 - 1.10 mg/dL Final   03/27/2024 0.69 0.40 - 1.10 mg/dL Final   03/26/2024 0.61 0.40 - 1.10 mg/dL Final     GFR, Calculated   Date Value Ref Range Status   12/19/2018 59 (L) >60 mL/min/1.73m2 Final     Comment:     mL/min per 1.73 square meters                                            Normal Function or Mild Renal    Disease (if clinically at risk):  >or=60  Moderately Decreased:                30-59  Severely Decreased:                  15-29  Renal Failure:                         <15                                            -American GFR: multiply reported GFR by 1.16    Please note that the eGFR is based on the CKD-EPI calculation, and is not intended to be used for drug dosing.                                            Note: Calculated GFR may not be an accurate indicator of renal function if the patient's renal function is not in a steady state.     EGFR   Date Value Ref Range Status   07/20/2022 87 >=60 mL/min Final     Comment:     eGFR is calculated based on the " CKD-EPI 2021 equation   07/16/2022 77 >=60 mL/min Final     Comment:     eGFR is calculated based on the CKD-EPI 2021 equation   07/15/2022 76 >=60 mL/min Final     Comment:     eGFR is calculated based on the CKD-EPI 2021 equation     eGFRcr   Date Value Ref Range Status   04/16/2024 71 >59 Final   03/27/2024 84 >59 Final   03/26/2024 86 >59 Final     Calcium   Date Value Ref Range Status   04/16/2024 9.8 8.5 - 10.1 mg/dL Final   03/27/2024 9.7 8.5 - 10.1 mg/dL Final   03/26/2024 8.3 (L) 8.5 - 10.1 mg/dL Final     AST   Date Value Ref Range Status   04/16/2024 24 <41 U/L Final   03/25/2024 40 <41 U/L Final   12/17/2023 128 (H) <41 U/L Final     ALT   Date Value Ref Range Status   04/16/2024 14 <56 U/L Final   03/25/2024 32 <56 U/L Final   12/17/2023 130 (H) <56 U/L Final     Alkaline Phosphatase   Date Value Ref Range Status   04/16/2024 94 35 - 120 U/L Final   03/25/2024 95 35 - 120 U/L Final   12/17/2023 69 35 - 120 U/L Final     Magnesium   Date Value Ref Range Status   01/25/2023 2.4 1.6 - 2.6 mg/dL Final     Comment:     Slightly Hemolyzed; Results May be Affected     WBC   Date Value Ref Range Status   03/25/2024 6.27 4.31 - 10.16 Thousand/uL Final   03/20/2024 6.17 4.31 - 10.16 Thousand/uL Final   09/14/2023 6.24 4.31 - 10.16 Thousand/uL Final     Hemoglobin   Date Value Ref Range Status   03/25/2024 11.3 (L) 11.5 - 15.4 g/dL Final   03/20/2024 12.4 11.5 - 15.4 g/dL Final   09/14/2023 14.2 11.5 - 15.4 g/dL Final     Platelets   Date Value Ref Range Status   03/25/2024 125 (L) 149 - 390 Thousands/uL Final   03/20/2024 113 (L) 149 - 390 Thousands/uL Final   09/14/2023 133 (L) 149 - 390 Thousands/uL Final     Prothrombin Time   Date Value Ref Range Status   03/25/2024 17.1 (H) 12.0 - 14.6 sec Final   12/15/2023 19.9 (H) 12.0 - 14.6 sec Final     PTT   Date Value Ref Range Status   05/10/2023 32 23 - 37 seconds Final     Comment:     Therapeutic Heparin Range =  60-90 seconds   03/30/2023 30 23 - 37 seconds  "Final     Comment:     Therapeutic Heparin Range =  60-90 seconds     INR   Date Value Ref Range Status   03/25/2024 1.4  Final     Comment:     Interpretation  Suggested INR ranges for various  clinical conditions:                                           INR  Ambulatory Surgery          <1.5  Coumadinized Patients             (DVT,PE,MI or A.Fib)     2.0-3.0  Mechanical Heart Valve   2.5-3.5  Cardiogenic Embolus      2.5-3.5   12/15/2023 1.7  Final     Comment:     Interpretation  Suggested INR ranges for various  clinical conditions:                                           INR  Ambulatory Surgery          <1.5  Coumadinized Patients             (DVT,PE,MI or A.Fib)     2.0-3.0  Mechanical Heart Valve   2.5-3.5  Cardiogenic Embolus      2.5-3.5     No results found for: \"CK\", \"CKMB\", \"DIGOXIN\"  TSH   Date Value Ref Range Status   03/26/2024 4.62 0.45 - 5.33 uIU/mL Final   08/11/2023 2.36 0.45 - 5.33 uIU/mL Final     No results found for: \"CHOL\"   Hemoglobin A1C   Date Value Ref Range Status   02/16/2023 5.4 Normal 3.8-5.6%; PreDiabetic 5.7-6.4%; Diabetic >=6.5%; Glycemic control for adults with diabetes <7.0% % Final   07/16/2022 5.2 4.0 - 5.6 % Final     Comment:     The use of HbA1c to monitor glycemic status is based on normal hemoglobin and HbA composition. This test should not be used in patients with abnormal hemoglobin that affects the half life of the red blood cell or the in vivo glycation rates.      Blood Culture   Date Value Ref Range Status   01/21/2023 No Growth After 5 Days.  Final   01/21/2023 No Growth After 5 Days.  Final   05/10/2022 No Growth After 5 Days.  Final   05/10/2022 No Growth After 5 Days.  Final     Urine Culture   Date Value Ref Range Status   09/14/2023 20,000-29,000 cfu/ml  Final     Comment:     Mixed Contaminants X4   01/11/2023 >100,000 cfu/ml Escherichia coli ESBL (A)  Final     Comment:     An Extended-Spectrum Beta-Lactamase is being produced by this organism (requires " contact precautions).  Cephalosporins are NOT effective for treating these organisms.  For SEVERE infections (i.e. bacteremia, septic shock, etc.), Carbapenems are the drug of choice.  For MILD infections (i.e. isolated urinary or biliary infection), high-dose Zosyn may be used.  This organism has been edited. The previous result was Gram Negative Brian Enteric Like on 1/12/2023 at 1058 EST.   01/11/2023 10,000-19,000 cfu/ml  Final     Comment:     Mixed Contaminants X2     MRSA Culture Only   Date Value Ref Range Status   02/16/2023 (A)  Final    Methicillin Resistant Staphylococcus aureus isolated   01/26/2023 (A)  Final    Methicillin Resistant Staphylococcus aureus isolated   01/22/2023   Final    No Methicillin Resistant Staphlyococcus aureus (MRSA) isolated   01/10/2023   Final    No Methicillin Resistant Staphlyococcus aureus (MRSA) isolated     Legionella Urinary Antigen   Date Value Ref Range Status   05/10/2022 Negative Negative Final        *-*-*-*-*-*-*-*-*-*-*-*-*-*-*-*-*-*-*-*-*-*-*-*-*-*-*-*-*-*-*-*-*-*-*-*-*-*-*-*-*-*-*-*-*-*-*-*-*-*-*-*-*-*-  SIGNATURES:   @SIG@   Reece Cortez MD; [facility]    *-*-*-*-*-*-*-*-*-*-*-*-*-*-*-*-*-*-*-*-*-*-*-*-*-*-*-*-*-*-*-*-*-*-*-*-*-*-*-*-*-*-*-*-*-*-*-*-*-*-*-*-*-*-  PAST MEDICAL HISTORY:  Past Medical History:   Diagnosis Date   • A-fib ([facility])    • CAD (coronary artery disease)    • CHF (congestive heart failure) ([facility])    • CKD (chronic kidney disease) stage 2, GFR 60-89 ml/min    • Dementia ([facility])    • Hypertension    • Hypokalemia    • Knee effusion    • Memory loss    • Seizure ([facility])    • Urinary tract infection     PAST SURGICAL HISTORY:  Past Surgical History:   Procedure Laterality Date   • CARDIAC ELECTROPHYSIOLOGY PROCEDURE N/A 1/26/2023    Procedure: Cardiac pacer implant;  Surgeon: Francisco Georges DO;  [facility];  Service: Cardiology   • CHOLECYSTECTOMY     • REPLACEMENT TOTAL KNEE Left 2008         FAMILY HISTORY:  Family History   Problem  Relation Age of Onset   • Lung cancer Father    • Leukemia Brother    • Breast cancer Daughter     SOCIAL HISTORY:  Social History     Tobacco Use   Smoking Status Never   Smokeless Tobacco Never      Social History     Substance and Sexual Activity   Alcohol Use Never     Social History     Substance and Sexual Activity   Drug Use Never    [unfilled]     *-*-*-*-*-*-*-*-*-*-*-*-*-*-*-*-*-*-*-*-*-*-*-*-*-*-*-*-*-*-*-*-*-*-*-*-*-*-*-*-*-*-*-*-*-*-*-*-*-*-*-*-*-*  ALLERGIES:  Allergies   Allergen Reactions   • Morphine     CURRENT SCHEDULED MEDICATIONS:    Current Outpatient Medications:   •  acetaminophen (TYLENOL) 500 mg tablet, Take 500 mg by mouth every 6 (six) hours as needed for mild pain, Disp: , Rfl:   •  apixaban (ELIQUIS) 5 mg, Take 5 mg by mouth 2 (two) times a day, Disp: , Rfl:   •  Ascorbic Acid (VITAMIN C) 500 MG CAPS, Take 500 mg by mouth daily (Patient not taking: Reported on 3/25/2024), Disp: , Rfl:   •  aspirin 81 mg chewable tablet, Chew 1 tablet (81 mg total) daily Do not start before February 18, 2023., Disp: 30 tablet, Rfl: 0  •  atorvastatin (LIPITOR) 10 mg tablet, Take 40 mg by mouth daily, Disp: , Rfl:   •  bisacodyl (DULCOLAX) 5 mg EC tablet, Take 5 mg by mouth daily as needed for constipation, Disp: , Rfl:   •  calcium carbonate (TUMS) 500 mg chewable tablet, Chew 1 tablet daily, Disp: , Rfl:   •  Cholecalciferol (Vitamin D3) 1.25 MG (29614 UT) CAPS, Take 2,000 Units by mouth daily 2,000U daily, Disp: , Rfl:   •  Cranberry 450 MG CAPS, Take 1 capsule by mouth in the morning, Disp: , Rfl:   •  Diclofenac Sodium (VOLTAREN) 1 %, Apply 2 g topically 3 (three) times a day as needed (pain), Disp: , Rfl:   •  docusate sodium (COLACE) 100 mg capsule, Take 100 mg by mouth 2 (two) times a day, Disp: , Rfl:   •  furosemide (LASIX) 40 mg tablet, Take 1 tablet by mouth daily, Disp: , Rfl:   •  loperamide (IMODIUM) 2 mg capsule, , Disp: , Rfl:   •  polyethylene glycol (MIRALAX) 17 g packet, Take 17 g by mouth  daily, Disp: , Rfl:   •  potassium chloride (K-DUR,KLOR-CON) 20 mEq tablet, Take 20 mEq by mouth daily, Disp: , Rfl:   •  QUEtiapine (SEROquel) 25 mg tablet, Take 0.5 tablets (12.5 mg total) by mouth daily at bedtime Hold if sedated, Disp: 30 tablet, Rfl: 0  •  thiamine (VITAMIN B1) 100 mg tablet, , Disp: , Rfl:   •  Valerian 500 MG CAPS, Take by mouth in the morning (Patient not taking: Reported on 3/25/2024), Disp: , Rfl:   •  zonisamide (ZONEGRAN) 100 mg capsule, Take 1 capsule (100 mg total) by mouth daily Do not start before November 19, 2022. (Patient taking differently: Take 100 mg by mouth every 12 (twelve) hours), Disp: , Rfl: 0     *-*-*-*-*-*-*-*-*-*-*-*-*-*-*-*-*-*-*-*-*-*-*-*-*-*-*-*-*-*-*-*-*-*-*-*-*-*-*-*-*-*-*-*-*-*-*-*-*-*-*-*-*-*

## 2024-05-30 ENCOUNTER — APPOINTMENT (EMERGENCY)
Dept: RADIOLOGY | Facility: HOSPITAL | Age: 89
End: 2024-05-30
Payer: COMMERCIAL

## 2024-05-30 ENCOUNTER — HOSPITAL ENCOUNTER (EMERGENCY)
Facility: HOSPITAL | Age: 89
Discharge: HOME | End: 2024-05-30
Attending: EMERGENCY MEDICINE
Payer: COMMERCIAL

## 2024-05-30 VITALS
HEART RATE: 60 BPM | TEMPERATURE: 98.5 F | BODY MASS INDEX: 34.17 KG/M2 | WEIGHT: 192.9 LBS | OXYGEN SATURATION: 97 % | RESPIRATION RATE: 18 BRPM | SYSTOLIC BLOOD PRESSURE: 119 MMHG | DIASTOLIC BLOOD PRESSURE: 58 MMHG

## 2024-05-30 DIAGNOSIS — R79.89 ELEVATED TROPONIN I LEVEL: ICD-10-CM

## 2024-05-30 DIAGNOSIS — R68.89 FLU-LIKE SYMPTOMS: ICD-10-CM

## 2024-05-30 DIAGNOSIS — R53.1 GENERALIZED WEAKNESS: Primary | ICD-10-CM

## 2024-05-30 LAB
2HR DELTA HS TROPONIN: 8 NG/L
4HR DELTA HS TROPONIN: 9 NG/L
ALBUMIN SERPL BCP-MCNC: 4.1 G/DL (ref 3.5–5)
ALP SERPL-CCNC: 85 U/L (ref 34–104)
ALT SERPL W P-5'-P-CCNC: 21 U/L (ref 7–52)
ANION GAP SERPL CALCULATED.3IONS-SCNC: 7 MMOL/L (ref 4–13)
APTT PPP: 37 SECONDS (ref 23–37)
AST SERPL W P-5'-P-CCNC: 26 U/L (ref 13–39)
ATRIAL RATE: 312 BPM
ATRIAL RATE: 357 BPM
ATRIAL RATE: 72 BPM
BASOPHILS # BLD AUTO: 0.05 THOUSANDS/ÂΜL (ref 0–0.1)
BASOPHILS NFR BLD AUTO: 1 % (ref 0–1)
BILIRUB SERPL-MCNC: 0.92 MG/DL (ref 0.2–1)
BILIRUB UR QL STRIP: NEGATIVE
BNP SERPL-MCNC: 682 PG/ML (ref 0–100)
BUN SERPL-MCNC: 25 MG/DL (ref 5–25)
CALCIUM SERPL-MCNC: 9.8 MG/DL (ref 8.4–10.2)
CARDIAC TROPONIN I PNL SERPL HS: 77 NG/L
CARDIAC TROPONIN I PNL SERPL HS: 85 NG/L
CARDIAC TROPONIN I PNL SERPL HS: 86 NG/L
CHLORIDE SERPL-SCNC: 111 MMOL/L (ref 96–108)
CLARITY UR: CLEAR
CO2 SERPL-SCNC: 24 MMOL/L (ref 21–32)
COLOR UR: YELLOW
CREAT SERPL-MCNC: 0.95 MG/DL (ref 0.6–1.3)
EOSINOPHIL # BLD AUTO: 0.09 THOUSAND/ÂΜL (ref 0–0.61)
EOSINOPHIL NFR BLD AUTO: 1 % (ref 0–6)
ERYTHROCYTE [DISTWIDTH] IN BLOOD BY AUTOMATED COUNT: 15.4 % (ref 11.6–15.1)
FLUAV RNA RESP QL NAA+PROBE: NEGATIVE
FLUBV RNA RESP QL NAA+PROBE: NEGATIVE
GFR SERPL CREATININE-BSD FRML MDRD: 54 ML/MIN/1.73SQ M
GLUCOSE SERPL-MCNC: 118 MG/DL (ref 65–140)
GLUCOSE UR STRIP-MCNC: NEGATIVE MG/DL
HCT VFR BLD AUTO: 35.6 % (ref 34.8–46.1)
HGB BLD-MCNC: 11.5 G/DL (ref 11.5–15.4)
HGB UR QL STRIP.AUTO: NEGATIVE
IMM GRANULOCYTES # BLD AUTO: 0.04 THOUSAND/UL (ref 0–0.2)
IMM GRANULOCYTES NFR BLD AUTO: 0 % (ref 0–2)
INR PPP: 1.49 (ref 0.84–1.19)
KETONES UR STRIP-MCNC: NEGATIVE MG/DL
LACTATE SERPL-SCNC: 1.3 MMOL/L (ref 0.5–2)
LEUKOCYTE ESTERASE UR QL STRIP: NEGATIVE
LYMPHOCYTES # BLD AUTO: 1.25 THOUSANDS/ÂΜL (ref 0.6–4.47)
LYMPHOCYTES NFR BLD AUTO: 14 % (ref 14–44)
MCH RBC QN AUTO: 28.3 PG (ref 26.8–34.3)
MCHC RBC AUTO-ENTMCNC: 32.3 G/DL (ref 31.4–37.4)
MCV RBC AUTO: 88 FL (ref 82–98)
MONOCYTES # BLD AUTO: 1.11 THOUSAND/ÂΜL (ref 0.17–1.22)
MONOCYTES NFR BLD AUTO: 12 % (ref 4–12)
NEUTROPHILS # BLD AUTO: 6.52 THOUSANDS/ÂΜL (ref 1.85–7.62)
NEUTS SEG NFR BLD AUTO: 72 % (ref 43–75)
NITRITE UR QL STRIP: NEGATIVE
NRBC BLD AUTO-RTO: 0 /100 WBCS
PH UR STRIP.AUTO: 6 [PH]
PLATELET # BLD AUTO: 158 THOUSANDS/UL (ref 149–390)
PMV BLD AUTO: 11.7 FL (ref 8.9–12.7)
POTASSIUM SERPL-SCNC: 3.9 MMOL/L (ref 3.5–5.3)
PROCALCITONIN SERPL-MCNC: 0.06 NG/ML
PROT SERPL-MCNC: 7.5 G/DL (ref 6.4–8.4)
PROT UR STRIP-MCNC: NEGATIVE MG/DL
PROTHROMBIN TIME: 18.2 SECONDS (ref 11.6–14.5)
QRS AXIS: -35 DEGREES
QRS AXIS: -40 DEGREES
QRS AXIS: 79 DEGREES
QRSD INTERVAL: 122 MS
QRSD INTERVAL: 124 MS
QRSD INTERVAL: 86 MS
QT INTERVAL: 374 MS
QT INTERVAL: 464 MS
QT INTERVAL: 484 MS
QTC INTERVAL: 397 MS
QTC INTERVAL: 503 MS
QTC INTERVAL: 532 MS
RBC # BLD AUTO: 4.06 MILLION/UL (ref 3.81–5.12)
RSV RNA RESP QL NAA+PROBE: NEGATIVE
SARS-COV-2 RNA RESP QL NAA+PROBE: NEGATIVE
SODIUM SERPL-SCNC: 142 MMOL/L (ref 135–147)
SP GR UR STRIP.AUTO: 1.02 (ref 1–1.03)
T WAVE AXIS: 168 DEGREES
T WAVE AXIS: 259 DEGREES
T WAVE AXIS: 262 DEGREES
UROBILINOGEN UR STRIP-ACNC: 3 MG/DL
VENTRICULAR RATE: 65 BPM
VENTRICULAR RATE: 68 BPM
VENTRICULAR RATE: 79 BPM
WBC # BLD AUTO: 9.06 THOUSAND/UL (ref 4.31–10.16)

## 2024-05-30 PROCEDURE — 71045 X-RAY EXAM CHEST 1 VIEW: CPT

## 2024-05-30 PROCEDURE — 99285 EMERGENCY DEPT VISIT HI MDM: CPT

## 2024-05-30 PROCEDURE — 83880 ASSAY OF NATRIURETIC PEPTIDE: CPT

## 2024-05-30 PROCEDURE — 85610 PROTHROMBIN TIME: CPT

## 2024-05-30 PROCEDURE — 85025 COMPLETE CBC W/AUTO DIFF WBC: CPT

## 2024-05-30 PROCEDURE — 80053 COMPREHEN METABOLIC PANEL: CPT

## 2024-05-30 PROCEDURE — 0241U HB NFCT DS VIR RESP RNA 4 TRGT: CPT

## 2024-05-30 PROCEDURE — 96365 THER/PROPH/DIAG IV INF INIT: CPT

## 2024-05-30 PROCEDURE — 93010 ELECTROCARDIOGRAM REPORT: CPT | Performed by: INTERNAL MEDICINE

## 2024-05-30 PROCEDURE — 83605 ASSAY OF LACTIC ACID: CPT

## 2024-05-30 PROCEDURE — 84484 ASSAY OF TROPONIN QUANT: CPT

## 2024-05-30 PROCEDURE — 99284 EMERGENCY DEPT VISIT MOD MDM: CPT | Performed by: EMERGENCY MEDICINE

## 2024-05-30 PROCEDURE — 87040 BLOOD CULTURE FOR BACTERIA: CPT

## 2024-05-30 PROCEDURE — 81003 URINALYSIS AUTO W/O SCOPE: CPT

## 2024-05-30 PROCEDURE — 85730 THROMBOPLASTIN TIME PARTIAL: CPT

## 2024-05-30 PROCEDURE — 96375 TX/PRO/DX INJ NEW DRUG ADDON: CPT

## 2024-05-30 PROCEDURE — 36415 COLL VENOUS BLD VENIPUNCTURE: CPT

## 2024-05-30 PROCEDURE — 93005 ELECTROCARDIOGRAM TRACING: CPT

## 2024-05-30 PROCEDURE — 84145 PROCALCITONIN (PCT): CPT

## 2024-05-30 RX ORDER — FUROSEMIDE 10 MG/ML
40 INJECTION INTRAMUSCULAR; INTRAVENOUS ONCE
Status: COMPLETED | OUTPATIENT
Start: 2024-05-30 | End: 2024-05-30

## 2024-05-30 RX ADMIN — FUROSEMIDE 40 MG: 10 INJECTION, SOLUTION INTRAMUSCULAR; INTRAVENOUS at 20:10

## 2024-05-30 RX ADMIN — DEXTROSE 1000 MG: 50 INJECTION, SOLUTION INTRAVENOUS at 20:16

## 2024-05-30 NOTE — ED PROVIDER NOTES
History  Chief Complaint   Patient presents with    Flu Symptoms     Pt coming in via EMS from [facility] for flu like symptoms. Per EMS pt has had flu like symptoms starting yesterday with a temp of 101. Tested neg for covid. Pt unable to elaborate on what is bothering her.      This is an 89-year-old female with known history of atrial fibrillation/flutter (on Eliquis), sick sinus syndrome status post pacemaker, late Alzheimer's dementia, history of CVA, CKD stage III, chronic congestive heart failure (diastolic), hypertension, and hyperlipidemia who is presenting today with concern for fevers at her assisted living facility, and cough, congestion.  EMS reports that nursing facility has noticed approximately 2 days of upper respiratory symptoms including a mild cough, and congestion.  Patient did have a reported fever prehospital of 100.0.  Patient herself is offering much subjective history, she is disoriented to place, time, and is unable to tell me her name.  I am uncertain of patient's cognitive baseline and therefore will attempt to obtain history from her son or other family member.        Prior to Admission Medications   Prescriptions Last Dose Informant Patient Reported? Taking?   Ascorbic Acid (VITAMIN C) 500 MG CAPS  Outside Facility (Specify) Yes No   Sig: Take 500 mg by mouth daily   Patient not taking: Reported on 3/25/2024   Cholecalciferol (Vitamin D3) 1.25 MG (85747 UT) CAPS  Outside Facility (Specify) Yes No   Sig: Take 2,000 Units by mouth daily 2,000U daily   Cranberry 450 MG CAPS  Outside Facility (Specify) Yes No   Sig: Take 1 capsule by mouth in the morning   Diclofenac Sodium (VOLTAREN) 1 %   Yes No   Sig: Apply 2 g topically 3 (three) times a day as needed (pain)   Elastic Bandages & Supports (Medical Compression Stockings) MISC   No No   Sig: Use in the morning   QUEtiapine (SEROquel) 25 mg tablet   No No   Sig: Take 0.5 tablets (12.5 mg total) by mouth daily at bedtime Hold if sedated    Valerian 500 MG CAPS   Yes No   Sig: Take by mouth in the morning   Patient not taking: Reported on 3/25/2024   acetaminophen (TYLENOL) 500 mg tablet  Outside Facility (Specify) Yes No   Sig: Take 500 mg by mouth every 6 (six) hours as needed for mild pain   apixaban (ELIQUIS) 5 mg  Outside Facility (Specify) Yes No   Sig: Take 5 mg by mouth 2 (two) times a day   aspirin 81 mg chewable tablet   No No   Sig: Chew 1 tablet (81 mg total) daily Do not start before February 18, 2023.   atorvastatin (LIPITOR) 10 mg tablet  Outside Facility (Specify) Yes No   Sig: Take 40 mg by mouth daily   bisacodyl (DULCOLAX) 5 mg EC tablet   Yes No   Sig: Take 5 mg by mouth daily as needed for constipation   calcium carbonate (TUMS) 500 mg chewable tablet  Outside Facility (Specify) Yes No   Sig: Chew 1 tablet daily   docusate sodium (COLACE) 100 mg capsule  Outside Facility (Specify) Yes No   Sig: Take 100 mg by mouth 2 (two) times a day   furosemide (LASIX) 40 mg tablet  Outside Facility (Specify) Yes No   Sig: Take 1 tablet by mouth daily   loperamide (IMODIUM) 2 mg capsule   Yes No   oxyCODONE (ROXICODONE) 5 immediate release tablet   Yes No   Sig: Take 5 mg by mouth every 4 (four) hours as needed for moderate pain Take 1/2 tablet q6h as needed for pain   polyethylene glycol (MIRALAX) 17 g packet  Outside Facility (Specify) Yes No   Sig: Take 17 g by mouth daily   potassium chloride (K-DUR,KLOR-CON) 20 mEq tablet  Outside Facility (Specify) Yes No   Sig: Take 20 mEq by mouth 2 (two) times a day   thiamine (VITAMIN B1) 100 mg tablet  Outside Facility (Specify) Yes No   zonisamide (ZONEGRAN) 100 mg capsule   No No   Sig: Take 1 capsule (100 mg total) by mouth daily Do not start before November 19, 2022.   Patient taking differently: Take 100 mg by mouth every 12 (twelve) hours      Facility-Administered Medications: None       Past Medical History:   Diagnosis Date    A-fib (HCC)     CAD (coronary artery disease)     CHF  (congestive heart failure) (HCC)     CKD (chronic kidney disease) stage 2, GFR 60-89 ml/min     Dementia (HCC)     Hypertension     Hypokalemia     Knee effusion     Memory loss     Seizure (HCC)     Urinary tract infection        Past Surgical History:   Procedure Laterality Date    CARDIAC ELECTROPHYSIOLOGY PROCEDURE N/A 1/26/2023    Procedure: Cardiac pacer implant;  Surgeon: Francisco Georges DO;  Location: [facility];  Service: Cardiology    CHOLECYSTECTOMY      REPLACEMENT TOTAL KNEE Left 2008       Family History   Problem Relation Age of Onset    Lung cancer Father     Leukemia Brother     Breast cancer Daughter      I have reviewed and agree with the history as documented.    E-Cigarette/Vaping    E-Cigarette Use Never User      E-Cigarette/Vaping Substances    Nicotine No     THC No     CBD No     Flavoring No     Other No     Unknown No      Social History     Tobacco Use    Smoking status: Never    Smokeless tobacco: Never   Vaping Use    Vaping status: Never Used   Substance Use Topics    Alcohol use: Never    Drug use: Never        Review of Systems   Unable to perform ROS: Dementia       Physical Exam  ED Triage Vitals [05/30/24 1641]   Temperature Pulse Respirations Blood Pressure SpO2   98.5 °F (36.9 °C) 86 18 126/87 97 %      Temp Source Heart Rate Source Patient Position - Orthostatic VS BP Location FiO2 (%)   Oral Monitor Lying Right arm --      Pain Score       --             Orthostatic Vital Signs  Vitals:    05/30/24 1800 05/30/24 1930 05/30/24 2030 05/30/24 2145   BP: 113/50 129/55 114/59 119/58   Pulse: 60 60 60 60   Patient Position - Orthostatic VS: Lying Lying Lying Lying       Physical Exam  Vitals and nursing note reviewed.   Constitutional:       General: She is not in acute distress.     Appearance: She is well-developed. She is obese. She is ill-appearing.   HENT:      Right Ear: External ear normal.      Left Ear: External ear normal.      Nose: Nose normal. No congestion or  rhinorrhea.      Mouth/Throat:      Mouth: Mucous membranes are moist.      Pharynx: Oropharynx is clear. No oropharyngeal exudate or posterior oropharyngeal erythema.   Eyes:      General: No scleral icterus.     Extraocular Movements: Extraocular movements intact.      Conjunctiva/sclera: Conjunctivae normal.      Pupils: Pupils are equal, round, and reactive to light.   Cardiovascular:      Rate and Rhythm: Normal rate and regular rhythm.      Pulses: Normal pulses.      Heart sounds: Normal heart sounds. No murmur heard.     Comments: PPM in place over left chest wall    Pulmonary:      Effort: No respiratory distress.      Breath sounds: Normal breath sounds. No wheezing or rhonchi.      Comments: Tachypnea 22-24 BPM noted      Abdominal:      General: Abdomen is flat. There is no distension.      Palpations: Abdomen is soft.      Tenderness: There is no abdominal tenderness. There is no guarding.   Musculoskeletal:         General: Swelling present.      Cervical back: Neck supple. No rigidity.      Right lower leg: Edema (1-2+ B/L) present.      Left lower leg: Edema (1-2+ B/L) present.   Lymphadenopathy:      Cervical: No cervical adenopathy.   Skin:     General: Skin is warm and dry.      Capillary Refill: Capillary refill takes 2 to 3 seconds.      Coloration: Skin is pale. Skin is not jaundiced.      Findings: No rash.   Neurological:      General: No focal deficit present.      Mental Status: She is alert. She is disoriented.         ED Medications  Medications   furosemide (LASIX) injection 40 mg (40 mg Intravenous Given 5/30/24 2010)   ceftriaxone (ROCEPHIN) 1 g/50 mL in dextrose IVPB (0 mg Intravenous Stopped 5/30/24 2058)       Diagnostic Studies  Results Reviewed       Procedure Component Value Units Date/Time    Blood culture [[id number]] Collected: 05/30/24 1651    Lab Status: Preliminary result Specimen: Blood from Arm, Left Updated: 05/30/24 2301     Blood Culture Received in Microbiology Lab.  Culture in Progress.    Blood culture [[id number]] Collected: 05/30/24 1645    Lab Status: Preliminary result Specimen: Blood from Arm, Right Updated: 05/30/24 2301     Blood Culture Received in Microbiology Lab. Culture in Progress.    HS Troponin I 4hr [[id number]]  (Abnormal) Collected: 05/30/24 2059    Lab Status: Final result Specimen: Blood from Arm, Right Updated: 05/30/24 2131     hs TnI 4hr 86 ng/L      Delta 4hr hsTnI 9 ng/L     HS Troponin I 2hr [[id number]]  (Abnormal) Collected: 05/30/24 1851    Lab Status: Final result Specimen: Blood from Arm, Right Updated: 05/30/24 2020     hs TnI 2hr 85 ng/L      Delta 2hr hsTnI 8 ng/L     UA w Reflex to Microscopic w Reflex to Culture [[id number]]  (Abnormal) Collected: 05/30/24 1850    Lab Status: Final result Specimen: Urine, Straight Cath Updated: 05/30/24 1904     Color, UA Yellow     Clarity, UA Clear     Specific Gravity, UA 1.019     pH, UA 6.0     Leukocytes, UA Negative     Nitrite, UA Negative     Protein, UA Negative mg/dl      Glucose, UA Negative mg/dl      Ketones, UA Negative mg/dl      Urobilinogen, UA 3.0 mg/dl      Bilirubin, UA Negative     Occult Blood, UA Negative    B-Type Natriuretic Peptide(BNP) [[id number]]  (Abnormal) Collected: 05/30/24 1654    Lab Status: Final result Specimen: Blood from Arm, Right Updated: 05/30/24 1808      pg/mL     FLU/RSV/COVID - if FLU/RSV clinically relevant [[id number]]  (Normal) Collected: 05/30/24 1654    Lab Status: Final result Specimen: Nares from Nose Updated: 05/30/24 1742     SARS-CoV-2 Negative     INFLUENZA A PCR Negative     INFLUENZA B PCR Negative     RSV PCR Negative    Narrative:      FOR PEDIATRIC PATIENTS - copy/paste COVID Guidelines URL to browser: [email or web address]    SARS-CoV-2 assay is a Nucleic Acid Amplification assay intended for the  qualitative detection of nucleic acid from SARS-CoV-2 in nasopharyngeal  swabs. Results are for  the presumptive identification of SARS-CoV-2 RNA.    Positive results are indicative of infection with SARS-CoV-2, the virus  causing COVID-19, but do not rule out bacterial infection or co-infection  with other viruses. Laboratories within the [place] and its  territories are required to report all positive results to the appropriate  public health authorities. Negative results do not preclude SARS-CoV-2  infection and should not be used as the sole basis for treatment or other  patient management decisions. Negative results must be combined with  clinical observations, patient history, and epidemiological information.  This test has not been FDA cleared or approved.    This test has been authorized by FDA under an Emergency Use Authorization  (EUA). This test is only authorized for the duration of time the  declaration that circumstances exist justifying the authorization of the  emergency use of an in vitro diagnostic tests for detection of SARS-CoV-2  virus and/or diagnosis of COVID-19 infection under section 564(b)(1) of  the Act, 21 U.S.C. 360bbb-3(b)(1), unless the authorization is terminated  or revoked sooner. The test has been validated but independent review by FDA  and CLIA is pending.    Test performed using [employer] GeneXpert: This RT-PCR assay targets N2,  a region unique to SARS-CoV-2. A conserved region in the E-gene was chosen  for pan-Sarbecovirus detection which includes SARS-CoV-2.    According to CMS-2020-01-R, this platform meets the definition of high-throughput technology.    HS Troponin 0hr (reflex protocol) [[id number]]  (Abnormal) Collected: 05/30/24 1658    Lab Status: Final result Specimen: Blood from Arm, Right Updated: 05/30/24 1726     hs TnI 0hr 77 ng/L     Procalcitonin [[id number]]  (Normal) Collected: 05/30/24 1654    Lab Status: Final result Specimen: Blood from Arm, Right Updated: 05/30/24 1726     Procalcitonin 0.06 ng/ml     Comprehensive metabolic panel [[id number]]   (Abnormal) Collected: 05/30/24 1654    Lab Status: Final result Specimen: Blood from Arm, Right Updated: 05/30/24 1722     Sodium 142 mmol/L      Potassium 3.9 mmol/L      Chloride 111 mmol/L      CO2 24 mmol/L      ANION GAP 7 mmol/L      BUN 25 mg/dL      Creatinine 0.95 mg/dL      Glucose 118 mg/dL      Calcium 9.8 mg/dL      AST 26 U/L      ALT 21 U/L      Alkaline Phosphatase 85 U/L      Total Protein 7.5 g/dL      Albumin 4.1 g/dL      Total Bilirubin 0.92 mg/dL      eGFR 54 ml/min/1.73sq m     Narrative:      National Kidney Disease Foundation guidelines for Chronic Kidney Disease (CKD):     Stage 1 with normal or high GFR (GFR > 90 mL/min/1.73 square meters)    Stage 2 Mild CKD (GFR = 60-89 mL/min/1.73 square meters)    Stage 3A Moderate CKD (GFR = 45-59 mL/min/1.73 square meters)    Stage 3B Moderate CKD (GFR = 30-44 mL/min/1.73 square meters)    Stage 4 Severe CKD (GFR = 15-29 mL/min/1.73 square meters)    Stage 5 End Stage CKD (GFR <15 mL/min/1.73 square meters)  Note: GFR calculation is accurate only with a steady state creatinine    Lactic acid [[id number]]  (Normal) Collected: 05/30/24 1654    Lab Status: Final result Specimen: Blood from Arm, Right Updated: 05/30/24 1720     LACTIC ACID 1.3 mmol/L     Narrative:      Result may be elevated if tourniquet was used during collection.    Protime-INR [[id number]]  (Abnormal) Collected: 05/30/24 1654    Lab Status: Final result Specimen: Blood from Arm, Right Updated: 05/30/24 1718     Protime 18.2 seconds      INR 1.49    APTT [[id number]]  (Normal) Collected: 05/30/24 1654    Lab Status: Final result Specimen: Blood from Arm, Right Updated: 05/30/24 1718     PTT 37 seconds     CBC and differential [[id number]]  (Abnormal) Collected: 05/30/24 1654    Lab Status: Final result Specimen: Blood from Arm, Right Updated: 05/30/24 1700     WBC 9.06 Thousand/uL      RBC 4.06 Million/uL      Hemoglobin 11.5 g/dL      Hematocrit 35.6 %      MCV 88 fL      MCH 28.3 pg       MCHC 32.3 g/dL      RDW 15.4 %      MPV 11.7 fL      Platelets 158 Thousands/uL      nRBC 0 /100 WBCs      Segmented % 72 %      Immature Grans % 0 %      Lymphocytes % 14 %      Monocytes % 12 %      Eosinophils Relative 1 %      Basophils Relative 1 %      Absolute Neutrophils 6.52 Thousands/µL      Absolute Immature Grans 0.04 Thousand/uL      Absolute Lymphocytes 1.25 Thousands/µL      Absolute Monocytes 1.11 Thousand/µL      Eosinophils Absolute 0.09 Thousand/µL      Basophils Absolute 0.05 Thousands/µL                    XR chest portable   ED Interpretation by Raymundo Pop MD (05/30 1736)   Very slightly worse vascular congestion when compared to 9/14/2023, otherwise no acute pathology evident, no focal infiltrate.            Procedures  Procedures      ED Course  ED Course as of 05/30/24 2332   Thu May 30, 2024   1728 hs TnI 0hr(!): 77  Recent values 40-66   1807 Called son (Kolby) who saw patient today and sees her regularly.  He states that the patient seemed to be more or less at her cognitive baseline without focal deficit, however he was very concerned by her generalized weakness.  Normally she is ambulatory with walker, today she needed wheelchair to get around and when attempt was made to get her to stand, she nearly collapsed.  SNF reported to son a temp of 100.3 that was treated with Tylenol just prior to transport to Hospital.    1810 BNP(!): 682  Slightly up from priors, not very elevated though   1831 Awaiting urine, low threshold to treat if suspicious               HEART Risk Score      Flowsheet Row Most Recent Value   Heart Score Risk Calculator    History 0 Filed at: 05/30/2024 2331   ECG 1 Filed at: 05/30/2024 2331   Age 2 Filed at: 05/30/2024 2331   Risk Factors 2 Filed at: 05/30/2024 2331   Troponin 2 Filed at: 05/30/2024 2331   HEART Score 7 Filed at: 05/30/2024 2331                        SBIRT 20yo+      Flowsheet Row Most Recent Value   Initial Alcohol Screen: US AUDIT-C      1. How often do you have a drink containing alcohol? 0 Filed at: 05/30/2024 1643   2. How many drinks containing alcohol do you have on a typical day you are drinking?  0 Filed at: 05/30/2024 1643   3b. FEMALE Any Age, or MALE 65+: How often do you have 4 or more drinks on one occassion? 0 Filed at: 05/30/2024 1643   Audit-C Score 0 Filed at: 05/30/2024 1643   MIN: How many times in the past year have you...    Used an illegal drug or used a prescription medication for non-medical reasons? Never Filed at: 05/30/2024 1643                  Medical Decision Making  Medical complexity: This 89-year-old female is presenting from her skilled nursing facility unable to offer much subjective history, however according to son, she is weaker than normal, and normally is able to ambulate with rollator walker however has been very difficult to stand up and attempts to ambulate over the past day to day and a half.  Reported fever at her nursing facility however patient has remained afebrile here.  Will observe closely.  Will obtain infectious markers including procalcitonin, viral panel, and lactate in addition to CBC looking for leukocytosis or left shift.  Patient will be screened for metabolic disturbances as she is unable to tell us what exactly her symptoms are with a metabolic panel.  Will evaluate cardiac function secondary to patient's known history of CHF, and inability to tell us if she is having chest pain.  Patient is expected to have a slight troponin elevation, as this has been her recent baseline, will need to trend.  I did discuss the case with patient's son who states that he would prefer for patient to be back at her nursing facility if we do not find signs of severe infection or other need for hospitalization.    Patient is troponin was elevated slightly more than her baseline, will continue to trend for the 4-hour delta.  Case was discussed with internal medicine service as per protocol for heart score.   At this time we will continue to trend and monitor for any EKG changes or significant jump in troponin.  No indication at this time for medication changes.    Reassessment/disposition: Patient's lab work all returned fairly benign including her urine studies without any indicative bacterial infection.  Viral panel is also negative.  Patient has furthermore remained hemodynamically stable with no sign of fever here in the emergency department for over 5 hours.  She continues to mentate at her baseline according to her son.  She remains somewhat weak, however per son's request, will discharge back to nursing facility to undergo PT/OT if necessary through her nursing facility.  She will be monitored closely there and will come back if she has worsening signs of infection.  She will follow-up with her primary doctor regarding her elevated troponin and slightly elevated BNP today.  I do feel she had slight volume overload on examination but this was treated in the emergency department with dose of Lasix.  She had normal work of breathing at time of discharge.    Amount and/or Complexity of Data Reviewed  Labs: ordered. Decision-making details documented in ED Course.  Radiology: ordered and independent interpretation performed.    Risk  Prescription drug management.          Disposition  Final diagnoses:   Generalized weakness   Flu-like symptoms   Elevated troponin I level     Time reflects when diagnosis was documented in both MDM as applicable and the Disposition within this note       Time User Action Codes Description Comment    5/30/2024  9:52 PM Raymundo Pop [R53.1] Generalized weakness     5/30/2024  9:52 PM Raymundo Pop [R68.89] Flu-like symptoms     5/30/2024  9:52 PM Raymundo Pop [R79.89] Elevated troponin I level           ED Disposition       ED Disposition   Discharge    Condition   Stable    Date/Time   Thu May 30, 2024 2152    Comment   Eusebio Tate discharge to home/self care.                    Follow-up Information       Follow up With Specialties Details Why Contact Info Additional Information    Daniel Birch MD Internal Medicine In 2 days  [address]  [address]  [facility] [address] [phone]       [facility] Emergency Department Emergency Medicine Go to  If symptoms worsen, As needed [address] [facility] [address] [phone] [facility] Emergency Department, [address], [place], [address]            Discharge Medication List as of 5/30/2024  9:54 PM        CONTINUE these medications which have NOT CHANGED    Details   acetaminophen (TYLENOL) 500 mg tablet Take 500 mg by mouth every 6 (six) hours as needed for mild pain, Historical Med      apixaban (ELIQUIS) 5 mg Take 5 mg by mouth 2 (two) times a day, Historical Med      Ascorbic Acid (VITAMIN C) 500 MG CAPS Take 500 mg by mouth daily, Historical Med      aspirin 81 mg chewable tablet Chew 1 tablet (81 mg total) daily Do not start before February 18, 2023., Starting Sat 2/18/2023, No Print      atorvastatin (LIPITOR) 10 mg tablet Take 40 mg by mouth daily, Historical Med      bisacodyl (DULCOLAX) 5 mg EC tablet Take 5 mg by mouth daily as needed for constipation, Historical Med      calcium carbonate (TUMS) 500 mg chewable tablet Chew 1 tablet daily, Historical Med      Cholecalciferol (Vitamin D3) 1.25 MG (55673 UT) CAPS Take 2,000 Units by mouth daily 2,000U daily, Historical Med      Cranberry 450 MG CAPS Take 1 capsule by mouth in the morning, Historical Med      Diclofenac Sodium (VOLTAREN) 1 % Apply 2 g topically 3 (three) times a day as needed (pain), Historical Med      docusate sodium (COLACE) 100 mg capsule Take 100 mg by mouth 2 (two) times a day, Historical Med      Elastic Bandages & Supports (Medical Compression Stockings) MISC Use in the morning, Starting Thu 5/2/2024, Normal      furosemide (LASIX) 40 mg tablet Take 1 tablet by mouth daily,  Starting Thu 2/20/2020, Historical Med      loperamide (IMODIUM) 2 mg capsule Starting Wed 2/15/2023, Historical Med      oxyCODONE (ROXICODONE) 5 immediate release tablet Take 5 mg by mouth every 4 (four) hours as needed for moderate pain Take 1/2 tablet q6h as needed for pain, Historical Med      polyethylene glycol (MIRALAX) 17 g packet Take 17 g by mouth daily, Historical Med      potassium chloride (K-DUR,KLOR-CON) 20 mEq tablet Take 20 mEq by mouth 2 (two) times a day, Historical Med      QUEtiapine (SEROquel) 25 mg tablet Take 0.5 tablets (12.5 mg total) by mouth daily at bedtime Hold if sedated, Starting Fri 11/18/2022, No Print      thiamine (VITAMIN B1) 100 mg tablet Historical Med      Valerian 500 MG CAPS Take by mouth in the morning, Historical Med      zonisamide (ZONEGRAN) 100 mg capsule Take 1 capsule (100 mg total) by mouth daily Do not start before November 19, 2022., Starting Sat 11/19/2022, No Print           No discharge procedures on file.    PDMP Review       None             ED Provider  Attending physically available and evaluated Eusebio Tate. I managed the patient along with the ED Attending.    Electronically Signed by           Raymundo Pop MD  05/30/24 7114

## 2024-05-30 NOTE — ED ATTENDING ATTESTATION
5/30/2024  I, Chidi Kelley MD, saw and evaluated the patient. I have discussed the patient with the resident/non-physician practitioner and agree with the resident's/non-physician practitioner's findings, Plan of Care, and MDM as documented in the resident's/non-physician practitioner's note, except where noted. All available labs and Radiology studies were reviewed.  I was present for key portions of any procedure(s) performed by the resident/non-physician practitioner and I was immediately available to provide assistance.       At this point I agree with the current assessment done in the Emergency Department.  I have conducted an independent evaluation of this patient a history and physical is as follows:  89 yo F brought to ED by EMS from care facility where she resides in the memory care unit, for onset of reported fever, changes in mental status, with possibly more confusion.  She is not able to provide any details of recent illness, is disoriented to place, can follow simple commands.  She denies any discomfort at this time.      VSS.  NAD.  No fever measured in ED.  She has nasal congestion, cough, rhinorrhea.  Abd soft, nonfocal tenderness.  BLE edema, normal pulses.      Based on my history and physical examination, I considered the following life or limb threatening conditions in my differential diagnosis:  broad differential considering metabolic, infectious, cardiac, neurologic derangements.  Consider imaging depending on lab abnormalities.      Based on my clinical acumen, I will order the appropriate diagnostic testing to narrow my differential diagnosis and arrive at a final diagnosis, and consider admission for persistent AMS, dyspnea, or lab abnormality.        ED Course         Critical Care Time  Procedures

## 2024-05-31 NOTE — ED NOTES
[facility] called at this time to inform them pt is discharged and will be coming back soon.      Benny Brown RN  05/30/24 [id number]

## 2024-05-31 NOTE — DISCHARGE INSTRUCTIONS
Eusebio is medically cleared to return to her nursing facility.    She needs to followup with her primary doctor regarding her slightly elevated troponin (heart enzyme) and BUN ( another marker of heart function) which may be related to her illness but will need to be rechecked at some point.  If Eusebio is too weak to be taken care of appropriately at her facility then please bring her back to the hospital.  Otherwise followup with her primary doctor.

## 2024-06-02 LAB
BACTERIA BLD CULT: NORMAL
BACTERIA BLD CULT: NORMAL

## 2024-06-04 LAB
BACTERIA BLD CULT: NORMAL
BACTERIA BLD CULT: NORMAL

## 2024-06-20 ENCOUNTER — OFFICE VISIT (OUTPATIENT)
Dept: SURGERY | Facility: CLINIC | Age: 89
End: 2024-06-20
Payer: COMMERCIAL

## 2024-06-20 VITALS
SYSTOLIC BLOOD PRESSURE: 118 MMHG | TEMPERATURE: 96.3 F | HEART RATE: 62 BPM | DIASTOLIC BLOOD PRESSURE: 82 MMHG | OXYGEN SATURATION: 98 %

## 2024-06-20 DIAGNOSIS — L02.91 ABSCESS: Primary | ICD-10-CM

## 2024-06-20 PROCEDURE — 99203 OFFICE O/P NEW LOW 30 MIN: CPT | Performed by: PHYSICIAN ASSISTANT

## 2024-06-20 RX ORDER — CEPHALEXIN 500 MG/1
500 CAPSULE ORAL 4 TIMES DAILY
Qty: 40 CAPSULE | Refills: 0 | Status: SHIPPED | OUTPATIENT
Start: 2024-06-20 | End: 2024-06-30

## 2024-06-20 RX ORDER — NITROGLYCERIN 0.4 MG/1
0.4 TABLET SUBLINGUAL
COMMUNITY
Start: 2024-05-31

## 2024-06-20 NOTE — PROGRESS NOTES
Assessment/Plan:   Eusebio Tate is a 89 y.o.female who is here for   Chief Complaint   Patient presents with    Abscess     Patient is here today for a follow up on her Left thigh abscess          On exam found to have early non-fluctuant abscess of the : left upper leg.    Plan: Very minimal fluid expressed from left upper leg. Some odor and minimal purulence. No benefit from I and D at this time. Will continue on keflex and check on Monday. She will change bandage daily. Return one week to ensure continued improvement.    Positioning: standing    Post Op Pain Management:   Motrin and Tylenol        _______________________________________________________  CC:Abscess (Patient is here today for a follow up on her Left thigh abscess )  .    HPI:  Eusebio Tate is a 89 y.o.female who was referred for evaluation of Abscess (Patient is here today for a follow up on her Left thigh abscess )  .    Currently patient reports Left thigh drainage x <1 month. She lives in a senior living center and the PA saw she had a developing abscess and referred her here. She has no pain and denies fevers. Minimal drainage on band aide changed daily.       ROS:  General ROS: negative  negative for - chills, fatigue, fever or night sweats, weight loss  Respiratory ROS: no cough, shortness of breath, or wheezing  Cardiovascular ROS: no chest pain or dyspnea on exertion  Genito-Urinary ROS: no dysuria, trouble voiding, or hematuria  Musculoskeletal ROS: negative for - gait disturbance, joint pain or muscle pain  Neurological ROS: no TIA or stroke symptoms  Skin ROS: See HPI  GI ROS: see HPI  Skin ROS: no new rashes or lesions   Lymphatic ROS: no new adenopathy noted by pt.   Psy ROS: no new mental or behavioral disturbances       Patient Active Problem List   Diagnosis    Action tremor    Age-related osteoporosis without fracture    Skin lesion    Benign essential hypertension    Other hyperlipidemia    Permanent atrial fibrillation (HCC)     Amnestic MCI (mild cognitive impairment with memory loss)    Other insomnia    Atypical pneumonia    Respiratory insufficiency    Acute metabolic encephalopathy    Chronic diastolic heart failure (HCC)    Thrombocytopenia (HCC)    Chronic anticoagulation    Other fatigue    Gait instability    Venous stasis dermatitis of both lower extremities    Post-stroke epilepsy    Urinary retention    New onset seizure (HCC)    History of stroke    Sick sinus syndrome (HCC)    Stroke-like symptoms    Syncope    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 behavioral disturbance, psychotic disturbance, mood disturbance, or anxiety (HCC)    Prolonged Q-T interval on ECG    Embolic stroke (formerly [facility])    Class 1 obesity due to excess calories without serious comorbidity with body mass index (BMI) of 30.0 to 30.9 in adult    Nodule of middle lobe of right lung    Severe concentric left ventricular hypertrophy    Biatrial enlargement    Peripheral vascular disease, unspecified (formerly [facility])    Stage 3b chronic kidney disease (formerly [facility])         Allergies:  Morphine      Current Outpatient Medications:     acetaminophen (TYLENOL) 500 mg tablet, Take 500 mg by mouth every 6 (six) hours as needed for mild pain, Disp: , Rfl:     apixaban (ELIQUIS) 5 mg, Take 5 mg by mouth 2 (two) times a day, Disp: , Rfl:     aspirin 81 mg chewable tablet, Chew 1 tablet (81 mg total) daily Do not start before February 18, 2023., Disp: 30 tablet, Rfl: 0    atorvastatin (LIPITOR) 40 mg tablet, Take 40 mg by mouth daily, Disp: , Rfl:     bisacodyl (DULCOLAX) 5 mg EC tablet, Take 5 mg by mouth daily as needed for constipation, Disp: , Rfl:     calcium carbonate (TUMS) 500 mg chewable tablet, Chew 1 tablet daily, Disp: , Rfl:     cephalexin (KEFLEX) 500 mg capsule, Take 1 capsule (500 mg total) by mouth 4 (four) times a day for 10 days, Disp: 40 capsule, Rfl: 0    Cranberry 450 MG CAPS, Take 1 capsule by mouth in the morning, Disp: , Rfl:     Diclofenac Sodium (VOLTAREN) 1  %, Apply 2 g topically 3 (three) times a day as needed (pain), Disp: , Rfl:     docusate sodium (COLACE) 100 mg capsule, Take 100 mg by mouth 2 (two) times a day, Disp: , Rfl:     Elastic Bandages & Supports (Medical Compression Stockings) MISC, Use in the morning, Disp: 4 each, Rfl: 1    furosemide (LASIX) 40 mg tablet, Take 1 tablet by mouth daily, Disp: , Rfl:     loperamide (IMODIUM) 2 mg capsule, , Disp: , Rfl:     polyethylene glycol (MIRALAX) 17 g packet, Take 17 g by mouth daily, Disp: , Rfl:     QUEtiapine (SEROquel) 25 mg tablet, Take 0.5 tablets (12.5 mg total) by mouth daily at bedtime Hold if sedated, Disp: 30 tablet, Rfl: 0    Ascorbic Acid (VITAMIN C) 500 MG CAPS, Take 500 mg by mouth daily (Patient not taking: Reported on 6/24/2024), Disp: , Rfl:     Cholecalciferol (Vitamin D3) 1.25 MG (17244 UT) CAPS, Take 2,000 Units by mouth daily 2,000U daily (Patient not taking: Reported on 6/24/2024), Disp: , Rfl:     nitroglycerin (NITROSTAT) 0.4 mg SL tablet, Place 0.4 mg under the tongue every 5 (five) minutes as needed for chest pain (Patient not taking: Reported on 6/24/2024), Disp: , Rfl:     oxyCODONE (ROXICODONE) 5 immediate release tablet, Take 5 mg by mouth every 4 (four) hours as needed for moderate pain Take 1/2 tablet q6h as needed for pain (Patient not taking: Reported on 6/20/2024), Disp: , Rfl:     potassium chloride (K-DUR,KLOR-CON) 20 mEq tablet, Take 20 mEq by mouth 2 (two) times a day (Patient not taking: Reported on 6/20/2024), Disp: , Rfl:     thiamine (VITAMIN B1) 100 mg tablet, , Disp: , Rfl:     Valerian 500 MG CAPS, Take by mouth in the morning (Patient not taking: Reported on 3/25/2024), Disp: , Rfl:     zonisamide (ZONEGRAN) 100 mg capsule, Take 1 capsule (100 mg total) by mouth daily Do not start before November 19, 2022. (Patient not taking: Reported on 6/20/2024), Disp: , Rfl: 0    Past Medical History:   Diagnosis Date    A-fib (HCC)     CAD (coronary artery disease)     CHF  (congestive heart failure) (HCC)     CKD (chronic kidney disease) stage 2, GFR 60-89 ml/min     Dementia (HCC)     Hypertension     Hypokalemia     Knee effusion     Memory loss     Seizure (HCC)     Urinary tract infection        Past Surgical History:   Procedure Laterality Date    CARDIAC ELECTROPHYSIOLOGY PROCEDURE N/A 1/26/2023    Procedure: Cardiac pacer implant;  Surgeon: Francisco Georges DO;  Location:  CARDIAC CATH LAB;  Service: Cardiology    CHOLECYSTECTOMY      REPLACEMENT TOTAL KNEE Left 2008       Family History   Problem Relation Age of Onset    Lung cancer Father     Leukemia Brother     Breast cancer Daughter         reports that she has never smoked. She has never used smokeless tobacco. She reports that she does not drink alcohol and does not use drugs.    Vitals:    06/24/24 0805   BP: 118/82   Pulse: 75   Resp: 16   Temp: (!) 96.1 °F (35.6 °C)   SpO2: 96%          PHYSICAL EXAM  General Appearance:    Alert, cooperative, no distress,    Head:    Normocephalic without obvious abnormality   Eyes:    PERRL, conjunctiva/corneas clear     Neck:   Supple, no adenopathy, no JVD   Back:      Lungs:      Heart:     Abdomen:     Benign, no rebound or guarding.    Extremities:   Extremities normal. No clubbing, cyanosis or edema   Psych:   Normal Affect, AOx3.    Neurologic:  Skin:   CNII-XII intact. Strength symmetric, speech intact    Warm, dry, intact, no visible rashes or lesions except as follows: There is a small area on the left upper leg with no erythema or fluctuation. When palpated I was able to express minimal serous fluid with very minimal purulence.                  Ivonne Kraus PA-C    Date: 6/24/2024 Time: 8:19 AM

## 2024-06-20 NOTE — PROGRESS NOTES
Assessment/Plan:   Eusebio Tate is a 89 y.o.female who is here for   Chief Complaint   Patient presents with    Cyst     Left thigh ongoing about a month has drained some fluid          On exam found to have early non-fluctuant abscess of the : left upper leg.    Plan: Very minimal fluid expressed from left upper leg. Some odor and minimal purulence. No benefit from I and D at this time. Will place patient on keflex and check on Monday. She will change bandage daily.    Positioning: standing    Post Op Pain Management:   Motrin and Tylenol        _______________________________________________________  CC:Cyst (Left thigh ongoing about a month has drained some fluid )  .    HPI:  Eusebio Tate is a 89 y.o.female who was referred for evaluation of Cyst (Left thigh ongoing about a month has drained some fluid )  .    Currently patient reports Left thigh drainage x <1 month. She lives in a senior living center and the PA saw she had a developing abscess and referred her here. She has no pain and denies fevers. Minimal drainage on band aide changed daily.       ROS:  General ROS: negative  negative for - chills, fatigue, fever or night sweats, weight loss  Respiratory ROS: no cough, shortness of breath, or wheezing  Cardiovascular ROS: no chest pain or dyspnea on exertion  Genito-Urinary ROS: no dysuria, trouble voiding, or hematuria  Musculoskeletal ROS: negative for - gait disturbance, joint pain or muscle pain  Neurological ROS: no TIA or stroke symptoms  Skin ROS: See HPI  GI ROS: see HPI  Skin ROS: no new rashes or lesions   Lymphatic ROS: no new adenopathy noted by pt.   Psy ROS: no new mental or behavioral disturbances       Patient Active Problem List   Diagnosis    Action tremor    Age-related osteoporosis without fracture    Skin lesion    Benign essential hypertension    Other hyperlipidemia    Permanent atrial fibrillation (HCC)    Amnestic MCI (mild cognitive impairment with memory loss)    Other insomnia     Atypical pneumonia    Respiratory insufficiency    Acute metabolic encephalopathy    Chronic diastolic heart failure (HCC)    Thrombocytopenia (HCC)    Chronic anticoagulation    Other fatigue    Gait instability    Venous stasis dermatitis of both lower extremities    Post-stroke epilepsy    Urinary retention    New onset seizure (HCC)    History of stroke    Sick sinus syndrome (HCC)    Stroke-like symptoms    Syncope    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 behavioral disturbance, psychotic disturbance, mood disturbance, or anxiety (HCC)    Prolonged Q-T interval on ECG    Embolic stroke (HCC)    Class 1 obesity due to excess calories without serious comorbidity with body mass index (BMI) of 30.0 to 30.9 in adult    Nodule of middle lobe of right lung    Severe concentric left ventricular hypertrophy    Biatrial enlargement    Peripheral vascular disease, unspecified (HCC)    Stage 3b chronic kidney disease (HCC)         Allergies:  Morphine      Current Outpatient Medications:     acetaminophen (TYLENOL) 500 mg tablet, Take 500 mg by mouth every 6 (six) hours as needed for mild pain, Disp: , Rfl:     apixaban (ELIQUIS) 5 mg, Take 5 mg by mouth 2 (two) times a day, Disp: , Rfl:     aspirin 81 mg chewable tablet, Chew 1 tablet (81 mg total) daily Do not start before February 18, 2023., Disp: 30 tablet, Rfl: 0    atorvastatin (LIPITOR) 40 mg tablet, Take 40 mg by mouth daily, Disp: , Rfl:     bisacodyl (DULCOLAX) 5 mg EC tablet, Take 5 mg by mouth daily as needed for constipation, Disp: , Rfl:     calcium carbonate (TUMS) 500 mg chewable tablet, Chew 1 tablet daily, Disp: , Rfl:     Cranberry 450 MG CAPS, Take 1 capsule by mouth in the morning, Disp: , Rfl:     Diclofenac Sodium (VOLTAREN) 1 %, Apply 2 g topically 3 (three) times a day as needed (pain), Disp: , Rfl:     docusate sodium (COLACE) 100 mg capsule, Take 100 mg by mouth 2 (two) times a day, Disp: , Rfl:     Elastic Bandages & Supports  (Medical Compression Stockings) MISC, Use in the morning, Disp: 4 each, Rfl: 1    furosemide (LASIX) 40 mg tablet, Take 1 tablet by mouth daily, Disp: , Rfl:     loperamide (IMODIUM) 2 mg capsule, , Disp: , Rfl:     nitroglycerin (NITROSTAT) 0.4 mg SL tablet, Place 0.4 mg under the tongue every 5 (five) minutes as needed for chest pain, Disp: , Rfl:     polyethylene glycol (MIRALAX) 17 g packet, Take 17 g by mouth daily, Disp: , Rfl:     QUEtiapine (SEROquel) 25 mg tablet, Take 0.5 tablets (12.5 mg total) by mouth daily at bedtime Hold if sedated, Disp: 30 tablet, Rfl: 0    Ascorbic Acid (VITAMIN C) 500 MG CAPS, Take 500 mg by mouth daily (Patient not taking: Reported on 3/25/2024), Disp: , Rfl:     Cholecalciferol (Vitamin D3) 1.25 MG (69614 UT) CAPS, Take 2,000 Units by mouth daily 2,000U daily (Patient not taking: Reported on 6/20/2024), Disp: , Rfl:     oxyCODONE (ROXICODONE) 5 immediate release tablet, Take 5 mg by mouth every 4 (four) hours as needed for moderate pain Take 1/2 tablet q6h as needed for pain (Patient not taking: Reported on 6/20/2024), Disp: , Rfl:     potassium chloride (K-DUR,KLOR-CON) 20 mEq tablet, Take 20 mEq by mouth 2 (two) times a day (Patient not taking: Reported on 6/20/2024), Disp: , Rfl:     thiamine (VITAMIN B1) 100 mg tablet, , Disp: , Rfl:     Valerian 500 MG CAPS, Take by mouth in the morning (Patient not taking: Reported on 3/25/2024), Disp: , Rfl:     zonisamide (ZONEGRAN) 100 mg capsule, Take 1 capsule (100 mg total) by mouth daily Do not start before November 19, 2022. (Patient not taking: Reported on 6/20/2024), Disp: , Rfl: 0    Past Medical History:   Diagnosis Date    A-fib (HCC)     CAD (coronary artery disease)     CHF (congestive heart failure) (HCC)     CKD (chronic kidney disease) stage 2, GFR 60-89 ml/min     Dementia (HCC)     Hypertension     Hypokalemia     Knee effusion     Memory loss     Seizure (HCC)     Urinary tract infection        Past Surgical History:    Procedure Laterality Date    CARDIAC ELECTROPHYSIOLOGY PROCEDURE N/A 1/26/2023    Procedure: Cardiac pacer implant;  Surgeon: Francisco Georges DO;  Location: [facility];  Service: Cardiology    CHOLECYSTECTOMY      REPLACEMENT TOTAL KNEE Left 2008       Family History   Problem Relation Age of Onset    Lung cancer Father     Leukemia Brother     Breast cancer Daughter         reports that she has never smoked. She has never used smokeless tobacco. She reports that she does not drink alcohol and does not use drugs.    Vitals:    06/20/24 0818   BP: 118/82   Pulse: 62   Temp: (!) 96.3 °F (35.7 °C)   SpO2: 98%        PHYSICAL EXAM  General Appearance:    Alert, cooperative, no distress,    Head:    Normocephalic without obvious abnormality   Eyes:    PERRL, conjunctiva/corneas clear     Neck:   Supple, no adenopathy, no JVD   Back:      Lungs:      Heart:     Abdomen:     Benign, no rebound or guarding.    Extremities:   Extremities normal. No clubbing, cyanosis or edema   Psych:   Normal Affect, AOx3.    Neurologic:  Skin:   CNII-XII intact. Strength symmetric, speech intact    Warm, dry, intact, no visible rashes or lesions except as follows: There is a small area on the left upper leg with no erythema or fluctuation. When palpated I was able to express minimal serous fluid with very minimal purulence.                  Ivonne Kraus PA-C    Date: 6/20/2024 Time: 8:26 AM

## 2024-06-24 ENCOUNTER — OFFICE VISIT (OUTPATIENT)
Dept: SURGERY | Facility: CLINIC | Age: 89
End: 2024-06-24
Payer: COMMERCIAL

## 2024-06-24 VITALS
TEMPERATURE: 96.1 F | DIASTOLIC BLOOD PRESSURE: 82 MMHG | SYSTOLIC BLOOD PRESSURE: 118 MMHG | RESPIRATION RATE: 16 BRPM | HEART RATE: 75 BPM | OXYGEN SATURATION: 96 %

## 2024-06-24 DIAGNOSIS — L03.90 CELLULITIS: Primary | ICD-10-CM

## 2024-06-24 PROCEDURE — 99211 OFF/OP EST MAY X REQ PHY/QHP: CPT | Performed by: PHYSICIAN ASSISTANT

## 2024-06-25 NOTE — PROGRESS NOTES
Assessment/Plan:   Eusebio Tate is a 89 y.o.female who is here for   Chief Complaint   Patient presents with    Abscess     Patient is here today for a follow up on her Cyst on her left thigh          On exam found to have early non-fluctuant abscess of the : left upper leg.    Plan: Very minimal fluid expressed from left upper leg. Some odor and minimal purulence. No benefit from I and D at this time. Will switch to Bactrim and see patient back in two weeks for a hopeful final check. If no improvement at this time we will debride the cystic area. Patient is on anticoagulation so hope to avoid this if possible.    Positioning: standing    Post Op Pain Management:   Motrin and Tylenol        _______________________________________________________  CC:Abscess (Patient is here today for a follow up on her Cyst on her left thigh )  .    HPI:  Eusebio Tate is a 89 y.o.female who was referred for evaluation of Abscess (Patient is here today for a follow up on her Cyst on her left thigh )  .    Currently patient reports Left thigh drainage x <1 month. She lives in a senior living center and the PA saw she had a developing abscess and referred her here. She has no pain and denies fevers. Minimal drainage on band aide changed daily.       ROS:  General ROS: negative  negative for - chills, fatigue, fever or night sweats, weight loss  Respiratory ROS: no cough, shortness of breath, or wheezing  Cardiovascular ROS: no chest pain or dyspnea on exertion  Genito-Urinary ROS: no dysuria, trouble voiding, or hematuria  Musculoskeletal ROS: negative for - gait disturbance, joint pain or muscle pain  Neurological ROS: no TIA or stroke symptoms  Skin ROS: See HPI  GI ROS: see HPI  Skin ROS: no new rashes or lesions   Lymphatic ROS: no new adenopathy noted by pt.   Psy ROS: no new mental or behavioral disturbances       Patient Active Problem List   Diagnosis    Action tremor    Age-related osteoporosis without fracture    Skin  lesion    Benign essential hypertension    Other hyperlipidemia    Permanent atrial fibrillation (HCC)    Amnestic MCI (mild cognitive impairment with memory loss)    Other insomnia    Atypical pneumonia    Respiratory insufficiency    Acute metabolic encephalopathy    Chronic diastolic heart failure (HCC)    Thrombocytopenia (HCC)    Chronic anticoagulation    Other fatigue    Gait instability    Venous stasis dermatitis of both lower extremities    Post-stroke epilepsy    Urinary retention    New onset seizure (HCC)    History of stroke    Sick sinus syndrome (HCC)    Stroke-like symptoms    Syncope    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 behavioral disturbance, psychotic disturbance, mood disturbance, or anxiety (HCC)    Prolonged Q-T interval on ECG    Embolic stroke ([facility])    Class 1 obesity due to excess calories without serious comorbidity with body mass index (BMI) of 30.0 to 30.9 in adult    Nodule of middle lobe of right lung    Severe concentric left ventricular hypertrophy    Biatrial enlargement    Peripheral vascular disease, unspecified ([facility])    Stage 3b chronic kidney disease ([facility])         Allergies:  Morphine      Current Outpatient Medications:     sulfamethoxazole-trimethoprim (BACTRIM) 400-80 mg per tablet, Take 2 tablets by mouth every 12 (twelve) hours for 7 days, Disp: 28 tablet, Rfl: 0    acetaminophen (TYLENOL) 500 mg tablet, Take 500 mg by mouth every 6 (six) hours as needed for mild pain, Disp: , Rfl:     apixaban (ELIQUIS) 5 mg, Take 5 mg by mouth 2 (two) times a day, Disp: , Rfl:     Ascorbic Acid (VITAMIN C) 500 MG CAPS, Take 500 mg by mouth daily (Patient not taking: Reported on 6/24/2024), Disp: , Rfl:     aspirin 81 mg chewable tablet, Chew 1 tablet (81 mg total) daily Do not start before February 18, 2023., Disp: 30 tablet, Rfl: 0    atorvastatin (LIPITOR) 40 mg tablet, Take 40 mg by mouth daily, Disp: , Rfl:     bisacodyl (DULCOLAX) 5 mg EC tablet, Take 5 mg by mouth  daily as needed for constipation, Disp: , Rfl:     calcium carbonate (TUMS) 500 mg chewable tablet, Chew 1 tablet daily, Disp: , Rfl:     Cholecalciferol (Vitamin D3) 1.25 MG (46580 UT) CAPS, Take 2,000 Units by mouth daily 2,000U daily (Patient not taking: Reported on 6/24/2024), Disp: , Rfl:     Cranberry 450 MG CAPS, Take 1 capsule by mouth in the morning, Disp: , Rfl:     Diclofenac Sodium (VOLTAREN) 1 %, Apply 2 g topically 3 (three) times a day as needed (pain), Disp: , Rfl:     docusate sodium (COLACE) 100 mg capsule, Take 100 mg by mouth 2 (two) times a day, Disp: , Rfl:     Elastic Bandages & Supports (Medical Compression Stockings) MISC, Use in the morning, Disp: 4 each, Rfl: 1    furosemide (LASIX) 40 mg tablet, Take 1 tablet by mouth daily, Disp: , Rfl:     loperamide (IMODIUM) 2 mg capsule, , Disp: , Rfl:     nitroglycerin (NITROSTAT) 0.4 mg SL tablet, Place 0.4 mg under the tongue every 5 (five) minutes as needed for chest pain (Patient not taking: Reported on 6/24/2024), Disp: , Rfl:     oxyCODONE (ROXICODONE) 5 immediate release tablet, Take 5 mg by mouth every 4 (four) hours as needed for moderate pain Take 1/2 tablet q6h as needed for pain (Patient not taking: Reported on 6/20/2024), Disp: , Rfl:     polyethylene glycol (MIRALAX) 17 g packet, Take 17 g by mouth daily, Disp: , Rfl:     potassium chloride (K-DUR,KLOR-CON) 20 mEq tablet, Take 20 mEq by mouth 2 (two) times a day (Patient not taking: Reported on 6/20/2024), Disp: , Rfl:     QUEtiapine (SEROquel) 25 mg tablet, Take 0.5 tablets (12.5 mg total) by mouth daily at bedtime Hold if sedated, Disp: 30 tablet, Rfl: 0    thiamine (VITAMIN B1) 100 mg tablet, , Disp: , Rfl:     Valerian 500 MG CAPS, Take by mouth in the morning (Patient not taking: Reported on 3/25/2024), Disp: , Rfl:     zonisamide (ZONEGRAN) 100 mg capsule, Take 1 capsule (100 mg total) by mouth daily Do not start before November 19, 2022. (Patient not taking: Reported on  6/20/2024), Disp: , Rfl: 0    Past Medical History:   Diagnosis Date    A-fib (HCC)     CAD (coronary artery disease)     CHF (congestive heart failure) (HCC)     CKD (chronic kidney disease) stage 2, GFR 60-89 ml/min     Dementia (HCC)     Hypertension     Hypokalemia     Knee effusion     Memory loss     Seizure (HCC)     Urinary tract infection        Past Surgical History:   Procedure Laterality Date    CARDIAC ELECTROPHYSIOLOGY PROCEDURE N/A 1/26/2023    Procedure: Cardiac pacer implant;  Surgeon: Francisco Georges DO;  [facility];  Service: Cardiology    CHOLECYSTECTOMY      REPLACEMENT TOTAL KNEE Left 2008       Family History   Problem Relation Age of Onset    Lung cancer Father     Leukemia Brother     Breast cancer Daughter         reports that she has never smoked. She has never been exposed to tobacco smoke. She has never used smokeless tobacco. She reports that she does not drink alcohol and does not use drugs.    Vitals:    07/01/24 0805   BP: 118/78   Pulse: 60   Temp: (!) 96 °F (35.6 °C)   SpO2: 92%            PHYSICAL EXAM  General Appearance:    Alert, cooperative, no distress,    Head:    Normocephalic without obvious abnormality   Eyes:    PERRL, conjunctiva/corneas clear     Neck:   Supple, no adenopathy, no JVD   Back:      Lungs:      Heart:     Abdomen:     Benign, no rebound or guarding.    Extremities:   Extremities normal. No clubbing, cyanosis or edema   Psych:   Normal Affect, AOx3.    Neurologic:  Skin:   CNII-XII intact. Strength symmetric, speech intact    Warm, dry, intact, no visible rashes or lesions except as follows: There is a small area on the left upper leg with no erythema or fluctuation. When palpated I was able to express minimal serous fluid with very minimal purulence.                  Ivonne Kraus PA-C    Date: 7/1/2024 Time: 8:22 AM

## 2024-07-01 ENCOUNTER — OFFICE VISIT (OUTPATIENT)
Dept: SURGERY | Facility: CLINIC | Age: 89
End: 2024-07-01
Payer: COMMERCIAL

## 2024-07-01 VITALS
OXYGEN SATURATION: 92 % | TEMPERATURE: 96 F | SYSTOLIC BLOOD PRESSURE: 118 MMHG | HEART RATE: 60 BPM | DIASTOLIC BLOOD PRESSURE: 78 MMHG

## 2024-07-01 DIAGNOSIS — L02.91 ABSCESS: Primary | ICD-10-CM

## 2024-07-01 PROCEDURE — 99212 OFFICE O/P EST SF 10 MIN: CPT | Performed by: PHYSICIAN ASSISTANT

## 2024-07-01 RX ORDER — SULFAMETHOXAZOLE AND TRIMETHOPRIM 400; 80 MG/1; MG/1
2 TABLET ORAL EVERY 12 HOURS SCHEDULED
Qty: 28 TABLET | Refills: 0 | Status: SHIPPED | OUTPATIENT
Start: 2024-07-01 | End: 2024-07-08

## 2024-07-18 NOTE — PROGRESS NOTES
Assessment/Plan:   Eusebio Tate is a 89 y.o.female who is here for   Chief Complaint   Patient presents with   • Follow-up     Cyst on her left thigh, possible procedure.         On exam found to have early non-fluctuant abscess of the : left upper leg.    Plan:   Breast some additional fluid today.  No evidence of spreading cellulitis.  Dry dressing.  Wound care instructions given.  Washed daily with saline and/or Betadine or Hibiclens and dry dressing daily.    Patient is very frail and was not able to even stand without full assistance and then only for about 2 or 3 minutes.  She would be a poor surgical candidate even in the office.  Will continue local management.     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    Positioning: standing    Post Op Pain Management:   Motrin and Tylenol    Patient with significant comorbid diseases including chronic diastolic heart failure moderately controlled, peripheral vascular disease and atrial fibrillation moderately controlled.  She is 89-year-old frail female.  She also has stage III kidney disease.    _______________________________________________________  CC:Follow-up (Cyst on her left thigh, possible procedure.)  .    HPI:  Eusebio Tate is a 89 y.o.female who was referred for evaluation of Follow-up (Cyst on her left thigh, possible procedure.)  .    Currently patient reports Left thigh drainage x <1 month. She lives in a senior living center and the PA saw she had a developing abscess and referred her here. She has no pain and denies fevers. Minimal drainage on band aide changed daily.       ROS:  General ROS: negative  negative for - chills, fatigue, fever or night sweats, weight loss  Respiratory ROS: no cough, shortness of breath, or wheezing  Cardiovascular ROS: no chest pain or dyspnea on exertion  Genito-Urinary ROS: no dysuria, trouble voiding, or hematuria  Musculoskeletal ROS: negative for - gait disturbance, joint  pain or muscle pain  Neurological ROS: no TIA or stroke symptoms  Skin ROS: See HPI  GI ROS: see HPI  Skin ROS: no new rashes or lesions   Lymphatic ROS: no new adenopathy noted by pt.   Psy ROS: no new mental or behavioral disturbances       Patient Active Problem List   Diagnosis   • Action tremor   • Age-related osteoporosis without fracture   • Skin lesion   • Benign essential hypertension   • Other hyperlipidemia   • Permanent atrial fibrillation (HCC)   • Amnestic MCI (mild cognitive impairment with memory loss)   • Other insomnia   • Atypical pneumonia   • Respiratory insufficiency   • Acute metabolic encephalopathy   • Chronic diastolic heart failure (HCC)   • Thrombocytopenia (HCC)   • Chronic anticoagulation   • Other fatigue   • Gait instability   • Venous stasis dermatitis of both lower extremities   • Post-stroke epilepsy   • Urinary retention   • New onset seizure (HCC)   • History of stroke   • Sick sinus syndrome (HCC)   • Stroke-like symptoms   • Syncope   • 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 behavioral disturbance, psychotic disturbance, mood disturbance, or anxiety (HCC)   • Prolonged Q-T interval on ECG   • Embolic stroke (HCC)   • Class 1 obesity due to excess calories without serious comorbidity with body mass index (BMI) of 30.0 to 30.9 in adult   • Nodule of middle lobe of right lung   • Severe concentric left ventricular hypertrophy   • Biatrial enlargement   • Peripheral vascular disease, unspecified (HCC)   • Stage 3b chronic kidney disease (HCC)         Allergies:  Morphine      Current Outpatient Medications:   •  acetaminophen (TYLENOL) 500 mg tablet, Take 500 mg by mouth every 6 (six) hours as needed for mild pain, Disp: , Rfl:   •  apixaban (ELIQUIS) 5 mg, Take 5 mg by mouth 2 (two) times a day, Disp: , Rfl:   •  aspirin 81 mg chewable tablet, Chew 1 tablet (81 mg total) daily Do not start before February 18, 2023., Disp: 30 tablet, Rfl: 0  •  atorvastatin (LIPITOR) 40  mg tablet, Take 40 mg by mouth daily, Disp: , Rfl:   •  bisacodyl (DULCOLAX) 5 mg EC tablet, Take 5 mg by mouth daily as needed for constipation, Disp: , Rfl:   •  calcium carbonate (TUMS) 500 mg chewable tablet, Chew 1 tablet daily, Disp: , Rfl:   •  Cranberry 450 MG CAPS, Take 1 capsule by mouth in the morning, Disp: , Rfl:   •  Diclofenac Sodium (VOLTAREN) 1 %, Apply 2 g topically 3 (three) times a day as needed (pain), Disp: , Rfl:   •  docusate sodium (COLACE) 100 mg capsule, Take 100 mg by mouth 2 (two) times a day, Disp: , Rfl:   •  Elastic Bandages & Supports (Medical Compression Stockings) MISC, Use in the morning, Disp: 4 each, Rfl: 1  •  furosemide (LASIX) 40 mg tablet, Take 1 tablet by mouth daily, Disp: , Rfl:   •  loperamide (IMODIUM) 2 mg capsule, , Disp: , Rfl:   •  polyethylene glycol (MIRALAX) 17 g packet, Take 17 g by mouth daily, Disp: , Rfl:   •  QUEtiapine (SEROquel) 25 mg tablet, Take 0.5 tablets (12.5 mg total) by mouth daily at bedtime Hold if sedated, Disp: 30 tablet, Rfl: 0  •  Ascorbic Acid (VITAMIN C) 500 MG CAPS, Take 500 mg by mouth daily (Patient not taking: Reported on 6/24/2024), Disp: , Rfl:   •  Cholecalciferol (Vitamin D3) 1.25 MG (53712 UT) CAPS, Take 2,000 Units by mouth daily 2,000U daily (Patient not taking: Reported on 6/24/2024), Disp: , Rfl:   •  nitroglycerin (NITROSTAT) 0.4 mg SL tablet, Place 0.4 mg under the tongue every 5 (five) minutes as needed for chest pain (Patient not taking: Reported on 6/24/2024), Disp: , Rfl:   •  oxyCODONE (ROXICODONE) 5 immediate release tablet, Take 5 mg by mouth every 4 (four) hours as needed for moderate pain Take 1/2 tablet q6h as needed for pain (Patient not taking: Reported on 6/20/2024), Disp: , Rfl:   •  potassium chloride (K-DUR,KLOR-CON) 20 mEq tablet, Take 20 mEq by mouth 2 (two) times a day (Patient not taking: Reported on 6/20/2024), Disp: , Rfl:   •  thiamine (VITAMIN B1) 100 mg tablet, , Disp: , Rfl:   •  Valerian 500 MG  CAPS, Take by mouth in the morning (Patient not taking: Reported on 3/25/2024), Disp: , Rfl:   •  zonisamide (ZONEGRAN) 100 mg capsule, Take 1 capsule (100 mg total) by mouth daily Do not start before November 19, 2022. (Patient not taking: Reported on 6/20/2024), Disp: , Rfl: 0    Past Medical History:   Diagnosis Date   • A-fib (HCC)    • CAD (coronary artery disease)    • CHF (congestive heart failure) (HCC)    • CKD (chronic kidney disease) stage 2, GFR 60-89 ml/min    • Dementia (HCC)    • Hypertension    • Hypokalemia    • Knee effusion    • Memory loss    • Seizure (HCC)    • Urinary tract infection        Past Surgical History:   Procedure Laterality Date   • CARDIAC ELECTROPHYSIOLOGY PROCEDURE N/A 1/26/2023    Procedure: Cardiac pacer implant;  Surgeon: Francisco Georges DO;  [facility]: [facility];  Service: Cardiology   • CHOLECYSTECTOMY     • REPLACEMENT TOTAL KNEE Left 2008       Family History   Problem Relation Age of Onset   • Lung cancer Father    • Leukemia Brother    • Breast cancer Daughter         reports that she has never smoked. She has never been exposed to tobacco smoke. She has never used smokeless tobacco. She reports that she does not drink alcohol and does not use drugs.    Vitals:    07/22/24 1057   BP: 115/70   Pulse: 80   Resp: 17   Temp: (!) 95 °F (35 °C)   SpO2: 95%              PHYSICAL EXAM  General Appearance:    Alert, cooperative, no distress,    Head:    Normocephalic without obvious abnormality   Eyes:    PERRL, conjunctiva/corneas clear     Neck:   Supple, no adenopathy, no JVD   Back:      Lungs:      Heart:     Abdomen:     Benign, no rebound or guarding.    Extremities:   Extremities normal. No clubbing, cyanosis or edema   Psych:   Normal Affect, AOx3.    Neurologic:  Skin:   CNII-XII intact. Strength symmetric, speech intact    Warm, dry, intact, no visible rashes or lesions except as follows:     There is a small area on the left upper leg thigh with no erythema or  fluctuation. When palpated I was able to express minimal serous fluid with very minimal purulence.                  Coral Lynn MD    Date: 7/22/2024 Time: 11:27 AM

## 2024-07-22 ENCOUNTER — OFFICE VISIT (OUTPATIENT)
Dept: SURGERY | Facility: CLINIC | Age: 89
End: 2024-07-22
Payer: COMMERCIAL

## 2024-07-22 VITALS
DIASTOLIC BLOOD PRESSURE: 70 MMHG | TEMPERATURE: 95 F | RESPIRATION RATE: 17 BRPM | SYSTOLIC BLOOD PRESSURE: 115 MMHG | HEART RATE: 80 BPM | OXYGEN SATURATION: 95 %

## 2024-07-22 DIAGNOSIS — L03.90 CELLULITIS, UNSPECIFIED CELLULITIS SITE: Primary | ICD-10-CM

## 2024-07-22 DIAGNOSIS — I73.9 PERIPHERAL VASCULAR DISEASE, UNSPECIFIED (HCC): ICD-10-CM

## 2024-07-22 DIAGNOSIS — R33.9 URINARY RETENTION: ICD-10-CM

## 2024-07-22 DIAGNOSIS — I48.21 PERMANENT ATRIAL FIBRILLATION (HCC): ICD-10-CM

## 2024-07-22 DIAGNOSIS — I50.32 CHRONIC DIASTOLIC HEART FAILURE (HCC): ICD-10-CM

## 2024-07-22 DIAGNOSIS — I63.10 CEREBROVASCULAR ACCIDENT (CVA) DUE TO EMBOLISM OF PRECEREBRAL ARTERY (HCC): ICD-10-CM

## 2024-07-22 PROCEDURE — 99213 OFFICE O/P EST LOW 20 MIN: CPT | Performed by: SURGERY

## 2024-07-23 ENCOUNTER — NURSE TRIAGE (OUTPATIENT)
Dept: SURGERY | Facility: CLINIC | Age: 89
End: 2024-07-23

## 2024-07-23 NOTE — TELEPHONE ENCOUNTER
"Pt of Dr. Smith, cyst left thigh.     Corinne nurse from [facility] calling for dressing orders for left thigh.  States she has a copy of orders, but some are crossed off and there are other instructions written on top of the old orders, so she is unsure what orders should say.   RN checked AVS, but is unable to open it.  Will reach out to provider and call back    CB Corinne [phone]          Reason for Disposition   Information only question and nurse able to answer    Answer Assessment - Initial Assessment Questions  1. REASON FOR CALL or QUESTION: \"What is your reason for calling today?\" or \"How can I best help you?\" or \"What question do you have that I can help answer?\"      [facility] calling about dressing orders    Protocols used: Information Only Call - No Triage-ADULT-[place]    "

## 2024-07-23 NOTE — TELEPHONE ENCOUNTER
Corinne, nurse at [facility] where pt currently resides, had questions regarding the wound care for the pt. Faxed me the wound care instructions. After messaging Dr Smith, she said for Corinne to follow the original directions. I relayed this to Corinne and she was fine with that and understood.

## 2024-08-12 ENCOUNTER — TELEPHONE (OUTPATIENT)
Dept: SURGERY | Facility: CLINIC | Age: 89
End: 2024-08-12

## 2024-08-12 ENCOUNTER — OFFICE VISIT (OUTPATIENT)
Dept: SURGERY | Facility: CLINIC | Age: 89
End: 2024-08-12
Payer: COMMERCIAL

## 2024-08-12 VITALS
HEART RATE: 61 BPM | OXYGEN SATURATION: 95 % | SYSTOLIC BLOOD PRESSURE: 108 MMHG | TEMPERATURE: 97.8 F | DIASTOLIC BLOOD PRESSURE: 64 MMHG

## 2024-08-12 DIAGNOSIS — L72.9 INFECTED CYST OF SKIN: ICD-10-CM

## 2024-08-12 DIAGNOSIS — Z00.00 ROUTINE GENERAL MEDICAL EXAMINATION AT A HEALTH CARE FACILITY: ICD-10-CM

## 2024-08-12 DIAGNOSIS — Z12.39 SCREENING FOR BREAST CANCER: ICD-10-CM

## 2024-08-12 DIAGNOSIS — Z12.31 ENCOUNTER FOR SCREENING MAMMOGRAM FOR MALIGNANT NEOPLASM OF BREAST: ICD-10-CM

## 2024-08-12 DIAGNOSIS — L08.9 INFECTED CYST OF SKIN: Primary | ICD-10-CM

## 2024-08-12 DIAGNOSIS — Z09 POSTOP CHECK: Primary | ICD-10-CM

## 2024-08-12 DIAGNOSIS — L72.9 INFECTED CYST OF SKIN: Primary | ICD-10-CM

## 2024-08-12 DIAGNOSIS — L08.9 INFECTED CYST OF SKIN: ICD-10-CM

## 2024-08-12 PROCEDURE — 87205 SMEAR GRAM STAIN: CPT | Performed by: PHYSICIAN ASSISTANT

## 2024-08-12 PROCEDURE — 99212 OFFICE O/P EST SF 10 MIN: CPT | Performed by: PHYSICIAN ASSISTANT

## 2024-08-12 PROCEDURE — 87070 CULTURE OTHR SPECIMN AEROBIC: CPT | Performed by: PHYSICIAN ASSISTANT

## 2024-08-12 RX ORDER — SULFAMETHOXAZOLE/TRIMETHOPRIM 800-160 MG
1 TABLET ORAL 2 TIMES DAILY
Qty: 20 TABLET | Refills: 0 | Status: SHIPPED | OUTPATIENT
Start: 2024-08-12 | End: 2024-08-22

## 2024-08-12 RX ORDER — SULFAMETHOXAZOLE/TRIMETHOPRIM 800-160 MG
1 TABLET ORAL 2 TIMES DAILY
Qty: 20 TABLET | Refills: 0 | Status: SHIPPED | OUTPATIENT
Start: 2024-08-12 | End: 2024-08-12 | Stop reason: SDUPTHER

## 2024-08-12 NOTE — TELEPHONE ENCOUNTER
Pt's son Matt called in to ask MELQUIADES Kraus if she could cancel the script to [facility] for medication - sulfamethoxazole-trimethoprim (BACTRIM DS) 800-160 mg per tablet,  And resend it over to - [facility] - [place], [place] - [address], due to the nursing home the pt is at, they use [facility] to fill medication.

## 2024-08-12 NOTE — PROGRESS NOTES
Assessment/Plan:   Eusebio Tate is a 89 y.o.female who is here for   Chief Complaint   Patient presents with    Wound Check     Patient is here today for a 3 week follow up wound check for her left thigh abscess,          On exam found to have early non-fluctuant abscess of the : left upper leg.    Plan:   Expressed some additional fluid today.  No evidence of spreading cellulitis.  Dressing is wet.   Washed daily with saline and/or Betadine or Hibiclens and dry dressing daily. Started on Bactrim. We did get a wound culture at today's visit. We did discuss potential need to get off blood thinners and I and D in office. Last time she was off her anticoagulants for a procedure she had a stroke. This would be our last case effort as she has no symptoms from this other then the annoyance of changing her bandage.     Patient is very frail and was not able to even stand without full assistance and then only for about 2 or 3 minutes.  She would be a poor surgical candidate even in the office.  Will continue local management.     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    Positioning: standing    Post Op Pain Management:   Motrin and Tylenol    Patient with significant comorbid diseases including chronic diastolic heart failure moderately controlled, peripheral vascular disease and atrial fibrillation moderately controlled.  She is 89-year-old frail female.  She also has stage III kidney disease.    _______________________________________________________  CC:Wound Check (Patient is here today for a 3 week follow up wound check for her left thigh abscess, )  .    HPI:  Eusebio Tate is a 89 y.o.female who was referred for evaluation of Wound Check (Patient is here today for a 3 week follow up wound check for her left thigh abscess, )  .    Currently patient reports Left thigh drainage x <1 month. She lives in a senior living center and the PA saw she had a developing abscess and  referred her here. She has no pain and denies fevers. Minimal drainage on band aide changed daily.       ROS:  General ROS: negative  negative for - chills, fatigue, fever or night sweats, weight loss  Respiratory ROS: no cough, shortness of breath, or wheezing  Cardiovascular ROS: no chest pain or dyspnea on exertion  Genito-Urinary ROS: no dysuria, trouble voiding, or hematuria  Musculoskeletal ROS: negative for - gait disturbance, joint pain or muscle pain  Neurological ROS: no TIA or stroke symptoms  Skin ROS: See HPI  GI ROS: see HPI  Skin ROS: no new rashes or lesions   Lymphatic ROS: no new adenopathy noted by pt.   Psy ROS: no new mental or behavioral disturbances       Patient Active Problem List   Diagnosis    Action tremor    Age-related osteoporosis without fracture    Skin lesion    Benign essential hypertension    Other hyperlipidemia    Permanent atrial fibrillation (HCC)    Amnestic MCI (mild cognitive impairment with memory loss)    Other insomnia    Atypical pneumonia    Respiratory insufficiency    Acute metabolic encephalopathy    Chronic diastolic heart failure (HCC)    Thrombocytopenia (HCC)    Chronic anticoagulation    Other fatigue    Gait instability    Venous stasis dermatitis of both lower extremities    Post-stroke epilepsy    Urinary retention    New onset seizure (HCC)    History of stroke    Sick sinus syndrome (HCC)    Stroke-like symptoms    Syncope    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 behavioral disturbance, psychotic disturbance, mood disturbance, or anxiety (HCC)    Prolonged Q-T interval on ECG    Embolic stroke (HCC)    Class 1 obesity due to excess calories without serious comorbidity with body mass index (BMI) of 30.0 to 30.9 in adult    Nodule of middle lobe of right lung    Severe concentric left ventricular hypertrophy    Biatrial enlargement    Peripheral vascular disease, unspecified (HCC)    Stage 3b chronic kidney disease (HCC)          Allergies:  Morphine      Current Outpatient Medications:     acetaminophen (TYLENOL) 500 mg tablet, Take 500 mg by mouth every 6 (six) hours as needed for mild pain, Disp: , Rfl:     apixaban (ELIQUIS) 5 mg, Take 5 mg by mouth 2 (two) times a day, Disp: , Rfl:     Ascorbic Acid (VITAMIN C) 500 MG CAPS, Take 500 mg by mouth daily, Disp: , Rfl:     aspirin 81 mg chewable tablet, Chew 1 tablet (81 mg total) daily Do not start before February 18, 2023., Disp: 30 tablet, Rfl: 0    atorvastatin (LIPITOR) 40 mg tablet, Take 40 mg by mouth daily, Disp: , Rfl:     bisacodyl (DULCOLAX) 5 mg EC tablet, Take 5 mg by mouth daily as needed for constipation, Disp: , Rfl:     calcium carbonate (TUMS) 500 mg chewable tablet, Chew 1 tablet daily, Disp: , Rfl:     Cranberry 450 MG CAPS, Take 1 capsule by mouth in the morning, Disp: , Rfl:     Diclofenac Sodium (VOLTAREN) 1 %, Apply 2 g topically 3 (three) times a day as needed (pain), Disp: , Rfl:     docusate sodium (COLACE) 100 mg capsule, Take 100 mg by mouth 2 (two) times a day, Disp: , Rfl:     furosemide (LASIX) 40 mg tablet, Take 1 tablet by mouth daily, Disp: , Rfl:     loperamide (IMODIUM) 2 mg capsule, , Disp: , Rfl:     QUEtiapine (SEROquel) 25 mg tablet, Take 0.5 tablets (12.5 mg total) by mouth daily at bedtime Hold if sedated, Disp: 30 tablet, Rfl: 0    sulfamethoxazole-trimethoprim (BACTRIM DS) 800-160 mg per tablet, Take 1 tablet by mouth 2 (two) times a day for 10 days, Disp: 20 tablet, Rfl: 0    Cholecalciferol (Vitamin D3) 1.25 MG (61634 UT) CAPS, Take 2,000 Units by mouth daily 2,000U daily (Patient not taking: Reported on 8/12/2024), Disp: , Rfl:     Elastic Bandages & Supports (Medical Compression Stockings) MISC, Use in the morning, Disp: 4 each, Rfl: 1    nitroglycerin (NITROSTAT) 0.4 mg SL tablet, Place 0.4 mg under the tongue every 5 (five) minutes as needed for chest pain (Patient not taking: Reported on 6/24/2024), Disp: , Rfl:     oxyCODONE  (ROXICODONE) 5 immediate release tablet, Take 5 mg by mouth every 4 (four) hours as needed for moderate pain Take 1/2 tablet q6h as needed for pain (Patient not taking: Reported on 6/20/2024), Disp: , Rfl:     polyethylene glycol (MIRALAX) 17 g packet, Take 17 g by mouth daily (Patient not taking: Reported on 8/12/2024), Disp: , Rfl:     potassium chloride (K-DUR,KLOR-CON) 20 mEq tablet, Take 20 mEq by mouth 2 (two) times a day (Patient not taking: Reported on 6/20/2024), Disp: , Rfl:     thiamine (VITAMIN B1) 100 mg tablet, , Disp: , Rfl:     Valerian 500 MG CAPS, Take by mouth in the morning (Patient not taking: Reported on 3/25/2024), Disp: , Rfl:     zonisamide (ZONEGRAN) 100 mg capsule, Take 1 capsule (100 mg total) by mouth daily Do not start before November 19, 2022. (Patient not taking: Reported on 6/20/2024), Disp: , Rfl: 0    Past Medical History:   Diagnosis Date    A-fib ([facility])     CAD (coronary artery disease)     CHF (congestive heart failure) ([facility])     CKD (chronic kidney disease) stage 2, GFR 60-89 ml/min     Dementia ([facility])     Hypertension     Hypokalemia     Knee effusion     Memory loss     Seizure ([facility])     Urinary tract infection        Past Surgical History:   Procedure Laterality Date    CARDIAC ELECTROPHYSIOLOGY PROCEDURE N/A 1/26/2023    Procedure: Cardiac pacer implant;  Surgeon: Francisco Georges DO;  [facility];  Service: Cardiology    CHOLECYSTECTOMY      REPLACEMENT TOTAL KNEE Left 2008       Family History   Problem Relation Age of Onset    Lung cancer Father     Leukemia Brother     Breast cancer Daughter         reports that she has never smoked. She has never been exposed to tobacco smoke. She has never used smokeless tobacco. She reports that she does not drink alcohol and does not use drugs.    Vitals:    08/12/24 0946   BP: 108/64   Pulse: 61   Temp: 97.8 °F (36.6 °C)   SpO2: 95%                PHYSICAL EXAM  General Appearance:    Alert, cooperative, no distress,     Head:    Normocephalic without obvious abnormality   Eyes:    PERRL, conjunctiva/corneas clear     Neck:   Supple, no adenopathy, no JVD   Back:      Lungs:      Heart:     Abdomen:     Benign, no rebound or guarding.    Extremities:   Extremities normal. No clubbing, cyanosis or edema   Psych:   Normal Affect, AOx3.    Neurologic:  Skin:   CNII-XII intact. Strength symmetric, speech intact    Warm, dry, intact, no visible rashes or lesions except as follows:     There is a small area on the left upper leg thigh with no erythema or fluctuation. When palpated I was able to express minimal serous fluid with very minimal purulence.                  Ivonne Kraus PA-C    Date: 8/12/2024 Time: 11:04 AM

## 2024-08-12 NOTE — LETTER
August 12, 2024     Patient: Eusebio Tate  YOB: 1935  Date of Visit: 8/12/2024      To Whom it May Concern:    Eusebio Tate is under my professional care. Eusebio was seen in my office on 8/12/2024. Eusebio may return to work on 8/19/2024 with no restrictions .    If you have any questions or concerns, please don't hesitate to call.         Sincerely,          Ivonne Kraus PA-C        CC: No Recipients

## 2024-08-14 LAB
BACTERIA WND AEROBE CULT: ABNORMAL
GRAM STN SPEC: ABNORMAL

## 2024-09-05 NOTE — PROGRESS NOTES
Assessment/Plan:   Eusebio Tate is a 89 y.o.female who is here for   Chief Complaint   Patient presents with    Follow-up     Patient is here today for a follow up on her left thigh abscess. On states the nurses at [facility] advised him its still draining.          On exam found to have cyst left upper thigh no erythema, fluctuation, or drainage today.    Plan:   Cyst has no erythema and did not get any fluid expressed today in the office. Last time she was off her anticoagulants for a procedure she had a stroke. Discussed with patient and her son that she has no symptoms other than drainage which does not bother her. We could bring her to the OR but she would have to get medical clearance and be off anticoagulants and potentially have a wound on her left thigh which may have issues healing as she is wheelchair bound. Patient will return if becomes painful or need I and D/antibiotics.     Patient is very frail and was not able to even stand without full assistance and then only for about 2 or 3 minutes.  She would be a poor surgical candidate even in the office.  Will continue local management.     Positioning: standing    Post Op Pain Management:   Motrin and Tylenol    Patient with significant comorbid diseases including chronic diastolic heart failure moderately controlled, peripheral vascular disease and atrial fibrillation moderately controlled.  She is 89-year-old frail female.  She also has stage III kidney disease.    _______________________________________________________  CC:Follow-up (Patient is here today for a follow up on her left thigh abscess. On states the nurses at [facility] advised him its still draining. )  .    HPI:  Eusebio Tate is a 89 y.o.female who was referred for evaluation of Follow-up (Patient is here today for a follow up on her left thigh abscess. On states the nurses at [facility] advised him its still draining. )  .    Currently patient reports Left  thigh drainage x 3+ months. She lives in a senior living center and the PA saw she had a developing abscess and referred her here. She has no pain and denies fevers. Minimal drainage on band aide changed daily.       ROS:  General ROS: negative  negative for - chills, fatigue, fever or night sweats, weight loss  Respiratory ROS: no cough, shortness of breath, or wheezing  Cardiovascular ROS: no chest pain or dyspnea on exertion  Genito-Urinary ROS: no dysuria, trouble voiding, or hematuria  Musculoskeletal ROS: negative for - gait disturbance, joint pain or muscle pain  Neurological ROS: no TIA or stroke symptoms  Skin ROS: See HPI  GI ROS: see HPI  Skin ROS: no new rashes or lesions   Lymphatic ROS: no new adenopathy noted by pt.   Psy ROS: no new mental or behavioral disturbances       Patient Active Problem List   Diagnosis    Action tremor    Age-related osteoporosis without fracture    Skin lesion    Benign essential hypertension    Other hyperlipidemia    Permanent atrial fibrillation (HCC)    Amnestic MCI (mild cognitive impairment with memory loss)    Other insomnia    Atypical pneumonia    Respiratory insufficiency    Acute metabolic encephalopathy    Chronic diastolic heart failure (HCC)    Thrombocytopenia (HCC)    Chronic anticoagulation    Other fatigue    Gait instability    Venous stasis dermatitis of both lower extremities    Post-stroke epilepsy    Urinary retention    New onset seizure (HCC)    History of stroke    Sick sinus syndrome (HCC)    Stroke-like symptoms    Syncope    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 behavioral disturbance, psychotic disturbance, mood disturbance, or anxiety (HCC)    Prolonged Q-T interval on ECG    Embolic stroke ([facility])    Class 1 obesity due to excess calories without serious comorbidity with body mass index (BMI) of 30.0 to 30.9 in adult    Nodule of middle lobe of right lung    Severe concentric left ventricular hypertrophy    Biatrial enlargement     Peripheral vascular disease, unspecified (HCC)    Stage 3b chronic kidney disease (HCC)         Allergies:  Morphine      Current Outpatient Medications:     acetaminophen (TYLENOL) 500 mg tablet, Take 500 mg by mouth every 6 (six) hours as needed for mild pain, Disp: , Rfl:     apixaban (ELIQUIS) 5 mg, Take 5 mg by mouth 2 (two) times a day, Disp: , Rfl:     Ascorbic Acid (VITAMIN C) 500 MG CAPS, Take 500 mg by mouth daily, Disp: , Rfl:     aspirin 81 mg chewable tablet, Chew 1 tablet (81 mg total) daily Do not start before February 18, 2023., Disp: 30 tablet, Rfl: 0    atorvastatin (LIPITOR) 40 mg tablet, Take 40 mg by mouth daily, Disp: , Rfl:     bisacodyl (DULCOLAX) 5 mg EC tablet, Take 5 mg by mouth daily as needed for constipation, Disp: , Rfl:     calcium carbonate (TUMS) 500 mg chewable tablet, Chew 1 tablet daily, Disp: , Rfl:     Cholecalciferol (Vitamin D3) 1.25 MG (22875 UT) CAPS, Take 2,000 Units by mouth daily 2,000U daily, Disp: , Rfl:     Cranberry 450 MG CAPS, Take 1 capsule by mouth in the morning, Disp: , Rfl:     Diclofenac Sodium (VOLTAREN) 1 %, Apply 2 g topically 3 (three) times a day as needed (pain), Disp: , Rfl:     docusate sodium (COLACE) 100 mg capsule, Take 100 mg by mouth 2 (two) times a day, Disp: , Rfl:     Elastic Bandages & Supports (Medical Compression Stockings) MISC, Use in the morning, Disp: 4 each, Rfl: 1    escitalopram (LEXAPRO) 10 mg tablet, , Disp: , Rfl:     furosemide (LASIX) 40 mg tablet, Take 1 tablet by mouth daily, Disp: , Rfl:     loperamide (IMODIUM) 2 mg capsule, , Disp: , Rfl:     nitroglycerin (NITROSTAT) 0.4 mg SL tablet, Place 0.4 mg under the tongue every 5 (five) minutes as needed for chest pain, Disp: , Rfl:     oxyCODONE (ROXICODONE) 5 immediate release tablet, Take 5 mg by mouth every 4 (four) hours as needed for moderate pain Take 1/2 tablet q6h as needed for pain, Disp: , Rfl:     polyethylene glycol (MIRALAX) 17 g packet, Take 17 g by mouth daily,  Disp: , Rfl:     potassium chloride (K-DUR,KLOR-CON) 20 mEq tablet, Take 20 mEq by mouth 2 (two) times a day, Disp: , Rfl:     QUEtiapine (SEROquel) 25 mg tablet, Take 0.5 tablets (12.5 mg total) by mouth daily at bedtime Hold if sedated, Disp: 30 tablet, Rfl: 0    thiamine (VITAMIN B1) 100 mg tablet, , Disp: , Rfl:     Valerian 500 MG CAPS, Take by mouth in the morning, Disp: , Rfl:     zonisamide (ZONEGRAN) 100 mg capsule, Take 1 capsule (100 mg total) by mouth daily Do not start before November 19, 2022., Disp: , Rfl: 0    Past Medical History:   Diagnosis Date    A-fib (HCC)     CAD (coronary artery disease)     CHF (congestive heart failure) (HCC)     CKD (chronic kidney disease) stage 2, GFR 60-89 ml/min     Dementia (HCC)     Hypertension     Hypokalemia     Knee effusion     Memory loss     Seizure (HCC)     Urinary tract infection        Past Surgical History:   Procedure Laterality Date    CARDIAC ELECTROPHYSIOLOGY PROCEDURE N/A 1/26/2023    Procedure: Cardiac pacer implant;  Surgeon: Francisco Georges DO;  Location: [facility];  Service: Cardiology    CHOLECYSTECTOMY      REPLACEMENT TOTAL KNEE Left 2008       Family History   Problem Relation Age of Onset    Lung cancer Father     Leukemia Brother     Breast cancer Daughter         reports that she has never smoked. She has never been exposed to tobacco smoke. She has never used smokeless tobacco. She reports that she does not drink alcohol and does not use drugs.    Vitals:    09/09/24 0814   BP: 124/62   Pulse: 65   Temp: 97.5 °F (36.4 °C)   SpO2: 95%                  PHYSICAL EXAM  General Appearance:    Alert, cooperative, no distress,    Head:    Normocephalic without obvious abnormality   Eyes:    PERRL, conjunctiva/corneas clear     Neck:   Supple, no adenopathy, no JVD   Back:      Lungs:      Heart:     Abdomen:     Benign, no rebound or guarding.    Extremities:   Extremities normal. No clubbing, cyanosis or edema   Psych:   Normal Affect,  AOx3.    Neurologic:  Skin:   CNII-XII intact. Strength symmetric, speech intact    Warm, dry, intact, no visible rashes or lesions except as follows:     There is a small area on the left upper leg thigh with no erythema or fluctuation. When palpated I was not able to express drainage.                 Ivonne Kraus PA-C    Date: 9/9/2024 Time: 8:58 AM

## 2024-09-09 ENCOUNTER — OFFICE VISIT (OUTPATIENT)
Dept: SURGERY | Facility: CLINIC | Age: 89
End: 2024-09-09
Payer: COMMERCIAL

## 2024-09-09 VITALS
OXYGEN SATURATION: 95 % | SYSTOLIC BLOOD PRESSURE: 124 MMHG | DIASTOLIC BLOOD PRESSURE: 62 MMHG | HEART RATE: 65 BPM | TEMPERATURE: 97.5 F

## 2024-09-09 DIAGNOSIS — Z51.89 ENCOUNTER FOR WOUND CARE: Primary | ICD-10-CM

## 2024-09-09 PROCEDURE — 99212 OFFICE O/P EST SF 10 MIN: CPT | Performed by: PHYSICIAN ASSISTANT

## 2024-09-09 RX ORDER — ESCITALOPRAM OXALATE 10 MG/1
TABLET ORAL
COMMUNITY
Start: 2024-08-21

## 2024-09-12 ENCOUNTER — REMOTE DEVICE CLINIC VISIT (OUTPATIENT)
Dept: CARDIOLOGY CLINIC | Facility: CLINIC | Age: 89
End: 2024-09-12
Payer: COMMERCIAL

## 2024-09-12 DIAGNOSIS — Z95.0 CARDIAC PACEMAKER IN SITU: Primary | ICD-10-CM

## 2024-09-12 PROCEDURE — 93296 REM INTERROG EVL PM/IDS: CPT | Performed by: INTERNAL MEDICINE

## 2024-09-12 PROCEDURE — 93294 REM INTERROG EVL PM/LDLS PM: CPT | Performed by: INTERNAL MEDICINE

## 2024-09-12 NOTE — PROGRESS NOTES
"Results for orders placed or performed in visit on 09/12/24   Cardiac EP device report    Narrative    MDT SC PM/ACTIVE SYSTEM IS MRI CONDITIONAL  CARELINK TRANSMISSION: BATTERY STATUS \"11 YRS.\"  97%. ALL AVAILABLE LEAD PARAMETERS WITHIN NORMAL LIMITS. NO SIGNIFICANT HIGH RATE EPISODES. NORMAL DEVICE FUNCTION. NC         "

## 2024-10-18 ENCOUNTER — TELEPHONE (OUTPATIENT)
Dept: CARDIOLOGY CLINIC | Facility: CLINIC | Age: 89
End: 2024-10-18

## 2024-10-23 ENCOUNTER — APPOINTMENT (EMERGENCY)
Dept: RADIOLOGY | Facility: HOSPITAL | Age: 89
DRG: 291 | End: 2024-10-23
Payer: COMMERCIAL

## 2024-10-23 ENCOUNTER — APPOINTMENT (EMERGENCY)
Dept: CT IMAGING | Facility: HOSPITAL | Age: 89
DRG: 291 | End: 2024-10-23
Attending: EMERGENCY MEDICINE
Payer: COMMERCIAL

## 2024-10-23 ENCOUNTER — HOSPITAL ENCOUNTER (INPATIENT)
Facility: HOSPITAL | Age: 89
LOS: 8 days | Discharge: HOME | DRG: 291 | End: 2024-10-31
Attending: EMERGENCY MEDICINE
Payer: COMMERCIAL

## 2024-10-23 DIAGNOSIS — R33.9 URINARY RETENTION: ICD-10-CM

## 2024-10-23 DIAGNOSIS — K92.1 MELENA: ICD-10-CM

## 2024-10-23 DIAGNOSIS — G93.40 ACUTE ENCEPHALOPATHY: Primary | ICD-10-CM

## 2024-10-23 DIAGNOSIS — I50.32 CHRONIC DIASTOLIC HEART FAILURE (HCC): ICD-10-CM

## 2024-10-23 DIAGNOSIS — Z95.0 PACEMAKER: ICD-10-CM

## 2024-10-23 DIAGNOSIS — D50.9 MICROCYTIC ANEMIA: ICD-10-CM

## 2024-10-23 DIAGNOSIS — K59.00 CONSTIPATION: ICD-10-CM

## 2024-10-23 DIAGNOSIS — I50.9 CHF (CONGESTIVE HEART FAILURE) (HCC): ICD-10-CM

## 2024-10-23 DIAGNOSIS — I10 HYPERTENSION: ICD-10-CM

## 2024-10-23 DIAGNOSIS — G40.909 EPILEPSY AFTER STROKE (HCC): ICD-10-CM

## 2024-10-23 DIAGNOSIS — L08.9 SKIN INFECTION: ICD-10-CM

## 2024-10-23 DIAGNOSIS — I69.398 EPILEPSY AFTER STROKE (HCC): ICD-10-CM

## 2024-10-23 DIAGNOSIS — I49.5 SICK SINUS SYNDROME (HCC): ICD-10-CM

## 2024-10-23 DIAGNOSIS — Z86.73 HX OF COMPLETED STROKE: ICD-10-CM

## 2024-10-23 LAB
2HR DELTA HS TROPONIN: -12 NG/L
4HR DELTA HS TROPONIN: 1 NG/L
ALBUMIN SERPL BCG-MCNC: 4 G/DL (ref 3.5–5)
ALP SERPL-CCNC: 93 U/L (ref 34–104)
ALT SERPL W P-5'-P-CCNC: 15 U/L (ref 7–52)
ANION GAP SERPL CALCULATED.3IONS-SCNC: 7 MMOL/L (ref 4–13)
AST SERPL W P-5'-P-CCNC: 21 U/L (ref 13–39)
ATRIAL RATE: 104 BPM
BASE EX.OXY STD BLDV CALC-SCNC: 88.6 % (ref 60–80)
BASE EXCESS BLDV CALC-SCNC: -0.8 MMOL/L
BASOPHILS # BLD AUTO: 0.05 THOUSANDS/ΜL (ref 0–0.1)
BASOPHILS NFR BLD AUTO: 1 % (ref 0–1)
BILIRUB SERPL-MCNC: 1.06 MG/DL (ref 0.2–1)
BILIRUB UR QL STRIP: NEGATIVE
BUN SERPL-MCNC: 15 MG/DL (ref 5–25)
CALCIUM SERPL-MCNC: 9.7 MG/DL (ref 8.4–10.2)
CARDIAC TROPONIN I PNL SERPL HS: 59 NG/L
CARDIAC TROPONIN I PNL SERPL HS: 71 NG/L
CARDIAC TROPONIN I PNL SERPL HS: 72 NG/L
CHLORIDE SERPL-SCNC: 110 MMOL/L (ref 96–108)
CLARITY UR: CLEAR
CO2 SERPL-SCNC: 25 MMOL/L (ref 21–32)
COLOR UR: YELLOW
CREAT SERPL-MCNC: 0.93 MG/DL (ref 0.6–1.3)
EOSINOPHIL # BLD AUTO: 0.09 THOUSAND/ΜL (ref 0–0.61)
EOSINOPHIL NFR BLD AUTO: 1 % (ref 0–6)
ERYTHROCYTE [DISTWIDTH] IN BLOOD BY AUTOMATED COUNT: 20.1 % (ref 11.6–15.1)
FLUAV AG UPPER RESP QL IA.RAPID: NEGATIVE
FLUBV AG UPPER RESP QL IA.RAPID: NEGATIVE
GFR SERPL CREATININE-BSD FRML MDRD: 55 ML/MIN/1.73SQ M
GLUCOSE SERPL-MCNC: 107 MG/DL (ref 65–140)
GLUCOSE SERPL-MCNC: 110 MG/DL (ref 65–140)
GLUCOSE UR STRIP-MCNC: NEGATIVE MG/DL
HCO3 BLDV-SCNC: 23.1 MMOL/L (ref 24–30)
HCT VFR BLD AUTO: 29.4 % (ref 34.8–46.1)
HGB BLD-MCNC: 9.1 G/DL (ref 11.5–15.4)
HGB UR QL STRIP.AUTO: NEGATIVE
IMM GRANULOCYTES # BLD AUTO: 0.03 THOUSAND/UL (ref 0–0.2)
IMM GRANULOCYTES NFR BLD AUTO: 0 % (ref 0–2)
KETONES UR STRIP-MCNC: NEGATIVE MG/DL
LACTATE SERPL-SCNC: 1.7 MMOL/L (ref 0.5–2)
LDH SERPL-CCNC: 201 U/L (ref 140–271)
LEUKOCYTE ESTERASE UR QL STRIP: NEGATIVE
LYMPHOCYTES # BLD AUTO: 1.22 THOUSANDS/ΜL (ref 0.6–4.47)
LYMPHOCYTES NFR BLD AUTO: 15 % (ref 14–44)
MCH RBC QN AUTO: 23.2 PG (ref 26.8–34.3)
MCHC RBC AUTO-ENTMCNC: 31 G/DL (ref 31.4–37.4)
MCV RBC AUTO: 75 FL (ref 82–98)
MONOCYTES # BLD AUTO: 0.79 THOUSAND/ΜL (ref 0.17–1.22)
MONOCYTES NFR BLD AUTO: 10 % (ref 4–12)
NEUTROPHILS # BLD AUTO: 5.86 THOUSANDS/ΜL (ref 1.85–7.62)
NEUTS SEG NFR BLD AUTO: 73 % (ref 43–75)
NITRITE UR QL STRIP: NEGATIVE
NRBC BLD AUTO-RTO: 0 /100 WBCS
O2 CT BLDV-SCNC: 11.3 ML/DL
PCO2 BLDV: 34.8 MM HG (ref 42–50)
PH BLDV: 7.44 [PH] (ref 7.3–7.4)
PH UR STRIP.AUTO: 6 [PH] (ref 4.5–8)
PLATELET # BLD AUTO: 195 THOUSANDS/UL (ref 149–390)
PMV BLD AUTO: 11.7 FL (ref 8.9–12.7)
PO2 BLDV: 64 MM HG (ref 35–45)
POTASSIUM SERPL-SCNC: 3.7 MMOL/L (ref 3.5–5.3)
PROT SERPL-MCNC: 7.4 G/DL (ref 6.4–8.4)
PROT UR STRIP-MCNC: NEGATIVE MG/DL
QRS AXIS: 65 DEGREES
QRSD INTERVAL: 126 MS
QT INTERVAL: 394 MS
QTC INTERVAL: 428 MS
RBC # BLD AUTO: 3.92 MILLION/UL (ref 3.81–5.12)
SARS-COV+SARS-COV-2 AG RESP QL IA.RAPID: NEGATIVE
SODIUM SERPL-SCNC: 142 MMOL/L (ref 135–147)
SP GR UR STRIP.AUTO: 1.02 (ref 1–1.03)
T WAVE AXIS: 218 DEGREES
UROBILINOGEN UR QL STRIP.AUTO: 2 E.U./DL
VENTRICULAR RATE: 71 BPM
WBC # BLD AUTO: 8.04 THOUSAND/UL (ref 4.31–10.16)

## 2024-10-23 PROCEDURE — 87804 INFLUENZA ASSAY W/OPTIC: CPT | Performed by: EMERGENCY MEDICINE

## 2024-10-23 PROCEDURE — 83550 IRON BINDING TEST: CPT

## 2024-10-23 PROCEDURE — 82805 BLOOD GASES W/O2 SATURATION: CPT

## 2024-10-23 PROCEDURE — 81003 URINALYSIS AUTO W/O SCOPE: CPT

## 2024-10-23 PROCEDURE — 87811 SARS-COV-2 COVID19 W/OPTIC: CPT | Performed by: EMERGENCY MEDICINE

## 2024-10-23 PROCEDURE — 80053 COMPREHEN METABOLIC PANEL: CPT | Performed by: EMERGENCY MEDICINE

## 2024-10-23 PROCEDURE — 83540 ASSAY OF IRON: CPT

## 2024-10-23 PROCEDURE — 96374 THER/PROPH/DIAG INJ IV PUSH: CPT

## 2024-10-23 PROCEDURE — 82607 VITAMIN B-12: CPT

## 2024-10-23 PROCEDURE — 82728 ASSAY OF FERRITIN: CPT

## 2024-10-23 PROCEDURE — 84484 ASSAY OF TROPONIN QUANT: CPT

## 2024-10-23 PROCEDURE — 71045 X-RAY EXAM CHEST 1 VIEW: CPT

## 2024-10-23 PROCEDURE — 93010 ELECTROCARDIOGRAM REPORT: CPT | Performed by: INTERNAL MEDICINE

## 2024-10-23 PROCEDURE — 36415 COLL VENOUS BLD VENIPUNCTURE: CPT | Performed by: EMERGENCY MEDICINE

## 2024-10-23 PROCEDURE — 83615 LACTATE (LD) (LDH) ENZYME: CPT

## 2024-10-23 PROCEDURE — 99223 1ST HOSP IP/OBS HIGH 75: CPT

## 2024-10-23 PROCEDURE — 83605 ASSAY OF LACTIC ACID: CPT | Performed by: EMERGENCY MEDICINE

## 2024-10-23 PROCEDURE — 70450 CT HEAD/BRAIN W/O DYE: CPT

## 2024-10-23 PROCEDURE — 84484 ASSAY OF TROPONIN QUANT: CPT | Performed by: EMERGENCY MEDICINE

## 2024-10-23 PROCEDURE — 82948 REAGENT STRIP/BLOOD GLUCOSE: CPT

## 2024-10-23 PROCEDURE — 99285 EMERGENCY DEPT VISIT HI MDM: CPT | Performed by: EMERGENCY MEDICINE

## 2024-10-23 PROCEDURE — 85025 COMPLETE CBC W/AUTO DIFF WBC: CPT | Performed by: EMERGENCY MEDICINE

## 2024-10-23 PROCEDURE — 83010 ASSAY OF HAPTOGLOBIN QUANT: CPT

## 2024-10-23 PROCEDURE — 99285 EMERGENCY DEPT VISIT HI MDM: CPT

## 2024-10-23 PROCEDURE — 93005 ELECTROCARDIOGRAM TRACING: CPT

## 2024-10-23 RX ORDER — ASPIRIN 81 MG/1
81 TABLET, CHEWABLE ORAL DAILY
Status: DISCONTINUED | OUTPATIENT
Start: 2024-10-24 | End: 2024-10-29

## 2024-10-23 RX ORDER — LANOLIN ALCOHOL/MO/W.PET/CERES
100 CREAM (GRAM) TOPICAL DAILY
Status: DISCONTINUED | OUTPATIENT
Start: 2024-10-24 | End: 2024-10-31 | Stop reason: HOSPADM

## 2024-10-23 RX ORDER — ATORVASTATIN CALCIUM 40 MG/1
40 TABLET, FILM COATED ORAL
Status: DISCONTINUED | OUTPATIENT
Start: 2024-10-24 | End: 2024-10-31 | Stop reason: HOSPADM

## 2024-10-23 RX ORDER — ACETAMINOPHEN 325 MG/1
650 TABLET ORAL EVERY 4 HOURS PRN
Status: DISCONTINUED | OUTPATIENT
Start: 2024-10-23 | End: 2024-10-31 | Stop reason: HOSPADM

## 2024-10-23 RX ORDER — ZONISAMIDE 100 MG/1
100 CAPSULE ORAL DAILY
Status: DISCONTINUED | OUTPATIENT
Start: 2024-10-24 | End: 2024-10-31 | Stop reason: HOSPADM

## 2024-10-23 RX ORDER — POLYETHYLENE GLYCOL 3350 17 G/17G
17 POWDER, FOR SOLUTION ORAL DAILY
Status: DISCONTINUED | OUTPATIENT
Start: 2024-10-24 | End: 2024-10-31 | Stop reason: HOSPADM

## 2024-10-23 RX ORDER — SENNOSIDES 8.6 MG
1 TABLET ORAL DAILY
Status: DISCONTINUED | OUTPATIENT
Start: 2024-10-24 | End: 2024-10-31 | Stop reason: HOSPADM

## 2024-10-23 RX ORDER — ESCITALOPRAM OXALATE 10 MG/1
10 TABLET ORAL DAILY
Status: DISCONTINUED | OUTPATIENT
Start: 2024-10-24 | End: 2024-10-31 | Stop reason: HOSPADM

## 2024-10-23 RX ORDER — ONDANSETRON 2 MG/ML
4 INJECTION INTRAMUSCULAR; INTRAVENOUS EVERY 6 HOURS PRN
Status: DISCONTINUED | OUTPATIENT
Start: 2024-10-23 | End: 2024-10-31 | Stop reason: HOSPADM

## 2024-10-23 RX ORDER — QUETIAPINE FUMARATE 25 MG/1
12.5 TABLET, FILM COATED ORAL
Status: DISCONTINUED | OUTPATIENT
Start: 2024-10-23 | End: 2024-10-31 | Stop reason: HOSPADM

## 2024-10-23 RX ORDER — FUROSEMIDE 10 MG/ML
50 INJECTION INTRAMUSCULAR; INTRAVENOUS 2 TIMES DAILY
Status: DISCONTINUED | OUTPATIENT
Start: 2024-10-24 | End: 2024-10-26

## 2024-10-23 RX ORDER — OXYCODONE HYDROCHLORIDE 5 MG/1
5 TABLET ORAL EVERY 6 HOURS PRN
Status: DISCONTINUED | OUTPATIENT
Start: 2024-10-23 | End: 2024-10-23

## 2024-10-23 RX ORDER — POTASSIUM CHLORIDE 1500 MG/1
20 TABLET, EXTENDED RELEASE ORAL 2 TIMES DAILY
Status: DISCONTINUED | OUTPATIENT
Start: 2024-10-23 | End: 2024-10-28

## 2024-10-23 RX ORDER — FUROSEMIDE 10 MG/ML
20 INJECTION INTRAMUSCULAR; INTRAVENOUS ONCE
Status: COMPLETED | OUTPATIENT
Start: 2024-10-23 | End: 2024-10-23

## 2024-10-23 RX ORDER — MAGNESIUM SULFATE HEPTAHYDRATE 40 MG/ML
2 INJECTION, SOLUTION INTRAVENOUS ONCE
Status: COMPLETED | OUTPATIENT
Start: 2024-10-23 | End: 2024-10-24

## 2024-10-23 RX ORDER — HYDROMORPHONE HCL IN WATER/PF 6 MG/30 ML
0.2 PATIENT CONTROLLED ANALGESIA SYRINGE INTRAVENOUS EVERY 4 HOURS PRN
Status: DISCONTINUED | OUTPATIENT
Start: 2024-10-23 | End: 2024-10-31 | Stop reason: HOSPADM

## 2024-10-23 RX ORDER — FUROSEMIDE 10 MG/ML
40 INJECTION INTRAMUSCULAR; INTRAVENOUS ONCE
Status: COMPLETED | OUTPATIENT
Start: 2024-10-23 | End: 2024-10-23

## 2024-10-23 RX ADMIN — POTASSIUM CHLORIDE 20 MEQ: 1500 TABLET, EXTENDED RELEASE ORAL at 23:46

## 2024-10-23 RX ADMIN — QUETIAPINE FUMARATE 12.5 MG: 25 TABLET ORAL at 23:46

## 2024-10-23 RX ADMIN — NITROGLYCERIN 1 INCH: 20 OINTMENT TOPICAL at 20:19

## 2024-10-23 RX ADMIN — APIXABAN 5 MG: 5 TABLET, FILM COATED ORAL at 23:46

## 2024-10-23 RX ADMIN — FUROSEMIDE 40 MG: 10 INJECTION, SOLUTION INTRAVENOUS at 20:12

## 2024-10-23 RX ADMIN — FUROSEMIDE 20 MG: 10 INJECTION, SOLUTION INTRAVENOUS at 23:52

## 2024-10-23 RX ADMIN — HYDROMORPHONE HYDROCHLORIDE 0.2 MG: 0.2 INJECTION, SOLUTION INTRAMUSCULAR; INTRAVENOUS; SUBCUTANEOUS at 22:28

## 2024-10-23 NOTE — ED NOTES
Dr. Newman made aware of patient labored breathing, Placed patient on 2L NC oxygen for comfort.      Tomeka Nance RN  10/23/24 1951

## 2024-10-23 NOTE — ED PROVIDER NOTES
Time reflects when diagnosis was documented in both MDM as applicable and the Disposition within this note       Time User Action Codes Description Comment    10/23/2024  8:35 PM Jarrod Newman Add [G93.40] Acute encephalopathy     10/23/2024  8:35 PM HosJarrod santiago Add [D32.9] Meningioma (HCC)     10/23/2024  8:35 PM HosJarrod santiago Add [I10] Hypertension     10/23/2024  8:35 PM HosJarrod santiago Add [I50.9] CHF (congestive heart failure) (HCC)     10/23/2024  8:36 PM HosakJarrod Add [Z95.0] Pacemaker     10/23/2024  8:40 PM HosJarrod santiago Remove [D32.9] Meningioma (HCC)     10/23/2024  8:40 PM Jarrod Newman Add [Z86.73] Hx of completed stroke           ED Disposition       ED Disposition   Admit    Condition   Stable    Date/Time   Wed Oct 23, 2024  8:14 PM    Comment                  Assessment & Plan       Medical Decision Making  AMS-will ct head ot r/o stroke/bleed, cardiac work up to r/o pna, chf, dysrhtyhmia, lyte abnormality, urine dip to r/o uti, admit    Amount and/or Complexity of Data Reviewed  Labs: ordered. Decision-making details documented in ED Course.  Radiology: ordered and independent interpretation performed. Decision-making details documented in ED Course.    Risk  Prescription drug management.  Decision regarding hospitalization.        ED Course as of 10/23/24 2040   Wed Oct 23, 2024   1810 hs TnI 0hr(!): 71  Will trend, chronic elevation   1828 Hemoglobin(!): 9.1   2004 XR chest 1 view portable  Will tx for chf given vascular congestion which appears to be similar to prior xray but increased work of breathing       Medications   nitroglycerin (NITRO-BID) 2 % TD ointment 1 inch (1 inch Topical Given 10/23/24 2019)   furosemide (LASIX) injection 40 mg (40 mg Intravenous Given 10/23/24 2012)       ED Risk Strat Scores                           SBIRT 22yo+      Flowsheet Row Most Recent Value   Initial Alcohol Screen: US AUDIT-C     1. How often do you have a drink containing alcohol? 0 Filed at:  10/23/2024 1833   2. How many drinks containing alcohol do you have on a typical day you are drinking?  0 Filed at: 10/23/2024 1833   3b. FEMALE Any Age, or MALE 65+: How often do you have 4 or more drinks on one occassion? 0 Filed at: 10/23/2024 1833   Audit-C Score 0 Filed at: 10/23/2024 1833   MIN: How many times in the past year have you...    Used an illegal drug or used a prescription medication for non-medical reasons? Never Filed at: 10/23/2024 1833                            History of Present Illness       Chief Complaint   Patient presents with    Altered Mental Status     Patient brought in for altered mental status. She received vaccines yesterday and has been noted to have increased mentation changes since.       Past Medical History:   Diagnosis Date    A-fib (HCC)     CAD (coronary artery disease)     CHF (congestive heart failure) ([facility])     CKD (chronic kidney disease) stage 2, GFR 60-89 ml/min     Dementia (HCC)     Hypertension     Hypokalemia     Knee effusion     Memory loss     Seizure (HCC)     Urinary tract infection       Past Surgical History:   Procedure Laterality Date    CARDIAC ELECTROPHYSIOLOGY PROCEDURE N/A 1/26/2023    Procedure: Cardiac pacer implant;  Surgeon: Francisco Georges DO;  [facility];  Service: Cardiology    CHOLECYSTECTOMY      REPLACEMENT TOTAL KNEE Left 2008      Family History   Problem Relation Age of Onset    Lung cancer Father     Leukemia Brother     Breast cancer Daughter       Social History     Tobacco Use    Smoking status: Never     Passive exposure: Never    Smokeless tobacco: Never   Vaping Use    Vaping status: Never Used   Substance Use Topics    Alcohol use: Never    Drug use: Never      E-Cigarette/Vaping    E-Cigarette Use Never User       E-Cigarette/Vaping Substances    Nicotine No     THC No     CBD No     Flavoring No     Other No     Unknown No       I have reviewed and agree with the history as documented.     89 y/o F presents  for evaluation of AMS x 1 day. Pt received multiple vaccinations yesterday.  Is normally able to answer questions appropriately as per nursing home staff.       History provided by:  EMS personnel and nursing home      Review of Systems        Objective       ED Triage Vitals   Temperature Pulse Blood Pressure Respirations SpO2 Patient Position - Orthostatic VS   10/23/24 1705 10/23/24 1705 10/23/24 1705 10/23/24 1707 10/23/24 1705 10/23/24 1705   97.7 °F (36.5 °C) 60 128/61 20 93 % Lying      Temp Source Heart Rate Source BP Location FiO2 (%) Pain Score    10/23/24 1705 10/23/24 1705 10/23/24 1705 -- --    Oral Monitor Right arm        Vitals      Date and Time Temp Pulse SpO2 Resp BP Pain Score FACES Pain Rating User   10/23/24 2012 -- -- -- -- 143/108 -- -- ES   10/23/24 1945 -- 60 94 % 36 157/65 -- -- AF   10/23/24 1942 -- -- -- 36 -- -- -- AF   10/23/24 1707 -- -- -- 20 -- -- -- KB   10/23/24 1705 97.7 °F (36.5 °C) 60 93 % -- 128/61 -- -- KB            Physical Exam  Vitals and nursing note reviewed.   Constitutional:       Appearance: Normal appearance.   HENT:      Head: Normocephalic and atraumatic.   Eyes:      Pupils: Pupils are equal, round, and reactive to light.   Cardiovascular:      Rate and Rhythm: Normal rate and regular rhythm.   Pulmonary:      Effort: Pulmonary effort is normal.      Breath sounds: Normal breath sounds.   Abdominal:      General: There is no distension.      Tenderness: There is no abdominal tenderness.   Musculoskeletal:      Cervical back: Normal range of motion and neck supple. No rigidity.   Neurological:      General: No focal deficit present.      Mental Status: She is alert. She is disoriented.         Results Reviewed       Procedure Component Value Units Date/Time    Urine Macroscopic, POC [[id number]]  (Abnormal) Collected: 10/23/24 1940    Lab Status: Final result Specimen: Urine Updated: 10/23/24 1942     Color, UA Yellow     Clarity, UA Clear     pH, UA 6.0      Leukocytes, UA Negative     Nitrite, UA Negative     Protein, UA Negative mg/dl      Glucose, UA Negative mg/dl      Ketones, UA Negative mg/dl      Urobilinogen, UA 2.0 E.U./dl      Bilirubin, UA Negative     Occult Blood, UA Negative     Specific Gravity, UA 1.020    Narrative:      CLINITEK RESULT    HS Troponin I 2hr [[id number]]  (Abnormal) Collected: 10/23/24 1859    Lab Status: Final result Specimen: Blood from Arm, Left Updated: 10/23/24 1938     hs TnI 2hr 59 ng/L      Delta 2hr hsTnI -12 ng/L     HS Troponin I 4hr [[id number]]     Lab Status: No result Specimen: Blood     HS Troponin 0hr (reflex protocol) [[id number]]  (Abnormal) Collected: 10/23/24 1709    Lab Status: Final result Specimen: Blood from Arm, Left Updated: 10/23/24 1752     hs TnI 0hr 71 ng/L     Comprehensive metabolic panel [[id number]]  (Abnormal) Collected: 10/23/24 1709    Lab Status: Final result Specimen: Blood from Arm, Left Updated: 10/23/24 1748     Sodium 142 mmol/L      Potassium 3.7 mmol/L      Chloride 110 mmol/L      CO2 25 mmol/L      ANION GAP 7 mmol/L      BUN 15 mg/dL      Creatinine 0.93 mg/dL      Glucose 107 mg/dL      Calcium 9.7 mg/dL      AST 21 U/L      ALT 15 U/L      Alkaline Phosphatase 93 U/L      Total Protein 7.4 g/dL      Albumin 4.0 g/dL      Total Bilirubin 1.06 mg/dL      eGFR 55 ml/min/1.73sq m     Narrative:      National Kidney Disease Foundation guidelines for Chronic Kidney Disease (CKD):     Stage 1 with normal or high GFR (GFR > 90 mL/min/1.73 square meters)    Stage 2 Mild CKD (GFR = 60-89 mL/min/1.73 square meters)    Stage 3A Moderate CKD (GFR = 45-59 mL/min/1.73 square meters)    Stage 3B Moderate CKD (GFR = 30-44 mL/min/1.73 square meters)    Stage 4 Severe CKD (GFR = 15-29 mL/min/1.73 square meters)    Stage 5 End Stage CKD (GFR <15 mL/min/1.73 square meters)  Note: GFR calculation is accurate only with a steady state creatinine    Lactic acid, plasma (w/reflex if result > 2.0) [[id number]]   (Normal) Collected: 10/23/24 1709    Lab Status: Final result Specimen: Blood from Arm, Left Updated: 10/23/24 1747     LACTIC ACID 1.7 mmol/L     Narrative:      Result may be elevated if tourniquet was used during collection.    FLU/COVID Rapid Antigen (30 min. TAT) - Preferred screening test in ED [[id number]]  (Normal) Collected: 10/23/24 1709    Lab Status: Final result Specimen: Nares from Nose Updated: 10/23/24 1746     SARS COV Rapid Antigen Negative     Influenza A Rapid Antigen Negative     Influenza B Rapid Antigen Negative    Narrative:      This test has been performed using the [employer] Vaishali 2 FLU+SARS Antigen test under the Emergency Use Authorization (EUA). This test has been validated by the  and verified by the performing laboratory. The Vaishali uses lateral flow immunofluorescent sandwich assay to detect SARS-COV, Influenza A and Influenza B Antigen.     The [employer] Vaishali 2 SARS Antigen test does not differentiate between SARS-CoV and SARS-CoV-2.     Negative results are presumptive and may be confirmed with a molecular assay, if necessary, for patient management. Negative results do not rule out SARS-CoV-2 or influenza infection and should not be used as the sole basis for treatment or patient management decisions. A negative test result may occur if the level of antigen in a sample is below the limit of detection of this test.     Positive results are indicative of the presence of viral antigens, but do not rule out bacterial infection or co-infection with other viruses.     All test results should be used as an adjunct to clinical observations and other information available to the provider.    FOR PEDIATRIC PATIENTS - copy/paste COVID Guidelines URL to browser: [email or web address]    CBC and differential [[id number]]  (Abnormal) Collected: 10/23/24 1709    Lab Status: Final result Specimen: Blood from Arm, Left Updated: 10/23/24 1727      WBC 8.04 Thousand/uL      RBC 3.92 Million/uL      Hemoglobin 9.1 g/dL      Hematocrit 29.4 %      MCV 75 fL      MCH 23.2 pg      MCHC 31.0 g/dL      RDW 20.1 %      MPV 11.7 fL      Platelets 195 Thousands/uL      nRBC 0 /100 WBCs      Segmented % 73 %      Immature Grans % 0 %      Lymphocytes % 15 %      Monocytes % 10 %      Eosinophils Relative 1 %      Basophils Relative 1 %      Absolute Neutrophils 5.86 Thousands/µL      Absolute Immature Grans 0.03 Thousand/uL      Absolute Lymphocytes 1.22 Thousands/µL      Absolute Monocytes 0.79 Thousand/µL      Eosinophils Absolute 0.09 Thousand/µL      Basophils Absolute 0.05 Thousands/µL     Fingerstick Glucose (POCT) [[id number]]  (Normal) Collected: 10/23/24 1648    Lab Status: Final result Specimen: Blood Updated: 10/23/24 1649     POC Glucose 110 mg/dl             CT head without contrast   Final Interpretation by Jose Wilder MD (10/23 1915)      No acute intracranial abnormality given mild motion degradation.      Unchanged chronic infarct in left parietotemporal lobe with mild chronic microangiopathy.      Unchanged small lobulated cutaneous lesion in left parietal scalp, indeterminate. Recommend direct visualization for further evaluation.      The study was marked in EPIC for immediate notification.                  Workstation performed: [id number]         XR chest 1 view portable   ED Interpretation by Jarrod Newman MD (10/23 1810)   Primary reviewed: No acute abnormality          ECG 12 Lead Documentation Only    Date/Time: 10/23/2024 5:15 PM    Performed by: Jarrod Newman MD  Authorized by: Jarrod Newman MD    ECG reviewed by me, the ED Provider: yes    Patient location:  ED  Rate:     ECG rate:  71    ECG rate assessment: normal    Rhythm:     Rhythm: paced    Pacing:     Type of pacing:  Ventricular  Ectopy:     Ectopy: PVCs      PVCs:  Infrequent      ED Medication and Procedure Management   Prior to Admission Medications    Prescriptions Last Dose Informant Patient Reported? Taking?   Ascorbic Acid (VITAMIN C) 500 MG CAPS  Outside Facility (Specify), Child Yes No   Sig: Take 500 mg by mouth daily   Cholecalciferol (Vitamin D3) 1.25 MG (30152 UT) CAPS  Outside Facility (Specify), Child Yes No   Sig: Take 2,000 Units by mouth daily 2,000U daily   Cranberry 450 MG CAPS  Outside Facility (Specify), Child Yes No   Sig: Take 1 capsule by mouth in the morning   Diclofenac Sodium (VOLTAREN) 1 %  Outside Facility (Specify), Child Yes No   Sig: Apply 2 g topically 3 (three) times a day as needed (pain)   Elastic Bandages & Supports (Medical Compression Stockings) MISC  Outside Facility (Specify), Child No No   Sig: Use in the morning   QUEtiapine (SEROquel) 25 mg tablet  Outside Facility (Specify), Child No No   Sig: Take 0.5 tablets (12.5 mg total) by mouth daily at bedtime Hold if sedated   Valerian 500 MG CAPS  Outside Facility (Specify), Child Yes No   Sig: Take by mouth in the morning   acetaminophen (TYLENOL) 500 mg tablet  Outside Facility (Specify), Child Yes No   Sig: Take 500 mg by mouth every 6 (six) hours as needed for mild pain   apixaban (ELIQUIS) 5 mg  Outside Facility (Specify), Child Yes No   Sig: Take 5 mg by mouth 2 (two) times a day   aspirin 81 mg chewable tablet  Outside Facility (Specify), Child No No   Sig: Chew 1 tablet (81 mg total) daily Do not start before February 18, 2023.   atorvastatin (LIPITOR) 40 mg tablet  Outside Facility (Specify), Child Yes No   Sig: Take 40 mg by mouth daily   bisacodyl (DULCOLAX) 5 mg EC tablet  Outside Facility (Specify), Child Yes No   Sig: Take 5 mg by mouth daily as needed for constipation   calcium carbonate (TUMS) 500 mg chewable tablet  Outside Facility (Specify), Child Yes No   Sig: Chew 1 tablet daily   docusate sodium (COLACE) 100 mg capsule  Outside Facility (Specify), Child Yes No   Sig: Take 100 mg by mouth 2 (two) times a day   escitalopram (LEXAPRO) 10 mg tablet  Child,  Outside Facility (Specify) Yes No   furosemide (LASIX) 40 mg tablet  Outside Facility (Specify), Child Yes No   Sig: Take 1 tablet by mouth daily   loperamide (IMODIUM) 2 mg capsule  Outside Facility (Specify), Child Yes No   nitroglycerin (NITROSTAT) 0.4 mg SL tablet  Child, Outside Facility (Specify) Yes No   Sig: Place 0.4 mg under the tongue every 5 (five) minutes as needed for chest pain   oxyCODONE (ROXICODONE) 5 immediate release tablet  Outside Facility (Specify), Child Yes No   Sig: Take 5 mg by mouth every 4 (four) hours as needed for moderate pain Take 1/2 tablet q6h as needed for pain   polyethylene glycol (MIRALAX) 17 g packet  Outside Facility (Specify), Child Yes No   Sig: Take 17 g by mouth daily   potassium chloride (K-DUR,KLOR-CON) 20 mEq tablet  Outside Facility (Specify), Child Yes No   Sig: Take 20 mEq by mouth 2 (two) times a day   thiamine (VITAMIN B1) 100 mg tablet  Outside Facility (Specify), Child Yes No   zonisamide (ZONEGRAN) 100 mg capsule  Outside Facility (Specify), Child No No   Sig: Take 1 capsule (100 mg total) by mouth daily Do not start before November 19, 2022.      Facility-Administered Medications: None     Patient's Medications   Discharge Prescriptions    No medications on file     No discharge procedures on file.  ED SEPSIS DOCUMENTATION   Time reflects when diagnosis was documented in both MDM as applicable and the Disposition within this note       Time User Action Codes Description Comment    10/23/2024  8:35 PM Jarrod Newman Add [G93.40] Acute encephalopathy     10/23/2024  8:35 PM Jarrod Newman Add [D32.9] Meningioma ([facility])     10/23/2024  8:35 PM Jarrod Newman Add [I10] Hypertension     10/23/2024  8:35 PM Jarrod Newman Add [I50.9] CHF (congestive heart failure) ([facility])     10/23/2024  8:36 PM Jarrod Newman Add [Z95.0] Pacemaker     10/23/2024  8:40 PM Jarrod Newman Remove [D32.9] Meningioma ([facility])     10/23/2024  8:40 PM Jarrod Newman Add [Z86.73] Hx of completed stroke                   Jarrod Newman MD  10/23/24 2037       Jarrod Newman MD  10/23/24 2040

## 2024-10-24 PROBLEM — Z71.89 ADVANCED CARE PLANNING/COUNSELING DISCUSSION: Status: ACTIVE | Noted: 2024-10-24

## 2024-10-24 PROBLEM — D50.9 MICROCYTIC ANEMIA: Status: ACTIVE | Noted: 2024-10-24

## 2024-10-24 PROBLEM — H10.9 BACTERIAL CONJUNCTIVITIS OF LEFT EYE: Status: ACTIVE | Noted: 2024-10-24

## 2024-10-24 LAB
AMMONIA PLAS-SCNC: 23 UMOL/L (ref 18–72)
ANION GAP SERPL CALCULATED.3IONS-SCNC: 4 MMOL/L (ref 4–13)
ATRIAL RATE: 174 BPM
ATRIAL RATE: 59 BPM
BASOPHILS # BLD AUTO: 0.05 THOUSANDS/ΜL (ref 0–0.1)
BASOPHILS NFR BLD AUTO: 1 % (ref 0–1)
BUN SERPL-MCNC: 15 MG/DL (ref 5–25)
CALCIUM SERPL-MCNC: 8.9 MG/DL (ref 8.4–10.2)
CHLORIDE SERPL-SCNC: 112 MMOL/L (ref 96–108)
CO2 SERPL-SCNC: 28 MMOL/L (ref 21–32)
CREAT SERPL-MCNC: 0.97 MG/DL (ref 0.6–1.3)
EOSINOPHIL # BLD AUTO: 0.1 THOUSAND/ΜL (ref 0–0.61)
EOSINOPHIL NFR BLD AUTO: 2 % (ref 0–6)
ERYTHROCYTE [DISTWIDTH] IN BLOOD BY AUTOMATED COUNT: 19.8 % (ref 11.6–15.1)
FOLATE SERPL-MCNC: 15.6 NG/ML
GFR SERPL CREATININE-BSD FRML MDRD: 52 ML/MIN/1.73SQ M
GLUCOSE SERPL-MCNC: 99 MG/DL (ref 65–140)
HCT VFR BLD AUTO: 25.4 % (ref 34.8–46.1)
HCT VFR BLD AUTO: 26.4 % (ref 34.8–46.1)
HGB BLD-MCNC: 7.9 G/DL (ref 11.5–15.4)
HGB BLD-MCNC: 8.2 G/DL (ref 11.5–15.4)
IMM GRANULOCYTES # BLD AUTO: 0.02 THOUSAND/UL (ref 0–0.2)
IMM GRANULOCYTES NFR BLD AUTO: 0 % (ref 0–2)
IRON SATN MFR SERPL: 7 % (ref 15–50)
IRON SERPL-MCNC: 32 UG/DL (ref 50–212)
LYMPHOCYTES # BLD AUTO: 1.16 THOUSANDS/ΜL (ref 0.6–4.47)
LYMPHOCYTES NFR BLD AUTO: 18 % (ref 14–44)
MAGNESIUM SERPL-MCNC: 2.8 MG/DL (ref 1.9–2.7)
MCH RBC QN AUTO: 23.2 PG (ref 26.8–34.3)
MCHC RBC AUTO-ENTMCNC: 31.1 G/DL (ref 31.4–37.4)
MCV RBC AUTO: 75 FL (ref 82–98)
MONOCYTES # BLD AUTO: 0.77 THOUSAND/ΜL (ref 0.17–1.22)
MONOCYTES NFR BLD AUTO: 12 % (ref 4–12)
NEUTROPHILS # BLD AUTO: 4.36 THOUSANDS/ΜL (ref 1.85–7.62)
NEUTS SEG NFR BLD AUTO: 67 % (ref 43–75)
NRBC BLD AUTO-RTO: 0 /100 WBCS
P AXIS: 190 DEGREES
PLATELET # BLD AUTO: 158 THOUSANDS/UL (ref 149–390)
PMV BLD AUTO: 11 FL (ref 8.9–12.7)
POTASSIUM SERPL-SCNC: 3.6 MMOL/L (ref 3.5–5.3)
PR INTERVAL: 344 MS
QRS AXIS: 76 DEGREES
QRS AXIS: 99 DEGREES
QRSD INTERVAL: 136 MS
QRSD INTERVAL: 136 MS
QT INTERVAL: 430 MS
QT INTERVAL: 482 MS
QTC INTERVAL: 425 MS
QTC INTERVAL: 477 MS
RBC # BLD AUTO: 3.4 MILLION/UL (ref 3.81–5.12)
SODIUM SERPL-SCNC: 144 MMOL/L (ref 135–147)
T WAVE AXIS: 143 DEGREES
T WAVE AXIS: 190 DEGREES
TIBC SERPL-MCNC: 436 UG/DL (ref 250–450)
TSH SERPL DL<=0.05 MIU/L-ACNC: 1.38 UIU/ML (ref 0.45–4.5)
UIBC SERPL-MCNC: 404 UG/DL (ref 155–355)
VENTRICULAR RATE: 59 BPM
VENTRICULAR RATE: 59 BPM
VIT B12 SERPL-MCNC: 261 PG/ML (ref 180–914)
WBC # BLD AUTO: 6.46 THOUSAND/UL (ref 4.31–10.16)

## 2024-10-24 PROCEDURE — 82746 ASSAY OF FOLIC ACID SERUM: CPT

## 2024-10-24 PROCEDURE — 97163 PT EVAL HIGH COMPLEX 45 MIN: CPT

## 2024-10-24 PROCEDURE — 99232 SBSQ HOSP IP/OBS MODERATE 35: CPT

## 2024-10-24 PROCEDURE — 99222 1ST HOSP IP/OBS MODERATE 55: CPT | Performed by: INTERNAL MEDICINE

## 2024-10-24 PROCEDURE — 85014 HEMATOCRIT: CPT

## 2024-10-24 PROCEDURE — 84443 ASSAY THYROID STIM HORMONE: CPT

## 2024-10-24 PROCEDURE — 80048 BASIC METABOLIC PNL TOTAL CA: CPT

## 2024-10-24 PROCEDURE — 83735 ASSAY OF MAGNESIUM: CPT

## 2024-10-24 PROCEDURE — 93010 ELECTROCARDIOGRAM REPORT: CPT | Performed by: INTERNAL MEDICINE

## 2024-10-24 PROCEDURE — 82140 ASSAY OF AMMONIA: CPT

## 2024-10-24 PROCEDURE — 85018 HEMOGLOBIN: CPT

## 2024-10-24 PROCEDURE — 97167 OT EVAL HIGH COMPLEX 60 MIN: CPT

## 2024-10-24 PROCEDURE — 85025 COMPLETE CBC W/AUTO DIFF WBC: CPT

## 2024-10-24 PROCEDURE — 92610 EVALUATE SWALLOWING FUNCTION: CPT

## 2024-10-24 RX ORDER — OFLOXACIN 3 MG/ML
1 SOLUTION/ DROPS OPHTHALMIC 4 TIMES DAILY
Status: DISCONTINUED | OUTPATIENT
Start: 2024-10-24 | End: 2024-10-31 | Stop reason: HOSPADM

## 2024-10-24 RX ADMIN — THIAMINE HCL TAB 100 MG 100 MG: 100 TAB at 09:00

## 2024-10-24 RX ADMIN — OFLOXACIN 1 DROP: 3 SOLUTION/ DROPS OPHTHALMIC at 09:10

## 2024-10-24 RX ADMIN — OFLOXACIN 1 DROP: 3 SOLUTION/ DROPS OPHTHALMIC at 12:44

## 2024-10-24 RX ADMIN — FUROSEMIDE 50 MG: 10 INJECTION, SOLUTION INTRAVENOUS at 17:17

## 2024-10-24 RX ADMIN — OFLOXACIN 1 DROP: 3 SOLUTION/ DROPS OPHTHALMIC at 17:18

## 2024-10-24 RX ADMIN — ATORVASTATIN CALCIUM 40 MG: 40 TABLET, FILM COATED ORAL at 17:17

## 2024-10-24 RX ADMIN — FUROSEMIDE 50 MG: 10 INJECTION, SOLUTION INTRAVENOUS at 09:00

## 2024-10-24 RX ADMIN — SENNOSIDES 8.6 MG: 8.6 TABLET, FILM COATED ORAL at 09:00

## 2024-10-24 RX ADMIN — POTASSIUM CHLORIDE 20 MEQ: 1500 TABLET, EXTENDED RELEASE ORAL at 09:00

## 2024-10-24 RX ADMIN — APIXABAN 5 MG: 5 TABLET, FILM COATED ORAL at 17:17

## 2024-10-24 RX ADMIN — POLYETHYLENE GLYCOL 3350 17 G: 17 POWDER, FOR SOLUTION ORAL at 09:00

## 2024-10-24 RX ADMIN — ZONISAMIDE 100 MG: 100 CAPSULE ORAL at 09:01

## 2024-10-24 RX ADMIN — Medication 1000 UNITS: at 09:00

## 2024-10-24 RX ADMIN — ESCITALOPRAM OXALATE 10 MG: 10 TABLET ORAL at 09:00

## 2024-10-24 RX ADMIN — APIXABAN 5 MG: 5 TABLET, FILM COATED ORAL at 09:00

## 2024-10-24 RX ADMIN — POTASSIUM CHLORIDE 20 MEQ: 1500 TABLET, EXTENDED RELEASE ORAL at 17:16

## 2024-10-24 RX ADMIN — OFLOXACIN 1 DROP: 3 SOLUTION/ DROPS OPHTHALMIC at 21:08

## 2024-10-24 RX ADMIN — MAGNESIUM SULFATE HEPTAHYDRATE 2 G: 40 INJECTION, SOLUTION INTRAVENOUS at 00:01

## 2024-10-24 RX ADMIN — QUETIAPINE FUMARATE 12.5 MG: 25 TABLET ORAL at 21:08

## 2024-10-24 RX ADMIN — ASPIRIN 81 MG CHEWABLE TABLET 81 MG: 81 TABLET CHEWABLE at 09:00

## 2024-10-24 NOTE — ASSESSMENT & PLAN NOTE
Lab Results   Component Value Date    EGFR 55 10/23/2024    EGFR 63 06/19/2024    EGFR 54 05/30/2024    CREATININE 0.93 10/23/2024    CREATININE 0.89 06/19/2024    CREATININE 0.95 05/30/2024

## 2024-10-24 NOTE — PLAN OF CARE
Problem: Potential for Falls  Goal: Patient will remain free of falls  Description: INTERVENTIONS:  - Educate patient/family on patient safety including physical limitations  - Instruct patient to call for assistance with activity   - Consult OT/PT to assist with strengthening/mobility   - Keep Call bell within reach  - Keep bed low and locked with side rails adjusted as appropriate  - Keep care items and personal belongings within reach  - Initiate and maintain comfort rounds  - Make Fall Risk Sign visible to staff  - Offer Toileting every 2 Hours, in advance of need  - Initiate/Maintain bed alarm  - Obtain necessary fall risk management equipment: alarms  - Apply yellow socks and bracelet for high fall risk patients  - Consider moving patient to room near nurses station  Outcome: Progressing

## 2024-10-24 NOTE — SPEECH THERAPY NOTE
Speech Language/Pathology  Speech/Language Pathology  Assessment    Patient Name: Eusebio Tate  Today's Date: 10/24/2024     Problem List  Principal Problem:    Acute on chronic diastolic heart failure (HCC)  Active Problems:    Age-related osteoporosis without fracture    Benign essential hypertension    Permanent atrial fibrillation (HCC)    Acute metabolic encephalopathy    Post-stroke epilepsy    History of stroke    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 behavioral disturbance, psychotic disturbance, mood disturbance, or anxiety ([facility])    Stage 3b chronic kidney disease ([facility])    Microcytic anemia    Bacterial conjunctivitis of left eye    Past Medical History  Past Medical History:   Diagnosis Date    A-fib ([facility])     CAD (coronary artery disease)     CHF (congestive heart failure) ([facility])     CKD (chronic kidney disease) stage 2, GFR 60-89 ml/min     Dementia ([facility])     Hypertension     Hypokalemia     Knee effusion     Memory loss     Seizure ([facility])     Urinary tract infection      Past Surgical History  Past Surgical History:   Procedure Laterality Date    CARDIAC ELECTROPHYSIOLOGY PROCEDURE N/A 1/26/2023    Procedure: Cardiac pacer implant;  Surgeon: Francisco Georges DO;  [facility];  Service: Cardiology    CHOLECYSTECTOMY      REPLACEMENT TOTAL KNEE Left 2008        Bedside Swallow Evaluation:    Summary:  Reportedly had oatmeal for breakfast w/ assistance. No difficulty observed. Pt presented sleeping but alerted briefly. Agreeable to only drinking. Refused solids multiple times. Took multiple consecutive sips of lemonade w/ prompt transfer and swallow. No cough or wet vocal quality. O2 sats maintained at 96 on RA. Limited eval. No oral/pharyngeal s/s w/ liquids. Last seen by me 2/16/23 at which time she was tolerating a regular diet. Regular diet at [facility].    Recommendations:  Diet:as tolerated. Currently ordered regular  Liquid:thin  Meds:as tolerated  Supervision: full for  now, assist/feed as needed  Positioning:Upright  Pt to take PO/Meds only when fully alert and upright.   Oral care  Aspiration precautions  Reflux precautions  Therapy Prognosis: suspect favorable, close to baseline  Frequency: ? 1-2 f/u visits    Consider consult w/:  Nutrition  Rehab    Goal(s):  Dysphagia LTG  -Patient will demonstrate safe and effective oral intake (without overt s/s significant oral/pharyngeal dysphagia including s/s penetration or aspiration) for the highest appropriate diet level.     1.Pt will tolerate least restrictive diet w/out s/s aspiration or oral/pharyngeal difficulties.   2.Pt will will effectively manipulate/masticate and transfer purees/solids w/out s/s dysphagia/aspiration.   3.Pt will tolerate thin liquids w/out s/s aspiration.   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  Altered mental status started today.  Of note patient received multiple vaccines yesterday.  Patient found encephalopathic at nursing home and brought to the ED.  Patient found to have increased work of breathing.  Unable to obtain history as baseline patient has MCA strokes and Alzheimer, has been disoriented x 3 for past 2 years and unable to form full sentences per family members.  In ED cardiac workup showed initially elevated troponin  of 71 with a delta of -12 EKG was nonischemic.  Chest x-ray significant for volume overload with significant pulmonary vascular congestion, no pleural effusion discerned compared to September 2023 on my read.  CT head and UA were WNL. Additionally new hemoglobin dropped from 11.5 (5/2024) - 10.6 (6/2024) - 9.1.  MCV has been dropping from 90s 1 year ago to 75 on today's CBC.   Patient will be admitted for heart failure exacerbation and increased work of breathing.     Special Studies:  CT head 10/23:  No acute intracranial abnormality given mild motion degradation.  Unchanged chronic infarct in left parietotemporal lobe with mild chronic microangiopathy.  Unchanged small lobulated cutaneous lesion in left parietal scalp, indeterminate. Recommend direct visualization for further evaluation.  Cxr 10/23:  Cardiomegaly with mild vascular interstitial prominence suggesting mild volume overload. Clear lungs.     Procalcitonin<=0.25ng/ml    WBC  4.31-10.16 Thousand/uL   6.46     Nitrites, UA  Neg 10/23   Leukocytes, UA  Neg 10/23     Previous MBS:  No barium studies or egd in epic    Patient's goal: wanted to sleep, not eat    Did the pt report pain? no  If yes, was nursing notified/was it addressed? N/a    Reason for consult:  R/o aspiration  Determine safest and least restrictive diet  respiratory compromise  H/o dementia    Precautions:  Contact  Fall    Food Allergies: nkfa   Current Diet:  Regular and thin   Premorbid diet:  Same, + Regular and thin when last seen here 2/16/23   O2 requirement:  RA   Social/[facility]  [facility]   Voice/Speech: Minimal speech was clear   Follows commands:  A few   Cognitive status:  Alerted but wanted to sleep     Oral [facility] exam:  Dentition: natural  Lips (VII):grossly +  Tongue (XII):grossly +  Secretion management:+    Esophageal stage:  No s/s reported    Results d/w:  Pt, nursing

## 2024-10-24 NOTE — ASSESSMENT & PLAN NOTE
Wt Readings from Last 3 Encounters:   10/24/24 88.6 kg (195 lb 5.2 oz)   05/30/24 87.5 kg (192 lb 14.4 oz)   05/02/23 78.6 kg (173 lb 3.2 oz)     Patient takes Lasix 40 mg daily.  Recently gained about 4 pounds at nursing facility where they were giving her extra doses of Lasix.      Chest x-ray with venous congestion.  Patient received 40 mg IV Lasix in the ED.  On initial presentation patient with increased work of breathing, now breathing even and non labored.     Received lasix 20mg last night  Will start lasix 50mg BID for diuresis, symptoms improving  Monitor BMP  Daily I&O, weights  Straight cath for urine  Last Echo 12/223  EF 55-60%

## 2024-10-24 NOTE — ASSESSMENT & PLAN NOTE
Acute metabolic encephalopathy since this morning.  Patient found to be more tired than usual.  At baseline she is alert and oriented x 0, unable to perform full sentences for past 2 years.  Family states that patient is back to her own baseline at the time of evaluation.  - Check ammonia, B12, TSH  -Follow-up on mentation, appears to be back at baseline

## 2024-10-24 NOTE — CONSULTS
Consultation - Palliative Care   Name: Eusebio Tate 89 y.o. female I MRN: [id number]  Unit/Bed#: [place] [address] -01 I Date of Admission: 10/23/2024   Date of Service: 10/24/2024 I Hospital Day: 1   Inpatient consult to Palliative Care  Consult performed by: Isela Romero MD  Consult ordered by: Emilie Soyeon Kim, MD        Physician Requesting Evaluation: Adrianna Corrigan *   Reason for Evaluation / Principal Problem: goals of care/patient's family's request    Assessment & Plan  Acute on chronic diastolic heart failure (HCC)  Wt Readings from Last 3 Encounters:   10/24/24 88.6 kg (195 lb 5.2 oz)   05/30/24 87.5 kg (192 lb 14.4 oz)   05/02/23 78.6 kg (173 lb 3.2 oz)     Recent clinical decline per the family appears more from the CHF perspective than from the dementia   She has been more dyspneic with minimal exertion lately, especially so in the last month.   She has been using the wheelchair more now because of this.   Per family, also seems to be with more fluid lately - more leg swelling and a month ago, CXR was showing volume overload and was given extra lasix.   This is her 1st hospital stay for CHF exacerbation in the last 6 months. Had one ED visit in 5/2024 for covid/flu like symptoms. She had a 2 day hospital admission in 3/2024 for AMS thought from cellulitis.   Acute metabolic encephalopathy  May be close to baseline now  History of stroke  Has residual expressive aphasia  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 behavioral disturbance, psychotic disturbance, mood disturbance, or anxiety (HCC)  Alzheimer's dementia diagnosis came first before the stroke  She's been at the locked dementia unit at [facility] for 2 years  She requires assistance in almost all ADLs except for feeding (able to feed herself). She is most of the times incontinent of urine. She is now using the wheelchair more in the last month, but this sounds like its more because of SOB. Was able to navigate using a walker  prior to this. Able to make needs known. Able to eat with no issues. Not losing weight.   Advanced care planning/counseling discussion  Patient lacks capacity to make complex medical choices on exam today  She does not have a POA paperwork. Discussed [place] Act 169 to the children as it pertains to hierarchy in surrogate medical decision making and it will be all 3 adult children. They both said they are on the same page when it comes to their mother's medical affairs.   Discussed GOC with patient's son/Matt and daughter/Nallely who are both present at bedside. Present as well was Nallely's   Discussed the difference between palliative care (this is for patients who continue to seek life prolonging treatments from different disciplines and specialties especially for illnesses that are considered incurable but treatable. This includes seeking hospital cares for any serious/emergent conditions) versus hospice cares (purely for end of life cares where the focus is solely for comfort from symptoms of worsening illnesses. There will be cessation of any interventions meant for indefinite life prolongation, including further re-hospitalizations except for some very special circumstances).  At this time, they do not appear to be ready for end of life cares. They are seeking continued palliative care in the facility.   At this point in time, I cannot fully recommend hospice anyway as she appears to be stable from the dementia perspective and this is her first CHF exacerbation in the last 6-12 months. She is not losing weight, she can still eat with no reports of dysphagia.  I did encourage a transition to hospice, however, should there be any further decline in the future.   There is no need nor is it recommended to follow up in the office for palliative medicine to continue GOC discussion for this lady who lives in a locked dementia unit. The facility should be more than capable of handling GOC with no needs to make  transportation arrangements for this patient.   D/w SL  Palliative Medicine will sign off     Palliative Care service signing off.    Decisional apparatus: Patient is not competent on my exam today. If competence is lost, patient's substitute decision maker would default to all 3 adult children by [place] Act 169.     PDMP Review: I have reviewed the patient's controlled substance dispensing history in the Prescription Drug Monitoring Program in compliance with the [facility] regulations before prescribing any controlled substances.    History of Present Illness   HPI: Eusebio Tate is a 89 y.o. year old female with Afib on Eliquis, CAD, cHFpEF, CKD, Hx of L MCA CVA with residual expressive aphasia, late onset Alzheimer's dementia, who was admitted on 10/23 from a locked dementia unit due to altered mental status. On presentation, was also showing increased work of breathing. Work up showed that she is in volume overload with CXR showing significant pulmonary vascular congestion. CT head with No acute intracranial abnormality. She is currently receiving supportive cares for acute CHF exacerbation. Palliative Medicine for GOC/family request.    Saw patient in her room. She was awake but did not say anything. She then went to sleep and was asleep the entire time I was in the room. Discussion was had with her 2 children and REEMA at bedside. Details as noted above.     Review of Systems   Unable to perform ROS: Patient nonverbal     I have reviewed the patient's PMH, PSH, Social History, Family History, Meds, and Allergies  Historical Information   Past Medical History:   Diagnosis Date    A-fib (HCC)     CAD (coronary artery disease)     CHF (congestive heart failure) (HCC)     CKD (chronic kidney disease) stage 2, GFR 60-89 ml/min     Dementia (HCC)     Hypertension     Hypokalemia     Knee effusion     Memory loss     Seizure (HCC)     Urinary tract infection      Past Surgical History:   Procedure Laterality Date    CARDIAC  ELECTROPHYSIOLOGY PROCEDURE N/A 1/26/2023    Procedure: Cardiac pacer implant;  Surgeon: Francisco Georges DO;  Location: [facility];  Service: Cardiology    CHOLECYSTECTOMY      REPLACEMENT TOTAL KNEE Left 2008     Social History     Tobacco Use    Smoking status: Never     Passive exposure: Never    Smokeless tobacco: Never   Vaping Use    Vaping status: Never Used   Substance and Sexual Activity    Alcohol use: Never    Drug use: Never    Sexual activity: Not Currently     Partners: Male     Birth control/protection: None     E-Cigarette/Vaping    E-Cigarette Use Never User      E-Cigarette/Vaping Substances    Nicotine No     THC No     CBD No     Flavoring No     Other No     Unknown No          Objective :  Temp:  [97.7 °F (36.5 °C)-100 °F (37.8 °C)] 97.9 °F (36.6 °C)  HR:  [58-65] 61  BP: (115-157)/() 140/113  Resp:  [18-38] 20  SpO2:  [93 %-98 %] 95 %  O2 Device: Nasal cannula  Nasal Cannula O2 Flow Rate (L/min):  [2 L/min] 2 L/min    Physical Exam  Constitutional:       Comments: Awake, sleepy, comfortable, chronically ill looking but not toxic appearing, comfortable    HENT:      Head: Normocephalic and atraumatic.   Eyes:      General: No scleral icterus.        Right eye: No discharge.         Left eye: No discharge.   Pulmonary:      Effort: Pulmonary effort is normal. No respiratory distress.      Comments: On RA  Abdominal:      General: Abdomen is flat. There is no distension.   Musculoskeletal:         General: Swelling present.   Skin:     General: Skin is warm and dry.      Coloration: Skin is not jaundiced or pale.   Neurological:      Comments: Acknowledged my presence by looking at me but mostly nonverbal did not provide any meaningful history, went back to sleep   Psychiatric:         Behavior: Behavior is not agitated.          Lab Results: I have reviewed the following results:  Lab Results   Component Value Date/Time    SODIUM 144 10/24/2024 04:50 AM    SODIUM 144 06/19/2024  08:50 AM    K 3.6 10/24/2024 04:50 AM    K 4.2 06/19/2024 08:50 AM    BUN 15 10/24/2024 04:50 AM    BUN 21 06/19/2024 08:50 AM    CREATININE 0.97 10/24/2024 04:50 AM    CREATININE 0.89 06/19/2024 08:50 AM    GLUC 99 10/24/2024 04:50 AM    GLUC 78 06/19/2024 08:50 AM    CALCIUM 8.9 10/24/2024 04:50 AM    CALCIUM 9.0 06/19/2024 08:50 AM    AST 21 10/23/2024 05:09 PM    AST 25 06/19/2024 08:50 AM    ALT 15 10/23/2024 05:09 PM    ALT 21 06/19/2024 08:50 AM    ALB 4.0 10/23/2024 05:09 PM    ALB 3.5 06/19/2024 08:50 AM    ALB 2.8 (L) 07/20/2022 06:33 AM    TP 7.4 10/23/2024 05:09 PM    TP 6.2 (L) 06/19/2024 08:50 AM    EGFR 52 10/24/2024 04:50 AM    EGFR 63 06/19/2024 08:50 AM    EGFR 87 07/20/2022 06:33 AM    EGFR 59 (L) 12/19/2018 08:26 AM     Lab Results   Component Value Date/Time    HGB 7.9 (L) 10/24/2024 04:50 AM    WBC 6.46 10/24/2024 04:50 AM     10/24/2024 04:50 AM    INR 1.49 (H) 05/30/2024 04:54 PM    INR 1.4 03/25/2024 06:23 PM    PTT 37 05/30/2024 04:54 PM     Lab Results   Component Value Date/Time    CUV8MGBHLTDG 1.384 10/24/2024 04:50 AM       Imaging Results Review: I reviewed radiology reports from this admission including: chest xray and CT head.  Other Study Results Review: No additional pertinent studies reviewed.    Code Status: Level 3 - DNAR and DNI  Advance Directive and Living Will:      Power of : Yes  POLST:      Administrative Statements   I have spent a total time of 60 minutes in caring for this patient on the day of the visit/encounter including Diagnostic results, Prognosis, Risks and benefits of tx options, Instructions for management, Patient and family education, Importance of tx compliance, Risk factor reductions, Impressions, Counseling / Coordination of care, Documenting in the medical record, Reviewing / ordering tests, medicine, procedures  , Obtaining or reviewing history  , and Communicating with other healthcare professionals . Topics discussed with the patient /  family include advanced directives, goals of care, hospice services, supportive listening, and anticipatory guidance.    Isela Romero MD  Palliative Medicine & Supportive Care  Internal Medicine  Available via [place] [employer]  Office: [phone]  Fax: [phone]

## 2024-10-24 NOTE — PROGRESS NOTES
Progress Note - Hospitalist   Name: Eusebio Tate 89 y.o. female I MRN: [id number]  Unit/Bed#: [place] [address] -01 I Date of Admission: 10/23/2024   Date of Service: 10/24/2024 I Hospital Day: 1    Assessment & Plan  Acute on chronic diastolic heart failure (HCC)  Wt Readings from Last 3 Encounters:   10/24/24 88.6 kg (195 lb 5.2 oz)   05/30/24 87.5 kg (192 lb 14.4 oz)   05/02/23 78.6 kg (173 lb 3.2 oz)     Patient takes Lasix 40 mg daily.  Recently gained about 4 pounds at nursing facility where they were giving her extra doses of Lasix.      Chest x-ray with venous congestion.  Patient received 40 mg IV Lasix in the ED.  On initial presentation patient with increased work of breathing, now breathing even and non labored.     Received lasix 20mg last night  Will start lasix 50mg BID for diuresis, symptoms improving  Monitor BMP  Daily I&O, weights  Straight cath for urine  Last Echo 12/223  EF 55-60%  Age-related osteoporosis without fracture  Continue on calcium and vitamin D and zonisamide 100 mg daily   Benign essential hypertension  Monitor  Continue lasix  Permanent atrial fibrillation (HCC)  A-fib-patient is rate controlled on Eliquis for anticoagulation   Acute metabolic encephalopathy  Acute metabolic encephalopathy since on admission.  Patient found to be more tired than usual.  At baseline she is alert and oriented x 0, unable to perform full sentences for past 2 years.  Family states that patient is back to her own baseline at the time of evaluation.  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 No POA but 3 children who  make decisions and would like to know about palliative and hospice.     - Palliative care following, appreciate input  Family considering hospice care vs palliative care  Case management to assist with dispo  Stage 3b chronic kidney disease (HCC)  Lab Results   Component Value Date    EGFR 52 10/24/2024    EGFR 55 10/23/2024    EGFR 63 06/19/2024    CREATININE 0.97 10/24/2024    CREATININE 0.93 10/23/2024    CREATININE 0.89 06/19/2024     Microcytic anemia  Lab Results   Component Value Date    HGB 7.9 (L) 10/24/2024    HGB 9.1 (L) 10/23/2024    HGB 11.5 05/30/2024   Appears to be microcytic anemia gradually decreasing over the span of 7 months from baseline of hemoglobin 11 which has been steadily coming down since June 2024.  No recent surgery since May 2024 to explain loss of blood.  No colonoscopy in chart.        Iron deficiency panel pending, TSH, vitamin deficiencies, rule out hemolysis with haptoglobin and LDH.    Transfuse for hgb <7  In setting of elderly with new onset anemia --May need evaluation of the GI tract based on shared decision making whether patient would like to pursue procedure or not outpatient (given poor functionality in dementia patient; significant difficulty with prep    Bacterial conjunctivitis of left eye  Purulent crusting of left eye.  Continue Ofloxacin drops    VTE Pharmacologic Prophylaxis: VTE Score: 5 High Risk (Score >/= 5) - Pharmacological DVT Prophylaxis Ordered: apixaban (Eliquis). Sequential Compression Devices Ordered.    Mobility:   Basic Mobility Inpatient Raw Score: 8  JH-HLM Goal: 3: Sit at edge of bed  JH-HLM Achieved: 1: Laying in bed  JH-HLM Goal NOT achieved. Continue with multidisciplinary rounding and encourage appropriate mobility to improve upon JH-HLM goals.    Patient Centered Rounds: I performed bedside rounds with nursing staff today.   Discussions with Specialists or Other Care Team Provider:   Inpatient Palliative care  Case Management    Education and  Discussions with Family / Patient: Updated  (son and daughter) at bedside.    Current Length of Stay: 1 day(s)  Current Patient Status: Inpatient   Certification Statement: The patient will continue to require additional inpatient hospital stay due to need for IV diuresis  Discharge Plan: Anticipate discharge in 24-48 hrs to rehab facility.    Code Status: Level 3 - DNAR and DNI    Subjective   Patient seen and examined resting comfortably in bed, breathing even and non labored. Family at the bedside    Objective :  Temp:  [97.7 °F (36.5 °C)-100 °F (37.8 °C)] 97.9 °F (36.6 °C)  HR:  [58-65] 61  BP: (115-157)/() 140/113  Resp:  [18-38] 20  SpO2:  [93 %-98 %] 95 %  O2 Device: Nasal cannula  Nasal Cannula O2 Flow Rate (L/min):  [2 L/min] 2 L/min    Body mass index is 35.73 kg/m².     Input and Output Summary (last 24 hours):     Intake/Output Summary (Last 24 hours) at 10/24/2024 1202  Last data filed at 10/24/2024 0903  Gross per 24 hour   Intake 480 ml   Output 800 ml   Net -320 ml       Physical Exam  Vitals and nursing note reviewed.   Constitutional:       General: She is sleeping. She is not in acute distress.     Appearance: Normal appearance.           Lines/Drains:  Lines/Drains/Airways       Active Status       Name Placement date Placement time Site Days    External Urinary Catheter 10/23/24  2330  -- less than 1                      Telemetry:  Telemetry Orders (From admission, onward)               24 Hour Telemetry Monitoring  (ED Bridging Orders Panel)  Continuous x 24 Hours (Telem)        Question:  Reason for 24 Hour Telemetry  Answer:  Decompensated CHF- and any one of the following: continuous diuretic infusion or total diuretic dose >200 mg daily, associated electrolyte derangement (I.e. K < 3.0), ionotropic drip (continuous infusion), hx of ventricular arrhythmia, or new EF < 35%                     Telemetry Reviewed:  paced rhythm   Indication for Continued Telemetry Use:  Arrthymias requiring medical therapy               Lab Results: I have reviewed the following results:   Results from last 7 days   Lab Units 10/24/24  0450   WBC Thousand/uL 6.46   HEMOGLOBIN g/dL 7.9*   HEMATOCRIT % 25.4*   PLATELETS Thousands/uL 158   SEGS PCT % 67   LYMPHO PCT % 18   MONO PCT % 12   EOS PCT % 2     Results from last 7 days   Lab Units 10/24/24  0450 10/23/24  1709   SODIUM mmol/L 144 142   POTASSIUM mmol/L 3.6 3.7   CHLORIDE mmol/L 112* 110*   CO2 mmol/L 28 25   BUN mg/dL 15 15   CREATININE mg/dL 0.97 0.93   ANION GAP mmol/L 4 7   CALCIUM mg/dL 8.9 9.7   ALBUMIN g/dL  --  4.0   TOTAL BILIRUBIN mg/dL  --  1.06*   ALK PHOS U/L  --  93   ALT U/L  --  15   AST U/L  --  21   GLUCOSE RANDOM mg/dL 99 107         Results from last 7 days   Lab Units 10/23/24  1648   POC GLUCOSE mg/dl 110         Results from last 7 days   Lab Units 10/23/24  1709   LACTIC ACID mmol/L 1.7       Recent Cultures (last 7 days):         Imaging Results Review: No pertinent imaging studies reviewed.  Other Study Results Review: No additional pertinent studies reviewed.    Last 24 Hours Medication List:     Current Facility-Administered Medications:     acetaminophen (TYLENOL) tablet 650 mg, Q4H PRN    apixaban (ELIQUIS) tablet 5 mg, BID    aspirin chewable tablet 81 mg, Daily    atorvastatin (LIPITOR) tablet 40 mg, Daily With Dinner    Cholecalciferol (VITAMIN D3) tablet 1,000 Units, Daily    escitalopram (LEXAPRO) tablet 10 mg, Daily    furosemide (LASIX) injection 50 mg, BID    HYDROmorphone HCl (DILAUDID) injection 0.2 mg, Q4H PRN    ofloxacin (OCUFLOX) 0.3 % ophthalmic solution 1 drop, 4x Daily    ondansetron (ZOFRAN) injection 4 mg, Q6H PRN    polyethylene glycol (MIRALAX) packet 17 g, Daily    potassium chloride (Klor-Con M20) CR tablet 20 mEq, BID    QUEtiapine (SEROquel) tablet 12.5 mg, HS    senna (SENOKOT) tablet 8.6 mg, Daily    thiamine tablet 100 mg, Daily    zonisamide (ZONEGRAN) capsule 100 mg,  Daily    Administrative Statements   Today, Patient Was Seen By: CRISTO Richardson  I have spent a total time of 31 minutes in caring for this patient on the day of the visit/encounter including Prognosis, Risks and benefits of tx options, Instructions for management, Patient and family education, Importance of tx compliance, Risk factor reductions, Impressions, Counseling / Coordination of care, Documenting in the medical record, Reviewing / ordering tests, medicine, procedures  , Obtaining or reviewing history  , and Communicating with other healthcare professionals .    **Please Note: This note may have been constructed using a voice recognition system.**

## 2024-10-24 NOTE — OCCUPATIONAL THERAPY NOTE
Occupational Therapy Evaluation     Patient Name: Eusebio Tate  Today's Date: 10/24/2024  Problem List  Principal Problem:    Acute on chronic diastolic heart failure (HCC)  Active Problems:    Age-related osteoporosis without fracture    Benign essential hypertension    Permanent atrial fibrillation (HCC)    Acute metabolic encephalopathy    Post-stroke epilepsy    History of stroke    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 behavioral disturbance, psychotic disturbance, mood disturbance, or anxiety (HCC)    Stage 3b chronic kidney disease (HCC)    Microcytic anemia    Bacterial conjunctivitis of left eye    Past Medical History  Past Medical History:   Diagnosis Date    A-fib (HCC)     CAD (coronary artery disease)     CHF (congestive heart failure) (HCC)     CKD (chronic kidney disease) stage 2, GFR 60-89 ml/min     Dementia (HCC)     Hypertension     Hypokalemia     Knee effusion     Memory loss     Seizure (HCC)     Urinary tract infection      Past Surgical History  Past Surgical History:   Procedure Laterality Date    CARDIAC ELECTROPHYSIOLOGY PROCEDURE N/A 1/26/2023    Procedure: Cardiac pacer implant;  Surgeon: Francisco Georges DO;  Location: [facility];  Service: Cardiology    CHOLECYSTECTOMY      REPLACEMENT TOTAL KNEE Left 2008         10/24/24 1016   OT Last Visit   OT Visit Date 10/24/24   Note Type   Note type Evaluation   Additional Comments greeted in supine, Family at bedside.   Pain Assessment   Pain Assessment Tool Lewis-Sargent FACES   Pain Rating: FLACC (Rest) - Face 0   Pain Rating: FLACC (Rest) - Legs 0   Pain Rating: FLACC (Rest) - Activity 0   Pain Rating: FLACC (Rest) - Cry 0   Pain Rating: FLACC (Rest) - Consolability 0   Score: FLACC (Rest) 0   Pain Rating: FLACC (Activity) - Face 1   Pain Rating: FLACC (Activity) - Legs 0   Pain Rating: FLACC (Activity) - Activity 0   Pain Rating: FLACC (Activity) - Cry 1   Pain Rating: FLACC (Activity) - Consolability 0   Score: FLACC  (Activity) 2   Restrictions/Precautions   Weight Bearing Precautions Per Order No   Other Precautions Chair Alarm;Bed Alarm;Cognitive;O2;Fall Risk   Home Living   Type of Home Assisted living  ([facility])   Home Layout One level   Bathroom Shower/Tub Walk-in shower   Bathroom Toilet Standard   Bathroom Equipment Grab bars in shower;Shower chair;Grab bars around toilet   Bathroom Accessibility Accessible via wheelchair   Home Equipment Walker;Wheelchair-manual   Prior Function   Level of [place] Needs assistance with ADLs;Needs assistance with functional mobility;Needs assistance with IADLS   Lives With Facility staff   Receives Help From Personal care attendant   IADLs Family/Friend/Other provides transportation;Family/Friend/Other provides medication management;Family/Friend/Other provides meals   Falls in the last 6 months 0   Vocational Retired   ADL   Where Assessed Edge of bed   Eating Assistance 4  Minimal Assistance   Grooming Assistance 3  Moderate Assistance   UB Bathing Assistance 3  Moderate Assistance   LB Bathing Assistance 2  Maximal Assistance   UB Dressing Assistance 3  Moderate Assistance   LB Dressing Assistance 1  Total Assistance   Toileting Assistance  1  Total Assistance   Bed Mobility   Rolling R 2  Maximal assistance   Additional items Assist x 2   Rolling L 2  Maximal assistance   Additional items Assist x 2   Supine to Sit 2  Maximal assistance   Additional items Assist x 2   Sit to Supine 2  Maximal assistance   Additional items Assist x 2   Transfers   Sit to Stand 3  Moderate assistance   Additional items Assist x 2   Stand to Sit 3  Moderate assistance   Additional items Assist x 2   Functional Mobility   Functional Mobility 3  Moderate assistance   Additional Comments Ax2, RW. max cues. 2 side steps.   Additional items Rolling walker   Balance   Static Sitting Fair   Dynamic Sitting Poor   Static Standing Poor -   Ambulatory Poor -   Activity Tolerance    Activity Tolerance Patient limited by fatigue;Treatment limited secondary to medical complications (Comment)   Medical Staff Made Aware PT Bernarda   Nurse Made Aware RN   RUE Assessment   RUE Assessment WFL   LUE Assessment   LUE Assessment WFL   Hand Function   Gross Motor Coordination Functional   Fine Motor Coordination Functional   Vision-Basic Assessment   Current Vision Wears glasses all the time   Psychosocial   Psychosocial (WDL) X   Patient Behaviors/Mood Flat affect;Calm   Cognition   Overall Cognitive Status Impaired   Arousal/Participation Arousable   Attention Difficulty attending to directions   Orientation Level Oriented to person;Disoriented to place;Disoriented to time;Disoriented to situation   Memory Decreased short term memory;Decreased recall of recent events;Decreased recall of precautions   Following Commands Follows one step commands inconsistently   Comments hx of dementia   Assessment   Limitation Decreased ADL status;Decreased UE strength;Decreased Safe judgement during ADL;Decreased endurance;Decreased cognition;Decreased self-care trans;Decreased high-level ADLs   Prognosis Fair   Assessment Eusebio Tate is a 89 y.o. female seen for OT evaluation s/p admit to [facility] on 10/23/2024 w/ Acute on chronic diastolic heart failure (HCC).  Comorbidities affecting pt's functional performance at time of assessment include:  A-fib on Eliquis, CAD, CKD, hypertension, dementia, history of left MCA stroke with residual expressive aphasia . Pt with active OT orders and activity orders for Up and OOB as tolerated. Personal factors affecting pt at time of IE include:difficulty performing ADLs, difficulty performing IADLs, difficulty performing transfers/mobility, functional decline , and new O2 requirements. Prior to admission, pt lives at [facility] in memory Care unit. A for ADLS, IADLS and mobility. Recently has been transferred for  with Ax1. 0 falls. Upon evaluation: Pt currently requires  modA for UB ADLs, maxA for LB ADLs, total assist  for toileting, maxAx2 for bed mobility, modAx2 for functional transfers, and modAx2 RW lateral side step for mobility 2* the following deficits impacting occupational performance:weakness, decreased strength , decreased balance, and decreased activity tolerance. VITALS during session: on Ra during session , sats 88-93% with activity. >95% at rest .  Pt to benefit from continued skilled OT tx while in the hospital to address deficits as defined above and maximize level of functional independence w ADL's and functional mobility. Occupational Performance areas to address include: grooming, bathing/shower, toilet hygiene, dressing, and clothing management. From OT standpoint, recommendation at time of d/c would be level 2 resources . OT to follow pt on caseload 2-3x/wk.   Goals   STG Time Frame 3-5   Short Term Goal #1 Pt will improve activity tolerance to G for min 30 min txment sessions for increase engagement in functional tasks   Short Term Goal #2 Pt will complete bed mobility at a ModAx1 level w/ G balance/safety demonstrated to decrease caregiver assistance required   Short Term Goal  Pt will complete toileting w/ modAx1 w/ G hygiene/thoroughness using DME as needed   LTG Time Frame 10-14   Long Term Goal #1 Pt will improve functional transfers to MinAx1 on/off all surfaces using DME as needed w/ G balance/safety   Long Term Goal #2 Pt will improve functional mobility during ADL/IADL/leisure tasks to [id number] D[employer] as needed w/ G balance/safety   Plan   Treatment Interventions ADL retraining;Functional transfer training;UE strengthening/ROM;Endurance training;Patient/family training;Equipment evaluation/education;Neuromuscular reeducation;Compensatory technique education;Energy conservation;Activityengagement   Goal Expiration Date 11/07/24   OT Treatment Day 0   OT Frequency 2-3x/wk   Discharge Recommendation   Rehab Resource Intensity Level, OT II (Moderate  Resource Intensity)   AM-PAC Daily Activity Inpatient   Lower Body Dressing 1   Bathing 2   Toileting 1   Upper Body Dressing 2   Grooming 2   Eating 2   Daily Activity Raw Score 10   Turning Head Towards Sound 2   Follow Simple Instructions 2   Low Function Daily Activity Raw Score 14   Low Function Daily Activity Standardized Score  24.79   AM-PAC Applied Cognition Inpatient   Following a Speech/Presentation 2   Understanding Ordinary Conversation 2   Taking Medications 1   Remembering Where Things Are Placed or Put Away 1   Remembering List of 4-5 Errands 1   Taking Care of Complicated Tasks 1   Applied Cognition Raw Score 8   Applied Cognition Standardized Score 19.32   Otilia Sun, OT

## 2024-10-24 NOTE — ASSESSMENT & PLAN NOTE
Patient is in dementia unit with multiple comorbidities and alzheimers Aox0 at baseline. No POA but 3 children who make decisions and would like to know about palliative and hospice.     Daugther: Nallely Goldsmith [phone] (visiting area from [place] and wants to be involved)  Son: Caleb Tate ) [phone] (remotely in [place])  Son: Matt ) [phone] (locally in [place])    - Palliative care consulted; appreciate recs and outpatient follow up

## 2024-10-24 NOTE — ED NOTES
Savannah Nguyễn informed of pt admission, spoke with Tomás.     Antonella Boss RN  10/23/24 2133

## 2024-10-24 NOTE — ASSESSMENT & PLAN NOTE
Lab Results   Component Value Date    HGB 7.9 (L) 10/24/2024    HGB 9.1 (L) 10/23/2024    HGB 11.5 05/30/2024   Appears to be microcytic anemia gradually decreasing over the span of 7 months from baseline of hemoglobin 11 which has been steadily coming down since June 2024.  No recent surgery since May 2024 to explain loss of blood.  No colonoscopy in chart.        Iron deficiency panel pending, TSH, vitamin deficiencies, rule out hemolysis with haptoglobin and LDH.    Transfuse for hgb <7  In setting of elderly with new onset anemia --May need evaluation of the GI tract based on shared decision making whether patient would like to pursue procedure or not outpatient (given poor functionality in dementia patient; significant difficulty with prep

## 2024-10-24 NOTE — ASSESSMENT & PLAN NOTE
Wt Readings from Last 3 Encounters:   10/24/24 88.6 kg (195 lb 5.2 oz)   05/30/24 87.5 kg (192 lb 14.4 oz)   05/02/23 78.6 kg (173 lb 3.2 oz)     Recent clinical decline per the family appears more from the CHF perspective than from the dementia   She has been more dyspneic with minimal exertion lately, especially so in the last month.   She has been using the wheelchair more now because of this.   Per family, also seems to be with more fluid lately - more leg swelling and a month ago, CXR was showing volume overload and was given extra lasix.   This is her 1st hospital stay for CHF exacerbation in the last 6 months. Had one ED visit in 5/2024 for covid/flu like symptoms. She had a 2 day hospital admission in 3/2024 for AMS thought from cellulitis.

## 2024-10-24 NOTE — ASSESSMENT & PLAN NOTE
Acute metabolic encephalopathy since on admission.  Patient found to be more tired than usual.  At baseline she is alert and oriented x 0, unable to perform full sentences for past 2 years.  Family states that patient is back to her own baseline at the time of evaluation.  Ammonia, B12, TSH wnl  Follow-up on mentation, appears to be back at baseline

## 2024-10-24 NOTE — ASSESSMENT & PLAN NOTE
Appears to be microcytic anemia gradually decreasing over the span of 7 months from baseline of hemoglobin 11 which has been steadily coming down since June 2024.  No recent surgery since May 2024 to explain loss of blood.  No colonoscopy in chart.      -Will check for iron deficiency, TSH, vitamin deficiencies, rule out hemolysis with haptoglobin and LDH.    -In setting of elderly with new onset anemia --May need evaluation of the GI tract based on shared decision making whether patient would like to pursue procedure or not outpatient (given poor functionality in dementia patient; significant difficulty with prep)

## 2024-10-24 NOTE — ASSESSMENT & PLAN NOTE
Patient is in dementia unit with multiple comorbidities and alzheimers Aox0 at baseline. No POA but 3 children who make decisions and would like to know about palliative and hospice.     - Palliative care following, appreciate input  Family considering hospice care vs palliative care  Case management to assist with dispo

## 2024-10-24 NOTE — PLAN OF CARE
Problem: OCCUPATIONAL THERAPY ADULT  Goal: Performs self-care activities at highest level of function for planned discharge setting.  See evaluation for individualized goals.  Description: Treatment Interventions: ADL retraining, Functional transfer training, UE strengthening/ROM, Endurance training, Patient/family training, Equipment evaluation/education, Neuromuscular reeducation, Compensatory technique education, Energy conservation, Activityengagement          See flowsheet documentation for full assessment, interventions and recommendations.   Note: Limitation: Decreased ADL status, Decreased UE strength, Decreased Safe judgement during ADL, Decreased endurance, Decreased cognition, Decreased self-care trans, Decreased high-level ADLs  Prognosis: Fair  Assessment: Eusebio Tate is a 89 y.o. female seen for OT evaluation s/p admit to [facility] on 10/23/2024 w/ Acute on chronic diastolic heart failure (HCC).  Comorbidities affecting pt's functional performance at time of assessment include:  A-fib on Eliquis, CAD, CKD, hypertension, dementia, history of left MCA stroke with residual expressive aphasia . Pt with active OT orders and activity orders for Up and OOB as tolerated. Personal factors affecting pt at time of IE include:difficulty performing ADLs, difficulty performing IADLs, difficulty performing transfers/mobility, functional decline , and new O2 requirements. Prior to admission, pt lives at [facility] in memory Care unit. A for ADLS, IADLS and mobility. Recently has been transferred for  with Ax1. 0 falls. Upon evaluation: Pt currently requires modA for UB ADLs, maxA for LB ADLs, total assist  for toileting, maxAx2 for bed mobility, modAx2 for functional transfers, and modAx2 RW lateral side step for mobility 2* the following deficits impacting occupational performance:weakness, decreased strength , decreased balance, and decreased activity tolerance. VITALS during session: on Ra during session ,  sats 88-93% with activity. >95% at rest .  Pt to benefit from continued skilled OT tx while in the hospital to address deficits as defined above and maximize level of functional independence w ADL's and functional mobility. Occupational Performance areas to address include: grooming, bathing/shower, toilet hygiene, dressing, and clothing management. From OT standpoint, recommendation at time of d/c would be level 2 resources . OT to follow pt on caseload 2-3x/wk.     Rehab Resource Intensity Level, OT: II (Moderate Resource Intensity)

## 2024-10-24 NOTE — ASSESSMENT & PLAN NOTE
Patient lacks capacity to make complex medical choices on exam today  She does not have a POA paperwork. Discussed [place] to the children as it pertains to hierarchy in surrogate medical decision making and it will be all 3 adult children. They both said they are on the same page when it comes to their mother's medical affairs.   Discussed GOC with patient's son/Matt and daughter/Nallely who are both present at bedside. Present as well was Nallely's   Discussed the difference between palliative care (this is for patients who continue to seek life prolonging treatments from different disciplines and specialties especially for illnesses that are considered incurable but treatable. This includes seeking hospital cares for any serious/emergent conditions) versus hospice cares (purely for end of life cares where the focus is solely for comfort from symptoms of worsening illnesses. There will be cessation of any interventions meant for indefinite life prolongation, including further re-hospitalizations except for some very special circumstances).  At this time, they do not appear to be ready for end of life cares. They are seeking continued palliative care in the facility.   At this point in time, I cannot fully recommend hospice anyway as she appears to be stable from the dementia perspective and this is her first CHF exacerbation in the last 6-12 months. She is not losing weight, she can still eat with no reports of dysphagia.  I did encourage a transition to hospice, however, should there be any further decline in the future.   There is no need nor is it recommended to follow up in the office for palliative medicine to continue GOC discussion for this lady who lives in a locked dementia unit. The facility should be more than capable of handling GOC with no needs to make transportation arrangements for this patient.   D/w SLIM  Palliative Medicine will sign off

## 2024-10-24 NOTE — PLAN OF CARE
Problem: PHYSICAL THERAPY ADULT  Goal: Performs mobility at highest level of function for planned discharge setting.  See evaluation for individualized goals.  Description: Treatment/Interventions: Functional transfer training, LE strengthening/ROM, Therapeutic exercise, Endurance training, Cognitive reorientation, Patient/family training, Equipment eval/education, Bed mobility, Gait training, Compensatory technique education, Continued evaluation, Family, OT          See flowsheet documentation for full assessment, interventions and recommendations.  Note: Prognosis: Fair  Problem List: Decreased strength, Decreased endurance, Decreased mobility, Impaired balance, Decreased cognition, Obesity  Assessment: Eusebio Tate is a 89 y.o. female admitted to [facility] on 10/23/2024 for Acute on chronic diastolic heart failure (HCC). PT was consulted and pt was seen on 10/24/2024 for mobility assessment and d/c planning. Pt presents w contact isolation, high fall risk, multiple lines. Per family, pt gets A for ADLs, IADLs and mobility w Ax1. Uses RW or wc. Pt is currently functioning at a max Ax2 for bed mobility, mod Ax2 for transfers. Pt demonstrated deficits related to cognition, strength, balance, endurance, some of which are chronic in nature. She was able to sit unsupported at EOB without LOB. Upon standing she did have L and posterior lean requiring constant physical assist to correct to midline. She had difficulty wt shifting and advancing both LE's for gait. Limited standing tolerance dt fatigue w noted CUADRA post activity. Able to maintain on RA w O2 >93%; sx improve w rest. Pt will benefit from continued skilled IP PT to address the above mentioned impairments  in order to maximize recovery and increase functional independence when completing mobility and ADLs. The patient's AM-PAC Basic Mobility Inpatient Short Form Raw Score is 6. A Raw score of less than or equal to 16 suggests the patient may benefit from discharge  to post-acute rehabilitation services. Pt resident at facility; would recommend PT at facility as warranted.  Barriers to Discharge: None     Rehab Resource Intensity Level, PT: II (Moderate Resource Intensity) (return to facility w PT)    See flowsheet documentation for full assessment.

## 2024-10-24 NOTE — PLAN OF CARE
Problem: Potential for Falls  Goal: Patient will remain free of falls  Description: INTERVENTIONS:  - Educate patient/family on patient safety including physical limitations  - Instruct patient to call for assistance with activity   - Consult OT/PT to assist with strengthening/mobility   - Keep Call bell within reach  - Keep bed low and locked with side rails adjusted as appropriate  - Keep care items and personal belongings within reach  - Initiate and maintain comfort rounds  - Make Fall Risk Sign visible to staff  - Offer Toileting every 2 Hours, in advance of need  - Initiate/Maintain bed alarm  - Obtain necessary fall risk management equipment: alarms  - Apply yellow socks and bracelet for high fall risk patients  - Consider moving patient to room near nurses station  Outcome: Progressing     Problem: PAIN - ADULT  Goal: Verbalizes/displays adequate comfort level or baseline comfort level  Description: Interventions:  - Encourage patient to monitor pain and request assistance  - Assess pain using appropriate pain scale  - Administer analgesics based on type and severity of pain and evaluate response  - Implement non-pharmacological measures as appropriate and evaluate response  - Consider cultural and social influences on pain and pain management  - Notify physician/advanced practitioner if interventions unsuccessful or patient reports new pain  Outcome: Progressing     Problem: INFECTION - ADULT  Goal: Absence or prevention of progression during hospitalization  Description: INTERVENTIONS:  - Assess and monitor for signs and symptoms of infection  - Monitor lab/diagnostic results  - Monitor all insertion sites, i.e. indwelling lines, tubes, and drains  - Monitor endotracheal if appropriate and nasal secretions for changes in amount and color  - [place] appropriate cooling/warming therapies per order  - Administer medications as ordered  - Instruct and encourage patient and family to use good hand hygiene  technique  - Identify and instruct in appropriate isolation precautions for identified infection/condition  Outcome: Progressing     Problem: SAFETY ADULT  Goal: Maintain or return to baseline ADL function  Description: INTERVENTIONS:  -  Assess patient's ability to carry out ADLs; assess patient's baseline for ADL function and identify physical deficits which impact ability to perform ADLs (bathing, care of mouth/teeth, toileting, grooming, dressing, etc.)  - Assess/evaluate cause of self-care deficits   - Assess range of motion  - Assess patient's mobility; develop plan if impaired  - Assess patient's need for assistive devices and provide as appropriate  - Encourage maximum independence but intervene and supervise when necessary  - Involve family in performance of ADLs  - Assess for home care needs following discharge   - Consider OT consult to assist with ADL evaluation and planning for discharge  - Provide patient education as appropriate  Outcome: Progressing     Problem: CARDIOVASCULAR - ADULT  Goal: Absence of cardiac dysrhythmias or at baseline rhythm  Description: INTERVENTIONS:  - Continuous cardiac monitoring, vital signs, obtain 12 lead EKG if ordered  - Administer antiarrhythmic and heart rate control medications as ordered  - Monitor electrolytes and administer replacement therapy as ordered  Outcome: Progressing     Problem: RESPIRATORY - ADULT  Goal: Achieves optimal ventilation and oxygenation  Description: INTERVENTIONS:  - Assess for changes in respiratory status  - Assess for changes in mentation and behavior  - Position to facilitate oxygenation and minimize respiratory effort  - Oxygen administered by appropriate delivery if ordered  - Initiate smoking cessation education as indicated  - Encourage broncho-pulmonary hygiene including cough, deep breathe, Incentive Spirometry  - Assess the need for suctioning and aspirate as needed  - Assess and instruct to report SOB or any respiratory  difficulty  - Respiratory Therapy support as indicated  Outcome: Progressing     Problem: GASTROINTESTINAL - ADULT  Goal: Maintains adequate nutritional intake  Description: INTERVENTIONS:  - Monitor percentage of each meal consumed  - Identify factors contributing to decreased intake, treat as appropriate  - Assist with meals as needed  - Monitor I&O, weight, and lab values if indicated  - Obtain nutrition services referral as needed  Outcome: Progressing     Problem: METABOLIC, FLUID AND ELECTROLYTES - ADULT  Goal: Fluid balance maintained  Description: INTERVENTIONS:  - Monitor labs   - Monitor I/O and WT  - Instruct patient on fluid and nutrition as appropriate  - Assess for signs & symptoms of volume excess or deficit  Outcome: Progressing     Problem: SKIN/TISSUE INTEGRITY - ADULT  Goal: Skin Integrity remains intact(Skin Breakdown Prevention)  Description: Assess:  -Perform [place] assessment every shift  -Clean and moisturize skin every shift  -Inspect skin when repositioning, toileting, and assisting with ADLS  -Assess under medical devices such as IV every shift  -Assess extremities for adequate circulation and sensation     Bed Management:  -Have minimal linens on bed & keep smooth, unwrinkled  -Change linens as needed when moist or perspiring  -Avoid sitting or lying in one position for more than 2 hours while in bed  -Keep HOB at 30 degrees     Toileting:  -Offer bedside commode  -Assess for incontinence every shift  -Use incontinent care products after each incontinent episode such as foam cleanser    Activity:  -Mobilize patient 3 times a day  -Encourage activity and walks on unit  -Encourage or provide ROM exercises   -Turn and reposition patient every 2 Hours  -Use appropriate equipment to lift or move patient in bed  -Instruct/ Assist with weight shifting every hour when out of bed in chair  -Consider limitation of chair time 3 hour intervals    Skin Care:  -Avoid use of baby powder, tape, friction  and shearing, hot water or constrictive clothing  -Relieve pressure over bony prominences using pillows  -Do not massage red bony areas    Next Steps:  -Teach patient strategies to minimize risks such as skin breakdown   -Consider consults to  interdisciplinary teams such as wound care  Outcome: Progressing  Goal: Incision(s), wounds(s) or drain site(s) healing without S/S of infection  Description: INTERVENTIONS  - Assess and document dressing, incision, wound bed, drain sites and surrounding tissue  - Provide patient and family education  - Perform skin care/dressing changes every shift  Outcome: Progressing

## 2024-10-24 NOTE — ASSESSMENT & PLAN NOTE
Alzheimer's dementia diagnosis came first before the stroke  She's been at the locked dementia unit at [facility] for 2 years  She requires assistance in almost all ADLs except for feeding (able to feed herself). She is most of the times incontinent of urine. She is now using the wheelchair more in the last month, but this sounds like its more because of SOB. Was able to navigate using a walker prior to this. Able to make needs known. Able to eat with no issues. Not losing weight.

## 2024-10-24 NOTE — PHYSICAL THERAPY NOTE
PHYSICAL THERAPY EVALUATION          Patient Name: Eusebio Tate  Today's Date: 10/24/2024       10/24/24 1030   PT Last Visit   PT Visit Date 10/24/24   Note Type   Note type Evaluation   Pain Assessment   Pain Assessment Tool 0-10   Pain Score No Pain   Restrictions/Precautions   Other Precautions Contact/isolation;Cognitive;Bed Alarm;Chair Alarm;Multiple lines;Telemetry;O2;Fall Risk  (2L>RA)   Home Living   Type of Home Assisted living   Home Layout One level   Home Equipment Walker;Wheelchair-manual;Other (Comment)  (bedrails)   Additional Comments from [facility] memory care/dementia unit   Prior Function   Level of [place] Needs assistance with ADLs;Needs assistance with functional mobility;Needs assistance with IADLS   Lives With Facility staff   Receives Help From Other (Comment)  (staff)   Falls in the last 6 months 1 to 4   Comments per family. can ambulate w RW however also uses wc, Ax1   General   Additional Pertinent History pt admitted 10/23/24 for CHF. activity as tolerated orders. PMHx significant for osteoporosis, afib, CHF, stroke, Alzheimers dementia, tremor, seizure, obesity, PVD, CAD   Family/Caregiver Present Yes  (kids)   Cognition   Overall Cognitive Status Impaired   Arousal/Participation Alert   Attention Difficulty attending to directions   Orientation Level Unable to assess  (responds to name however mostly non verbal during session)   Memory Unable to assess   Following Commands Follows one step commands inconsistently   Comments hx dementia. family reports pt normally responsive however does not always make sense   Bed Mobility   Rolling R 2  Maximal assistance   Additional items Assist x 2   Rolling L 2  Maximal assistance   Additional items Assist x 2;Bedrails   Supine to Sit 2  Maximal assistance   Additional items Assist x 2;HOB elevated;Bedrails;Increased time required;Verbal cues;LE management;Other  (trunk support)   Sit to Supine 2  Maximal  assistance   Additional items Assist x 2;Increased time required;Verbal cues;LE management;Other  (trunk support)   Additional Comments cues for direction   Transfers   Sit to Stand 3  Moderate assistance   Additional items Assist x 2;Increased time required;Verbal cues;Other  (RW)   Stand to Sit 3  Moderate assistance   Additional items Assist x 2;Increased time required;Other  (RW)   Additional Comments cues for direction, safety   Ambulation/Elevation   Gait pattern Poor UE support;Improper Weight shift;Decreased foot clearance;Retropulsion;Short stride;Excessively slow  (L lean)   Gait Assistance 3  Moderate assist   Additional items Assist x 2;Verbal cues;Tactile cues   Assistive Device Rolling walker   Distance x1 side step   Balance   Static Sitting Fair -   Static Standing Zero  (Ax2)   Endurance Deficit   Endurance Deficit Yes   Endurance Deficit Description CUADRA. O2 93-97% on RA   Activity Tolerance   Activity Tolerance Patient limited by fatigue;Treatment limited secondary to medical complications (Comment)  (weakness. baseline cognition)   Medical Staff Made Aware Adrienne OT   Nurse Made Aware yes   Assessment   Prognosis Fair   Problem List Decreased strength;Decreased endurance;Decreased mobility;Impaired balance;Decreased cognition;Obesity   Assessment Eusebio Tate is a 89 y.o. female admitted to [facility] on 10/23/2024 for Acute on chronic diastolic heart failure (HCC). PT was consulted and pt was seen on 10/24/2024 for mobility assessment and d/c planning. Pt presents w contact isolation, high fall risk, multiple lines. Per family, pt gets A for ADLs, IADLs and mobility w Ax1. Uses RW or wc. Pt is currently functioning at a max Ax2 for bed mobility, mod Ax2 for transfers. Pt demonstrated deficits related to cognition, strength, balance, endurance, some of which are chronic in nature. She was able to sit unsupported at EOB without LOB. Upon standing she did have L and posterior lean requiring constant  physical assist to correct to midline. She had difficulty wt shifting and advancing both LE's for gait. Limited standing tolerance dt fatigue w noted CUADRA post activity. Able to maintain on RA w O2 >93%; sx improve w rest. Pt will benefit from continued skilled IP PT to address the above mentioned impairments  in order to maximize recovery and increase functional independence when completing mobility and ADLs. The patient's AM-PAC Basic Mobility Inpatient Short Form Raw Score is 6. A Raw score of less than or equal to 16 suggests the patient may benefit from discharge to post-acute rehabilitation services. Pt resident at facility; would recommend PT at facility as warranted.   Barriers to Discharge None   Goals   Patient Goals none stated. pt hopeful family will continue to mobilize   STG Expiration Date 11/03/24   Short Term Goal #1 1).  Pt will perform bed mobility with max Ax1 demonstrating appropriate technique 100% of the time in order to improve function. 2)  Perform all transfers with max Ax1 demonstrating safe and appropriate technique 100% of the time in order to improve ability to negotiate safely in home environment.3) Amb with least restrictive AD > 5'x1 with max Ax1 in order to demonstrate ability to negotiate in home environment. 4)  Improve overall strength and balance 1/2 grade in order to optimize ability to perform functional tasks and reduce fall risk.5) Increase activity tolerance to 25 minutes in order to improve endurance to functional tasks.   Plan   Treatment/Interventions Functional transfer training;LE strengthening/ROM;Therapeutic exercise;Endurance training;Cognitive reorientation;Patient/family training;Equipment eval/education;Bed mobility;Gait training;Compensatory technique education;Continued evaluation;Family;OT   PT Frequency 2-3x/wk   Discharge Recommendation   Rehab Resource Intensity Level, PT II (Moderate Resource Intensity)  (return to facility w PT)   Additional Comments can  consider quick move oob w nsg   AM-PAC Basic Mobility Inpatient   Turning in Flat Bed Without Bedrails 1   Lying on Back to Sitting on Edge of Flat Bed Without Bedrails 1   Moving Bed to Chair 1   Standing Up From Chair Using Arms 1   Walk in Room 1   Climb 3-5 Stairs With Railing 1   Basic Mobility Inpatient Raw Score 6   Turning Head Towards Sound 2   Follow Simple Instructions 2   Low Function Basic Mobility Raw Score  10   Low Function Basic Mobility Standardized Score  14.65   [facility] Level Of Mobility   -[facility] Goal 2: Bed activities/Dependent transfer   -[facility] Achieved 5: Stand (1 or more minutes)   End of Consult   Patient Position at End of Consult Supine;Bed/Chair alarm activated;All needs within reach   History: co - morbidities including age, use of assistive device, assist for adl's/iadl's/ mobility, cognition, current experience including fall risk, multiple lines, contact isolation  Exam: impairments in systems including multiple body structures involved; neuromuscular (balance, gait, transfers), cardiopulmonary (vitals), cognition; activity limitations  (difficulties executing an action); participation restrictions (problems associated w involvement in life situations), AM-PAC  Clinical: unstable/unpredictable  Complexity:high    Bernarda Mckeon, PT

## 2024-10-24 NOTE — H&P
H&P - Internal Medicine   Name: Eusebio Tate 89 y.o. female I MRN: [id number]  Unit/Bed#: ED-23 I Date of Admission: 10/23/2024   Date of Service: 10/23/2024 I Hospital Day: 0     Assessment & Plan  Acute on chronic diastolic heart failure (HCC)  Wt Readings from Last 3 Encounters:   05/30/24 87.5 kg (192 lb 14.4 oz)   05/02/23 78.6 kg (173 lb 3.2 oz)   03/29/23 76.2 kg (168 lb)     Patient takes Lasix 40 mg daily.  Recently gained about 4 pounds at nursing facility where they were giving her extra doses of Lasix.     Chest x-ray with venous congestion.  Patient received 40 mg IV Lasix in the ED.  On evaluation patient still with increased work of breathing.    -Give extra Lasix 20 mg IV tonight   -Increase Lasix to 50 mg IV twice daily dosing starting tomorrow morning  - Strict I's and O's and weights  - Straight cath for urine    Age-related osteoporosis without fracture  Continue on calcium and vitamin D and zonisamide 100 mg daily  Benign essential hypertension  On Lasix as above.  Permanent atrial fibrillation (HCC)  A-fib-patient is rate controlled on Eliquis for anticoagulation  Acute metabolic encephalopathy  Acute metabolic encephalopathy since this morning.  Patient found to be more tired than usual.  At baseline she is alert and oriented x 0, unable to perform full sentences for past 2 years.  Family states that patient is back to her own baseline at the time of evaluation.  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 No POA but 3 children who make decisions and would like to know about palliative and hospice.      Williugther: Nallely Goldsmith [phone] (visiting area from [place] and wants to be involved)  Son: Caleb Tate ) [phone] (remotely in [place])  Son: Matt ) [phone] (locally in [place])    - Palliative care consulted; appreciate recs and outpatient follow up   Stage 3b chronic kidney disease (HCC)  Lab Results   Component Value Date    EGFR 55 10/23/2024    EGFR 63 06/19/2024    EGFR 54 05/30/2024    CREATININE 0.93 10/23/2024    CREATININE 0.89 06/19/2024    CREATININE 0.95 05/30/2024     Microcytic anemia  Appears to be microcytic anemia gradually decreasing over the span of 7 months from baseline of hemoglobin 11 which has been steadily coming down since June 2024.  No recent surgery since May 2024 to explain loss of blood.  No colonoscopy in chart.      -Will check for iron deficiency, TSH, vitamin deficiencies, rule out hemolysis with haptoglobin and LDH.    -In setting of elderly with new onset anemia --May need evaluation of the GI tract based on shared decision making whether patient would like to pursue procedure or not outpatient (given poor functionality in dementia patient; significant difficulty with prep)  Bacterial conjunctivitis of left eye  Purulent crusting of left eye.  - Ofloxacin drops    Code Status: Level 3 - DNAR and DNI  Admission Status: INPATIENT   Disposition: Patient requires Med Surg      History of Present Illness     Patient is a 89-year-old female with history of A-fib on Eliquis, CAD, CKD, hypertension, dementia, history of left MCA stroke with residual expressive aphasia, and history of right MCA stroke in 2023, poststroke epilepsy, moderate late onset Alzheimer's dementia and locked dementia unit, sick sinus syndrome status post pacemaker 2023, chronic diastolic heart failure, hyperlipidemia presenting with altered mental status.  Altered mental status started today.  Of note patient received multiple vaccines yesterday.    Patient found encephalopathic at nursing home and brought  to the ED.  Patient found to have increased work of breathing.  Unable to obtain history as baseline patient has MCA strokes and Alzheimer, has been disoriented x 3 for past 2 years and unable to form full sentences per family members.    In ED cardiac workup showed initially elevated troponin of 71 with a delta of -12 EKG was nonischemic.  Chest x-ray significant for volume overload with significant pulmonary vascular congestion, no pleural effusion discerned compared to September 2023 on my read.  CT head and UA were WNL. Additionally new hemoglobin dropped from 11.5 (5/2024) - 10.6 (6/2024) - 9.1.  MCV has been dropping from 90s 1 year ago to 75 on today's CBC.     Patient will be admitted for heart failure exacerbation and increased work of breathing.       Review of Systems   Unable to perform ROS: Patient nonverbal     I have reviewed the patient's PMH, PSH, Social History, Family History, Meds, and Allergies    Objective :  Temp:  [97.7 °F (36.5 °C)] 97.7 °F (36.5 °C)  HR:  [58-60] 58  BP: (123-157)/() 123/58  Resp:  [20-38] 30  SpO2:  [93 %-98 %] 97 %  O2 Device: Nasal cannula  Nasal Cannula O2 Flow Rate (L/min):  [2 L/min] 2 L/min    Intake & Output:  I/O         10/22 0701  10/23 0700 10/23 0701  10/24 0700    Urine  500    Total Output  500    Net  -500                Weights:        There is no height or weight on file to calculate BMI.  Weight (last 2 days)       None          Physical Exam  Vitals and nursing note reviewed.   Constitutional:       General: She is not in acute distress.     Appearance: She is well-developed.   HENT:      Head: Normocephalic and atraumatic.   Eyes:      Conjunctiva/sclera: Conjunctivae normal.      Comments: Purulent crusting of left eye  No tenderness to touch of orbital region   Cardiovascular:      Rate and Rhythm: Normal rate and regular rhythm.      Heart sounds: No murmur heard.  Pulmonary:      Breath sounds: No wheezing or rales.      Comments: Increased  work of breathing with open mouth  Abdominal:      Palpations: Abdomen is soft.      Tenderness: There is no abdominal tenderness.   Musculoskeletal:         General: No swelling.      Cervical back: Neck supple.      Comments: Lower extremity edema present bilaterally pitting  Bilateral lower extremity with significant venous stasis dermatitis changes   Skin:     General: Skin is warm and dry.      Capillary Refill: Capillary refill takes less than 2 seconds.   Neurological:      Mental Status: She is alert. Mental status is at baseline. She is disoriented.      Comments: Patient disoriented at baseline, word salad  Per family members at bedside, patient's mentation is back to baseline   Psychiatric:         Mood and Affect: Mood normal.           Lab Results: I have reviewed the following results:  Recent Labs     10/23/24  1709 10/23/24  1859   WBC 8.04  --    HGB 9.1*  --    HCT 29.4*  --      --    SODIUM 142  --    K 3.7  --    *  --    CO2 25  --    BUN 15  --    CREATININE 0.93  --    GLUC 107  --    AST 21  --    ALT 15  --    ALB 4.0  --    TBILI 1.06*  --    ALKPHOS 93  --    HSTNI0 71*  --    HSTNI2  --  59*   LACTICACID 1.7  --      Micro:  Lab Results   Component Value Date    BLOODCX No Growth After 5 Days. 05/30/2024    BLOODCX No Growth After 5 Days. 05/30/2024    BLOODCX No Growth After 5 Days. 01/21/2023    BLOODCX No Growth After 5 Days. 01/21/2023    URINECX 20,000-29,000 cfu/ml 09/14/2023    URINECX >100,000 cfu/ml Escherichia coli ESBL (A) 01/11/2023    URINECX 10,000-19,000 cfu/ml 01/11/2023    WOUNDCULT 3+ Growth of 08/12/2024       Imaging Results Review: I reviewed radiology reports from this admission including: chest xray Other Study Results Review: EKG was reviewed.     Currently Ordered Meds:   Current Facility-Administered Medications:     acetaminophen (TYLENOL) tablet 650 mg, Q4H PRN    apixaban (ELIQUIS) tablet 5 mg, BID    [START ON 10/24/2024] aspirin chewable tablet  "81 mg, Daily    [START ON 10/24/2024] atorvastatin (LIPITOR) tablet 40 mg, Daily With Dinner    [START ON 10/24/2024] Cholecalciferol (VITAMIN D3) tablet 1,000 Units, Daily    [START ON 10/24/2024] escitalopram (LEXAPRO) tablet 10 mg, Daily    furosemide (LASIX) injection 20 mg, Once    [START ON 10/24/2024] furosemide (LASIX) injection 50 mg, BID    HYDROmorphone HCl (DILAUDID) injection 0.2 mg, Q4H PRN    magnesium sulfate 2 g/50 mL IVPB (premix) 2 g, Once    ondansetron (ZOFRAN) injection 4 mg, Q6H PRN    [START ON 10/24/2024] polyethylene glycol (MIRALAX) packet 17 g, Daily    potassium chloride (Klor-Con M20) CR tablet 20 mEq, BID    QUEtiapine (SEROquel) tablet 12.5 mg, HS    [START ON 10/24/2024] senna (SENOKOT) tablet 8.6 mg, Daily    [START ON 10/24/2024] thiamine tablet 100 mg, Daily    [START ON 10/24/2024] zonisamide (ZONEGRAN) capsule 100 mg, Daily  VTE Pharmacologic Prophylaxis: Reason for no pharmacologic prophylaxis on eliquis  VTE Mechanical Prophylaxis: sequential compression device    Administrative Statements   I have spent a total time of 50 minutes in caring for this patient on the day of the visit/encounter including Patient and family education.  Portions of the record may have been created with voice recognition software.  Occasional wrong word or \"sound a like\" substitutions may have occurred due to the inherent limitations of voice recognition software.  Read the chart carefully and recognize, using context, where substitutions have occurred.  ==  Emilie Soyeon Kim, MD  [facility]    "

## 2024-10-24 NOTE — UTILIZATION REVIEW
Initial Clinical Review    Admission: Date/Time/Statement:   Admission Orders (From admission, onward)       Ordered        10/23/24 2036  INPATIENT ADMISSION  Once                          Orders Placed This Encounter   Procedures    INPATIENT ADMISSION     Standing Status:   Standing     Number of Occurrences:   1     Order Specific Question:   Level of Care     Answer:   Med Surg [16]     Order Specific Question:   Estimated length of stay     Answer:   More than 2 Midnights     Order Specific Question:   Certification     Answer:   I certify that inpatient services are medically necessary for this patient for a duration of greater than two midnights. See H&P and MD Progress Notes for additional information about the patient's course of treatment.     ED Arrival Information       Expected   -    Arrival   10/23/2024 16:37    Acuity   Urgent              Means of arrival   Ambulance    Escorted by   [facility]    Service   Hospitalist    Admission type   Emergency              Arrival complaint   Medical problem             Chief Complaint   Patient presents with    Altered Mental Status     Patient brought in for altered mental status. She received vaccines yesterday and has been noted to have increased mentation changes since.       Initial Presentation: 89 y.o. female with history of A-fib on Eliquis, CAD, CKD, hypertension, dementia, history of left MCA stroke with residual expressive aphasia, and history of right MCA stroke in 2023, poststroke epilepsy, moderate late onset Alzheimer's dementia and locked dementia unit, sick sinus syndrome status post pacemaker 2023, chronic diastolic heart failure, hyperlipidemia presenting with altered mental status. Altered mental status started today. Of note patient received multiple vaccines yesterday. Patient found encephalopathic at nursing home and brought to the ED. Patient found to have increased work of breathing. Unable to obtain history as baseline  patient has MCA strokes and Alzheimer, has been disoriented x 3 for past 2 years and unable to form full sentences per family members. Plan: Inpatient admission for evaluation and treatment of CHF, osteoporosis, HTN, Afib, acute metabolic encephalopathy, hx of stroke, epilepsy, Alzheimer's, stage 3b CKD, anemia, bacterial conjunctivitis of left eye: IV lasix 50 mg bid, straight cath for urine, continue calcium, Vitamin D, Eliquis, check ammonia, B12, TSH, continue Zonegran, ASA, statin, Oxfloxacin drops.    Date: 10/24   Day 2:     Internal medicine: continue IV lasix 50 mg bid, monitor BMP, continue calcium, Vitamin D, Eliquis, Zonegran, ASA, statin, Oxfloxacin drops.    Date: 10/25  Day 3: Has surpassed a 2nd midnight with active treatments and services. Continue IV lasix 50 mg bid. Continue on calcium and vitamin D and zonisamide 100 mg daily. Ammonia, B12, TSH wnl. Continue Zonegran, ASA, statin, start supplemental iron. GOC discussion with family, would like palliative care going forward, not ready for end of life care at this time.       ED Treatment-Medication Administration from 10/23/2024 1637 to 10/23/2024 2324         Date/Time Order Dose Route Action     10/23/2024 2019 nitroglycerin (NITRO-BID) 2 % TD ointment 1 inch 1 inch Topical Given     10/23/2024 2012 furosemide (LASIX) injection 40 mg 40 mg Intravenous Given     10/23/2024 2228 HYDROmorphone HCl (DILAUDID) injection 0.2 mg 0.2 mg Intravenous Given            Scheduled Medications:  apixaban, 5 mg, Oral, BID  aspirin, 81 mg, Oral, Daily  atorvastatin, 40 mg, Oral, Daily With Dinner  Cholecalciferol, 1,000 Units, Oral, Daily  escitalopram, 10 mg, Oral, Daily  furosemide, 50 mg, Intravenous, BID  ofloxacin, 1 drop, Left Eye, 4x Daily  polyethylene glycol, 17 g, Oral, Daily  potassium chloride, 20 mEq, Oral, BID  QUEtiapine, 12.5 mg, Oral, HS  senna, 1 tablet, Oral, Daily  thiamine, 100 mg, Oral, Daily  zonisamide, 100 mg, Oral, Daily      Continuous  IV Infusions:     PRN Meds:  acetaminophen, 650 mg, Oral, Q4H PRN  HYDROmorphone, 0.2 mg, Intravenous, Q4H PRN  ondansetron, 4 mg, Intravenous, Q6H PRN      ED Triage Vitals   Temperature Pulse Respirations Blood Pressure SpO2 Pain Score   10/23/24 1705 10/23/24 1705 10/23/24 1707 10/23/24 1705 10/23/24 1705 10/24/24 0903   97.7 °F (36.5 °C) 60 20 128/61 93 % No Pain     Weight (last 2 days)       Date/Time Weight    10/24/24 0600 88.6 (195.33)            Vital Signs (last 3 days)       Date/Time Temp Pulse Resp BP MAP (mmHg) SpO2 Calculated FIO2 (%) - Nasal Cannula Nasal Cannula O2 Flow Rate (L/min) O2 Device Patient Position - Orthostatic VS Victor M Coma Scale Score Pain    10/24/24 15:06:06 98.7 °F (37.1 °C) 63 -- 139/71 94 92 % -- -- -- -- -- --    10/24/24 1030 -- -- -- -- -- -- -- -- -- -- -- No Pain    10/24/24 09:03:50 -- 61 -- 140/113 122 95 % -- -- -- -- -- No Pain    10/24/24 0750 -- -- -- -- -- -- -- -- Nasal cannula -- 13 --    10/24/24 06:37:04 97.9 °F (36.6 °C) 61 20 124/60 81 95 % -- -- -- -- -- --    10/24/24 03:25:29 98.6 °F (37 °C) 65 20 129/63 85 95 % -- -- -- -- -- --    10/23/24 23:32:04 100 °F (37.8 °C) 60 18 115/57 76 95 % -- -- -- -- -- --    10/23/24 2330 -- -- -- -- -- -- -- -- -- -- 12 --    10/23/24 2145 -- 58 30 123/58 84 97 % 28 2 L/min Nasal cannula Sitting -- --    10/23/24 2100 -- 58 38 131/69 86 98 % -- -- None (Room air) Sitting -- --    10/23/24 2012 -- -- -- 143/108 -- -- -- -- -- Lying -- --    10/23/24 1945 -- 60 36 157/65 94 94 % -- -- None (Room air) Lying -- --    10/23/24 1942 -- -- 36 -- -- -- -- -- -- -- -- --    10/23/24 1839 -- -- -- -- -- -- -- -- -- -- 15 --    10/23/24 1838 -- -- -- -- -- -- -- -- None (Room air) -- -- --    10/23/24 1707 -- -- 20 -- -- -- -- -- -- -- -- --    10/23/24 1705 97.7 °F (36.5 °C) 60 -- 128/61 85 93 % -- -- None (Room air) Lying -- --              Pertinent Labs/Diagnostic Test Results:   Radiology:  CT head without contrast   Final  Interpretation by Jose Wilder MD (10/23 1915)      No acute intracranial abnormality given mild motion degradation.      Unchanged chronic infarct in left parietotemporal lobe with mild chronic microangiopathy.      Unchanged small lobulated cutaneous lesion in left parietal scalp, indeterminate. Recommend direct visualization for further evaluation.      The study was marked in EPIC for immediate notification.                  Workstation performed: [id number]         XR chest 1 view portable   ED Interpretation by Jarrod Newman MD (10/23 1810)   Primary reviewed: No acute abnormality      Final Interpretation by Van Vivas MD (10/23 2107)      Cardiomegaly with mild vascular interstitial prominence suggesting mild volume overload. Clear lungs.            Workstation performed: [id number]           Cardiology:  ECG 12 lead   Final Result by James Koo DO (10/24 0638)    Age and gender specific ECG analysis    Electronic ventricular pacemaker   When compared with ECG of 23-OCT-2024 18:58, (unconfirmed)   No significant change was found   Confirmed by James Koo ([id number]) on 10/24/2024 6:38:54 AM      ECG 12 lead   Final Result by James Koo DO (10/24 0625)    Age and gender specific ECG analysis    Electronic ventricular pacemaker   When compared with ECG of 23-OCT-2024 16:47,   Vent. rate has decreased BY  12 BPM   Confirmed by James Koo ([id number]) on 10/24/2024 6:25:35 AM        GI:  No orders to display           Results from last 7 days   Lab Units 10/24/24  1401 10/24/24  0450 10/23/24  1709   WBC Thousand/uL  --  6.46 8.04   HEMOGLOBIN g/dL 8.2* 7.9* 9.1*   HEMATOCRIT % 26.4* 25.4* 29.4*   PLATELETS Thousands/uL  --  158 195   TOTAL NEUT ABS Thousands/µL  --  4.36 5.86         Results from last 7 days   Lab Units 10/24/24  0450 10/23/24  1709   SODIUM mmol/L 144 142   POTASSIUM mmol/L 3.6 3.7   CHLORIDE mmol/L 112* 110*   CO2 mmol/L 28 25   ANION GAP mmol/L 4 7    BUN mg/dL 15 15   CREATININE mg/dL 0.97 0.93   EGFR ml/min/1.73sq m 52 55   CALCIUM mg/dL 8.9 9.7   MAGNESIUM mg/dL 2.8*  --      Results from last 7 days   Lab Units 10/24/24  0023 10/23/24  1709   AST U/L  --  21   ALT U/L  --  15   ALK PHOS U/L  --  93   TOTAL PROTEIN g/dL  --  7.4   ALBUMIN g/dL  --  4.0   TOTAL BILIRUBIN mg/dL  --  1.06*   AMMONIA umol/L 23  --      Results from last 7 days   Lab Units 10/23/24  1648   POC GLUCOSE mg/dl 110     Results from last 7 days   Lab Units 10/24/24  0450 10/23/24  1709   GLUCOSE RANDOM mg/dL 99 107           Results from last 7 days   Lab Units 10/23/24  2227   PH MARLON  7.440*   PCO2 MARLON mm Hg 34.8*   PO2 MARLON mm Hg 64.0*   HCO3 MARLON mmol/L 23.1*   BASE EXC MARLON mmol/L -0.8   O2 CONTENT MARLON ml/dL 11.3   O2 HGB, VENOUS % 88.6*             Results from last 7 days   Lab Units 10/23/24  2130 10/23/24  1859 10/23/24  1709   HS TNI 0HR ng/L  --   --  71*   HS TNI 2HR ng/L  --  59*  --    HSTNI D2 ng/L  --  -12  --    HS TNI 4HR ng/L 72*  --   --    HSTNI D4 ng/L 1  --   --              Results from last 7 days   Lab Units 10/24/24  0450   TSH 3RD GENERATON uIU/mL 1.384         Results from last 7 days   Lab Units 10/23/24  1709   LACTIC ACID mmol/L 1.7         Results from last 7 days   Lab Units 10/23/24  1709   IRON SATURATION % 7*   IRON ug/dL 32*   TIBC ug/dL 436           Results from last 7 days   Lab Units 10/23/24  1940   CLARITY UA  Clear   COLOR UA  Yellow   SPEC GRAV UA  1.020   PH UA  6.0   GLUCOSE UA mg/dl Negative   KETONES UA mg/dl Negative   BLOOD UA  Negative   PROTEIN UA mg/dl Negative   NITRITE UA  Negative   BILIRUBIN UA  Negative   UROBILINOGEN UA E.U./dl 2.0*   LEUKOCYTES UA  Negative         Past Medical History:   Diagnosis Date    A-fib (HCC)     CAD (coronary artery disease)     CHF (congestive heart failure) (HCC)     CKD (chronic kidney disease) stage 2, GFR 60-89 ml/min     Dementia (HCC)     Hypertension     Hypokalemia     Knee effusion      Memory loss     Seizure (HCC)     Urinary tract infection      Present on Admission:   Age-related osteoporosis without fracture   Benign essential hypertension   Permanent atrial fibrillation (HCC)   Acute metabolic encephalopathy   Acute on chronic diastolic heart failure (HCC)   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 behavioral disturbance, psychotic disturbance, mood disturbance, or anxiety (HCC)   Stage 3b chronic kidney disease (HCC)      Admitting Diagnosis: Chronic diastolic heart failure (HCC) [I50.32]  Sick sinus syndrome (HCC) [I49.5]  Urinary retention [R33.9]  CHF (congestive heart failure) (HCC) [I50.9]  Hypertension [I10]  Pacemaker [Z95.0]  Acute encephalopathy [G93.40]  Hx of completed stroke [Z86.73]  History of medical problems [Z87.898]  Epilepsy after stroke (HCC) [I69.398, G40.909]  Age/Sex: 89 y.o. female    Network Utilization Review Department  ATTENTION: Please call with any questions or concerns to [phone] and carefully listen to the prompts so that you are directed to the right person. All voicemails are confidential.   For Discharge needs, contact Care Management DC Support Team at [phone] opt. [phone]  Send all requests for admission clinical reviews, approved or denied determinations and any other requests to dedicated fax number below belonging to the campus where the patient is receiving treatment. List of dedicated fax numbers for the Facilities:  FACILITY NAME UR FAX NUMBER   ADMISSION DENIALS (Administrative/Medical Necessity) [phone]   DISCHARGE SUPPORT TEAM (NETWORK) [phone]   PARENT CHILD HEALTH (Maternity/NICU/Pediatrics) [phone]   [facility] [phone]   [facility] [phone]   [facility] [phone]   [facility] [phone]   [facility] [phone]   [facility] [phone]   [facility]  [facility] [phone]   [facility] [phone]   [facility] [phone]   [facility] [phone]   [facility] [phone]   [facility] [phone]

## 2024-10-24 NOTE — ASSESSMENT & PLAN NOTE
Wt Readings from Last 3 Encounters:   05/30/24 87.5 kg (192 lb 14.4 oz)   05/02/23 78.6 kg (173 lb 3.2 oz)   03/29/23 76.2 kg (168 lb)     Patient takes Lasix 40 mg daily.  Recently gained about 4 pounds at nursing facility where they were giving her extra doses of Lasix.     Chest x-ray with venous congestion.  Patient received 40 mg IV Lasix in the ED.  On evaluation patient still with increased work of breathing.    -Give extra Lasix 20 mg IV tonight   -Increase Lasix to 50 mg IV twice daily dosing starting tomorrow morning  - Strict I's and O's and weights  - Straight cath for urine

## 2024-10-24 NOTE — ASSESSMENT & PLAN NOTE
"Subjective   Patient ID: Makenna Rylee Kipp is a 14 y.o. female who presents for Abdominal Pain (Patient is here with Dad for follow up on stomach pain not getting any better.)    HPI    Here for concern for continued abdominal pain started 5 months ago   -seen by NP 2 weeks ago for abdominal pain   -at that time, screening blood work done: cbc, cmp, vit d, tsh, xray done     Symptoms started 5 months ago   Stomach pain started, 1 month later with headache and dizziness.   Episodes  more frequent   Was initially every other day   Now daily pain, all day     Episodes:   Most in morning, some in afternoon   No night time waking   Normally with constant pain, stabbing pain.   Abd pain periumbilical pain to epigastric pain, constant with some stabbing pain  Some radiation to chest  Some stabbing side pain   Has tried to lay down, relax   Tried alleve to help with pain, no relief with pain   Has headache and dizziness at same time  Middle part of head, feels constant throbbing pain   No change in vision   Some nausea  No vomiting     Abd pain   After eating is worse   Slight improvement   No blood in stools   No urinary symptoms     No fevers   No sore throat     No uri   No coughing     Appetite: same appetite ;      Energy:   Normal energy is ok     School is ok     School some association ;     No missed school days; if having pain; eating breakfast    Diet  2-3 meals /day   Less  Less on vegetables         Relief when sitting down   Lights bother headache  Sound no change     No migraine ha   No family history of gi issues, migraine headaches     No gerd    Giving tums in past       Miralax for 3 days   No stool results   No change     Q month; lmp day 4; duration 7 days;    Normal periods, no relation to headache             Review of Systems    Vitals:    03/01/24 1529   BP: 103/57   Pulse: 84   Temp: 37.1 °C (98.7 °F)   SpO2: 97%   Weight: 69.6 kg   Height: 1.613 m (5' 3.5\")       Objective   Physical Exam  Vitals " Lab Results   Component Value Date    EGFR 52 10/24/2024    EGFR 55 10/23/2024    EGFR 63 06/19/2024    CREATININE 0.97 10/24/2024    CREATININE 0.93 10/23/2024    CREATININE 0.89 06/19/2024      and nursing note reviewed. Exam conducted with a chaperone present.   Constitutional:       General: She is active.      Appearance: Normal appearance.   HENT:      Head: Normocephalic and atraumatic.      Right Ear: Tympanic membrane normal.      Left Ear: Tympanic membrane normal.      Nose: Nose normal.      Mouth/Throat:      Mouth: Mucous membranes are moist.      Pharynx: Oropharynx is clear.   Eyes:      Extraocular Movements: Extraocular movements intact.      Conjunctiva/sclera: Conjunctivae normal.      Pupils: Pupils are equal, round, and reactive to light.   Cardiovascular:      Rate and Rhythm: Normal rate and regular rhythm.   Pulmonary:      Effort: Pulmonary effort is normal.      Breath sounds: Normal breath sounds.   Abdominal:      General: Abdomen is flat. Bowel sounds are normal. There is no distension.      Palpations: Abdomen is soft. There is no mass.      Tenderness: There is no abdominal tenderness. There is no guarding or rebound.      Hernia: No hernia is present.   Musculoskeletal:      Cervical back: Normal range of motion and neck supple.   Neurological:      Mental Status: She is alert.          Lab:   In office Rapid strep negative, Strep PCR sent from office   Assessment/Plan   Problem List Items Addressed This Visit    None  Visit Diagnoses       Abdominal pain, unspecified abdominal location    -  Primary    Relevant Medications    omeprazole (PriLOSEC) 20 mg tablet,delayed release (DR/EC) EC tablet    Other Relevant Orders    POCT rapid strep A manually resulted (Completed)    POCT UA (nonautomated) manually resulted    Urinalysis with Reflex Microscopic    Urine culture    Epigastric pain        Relevant Medications    omeprazole (PriLOSEC) 20 mg tablet,delayed release (DR/EC) EC tablet    Acute pharyngitis, unspecified etiology        Relevant Orders    POCT rapid strep A manually resulted (Completed)    Gastroesophageal reflux disease without esophagitis        Relevant  Medications    omeprazole (PriLOSEC) 20 mg tablet,delayed release (DR/EC) EC tablet    Chronic nonintractable headache, unspecified headache type                  Current Outpatient Medications:     omeprazole (PriLOSEC) 20 mg tablet,delayed release (DR/EC) EC tablet, Take 1 tablet (20 mg) by mouth once daily., Disp: 30 tablet, Rfl: 0      MDM   5 months of intermittent abdominal pain associated off and on with headaches, dizziness   Discussed possible differential diagnosis, course, treatment with Dad and patient   Suspect possible GERD, possible migraine syndrome   Recommend r/o infectious cause   In office Rapid strep negative, Strep PCR sent from office   Recommend urine sample to r/o uti   Lab work normal from previous visit  KUB never done   Discussed gerd diagnosis, reflux precautions  Trial with ppi rx: omeprazole 20 mg every day for at least 4 weeks, need to take daily  Keep diary of pain, meals, activity with pain, relieving and worsening factores  Discussed possible migraine headache causing pain  Recommend keeping diary for triggers, treat pain at onset with otc medication acetaminophen, ibuprofen or naproxen prn   If using more than 15 days/month for pain control, need to step up therapy and further evaluation as needed  Recommended being evaluated for optometry   Return in 4 weeks if not improving      Larisa Piedra MD

## 2024-10-25 LAB
ANION GAP SERPL CALCULATED.3IONS-SCNC: 6 MMOL/L (ref 4–13)
BUN SERPL-MCNC: 15 MG/DL (ref 5–25)
CALCIUM SERPL-MCNC: 9.2 MG/DL (ref 8.4–10.2)
CHLORIDE SERPL-SCNC: 109 MMOL/L (ref 96–108)
CO2 SERPL-SCNC: 27 MMOL/L (ref 21–32)
CREAT SERPL-MCNC: 0.96 MG/DL (ref 0.6–1.3)
ERYTHROCYTE [DISTWIDTH] IN BLOOD BY AUTOMATED COUNT: 19.9 % (ref 11.6–15.1)
FERRITIN SERPL-MCNC: 30 NG/ML (ref 11–307)
GFR SERPL CREATININE-BSD FRML MDRD: 53 ML/MIN/1.73SQ M
GLUCOSE SERPL-MCNC: 84 MG/DL (ref 65–140)
HAPTOGLOB SERPL-MCNC: 165 MG/DL (ref 41–333)
HCT VFR BLD AUTO: 26.3 % (ref 34.8–46.1)
HGB BLD-MCNC: 8.1 G/DL (ref 11.5–15.4)
MCH RBC QN AUTO: 22.9 PG (ref 26.8–34.3)
MCHC RBC AUTO-ENTMCNC: 30.8 G/DL (ref 31.4–37.4)
MCV RBC AUTO: 75 FL (ref 82–98)
PLATELET # BLD AUTO: 162 THOUSANDS/UL (ref 149–390)
PMV BLD AUTO: 11.1 FL (ref 8.9–12.7)
POTASSIUM SERPL-SCNC: 3.8 MMOL/L (ref 3.5–5.3)
RBC # BLD AUTO: 3.53 MILLION/UL (ref 3.81–5.12)
SODIUM SERPL-SCNC: 142 MMOL/L (ref 135–147)
WBC # BLD AUTO: 5.39 THOUSAND/UL (ref 4.31–10.16)

## 2024-10-25 PROCEDURE — 99232 SBSQ HOSP IP/OBS MODERATE 35: CPT

## 2024-10-25 PROCEDURE — 92526 ORAL FUNCTION THERAPY: CPT

## 2024-10-25 PROCEDURE — 80048 BASIC METABOLIC PNL TOTAL CA: CPT

## 2024-10-25 PROCEDURE — 85027 COMPLETE CBC AUTOMATED: CPT

## 2024-10-25 RX ORDER — FERROUS SULFATE 325(65) MG
325 TABLET ORAL
Status: DISCONTINUED | OUTPATIENT
Start: 2024-10-26 | End: 2024-10-26

## 2024-10-25 RX ADMIN — THIAMINE HCL TAB 100 MG 100 MG: 100 TAB at 09:30

## 2024-10-25 RX ADMIN — APIXABAN 5 MG: 5 TABLET, FILM COATED ORAL at 09:30

## 2024-10-25 RX ADMIN — ASPIRIN 81 MG CHEWABLE TABLET 81 MG: 81 TABLET CHEWABLE at 09:30

## 2024-10-25 RX ADMIN — APIXABAN 5 MG: 5 TABLET, FILM COATED ORAL at 17:20

## 2024-10-25 RX ADMIN — OFLOXACIN 1 DROP: 3 SOLUTION/ DROPS OPHTHALMIC at 17:22

## 2024-10-25 RX ADMIN — POTASSIUM CHLORIDE 20 MEQ: 1500 TABLET, EXTENDED RELEASE ORAL at 09:29

## 2024-10-25 RX ADMIN — SENNOSIDES 8.6 MG: 8.6 TABLET, FILM COATED ORAL at 09:30

## 2024-10-25 RX ADMIN — POLYETHYLENE GLYCOL 3350 17 G: 17 POWDER, FOR SOLUTION ORAL at 09:38

## 2024-10-25 RX ADMIN — ATORVASTATIN CALCIUM 40 MG: 40 TABLET, FILM COATED ORAL at 17:20

## 2024-10-25 RX ADMIN — OFLOXACIN 1 DROP: 3 SOLUTION/ DROPS OPHTHALMIC at 12:15

## 2024-10-25 RX ADMIN — QUETIAPINE FUMARATE 12.5 MG: 25 TABLET ORAL at 21:42

## 2024-10-25 RX ADMIN — Medication 1000 UNITS: at 09:30

## 2024-10-25 RX ADMIN — FUROSEMIDE 50 MG: 10 INJECTION, SOLUTION INTRAVENOUS at 17:22

## 2024-10-25 RX ADMIN — FUROSEMIDE 50 MG: 10 INJECTION, SOLUTION INTRAVENOUS at 09:35

## 2024-10-25 RX ADMIN — POTASSIUM CHLORIDE 20 MEQ: 1500 TABLET, EXTENDED RELEASE ORAL at 17:20

## 2024-10-25 RX ADMIN — OFLOXACIN 1 DROP: 3 SOLUTION/ DROPS OPHTHALMIC at 21:43

## 2024-10-25 RX ADMIN — ESCITALOPRAM OXALATE 10 MG: 10 TABLET ORAL at 09:30

## 2024-10-25 RX ADMIN — ZONISAMIDE 100 MG: 100 CAPSULE ORAL at 09:32

## 2024-10-25 RX ADMIN — OFLOXACIN 1 DROP: 3 SOLUTION/ DROPS OPHTHALMIC at 09:31

## 2024-10-25 NOTE — PROGRESS NOTES
Progress Note - Hospitalist   Name: Eusebio Tate 89 y.o. female I MRN: [id number]  Unit/Bed#: [place] [address] -01 I Date of Admission: 10/23/2024   Date of Service: 10/25/2024 I Hospital Day: 2    Assessment & Plan  Acute on chronic diastolic heart failure (HCC)  Wt Readings from Last 3 Encounters:   10/25/24 88.9 kg (195 lb 15.8 oz)   05/30/24 87.5 kg (192 lb 14.4 oz)   05/02/23 78.6 kg (173 lb 3.2 oz)     Patient takes Lasix 40 mg daily.  Recently gained about 4 pounds at nursing facility where they were giving her extra doses of Lasix.      Chest x-ray with venous congestion.  Patient received 40 mg IV Lasix in the ED.  On initial presentation patient with increased work of breathing, now breathing even and non labored.     Symptoms improved, will resume home dosing lasix 40mg daily  Monitor BMP  Daily I&O, weights  Straight cath for urine  Follows with Dr. Cortez's office outpatient  Last Echo 12/223  EF 55-60%    Age-related osteoporosis without fracture  Continue on calcium and vitamin D and zonisamide 100 mg daily   Benign essential hypertension  Monitor  Continue lasix  Permanent atrial fibrillation (HCC)  A-fib-patient is rate controlled on Eliquis for anticoagulation   Acute metabolic encephalopathy  Acute metabolic encephalopathy since on admission.  Patient found to be more tired than usual.  At baseline she is alert and oriented x 0, unable to perform full sentences for past 2 years.  Family states that patient is back to her own baseline at the time of evaluation.  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 No POA but  3 children who make decisions and would like to know about palliative and hospice.     - Palliative care   [facility] discussion with family, would like palliative care going forward, not ready for end of life care at this time  Case management to assist with dispo  Stage 3b chronic kidney disease (HCC)  Lab Results   Component Value Date    EGFR 53 10/25/2024    EGFR 52 10/24/2024    EGFR 55 10/23/2024    CREATININE 0.96 10/25/2024    CREATININE 0.97 10/24/2024    CREATININE 0.93 10/23/2024   Stable  Monitor BMP  Microcytic anemia  Lab Results   Component Value Date    HGB 8.1 (L) 10/25/2024    HGB 8.2 (L) 10/24/2024    HGB 7.9 (L) 10/24/2024   Appears to be microcytic anemia gradually decreasing over the span of 7 months from baseline of hemoglobin 11 which has been steadily coming down since June 2024.  No recent surgery since May 2024 to explain loss of blood.  No colonoscopy in chart.       Likely iron deficiency anemia secondary to poor dietary intake  TSH, vitamin deficiencies, haptoglobin and LDH within normal limits  Hgb has been stable around 8  Will start supplemental iron  Transfuse for hgb <7  May consider stool FOBT  In setting of elderly with new onset anemia --May need evaluation of the GI tract based on shared decision making whether patient would like to pursue procedure or not outpatient (given poor functionality in dementia patient; significant difficulty with prep    Bacterial conjunctivitis of left eye  Purulent crusting of left eye.  Continue Ofloxacin drops  Advanced care planning/counseling discussion      VTE Pharmacologic Prophylaxis: VTE Score: 5 High Risk (Score >/= 5) - Pharmacological DVT Prophylaxis Ordered: apixaban (Eliquis). Sequential Compression Devices Ordered.    Mobility:   Basic Mobility Inpatient Raw Score: 6  JH-HLM Goal: 2: Bed activities/Dependent transfer  JH-HLM Achieved: 2: Bed activities/Dependent transfer  JH-HLM Goal achieved. Continue to encourage appropriate  mobility.    Patient Centered Rounds: I performed bedside rounds with nursing staff today.   Discussions with Specialists or Other Care Team Provider:       Education and Discussions with Family / Patient: Updated  (daughter) at bedside.    Current Length of Stay: 2 day(s)  Current Patient Status: Inpatient   Certification Statement: The patient will continue to require additional inpatient hospital stay due to need for IV diuresis  Discharge Plan: Anticipate discharge in 24-48 hrs to rehab facility.    Code Status: Level 3 - DNAR and DNI    Subjective   Patient seen and examined at the bedside. No acute distress noted. At baseline mentation. No acute events overnight.     Objective :  Temp:  [97.2 °F (36.2 °C)-98.7 °F (37.1 °C)] 97.4 °F (36.3 °C)  HR:  [60-66] 66  BP: ()/(50-72) 120/64  Resp:  [16-18] 17  SpO2:  [91 %-96 %] 96 %  O2 Device: None (Room air)    Body mass index is 35.85 kg/m².     Input and Output Summary (last 24 hours):     Intake/Output Summary (Last 24 hours) at 10/25/2024 1312  Last data filed at 10/25/2024 1013  Gross per 24 hour   Intake 600 ml   Output 1430 ml   Net -830 ml       Physical Exam  Vitals and nursing note reviewed.   Constitutional:       General: She is not in acute distress.     Appearance: She is obese.   Eyes:      Conjunctiva/sclera: Conjunctivae normal.   Cardiovascular:      Rate and Rhythm: Normal rate and regular rhythm.      Pulses: Normal pulses.      Heart sounds: Normal heart sounds.   Pulmonary:      Effort: Pulmonary effort is normal. No respiratory distress.      Breath sounds: Normal breath sounds. No wheezing.   Abdominal:      General: There is no distension.      Palpations: Abdomen is soft.   Musculoskeletal:         General: Swelling present.   Skin:     General: Skin is warm.   Neurological:      Mental Status: She is alert. Mental status is at baseline.   Psychiatric:         Mood and Affect: Mood normal.         Behavior: Behavior  normal.           Lines/Drains:        Telemetry:  Telemetry Orders (From admission, onward)               24 Hour Telemetry Monitoring  (ED Bridging Orders Panel)  Continuous x 24 Hours (Telem)        Expiring   Question:  Reason for 24 Hour Telemetry  Answer:  Decompensated CHF- and any one of the following: continuous diuretic infusion or total diuretic dose >200 mg daily, associated electrolyte derangement (I.e. K < 3.0), ionotropic drip (continuous infusion), hx of ventricular arrhythmia, or new EF < 35%                     Telemetry Reviewed:   Indication for Continued Telemetry Use: No indication for continued use. Will discontinue.                Lab Results: I have reviewed the following results:   Results from last 7 days   Lab Units 10/25/24  0457 10/24/24  1401 10/24/24  0450   WBC Thousand/uL 5.39  --  6.46   HEMOGLOBIN g/dL 8.1*   < > 7.9*   HEMATOCRIT % 26.3*   < > 25.4*   PLATELETS Thousands/uL 162  --  158   SEGS PCT %  --   --  67   LYMPHO PCT %  --   --  18   MONO PCT %  --   --  12   EOS PCT %  --   --  2    < > = values in this interval not displayed.     Results from last 7 days   Lab Units 10/25/24  0457 10/24/24  0450 10/23/24  1709   SODIUM mmol/L 142   < > 142   POTASSIUM mmol/L 3.8   < > 3.7   CHLORIDE mmol/L 109*   < > 110*   CO2 mmol/L 27   < > 25   BUN mg/dL 15   < > 15   CREATININE mg/dL 0.96   < > 0.93   ANION GAP mmol/L 6   < > 7   CALCIUM mg/dL 9.2   < > 9.7   ALBUMIN g/dL  --   --  4.0   TOTAL BILIRUBIN mg/dL  --   --  1.06*   ALK PHOS U/L  --   --  93   ALT U/L  --   --  15   AST U/L  --   --  21   GLUCOSE RANDOM mg/dL 84   < > 107    < > = values in this interval not displayed.         Results from last 7 days   Lab Units 10/23/24  1648   POC GLUCOSE mg/dl 110         Results from last 7 days   Lab Units 10/23/24  1709   LACTIC ACID mmol/L 1.7       Recent Cultures (last 7 days):         Imaging Results Review: No pertinent imaging studies reviewed.  Other Study Results Review:  No additional pertinent studies reviewed.    Last 24 Hours Medication List:     Current Facility-Administered Medications:     acetaminophen (TYLENOL) tablet 650 mg, Q4H PRN    apixaban (ELIQUIS) tablet 5 mg, BID    aspirin chewable tablet 81 mg, Daily    atorvastatin (LIPITOR) tablet 40 mg, Daily With Dinner    Cholecalciferol (VITAMIN D3) tablet 1,000 Units, Daily    escitalopram (LEXAPRO) tablet 10 mg, Daily    furosemide (LASIX) injection 50 mg, BID    HYDROmorphone HCl (DILAUDID) injection 0.2 mg, Q4H PRN    ofloxacin (OCUFLOX) 0.3 % ophthalmic solution 1 drop, 4x Daily    ondansetron (ZOFRAN) injection 4 mg, Q6H PRN    polyethylene glycol (MIRALAX) packet 17 g, Daily    potassium chloride (Klor-Con M20) CR tablet 20 mEq, BID    QUEtiapine (SEROquel) tablet 12.5 mg, HS    senna (SENOKOT) tablet 8.6 mg, Daily    thiamine tablet 100 mg, Daily    zonisamide (ZONEGRAN) capsule 100 mg, Daily    Administrative Statements   Today, Patient Was Seen By: CRISTO Richardson  I have spent a total time of 31 minutes in caring for this patient on the day of the visit/encounter including Diagnostic results, Risks and benefits of tx options, Instructions for management, Patient and family education, Importance of tx compliance, Risk factor reductions, Impressions, Counseling / Coordination of care, Documenting in the medical record, Reviewing / ordering tests, medicine, procedures  , and Obtaining or reviewing history  .    **Please Note: This note may have been constructed using a voice recognition system.**

## 2024-10-25 NOTE — ASSESSMENT & PLAN NOTE
Wt Readings from Last 3 Encounters:   10/25/24 88.9 kg (195 lb 15.8 oz)   05/30/24 87.5 kg (192 lb 14.4 oz)   05/02/23 78.6 kg (173 lb 3.2 oz)     Patient takes Lasix 40 mg daily.  Recently gained about 4 pounds at nursing facility where they were giving her extra doses of Lasix.      Chest x-ray with venous congestion.  Patient received 40 mg IV Lasix in the ED.  On initial presentation patient with increased work of breathing, now breathing even and non labored.     Symptoms improved, will resume home dosing lasix 40mg daily  Monitor BMP  Daily I&O, weights  Straight cath for urine  Follows with Dr. Cortez's office outpatient  Last Echo 12/223  EF 55-60%

## 2024-10-25 NOTE — ASSESSMENT & PLAN NOTE
Lab Results   Component Value Date    HGB 8.1 (L) 10/25/2024    HGB 8.2 (L) 10/24/2024    HGB 7.9 (L) 10/24/2024   Appears to be microcytic anemia gradually decreasing over the span of 7 months from baseline of hemoglobin 11 which has been steadily coming down since June 2024.  No recent surgery since May 2024 to explain loss of blood.  No colonoscopy in chart.       Likely iron deficiency anemia secondary to poor dietary intake  TSH, vitamin deficiencies, haptoglobin and LDH within normal limits  Hgb has been stable around 8  Will start supplemental iron  Transfuse for hgb <7  May consider stool FOBT  In setting of elderly with new onset anemia --May need evaluation of the GI tract based on shared decision making whether patient would like to pursue procedure or not outpatient (given poor functionality in dementia patient; significant difficulty with prep

## 2024-10-25 NOTE — ASSESSMENT & PLAN NOTE
Lab Results   Component Value Date    EGFR 53 10/25/2024    EGFR 52 10/24/2024    EGFR 55 10/23/2024    CREATININE 0.96 10/25/2024    CREATININE 0.97 10/24/2024    CREATININE 0.93 10/23/2024   Stable  Monitor BMP

## 2024-10-25 NOTE — PLAN OF CARE
Problem: Potential for Falls  Goal: Patient will remain free of falls  Description: INTERVENTIONS:  - Educate patient/family on patient safety including physical limitations  - Instruct patient to call for assistance with activity   - Consult OT/PT to assist with strengthening/mobility   - Keep Call bell within reach  - Keep bed low and locked with side rails adjusted as appropriate  - Keep care items and personal belongings within reach  - Initiate and maintain comfort rounds  - Make Fall Risk Sign visible to staff  - Offer Toileting every 2 Hours, in advance of need  - Initiate/Maintain bed alarm  - Obtain necessary fall risk management equipment: alarms  - Apply yellow socks and bracelet for high fall risk patients  - Consider moving patient to room near nurses station  Outcome: Progressing     Problem: PAIN - ADULT  Goal: Verbalizes/displays adequate comfort level or baseline comfort level  Description: Interventions:  - Encourage patient to monitor pain and request assistance  - Assess pain using appropriate pain scale  - Administer analgesics based on type and severity of pain and evaluate response  - Implement non-pharmacological measures as appropriate and evaluate response  - Consider cultural and social influences on pain and pain management  - Notify physician/advanced practitioner if interventions unsuccessful or patient reports new pain  Outcome: Progressing     Problem: INFECTION - ADULT  Goal: Absence or prevention of progression during hospitalization  Description: INTERVENTIONS:  - Assess and monitor for signs and symptoms of infection  - Monitor lab/diagnostic results  - Monitor all insertion sites, i.e. indwelling lines, tubes, and drains  - Monitor endotracheal if appropriate and nasal secretions for changes in amount and color  - [place] appropriate cooling/warming therapies per order  - Administer medications as ordered  - Instruct and encourage patient and family to use good hand hygiene  technique  - Identify and instruct in appropriate isolation precautions for identified infection/condition  Outcome: Progressing     Problem: SAFETY ADULT  Goal: Maintain or return to baseline ADL function  Description: INTERVENTIONS:  -  Assess patient's ability to carry out ADLs; assess patient's baseline for ADL function and identify physical deficits which impact ability to perform ADLs (bathing, care of mouth/teeth, toileting, grooming, dressing, etc.)  - Assess/evaluate cause of self-care deficits   - Assess range of motion  - Assess patient's mobility; develop plan if impaired  - Assess patient's need for assistive devices and provide as appropriate  - Encourage maximum independence but intervene and supervise when necessary  - Involve family in performance of ADLs  - Assess for home care needs following discharge   - Consider OT consult to assist with ADL evaluation and planning for discharge  - Provide patient education as appropriate  Outcome: Progressing     Problem: CARDIOVASCULAR - ADULT  Goal: Absence of cardiac dysrhythmias or at baseline rhythm  Description: INTERVENTIONS:  - Continuous cardiac monitoring, vital signs, obtain 12 lead EKG if ordered  - Administer antiarrhythmic and heart rate control medications as ordered  - Monitor electrolytes and administer replacement therapy as ordered  Outcome: Progressing     Problem: RESPIRATORY - ADULT  Goal: Achieves optimal ventilation and oxygenation  Description: INTERVENTIONS:  - Assess for changes in respiratory status  - Assess for changes in mentation and behavior  - Position to facilitate oxygenation and minimize respiratory effort  - Oxygen administered by appropriate delivery if ordered  - Initiate smoking cessation education as indicated  - Encourage broncho-pulmonary hygiene including cough, deep breathe, Incentive Spirometry  - Assess the need for suctioning and aspirate as needed  - Assess and instruct to report SOB or any respiratory  difficulty  - Respiratory Therapy support as indicated  Outcome: Progressing     Problem: GASTROINTESTINAL - ADULT  Goal: Maintains adequate nutritional intake  Description: INTERVENTIONS:  - Monitor percentage of each meal consumed  - Identify factors contributing to decreased intake, treat as appropriate  - Assist with meals as needed  - Monitor I&O, weight, and lab values if indicated  - Obtain nutrition services referral as needed  Outcome: Progressing     Problem: METABOLIC, FLUID AND ELECTROLYTES - ADULT  Goal: Fluid balance maintained  Description: INTERVENTIONS:  - Monitor labs   - Monitor I/O and WT  - Instruct patient on fluid and nutrition as appropriate  - Assess for signs & symptoms of volume excess or deficit  Outcome: Progressing     Problem: SKIN/TISSUE INTEGRITY - ADULT  Goal: Skin Integrity remains intact(Skin Breakdown Prevention)  Description: Assess:  -Perform Pola assessment every shift  -Clean and moisturize skin every shift  -Inspect skin when repositioning, toileting, and assisting with ADLS  -Assess under medical devices such as IV every shift  -Assess extremities for adequate circulation and sensation     Bed Management:  -Have minimal linens on bed & keep smooth, unwrinkled  -Change linens as needed when moist or perspiring  -Avoid sitting or lying in one position for more than 2 hours while in bed  -Keep HOB at 30 degrees     Toileting:  -Offer bedside commode  -Assess for incontinence every shift  -Use incontinent care products after each incontinent episode such as foam cleanser    Activity:  -Mobilize patient 3 times a day  -Encourage activity and walks on unit  -Encourage or provide ROM exercises   -Turn and reposition patient every 2 Hours  -Use appropriate equipment to lift or move patient in bed  -Instruct/ Assist with weight shifting every hour when out of bed in chair  -Consider limitation of chair time 3 hour intervals    Skin Care:  -Avoid use of baby powder, tape, friction  and shearing, hot water or constrictive clothing  -Relieve pressure over bony prominences using pillows  -Do not massage red bony areas    Next Steps:  -Teach patient strategies to minimize risks such as skin breakdown   -Consider consults to  interdisciplinary teams such as wound care  Outcome: Progressing  Goal: Incision(s), wounds(s) or drain site(s) healing without S/S of infection  Description: INTERVENTIONS  - Assess and document dressing, incision, wound bed, drain sites and surrounding tissue  - Provide patient and family education  - Perform skin care/dressing changes every shift  Outcome: Progressing     Problem: Prexisting or High Potential for Compromised Skin Integrity  Goal: Skin integrity is maintained or improved  Description: INTERVENTIONS:  - Identify patients at risk for skin breakdown  - Assess and monitor skin integrity  - Assess and monitor nutrition and hydration status  - Monitor labs   - Assess for incontinence   - Turn and reposition patient  - Assist with mobility/ambulation  - Relieve pressure over bony prominences  - Avoid friction and shearing  - Provide appropriate hygiene as needed including keeping skin clean and dry  - Evaluate need for skin moisturizer/barrier cream  - Collaborate with interdisciplinary team   - Patient/family teaching  - Consider wound care consult   Outcome: Progressing

## 2024-10-25 NOTE — CASE MANAGEMENT
Case Management Assessment & Discharge Planning Note    Patient name Eusebio Tate  Location [place] [address] /South 2 M* MRN [id number]  : 1935 Date 10/25/2024       Current Admission Date: 10/23/2024  Current Admission Diagnosis:Acute on chronic diastolic heart failure (HCC)   Patient Active Problem List    Diagnosis Date Noted Date Diagnosed    Microcytic anemia 10/24/2024     Bacterial conjunctivitis of left eye 10/24/2024     Advanced care planning/counseling discussion 10/24/2024     Severe concentric left ventricular hypertrophy 2024     Biatrial enlargement 2024     Peripheral vascular disease, unspecified (HCC) 2024     Stage 3b chronic kidney disease (HCC) 2024     Class 1 obesity due to excess calories without serious comorbidity with body mass index (BMI) of 30.0 to 30.9 in adult 2023     Nodule of middle lobe of right lung 2023     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     Prolonged Q-T interval on ECG 2023     Syncope 2023     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 behavioral disturbance, psychotic disturbance, mood disturbance, or anxiety (HCC) 2023     Sick sinus syndrome (HCC) 2023     Stroke-like symptoms 2023     History of stroke 01/10/2023     New onset seizure (HCC)      Post-stroke epilepsy 2022     Urinary retention 2022     Venous stasis dermatitis of both lower extremities 2022     Gait instability 2022     Other fatigue 2022     Atypical pneumonia 05/10/2022     Respiratory insufficiency 05/10/2022     Acute metabolic encephalopathy 05/10/2022     Acute on chronic diastolic heart failure (HCC) 05/10/2022     Thrombocytopenia (HCC) 05/10/2022     Other hyperlipidemia 10/06/2021     Other insomnia 10/06/2021     Skin lesion 10/04/2021     Action tremor 2020     Age-related osteoporosis without fracture 2020     Permanent atrial fibrillation (HCC) 04/10/2015     Chronic  anticoagulation 04/10/2015     Benign essential hypertension 04/11/2011       LOS (days): 2  Geometric Mean LOS (GMLOS) (days): 4.6  Days to GMLOS:3     OBJECTIVE:    Risk of Unplanned Readmission Score: 17.16         Current admission status: Inpatient  Referral Reason: Other, Rehab ([facility] with Palliative care)    Preferred Pharmacy:   [facility] - [place], [place] - [address]  [address] [address]  Phone: [phone] Fax: [phone]    [id number] - [place] - [address]  [address] [place] [address]  Phone: [phone] Fax: [phone]    [facility] - [place], [place] - [address]  [address] [place] [address]  Phone: [phone] Fax: [phone]    Primary Care Provider: Daniel Birch MD    Primary Insurance: [facility]  Secondary Insurance:     ASSESSMENT:  Active Health Care Proxies       Matt Tate Health Care Representative - Son   Primary Phone: [phone] (Mobile)                 Advance Directives  Does patient have a Health Care POA?: No  Does patient have Advance Directives?: Yes  Advance Directives: Living will  Primary Contact: Bebeto,Matt (Son)  [phone] (Home Phone)    Readmission Root Cause  30 Day Readmission: No    Patient Information  Admitted from:: Facility ([facility])  Mental Status: Confused  During Assessment patient was accompanied by: Daughter, Son  Assessment information provided by:: Son, Daughter  Primary Caregiver: Other (Comment)  Caregiver's Name:: [facility]  Caregiver's Relationship to Patient:: Facility Staff  Caregiver's Telephone Number:: [phone]  Support Systems: Children, Family members  County of Residence: [place]  What city do you live in?: [place]  Home entry access options. Select all that apply.: No steps to enter home  Type of Current Residence: Facility ([facility])  Living  Arrangements: Lives in Facility  Is patient a ?: No    Activities of Daily Living Prior to Admission  Functional Status: Assistance (Sit-pivot transfer for mobility)  Completes ADLs independently?: No  Level of ADL dependence: Assistance (Assists with bathing and dressing routines)  Ambulates independently?: No  Level of ambulatory dependence: Assistance  Does patient use assisted devices?: Yes  Assisted Devices (DME) used: Walker, Wheelchair  Does patient currently own DME?: Yes  What DME does the patient currently own?: Walker, Wheelchair  Does patient have a history of Outpatient Therapy (PT/OT)?: No  Does the patient have a history of Short-Term Rehab?: Yes ([facility])  Does patient have a history of HHC?: No  Does patient currently have HHC?: No    Patient Information Continued  Income Source: SSI/SSD (Pension)  Does patient have prescription coverage?: Yes  Does patient receive dialysis treatments?: No  Does patient have a history of substance abuse?: No  Does patient have a history of Mental Health Diagnosis?: No    Means of Transportation  Means of Transport to Appts:: Other (Comment) (Facility or family transportation)      Social Determinants of Health (SDOH)      Flowsheet Row Most Recent Value   Housing Stability    In the last 12 months, was there a time when you were not able to pay the mortgage or rent on time? N   In the past 12 months, how many times have you moved where you were living? 0   At any time in the past 12 months, were you homeless or living in a shelter (including now)? N   Transportation Needs    In the past 12 months, has lack of transportation kept you from medical appointments or from getting medications? no   In the past 12 months, has lack of transportation kept you from meetings, work, or from getting things needed for daily living? No   Food Insecurity    Within the past 12 months, you worried that your food would run out before you got the  money to buy more. Never true   Within the past 12 months, the food you bought just didn't last and you didn't have money to get more. Never true   Utilities    In the past 12 months has the electric, gas, oil, or water company threatened to shut off services in your home? No        DISCHARGE DETAILS:    Discharge planning discussed with:: Patient's daughter, Nallely and sonHeladio  Freedom of Choice: Yes  Comments - Freedom of Choice: CM discussed follow up for STR, patient's son and daugther in agreement with STR. CM made referral in A[place], CM to follow.  CM contacted family/caregiver?: Yes (Patient's family at bedside)  Were Treatment Team discharge recommendations reviewed with patient/caregiver?: Yes  Did patient/caregiver verbalize understanding of patient care needs?: N/A- going to facility       Contacts  Patient Contacts: son Matt Tate  Relationship to Patient:: Family  Contact Method: Phone  Phone Number: [phone]  Reason/Outcome: Continuity of Care, Emergency Contact, Discharge Planning    Requested Home Health Care         Is the patient interested in HHC at discharge?: No    DME Referral Provided  Referral made for DME?: No    Other Referral/Resources/Interventions Provided:  Interventions: Short Term Rehab  Referral Comments: STR- Referrals made in Aidin    Treatment Team Recommendation: Short Term Rehab  Discharge Destination Plan:: Short Term Rehab    Additional Comments: CM spoke with patient's sonMatt and daughterHannah Browning to complete assessment at bedside. CM followed up with nursing supervisor, Es from [facility] (PH: [phone]); she confirmed paitent would need to be baseline level of functioning to return to [facility], with stand, pivot, transfer. Patient has walker and wheelchair at facility. CM reviewed IP PT notes. CM discussed STR referral with patient's family at bedside, CM confirmed patient's family agreeable. CM made referrals in Aidin.

## 2024-10-25 NOTE — SPEECH THERAPY NOTE
Speech Language/Pathology    Speech/Language Pathology Progress Note    Patient Name: Eusebio Tate  Today's Date: 10/25/2024     Problem List  Principal Problem:    Acute on chronic diastolic heart failure (HCC)  Active Problems:    Age-related osteoporosis without fracture    Benign essential hypertension    Permanent atrial fibrillation (HCC)    Acute metabolic encephalopathy    Post-stroke epilepsy    History of stroke    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 behavioral disturbance, psychotic disturbance, mood disturbance, or anxiety ([facility])    Stage 3b chronic kidney disease ([facility])    Microcytic anemia    Bacterial conjunctivitis of left eye    Advanced care planning/counseling discussion       Past Medical History  Past Medical History:   Diagnosis Date    A-fib ([facility])     CAD (coronary artery disease)     CHF (congestive heart failure) (HCC)     CKD (chronic kidney disease) stage 2, GFR 60-89 ml/min     Dementia (HCC)     Hypertension     Hypokalemia     Knee effusion     Memory loss     Seizure ([facility])     Urinary tract infection         Past Surgical History  Past Surgical History:   Procedure Laterality Date    CARDIAC ELECTROPHYSIOLOGY PROCEDURE N/A 1/26/2023    Procedure: Cardiac pacer implant;  Surgeon: Francisco Georges DO;  Location: [facility] CARDIAC CATH LAB;  Service: Cardiology    CHOLECYSTECTOMY      REPLACEMENT TOTAL KNEE Left 2008         Subjective:  Pt alert and verbal, family visiting.   Objective:  Seen for dysphagia f/u. Pt able to feed self today. She was eating toast and scrambled eggs when I arrived. Satting at 96 on RA. No complaints, wants to get out of the hospital. No issues w/ mastication, transfer, or swallow. No cough or wet vocal quality.   Assessment:  Appears to be at or close to baseline as far as oral/pharyngeal stages..   Plan/Recommendations:  D/c ST. Regular diet w/ thin liquids. Meds as desired/tolerated.

## 2024-10-25 NOTE — ASSESSMENT & PLAN NOTE
Patient is in dementia unit with multiple comorbidities and alzheimers Aox0 at baseline. No POA but 3 children who make decisions and would like to know about palliative and hospice.     - Palliative care   GOC discussion with family, would like palliative care going forward, not ready for end of life care at this time  Case management to assist with dispo

## 2024-10-26 PROCEDURE — 99232 SBSQ HOSP IP/OBS MODERATE 35: CPT

## 2024-10-26 RX ORDER — FERROUS SULFATE 325(65) MG
325 TABLET ORAL 2 TIMES DAILY WITH MEALS
Status: DISCONTINUED | OUTPATIENT
Start: 2024-10-26 | End: 2024-10-31 | Stop reason: HOSPADM

## 2024-10-26 RX ORDER — FUROSEMIDE 10 MG/ML
50 INJECTION INTRAMUSCULAR; INTRAVENOUS DAILY
Status: DISCONTINUED | OUTPATIENT
Start: 2024-10-27 | End: 2024-10-27

## 2024-10-26 RX ADMIN — OFLOXACIN 1 DROP: 3 SOLUTION/ DROPS OPHTHALMIC at 17:52

## 2024-10-26 RX ADMIN — ASPIRIN 81 MG CHEWABLE TABLET 81 MG: 81 TABLET CHEWABLE at 09:43

## 2024-10-26 RX ADMIN — POTASSIUM CHLORIDE 20 MEQ: 1500 TABLET, EXTENDED RELEASE ORAL at 09:43

## 2024-10-26 RX ADMIN — APIXABAN 5 MG: 5 TABLET, FILM COATED ORAL at 17:52

## 2024-10-26 RX ADMIN — APIXABAN 5 MG: 5 TABLET, FILM COATED ORAL at 09:43

## 2024-10-26 RX ADMIN — QUETIAPINE FUMARATE 12.5 MG: 25 TABLET ORAL at 21:37

## 2024-10-26 RX ADMIN — ZONISAMIDE 100 MG: 100 CAPSULE ORAL at 09:47

## 2024-10-26 RX ADMIN — OFLOXACIN 1 DROP: 3 SOLUTION/ DROPS OPHTHALMIC at 12:11

## 2024-10-26 RX ADMIN — OFLOXACIN 1 DROP: 3 SOLUTION/ DROPS OPHTHALMIC at 21:37

## 2024-10-26 RX ADMIN — POLYETHYLENE GLYCOL 3350 17 G: 17 POWDER, FOR SOLUTION ORAL at 09:43

## 2024-10-26 RX ADMIN — Medication 1000 UNITS: at 09:43

## 2024-10-26 RX ADMIN — SENNOSIDES 8.6 MG: 8.6 TABLET, FILM COATED ORAL at 09:43

## 2024-10-26 RX ADMIN — FUROSEMIDE 50 MG: 10 INJECTION, SOLUTION INTRAVENOUS at 09:43

## 2024-10-26 RX ADMIN — THIAMINE HCL TAB 100 MG 100 MG: 100 TAB at 09:43

## 2024-10-26 RX ADMIN — ACETAMINOPHEN 650 MG: 325 TABLET, FILM COATED ORAL at 20:40

## 2024-10-26 RX ADMIN — OFLOXACIN 1 DROP: 3 SOLUTION/ DROPS OPHTHALMIC at 09:47

## 2024-10-26 RX ADMIN — FERROUS SULFATE TAB 325 MG (65 MG ELEMENTAL FE) 325 MG: 325 (65 FE) TAB at 17:52

## 2024-10-26 RX ADMIN — ATORVASTATIN CALCIUM 40 MG: 40 TABLET, FILM COATED ORAL at 17:52

## 2024-10-26 RX ADMIN — POTASSIUM CHLORIDE 20 MEQ: 1500 TABLET, EXTENDED RELEASE ORAL at 17:52

## 2024-10-26 RX ADMIN — ESCITALOPRAM OXALATE 10 MG: 10 TABLET ORAL at 09:43

## 2024-10-26 RX ADMIN — FERROUS SULFATE TAB 325 MG (65 MG ELEMENTAL FE) 325 MG: 325 (65 FE) TAB at 09:43

## 2024-10-26 NOTE — ASSESSMENT & PLAN NOTE
Lab Results   Component Value Date    HGB 8.1 (L) 10/25/2024    HGB 8.2 (L) 10/24/2024    HGB 7.9 (L) 10/24/2024   Appears to be microcytic anemia gradually decreasing over the span of 7 months from baseline of hemoglobin 11 which has been steadily coming down since June 2024.  No recent surgery since May 2024 to explain loss of blood.  No colonoscopy in chart.       Likely iron deficiency anemia secondary to poor dietary intake  TSH, vitamin deficiencies, haptoglobin and LDH within normal limits  Hgb has been stable around 8  Increase p.o. iron to twice daily  Transfuse for hgb <7  Continue reviewing need for further workup, and discussion with family, family will decline endoscopic eval if warranted if hemoglobin continues to drop

## 2024-10-26 NOTE — CASE MANAGEMENT
Case Management Progress Note    Patient name Eusebio Tate  Location [place] [facility] /[place] 2 M* MRN [id number]  : 1935 Date 10/26/2024       LOS (days): 3  Geometric Mean LOS (GMLOS) (days): 4.6  Days to GMLOS:1.8        OBJECTIVE:        Current admission status: Inpatient  Preferred Pharmacy:   [facility] - [place], [place] - [address] [place] [address]  [address] [address]  Phone: [phone] Fax: [phone]    [id number] - [place] - [address]   [address] [place] [address]  Phone: [phone] Fax: [phone]    [facility] - [place], [place] - [address]  [address] [facility] [place] [address]  Phone: [phone] Fax: [phone]    Primary Care Provider: Daniel Birch MD    Primary Insurance: [facility]  Secondary Insurance:     PROGRESS NOTE:    Per [facility]. CM called patient's son at [phone] and left a  to provide the update. Once a facility is chosen and patient is medically cleared, auth can be submitted.

## 2024-10-26 NOTE — PLAN OF CARE
Problem: Potential for Falls  Goal: Patient will remain free of falls  Description: INTERVENTIONS:  - Educate patient/family on patient safety including physical limitations  - Instruct patient to call for assistance with activity   - Consult OT/PT to assist with strengthening/mobility   - Keep Call bell within reach  - Keep bed low and locked with side rails adjusted as appropriate  - Keep care items and personal belongings within reach  - Initiate and maintain comfort rounds  - Make Fall Risk Sign visible to staff  - Offer Toileting every 2 Hours, in advance of need  - Initiate/Maintain bed alarm  - Obtain necessary fall risk management equipment: alarms  - Apply yellow socks and bracelet for high fall risk patients  - Consider moving patient to room near nurses station  Outcome: Progressing     Problem: PAIN - ADULT  Goal: Verbalizes/displays adequate comfort level or baseline comfort level  Description: Interventions:  - Encourage patient to monitor pain and request assistance  - Assess pain using appropriate pain scale  - Administer analgesics based on type and severity of pain and evaluate response  - Implement non-pharmacological measures as appropriate and evaluate response  - Consider cultural and social influences on pain and pain management  - Notify physician/advanced practitioner if interventions unsuccessful or patient reports new pain  Outcome: Progressing     Problem: INFECTION - ADULT  Goal: Absence or prevention of progression during hospitalization  Description: INTERVENTIONS:  - Assess and monitor for signs and symptoms of infection  - Monitor lab/diagnostic results  - Monitor all insertion sites, i.e. indwelling lines, tubes, and drains  - Monitor endotracheal if appropriate and nasal secretions for changes in amount and color  - [place] appropriate cooling/warming therapies per order  - Administer medications as ordered  - Instruct and encourage patient and family to use good hand hygiene  technique  - Identify and instruct in appropriate isolation precautions for identified infection/condition  Outcome: Progressing     Problem: SAFETY ADULT  Goal: Maintain or return to baseline ADL function  Description: INTERVENTIONS:  -  Assess patient's ability to carry out ADLs; assess patient's baseline for ADL function and identify physical deficits which impact ability to perform ADLs (bathing, care of mouth/teeth, toileting, grooming, dressing, etc.)  - Assess/evaluate cause of self-care deficits   - Assess range of motion  - Assess patient's mobility; develop plan if impaired  - Assess patient's need for assistive devices and provide as appropriate  - Encourage maximum independence but intervene and supervise when necessary  - Involve family in performance of ADLs  - Assess for home care needs following discharge   - Consider OT consult to assist with ADL evaluation and planning for discharge  - Provide patient education as appropriate  Outcome: Progressing     Problem: CARDIOVASCULAR - ADULT  Goal: Absence of cardiac dysrhythmias or at baseline rhythm  Description: INTERVENTIONS:  - Continuous cardiac monitoring, vital signs, obtain 12 lead EKG if ordered  - Administer antiarrhythmic and heart rate control medications as ordered  - Monitor electrolytes and administer replacement therapy as ordered  Outcome: Progressing     Problem: RESPIRATORY - ADULT  Goal: Achieves optimal ventilation and oxygenation  Description: INTERVENTIONS:  - Assess for changes in respiratory status  - Assess for changes in mentation and behavior  - Position to facilitate oxygenation and minimize respiratory effort  - Oxygen administered by appropriate delivery if ordered  - Initiate smoking cessation education as indicated  - Encourage broncho-pulmonary hygiene including cough, deep breathe, Incentive Spirometry  - Assess the need for suctioning and aspirate as needed  - Assess and instruct to report SOB or any respiratory  difficulty  - Respiratory Therapy support as indicated  Outcome: Progressing     Problem: GASTROINTESTINAL - ADULT  Goal: Maintains adequate nutritional intake  Description: INTERVENTIONS:  - Monitor percentage of each meal consumed  - Identify factors contributing to decreased intake, treat as appropriate  - Assist with meals as needed  - Monitor I&O, weight, and lab values if indicated  - Obtain nutrition services referral as needed  Outcome: Progressing     Problem: METABOLIC, FLUID AND ELECTROLYTES - ADULT  Goal: Fluid balance maintained  Description: INTERVENTIONS:  - Monitor labs   - Monitor I/O and WT  - Instruct patient on fluid and nutrition as appropriate  - Assess for signs & symptoms of volume excess or deficit  Outcome: Progressing     Problem: SKIN/TISSUE INTEGRITY - ADULT  Goal: Skin Integrity remains intact(Skin Breakdown Prevention)  Description: Assess:  -Perform Pola assessment every shift  -Clean and moisturize skin every shift  -Inspect skin when repositioning, toileting, and assisting with ADLS  -Assess under medical devices such as IV every shift  -Assess extremities for adequate circulation and sensation     Bed Management:  -Have minimal linens on bed & keep smooth, unwrinkled  -Change linens as needed when moist or perspiring  -Avoid sitting or lying in one position for more than 2 hours while in bed  -Keep HOB at 30 degrees     Toileting:  -Offer bedside commode  -Assess for incontinence every shift  -Use incontinent care products after each incontinent episode such as foam cleanser    Activity:  -Mobilize patient 3 times a day  -Encourage activity and walks on unit  -Encourage or provide ROM exercises   -Turn and reposition patient every 2 Hours  -Use appropriate equipment to lift or move patient in bed  -Instruct/ Assist with weight shifting every hour when out of bed in chair  -Consider limitation of chair time 3 hour intervals    Skin Care:  -Avoid use of baby powder, tape, friction  and shearing, hot water or constrictive clothing  -Relieve pressure over bony prominences using pillows  -Do not massage red bony areas    Next Steps:  -Teach patient strategies to minimize risks such as skin breakdown   -Consider consults to  interdisciplinary teams such as wound care  Outcome: Progressing  Goal: Incision(s), wounds(s) or drain site(s) healing without S/S of infection  Description: INTERVENTIONS  - Assess and document dressing, incision, wound bed, drain sites and surrounding tissue  - Provide patient and family education  - Perform skin care/dressing changes every shift  Outcome: Progressing     Problem: Prexisting or High Potential for Compromised Skin Integrity  Goal: Skin integrity is maintained or improved  Description: INTERVENTIONS:  - Identify patients at risk for skin breakdown  - Assess and monitor skin integrity  - Assess and monitor nutrition and hydration status  - Monitor labs   - Assess for incontinence   - Turn and reposition patient  - Assist with mobility/ambulation  - Relieve pressure over bony prominences  - Avoid friction and shearing  - Provide appropriate hygiene as needed including keeping skin clean and dry  - Evaluate need for skin moisturizer/barrier cream  - Collaborate with interdisciplinary team   - Patient/family teaching  - Consider wound care consult   Outcome: Progressing

## 2024-10-26 NOTE — PROGRESS NOTES
Progress Note - Hospitalist   Name: Eusebio Tate 89 y.o. female I MRN: [id number]  Unit/Bed#: [place] [address] -01 I Date of Admission: 10/23/2024   Date of Service: 10/26/2024 I Hospital Day: 3    Assessment & Plan  Acute on chronic diastolic heart failure (HCC)  Wt Readings from Last 3 Encounters:   10/26/24 88.7 kg (195 lb 8.8 oz)   05/30/24 87.5 kg (192 lb 14.4 oz)   05/02/23 78.6 kg (173 lb 3.2 oz)     Patient takes Lasix 40 mg daily.  Recently gained about 4 pounds at nursing facility where they were giving her extra doses of Lasix.      Chest x-ray with venous congestion.  Patient received 40 mg IV Lasix in the ED.       Symptoms improved, past 2 days was given 50 mg of IV Lasix twice daily, will decrease to 50 mg once a day IV  Monitor BMP  Daily I&O, weights  Straight cath for urine  Follows with Dr. Cortez's office outpatient  Last Echo 12/223  EF 55-60%    Age-related osteoporosis without fracture  Continue on calcium and vitamin D and zonisamide 100 mg daily   Benign essential hypertension  Monitor  Continue lasix  Permanent atrial fibrillation (HCC)  A-fib-patient is rate controlled on Eliquis for anticoagulation   Acute metabolic encephalopathy  Acute metabolic encephalopathy since on admission.  Patient found to be more tired than usual.  At baseline she is alert and oriented x 0, unable to perform full sentences for past 2 years.  Family states that patient is back to her own baseline at the time of evaluation.  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 No POA but 3 children who make decisions and would like to know  about palliative and hospice.     - Palliative care   [facility] discussion with family, would like palliative care going forward, not ready for end of life care at this time  Case management to assist with dispo  Stage 3b chronic kidney disease (HCC)  Lab Results   Component Value Date    EGFR 53 10/25/2024    EGFR 52 10/24/2024    EGFR 55 10/23/2024    CREATININE 0.96 10/25/2024    CREATININE 0.97 10/24/2024    CREATININE 0.93 10/23/2024   Stable  Monitor BMP  Microcytic anemia  Lab Results   Component Value Date    HGB 8.1 (L) 10/25/2024    HGB 8.2 (L) 10/24/2024    HGB 7.9 (L) 10/24/2024   Appears to be microcytic anemia gradually decreasing over the span of 7 months from baseline of hemoglobin 11 which has been steadily coming down since June 2024.  No recent surgery since May 2024 to explain loss of blood.  No colonoscopy in chart.       Likely iron deficiency anemia secondary to poor dietary intake  TSH, vitamin deficiencies, haptoglobin and LDH within normal limits  Hgb has been stable around 8  Increase p.o. iron to twice daily  Transfuse for hgb <7  Continue reviewing need for further workup, and discussion with family, family will decline endoscopic eval if warranted if hemoglobin continues to drop    Bacterial conjunctivitis of left eye  Purulent crusting of left eye.,  Resolved continue with current treatment  Continue Ofloxacin drops  Advanced care planning/counseling discussion      VTE Pharmacologic Prophylaxis: VTE Score: 5 High Risk (Score >/= 5) - Pharmacological DVT Prophylaxis Ordered: apixaban (Eliquis). Sequential Compression Devices Ordered.    Mobility:   Basic Mobility Inpatient Raw Score: 6  -HL Goal: 2: Bed activities/Dependent transfer  -HLM Achieved: 1: Laying in bed  -HLM Goal achieved. Continue to encourage appropriate mobility.    Patient Centered Rounds: I performed bedside rounds with nursing staff today.   Discussions with Specialists or Other Care Team Provider: CM    Education  and Discussions with Family / Patient: Updated  (son and daughter) at bedside.    Current Length of Stay: 3 day(s)  Current Patient Status: Inpatient   Certification Statement: The patient will continue to require additional inpatient hospital stay due to continued treatment CHF with IV diuretics  Discharge Plan: Anticipate discharge in 24-48 hrs to discharge location to be determined pending rehab evaluations.    Code Status: Level 3 - DNAR and DNI    Subjective   Patient is a poor historian and cannot answer subjective questions    Objective :  Temp:  [97.8 °F (36.6 °C)-99.1 °F (37.3 °C)] 97.9 °F (36.6 °C)  HR:  [61-71] 61  BP: ()/(53-86) 131/72  Resp:  [18] 18  SpO2:  [94 %-96 %] 95 %  O2 Device: None (Room air)    Body mass index is 35.77 kg/m².     Input and Output Summary (last 24 hours):     Intake/Output Summary (Last 24 hours) at 10/26/2024 1140  Last data filed at 10/26/2024 0647  Gross per 24 hour   Intake --   Output 1546 ml   Net -1546 ml       Physical Exam  Vitals and nursing note reviewed.   Constitutional:       General: She is not in acute distress.     Appearance: She is normal weight. She is not ill-appearing, toxic-appearing or diaphoretic.   HENT:      Head: Normocephalic.      Nose: Nose normal.      Mouth/Throat:      Mouth: Mucous membranes are moist.      Pharynx: Oropharynx is clear.   Eyes:      General: No scleral icterus.     Conjunctiva/sclera: Conjunctivae normal.      Pupils: Pupils are equal, round, and reactive to light.   Cardiovascular:      Rate and Rhythm: Normal rate and regular rhythm.      Heart sounds: Murmur heard.      No friction rub. No gallop.   Pulmonary:      Effort: Pulmonary effort is normal. No respiratory distress.      Breath sounds: No stridor. No wheezing, rhonchi or rales.      Comments: Tachypneic on exam, respiratory rate 32, lying flat in bed  Abdominal:      General: Abdomen is flat.      Palpations: Abdomen is soft.   Musculoskeletal:          General: Normal range of motion.      Cervical back: Normal range of motion and neck supple.      Right lower leg: Edema (Nonpitting) present.      Left lower leg: Edema (Nonpitting) present.   Lymphadenopathy:      Cervical: No cervical adenopathy.   Skin:     General: Skin is warm.      Coloration: Skin is not jaundiced or pale.      Findings: No bruising, erythema or lesion.   Neurological:      General: No focal deficit present.      Mental Status: She is alert and oriented to person, place, and time. Mental status is at baseline.      Cranial Nerves: No cranial nerve deficit.      Motor: No weakness.   Psychiatric:         Mood and Affect: Mood normal.         Behavior: Behavior normal.         Thought Content: Thought content normal.           Lines/Drains:  Lines/Drains/Airways       Active Status       Name Placement date Placement time Site Days    External Urinary Catheter 10/25/24  1000  -- 1                            Lab Results: I have reviewed the following results:   Results from last 7 days   Lab Units 10/25/24  0457 10/24/24  1401 10/24/24  0450   WBC Thousand/uL 5.39  --  6.46   HEMOGLOBIN g/dL 8.1*   < > 7.9*   HEMATOCRIT % 26.3*   < > 25.4*   PLATELETS Thousands/uL 162  --  158   SEGS PCT %  --   --  67   LYMPHO PCT %  --   --  18   MONO PCT %  --   --  12   EOS PCT %  --   --  2    < > = values in this interval not displayed.     Results from last 7 days   Lab Units 10/25/24  0457 10/24/24  0450 10/23/24  1709   SODIUM mmol/L 142   < > 142   POTASSIUM mmol/L 3.8   < > 3.7   CHLORIDE mmol/L 109*   < > 110*   CO2 mmol/L 27   < > 25   BUN mg/dL 15   < > 15   CREATININE mg/dL 0.96   < > 0.93   ANION GAP mmol/L 6   < > 7   CALCIUM mg/dL 9.2   < > 9.7   ALBUMIN g/dL  --   --  4.0   TOTAL BILIRUBIN mg/dL  --   --  1.06*   ALK PHOS U/L  --   --  93   ALT U/L  --   --  15   AST U/L  --   --  21   GLUCOSE RANDOM mg/dL 84   < > 107    < > = values in this interval not displayed.         Results  from last 7 days   Lab Units 10/23/24  1648   POC GLUCOSE mg/dl 110         Results from last 7 days   Lab Units 10/23/24  1709   LACTIC ACID mmol/L 1.7       Recent Cultures (last 7 days):         Imaging Results Review: I reviewed radiology reports from this admission including: chest xray.  Other Study Results Review: No additional pertinent studies reviewed.    Last 24 Hours Medication List:     Current Facility-Administered Medications:     acetaminophen (TYLENOL) tablet 650 mg, Q4H PRN    apixaban (ELIQUIS) tablet 5 mg, BID    aspirin chewable tablet 81 mg, Daily    atorvastatin (LIPITOR) tablet 40 mg, Daily With Dinner    Cholecalciferol (VITAMIN D3) tablet 1,000 Units, Daily    escitalopram (LEXAPRO) tablet 10 mg, Daily    ferrous sulfate tablet 325 mg, BID With Meals    [START ON 10/27/2024] furosemide (LASIX) injection 50 mg, Daily    HYDROmorphone HCl (DILAUDID) injection 0.2 mg, Q4H PRN    ofloxacin (OCUFLOX) 0.3 % ophthalmic solution 1 drop, 4x Daily    ondansetron (ZOFRAN) injection 4 mg, Q6H PRN    polyethylene glycol (MIRALAX) packet 17 g, Daily    potassium chloride (Klor-Con M20) CR tablet 20 mEq, BID    QUEtiapine (SEROquel) tablet 12.5 mg, HS    senna (SENOKOT) tablet 8.6 mg, Daily    thiamine tablet 100 mg, Daily    zonisamide (ZONEGRAN) capsule 100 mg, Daily    Administrative Statements   Today, Patient Was Seen By: Jhony Brooke PA-C  I have spent a total time of 35 minutes in caring for this patient on the day of the visit/encounter including Prognosis, Reviewing / ordering tests, medicine, procedures  , Obtaining or reviewing history  , and Communicating with other healthcare professionals .    **Please Note: This note may have been constructed using a voice recognition system.**

## 2024-10-26 NOTE — ASSESSMENT & PLAN NOTE
Wt Readings from Last 3 Encounters:   10/26/24 88.7 kg (195 lb 8.8 oz)   05/30/24 87.5 kg (192 lb 14.4 oz)   05/02/23 78.6 kg (173 lb 3.2 oz)     Patient takes Lasix 40 mg daily.  Recently gained about 4 pounds at nursing facility where they were giving her extra doses of Lasix.      Chest x-ray with venous congestion.  Patient received 40 mg IV Lasix in the ED.       Symptoms improved, past 2 days was given 50 mg of IV Lasix twice daily, will decrease to 50 mg once a day IV  Monitor BMP  Daily I&O, weights  Straight cath for urine  Follows with Dr. Cortez's office outpatient  Last Echo 12/223  EF 55-60%

## 2024-10-26 NOTE — PLAN OF CARE
Problem: Potential for Falls  Goal: Patient will remain free of falls  Description: INTERVENTIONS:  - Educate patient/family on patient safety including physical limitations  - Instruct patient to call for assistance with activity   - Consult OT/PT to assist with strengthening/mobility   - Keep Call bell within reach  - Keep bed low and locked with side rails adjusted as appropriate  - Keep care items and personal belongings within reach  - Initiate and maintain comfort rounds  - Make Fall Risk Sign visible to staff  - Offer Toileting every 2 Hours, in advance of need  - Initiate/Maintain bed alarm  - Obtain necessary fall risk management equipment: alarms  - Apply yellow socks and bracelet for high fall risk patients  - Consider moving patient to room near nurses station  Outcome: Progressing     Problem: PAIN - ADULT  Goal: Verbalizes/displays adequate comfort level or baseline comfort level  Description: Interventions:  - Encourage patient to monitor pain and request assistance  - Assess pain using appropriate pain scale  - Administer analgesics based on type and severity of pain and evaluate response  - Implement non-pharmacological measures as appropriate and evaluate response  - Consider cultural and social influences on pain and pain management  - Notify physician/advanced practitioner if interventions unsuccessful or patient reports new pain  Outcome: Progressing     Problem: INFECTION - ADULT  Goal: Absence or prevention of progression during hospitalization  Description: INTERVENTIONS:  - Assess and monitor for signs and symptoms of infection  - Monitor lab/diagnostic results  - Monitor all insertion sites, i.e. indwelling lines, tubes, and drains  - Monitor endotracheal if appropriate and nasal secretions for changes in amount and color  - [place] appropriate cooling/warming therapies per order  - Administer medications as ordered  - Instruct and encourage patient and family to use good hand hygiene  technique  - Identify and instruct in appropriate isolation precautions for identified infection/condition  Outcome: Progressing     Problem: SAFETY ADULT  Goal: Maintain or return to baseline ADL function  Description: INTERVENTIONS:  -  Assess patient's ability to carry out ADLs; assess patient's baseline for ADL function and identify physical deficits which impact ability to perform ADLs (bathing, care of mouth/teeth, toileting, grooming, dressing, etc.)  - Assess/evaluate cause of self-care deficits   - Assess range of motion  - Assess patient's mobility; develop plan if impaired  - Assess patient's need for assistive devices and provide as appropriate  - Encourage maximum independence but intervene and supervise when necessary  - Involve family in performance of ADLs  - Assess for home care needs following discharge   - Consider OT consult to assist with ADL evaluation and planning for discharge  - Provide patient education as appropriate  Outcome: Progressing     Problem: CARDIOVASCULAR - ADULT  Goal: Absence of cardiac dysrhythmias or at baseline rhythm  Description: INTERVENTIONS:  - Continuous cardiac monitoring, vital signs, obtain 12 lead EKG if ordered  - Administer antiarrhythmic and heart rate control medications as ordered  - Monitor electrolytes and administer replacement therapy as ordered  Outcome: Progressing     Problem: RESPIRATORY - ADULT  Goal: Achieves optimal ventilation and oxygenation  Description: INTERVENTIONS:  - Assess for changes in respiratory status  - Assess for changes in mentation and behavior  - Position to facilitate oxygenation and minimize respiratory effort  - Oxygen administered by appropriate delivery if ordered  - Initiate smoking cessation education as indicated  - Encourage broncho-pulmonary hygiene including cough, deep breathe, Incentive Spirometry  - Assess the need for suctioning and aspirate as needed  - Assess and instruct to report SOB or any respiratory  difficulty  - Respiratory Therapy support as indicated  Outcome: Progressing     Problem: GASTROINTESTINAL - ADULT  Goal: Maintains adequate nutritional intake  Description: INTERVENTIONS:  - Monitor percentage of each meal consumed  - Identify factors contributing to decreased intake, treat as appropriate  - Assist with meals as needed  - Monitor I&O, weight, and lab values if indicated  - Obtain nutrition services referral as needed  Outcome: Progressing     Problem: METABOLIC, FLUID AND ELECTROLYTES - ADULT  Goal: Fluid balance maintained  Description: INTERVENTIONS:  - Monitor labs   - Monitor I/O and WT  - Instruct patient on fluid and nutrition as appropriate  - Assess for signs & symptoms of volume excess or deficit  Outcome: Progressing     Problem: SKIN/TISSUE INTEGRITY - ADULT  Goal: Skin Integrity remains intact(Skin Breakdown Prevention)  Description: Assess:  -Perform Pola assessment every shift  -Clean and moisturize skin every shift  -Inspect skin when repositioning, toileting, and assisting with ADLS  -Assess under medical devices such as IV every shift  -Assess extremities for adequate circulation and sensation     Bed Management:  -Have minimal linens on bed & keep smooth, unwrinkled  -Change linens as needed when moist or perspiring  -Avoid sitting or lying in one position for more than 2 hours while in bed  -Keep HOB at 30 degrees     Toileting:  -Offer bedside commode  -Assess for incontinence every shift  -Use incontinent care products after each incontinent episode such as foam cleanser    Activity:  -Mobilize patient 3 times a day  -Encourage activity and walks on unit  -Encourage or provide ROM exercises   -Turn and reposition patient every 2 Hours  -Use appropriate equipment to lift or move patient in bed  -Instruct/ Assist with weight shifting every hour when out of bed in chair  -Consider limitation of chair time 3 hour intervals    Skin Care:  -Avoid use of baby powder, tape, friction  and shearing, hot water or constrictive clothing  -Relieve pressure over bony prominences using pillows  -Do not massage red bony areas    Next Steps:  -Teach patient strategies to minimize risks such as skin breakdown   -Consider consults to  interdisciplinary teams such as wound care  Outcome: Progressing  Goal: Incision(s), wounds(s) or drain site(s) healing without S/S of infection  Description: INTERVENTIONS  - Assess and document dressing, incision, wound bed, drain sites and surrounding tissue  - Provide patient and family education  - Perform skin care/dressing changes every shift  Outcome: Progressing     Problem: Prexisting or High Potential for Compromised Skin Integrity  Goal: Skin integrity is maintained or improved  Description: INTERVENTIONS:  - Identify patients at risk for skin breakdown  - Assess and monitor skin integrity  - Assess and monitor nutrition and hydration status  - Monitor labs   - Assess for incontinence   - Turn and reposition patient  - Assist with mobility/ambulation  - Relieve pressure over bony prominences  - Avoid friction and shearing  - Provide appropriate hygiene as needed including keeping skin clean and dry  - Evaluate need for skin moisturizer/barrier cream  - Collaborate with interdisciplinary team   - Patient/family teaching  - Consider wound care consult   Outcome: Progressing

## 2024-10-26 NOTE — ASSESSMENT & PLAN NOTE
Purulent crusting of left eye.,  Resolved continue with current treatment  Continue Ofloxacin drops

## 2024-10-27 PROBLEM — L08.9 SKIN INFECTION: Status: ACTIVE | Noted: 2024-10-27

## 2024-10-27 LAB
ANION GAP SERPL CALCULATED.3IONS-SCNC: 5 MMOL/L (ref 4–13)
BUN SERPL-MCNC: 18 MG/DL (ref 5–25)
CALCIUM SERPL-MCNC: 8.9 MG/DL (ref 8.4–10.2)
CHLORIDE SERPL-SCNC: 111 MMOL/L (ref 96–108)
CO2 SERPL-SCNC: 26 MMOL/L (ref 21–32)
CREAT SERPL-MCNC: 0.86 MG/DL (ref 0.6–1.3)
ERYTHROCYTE [DISTWIDTH] IN BLOOD BY AUTOMATED COUNT: 19.9 % (ref 11.6–15.1)
GFR SERPL CREATININE-BSD FRML MDRD: 60 ML/MIN/1.73SQ M
GLUCOSE SERPL-MCNC: 83 MG/DL (ref 65–140)
HCT VFR BLD AUTO: 25.9 % (ref 34.8–46.1)
HGB BLD-MCNC: 8 G/DL (ref 11.5–15.4)
MCH RBC QN AUTO: 23.1 PG (ref 26.8–34.3)
MCHC RBC AUTO-ENTMCNC: 30.9 G/DL (ref 31.4–37.4)
MCV RBC AUTO: 75 FL (ref 82–98)
PLATELET # BLD AUTO: 151 THOUSANDS/UL (ref 149–390)
PMV BLD AUTO: 11.4 FL (ref 8.9–12.7)
POTASSIUM SERPL-SCNC: 3.8 MMOL/L (ref 3.5–5.3)
RBC # BLD AUTO: 3.46 MILLION/UL (ref 3.81–5.12)
SODIUM SERPL-SCNC: 142 MMOL/L (ref 135–147)
WBC # BLD AUTO: 5.87 THOUSAND/UL (ref 4.31–10.16)

## 2024-10-27 PROCEDURE — 87077 CULTURE AEROBIC IDENTIFY: CPT

## 2024-10-27 PROCEDURE — 87186 SC STD MICRODIL/AGAR DIL: CPT

## 2024-10-27 PROCEDURE — 85027 COMPLETE CBC AUTOMATED: CPT

## 2024-10-27 PROCEDURE — 80048 BASIC METABOLIC PNL TOTAL CA: CPT

## 2024-10-27 PROCEDURE — 87070 CULTURE OTHR SPECIMN AEROBIC: CPT

## 2024-10-27 PROCEDURE — 99232 SBSQ HOSP IP/OBS MODERATE 35: CPT

## 2024-10-27 PROCEDURE — 87205 SMEAR GRAM STAIN: CPT

## 2024-10-27 RX ORDER — CEPHALEXIN 500 MG/1
500 CAPSULE ORAL EVERY 6 HOURS SCHEDULED
Status: DISCONTINUED | OUTPATIENT
Start: 2024-10-27 | End: 2024-10-30

## 2024-10-27 RX ORDER — FUROSEMIDE 40 MG/1
40 TABLET ORAL DAILY
Status: DISCONTINUED | OUTPATIENT
Start: 2024-10-28 | End: 2024-10-28

## 2024-10-27 RX ADMIN — Medication 1000 UNITS: at 08:37

## 2024-10-27 RX ADMIN — APIXABAN 5 MG: 5 TABLET, FILM COATED ORAL at 08:37

## 2024-10-27 RX ADMIN — SENNOSIDES 8.6 MG: 8.6 TABLET, FILM COATED ORAL at 08:37

## 2024-10-27 RX ADMIN — ZONISAMIDE 100 MG: 100 CAPSULE ORAL at 08:36

## 2024-10-27 RX ADMIN — ESCITALOPRAM OXALATE 10 MG: 10 TABLET ORAL at 08:37

## 2024-10-27 RX ADMIN — ASPIRIN 81 MG CHEWABLE TABLET 81 MG: 81 TABLET CHEWABLE at 08:36

## 2024-10-27 RX ADMIN — POTASSIUM CHLORIDE 20 MEQ: 1500 TABLET, EXTENDED RELEASE ORAL at 16:48

## 2024-10-27 RX ADMIN — OFLOXACIN 1 DROP: 3 SOLUTION/ DROPS OPHTHALMIC at 08:36

## 2024-10-27 RX ADMIN — FUROSEMIDE 50 MG: 10 INJECTION, SOLUTION INTRAVENOUS at 08:36

## 2024-10-27 RX ADMIN — OFLOXACIN 1 DROP: 3 SOLUTION/ DROPS OPHTHALMIC at 13:09

## 2024-10-27 RX ADMIN — THIAMINE HCL TAB 100 MG 100 MG: 100 TAB at 08:37

## 2024-10-27 RX ADMIN — ATORVASTATIN CALCIUM 40 MG: 40 TABLET, FILM COATED ORAL at 16:48

## 2024-10-27 RX ADMIN — CEPHALEXIN 500 MG: 500 CAPSULE ORAL at 16:48

## 2024-10-27 RX ADMIN — APIXABAN 5 MG: 5 TABLET, FILM COATED ORAL at 16:48

## 2024-10-27 RX ADMIN — POLYETHYLENE GLYCOL 3350 17 G: 17 POWDER, FOR SOLUTION ORAL at 08:36

## 2024-10-27 RX ADMIN — OFLOXACIN 1 DROP: 3 SOLUTION/ DROPS OPHTHALMIC at 22:04

## 2024-10-27 RX ADMIN — QUETIAPINE FUMARATE 12.5 MG: 25 TABLET ORAL at 22:03

## 2024-10-27 RX ADMIN — POTASSIUM CHLORIDE 20 MEQ: 1500 TABLET, EXTENDED RELEASE ORAL at 08:37

## 2024-10-27 RX ADMIN — FERROUS SULFATE TAB 325 MG (65 MG ELEMENTAL FE) 325 MG: 325 (65 FE) TAB at 16:48

## 2024-10-27 RX ADMIN — OFLOXACIN 1 DROP: 3 SOLUTION/ DROPS OPHTHALMIC at 16:48

## 2024-10-27 RX ADMIN — CEPHALEXIN 500 MG: 500 CAPSULE ORAL at 13:09

## 2024-10-27 RX ADMIN — FERROUS SULFATE TAB 325 MG (65 MG ELEMENTAL FE) 325 MG: 325 (65 FE) TAB at 08:37

## 2024-10-27 NOTE — PLAN OF CARE
Problem: Potential for Falls  Goal: Patient will remain free of falls  Description: INTERVENTIONS:  - Educate patient/family on patient safety including physical limitations  - Instruct patient to call for assistance with activity   - Consult OT/PT to assist with strengthening/mobility   - Keep Call bell within reach  - Keep bed low and locked with side rails adjusted as appropriate  - Keep care items and personal belongings within reach  - Initiate and maintain comfort rounds  - Make Fall Risk Sign visible to staff  - Offer Toileting every 2 Hours, in advance of need  - Initiate/Maintain bed alarm  - Obtain necessary fall risk management equipment: alarms  - Apply yellow socks and bracelet for high fall risk patients  - Consider moving patient to room near nurses station  Outcome: Progressing     Problem: PAIN - ADULT  Goal: Verbalizes/displays adequate comfort level or baseline comfort level  Description: Interventions:  - Encourage patient to monitor pain and request assistance  - Assess pain using appropriate pain scale  - Administer analgesics based on type and severity of pain and evaluate response  - Implement non-pharmacological measures as appropriate and evaluate response  - Consider cultural and social influences on pain and pain management  - Notify physician/advanced practitioner if interventions unsuccessful or patient reports new pain  Outcome: Progressing     Problem: INFECTION - ADULT  Goal: Absence or prevention of progression during hospitalization  Description: INTERVENTIONS:  - Assess and monitor for signs and symptoms of infection  - Monitor lab/diagnostic results  - Monitor all insertion sites, i.e. indwelling lines, tubes, and drains  - Monitor endotracheal if appropriate and nasal secretions for changes in amount and color  - [place] appropriate cooling/warming therapies per order  - Administer medications as ordered  - Instruct and encourage patient and family to use good hand hygiene  technique  - Identify and instruct in appropriate isolation precautions for identified infection/condition  Outcome: Progressing     Problem: SAFETY ADULT  Goal: Maintain or return to baseline ADL function  Description: INTERVENTIONS:  -  Assess patient's ability to carry out ADLs; assess patient's baseline for ADL function and identify physical deficits which impact ability to perform ADLs (bathing, care of mouth/teeth, toileting, grooming, dressing, etc.)  - Assess/evaluate cause of self-care deficits   - Assess range of motion  - Assess patient's mobility; develop plan if impaired  - Assess patient's need for assistive devices and provide as appropriate  - Encourage maximum independence but intervene and supervise when necessary  - Involve family in performance of ADLs  - Assess for home care needs following discharge   - Consider OT consult to assist with ADL evaluation and planning for discharge  - Provide patient education as appropriate  Outcome: Progressing     Problem: CARDIOVASCULAR - ADULT  Goal: Absence of cardiac dysrhythmias or at baseline rhythm  Description: INTERVENTIONS:  - Continuous cardiac monitoring, vital signs, obtain 12 lead EKG if ordered  - Administer antiarrhythmic and heart rate control medications as ordered  - Monitor electrolytes and administer replacement therapy as ordered  Outcome: Progressing     Problem: RESPIRATORY - ADULT  Goal: Achieves optimal ventilation and oxygenation  Description: INTERVENTIONS:  - Assess for changes in respiratory status  - Assess for changes in mentation and behavior  - Position to facilitate oxygenation and minimize respiratory effort  - Oxygen administered by appropriate delivery if ordered  - Initiate smoking cessation education as indicated  - Encourage broncho-pulmonary hygiene including cough, deep breathe, Incentive Spirometry  - Assess the need for suctioning and aspirate as needed  - Assess and instruct to report SOB or any respiratory  difficulty  - Respiratory Therapy support as indicated  Outcome: Progressing     Problem: GASTROINTESTINAL - ADULT  Goal: Maintains adequate nutritional intake  Description: INTERVENTIONS:  - Monitor percentage of each meal consumed  - Identify factors contributing to decreased intake, treat as appropriate  - Assist with meals as needed  - Monitor I&O, weight, and lab values if indicated  - Obtain nutrition services referral as needed  Outcome: Progressing     Problem: METABOLIC, FLUID AND ELECTROLYTES - ADULT  Goal: Fluid balance maintained  Description: INTERVENTIONS:  - Monitor labs   - Monitor I/O and WT  - Instruct patient on fluid and nutrition as appropriate  - Assess for signs & symptoms of volume excess or deficit  Outcome: Progressing     Problem: SKIN/TISSUE INTEGRITY - ADULT  Goal: Skin Integrity remains intact(Skin Breakdown Prevention)  Description: Assess:  -Perform Pola assessment every shift  -Clean and moisturize skin every shift  -Inspect skin when repositioning, toileting, and assisting with ADLS  -Assess under medical devices such as IV every shift  -Assess extremities for adequate circulation and sensation     Bed Management:  -Have minimal linens on bed & keep smooth, unwrinkled  -Change linens as needed when moist or perspiring  -Avoid sitting or lying in one position for more than 2 hours while in bed  -Keep HOB at 30 degrees     Toileting:  -Offer bedside commode  -Assess for incontinence every shift  -Use incontinent care products after each incontinent episode such as foam cleanser    Activity:  -Mobilize patient 3 times a day  -Encourage activity and walks on unit  -Encourage or provide ROM exercises   -Turn and reposition patient every 2 Hours  -Use appropriate equipment to lift or move patient in bed  -Instruct/ Assist with weight shifting every hour when out of bed in chair  -Consider limitation of chair time 3 hour intervals    Skin Care:  -Avoid use of baby powder, tape, friction  and shearing, hot water or constrictive clothing  -Relieve pressure over bony prominences using pillows  -Do not massage red bony areas    Next Steps:  -Teach patient strategies to minimize risks such as skin breakdown   -Consider consults to  interdisciplinary teams such as wound care  Outcome: Progressing  Goal: Incision(s), wounds(s) or drain site(s) healing without S/S of infection  Description: INTERVENTIONS  - Assess and document dressing, incision, wound bed, drain sites and surrounding tissue  - Provide patient and family education  - Perform skin care/dressing changes every shift  Outcome: Progressing     Problem: Prexisting or High Potential for Compromised Skin Integrity  Goal: Skin integrity is maintained or improved  Description: INTERVENTIONS:  - Identify patients at risk for skin breakdown  - Assess and monitor skin integrity  - Assess and monitor nutrition and hydration status  - Monitor labs   - Assess for incontinence   - Turn and reposition patient  - Assist with mobility/ambulation  - Relieve pressure over bony prominences  - Avoid friction and shearing  - Provide appropriate hygiene as needed including keeping skin clean and dry  - Evaluate need for skin moisturizer/barrier cream  - Collaborate with interdisciplinary team   - Patient/family teaching  - Consider wound care consult   Outcome: Progressing

## 2024-10-27 NOTE — ASSESSMENT & PLAN NOTE
Small lesion with surrounding erythema on left buttock, lesion had purulence and odor  Obtain wound swab  Dressing is appropriate  Started on Keflex

## 2024-10-27 NOTE — PROGRESS NOTES
Progress Note - Hospitalist   Name: Eusebio Tate 89 y.o. female I MRN: [id number]  Unit/Bed#: [facility] [address] -01 I Date of Admission: 10/23/2024   Date of Service: 10/27/2024 I Hospital Day: 4    Assessment & Plan  Acute on chronic diastolic heart failure (HCC)  Wt Readings from Last 3 Encounters:   10/27/24 87.9 kg (193 lb 12.6 oz)   05/30/24 87.5 kg (192 lb 14.4 oz)   05/02/23 78.6 kg (173 lb 3.2 oz)     Patient takes Lasix 40 mg daily.  Recently gained about 4 pounds at nursing facility where they were giving her extra doses of Lasix.      Chest x-ray with venous congestion.  Patient received 40 mg IV Lasix in the ED.       Symptoms improved,, will transition from IV diuretics to starting 24-hour period of oral diuretics  Monitor BMP  Daily I&O, weights  Straight cath for urine  Follows with Dr. Cortez's office outpatient  Last Echo 12/223  EF 55-60%    Age-related osteoporosis without fracture  Continue on calcium and vitamin D and zonisamide 100 mg daily   Benign essential hypertension  Monitor  Continue lasix  Permanent atrial fibrillation (HCC)  A-fib-patient is rate controlled on Eliquis for anticoagulation   Acute metabolic encephalopathy  Acute metabolic encephalopathy since on admission.  Patient found to be more tired than usual.  At baseline she is alert and oriented x 0, unable to perform full sentences for past 2 years.  Family states that patient is back to her own baseline at the time of evaluation.  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 No POA but 3 children who make decisions and would like to know about  palliative and hospice.     - Palliative care   [facility] discussion with family, would like palliative care going forward, not ready for end of life care at this time  Case management to assist with dispo  Stage 3b chronic kidney disease (HCC)  Lab Results   Component Value Date    EGFR 60 10/27/2024    EGFR 53 10/25/2024    EGFR 52 10/24/2024    CREATININE 0.86 10/27/2024    CREATININE 0.96 10/25/2024    CREATININE 0.97 10/24/2024   Stable  Monitor BMP  Microcytic anemia  Lab Results   Component Value Date    HGB 8.0 (L) 10/27/2024    HGB 8.1 (L) 10/25/2024    HGB 8.2 (L) 10/24/2024   Appears to be microcytic anemia gradually decreasing over the span of 7 months from baseline of hemoglobin 11 which has been steadily coming down since June 2024.  No recent surgery since May 2024 to explain loss of blood.  No colonoscopy in chart.       Likely iron deficiency anemia secondary to poor dietary intake  TSH, vitamin deficiencies, haptoglobin and LDH within normal limits  Hgb has been stable around 8  Increase p.o. iron to twice daily  Transfuse for hgb <7  Continue reviewing need for further workup, and discussion with family, family will decline endoscopic eval if warranted if hemoglobin continues to drop    Bacterial conjunctivitis of left eye  Purulent crusting of left eye.,  Resolved continue with current treatment  Continue Ofloxacin drops  Advanced care planning/counseling discussion    Skin infection  Small lesion with surrounding erythema on left buttock, lesion had purulence and odor  Obtain wound swab  Dressing is appropriate  Started on Keflex    VTE Pharmacologic Prophylaxis: VTE Score: 5 High Risk (Score >/= 5) - Pharmacological DVT Prophylaxis Ordered: apixaban (Eliquis). Sequential Compression Devices Ordered.    Mobility:   Basic Mobility Inpatient Raw Score: 8  -HLM Goal: 3: Sit at edge of bed  JH-HLM Achieved: 2: Bed activities/Dependent transfer  JH-HLM Goal achieved. Continue to encourage appropriate  mobility.    Patient Centered Rounds: I performed bedside rounds with nursing staff today.   Discussions with Specialists or Other Care Team Provider: CM    Education and Discussions with Family / Patient: Updated  (daughter) at bedside.    Current Length of Stay: 4 day(s)  Current Patient Status: Inpatient   Certification Statement: The patient will continue to require additional inpatient hospital stay due to 24-hour oral diuretic trial and safe dispo  Discharge Plan: Anticipate discharge tomorrow to rehab facility.    Code Status: Level 3 - DNAR and DNI    Subjective   Patient reports be feeling well although is a poor historian    Objective :  Temp:  [98.1 °F (36.7 °C)-98.5 °F (36.9 °C)] 98.5 °F (36.9 °C)  HR:  [61-74] 74  BP: (125-128)/(53-69) 125/53  Resp:  [17-19] 17  SpO2:  [92 %-98 %] 96 %  O2 Device: None (Room air)    Body mass index is 35.44 kg/m².     Input and Output Summary (last 24 hours):     Intake/Output Summary (Last 24 hours) at 10/27/2024 1222  Last data filed at 10/27/2024 0933  Gross per 24 hour   Intake 720 ml   Output 1900 ml   Net -1180 ml       Physical Exam  Vitals and nursing note reviewed.   Constitutional:       General: She is not in acute distress.     Appearance: She is obese. She is not ill-appearing, toxic-appearing or diaphoretic.   HENT:      Head: Normocephalic.      Nose: Nose normal.      Mouth/Throat:      Mouth: Mucous membranes are moist.      Pharynx: Oropharynx is clear.   Eyes:      General: No scleral icterus.     Conjunctiva/sclera: Conjunctivae normal.      Pupils: Pupils are equal, round, and reactive to light.   Cardiovascular:      Rate and Rhythm: Normal rate and regular rhythm.      Heart sounds: No murmur heard.     No friction rub. No gallop.   Pulmonary:      Effort: Pulmonary effort is normal. No respiratory distress.      Breath sounds: Normal breath sounds. No stridor. No wheezing, rhonchi or rales.   Abdominal:      General: Abdomen is  flat.      Palpations: Abdomen is soft.   Musculoskeletal:         General: Normal range of motion.      Cervical back: Normal range of motion and neck supple.      Right lower leg: No edema.      Left lower leg: No edema.   Lymphadenopathy:      Cervical: No cervical adenopathy.   Skin:     General: Skin is warm.      Coloration: Skin is not jaundiced or pale.      Findings: Lesion (Lesion on left buttock, draining purulent bloody discharge, erythema around central lesion.  Lesion size of pea.  Tender on palpation) present. No bruising or erythema.   Neurological:      Mental Status: She is alert. Mental status is at baseline.      Cranial Nerves: No cranial nerve deficit.      Motor: No weakness.      Comments: Alert and oriented to self   Psychiatric:         Mood and Affect: Mood normal.         Behavior: Behavior normal.         Thought Content: Thought content normal.           Lines/Drains:  Lines/Drains/Airways       Active Status       Name Placement date Placement time Site Days    External Urinary Catheter 10/25/24  1000  -- 2                            Lab Results: I have reviewed the following results:   Results from last 7 days   Lab Units 10/27/24  0535 10/24/24  1401 10/24/24  0450   WBC Thousand/uL 5.87   < > 6.46   HEMOGLOBIN g/dL 8.0*   < > 7.9*   HEMATOCRIT % 25.9*   < > 25.4*   PLATELETS Thousands/uL 151   < > 158   SEGS PCT %  --   --  67   LYMPHO PCT %  --   --  18   MONO PCT %  --   --  12   EOS PCT %  --   --  2    < > = values in this interval not displayed.     Results from last 7 days   Lab Units 10/27/24  0535 10/24/24  0450 10/23/24  1709   SODIUM mmol/L 142   < > 142   POTASSIUM mmol/L 3.8   < > 3.7   CHLORIDE mmol/L 111*   < > 110*   CO2 mmol/L 26   < > 25   BUN mg/dL 18   < > 15   CREATININE mg/dL 0.86   < > 0.93   ANION GAP mmol/L 5   < > 7   CALCIUM mg/dL 8.9   < > 9.7   ALBUMIN g/dL  --   --  4.0   TOTAL BILIRUBIN mg/dL  --   --  1.06*   ALK PHOS U/L  --   --  93   ALT U/L  --    --  15   AST U/L  --   --  21   GLUCOSE RANDOM mg/dL 83   < > 107    < > = values in this interval not displayed.         Results from last 7 days   Lab Units 10/23/24  1648   POC GLUCOSE mg/dl 110         Results from last 7 days   Lab Units 10/23/24  1709   LACTIC ACID mmol/L 1.7       Recent Cultures (last 7 days):         Imaging Results Review: No pertinent imaging studies reviewed.  Other Study Results Review: No additional pertinent studies reviewed.    Last 24 Hours Medication List:     Current Facility-Administered Medications:     acetaminophen (TYLENOL) tablet 650 mg, Q4H PRN    apixaban (ELIQUIS) tablet 5 mg, BID    aspirin chewable tablet 81 mg, Daily    atorvastatin (LIPITOR) tablet 40 mg, Daily With Dinner    cephalexin (KEFLEX) capsule 500 mg, Q6H FRANKIE    Cholecalciferol (VITAMIN D3) tablet 1,000 Units, Daily    escitalopram (LEXAPRO) tablet 10 mg, Daily    ferrous sulfate tablet 325 mg, BID With Meals    [START ON 10/28/2024] furosemide (LASIX) tablet 40 mg, Daily    HYDROmorphone HCl (DILAUDID) injection 0.2 mg, Q4H PRN    ofloxacin (OCUFLOX) 0.3 % ophthalmic solution 1 drop, 4x Daily    ondansetron (ZOFRAN) injection 4 mg, Q6H PRN    polyethylene glycol (MIRALAX) packet 17 g, Daily    potassium chloride (Klor-Con M20) CR tablet 20 mEq, BID    QUEtiapine (SEROquel) tablet 12.5 mg, HS    senna (SENOKOT) tablet 8.6 mg, Daily    thiamine tablet 100 mg, Daily    zonisamide (ZONEGRAN) capsule 100 mg, Daily    Administrative Statements   Today, Patient Was Seen By: Jhony Brooke PA-C  I have spent a total time of 35 minutes in caring for this patient on the day of the visit/encounter including Documenting in the medical record, Reviewing / ordering tests, medicine, procedures  , Obtaining or reviewing history  , and Communicating with other healthcare professionals .    **Please Note: This note may have been constructed using a voice recognition system.**

## 2024-10-27 NOTE — ASSESSMENT & PLAN NOTE
Wt Readings from Last 3 Encounters:   10/27/24 87.9 kg (193 lb 12.6 oz)   05/30/24 87.5 kg (192 lb 14.4 oz)   05/02/23 78.6 kg (173 lb 3.2 oz)     Patient takes Lasix 40 mg daily.  Recently gained about 4 pounds at nursing facility where they were giving her extra doses of Lasix.      Chest x-ray with venous congestion.  Patient received 40 mg IV Lasix in the ED.       Symptoms improved,, will transition from IV diuretics to starting 24-hour period of oral diuretics  Monitor BMP  Daily I&O, weights  Straight cath for urine  Follows with Dr. Cortez's office outpatient  Last Echo 12/223  EF 55-60%

## 2024-10-27 NOTE — PLAN OF CARE
Problem: Potential for Falls  Goal: Patient will remain free of falls  Description: INTERVENTIONS:  - Educate patient/family on patient safety including physical limitations  - Instruct patient to call for assistance with activity   - Consult OT/PT to assist with strengthening/mobility   - Keep Call bell within reach  - Keep bed low and locked with side rails adjusted as appropriate  - Keep care items and personal belongings within reach  - Initiate and maintain comfort rounds  - Make Fall Risk Sign visible to staff  - Offer Toileting every 2 Hours, in advance of need  - Initiate/Maintain bed alarm  - Obtain necessary fall risk management equipment: alarms  - Apply yellow socks and bracelet for high fall risk patients  - Consider moving patient to room near nurses station  Outcome: Progressing     Problem: PAIN - ADULT  Goal: Verbalizes/displays adequate comfort level or baseline comfort level  Description: Interventions:  - Encourage patient to monitor pain and request assistance  - Assess pain using appropriate pain scale  - Administer analgesics based on type and severity of pain and evaluate response  - Implement non-pharmacological measures as appropriate and evaluate response  - Consider cultural and social influences on pain and pain management  - Notify physician/advanced practitioner if interventions unsuccessful or patient reports new pain  Outcome: Progressing     Problem: INFECTION - ADULT  Goal: Absence or prevention of progression during hospitalization  Description: INTERVENTIONS:  - Assess and monitor for signs and symptoms of infection  - Monitor lab/diagnostic results  - Monitor all insertion sites, i.e. indwelling lines, tubes, and drains  - Monitor endotracheal if appropriate and nasal secretions for changes in amount and color  - [place] appropriate cooling/warming therapies per order  - Administer medications as ordered  - Instruct and encourage patient and family to use good hand hygiene  technique  - Identify and instruct in appropriate isolation precautions for identified infection/condition  Outcome: Progressing     Problem: SAFETY ADULT  Goal: Maintain or return to baseline ADL function  Description: INTERVENTIONS:  -  Assess patient's ability to carry out ADLs; assess patient's baseline for ADL function and identify physical deficits which impact ability to perform ADLs (bathing, care of mouth/teeth, toileting, grooming, dressing, etc.)  - Assess/evaluate cause of self-care deficits   - Assess range of motion  - Assess patient's mobility; develop plan if impaired  - Assess patient's need for assistive devices and provide as appropriate  - Encourage maximum independence but intervene and supervise when necessary  - Involve family in performance of ADLs  - Assess for home care needs following discharge   - Consider OT consult to assist with ADL evaluation and planning for discharge  - Provide patient education as appropriate  Outcome: Progressing     Problem: CARDIOVASCULAR - ADULT  Goal: Absence of cardiac dysrhythmias or at baseline rhythm  Description: INTERVENTIONS:  - Continuous cardiac monitoring, vital signs, obtain 12 lead EKG if ordered  - Administer antiarrhythmic and heart rate control medications as ordered  - Monitor electrolytes and administer replacement therapy as ordered  Outcome: Progressing     Problem: RESPIRATORY - ADULT  Goal: Achieves optimal ventilation and oxygenation  Description: INTERVENTIONS:  - Assess for changes in respiratory status  - Assess for changes in mentation and behavior  - Position to facilitate oxygenation and minimize respiratory effort  - Oxygen administered by appropriate delivery if ordered  - Initiate smoking cessation education as indicated  - Encourage broncho-pulmonary hygiene including cough, deep breathe, Incentive Spirometry  - Assess the need for suctioning and aspirate as needed  - Assess and instruct to report SOB or any respiratory  difficulty  - Respiratory Therapy support as indicated  Outcome: Progressing     Problem: GASTROINTESTINAL - ADULT  Goal: Maintains adequate nutritional intake  Description: INTERVENTIONS:  - Monitor percentage of each meal consumed  - Identify factors contributing to decreased intake, treat as appropriate  - Assist with meals as needed  - Monitor I&O, weight, and lab values if indicated  - Obtain nutrition services referral as needed  Outcome: Progressing     Problem: METABOLIC, FLUID AND ELECTROLYTES - ADULT  Goal: Fluid balance maintained  Description: INTERVENTIONS:  - Monitor labs   - Monitor I/O and WT  - Instruct patient on fluid and nutrition as appropriate  - Assess for signs & symptoms of volume excess or deficit  Outcome: Progressing     Problem: SKIN/TISSUE INTEGRITY - ADULT  Goal: Skin Integrity remains intact(Skin Breakdown Prevention)  Description: Assess:  -Perform Pola assessment every shift  -Clean and moisturize skin every shift  -Inspect skin when repositioning, toileting, and assisting with ADLS  -Assess under medical devices such as IV every shift  -Assess extremities for adequate circulation and sensation     Bed Management:  -Have minimal linens on bed & keep smooth, unwrinkled  -Change linens as needed when moist or perspiring  -Avoid sitting or lying in one position for more than 2 hours while in bed  -Keep HOB at 30 degrees     Toileting:  -Offer bedside commode  -Assess for incontinence every shift  -Use incontinent care products after each incontinent episode such as foam cleanser    Activity:  -Mobilize patient 3 times a day  -Encourage activity and walks on unit  -Encourage or provide ROM exercises   -Turn and reposition patient every 2 Hours  -Use appropriate equipment to lift or move patient in bed  -Instruct/ Assist with weight shifting every hour when out of bed in chair  -Consider limitation of chair time 3 hour intervals    Skin Care:  -Avoid use of baby powder, tape, friction  and shearing, hot water or constrictive clothing  -Relieve pressure over bony prominences using pillows  -Do not massage red bony areas    Next Steps:  -Teach patient strategies to minimize risks such as skin breakdown   -Consider consults to  interdisciplinary teams such as wound care  Outcome: Progressing  Goal: Incision(s), wounds(s) or drain site(s) healing without S/S of infection  Description: INTERVENTIONS  - Assess and document dressing, incision, wound bed, drain sites and surrounding tissue  - Provide patient and family education  - Perform skin care/dressing changes every shift  Outcome: Progressing     Problem: Prexisting or High Potential for Compromised Skin Integrity  Goal: Skin integrity is maintained or improved  Description: INTERVENTIONS:  - Identify patients at risk for skin breakdown  - Assess and monitor skin integrity  - Assess and monitor nutrition and hydration status  - Monitor labs   - Assess for incontinence   - Turn and reposition patient  - Assist with mobility/ambulation  - Relieve pressure over bony prominences  - Avoid friction and shearing  - Provide appropriate hygiene as needed including keeping skin clean and dry  - Evaluate need for skin moisturizer/barrier cream  - Collaborate with interdisciplinary team   - Patient/family teaching  - Consider wound care consult   Outcome: Progressing

## 2024-10-27 NOTE — ASSESSMENT & PLAN NOTE
Lab Results   Component Value Date    HGB 8.0 (L) 10/27/2024    HGB 8.1 (L) 10/25/2024    HGB 8.2 (L) 10/24/2024   Appears to be microcytic anemia gradually decreasing over the span of 7 months from baseline of hemoglobin 11 which has been steadily coming down since June 2024.  No recent surgery since May 2024 to explain loss of blood.  No colonoscopy in chart.       Likely iron deficiency anemia secondary to poor dietary intake  TSH, vitamin deficiencies, haptoglobin and LDH within normal limits  Hgb has been stable around 8  Increase p.o. iron to twice daily  Transfuse for hgb <7  Continue reviewing need for further workup, and discussion with family, family will decline endoscopic eval if warranted if hemoglobin continues to drop

## 2024-10-27 NOTE — ASSESSMENT & PLAN NOTE
Lab Results   Component Value Date    EGFR 60 10/27/2024    EGFR 53 10/25/2024    EGFR 52 10/24/2024    CREATININE 0.86 10/27/2024    CREATININE 0.96 10/25/2024    CREATININE 0.97 10/24/2024   Stable  Monitor BMP

## 2024-10-28 PROBLEM — G93.41 ACUTE METABOLIC ENCEPHALOPATHY: Status: RESOLVED | Noted: 2022-05-10 | Resolved: 2024-10-28

## 2024-10-28 LAB
ANION GAP SERPL CALCULATED.3IONS-SCNC: 5 MMOL/L (ref 4–13)
BUN SERPL-MCNC: 24 MG/DL (ref 5–25)
CALCIUM SERPL-MCNC: 8.9 MG/DL (ref 8.4–10.2)
CHLORIDE SERPL-SCNC: 106 MMOL/L (ref 96–108)
CO2 SERPL-SCNC: 26 MMOL/L (ref 21–32)
CREAT SERPL-MCNC: 0.86 MG/DL (ref 0.6–1.3)
ERYTHROCYTE [DISTWIDTH] IN BLOOD BY AUTOMATED COUNT: 20 % (ref 11.6–15.1)
GFR SERPL CREATININE-BSD FRML MDRD: 60 ML/MIN/1.73SQ M
GLUCOSE SERPL-MCNC: 82 MG/DL (ref 65–140)
HCT VFR BLD AUTO: 26.3 % (ref 34.8–46.1)
HGB BLD-MCNC: 8.1 G/DL (ref 11.5–15.4)
MCH RBC QN AUTO: 23.4 PG (ref 26.8–34.3)
MCHC RBC AUTO-ENTMCNC: 30.8 G/DL (ref 31.4–37.4)
MCV RBC AUTO: 76 FL (ref 82–98)
PLATELET # BLD AUTO: 161 THOUSANDS/UL (ref 149–390)
POTASSIUM SERPL-SCNC: 4.1 MMOL/L (ref 3.5–5.3)
RBC # BLD AUTO: 3.46 MILLION/UL (ref 3.81–5.12)
SODIUM SERPL-SCNC: 137 MMOL/L (ref 135–147)
WBC # BLD AUTO: 7.92 THOUSAND/UL (ref 4.31–10.16)

## 2024-10-28 PROCEDURE — 99223 1ST HOSP IP/OBS HIGH 75: CPT | Performed by: INTERNAL MEDICINE

## 2024-10-28 PROCEDURE — 87081 CULTURE SCREEN ONLY: CPT | Performed by: STUDENT IN AN ORGANIZED HEALTH CARE EDUCATION/TRAINING PROGRAM

## 2024-10-28 PROCEDURE — 99232 SBSQ HOSP IP/OBS MODERATE 35: CPT | Performed by: STUDENT IN AN ORGANIZED HEALTH CARE EDUCATION/TRAINING PROGRAM

## 2024-10-28 PROCEDURE — 80048 BASIC METABOLIC PNL TOTAL CA: CPT

## 2024-10-28 PROCEDURE — 85027 COMPLETE CBC AUTOMATED: CPT

## 2024-10-28 RX ORDER — FUROSEMIDE 40 MG/1
40 TABLET ORAL
Status: DISCONTINUED | OUTPATIENT
Start: 2024-10-28 | End: 2024-10-31 | Stop reason: HOSPADM

## 2024-10-28 RX ORDER — SPIRONOLACTONE 25 MG/1
12.5 TABLET ORAL DAILY
Status: DISCONTINUED | OUTPATIENT
Start: 2024-10-29 | End: 2024-10-31 | Stop reason: HOSPADM

## 2024-10-28 RX ORDER — POTASSIUM CHLORIDE 1500 MG/1
20 TABLET, EXTENDED RELEASE ORAL DAILY
Status: DISCONTINUED | OUTPATIENT
Start: 2024-10-29 | End: 2024-10-31 | Stop reason: HOSPADM

## 2024-10-28 RX ADMIN — OFLOXACIN 1 DROP: 3 SOLUTION/ DROPS OPHTHALMIC at 17:23

## 2024-10-28 RX ADMIN — FERROUS SULFATE TAB 325 MG (65 MG ELEMENTAL FE) 325 MG: 325 (65 FE) TAB at 09:03

## 2024-10-28 RX ADMIN — ZONISAMIDE 100 MG: 100 CAPSULE ORAL at 09:04

## 2024-10-28 RX ADMIN — ASPIRIN 81 MG CHEWABLE TABLET 81 MG: 81 TABLET CHEWABLE at 09:02

## 2024-10-28 RX ADMIN — FUROSEMIDE 40 MG: 40 TABLET ORAL at 17:23

## 2024-10-28 RX ADMIN — THIAMINE HCL TAB 100 MG 100 MG: 100 TAB at 09:03

## 2024-10-28 RX ADMIN — Medication 1000 UNITS: at 09:03

## 2024-10-28 RX ADMIN — CEPHALEXIN 500 MG: 500 CAPSULE ORAL at 00:47

## 2024-10-28 RX ADMIN — FERROUS SULFATE TAB 325 MG (65 MG ELEMENTAL FE) 325 MG: 325 (65 FE) TAB at 17:23

## 2024-10-28 RX ADMIN — ATORVASTATIN CALCIUM 40 MG: 40 TABLET, FILM COATED ORAL at 17:23

## 2024-10-28 RX ADMIN — OFLOXACIN 1 DROP: 3 SOLUTION/ DROPS OPHTHALMIC at 12:47

## 2024-10-28 RX ADMIN — OFLOXACIN 1 DROP: 3 SOLUTION/ DROPS OPHTHALMIC at 09:04

## 2024-10-28 RX ADMIN — CEPHALEXIN 500 MG: 500 CAPSULE ORAL at 05:19

## 2024-10-28 RX ADMIN — APIXABAN 5 MG: 5 TABLET, FILM COATED ORAL at 09:03

## 2024-10-28 RX ADMIN — SENNOSIDES 8.6 MG: 8.6 TABLET, FILM COATED ORAL at 09:03

## 2024-10-28 RX ADMIN — APIXABAN 5 MG: 5 TABLET, FILM COATED ORAL at 17:23

## 2024-10-28 RX ADMIN — QUETIAPINE FUMARATE 12.5 MG: 25 TABLET ORAL at 22:15

## 2024-10-28 RX ADMIN — FUROSEMIDE 40 MG: 40 TABLET ORAL at 09:03

## 2024-10-28 RX ADMIN — CEPHALEXIN 500 MG: 500 CAPSULE ORAL at 17:23

## 2024-10-28 RX ADMIN — POTASSIUM CHLORIDE 20 MEQ: 1500 TABLET, EXTENDED RELEASE ORAL at 09:03

## 2024-10-28 RX ADMIN — CEPHALEXIN 500 MG: 500 CAPSULE ORAL at 12:47

## 2024-10-28 RX ADMIN — OFLOXACIN 1 DROP: 3 SOLUTION/ DROPS OPHTHALMIC at 22:15

## 2024-10-28 RX ADMIN — POLYETHYLENE GLYCOL 3350 17 G: 17 POWDER, FOR SOLUTION ORAL at 09:02

## 2024-10-28 RX ADMIN — ESCITALOPRAM OXALATE 10 MG: 10 TABLET ORAL at 09:03

## 2024-10-28 NOTE — ASSESSMENT & PLAN NOTE
Patient is in dementia unit with multiple comorbidities and alzheimers Aox0 at baseline. No POA but 3 children who make decisions and would like to know about palliative and hospice.   GOC discussion with family, would like palliative care going forward, not ready for end of life care at this time  Case management to assist with dispo

## 2024-10-28 NOTE — PLAN OF CARE
Problem: Potential for Falls  Goal: Patient will remain free of falls  Description: INTERVENTIONS:  - Educate patient/family on patient safety including physical limitations  - Instruct patient to call for assistance with activity   - Consult OT/PT to assist with strengthening/mobility   - Keep Call bell within reach  - Keep bed low and locked with side rails adjusted as appropriate  - Keep care items and personal belongings within reach  - Initiate and maintain comfort rounds  - Make Fall Risk Sign visible to staff  - Offer Toileting every 2 Hours, in advance of need  - Initiate/Maintain bed alarm  - Obtain necessary fall risk management equipment: alarms  - Apply yellow socks and bracelet for high fall risk patients  - Consider moving patient to room near nurses station  Outcome: Progressing     Problem: PAIN - ADULT  Goal: Verbalizes/displays adequate comfort level or baseline comfort level  Description: Interventions:  - Encourage patient to monitor pain and request assistance  - Assess pain using appropriate pain scale  - Administer analgesics based on type and severity of pain and evaluate response  - Implement non-pharmacological measures as appropriate and evaluate response  - Consider cultural and social influences on pain and pain management  - Notify physician/advanced practitioner if interventions unsuccessful or patient reports new pain  Outcome: Progressing     Problem: INFECTION - ADULT  Goal: Absence or prevention of progression during hospitalization  Description: INTERVENTIONS:  - Assess and monitor for signs and symptoms of infection  - Monitor lab/diagnostic results  - Monitor all insertion sites, i.e. indwelling lines, tubes, and drains  - Monitor endotracheal if appropriate and nasal secretions for changes in amount and color  - [place] appropriate cooling/warming therapies per order  - Administer medications as ordered  - Instruct and encourage patient and family to use good hand hygiene  technique  - Identify and instruct in appropriate isolation precautions for identified infection/condition  Outcome: Progressing     Problem: SAFETY ADULT  Goal: Maintain or return to baseline ADL function  Description: INTERVENTIONS:  -  Assess patient's ability to carry out ADLs; assess patient's baseline for ADL function and identify physical deficits which impact ability to perform ADLs (bathing, care of mouth/teeth, toileting, grooming, dressing, etc.)  - Assess/evaluate cause of self-care deficits   - Assess range of motion  - Assess patient's mobility; develop plan if impaired  - Assess patient's need for assistive devices and provide as appropriate  - Encourage maximum independence but intervene and supervise when necessary  - Involve family in performance of ADLs  - Assess for home care needs following discharge   - Consider OT consult to assist with ADL evaluation and planning for discharge  - Provide patient education as appropriate  Outcome: Progressing     Problem: CARDIOVASCULAR - ADULT  Goal: Absence of cardiac dysrhythmias or at baseline rhythm  Description: INTERVENTIONS:  - Continuous cardiac monitoring, vital signs, obtain 12 lead EKG if ordered  - Administer antiarrhythmic and heart rate control medications as ordered  - Monitor electrolytes and administer replacement therapy as ordered  Outcome: Progressing     Problem: RESPIRATORY - ADULT  Goal: Achieves optimal ventilation and oxygenation  Description: INTERVENTIONS:  - Assess for changes in respiratory status  - Assess for changes in mentation and behavior  - Position to facilitate oxygenation and minimize respiratory effort  - Oxygen administered by appropriate delivery if ordered  - Initiate smoking cessation education as indicated  - Encourage broncho-pulmonary hygiene including cough, deep breathe, Incentive Spirometry  - Assess the need for suctioning and aspirate as needed  - Assess and instruct to report SOB or any respiratory  difficulty  - Respiratory Therapy support as indicated  Outcome: Progressing     Problem: GASTROINTESTINAL - ADULT  Goal: Maintains adequate nutritional intake  Description: INTERVENTIONS:  - Monitor percentage of each meal consumed  - Identify factors contributing to decreased intake, treat as appropriate  - Assist with meals as needed  - Monitor I&O, weight, and lab values if indicated  - Obtain nutrition services referral as needed  Outcome: Progressing     Problem: METABOLIC, FLUID AND ELECTROLYTES - ADULT  Goal: Fluid balance maintained  Description: INTERVENTIONS:  - Monitor labs   - Monitor I/O and WT  - Instruct patient on fluid and nutrition as appropriate  - Assess for signs & symptoms of volume excess or deficit  Outcome: Progressing     Problem: SKIN/TISSUE INTEGRITY - ADULT  Goal: Skin Integrity remains intact(Skin Breakdown Prevention)  Description: Assess:  -Perform Pola assessment every shift  -Clean and moisturize skin every shift  -Inspect skin when repositioning, toileting, and assisting with ADLS  -Assess under medical devices such as IV every shift  -Assess extremities for adequate circulation and sensation     Bed Management:  -Have minimal linens on bed & keep smooth, unwrinkled  -Change linens as needed when moist or perspiring  -Avoid sitting or lying in one position for more than 2 hours while in bed  -Keep HOB at 30 degrees     Toileting:  -Offer bedside commode  -Assess for incontinence every shift  -Use incontinent care products after each incontinent episode such as foam cleanser    Activity:  -Mobilize patient 3 times a day  -Encourage activity and walks on unit  -Encourage or provide ROM exercises   -Turn and reposition patient every 2 Hours  -Use appropriate equipment to lift or move patient in bed  -Instruct/ Assist with weight shifting every hour when out of bed in chair  -Consider limitation of chair time 3 hour intervals    Skin Care:  -Avoid use of baby powder, tape, friction  and shearing, hot water or constrictive clothing  -Relieve pressure over bony prominences using pillows  -Do not massage red bony areas    Next Steps:  -Teach patient strategies to minimize risks such as skin breakdown   -Consider consults to  interdisciplinary teams such as wound care  Outcome: Progressing  Goal: Incision(s), wounds(s) or drain site(s) healing without S/S of infection  Description: INTERVENTIONS  - Assess and document dressing, incision, wound bed, drain sites and surrounding tissue  - Provide patient and family education  - Perform skin care/dressing changes every shift  Outcome: Progressing     Problem: Prexisting or High Potential for Compromised Skin Integrity  Goal: Skin integrity is maintained or improved  Description: INTERVENTIONS:  - Identify patients at risk for skin breakdown  - Assess and monitor skin integrity  - Assess and monitor nutrition and hydration status  - Monitor labs   - Assess for incontinence   - Turn and reposition patient  - Assist with mobility/ambulation  - Relieve pressure over bony prominences  - Avoid friction and shearing  - Provide appropriate hygiene as needed including keeping skin clean and dry  - Evaluate need for skin moisturizer/barrier cream  - Collaborate with interdisciplinary team   - Patient/family teaching  - Consider wound care consult   Outcome: Progressing

## 2024-10-28 NOTE — ASSESSMENT & PLAN NOTE
Wt Readings from Last 3 Encounters:   10/28/24 88.2 kg (194 lb 7.1 oz)   05/30/24 87.5 kg (192 lb 14.4 oz)   05/02/23 78.6 kg (173 lb 3.2 oz)     Required IV lasix initially and now transitioned to po lasix due to acute on chronic diastolic heart failure.  Appreciate cardiology recommendations. No need for ARNI at this time given no reduction in ejection fraction. Await final recommendations and discharge regimen.   Daily weights, I/Os

## 2024-10-28 NOTE — ASSESSMENT & PLAN NOTE
Small lesion with surrounding erythema on left buttock, lesion had purulence and odor  Await cultures, Continue Keflex

## 2024-10-28 NOTE — DISCHARGE INSTR - AVS FIRST PAGE
SUELLEN MOE     Please weigh patient daily and use additional Lasix 40 mg PRN for weight gains> 3 lbs in 1 day or 5 lbs in 1 week.    Also, PLEASE GET BMP blood test in 1 week from discharge date in order to check kidney function and potassium level.      Thank you,  Shagufta Skelton PA-C (cardiology) [phone]

## 2024-10-28 NOTE — CASE MANAGEMENT
Case Management Discharge Planning Note    Patient name Eusebio Tate  Location [place] [address] /[facility] M* MRN [id number]  : 1935 Date 10/28/2024       Current Admission Date: 10/23/2024  Current Admission Diagnosis:Acute on chronic diastolic heart failure (HCC)   Patient Active Problem List    Diagnosis Date Noted Date Diagnosed    Skin infection 10/27/2024     Microcytic anemia 10/24/2024     Bacterial conjunctivitis of left eye 10/24/2024     Advanced care planning/counseling discussion 10/24/2024     Severe concentric left ventricular hypertrophy 2024     Biatrial enlargement 2024     Peripheral vascular disease, unspecified (HCC) 2024     Stage 3b chronic kidney disease (HCC) 2024     Class 1 obesity due to excess calories without serious comorbidity with body mass index (BMI) of 30.0 to 30.9 in adult 2023     Nodule of middle lobe of right lung 2023     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     Prolonged Q-T interval on ECG 2023     Syncope 2023     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 behavioral disturbance, psychotic disturbance, mood disturbance, or anxiety (HCC) 2023     Sick sinus syndrome (HCC) 2023     Stroke-like symptoms 2023     History of stroke 01/10/2023     New onset seizure (HCC)      Post-stroke epilepsy 2022     Urinary retention 2022     Venous stasis dermatitis of both lower extremities 2022     Gait instability 2022     Other fatigue 2022     Atypical pneumonia 05/10/2022     Respiratory insufficiency 05/10/2022     Acute metabolic encephalopathy 05/10/2022     Acute on chronic diastolic heart failure (HCC) 05/10/2022     Thrombocytopenia (HCC) 05/10/2022     Other hyperlipidemia 10/06/2021     Other insomnia 10/06/2021     Skin lesion 10/04/2021     Action tremor 2020     Age-related osteoporosis without fracture 2020     Permanent atrial fibrillation (HCC) 04/10/2015      Chronic anticoagulation 04/10/2015     Benign essential hypertension 04/11/2011       LOS (days): 5  Geometric Mean LOS (GMLOS) (days): 4.6  Days to GMLOS:0.1     OBJECTIVE:  Risk of Unplanned Readmission Score: 18.25         Current admission status: Inpatient   Preferred Pharmacy:   [facility] - [place], [place] - [address]  [address] [address]  Phone: [phone] Fax: [phone]    [id number] - [place] - [address]   [address] [place] [address]  Phone: [phone] Fax: [phone]    [facility] - [place], [place] - [address] [facility]  [address] [facility] [facility] [place] [address]  Phone: [phone] Fax: [phone]    Primary Care Provider: Daniel Birch MD    Primary Insurance: [employer] [facility]  Secondary Insurance:     DISCHARGE DETAILS:      Additional Comments: CM spoke with patient's son, Matt via phone; CM provided mobility update for patient and discussed patient choice for STR in [place] with [facility] post acute. CM discussed follow up with [facility] care supervising nurse, Es. Patient's son, Matt agreeable and understood. CM reviewed need for insurance authorization with patient's son. CM to follow for medical clearance and insurance authorization request.

## 2024-10-28 NOTE — ASSESSMENT & PLAN NOTE
Eliquis 5 mg BID for stroke prevention  No high rate episodes on Sept device check. Due for next device check in Nov.

## 2024-10-28 NOTE — CASE MANAGEMENT
Case Management Discharge Planning Note    Patient name Eusebio Tate  Location [place] [address] /[facility] 2 M* MRN [id number]  : 1935 Date 10/28/2024       Current Admission Date: 10/23/2024  Current Admission Diagnosis:Acute on chronic diastolic heart failure (HCC)   Patient Active Problem List    Diagnosis Date Noted Date Diagnosed    Skin infection 10/27/2024     Microcytic anemia 10/24/2024     Bacterial conjunctivitis of left eye 10/24/2024     Advanced care planning/counseling discussion 10/24/2024     Severe concentric left ventricular hypertrophy 2024     Biatrial enlargement 2024     Peripheral vascular disease, unspecified (HCC) 2024     Stage 3b chronic kidney disease ([facility]) 2024     Class 1 obesity due to excess calories without serious comorbidity with body mass index (BMI) of 30.0 to 30.9 in adult 2023     Nodule of middle lobe of right lung 2023     Embolic stroke ([facility]) 02/15/2023     Prolonged Q-T interval on ECG 2023     Syncope 2023     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 behavioral disturbance, psychotic disturbance, mood disturbance, or anxiety ([facility]) 2023     Sick sinus syndrome ([facility]) 2023     Stroke-like symptoms 2023     History of stroke,  & 2023 01/10/2023     New onset seizure ([facility])      Post-stroke epilepsy 2022     Urinary retention 2022     Venous stasis dermatitis of both lower extremities 2022     Gait instability 2022     Other fatigue 2022     Atypical pneumonia 05/10/2022     Respiratory insufficiency 05/10/2022     Acute on chronic diastolic heart failure (HCC) 05/10/2022     Thrombocytopenia (HCC) 05/10/2022     Other hyperlipidemia 10/06/2021     Other insomnia 10/06/2021     Skin lesion 10/04/2021     Action tremor 2020     Age-related osteoporosis without fracture 2020     Permanent atrial fibrillation (HCC) 04/10/2015     Chronic anticoagulation  "04/10/2015     Benign essential hypertension 04/11/2011       LOS (days): 5  Geometric Mean LOS (GMLOS) (days): 4.6  Days to GMLOS:-0.1     OBJECTIVE:  Risk of Unplanned Readmission Score: 18.29         Current admission status: Inpatient   Preferred Pharmacy:   [facility] - [place], [place] - [address]  [address] [address]  Phone: [phone] Fax: [phone]    [id number] - [place] - [address]   [address] [place] [address]  Phone: [phone] Fax: [phone]    [facility] - [place], [place] - [address] [facility] [address] [facility]  [address] [facility] [place] [address]  Phone: [phone] Fax: [phone]    Primary Care Provider: Daniel Birch MD    Primary Insurance: [employer] [facility]  Secondary Insurance:     DISCHARGE DETAILS:  Additional Comments: CM received message from [facility] \"Patient switched her insurance from the [employer] to the community one since her past stay with us and we unfortunately are not in network with that plan at this location.\" CM reopened window for STR in [place] and expanded search. CM left follow up message for patient's son, Matt. CM to follow for STR choice.                      "

## 2024-10-28 NOTE — ASSESSMENT & PLAN NOTE
Lab Results   Component Value Date    EGFR 60 10/28/2024    EGFR 60 10/27/2024    EGFR 53 10/25/2024    CREATININE 0.86 10/28/2024    CREATININE 0.86 10/27/2024    CREATININE 0.96 10/25/2024   Stable. Does not meet SERA criteria

## 2024-10-28 NOTE — PLAN OF CARE
Problem: Potential for Falls  Goal: Patient will remain free of falls  Description: INTERVENTIONS:  - Educate patient/family on patient safety including physical limitations  - Instruct patient to call for assistance with activity   - Consult OT/PT to assist with strengthening/mobility   - Keep Call bell within reach  - Keep bed low and locked with side rails adjusted as appropriate  - Keep care items and personal belongings within reach  - Initiate and maintain comfort rounds  - Make Fall Risk Sign visible to staff  - Offer Toileting every 2 Hours, in advance of need  - Initiate/Maintain bed alarm  - Obtain necessary fall risk management equipment: alarms  - Apply yellow socks and bracelet for high fall risk patients  - Consider moving patient to room near nurses station  Outcome: Progressing     Problem: PAIN - ADULT  Goal: Verbalizes/displays adequate comfort level or baseline comfort level  Description: Interventions:  - Encourage patient to monitor pain and request assistance  - Assess pain using appropriate pain scale  - Administer analgesics based on type and severity of pain and evaluate response  - Implement non-pharmacological measures as appropriate and evaluate response  - Consider cultural and social influences on pain and pain management  - Notify physician/advanced practitioner if interventions unsuccessful or patient reports new pain  Outcome: Progressing     Problem: INFECTION - ADULT  Goal: Absence or prevention of progression during hospitalization  Description: INTERVENTIONS:  - Assess and monitor for signs and symptoms of infection  - Monitor lab/diagnostic results  - Monitor all insertion sites, i.e. indwelling lines, tubes, and drains  - Monitor endotracheal if appropriate and nasal secretions for changes in amount and color  - [place] appropriate cooling/warming therapies per order  - Administer medications as ordered  - Instruct and encourage patient and family to use good hand hygiene  technique  - Identify and instruct in appropriate isolation precautions for identified infection/condition  Outcome: Progressing     Problem: SAFETY ADULT  Goal: Maintain or return to baseline ADL function  Description: INTERVENTIONS:  -  Assess patient's ability to carry out ADLs; assess patient's baseline for ADL function and identify physical deficits which impact ability to perform ADLs (bathing, care of mouth/teeth, toileting, grooming, dressing, etc.)  - Assess/evaluate cause of self-care deficits   - Assess range of motion  - Assess patient's mobility; develop plan if impaired  - Assess patient's need for assistive devices and provide as appropriate  - Encourage maximum independence but intervene and supervise when necessary  - Involve family in performance of ADLs  - Assess for home care needs following discharge   - Consider OT consult to assist with ADL evaluation and planning for discharge  - Provide patient education as appropriate  Outcome: Progressing     Problem: CARDIOVASCULAR - ADULT  Goal: Absence of cardiac dysrhythmias or at baseline rhythm  Description: INTERVENTIONS:  - Continuous cardiac monitoring, vital signs, obtain 12 lead EKG if ordered  - Administer antiarrhythmic and heart rate control medications as ordered  - Monitor electrolytes and administer replacement therapy as ordered  Outcome: Progressing     Problem: RESPIRATORY - ADULT  Goal: Achieves optimal ventilation and oxygenation  Description: INTERVENTIONS:  - Assess for changes in respiratory status  - Assess for changes in mentation and behavior  - Position to facilitate oxygenation and minimize respiratory effort  - Oxygen administered by appropriate delivery if ordered  - Initiate smoking cessation education as indicated  - Encourage broncho-pulmonary hygiene including cough, deep breathe, Incentive Spirometry  - Assess the need for suctioning and aspirate as needed  - Assess and instruct to report SOB or any respiratory  difficulty  - Respiratory Therapy support as indicated  Outcome: Progressing     Problem: GASTROINTESTINAL - ADULT  Goal: Maintains adequate nutritional intake  Description: INTERVENTIONS:  - Monitor percentage of each meal consumed  - Identify factors contributing to decreased intake, treat as appropriate  - Assist with meals as needed  - Monitor I&O, weight, and lab values if indicated  - Obtain nutrition services referral as needed  Outcome: Progressing     Problem: METABOLIC, FLUID AND ELECTROLYTES - ADULT  Goal: Fluid balance maintained  Description: INTERVENTIONS:  - Monitor labs   - Monitor I/O and WT  - Instruct patient on fluid and nutrition as appropriate  - Assess for signs & symptoms of volume excess or deficit  Outcome: Progressing     Problem: SKIN/TISSUE INTEGRITY - ADULT  Goal: Skin Integrity remains intact(Skin Breakdown Prevention)  Description: Assess:  -Perform [place] assessment every shift  -Clean and moisturize skin every shift  -Inspect skin when repositioning, toileting, and assisting with ADLS  -Assess under medical devices such as IV every shift  -Assess extremities for adequate circulation and sensation     Bed Management:  -Have minimal linens on bed & keep smooth, unwrinkled  -Change linens as needed when moist or perspiring  -Avoid sitting or lying in one position for more than 2 hours while in bed  -Keep HOB at 30 degrees     Toileting:  -Offer bedside commode  -Assess for incontinence every shift  -Use incontinent care products after each incontinent episode such as foam cleanser    Activity:  -Mobilize patient 3 times a day  -Encourage activity and walks on unit  -Encourage or provide ROM exercises   -Turn and reposition patient every 2 Hours  -Use appropriate equipment to lift or move patient in bed  -Instruct/ Assist with weight shifting every hour when out of bed in chair  -Consider limitation of chair time 3 hour intervals    Skin Care:  -Avoid use of baby powder, tape, friction  and shearing, hot water or constrictive clothing  -Relieve pressure over bony prominences using pillows  -Do not massage red bony areas    Next Steps:  -Teach patient strategies to minimize risks such as skin breakdown   -Consider consults to  interdisciplinary teams such as wound care  Outcome: Progressing  Goal: Incision(s), wounds(s) or drain site(s) healing without S/S of infection  Description: INTERVENTIONS  - Assess and document dressing, incision, wound bed, drain sites and surrounding tissue  - Provide patient and family education  - Perform skin care/dressing changes every shift  Outcome: Progressing     Problem: Prexisting or High Potential for Compromised Skin Integrity  Goal: Skin integrity is maintained or improved  Description: INTERVENTIONS:  - Identify patients at risk for skin breakdown  - Assess and monitor skin integrity  - Assess and monitor nutrition and hydration status  - Monitor labs   - Assess for incontinence   - Turn and reposition patient  - Assist with mobility/ambulation  - Relieve pressure over bony prominences  - Avoid friction and shearing  - Provide appropriate hygiene as needed including keeping skin clean and dry  - Evaluate need for skin moisturizer/barrier cream  - Collaborate with interdisciplinary team   - Patient/family teaching  - Consider wound care consult   Outcome: Progressing

## 2024-10-28 NOTE — ASSESSMENT & PLAN NOTE
Stable, no indication for transfusion at this time.    Results from last 7 days   Lab Units 10/28/24  0441 10/27/24  0535 10/25/24  0457 10/24/24  1401 10/24/24  0450 10/23/24  1709   HEMOGLOBIN g/dL 8.1* 8.0* 8.1* 8.2* 7.9* 9.1*   MCV fL 76* 75* 75*  --  75* 75*   RDW % 20.0* 19.9* 19.9*  --  19.8* 20.1*   IRON ug/dL  --   --   --   --   --  32*   TIBC ug/dL  --   --   --   --   --  436   FERRITIN ng/mL  --   --   --   --   --  30   FOLATE ng/mL  --   --   --   --  15.6  --    VITAMIN B 12 pg/mL  --   --   --   --   --  261

## 2024-10-28 NOTE — PROGRESS NOTES
Progress Note - Hospitalist   Name: Eusebio Tate 89 y.o. female I MRN: [id number]  Unit/Bed#: [place] [address] -01 I Date of Admission: 10/23/2024   Date of Service: 10/28/2024 I Hospital Day: 5    Assessment & Plan  Acute on chronic diastolic heart failure (HCC)  Wt Readings from Last 3 Encounters:   10/28/24 88.2 kg (194 lb 7.1 oz)   05/30/24 87.5 kg (192 lb 14.4 oz)   05/02/23 78.6 kg (173 lb 3.2 oz)     Required IV lasix initially and now transitioned to po lasix due to acute on chronic diastolic heart failure.  Appreciate cardiology recommendations. No need for ARNI at this time given no reduction in ejection fraction. Await final recommendations and discharge regimen.   Daily weights, I/Os    Age-related osteoporosis without fracture  Continue on calcium and vitamin D and zonisamide 100 mg daily   Benign essential hypertension  Continue lasix  Permanent atrial fibrillation (HCC)  A-fib-patient is rate controlled on Eliquis for anticoagulation   Post-stroke epilepsy  [place]   History of stroke, 2022 & 2023  History of bilateral MCA strokes  Continue aspirin / statin  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 behavioral disturbance, psychotic disturbance, mood disturbance, or anxiety (HCC)  Patient is in dementia unit with multiple comorbidities and alzheimers Aox0 at baseline. No POA but 3 children who make decisions and would like to know about palliative and hospice.   C discussion with family, would like palliative care going forward, not ready for end of life care at this time  Case management to assist with dispo  Stage 3b chronic kidney disease (HCC)  Lab Results   Component Value Date    EGFR 60 10/28/2024    EGFR 60 10/27/2024    EGFR 53 10/25/2024    CREATININE 0.86 10/28/2024    CREATININE 0.86 10/27/2024    CREATININE 0.96 10/25/2024   Stable. Does not meet SERA criteria  Microcytic anemia  Stable, no indication for transfusion at this time.    Results from last 7 days   Lab Units  10/28/24  0441 10/27/24  0535 10/25/24  0457 10/24/24  1401 10/24/24  0450 10/23/24  1709   HEMOGLOBIN g/dL 8.1* 8.0* 8.1* 8.2* 7.9* 9.1*   MCV fL 76* 75* 75*  --  75* 75*   RDW % 20.0* 19.9* 19.9*  --  19.8* 20.1*   IRON ug/dL  --   --   --   --   --  32*   TIBC ug/dL  --   --   --   --   --  436   FERRITIN ng/mL  --   --   --   --   --  30   FOLATE ng/mL  --   --   --   --  15.6  --    VITAMIN B 12 pg/mL  --   --   --   --   --  261     Bacterial conjunctivitis of left eye  Purulent crusting of left eye.,  Resolved continue with current treatment  Continue Ofloxacin drops  Advanced care planning/counseling discussion    Skin infection  Small lesion with surrounding erythema on left buttock, lesion had purulence and odor  Await cultures, Continue Keflex  Sick sinus syndrome (HCC)    Peripheral vascular disease, unspecified (HCC)      VTE Pharmacologic Prophylaxis: VTE Score: 5 Moderate Risk (Score 3-4) - Pharmacological DVT Prophylaxis Ordered: apixaban (Eliquis).    Mobility:   Basic Mobility Inpatient Raw Score: 13  -[facility] Goal: 4: Move to chair/commode  -[facility] Achieved: 4: Move to chair/commode    Discussions with Specialists or Other Care Team Provider: cardiology    Education and Discussions with Family / Patient: patientMatt    Current Length of Stay: 5 day(s)  Current Patient Status: Inpatient   Certification Statement: The patient will continue to require additional inpatient hospital stay due to dispo planning, antibiotics, cardiology evaluation  Discharge Plan: 24-48 hours    Code Status: Level 3 - DNAR and DNI    Subjective   Patient seen and examined. No new complaints overnight, denies chest pain / shortness of breath.    Objective   Vitals:   Temp (24hrs), Av.5 °F (36.9 °C), Min:98.3 °F (36.8 °C), Max:98.7 °F (37.1 °C)    Temp:  [98.3 °F (36.8 °C)-98.7 °F (37.1 °C)] 98.7 °F (37.1 °C)  HR:  [60-61] 61  Resp:  [16-18] 18  BP: (110-114)/(55-67) 114/67  SpO2:  [97 %-100 %] 100 %  Body mass index  is 35.56 kg/m².     Input and Output Summary (last 24 hours):     Intake/Output Summary (Last 24 hours) at 10/28/2024 1443  Last data filed at 10/28/2024 0501  Gross per 24 hour   Intake 840 ml   Output 1100 ml   Net -260 ml       Physical Exam  Vitals reviewed.   Constitutional:       General: She is not in acute distress.  HENT:      Head: Normocephalic.      Nose: Nose normal.      Mouth/Throat:      Mouth: Mucous membranes are moist.   Eyes:      General: No scleral icterus.  Cardiovascular:      Rate and Rhythm: Normal rate and regular rhythm.   Pulmonary:      Effort: Pulmonary effort is normal. No respiratory distress.      Breath sounds: No wheezing.   Abdominal:      General: There is no distension.      Palpations: Abdomen is soft.      Tenderness: There is no abdominal tenderness.   Skin:     General: Skin is warm.   Neurological:      Mental Status: She is alert.   Psychiatric:         Mood and Affect: Mood normal.         Behavior: Behavior normal.       Lines/Drains:              Lab Results: I have reviewed the following results:   Results from last 7 days   Lab Units 10/28/24  0441 10/27/24  0535 10/25/24  0457   WBC Thousand/uL 7.92 5.87 5.39   HEMOGLOBIN g/dL 8.1* 8.0* 8.1*   PLATELETS Thousands/uL 161 151 162   MCV fL 76* 75* 75*     Results from last 7 days   Lab Units 10/28/24  0441 10/27/24  0535 10/25/24  0457 10/24/24  0450 10/23/24  1709   SODIUM mmol/L 137 142 142   < > 142   POTASSIUM mmol/L 4.1 3.8 3.8   < > 3.7   CHLORIDE mmol/L 106 111* 109*   < > 110*   CO2 mmol/L 26 26 27   < > 25   ANION GAP mmol/L 5 5 6   < > 7   BUN mg/dL 24 18 15   < > 15   CREATININE mg/dL 0.86 0.86 0.96   < > 0.93   CALCIUM mg/dL 8.9 8.9 9.2   < > 9.7   ALBUMIN g/dL  --   --   --   --  4.0   TOTAL BILIRUBIN mg/dL  --   --   --   --  1.06*   ALK PHOS U/L  --   --   --   --  93   ALT U/L  --   --   --   --  15   AST U/L  --   --   --   --  21   EGFR ml/min/1.73sq m 60 60 53   < > 55   GLUCOSE RANDOM mg/dL 82 83 84    < > 107    < > = values in this interval not displayed.     Results from last 7 days   Lab Units 10/24/24  0450   MAGNESIUM mg/dL 2.8*         Results from last 7 days   Lab Units 10/23/24  2130 10/23/24  1859 10/23/24  1709   HS TNI 0HR ng/L  --   --  71*   HS TNI 2HR ng/L  --  59*  --    HS TNI 4HR ng/L 72*  --   --           Results from last 7 days   Lab Units 10/23/24  1709   LACTIC ACID mmol/L 1.7     Results from last 7 days   Lab Units 10/23/24  1648   POC GLUCOSE mg/dl 110         Results from last 7 days   Lab Units 10/24/24  0450   TSH 3RD GENERATON uIU/mL 1.384       Recent Cultures (last 7 days):   Results from last 7 days   Lab Units 10/27/24  1310   GRAM STAIN RESULT  2+ Gram positive rods*  2+ Gram positive cocci in pairs*  No polys seen*   WOUND CULTURE  Culture too young- will reincubate       Imaging:  Reviewed radiology reports from this admission: xr chest, ct head    Last 24 Hours Medication List:     Current Facility-Administered Medications:     acetaminophen (TYLENOL) tablet 650 mg, Q4H PRN    apixaban (ELIQUIS) tablet 5 mg, BID    aspirin chewable tablet 81 mg, Daily    atorvastatin (LIPITOR) tablet 40 mg, Daily With Dinner    cephalexin (KEFLEX) capsule 500 mg, Q6H FRANKIE    Cholecalciferol (VITAMIN D3) tablet 1,000 Units, Daily    escitalopram (LEXAPRO) tablet 10 mg, Daily    ferrous sulfate tablet 325 mg, BID With Meals    furosemide (LASIX) tablet 40 mg, Daily    HYDROmorphone HCl (DILAUDID) injection 0.2 mg, Q4H PRN    ofloxacin (OCUFLOX) 0.3 % ophthalmic solution 1 drop, 4x Daily    ondansetron (ZOFRAN) injection 4 mg, Q6H PRN    polyethylene glycol (MIRALAX) packet 17 g, Daily    potassium chloride (Klor-Con M20) CR tablet 20 mEq, BID    QUEtiapine (SEROquel) tablet 12.5 mg, HS    senna (SENOKOT) tablet 8.6 mg, Daily    thiamine tablet 100 mg, Daily    zonisamide (ZONEGRAN) capsule 100 mg, Daily      **Please Note: This note may have been constructed using a voice recognition  system.**

## 2024-10-28 NOTE — CONSULTS
Consultation - Cardiology   Name: Eusebio Tate 89 y.o. female I MRN: [id number]  Unit/Bed#: [place] [address] -01 I Date of Admission: 10/23/2024   Date of Service: 10/28/2024 I Hospital Day: 5   Inpatient consult to Cardiology  Consult performed by: Shagufta Skelton PA-C  Consult ordered by: Colby Albarado MD      Physician Requesting Evaluation: Colby Albarado MD   Reason for Evaluation / Principal Problem: CHF  Assessment & Plan  Acute on chronic diastolic heart failure (HCC)  HFpEF, LVEF 55-60%, NYHA Class III, AHA Stage C  Neurohormonal Blockade:  --Beta Blocker: None  --ARNi / ACEi / ARB: Can consider in the future.  --Aldosterone Antagonist: Spironolactone 12.5 mg QD started.  --SGLT2 Inhibitor: None. Not a good idea given urinary incontinence.  --Home Diuretic: Lasix 40 mg BID + Kdur 20 mEq BID  --Inpatient Diuretic: Lasix 40 mg QD. Can use extra Lasix 40 mg PRN For weight gains 3lbs in 1 day or 5 lbs in 1 week.    Cut back Kdur from 20 mEq BID to 20 mEq QD.  Prior dry weight was ~ 190#. Examines near euvolemic today.  Daily weights as able  2 G Na restriction daily.  Needs BMP in 1 week time. Naseem Hummel can do this.  Benign essential hypertension  Normotensive  Permanent atrial fibrillation (HCC)  Eliquis 5 mg BID for stroke prevention  No high rate episodes on Sept device check. Due for next device check in Nov.  History of stroke, 2022 & 2023  On ASA 81 mg QD, continue.  Sick sinus syndrome (HCC)  S/p [employer] single-chamber pacemaker implantation 1/26/2023  She is almost entirely V-paced  Peripheral vascular disease, unspecified (HCC)  Venous insufficiency  Elevate lower extremities  Compression stockings/ wraps as tolerated    History of Present Illness   Eusebio Tate is a 89 y.o. female who presented to [facility] ED via EMS from [facility].  Patient's family members were concerned about her limited ambulation due to CUADRA, increased lethargy x 1 day, shortness of breath at rest  & acute on chronic peripheral edema x 2-3 weeks.  Patient has Alzheimer's and is nonverbal so the history comes from my chart review and patient's daughter and son who are at the bedside today.  Patient always has peripheral edema but it was getting much worse for the last few weeks, patient was moaning in pain due to swelling in the legs.  Patient who already has ambulatory dysfunction due to severe dementia and PVD, had worsening ambulatory dysfunction, had to use the wheelchair all the time for the last 1 week at least because of CUADRA.  Staff at the [facility] noted weight gain and increased home Lasix dose however weight gain persisted.    Primary cardiologist: Dr. Reece Cortez    Review of Systems   Reason unable to perform ROS: dementia.   Constitutional:  Positive for activity change and unexpected weight change.   Respiratory:  Positive for shortness of breath.    Cardiovascular:  Positive for leg swelling.   Musculoskeletal:         Pain in the limbs   Neurological:  Positive for weakness.     Pertinent PMH: HTN, Permanent A-Fib, strokes, diastolic heart failure, biatrial enlargement, severe LV hypertrophy, DJD, OA, post-stroke epilepsy, Alzheimer's disease, venous insufficiency, Mild PH, Vit D deficiency, SSS  Surgical history: implantation of pace maker, 2023  No FH of heart disease.  Social history: Lives in [facility]. Does not smoke, drink alcohol or do drugs. 3 adult children.    Pertinent Cardiac medications from [facility]: Aspirin 81 mg daily, atorvastatin 40 mg nightly, Eliquis 5 mg twice daily, furosemide 40 mg twice daily, potassium chloride 20 mill equivalents twice daily & sublingual nitroglycerin 0.4 mg Q 5 minutes as needed    Objective :  Temp:  [98.3 °F (36.8 °C)-98.5 °F (36.9 °C)] 98.4 °F (36.9 °C)  HR:  [60] 60  BP: (110-113)/(55) 111/55  Resp:  [16-18] 18  SpO2:  [97 %-98 %] 98 %  O2 Device: None (Room air)    First  "Weight: Weight - Scale: 88.6 kg (195 lb 5.2 oz) (10/24/24 0600)  Vitals:    10/27/24 0554 10/28/24 0600   Weight: 87.9 kg (193 lb 12.6 oz) 88.2 kg (194 lb 7.1 oz)     Weights log from [facility]:      Physical Exam  Vitals and nursing note reviewed.   Constitutional:       General: She is not in acute distress.     Appearance: She is not ill-appearing.   HENT:      Head: Normocephalic and atraumatic.      Nose: No congestion or rhinorrhea.      Mouth/Throat:      Mouth: Mucous membranes are dry.   Eyes:      Conjunctiva/sclera: Conjunctivae normal.   Neck:      Comments: Mild HJR   Cardiovascular:      Rate and Rhythm: Normal rate and regular rhythm.      Heart sounds: S1 normal and S2 normal. No murmur heard.  Pulmonary:      Comments: Right lung clear breath sounds , left lung base with mild rales otherwise clear    No increased work of breathing at rest I did not watch her exert herself    No coughing. No hypoxia.  Chest:      Comments: Left chest pacer non tender  Abdominal:      General: Abdomen is flat.   Skin:     Comments: Bl le with very cracked dry skin glistening but not oozing today    Legs are warm     Old scars from knee surgery on right.    Trace pitting edema in the bl ankles at the malleoli   Neurological:      Mental Status: She is alert.      Comments: Non verbal    She follows my commands though   Psychiatric:      Comments: dementia     Lab Results: I have reviewed the following results:  CBC & BMP from today  Troponins on admission 71-59-72  Imaging: CXR from admission with mild pulm vascular congestion. No effusions. Cardiomegaly. Pacer can with lead.  Other studies: Echo from 2023 Severe LV hypertrophy. LVEF 55-60%, mildly reduced RV function. Bl atria dilation. Mildly dilated aorta. Trace MR, Trace TR.  Device report 9/12/24 BATTERY STATUS \"11 YRS.\"  97%. ALL AVAILABLE LEAD PARAMETERS WITHIN NORMAL LIMITS. NO SIGNIFICANT HIGH RATE EPISODES. NORMAL DEVICE FUNCTION. "   EKG on admission V-paced with HR 60 bpm. Nl axis no infarct or ischemia.    Shagufta Skelton PA-C

## 2024-10-28 NOTE — ASSESSMENT & PLAN NOTE
HFpEF, LVEF 55-60%, NYHA Class III, AHA Stage C  Neurohormonal Blockade:  --Beta Blocker: None  --ARNi / ACEi / ARB: Can consider in the future.  --Aldosterone Antagonist: Spironolactone 12.5 mg QD started.  --SGLT2 Inhibitor: None. Not a good idea given urinary incontinence.  --Home Diuretic: Lasix 40 mg BID + Kdur 20 mEq BID  --Inpatient Diuretic: Lasix 40 mg QD. Can use extra Lasix 40 mg PRN For weight gains 3lbs in 1 day or 5 lbs in 1 week.    Cut back Kdur from 20 mEq BID to 20 mEq QD.  Prior dry weight was ~ 190#. Examines near euvolemic today.  Daily weights as able  2 G Na restriction daily.  Needs BMP in 1 week time. Naseem Hummel can do this.

## 2024-10-29 LAB
ANION GAP SERPL CALCULATED.3IONS-SCNC: 5 MMOL/L (ref 4–13)
APTT PPP: 35 SECONDS (ref 23–34)
BUN SERPL-MCNC: 19 MG/DL (ref 5–25)
CALCIUM SERPL-MCNC: 8.8 MG/DL (ref 8.4–10.2)
CHLORIDE SERPL-SCNC: 107 MMOL/L (ref 96–108)
CO2 SERPL-SCNC: 26 MMOL/L (ref 21–32)
CREAT SERPL-MCNC: 0.85 MG/DL (ref 0.6–1.3)
ERYTHROCYTE [DISTWIDTH] IN BLOOD BY AUTOMATED COUNT: 20.1 % (ref 11.6–15.1)
GFR SERPL CREATININE-BSD FRML MDRD: 61 ML/MIN/1.73SQ M
GLUCOSE SERPL-MCNC: 91 MG/DL (ref 65–140)
HCT VFR BLD AUTO: 25 % (ref 34.8–46.1)
HGB BLD-MCNC: 7.6 G/DL (ref 11.5–15.4)
INR PPP: 1.64 (ref 0.85–1.19)
MAGNESIUM SERPL-MCNC: 2.1 MG/DL (ref 1.9–2.7)
MCH RBC QN AUTO: 22.4 PG (ref 26.8–34.3)
MCHC RBC AUTO-ENTMCNC: 30.4 G/DL (ref 31.4–37.4)
MCV RBC AUTO: 74 FL (ref 82–98)
MRSA NOSE QL CULT: NORMAL
PLATELET # BLD AUTO: 166 THOUSANDS/UL (ref 149–390)
PMV BLD AUTO: 11.5 FL (ref 8.9–12.7)
POTASSIUM SERPL-SCNC: 4 MMOL/L (ref 3.5–5.3)
PROTHROMBIN TIME: 19.4 SECONDS (ref 12.3–15)
RBC # BLD AUTO: 3.4 MILLION/UL (ref 3.81–5.12)
SODIUM SERPL-SCNC: 138 MMOL/L (ref 135–147)
WBC # BLD AUTO: 7.57 THOUSAND/UL (ref 4.31–10.16)

## 2024-10-29 PROCEDURE — 85730 THROMBOPLASTIN TIME PARTIAL: CPT | Performed by: PHYSICIAN ASSISTANT

## 2024-10-29 PROCEDURE — 80048 BASIC METABOLIC PNL TOTAL CA: CPT | Performed by: PHYSICIAN ASSISTANT

## 2024-10-29 PROCEDURE — 99222 1ST HOSP IP/OBS MODERATE 55: CPT | Performed by: STUDENT IN AN ORGANIZED HEALTH CARE EDUCATION/TRAINING PROGRAM

## 2024-10-29 PROCEDURE — 97116 GAIT TRAINING THERAPY: CPT

## 2024-10-29 PROCEDURE — 99232 SBSQ HOSP IP/OBS MODERATE 35: CPT | Performed by: INTERNAL MEDICINE

## 2024-10-29 PROCEDURE — 97530 THERAPEUTIC ACTIVITIES: CPT

## 2024-10-29 PROCEDURE — 83735 ASSAY OF MAGNESIUM: CPT | Performed by: PHYSICIAN ASSISTANT

## 2024-10-29 PROCEDURE — 85027 COMPLETE CBC AUTOMATED: CPT | Performed by: PHYSICIAN ASSISTANT

## 2024-10-29 PROCEDURE — 85610 PROTHROMBIN TIME: CPT | Performed by: PHYSICIAN ASSISTANT

## 2024-10-29 PROCEDURE — 97535 SELF CARE MNGMENT TRAINING: CPT

## 2024-10-29 PROCEDURE — 99232 SBSQ HOSP IP/OBS MODERATE 35: CPT | Performed by: STUDENT IN AN ORGANIZED HEALTH CARE EDUCATION/TRAINING PROGRAM

## 2024-10-29 PROCEDURE — 97110 THERAPEUTIC EXERCISES: CPT

## 2024-10-29 RX ORDER — PANTOPRAZOLE SODIUM 40 MG/10ML
40 INJECTION, POWDER, LYOPHILIZED, FOR SOLUTION INTRAVENOUS EVERY 12 HOURS SCHEDULED
Status: DISCONTINUED | OUTPATIENT
Start: 2024-10-29 | End: 2024-10-30

## 2024-10-29 RX ADMIN — CEPHALEXIN 500 MG: 500 CAPSULE ORAL at 17:09

## 2024-10-29 RX ADMIN — PANTOPRAZOLE SODIUM 40 MG: 40 INJECTION, POWDER, LYOPHILIZED, FOR SOLUTION INTRAVENOUS at 21:36

## 2024-10-29 RX ADMIN — FERROUS SULFATE TAB 325 MG (65 MG ELEMENTAL FE) 325 MG: 325 (65 FE) TAB at 17:09

## 2024-10-29 RX ADMIN — ESCITALOPRAM OXALATE 10 MG: 10 TABLET ORAL at 09:22

## 2024-10-29 RX ADMIN — POTASSIUM CHLORIDE 20 MEQ: 1500 TABLET, EXTENDED RELEASE ORAL at 09:21

## 2024-10-29 RX ADMIN — PANTOPRAZOLE SODIUM 40 MG: 40 INJECTION, POWDER, LYOPHILIZED, FOR SOLUTION INTRAVENOUS at 09:22

## 2024-10-29 RX ADMIN — CEPHALEXIN 500 MG: 500 CAPSULE ORAL at 23:11

## 2024-10-29 RX ADMIN — APIXABAN 5 MG: 5 TABLET, FILM COATED ORAL at 09:22

## 2024-10-29 RX ADMIN — CEPHALEXIN 500 MG: 500 CAPSULE ORAL at 00:35

## 2024-10-29 RX ADMIN — OFLOXACIN 1 DROP: 3 SOLUTION/ DROPS OPHTHALMIC at 09:24

## 2024-10-29 RX ADMIN — CEPHALEXIN 500 MG: 500 CAPSULE ORAL at 13:03

## 2024-10-29 RX ADMIN — FERROUS SULFATE TAB 325 MG (65 MG ELEMENTAL FE) 325 MG: 325 (65 FE) TAB at 09:22

## 2024-10-29 RX ADMIN — APIXABAN 5 MG: 5 TABLET, FILM COATED ORAL at 17:09

## 2024-10-29 RX ADMIN — FUROSEMIDE 40 MG: 40 TABLET ORAL at 17:09

## 2024-10-29 RX ADMIN — THIAMINE HCL TAB 100 MG 100 MG: 100 TAB at 09:22

## 2024-10-29 RX ADMIN — QUETIAPINE FUMARATE 12.5 MG: 25 TABLET ORAL at 21:36

## 2024-10-29 RX ADMIN — CEPHALEXIN 500 MG: 500 CAPSULE ORAL at 05:59

## 2024-10-29 RX ADMIN — ATORVASTATIN CALCIUM 40 MG: 40 TABLET, FILM COATED ORAL at 17:09

## 2024-10-29 RX ADMIN — OFLOXACIN 1 DROP: 3 SOLUTION/ DROPS OPHTHALMIC at 17:09

## 2024-10-29 RX ADMIN — Medication 1000 UNITS: at 09:22

## 2024-10-29 RX ADMIN — ASPIRIN 81 MG CHEWABLE TABLET 81 MG: 81 TABLET CHEWABLE at 09:22

## 2024-10-29 RX ADMIN — OFLOXACIN 1 DROP: 3 SOLUTION/ DROPS OPHTHALMIC at 21:36

## 2024-10-29 RX ADMIN — ZONISAMIDE 100 MG: 100 CAPSULE ORAL at 09:23

## 2024-10-29 NOTE — ASSESSMENT & PLAN NOTE
Wt Readings from Last 3 Encounters:   10/29/24 83.9 kg (184 lb 15.5 oz)   05/30/24 87.5 kg (192 lb 14.4 oz)   05/02/23 78.6 kg (173 lb 3.2 oz)     Required IV lasix initially and now transitioned to po lasix due to acute on chronic diastolic heart failure.  Appreciate cardiology recommendations. No need for ARNI at this time given no reduction in ejection fraction. Lasix increased to 40mg BID.  Daily weights, I/Os

## 2024-10-29 NOTE — PLAN OF CARE
Problem: OCCUPATIONAL THERAPY ADULT  Goal: Performs self-care activities at highest level of function for planned discharge setting.  See evaluation for individualized goals.  Description: Treatment Interventions: ADL retraining, Functional transfer training, UE strengthening/ROM, Endurance training, Patient/family training, Equipment evaluation/education, Neuromuscular reeducation, Compensatory technique education, Energy conservation, Activityengagement          See flowsheet documentation for full assessment, interventions and recommendations.   Outcome: Progressing  Note: Limitation: Decreased ADL status, Decreased UE strength, Decreased Safe judgement during ADL, Decreased endurance, Decreased cognition, Decreased self-care trans, Decreased high-level ADLs  Prognosis: Fair  Assessment: Pt seen for skilled OT tx this date. Tx focused on improving strength, activity tolerance and balance, safety awareness to increase independence with self care tasks. Pt tolerated session well. Pt was limited by weakness and cognition. Pt performed UB dressing/ bathing with Sapna, LB dressing / bathing modA ,  bed mobility Sapna, transfers Sapna- minAx2, mobility with Sapna RW. Pt demonstrated fair - standing balance during functional tasks. Pt required moderate verbal cuing during session to safely complete tasks.   Current OT DC recommendations for pt is level 3 resources (RTF).     Rehab Resource Intensity Level, OT: III (Minimum Resource Intensity) (return to facility with Assist)

## 2024-10-29 NOTE — PLAN OF CARE
Problem: Potential for Falls  Goal: Patient will remain free of falls  Description: INTERVENTIONS:  - Educate patient/family on patient safety including physical limitations  - Instruct patient to call for assistance with activity   - Consult OT/PT to assist with strengthening/mobility   - Keep Call bell within reach  - Keep bed low and locked with side rails adjusted as appropriate  - Keep care items and personal belongings within reach  - Initiate and maintain comfort rounds  - Make Fall Risk Sign visible to staff  - Offer Toileting every 2 Hours, in advance of need  - Initiate/Maintain bed alarm  - Obtain necessary fall risk management equipment: alarms  - Apply yellow socks and bracelet for high fall risk patients  - Consider moving patient to room near nurses station  Outcome: Progressing     Problem: PAIN - ADULT  Goal: Verbalizes/displays adequate comfort level or baseline comfort level  Description: Interventions:  - Encourage patient to monitor pain and request assistance  - Assess pain using appropriate pain scale  - Administer analgesics based on type and severity of pain and evaluate response  - Implement non-pharmacological measures as appropriate and evaluate response  - Consider cultural and social influences on pain and pain management  - Notify physician/advanced practitioner if interventions unsuccessful or patient reports new pain  Outcome: Progressing     Problem: INFECTION - ADULT  Goal: Absence or prevention of progression during hospitalization  Description: INTERVENTIONS:  - Assess and monitor for signs and symptoms of infection  - Monitor lab/diagnostic results  - Monitor all insertion sites, i.e. indwelling lines, tubes, and drains  - Monitor endotracheal if appropriate and nasal secretions for changes in amount and color  - Weed appropriate cooling/warming therapies per order  - Administer medications as ordered  - Instruct and encourage patient and family to use good hand hygiene  technique  - Identify and instruct in appropriate isolation precautions for identified infection/condition  Outcome: Progressing     Problem: SAFETY ADULT  Goal: Maintain or return to baseline ADL function  Description: INTERVENTIONS:  -  Assess patient's ability to carry out ADLs; assess patient's baseline for ADL function and identify physical deficits which impact ability to perform ADLs (bathing, care of mouth/teeth, toileting, grooming, dressing, etc.)  - Assess/evaluate cause of self-care deficits   - Assess range of motion  - Assess patient's mobility; develop plan if impaired  - Assess patient's need for assistive devices and provide as appropriate  - Encourage maximum independence but intervene and supervise when necessary  - Involve family in performance of ADLs  - Assess for home care needs following discharge   - Consider OT consult to assist with ADL evaluation and planning for discharge  - Provide patient education as appropriate  Outcome: Progressing     Problem: CARDIOVASCULAR - ADULT  Goal: Absence of cardiac dysrhythmias or at baseline rhythm  Description: INTERVENTIONS:  - Continuous cardiac monitoring, vital signs, obtain 12 lead EKG if ordered  - Administer antiarrhythmic and heart rate control medications as ordered  - Monitor electrolytes and administer replacement therapy as ordered  Outcome: Progressing     Problem: RESPIRATORY - ADULT  Goal: Achieves optimal ventilation and oxygenation  Description: INTERVENTIONS:  - Assess for changes in respiratory status  - Assess for changes in mentation and behavior  - Position to facilitate oxygenation and minimize respiratory effort  - Oxygen administered by appropriate delivery if ordered  - Initiate smoking cessation education as indicated  - Encourage broncho-pulmonary hygiene including cough, deep breathe, Incentive Spirometry  - Assess the need for suctioning and aspirate as needed  - Assess and instruct to report SOB or any respiratory  difficulty  - Respiratory Therapy support as indicated  Outcome: Progressing     Problem: GASTROINTESTINAL - ADULT  Goal: Maintains adequate nutritional intake  Description: INTERVENTIONS:  - Monitor percentage of each meal consumed  - Identify factors contributing to decreased intake, treat as appropriate  - Assist with meals as needed  - Monitor I&O, weight, and lab values if indicated  - Obtain nutrition services referral as needed  Outcome: Progressing     Problem: METABOLIC, FLUID AND ELECTROLYTES - ADULT  Goal: Fluid balance maintained  Description: INTERVENTIONS:  - Monitor labs   - Monitor I/O and WT  - Instruct patient on fluid and nutrition as appropriate  - Assess for signs & symptoms of volume excess or deficit  Outcome: Progressing     Problem: SKIN/TISSUE INTEGRITY - ADULT  Goal: Skin Integrity remains intact(Skin Breakdown Prevention)  Description: Assess:  -Perform Pola assessment every shift  -Clean and moisturize skin every shift  -Inspect skin when repositioning, toileting, and assisting with ADLS  -Assess under medical devices such as IV every shift  -Assess extremities for adequate circulation and sensation     Bed Management:  -Have minimal linens on bed & keep smooth, unwrinkled  -Change linens as needed when moist or perspiring  -Avoid sitting or lying in one position for more than 2 hours while in bed  -Keep HOB at 30 degrees     Toileting:  -Offer bedside commode  -Assess for incontinence every shift  -Use incontinent care products after each incontinent episode such as foam cleanser    Activity:  -Mobilize patient 3 times a day  -Encourage activity and walks on unit  -Encourage or provide ROM exercises   -Turn and reposition patient every 2 Hours  -Use appropriate equipment to lift or move patient in bed  -Instruct/ Assist with weight shifting every hour when out of bed in chair  -Consider limitation of chair time 3 hour intervals    Skin Care:  -Avoid use of baby powder, tape, friction  and shearing, hot water or constrictive clothing  -Relieve pressure over bony prominences using pillows  -Do not massage red bony areas    Next Steps:  -Teach patient strategies to minimize risks such as skin breakdown   -Consider consults to  interdisciplinary teams such as wound care  Outcome: Progressing  Goal: Incision(s), wounds(s) or drain site(s) healing without S/S of infection  Description: INTERVENTIONS  - Assess and document dressing, incision, wound bed, drain sites and surrounding tissue  - Provide patient and family education  - Perform skin care/dressing changes every shift  Outcome: Progressing     Problem: Prexisting or High Potential for Compromised Skin Integrity  Goal: Skin integrity is maintained or improved  Description: INTERVENTIONS:  - Identify patients at risk for skin breakdown  - Assess and monitor skin integrity  - Assess and monitor nutrition and hydration status  - Monitor labs   - Assess for incontinence   - Turn and reposition patient  - Assist with mobility/ambulation  - Relieve pressure over bony prominences  - Avoid friction and shearing  - Provide appropriate hygiene as needed including keeping skin clean and dry  - Evaluate need for skin moisturizer/barrier cream  - Collaborate with interdisciplinary team   - Patient/family teaching  - Consider wound care consult   Outcome: Progressing

## 2024-10-29 NOTE — ASSESSMENT & PLAN NOTE
Lab Results   Component Value Date    EGFR 61 10/29/2024    EGFR 60 10/28/2024    EGFR 60 10/27/2024    CREATININE 0.85 10/29/2024    CREATININE 0.86 10/28/2024    CREATININE 0.86 10/27/2024

## 2024-10-29 NOTE — ASSESSMENT & PLAN NOTE
Lab Results   Component Value Date    EGFR 61 10/29/2024    EGFR 60 10/28/2024    EGFR 60 10/27/2024    CREATININE 0.85 10/29/2024    CREATININE 0.86 10/28/2024    CREATININE 0.86 10/27/2024   Stable. Does not meet SERA criteria

## 2024-10-29 NOTE — OCCUPATIONAL THERAPY NOTE
Occupational Therapy Progress Note     Patient Name: Eusebio Tate  Today's Date: 10/29/2024  Problem List  Principal Problem:    Acute on chronic diastolic heart failure (HCC)  Active Problems:    Age-related osteoporosis without fracture    Benign essential hypertension    Permanent atrial fibrillation (HCC)    Post-stroke epilepsy    History of stroke, 2022 & 2023    Sick sinus syndrome (HCC)    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 behavioral disturbance, psychotic disturbance, mood disturbance, or anxiety (HCC)    Peripheral vascular disease, unspecified (HCC)    Stage 3b chronic kidney disease (HCC)    Microcytic anemia    Bacterial conjunctivitis of left eye    Advanced care planning/counseling discussion    Skin infection          10/29/24 1120   OT Last Visit   OT Visit Date 10/29/24   Note Type   Note Type Treatment   Pain Assessment   Pain Assessment Tool 0-10   Pain Score No Pain   Restrictions/Precautions   Weight Bearing Precautions Per Order No   Other Precautions Chair Alarm;Bed Alarm;Cognitive;O2;Fall Risk;Contact/isolation   ADL   Where Assessed Edge of bed   Grooming Assistance 4  Minimal Assistance   Grooming Deficit Setup;Verbal cueing;Supervision/safety;Increased time to complete   UB Bathing Assistance 4  Minimal Assistance   LB Bathing Assistance 3  Moderate Assistance   LB Bathing Deficit Verbal cueing;Increased time to complete;Supervision/safety   UB Dressing Assistance 4  Minimal Assistance   UB Dressing Deficit Setup;Supervision/safety;Increased time to complete;Verbal cueing   LB Dressing Assistance 3  Moderate Assistance   LB Dressing Deficit Setup;Verbal cueing;Supervision/safety;Increased time to complete   Functional Standing Tolerance   Time 2-3 min   Activity mobility , RW for support.   Bed Mobility   Supine to Sit 4  Minimal assistance   Additional items Assist x 1;Increased time required;Verbal cues;LE management   Additional Comments left seated up in chair  following session. call light in reach. chair alarm. activated.   Transfers   Sit to Stand 4  Minimal assistance   Additional items Assist x 1;Assist x 2;Increased time required;Verbal cues   Stand to Sit 4  Minimal assistance   Additional items Assist x 1;Increased time required;Verbal cues   Additional Comments cues for hand placement.   Functional Mobility   Functional Mobility 4  Minimal assistance   Additional Comments Ax1, household distances. RW   Additional items Rolling walker   Cognition   Overall Cognitive Status Impaired   Arousal/Participation Arousable   Attention Attends with cues to redirect   Orientation Level Oriented to person   Memory Decreased recall of precautions;Decreased recall of recent events;Decreased short term memory   Following Commands Follows one step commands with increased time or repetition   Activity Tolerance   Activity Tolerance Patient limited by fatigue   Medical Staff Made Aware PTA Jiji   Assessment   Assessment Pt seen for skilled OT tx this date. Tx focused on improving strength, activity tolerance and balance, safety awareness to increase independence with self care tasks. Pt tolerated session well. Pt was limited by weakness and cognition. Pt performed UB dressing/ bathing with Sapna, LB dressing / bathing modA ,  bed mobility Sapna, transfers Sapna- minAx2, mobility with Sapna RW. Pt demonstrated fair - standing balance during functional tasks. Pt required moderate verbal cuing during session to safely complete tasks.   Current OT DC recommendations for pt is level 3 resources (RTF).   Plan   Treatment Interventions ADL retraining;Functional transfer training;UE strengthening/ROM;Endurance training;Patient/family training;Equipment evaluation/education;Neuromuscular reeducation;Compensatory technique education;Energy conservation;Activityengagement   Goal Expiration Date 11/07/24   OT Treatment Day 1   OT Frequency 2-3x/wk   Discharge Recommendation   Rehab Resource  Intensity Level, OT III (Minimum Resource Intensity)  (return to facility with Assist)   Additional Comments  The patient's raw score on the AM-PAC Daily Activity Inpatient Short Form is 15. A raw score of less than 19 suggests the patient may benefit from discharge to post-acute rehabilitation services. Please refer to the recommendation of the Occupational Therapist for safe discharge planning.   AM-PAC Daily Activity Inpatient   Lower Body Dressing 2   Bathing 2   Toileting 2   Upper Body Dressing 3   Grooming 3   Eating 3   Daily Activity Raw Score 15   Daily Activity Standardized Score (Calc for Raw Score >=11) 34.69   AM-PAC Applied Cognition Inpatient   Following a Speech/Presentation 3   Understanding Ordinary Conversation 3   Taking Medications 1   Remembering Where Things Are Placed or Put Away 1   Remembering List of 4-5 Errands 1   Taking Care of Complicated Tasks 1   Applied Cognition Raw Score 10   Applied Cognition Standardized Score 24.98     Otilia Sun, OT

## 2024-10-29 NOTE — QUICK NOTE
Noted to have an episode of melena by RN.  Pt hgb stable since amdmission in low 8s but was 9 on admission and 11s back in 05/24.  Is on asa/eliquis but also started on FeSO4 and no significant azotemia...given advanced age and asymptomatic single episode of unclear significance will check labs w/coags, empiric iv ppi.  If continues or drop in hgb will downgrade diet.    ___________________________    2nd episode noted per RN downgrade diet send hemoccult.

## 2024-10-29 NOTE — ASSESSMENT & PLAN NOTE
Previous palliative care notes that patient's family wishes to pursue palliative care in the facility and or not to pursue end-of-life cares.  Unclear whether they would consider pursuing endoscopic evaluation.  Will reach out to discuss with them and arrange appropriate follow-up pending their overall wishes.

## 2024-10-29 NOTE — ASSESSMENT & PLAN NOTE
Hemoglobin levels continue to downtrend. GI consulted given black stools overnight. They offered endoscopy if patient and family is willing, but Matt appears to be reluctant to do so and would like us to maintain conservative measures. Discussed risks and benefits and he would like to hold off for now.    Results from last 7 days   Lab Units 10/29/24  0456 10/28/24  0441 10/27/24  0535 10/25/24  0457 10/24/24  1401 10/24/24  0450 10/23/24  1709   HEMOGLOBIN g/dL 7.6* 8.1* 8.0* 8.1* 8.2* 7.9* 9.1*   MCV fL 74* 76* 75* 75*  --  75* 75*   RDW % 20.1* 20.0* 19.9* 19.9*  --  19.8* 20.1*   IRON ug/dL  --   --   --   --   --   --  32*   TIBC ug/dL  --   --   --   --   --   --  436   FERRITIN ng/mL  --   --   --   --   --   --  30   FOLATE ng/mL  --   --   --   --   --  15.6  --    VITAMIN B 12 pg/mL  --   --   --   --   --   --  261

## 2024-10-29 NOTE — PHYSICAL THERAPY NOTE
Physical Therapy Treatment Note       10/29/24 1152   PT Last Visit   PT Visit Date 10/29/24   Note Type   Note Type Treatment for insurance authorization   Pain Assessment   Pain Assessment Tool 0-10   Pain Score No Pain   Restrictions/Precautions   Weight Bearing Precautions Per Order No   Other Precautions Contact/isolation;Cognitive;Bed Alarm;Chair Alarm;Fall Risk;Telemetry   General   Chart Reviewed Yes   Family/Caregiver Present No   Subjective   Subjective Pt. agreeable to PT   Bed Mobility   Supine to Sit 4  Minimal assistance   Additional items Assist x 1;HOB elevated;Bedrails;Increased time required;Verbal cues   Transfers   Sit to Stand 4  Minimal assistance   Additional items Assist x 2;Increased time required;Verbal cues;Armrests  (depending on the height of surface)   Stand to Sit 4  Minimal assistance   Additional items Assist x 1;Increased time required;Armrests;Verbal cues   Stand pivot 4  Minimal assistance   Additional items Assist x 1;Increased time required;Verbal cues;Armrests   Ambulation/Elevation   Gait pattern Forward Flexion;Decreased foot clearance;Antalgic;Excessively slow;Short stride;Decreased toe off;Decreased heel strike;Inconsistent merle;Foward flexed   Gait Assistance 4  Minimal assist   Additional items Assist x 1;Assist x 2;Verbal cues;Other (Comment)  (2nd A for chair follow only)   Assistive Device Rolling walker   Distance 70ft x 2   Balance   Static Sitting Good   Dynamic Sitting Fair   Static Standing Fair   Dynamic Standing Fair -   Ambulatory Fair -   Endurance Deficit   Endurance Deficit Yes   Endurance Deficit Description fatigue   Activity Tolerance   Activity Tolerance Patient tolerated treatment well   Nurse Made Aware yes   Exercises   THR Supine;Bilateral;AROM;20 reps;10 reps   Assessment   Prognosis Fair   Problem List Decreased strength;Decreased mobility;Decreased range of motion;Decreased endurance;Decreased cognition   Assessment Pt. progressing well with  overall mobility. Pt. needed decreased assist for all mobility. Pt. able to follow cues and able to respond appropriately most of the time. pt. noted with inability to find words and decreased recall of event/persons. No LOB or knee buckling noted t/o session. pt. able to perform all LE Neeru actively. Pt. seated on chair post session with all needs within reach and alarm engaged. Will continue to follow per PT POC. DCP changed to level III for HHPT.   Barriers to Discharge None   Goals   Patient Goals None reported   STG Expiration Date 11/03/24   PT Treatment Day 1   Plan   Treatment/Interventions Functional transfer training;LE strengthening/ROM;Therapeutic exercise;Patient/family training;Equipment eval/education;Gait training;Bed mobility;Cognitive reorientation;Spoke to nursing;OT   Progress Progressing toward goals   PT Frequency 2-3x/wk   Discharge Recommendation   Rehab Resource Intensity Level, PT II (Moderate Resource Intensity)   AM-PAC Basic Mobility Inpatient   Turning in Flat Bed Without Bedrails 3   Lying on Back to Sitting on Edge of Flat Bed Without Bedrails 3   Moving Bed to Chair 3   Standing Up From Chair Using Arms 3   Walk in Room 3   Climb 3-5 Stairs With Railing 3   Basic Mobility Inpatient Raw Score 18   Basic Mobility Standardized Score 41.05   Turning Head Towards Sound 4   Follow Simple Instructions 4   Low Function Basic Mobility Raw Score  26   Low Function Basic Mobility Standardized Score  41.2   [facility] Highest Level Of Mobility   -HLM Goal 6: Walk 10 steps or more   -HLM Achieved 7: Walk 25 feet or more     Change made in the assessment section but did not change to level II in D/C recomm. Section in flowsheet. Pt. Has progressed in her mobility to be DC back to facility with HHPT and A from staff.     Jeronimo Orozco PTA    An AM-PAC basic mobility standardized score less than 42.9 suggest the patient may benefit from discharge to post-acute rehab services.

## 2024-10-29 NOTE — CASE MANAGEMENT
Case Management Discharge Planning Note    Patient name Eusebio Tate  Location [place] [address] /[facility] 2 M* MRN [id number]  : 1935 Date 10/29/2024       Current Admission Date: 10/23/2024  Current Admission Diagnosis:Acute on chronic diastolic heart failure (HCC)   Patient Active Problem List    Diagnosis Date Noted Date Diagnosed    Skin infection 10/27/2024     Microcytic anemia 10/24/2024     Bacterial conjunctivitis of left eye 10/24/2024     Advanced care planning/counseling discussion 10/24/2024     Severe concentric left ventricular hypertrophy 2024     Biatrial enlargement 2024     Peripheral vascular disease, unspecified (HCC) 2024     Stage 3b chronic kidney disease ([facility]) 2024     Class 1 obesity due to excess calories without serious comorbidity with body mass index (BMI) of 30.0 to 30.9 in adult 2023     Nodule of middle lobe of right lung 2023     Embolic stroke ([facility]) 02/15/2023     Prolonged Q-T interval on ECG 2023     Syncope 2023     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 behavioral disturbance, psychotic disturbance, mood disturbance, or anxiety ([facility]) 2023     Sick sinus syndrome ([facility]) 2023     Stroke-like symptoms 2023     History of stroke,  & 2023 01/10/2023     New onset seizure ([facility])      Post-stroke epilepsy 2022     Urinary retention 2022     Venous stasis dermatitis of both lower extremities 2022     Gait instability 2022     Other fatigue 2022     Atypical pneumonia 05/10/2022     Respiratory insufficiency 05/10/2022     Acute on chronic diastolic heart failure (HCC) 05/10/2022     Thrombocytopenia (HCC) 05/10/2022     Other hyperlipidemia 10/06/2021     Other insomnia 10/06/2021     Skin lesion 10/04/2021     Action tremor 2020     Age-related osteoporosis without fracture 2020     Permanent atrial fibrillation (HCC) 04/10/2015     Chronic anticoagulation  04/10/2015     Benign essential hypertension 04/11/2011       LOS (days): 6  Geometric Mean LOS (GMLOS) (days): 4.6  Days to GMLOS:-1.2     OBJECTIVE:  Risk of Unplanned Readmission Score: 21.11         Current admission status: Inpatient   Preferred Pharmacy:   [facility] - [place], [place] - [address]  [address] [address]  Phone: [phone] Fax: [phone]    [id number] - [place] - [address]  [address] [place] [address]  Phone: [phone] Fax: [phone]    [facility] - [place], [place] - [address]  [address] [place] [address]  Phone: [phone] Fax: [phone]    Primary Care Provider: Daniel Birch MD    Primary Insurance: [employer] [facility]  Secondary Insurance:     DISCHARGE DETAILS:    Additional Comments: CM spoke with patient's sonMatt via phone, CM provided update, patient has accepting [facility]. Patient's son, Matt agreeable to STR. CM reserved in [place] and requested follow up NPI information for STR. CM to follow for further discharge planning.        CM spoke with patient's sonMatt with recent mobility update; CM discussed patient progressing. CM spoke with Glenda at patient's Assisted Living Facility, [facility]. CM faxed updated PT/OT notes and medical to facility for follow up and would discuss discharge further the following day. Patient's son agreeable and understood this is preferred discharge location. CM sent update to [facility] in [place]. CM to follow.

## 2024-10-29 NOTE — PROGRESS NOTES
Progress Note - Hospitalist   Name: Eusebio Tate 89 y.o. female I MRN: [id number]  Unit/Bed#: [place] [address] -01 I Date of Admission: 10/23/2024   Date of Service: 10/29/2024 I Hospital Day: 6    Assessment & Plan  Acute on chronic diastolic heart failure (HCC)  Wt Readings from Last 3 Encounters:   10/29/24 83.9 kg (184 lb 15.5 oz)   05/30/24 87.5 kg (192 lb 14.4 oz)   05/02/23 78.6 kg (173 lb 3.2 oz)     Required IV lasix initially and now transitioned to po lasix due to acute on chronic diastolic heart failure.  Appreciate cardiology recommendations. No need for ARNI at this time given no reduction in ejection fraction. Lasix increased to 40mg BID.  Daily weights, I/Os    Age-related osteoporosis without fracture  Continue on calcium and vitamin D and zonisamide 100 mg daily   Benign essential hypertension  Continue lasix  Permanent atrial fibrillation (HCC)  A-fib-patient is rate controlled on Eliquis for anticoagulation   Post-stroke epilepsy  Zonegran   History of stroke, 2022 & 2023  History of bilateral MCA strokes  Continue aspirin / statin  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 behavioral disturbance, psychotic disturbance, mood disturbance, or anxiety (HCC)  Patient is in dementia unit with multiple comorbidities and alzheimers Aox0 at baseline. No POA but 3 children who make decisions and would like to know about palliative and hospice.   C discussion with family, would like palliative care going forward, not ready for end of life care at this time  Case management to assist with dispo  Stage 3b chronic kidney disease (HCC)  Lab Results   Component Value Date    EGFR 61 10/29/2024    EGFR 60 10/28/2024    EGFR 60 10/27/2024    CREATININE 0.85 10/29/2024    CREATININE 0.86 10/28/2024    CREATININE 0.86 10/27/2024   Stable. Does not meet SERA criteria  Microcytic anemia  Hemoglobin levels continue to downtrend. GI consulted given black stools overnight. They offered endoscopy if patient and  family is willing, but Hannah appears to be reluctant to do so and would like us to maintain conservative measures. Discussed risks and benefits and he would like to hold off for now.    Results from last 7 days   Lab Units 10/29/24  0456 10/28/24  0441 10/27/24  0535 10/25/24  0457 10/24/24  1401 10/24/24  0450 10/23/24  1709   HEMOGLOBIN g/dL 7.6* 8.1* 8.0* 8.1* 8.2* 7.9* 9.1*   MCV fL 74* 76* 75* 75*  --  75* 75*   RDW % 20.1* 20.0* 19.9* 19.9*  --  19.8* 20.1*   IRON ug/dL  --   --   --   --   --   --  32*   TIBC ug/dL  --   --   --   --   --   --  436   FERRITIN ng/mL  --   --   --   --   --   --  30   FOLATE ng/mL  --   --   --   --   --  15.6  --    VITAMIN B 12 pg/mL  --   --   --   --   --   --  261     Bacterial conjunctivitis of left eye  Purulent crusting of left eye.,  Resolved continue with current treatment  Continue Ofloxacin drops  Advanced care planning/counseling discussion    Skin infection  Small lesion with surrounding erythema on left buttock, lesion had purulence and odor  Await cultures, Continue Keflex  Sick sinus syndrome (HCC)    Peripheral vascular disease, unspecified (HCC)      VTE Pharmacologic Prophylaxis: VTE Score: 5 Moderate Risk (Score 3-4) - Pharmacological DVT Prophylaxis Ordered: apixaban (Eliquis).    Mobility:   Basic Mobility Inpatient Raw Score: 18  -HLM Goal: 6: Walk 10 steps or more  JH-HLM Achieved: 7: Walk 25 feet or more    Discussions with Specialists or Other Care Team Provider: gi    Education and Discussions with Family / Patient: patienthannah    Current Length of Stay: 6 day(s)  Current Patient Status: Inpatient   Certification Statement: The patient will continue to require additional inpatient hospital stay due to gi reeval, dispo planning  Discharge Plan: 24 hours to rehab    Code Status: Level 3 - DNAR and DNI    Subjective   Patient seen and examined. Black stools overnight. Denies any chest pain or shortness of breath.     Objective   Vitals:   Temp  (24hrs), Av.5 °F (36.9 °C), Min:97.9 °F (36.6 °C), Max:99 °F (37.2 °C)    Temp:  [97.9 °F (36.6 °C)-99 °F (37.2 °C)] 97.9 °F (36.6 °C)  HR:  [59-65] 59  Resp:  [18] 18  BP: ()/(47-77) 126/77  SpO2:  [93 %-100 %] 99 %  Body mass index is 33.83 kg/m².     Input and Output Summary (last 24 hours):     Intake/Output Summary (Last 24 hours) at 10/29/2024 1708  Last data filed at 10/28/2024 1811  Gross per 24 hour   Intake 240 ml   Output 519 ml   Net -279 ml       Physical Exam  Vitals reviewed.   Constitutional:       General: She is not in acute distress.  HENT:      Head: Normocephalic.      Nose: Nose normal.      Mouth/Throat:      Mouth: Mucous membranes are moist.   Eyes:      General: No scleral icterus.  Cardiovascular:      Rate and Rhythm: Normal rate and regular rhythm.   Pulmonary:      Effort: Pulmonary effort is normal. No respiratory distress.   Abdominal:      General: There is no distension.      Palpations: Abdomen is soft.      Tenderness: There is no abdominal tenderness.   Skin:     General: Skin is warm.   Neurological:      Mental Status: She is alert.   Psychiatric:         Mood and Affect: Mood normal.         Behavior: Behavior normal.       Lines/Drains:              Lab Results: I have reviewed the following results:   Results from last 7 days   Lab Units 10/29/24  0456 10/28/24  0441 10/27/24  0535   WBC Thousand/uL 7.57 7.92 5.87   HEMOGLOBIN g/dL 7.6* 8.1* 8.0*   PLATELETS Thousands/uL 166 161 151   MCV fL 74* 76* 75*   INR  1.64*  --   --      Results from last 7 days   Lab Units 10/29/24  0456 10/28/24  0441 10/27/24  0535 10/24/24  0450 10/23/24  1709   SODIUM mmol/L 138 137 142   < > 142   POTASSIUM mmol/L 4.0 4.1 3.8   < > 3.7   CHLORIDE mmol/L 107 106 111*   < > 110*   CO2 mmol/L 26 26 26   < > 25   ANION GAP mmol/L 5 5 5   < > 7   BUN mg/dL 19 24 18   < > 15   CREATININE mg/dL 0.85 0.86 0.86   < > 0.93   CALCIUM mg/dL 8.8 8.9 8.9   < > 9.7   ALBUMIN g/dL  --   --   --   --   4.0   TOTAL BILIRUBIN mg/dL  --   --   --   --  1.06*   ALK PHOS U/L  --   --   --   --  93   ALT U/L  --   --   --   --  15   AST U/L  --   --   --   --  21   EGFR ml/min/1.73sq m 61 60 60   < > 55   GLUCOSE RANDOM mg/dL 91 82 83   < > 107    < > = values in this interval not displayed.     Results from last 7 days   Lab Units 10/29/24  0456 10/24/24  0450   MAGNESIUM mg/dL 2.1 2.8*         Results from last 7 days   Lab Units 10/23/24  2130 10/23/24  1859 10/23/24  1709   HS TNI 0HR ng/L  --   --  71*   HS TNI 2HR ng/L  --  59*  --    HS TNI 4HR ng/L 72*  --   --           Results from last 7 days   Lab Units 10/23/24  1709   LACTIC ACID mmol/L 1.7     Results from last 7 days   Lab Units 10/23/24  1648   POC GLUCOSE mg/dl 110         Results from last 7 days   Lab Units 10/24/24  0450   TSH 3RD GENERATON uIU/mL 1.384       Recent Cultures (last 7 days):   Results from last 7 days   Lab Units 10/27/24  1310   GRAM STAIN RESULT  2+ Gram positive rods*  2+ Gram positive cocci in pairs*  No polys seen*   WOUND CULTURE  3+ Growth of Gram Negative Brian*       Imaging:  Reviewed radiology reports from this admission: no new imaging    Last 24 Hours Medication List:     Current Facility-Administered Medications:     acetaminophen (TYLENOL) tablet 650 mg, Q4H PRN    apixaban (ELIQUIS) tablet 5 mg, BID    atorvastatin (LIPITOR) tablet 40 mg, Daily With Dinner    cephalexin (KEFLEX) capsule 500 mg, Q6H FRANKIE    Cholecalciferol (VITAMIN D3) tablet 1,000 Units, Daily    escitalopram (LEXAPRO) tablet 10 mg, Daily    ferrous sulfate tablet 325 mg, BID With Meals    furosemide (LASIX) tablet 40 mg, BID (diuretic)    HYDROmorphone HCl (DILAUDID) injection 0.2 mg, Q4H PRN    ofloxacin (OCUFLOX) 0.3 % ophthalmic solution 1 drop, 4x Daily    ondansetron (ZOFRAN) injection 4 mg, Q6H PRN    pantoprazole (PROTONIX) injection 40 mg, Q12H FRANKIE    polyethylene glycol (MIRALAX) packet 17 g, Daily    potassium chloride (Klor-Con M20) CR  tablet 20 mEq, Daily    QUEtiapine (SEROquel) tablet 12.5 mg, HS    senna (SENOKOT) tablet 8.6 mg, Daily    spironolactone (ALDACTONE) tablet 12.5 mg, Daily    thiamine tablet 100 mg, Daily    zonisamide (ZONEGRAN) capsule 100 mg, Daily      **Please Note: This note may have been constructed using a voice recognition system.**

## 2024-10-29 NOTE — PLAN OF CARE
Problem: PHYSICAL THERAPY ADULT  Goal: Performs mobility at highest level of function for planned discharge setting.  See evaluation for individualized goals.  Description: Treatment/Interventions: Functional transfer training, LE strengthening/ROM, Therapeutic exercise, Endurance training, Cognitive reorientation, Patient/family training, Equipment eval/education, Bed mobility, Gait training, Compensatory technique education, Continued evaluation, Family, OT          See flowsheet documentation for full assessment, interventions and recommendations.  Outcome: Progressing  Note: Prognosis: Fair  Problem List: Decreased strength, Decreased mobility, Decreased range of motion, Decreased endurance, Decreased cognition  Assessment: Pt. progressing well with overall mobility. Pt. needed decreased assist for all mobility. Pt. able to follow cues and able to respond appropriately most of the time. pt. noted with inability to find words and decreased recall of event/persons. No LOB or knee buckling noted t/o session. pt. able to perform all LE Neeru actively. Pt. seated on chair post session with all needs within reach and alarm engaged. Will continue to follow per PT POC. DCP changed to level III for HHPT with A from facility staff and HHPT   Barriers to Discharge: None     Rehab Resource Intensity Level, PT: III (Minimum Resource Intensity)    See flowsheet documentation for full assessment.

## 2024-10-29 NOTE — ASSESSMENT & PLAN NOTE
Wt Readings from Last 3 Encounters:   10/29/24 83.9 kg (184 lb 15.5 oz)   05/30/24 87.5 kg (192 lb 14.4 oz)   05/02/23 78.6 kg (173 lb 3.2 oz)

## 2024-10-29 NOTE — ASSESSMENT & PLAN NOTE
Patient with prior baseline hemoglobin 13-14.  Noted to decline to closer to 11 in March and May of this year.  Hemoglobin on presentation was 9.1 with further downtrending to 7.9 and now 7.6 on a.m. labs.  Her iron studies are consistent with iron deficiency anemia and her MCV is in the mid 70s.  Patient was reported to black bowel movements overnight leading to GI consult for further evaluation and recommendation.  No prior EGD and colonoscopy available for review at this time and patient remains confused and is unable to give subjective information.  Patient can certainly be having slow GI bleeding leading to her progressive decline in hemoglobin.  Would benefit from bidirectional endoscopic evaluation to rule out source of bleed.  However, based on chart review patient's family seems to be focused on palliative interventions and unclear whether they would be agreeable to pursuing endoscopic evaluation  Will call family to further discuss whether or not they would like to pursue EGD and colonoscopy in the future  At this time, no urgency to pursue evaluation given relatively stable hemoglobin, ongoing anticoagulation.  Would recommend outpatient follow-up to discuss potential EGD and colonoscopy on an outpatient basis if family were to be agreeable  Agree with continuing PPI IV twice daily at this time and monitoring hemoglobin closely  Monitor hemoglobin every 12 hours and transfuse for less than 7  Monitor stool output

## 2024-10-29 NOTE — PROGRESS NOTES
"  Cardiology         Progress Note - Cardiology   Eusebio Tate 89 y.o. female MRN: [id number]  Unit/Bed#: [place] [address] -01 Encounter: [id number]          Assessment/Recommendations/Discussion:   Acute on chronic heart failure, heart failure with preserved ejection fraction  Essential hypertension  Permanent atrial fibrillation  Sick sinus syndrome, status post pacemaker implantation  Severe obesity  Chronic peripheral venous insufficiency  Alzheimer's dementia  Hypertensive heart disease with chronic heart failure  Mild pulm hypertension      Continue p.o. furosemide, volume status compensated  Discontinue aspirin, given reported melena and decreased hemoglobin.  If any further melena, or decreasing hemoglobin, would hold Eliquis and pursue further GI workup            Subjective: Patient seen and examined, resting comfortably, offers no complaints                Physical Exam:  GEN:  NAD  HEENT:  MMM, NCAT, pink conjunctiva, EOMI, nonicteric sclera  CV:  NO JVD/HJR, RR, NO M/R/G, +S1/S2, NO PARASTERNAL HEAVE/THRILL, mild below the knee LE EDEMA, NO HEPATIC SYSTOLIC PULSATION, WARM EXTREMITIES  RESP:  CTAB/L  ABD:  SOFT, NT, NO GROSS ORGANOMEGALY        Vitals:   BP (!) 96/47   Pulse 65   Temp 97.9 °F (36.6 °C)   Resp 18   Ht 5' 2\" (1.575 m)   Wt 83.9 kg (184 lb 15.5 oz)   SpO2 93%   BMI 33.83 kg/m²   Vitals:    10/28/24 0600 10/29/24 0538   Weight: 88.2 kg (194 lb 7.1 oz) 83.9 kg (184 lb 15.5 oz)       Intake/Output Summary (Last 24 hours) at 10/29/2024 1027  Last data filed at 10/28/2024 1811  Gross per 24 hour   Intake 720 ml   Output 519 ml   Net 201 ml       TELEMETRY: Off telemetry  Lab Results:  Results from last 7 days   Lab Units 10/29/24  0456   WBC Thousand/uL 7.57   HEMOGLOBIN g/dL 7.6*   HEMATOCRIT % 25.0*   PLATELETS Thousands/uL 166     Results from last 7 days   Lab Units 10/29/24  0456 10/24/24  0450 10/23/24  1709   POTASSIUM mmol/L 4.0   < > 3.7   CHLORIDE mmol/L 107   < > 110*   CO2 mmol/L " 26   < > 25   BUN mg/dL 19   < > 15   CREATININE mg/dL 0.85   < > 0.93   CALCIUM mg/dL 8.8   < > 9.7   ALK PHOS U/L  --   --  93   ALT U/L  --   --  15   AST U/L  --   --  21    < > = values in this interval not displayed.     Results from last 7 days   Lab Units 10/29/24  0456   POTASSIUM mmol/L 4.0   CHLORIDE mmol/L 107   CO2 mmol/L 26   BUN mg/dL 19   CREATININE mg/dL 0.85   CALCIUM mg/dL 8.8           Medications:    Current Facility-Administered Medications:     acetaminophen (TYLENOL) tablet 650 mg, 650 mg, Oral, Q4H PRN, Emilie Soyeon Kim, MD, 650 mg at 10/26/24 2040    apixaban (ELIQUIS) tablet 5 mg, 5 mg, Oral, BID, Emilie Soyeon Kim, MD, 5 mg at 10/29/24 0922    aspirin chewable tablet 81 mg, 81 mg, Oral, Daily, Emilie Soyeon Kim, MD, 81 mg at 10/29/24 0922    atorvastatin (LIPITOR) tablet 40 mg, 40 mg, Oral, Daily With Dinner, Emilie Soyeon Kim, MD, 40 mg at 10/28/24 1723    cephalexin (KEFLEX) capsule 500 mg, 500 mg, Oral, Q6H FRANKIE, Jhony Brooke PA-C, 500 mg at 10/29/24 0559    Cholecalciferol (VITAMIN D3) tablet 1,000 Units, 1,000 Units, Oral, Daily, Emilie Soyeon Kim, MD, 1,000 Units at 10/29/24 0922    escitalopram (LEXAPRO) tablet 10 mg, 10 mg, Oral, Daily, Emilie Soyeon Kim, MD, 10 mg at 10/29/24 0922    ferrous sulfate tablet 325 mg, 325 mg, Oral, BID With Meals, Jhony Brooke PA-C, 325 mg at 10/29/24 0922    furosemide (LASIX) tablet 40 mg, 40 mg, Oral, BID (diuretic), Reece Cortez MD, 40 mg at 10/28/24 1723    HYDROmorphone HCl (DILAUDID) injection 0.2 mg, 0.2 mg, Intravenous, Q4H PRN, Emilie Soyeon Kim, MD, 0.2 mg at 10/23/24 2228    ofloxacin (OCUFLOX) 0.3 % ophthalmic solution 1 drop, 1 drop, Left Eye, 4x Daily, Emilie Soyeon Kim, MD, 1 drop at 10/29/24 0924    ondansetron (ZOFRAN) injection 4 mg, 4 mg, Intravenous, Q6H PRN, Emilie Soyeon Kim, MD    pantoprazole (PROTONIX) injection 40 mg, 40 mg, Intravenous, Q12H [facility], Amparo Kent PA-C, 40 mg at 10/29/24 0922     polyethylene glycol (MIRALAX) packet 17 g, 17 g, Oral, Daily, Emilie Soyeon Kim, MD, 17 g at 10/28/24 0902    potassium chloride (Klor-Con M20) CR tablet 20 mEq, 20 mEq, Oral, Daily, Shagufta Skelton PA-C, 20 mEq at 10/29/24 0921    QUEtiapine (SEROquel) tablet 12.5 mg, 12.5 mg, Oral, HS, Emilie Soyeon Kim, MD, 12.5 mg at 10/28/24 2215    senna (SENOKOT) tablet 8.6 mg, 1 tablet, Oral, Daily, Emilie Soyeon Kim, MD, 8.6 mg at 10/28/24 0903    spironolactone (ALDACTONE) tablet 12.5 mg, 12.5 mg, Oral, Daily, Shagufta Skelton PA-C    thiamine tablet 100 mg, 100 mg, Oral, Daily, Emilie Soyeon Kim, MD, 100 mg at 10/29/24 0922    zonisamide (ZONEGRAN) capsule 100 mg, 100 mg, Oral, Daily, Emilie Soyeon Kim, MD, 100 mg at 10/29/24 0923    This note was completed in part utilizing [employer] Direct Software.  Grammatical errors, random word insertions, spelling mistakes, and incomplete sentences may be an occasional consequence of this system secondary to software limitations, ambient noise, and hardware issues.  If you have any questions or concerns about the content, text, or information contained within the body of this dictation, please contact the provider for clarification.

## 2024-10-29 NOTE — CONSULTS
Consultation - Gastroenterology   Name: Eusebio Tate 89 y.o. female I MRN: [id number]  Unit/Bed#: [place] [address] -01 I Date of Admission: 10/23/2024   Date of Service: 10/29/2024 I Hospital Day: 6       Inpatient consult to gastroenterology     Date/Time  10/23/2024 4:37 PM     Performed by  Flores Ty DO   Authorized by  Colby Albarado MD           Physician Requesting Evaluation: Colby Albarado MD   Reason for Evaluation / Principal Problem: Black stools      Eusebio Tate is a 89 y.o. 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 Hospitalization complicated by episode of black stools with progressive anemia over the past few months for which GI has been consulted.   Assessment & Plan  Microcytic anemia  Patient with prior baseline hemoglobin 13-14.  Noted to decline to closer to 11 in March and May of this year.  Hemoglobin on presentation was 9.1 with further downtrending to 7.9 and now 7.6 on a.m. labs.  Her iron studies are consistent with iron deficiency anemia and her MCV is in the mid 70s.  Patient was reported to black bowel movements overnight leading to GI consult for further evaluation and recommendation.  No prior EGD and colonoscopy available for review at this time and patient remains confused and is unable to give subjective information.  Patient can certainly be having slow GI bleeding leading to her progressive decline in hemoglobin.  Would benefit from bidirectional endoscopic evaluation to rule out source of bleed.  However, based on chart  review patient's family seems to be focused on palliative interventions and unclear whether they would be agreeable to pursuing endoscopic evaluation  Will call family to further discuss whether or not they would like to pursue EGD and colonoscopy in the future  At this time, no urgency to pursue evaluation given relatively stable hemoglobin, ongoing anticoagulation.  Would recommend outpatient follow-up to discuss potential EGD and colonoscopy on an outpatient basis if family were to be agreeable  Agree with continuing PPI IV twice daily at this time and monitoring hemoglobin closely  Monitor hemoglobin every 12 hours and transfuse for less than 7  Monitor stool output  Advanced care planning/counseling discussion  Previous palliative care notes that patient's family wishes to pursue palliative care in the facility and or not to pursue end-of-life cares.  Unclear whether they would consider pursuing endoscopic evaluation.  Will reach out to discuss with them and arrange appropriate follow-up pending their overall wishes.  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 behavioral disturbance, psychotic disturbance, mood disturbance, or anxiety (HCC)  Confused at baseline with expressive aphasia from stroke. Unable to provide appropriate history  Permanent atrial fibrillation (HCC)  Anticoagulated on eliquis.      History of Present Illness   HPI:  Eusebio Tate is a 89 y.o. 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 on 10/23 for concern of change in mental status with increased work of breathing.  Patient ultimately admitted to medicine service for heart failure  exacerbation and increased work of breathing.  She has been being managed by primary team and cardiology in regards to diuresis, and was approaching discharge.  However, hospitalization complicated by 2 reported black bowel movements overnight concerning for melena.  GI consulted for further evaluation recommendation.    Appears patient has been having progressive decline in her hemoglobin over the past few months with iron studies significant for iron deficiency.  Although no prior reported evidence of overt GI bleeding, now with 2 possible black bowel movements.  Fortunately hemoglobin is relatively stable at 7.6 from 8.1.  She was initiated on PPI twice daily on 10/29 by primary team prior to our consult.  No prior EGD or colonoscopy available for review in chart, and patient is confused at baseline and unable to provide any further input.  She remains on Eliquis at this time for her underlying atrial fibrillation.    Review of Systems   Unable to perform ROS: Dementia (Review of systems is limited by patient's underlying dementia and expressive aphasia)   Constitutional:  Negative for chills and fever.   Gastrointestinal:  Negative for abdominal pain.   Psychiatric/Behavioral:  Positive for confusion.      I have reviewed the patient's PMH, PSH, Social History, Family History, Meds, and Allergies  Historical Information   Past Medical History:   Diagnosis Date    A-fib (HCC)     CAD (coronary artery disease)     CHF (congestive heart failure) (HCC)     CKD (chronic kidney disease) stage 2, GFR 60-89 ml/min     Dementia (HCC)     Hypertension     Hypokalemia     Knee effusion     Memory loss     Seizure (HCC)     Urinary tract infection      Past Surgical History:   Procedure Laterality Date    CARDIAC ELECTROPHYSIOLOGY PROCEDURE N/A 1/26/2023    Procedure: Cardiac pacer implant;  Surgeon: Francisco Georges DO;  Location: [facility];  Service: Cardiology    CHOLECYSTECTOMY      REPLACEMENT TOTAL KNEE Left  2008     Social History     Tobacco Use    Smoking status: Never     Passive exposure: Never    Smokeless tobacco: Never   Vaping Use    Vaping status: Never Used   Substance and Sexual Activity    Alcohol use: Never    Drug use: Never    Sexual activity: Not Currently     Partners: Male     Birth control/protection: None     E-Cigarette/Vaping    E-Cigarette Use Never User      E-Cigarette/Vaping Substances    Nicotine No     THC No     CBD No     Flavoring No     Other No     Unknown No      Family History   Problem Relation Age of Onset    Lung cancer Father     Leukemia Brother     Breast cancer Daughter      Social History     Tobacco Use    Smoking status: Never     Passive exposure: Never    Smokeless tobacco: Never   Vaping Use    Vaping status: Never Used   Substance and Sexual Activity    Alcohol use: Never    Drug use: Never    Sexual activity: Not Currently     Partners: Male     Birth control/protection: None       Current Facility-Administered Medications:     acetaminophen (TYLENOL) tablet 650 mg, Q4H PRN    apixaban (ELIQUIS) tablet 5 mg, BID    atorvastatin (LIPITOR) tablet 40 mg, Daily With Dinner    cephalexin (KEFLEX) capsule 500 mg, Q6H FRANKIE    Cholecalciferol (VITAMIN D3) tablet 1,000 Units, Daily    escitalopram (LEXAPRO) tablet 10 mg, Daily    ferrous sulfate tablet 325 mg, BID With Meals    furosemide (LASIX) tablet 40 mg, BID (diuretic)    HYDROmorphone HCl (DILAUDID) injection 0.2 mg, Q4H PRN    ofloxacin (OCUFLOX) 0.3 % ophthalmic solution 1 drop, 4x Daily    ondansetron (ZOFRAN) injection 4 mg, Q6H PRN    pantoprazole (PROTONIX) injection 40 mg, Q12H FRANKIE    polyethylene glycol (MIRALAX) packet 17 g, Daily    potassium chloride (Klor-Con M20) CR tablet 20 mEq, Daily    QUEtiapine (SEROquel) tablet 12.5 mg, HS    senna (SENOKOT) tablet 8.6 mg, Daily    spironolactone (ALDACTONE) tablet 12.5 mg, Daily    thiamine tablet 100 mg, Daily    zonisamide (ZONEGRAN) capsule 100 mg, Daily  Prior to  Admission Medications   Prescriptions Last Dose Informant Patient Reported? Taking?   Ascorbic Acid (VITAMIN C) 500 MG CAPS  Outside Facility (Specify), Child Yes No   Sig: Take 500 mg by mouth daily   Cholecalciferol (Vitamin D3) 1.25 MG (95005 UT) CAPS  Outside Facility (Specify), Child Yes No   Sig: Take 2,000 Units by mouth daily 2,000U daily   Cranberry 450 MG CAPS  Outside Facility (Specify), Child Yes No   Sig: Take 1 capsule by mouth in the morning   Diclofenac Sodium (VOLTAREN) 1 %  Outside Facility (Specify), Child Yes No   Sig: Apply 2 g topically 3 (three) times a day as needed (pain)   Elastic Bandages & Supports (Medical Compression Stockings) MISC  Outside Facility (Specify), Child No No   Sig: Use in the morning   QUEtiapine (SEROquel) 25 mg tablet  Outside Facility (Specify), Child No No   Sig: Take 0.5 tablets (12.5 mg total) by mouth daily at bedtime Hold if sedated   Valerian 500 MG CAPS  Outside Facility (Specify), Child Yes No   Sig: Take by mouth in the morning   acetaminophen (TYLENOL) 500 mg tablet  Outside Facility (Specify), Child Yes No   Sig: Take 500 mg by mouth every 6 (six) hours as needed for mild pain   apixaban (ELIQUIS) 5 mg  Outside Facility (Specify), Child Yes No   Sig: Take 5 mg by mouth 2 (two) times a day   aspirin 81 mg chewable tablet  Outside Facility (Specify), Child No No   Sig: Chew 1 tablet (81 mg total) daily Do not start before February 18, 2023.   atorvastatin (LIPITOR) 40 mg tablet  Outside Facility (Specify), Child Yes No   Sig: Take 40 mg by mouth daily   bisacodyl (DULCOLAX) 5 mg EC tablet  Outside Facility (Specify), Child Yes No   Sig: Take 5 mg by mouth daily as needed for constipation   calcium carbonate (TUMS) 500 mg chewable tablet  Outside Facility (Specify), Child Yes No   Sig: Chew 1 tablet daily   docusate sodium (COLACE) 100 mg capsule  Outside Facility (Specify), Child Yes No   Sig: Take 100 mg by mouth 2 (two) times a day   escitalopram (LEXAPRO)  10 mg tablet  Child, Outside Facility (Specify) Yes No   furosemide (LASIX) 40 mg tablet  Outside Facility (Specify), Child Yes No   Sig: Take 1 tablet by mouth daily   loperamide (IMODIUM) 2 mg capsule  Outside Facility (Specify), Child Yes No   nitroglycerin (NITROSTAT) 0.4 mg SL tablet  Child, Outside Facility (Specify) Yes No   Sig: Place 0.4 mg under the tongue every 5 (five) minutes as needed for chest pain   oxyCODONE (ROXICODONE) 5 immediate release tablet  Outside Facility (Specify), Child Yes No   Sig: Take 5 mg by mouth every 4 (four) hours as needed for moderate pain Take 1/2 tablet q6h as needed for pain   polyethylene glycol (MIRALAX) 17 g packet  Outside Facility (Specify), Child Yes No   Sig: Take 17 g by mouth daily   potassium chloride (K-DUR,KLOR-CON) 20 mEq tablet  Outside Facility (Specify), Child Yes No   Sig: Take 20 mEq by mouth 2 (two) times a day   thiamine (VITAMIN B1) 100 mg tablet  Outside Facility (Specify), Child Yes No   zonisamide (ZONEGRAN) 100 mg capsule  Outside Facility (Specify), Child No No   Sig: Take 1 capsule (100 mg total) by mouth daily Do not start before November 19, 2022.      Facility-Administered Medications: None     Morphine    Objective :  Temp:  [97.9 °F (36.6 °C)-99 °F (37.2 °C)] 97.9 °F (36.6 °C)  HR:  [61-65] 65  BP: ()/(47-67) 96/47  Resp:  [18] 18  SpO2:  [93 %-100 %] 93 %  O2 Device: None (Room air)    Physical Exam  Vitals and nursing note reviewed.   Constitutional:       General: She is not in acute distress.     Appearance: Normal appearance. She is not ill-appearing.   HENT:      Head: Normocephalic and atraumatic.      Nose: Nose normal.      Mouth/Throat:      Mouth: Mucous membranes are moist.   Eyes:      General: No scleral icterus.     Conjunctiva/sclera: Conjunctivae normal.   Cardiovascular:      Rate and Rhythm: Normal rate and regular rhythm.   Pulmonary:      Effort: Pulmonary effort is normal. No respiratory distress.   Abdominal:       General: Bowel sounds are normal. There is no distension.      Palpations: Abdomen is soft. There is no mass.      Tenderness: There is no abdominal tenderness. There is no guarding or rebound.      Hernia: No hernia is present.   Musculoskeletal:         General: Normal range of motion.      Cervical back: Normal range of motion.      Right lower leg: Edema present.      Left lower leg: Edema present.   Skin:     General: Skin is warm and dry.      Coloration: Skin is not jaundiced.      Findings: No bruising.   Neurological:      Mental Status: She is alert. She is disoriented.      Motor: Weakness (Generalized) present.           Lab Results: I have reviewed the following results:CBC/BMP:   .     10/29/24  0456   WBC 7.57   HGB 7.6*   HCT 25.0*      SODIUM 138   K 4.0      CO2 26   BUN 19   CREATININE 0.85   GLUC 91   MG 2.1    , LFTs: No new results in last 24 hours. , PTT/INR:  .     10/29/24  0456   PTT 35*   INR 1.64*

## 2024-10-30 LAB
ANION GAP SERPL CALCULATED.3IONS-SCNC: 4 MMOL/L (ref 4–13)
BACTERIA WND AEROBE CULT: ABNORMAL
BACTERIA WND AEROBE CULT: ABNORMAL
BUN SERPL-MCNC: 14 MG/DL (ref 5–25)
CALCIUM SERPL-MCNC: 9.2 MG/DL (ref 8.4–10.2)
CHLORIDE SERPL-SCNC: 107 MMOL/L (ref 96–108)
CO2 SERPL-SCNC: 27 MMOL/L (ref 21–32)
CREAT SERPL-MCNC: 0.94 MG/DL (ref 0.6–1.3)
ERYTHROCYTE [DISTWIDTH] IN BLOOD BY AUTOMATED COUNT: 21 % (ref 11.6–15.1)
GFR SERPL CREATININE-BSD FRML MDRD: 54 ML/MIN/1.73SQ M
GLUCOSE SERPL-MCNC: 85 MG/DL (ref 65–140)
GRAM STN SPEC: ABNORMAL
HCT VFR BLD AUTO: 27.7 % (ref 34.8–46.1)
HGB BLD-MCNC: 8.3 G/DL (ref 11.5–15.4)
MCH RBC QN AUTO: 22.8 PG (ref 26.8–34.3)
MCHC RBC AUTO-ENTMCNC: 30 G/DL (ref 31.4–37.4)
MCV RBC AUTO: 76 FL (ref 82–98)
PLATELET # BLD AUTO: 165 THOUSANDS/UL (ref 149–390)
POTASSIUM SERPL-SCNC: 4.4 MMOL/L (ref 3.5–5.3)
RBC # BLD AUTO: 3.64 MILLION/UL (ref 3.81–5.12)
SODIUM SERPL-SCNC: 138 MMOL/L (ref 135–147)
WBC # BLD AUTO: 5.94 THOUSAND/UL (ref 4.31–10.16)

## 2024-10-30 PROCEDURE — 97530 THERAPEUTIC ACTIVITIES: CPT

## 2024-10-30 PROCEDURE — 80048 BASIC METABOLIC PNL TOTAL CA: CPT | Performed by: STUDENT IN AN ORGANIZED HEALTH CARE EDUCATION/TRAINING PROGRAM

## 2024-10-30 PROCEDURE — 99232 SBSQ HOSP IP/OBS MODERATE 35: CPT | Performed by: INTERNAL MEDICINE

## 2024-10-30 PROCEDURE — 85027 COMPLETE CBC AUTOMATED: CPT | Performed by: STUDENT IN AN ORGANIZED HEALTH CARE EDUCATION/TRAINING PROGRAM

## 2024-10-30 PROCEDURE — 99232 SBSQ HOSP IP/OBS MODERATE 35: CPT | Performed by: STUDENT IN AN ORGANIZED HEALTH CARE EDUCATION/TRAINING PROGRAM

## 2024-10-30 PROCEDURE — 97116 GAIT TRAINING THERAPY: CPT

## 2024-10-30 RX ORDER — CEFUROXIME AXETIL 250 MG/1
500 TABLET ORAL EVERY 12 HOURS SCHEDULED
Status: DISCONTINUED | OUTPATIENT
Start: 2024-10-30 | End: 2024-10-31 | Stop reason: HOSPADM

## 2024-10-30 RX ORDER — PANTOPRAZOLE SODIUM 40 MG/1
40 TABLET, DELAYED RELEASE ORAL EVERY 12 HOURS
Status: DISCONTINUED | OUTPATIENT
Start: 2024-10-30 | End: 2024-10-31 | Stop reason: HOSPADM

## 2024-10-30 RX ADMIN — Medication 1000 UNITS: at 08:51

## 2024-10-30 RX ADMIN — ESCITALOPRAM OXALATE 10 MG: 10 TABLET ORAL at 08:51

## 2024-10-30 RX ADMIN — FERROUS SULFATE TAB 325 MG (65 MG ELEMENTAL FE) 325 MG: 325 (65 FE) TAB at 17:01

## 2024-10-30 RX ADMIN — PANTOPRAZOLE SODIUM 40 MG: 40 INJECTION, POWDER, LYOPHILIZED, FOR SOLUTION INTRAVENOUS at 08:51

## 2024-10-30 RX ADMIN — APIXABAN 5 MG: 5 TABLET, FILM COATED ORAL at 17:01

## 2024-10-30 RX ADMIN — PANTOPRAZOLE SODIUM 40 MG: 40 TABLET, DELAYED RELEASE ORAL at 17:01

## 2024-10-30 RX ADMIN — ZONISAMIDE 100 MG: 100 CAPSULE ORAL at 08:54

## 2024-10-30 RX ADMIN — CEFUROXIME AXETIL 500 MG: 250 TABLET, FILM COATED ORAL at 21:34

## 2024-10-30 RX ADMIN — CEPHALEXIN 500 MG: 500 CAPSULE ORAL at 05:58

## 2024-10-30 RX ADMIN — FERROUS SULFATE TAB 325 MG (65 MG ELEMENTAL FE) 325 MG: 325 (65 FE) TAB at 08:51

## 2024-10-30 RX ADMIN — ATORVASTATIN CALCIUM 40 MG: 40 TABLET, FILM COATED ORAL at 17:01

## 2024-10-30 RX ADMIN — OFLOXACIN 1 DROP: 3 SOLUTION/ DROPS OPHTHALMIC at 17:05

## 2024-10-30 RX ADMIN — THIAMINE HCL TAB 100 MG 100 MG: 100 TAB at 08:51

## 2024-10-30 RX ADMIN — OFLOXACIN 1 DROP: 3 SOLUTION/ DROPS OPHTHALMIC at 21:34

## 2024-10-30 RX ADMIN — APIXABAN 5 MG: 5 TABLET, FILM COATED ORAL at 08:51

## 2024-10-30 RX ADMIN — OFLOXACIN 1 DROP: 3 SOLUTION/ DROPS OPHTHALMIC at 08:59

## 2024-10-30 RX ADMIN — CEPHALEXIN 500 MG: 500 CAPSULE ORAL at 13:35

## 2024-10-30 RX ADMIN — OFLOXACIN 1 DROP: 3 SOLUTION/ DROPS OPHTHALMIC at 13:35

## 2024-10-30 RX ADMIN — FUROSEMIDE 40 MG: 40 TABLET ORAL at 17:01

## 2024-10-30 RX ADMIN — QUETIAPINE FUMARATE 12.5 MG: 25 TABLET ORAL at 21:33

## 2024-10-30 NOTE — ASSESSMENT & PLAN NOTE
Patient with prior baseline hemoglobin 13-14.  Noted to decline to closer to 11 in March and May of this year.  Hemoglobin on presentation was 9.1 with further downtrending to 7.9 and now 7.6 on a.m. labs.  Her iron studies are consistent with iron deficiency anemia and her MCV is in the mid 70s.  Patient was reported to black bowel movements overnight leading to GI consult for further evaluation and recommendation.  No prior EGD and colonoscopy available for review at this time and patient remains confused and is unable to give subjective information.  Currently patient with stable hemoglobin at 8.3 with green stools documented  Discussed possible endoscopic evaluation with patient's son Matt who is also discussed with his siblings-at this time, they would prefer conservative management rather than aggressive interventions with endoscopic evaluation  Did review that should patient be diagnosed with malignancy on endoscopic evaluation they would not pursue any type of treatment or therapy, and it is reasonable to forego these evaluations given overall clinical stability at this point  Matt also disclose that patient had held her Eliquis due to traumatic knee injury with bleeding a few years back leading to her massive stroke that has now caused her expressive aphasia and progressive cognitive decline.  Understandably so her family is hesitant to hold her Eliquis for any procedures.  Can continue eliquis from GI perspective. Advance diet as tolerated  Transition to daily Pantoprazole 40mg PO  Monitor hemoglobin daily and transfuse for <7.0  Monitor stool output  No further inpatient GI recommendations at this time. GI will sign off but remain available for questions. Please reach out to on-call provider with changes in clinical status

## 2024-10-30 NOTE — ASSESSMENT & PLAN NOTE
Wt Readings from Last 3 Encounters:   10/30/24 85 kg (187 lb 6.3 oz)   05/30/24 87.5 kg (192 lb 14.4 oz)   05/02/23 78.6 kg (173 lb 3.2 oz)     Acute on chronic diastolic heart failure. Resolved with iV diuretics  Cleared by cardiology for discharge with lasix 40mg BID with hold parameters  Daily weights, I/Os  Anticipate discharge back to facility tomorrow

## 2024-10-30 NOTE — PROGRESS NOTES
Progress Note - Gastroenterology   Name: Eusebio Tate 89 y.o. female I MRN: [id number]  Unit/Bed#: [place] [address] -01 I Date of Admission: 10/23/2024   Date of Service: 10/30/2024 I Hospital Day: 7    Assessment & Plan  Microcytic anemia  Patient with prior baseline hemoglobin 13-14.  Noted to decline to closer to 11 in March and May of this year.  Hemoglobin on presentation was 9.1 with further downtrending to 7.9 and now 7.6 on a.m. labs.  Her iron studies are consistent with iron deficiency anemia and her MCV is in the mid 70s.  Patient was reported to black bowel movements overnight leading to GI consult for further evaluation and recommendation.  No prior EGD and colonoscopy available for review at this time and patient remains confused and is unable to give subjective information.  Currently patient with stable hemoglobin at 8.3 with green stools documented  Discussed possible endoscopic evaluation with patient's son Matt who is also discussed with his siblings-at this time, they would prefer conservative management rather than aggressive interventions with endoscopic evaluation  Did review that should patient be diagnosed with malignancy on endoscopic evaluation they would not pursue any type of treatment or therapy, and it is reasonable to forego these evaluations given overall clinical stability at this point  Matt also disclose that patient had held her Eliquis due to traumatic knee injury with bleeding a few years back leading to her massive stroke that has now caused her expressive aphasia and progressive cognitive decline.  Understandably so her family is hesitant to hold her Eliquis for any procedures.  Can continue eliquis from GI perspective. Advance diet as tolerated  Transition to daily Pantoprazole 40mg PO  Monitor hemoglobin daily and transfuse for <7.0  Monitor stool output  No further inpatient GI recommendations at this time. GI will sign off but remain available for questions. Please  reach out to on-call provider with changes in clinical status  Advanced care planning/counseling discussion  Previous palliative care notes that patient's family wishes to pursue palliative care in the facility and or not to pursue end-of-life cares.  Plan from GI perspective as above  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 behavioral disturbance, psychotic disturbance, mood disturbance, or anxiety (HCC)  Confused at baseline with expressive aphasia from stroke. Unable to provide appropriate history  Permanent atrial fibrillation (HCC)  Anticoagulated on eliquis.        Subjective   Patient feels well today. Tolerating CLD. Having bowel movements. Denies abdominal pain. Remains confused with expressive aphasia.    Objective :  Temp:  [98.5 °F (36.9 °C)] 98.5 °F (36.9 °C)  HR:  [59-78] 62  BP: (126-141)/(77-78) 141/78  Resp:  [18-20] 20  SpO2:  [97 %-99 %] 97 %  O2 Device: None (Room air)    Physical Exam  Vitals and nursing note reviewed.   Constitutional:       General: She is not in acute distress.     Appearance: Normal appearance. She is not ill-appearing.      Comments: Pleasantly confused   HENT:      Head: Normocephalic and atraumatic.      Nose: Nose normal.      Mouth/Throat:      Mouth: Mucous membranes are moist.   Eyes:      General: No scleral icterus.     Conjunctiva/sclera: Conjunctivae normal.   Cardiovascular:      Rate and Rhythm: Normal rate and regular rhythm.   Pulmonary:      Effort: Pulmonary effort is normal. No respiratory distress.   Abdominal:      General: Bowel sounds are normal. There is no distension.      Palpations: Abdomen is soft. There is no mass.      Tenderness: There is no abdominal tenderness. There is no guarding or rebound.      Hernia: No hernia is present.   Musculoskeletal:         General: Normal range of motion.      Cervical back: Normal range of motion.      Right lower leg: No edema.      Left lower leg: No edema.   Skin:     General: Skin is warm and dry.       Coloration: Skin is not jaundiced.      Findings: No bruising.   Neurological:      Mental Status: She is disoriented.      Motor: Weakness (generalized) present.      Comments: Expressive aphasia   Psychiatric:         Mood and Affect: Mood normal.         Behavior: Behavior normal.           Lab Results: I have reviewed the following results:CBC/BMP:   .     10/30/24  0510   WBC 5.94   HGB 8.3*   HCT 27.7*      SODIUM 138   K 4.4      CO2 27   BUN 14   CREATININE 0.94   GLUC 85

## 2024-10-30 NOTE — CASE MANAGEMENT
Case Management Discharge Planning Note    Patient name Eusebio Tate  Location [place] [address] /[facility] 2 M* MRN [id number]  : 1935 Date 10/30/2024       Current Admission Date: 10/23/2024  Current Admission Diagnosis:Acute on chronic diastolic heart failure (HCC)   Patient Active Problem List    Diagnosis Date Noted Date Diagnosed    Skin infection 10/27/2024     Microcytic anemia 10/24/2024     Bacterial conjunctivitis of left eye 10/24/2024     Advanced care planning/counseling discussion 10/24/2024     Severe concentric left ventricular hypertrophy 2024     Biatrial enlargement 2024     Peripheral vascular disease, unspecified (HCC) 2024     Stage 3b chronic kidney disease ([facility]) 2024     Class 1 obesity due to excess calories without serious comorbidity with body mass index (BMI) of 30.0 to 30.9 in adult 2023     Nodule of middle lobe of right lung 2023     Embolic stroke ([facility]) 02/15/2023     Prolonged Q-T interval on ECG 2023     Syncope 2023     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 behavioral disturbance, psychotic disturbance, mood disturbance, or anxiety ([facility]) 2023     Sick sinus syndrome ([facility]) 2023     Stroke-like symptoms 2023     History of stroke,  & 2023 01/10/2023     New onset seizure ([facility])      Post-stroke epilepsy 2022     Urinary retention 2022     Venous stasis dermatitis of both lower extremities 2022     Gait instability 2022     Other fatigue 2022     Atypical pneumonia 05/10/2022     Respiratory insufficiency 05/10/2022     Acute on chronic diastolic heart failure (HCC) 05/10/2022     Thrombocytopenia (HCC) 05/10/2022     Other hyperlipidemia 10/06/2021     Other insomnia 10/06/2021     Skin lesion 10/04/2021     Action tremor 2020     Age-related osteoporosis without fracture 2020     Permanent atrial fibrillation (HCC) 04/10/2015     Chronic anticoagulation  04/10/2015     Benign essential hypertension 04/11/2011       LOS (days): 7  Geometric Mean LOS (GMLOS) (days): 4.6  Days to GMLOS:-2     OBJECTIVE:  Risk of Unplanned Readmission Score: 21.36         Current admission status: Inpatient   Preferred Pharmacy:   [facility] - [place], [place] - [address] [facility]  [address] [facility] [address]  Phone: [phone] Fax: [phone]    [id number] - [place] - [address]   [address] [place] [address]  Phone: [phone] Fax: [phone]    [facility] - [place] - [address] [facility]  [address] [place] [address]  Phone: [phone] Fax: [phone]    Primary Care Provider: Daniel Birch MD    Primary Insurance: [employer] [facility]  Secondary Insurance:     DISCHARGE DETAILS:    Additional Comments: CM left message for Savannah WING Rachel PH: [phone], CM faxed clinical information for Savannah at [phone]. CM to follow for further discharge planning.

## 2024-10-30 NOTE — PLAN OF CARE
Problem: Potential for Falls  Goal: Patient will remain free of falls  Description: INTERVENTIONS:  - Educate patient/family on patient safety including physical limitations  - Instruct patient to call for assistance with activity   - Consult OT/PT to assist with strengthening/mobility   - Keep Call bell within reach  - Keep bed low and locked with side rails adjusted as appropriate  - Keep care items and personal belongings within reach  - Initiate and maintain comfort rounds  - Make Fall Risk Sign visible to staff  - Offer Toileting every 2 Hours, in advance of need  - Initiate/Maintain bed alarm  - Obtain necessary fall risk management equipment: alarms  - Apply yellow socks and bracelet for high fall risk patients  - Consider moving patient to room near nurses station  Outcome: Progressing     Problem: SAFETY ADULT  Goal: Maintain or return to baseline ADL function  Description: INTERVENTIONS:  -  Assess patient's ability to carry out ADLs; assess patient's baseline for ADL function and identify physical deficits which impact ability to perform ADLs (bathing, care of mouth/teeth, toileting, grooming, dressing, etc.)  - Assess/evaluate cause of self-care deficits   - Assess range of motion  - Assess patient's mobility; develop plan if impaired  - Assess patient's need for assistive devices and provide as appropriate  - Encourage maximum independence but intervene and supervise when necessary  - Involve family in performance of ADLs  - Assess for home care needs following discharge   - Consider OT consult to assist with ADL evaluation and planning for discharge  - Provide patient education as appropriate  Outcome: Progressing     Problem: CARDIOVASCULAR - ADULT  Goal: Absence of cardiac dysrhythmias or at baseline rhythm  Description: INTERVENTIONS:  - Continuous cardiac monitoring, vital signs, obtain 12 lead EKG if ordered  - Administer antiarrhythmic and heart rate control medications as ordered  - Monitor  electrolytes and administer replacement therapy as ordered  Outcome: Progressing     Problem: RESPIRATORY - ADULT  Goal: Achieves optimal ventilation and oxygenation  Description: INTERVENTIONS:  - Assess for changes in respiratory status  - Assess for changes in mentation and behavior  - Position to facilitate oxygenation and minimize respiratory effort  - Oxygen administered by appropriate delivery if ordered  - Initiate smoking cessation education as indicated  - Encourage broncho-pulmonary hygiene including cough, deep breathe, Incentive Spirometry  - Assess the need for suctioning and aspirate as needed  - Assess and instruct to report SOB or any respiratory difficulty  - Respiratory Therapy support as indicated  Outcome: Progressing     Problem: GASTROINTESTINAL - ADULT  Goal: Maintains adequate nutritional intake  Description: INTERVENTIONS:  - Monitor percentage of each meal consumed  - Identify factors contributing to decreased intake, treat as appropriate  - Assist with meals as needed  - Monitor I&O, weight, and lab values if indicated  - Obtain nutrition services referral as needed  Outcome: Progressing     Problem: METABOLIC, FLUID AND ELECTROLYTES - ADULT  Goal: Fluid balance maintained  Description: INTERVENTIONS:  - Monitor labs   - Monitor I/O and WT  - Instruct patient on fluid and nutrition as appropriate  - Assess for signs & symptoms of volume excess or deficit  Outcome: Progressing     Problem: SKIN/TISSUE INTEGRITY - ADULT  Goal: Skin Integrity remains intact(Skin Breakdown Prevention)  Description: Assess:  -Perform Pola assessment every shift  -Clean and moisturize skin every shift  -Inspect skin when repositioning, toileting, and assisting with ADLS  -Assess under medical devices such as IV every shift  -Assess extremities for adequate circulation and sensation     Bed Management:  -Have minimal linens on bed & keep smooth, unwrinkled  -Change linens as needed when moist or  perspiring  -Avoid sitting or lying in one position for more than 2 hours while in bed  -Keep HOB at 30 degrees     Toileting:  -Offer bedside commode  -Assess for incontinence every shift  -Use incontinent care products after each incontinent episode such as foam cleanser    Activity:  -Mobilize patient 3 times a day  -Encourage activity and walks on unit  -Encourage or provide ROM exercises   -Turn and reposition patient every 2 Hours  -Use appropriate equipment to lift or move patient in bed  -Instruct/ Assist with weight shifting every hour when out of bed in chair  -Consider limitation of chair time 3 hour intervals    Skin Care:  -Avoid use of baby powder, tape, friction and shearing, hot water or constrictive clothing  -Relieve pressure over bony prominences using pillows  -Do not massage red bony areas    Next Steps:  -Teach patient strategies to minimize risks such as skin breakdown   -Consider consults to  interdisciplinary teams such as wound care  Outcome: Progressing  Goal: Incision(s), wounds(s) or drain site(s) healing without S/S of infection  Description: INTERVENTIONS  - Assess and document dressing, incision, wound bed, drain sites and surrounding tissue  - Provide patient and family education  - Perform skin care/dressing changes every shift  Outcome: Progressing     Problem: Prexisting or High Potential for Compromised Skin Integrity  Goal: Skin integrity is maintained or improved  Description: INTERVENTIONS:  - Identify patients at risk for skin breakdown  - Assess and monitor skin integrity  - Assess and monitor nutrition and hydration status  - Monitor labs   - Assess for incontinence   - Turn and reposition patient  - Assist with mobility/ambulation  - Relieve pressure over bony prominences  - Avoid friction and shearing  - Provide appropriate hygiene as needed including keeping skin clean and dry  - Evaluate need for skin moisturizer/barrier cream  - Collaborate with interdisciplinary team    - Patient/family teaching  - Consider wound care consult   Outcome: Progressing

## 2024-10-30 NOTE — PROGRESS NOTES
"  Cardiology         Progress Note - Cardiology   Eusebio Tate 89 y.o. female MRN: [id number]  Unit/Bed#: [place] [address] -01 Encounter: [id number]          Assessment/Recommendations/Discussion:   Acute on chronic heart failure, heart failure with preserved ejection fraction  Essential hypertension  Permanent atrial fibrillation  Sick sinus syndrome, status post pacemaker implantation  Severe obesity  Chronic peripheral venous insufficiency  Alzheimer's dementia  Hypertensive heart disease with chronic heart failure  Mild pulm hypertension      Continue p.o. furosemide, volume status seems compensated  Follow-up with Dr. Cortez as scheduled on 11/13/2024  Will arrange outpatient follow-up next week          Subjective: Patient seen and examined, feels well today, offers no complaints                Physical Exam:  GEN:  NAD  HEENT:  MMM, NCAT, pink conjunctiva, EOMI, nonicteric sclera  CV:  NO JVD/HJR, RR, NO M/R/G, +S1/S2, NO PARASTERNAL HEAVE/THRILL, mild LE EDEMA, NO HEPATIC SYSTOLIC PULSATION, WARM EXTREMITIES  RESP:  CTAB/L  ABD:  SOFT, NT, NO GROSS ORGANOMEGALY        Vitals:   /80   Pulse 60   Temp 97.9 °F (36.6 °C)   Resp 20   Ht 5' 2\" (1.575 m)   Wt 85 kg (187 lb 6.3 oz)   SpO2 97%   BMI 34.27 kg/m²   Vitals:    10/29/24 0538 10/30/24 0600   Weight: 83.9 kg (184 lb 15.5 oz) 85 kg (187 lb 6.3 oz)       Intake/Output Summary (Last 24 hours) at 10/30/2024 1249  Last data filed at 10/30/2024 0501  Gross per 24 hour   Intake 1560 ml   Output 1913 ml   Net -353 ml       TELEMETRY: Off  Lab Results:  Results from last 7 days   Lab Units 10/30/24  0510   WBC Thousand/uL 5.94   HEMOGLOBIN g/dL 8.3*   HEMATOCRIT % 27.7*   PLATELETS Thousands/uL 165     Results from last 7 days   Lab Units 10/30/24  0510 10/24/24  0450 10/23/24  1709   POTASSIUM mmol/L 4.4   < > 3.7   CHLORIDE mmol/L 107   < > 110*   CO2 mmol/L 27   < > 25   BUN mg/dL 14   < > 15   CREATININE mg/dL 0.94   < > 0.93   CALCIUM mg/dL 9.2   < > " 9.7   ALK PHOS U/L  --   --  93   ALT U/L  --   --  15   AST U/L  --   --  21    < > = values in this interval not displayed.     Results from last 7 days   Lab Units 10/30/24  0510   POTASSIUM mmol/L 4.4   CHLORIDE mmol/L 107   CO2 mmol/L 27   BUN mg/dL 14   CREATININE mg/dL 0.94   CALCIUM mg/dL 9.2           Medications:    Current Facility-Administered Medications:     acetaminophen (TYLENOL) tablet 650 mg, 650 mg, Oral, Q4H PRN, Emilie Soyeon Kim, MD, 650 mg at 10/26/24 2040    apixaban (ELIQUIS) tablet 5 mg, 5 mg, Oral, BID, Emilie Soyeon Kim, MD, 5 mg at 10/30/24 0851    atorvastatin (LIPITOR) tablet 40 mg, 40 mg, Oral, Daily With Dinner, Emilie Soyeon Kim, MD, 40 mg at 10/29/24 1709    cephalexin (KEFLEX) capsule 500 mg, 500 mg, Oral, Q6H FRANKIE, Jhony Brooke PA-C, 500 mg at 10/30/24 0558    Cholecalciferol (VITAMIN D3) tablet 1,000 Units, 1,000 Units, Oral, Daily, Emilie Soyeon Kim, MD, 1,000 Units at 10/30/24 0851    escitalopram (LEXAPRO) tablet 10 mg, 10 mg, Oral, Daily, Emilie Soyeon Kim, MD, 10 mg at 10/30/24 0851    ferrous sulfate tablet 325 mg, 325 mg, Oral, BID With Meals, Jhony Brooke PA-C, 325 mg at 10/30/24 0851    furosemide (LASIX) tablet 40 mg, 40 mg, Oral, BID (diuretic), Reece Cortez MD, 40 mg at 10/29/24 1709    HYDROmorphone HCl (DILAUDID) injection 0.2 mg, 0.2 mg, Intravenous, Q4H PRN, Emilie Soyeon Kim, MD, 0.2 mg at 10/23/24 2228    ofloxacin (OCUFLOX) 0.3 % ophthalmic solution 1 drop, 1 drop, Left Eye, 4x Daily, Emilie Soyeon Kim, MD, 1 drop at 10/30/24 0859    ondansetron (ZOFRAN) injection 4 mg, 4 mg, Intravenous, Q6H PRN, Emilie Soyeon Kim, MD    pantoprazole (PROTONIX) injection 40 mg, 40 mg, Intravenous, Q12H FRANKIE, Amparo Kent PA-C, 40 mg at 10/30/24 0851    polyethylene glycol (MIRALAX) packet 17 g, 17 g, Oral, Daily, Emilie Soyeon Kim, MD, 17 g at 10/28/24 0902    potassium chloride (Klor-Con M20) CR tablet 20 mEq, 20 mEq, Oral, Daily, Shagufta Skelton PA-C, 20 mEq  at 10/29/24 0921    QUEtiapine (SEROquel) tablet 12.5 mg, 12.5 mg, Oral, HS, Emilie Soyeon Kim, MD, 12.5 mg at 10/29/24 2136    senna (SENOKOT) tablet 8.6 mg, 1 tablet, Oral, Daily, Emilie Soyeon Kim, MD, 8.6 mg at 10/28/24 0903    spironolactone (ALDACTONE) tablet 12.5 mg, 12.5 mg, Oral, Daily, Shagufta Skelton PA-C    thiamine tablet 100 mg, 100 mg, Oral, Daily, Emilie Soyeon Kim, MD, 100 mg at 10/30/24 0851    zonisamide (ZONEGRAN) capsule 100 mg, 100 mg, Oral, Daily, Emilie Soyeon Kim, MD, 100 mg at 10/30/24 0854    This note was completed in part utilizing [employer] Direct Software.  Grammatical errors, random word insertions, spelling mistakes, and incomplete sentences may be an occasional consequence of this system secondary to software limitations, ambient noise, and hardware issues.  If you have any questions or concerns about the content, text, or information contained within the body of this dictation, please contact the provider for clarification.

## 2024-10-30 NOTE — ASSESSMENT & PLAN NOTE
Lab Results   Component Value Date    EGFR 54 10/30/2024    EGFR 61 10/29/2024    EGFR 60 10/28/2024    CREATININE 0.94 10/30/2024    CREATININE 0.85 10/29/2024    CREATININE 0.86 10/28/2024   Stable. Does not meet SERA criteria

## 2024-10-30 NOTE — ASSESSMENT & PLAN NOTE
History of bilateral MCA strokes  Continue Statin, aspirin is held due to GI bleeds.   Attending to bill

## 2024-10-30 NOTE — ASSESSMENT & PLAN NOTE
Previous palliative care notes that patient's family wishes to pursue palliative care in the facility and or not to pursue end-of-life cares.  Plan from GI perspective as above

## 2024-10-30 NOTE — PROGRESS NOTES
Progress Note - Hospitalist   Name: Eusebio Tate 89 y.o. female I MRN: [id number]  Unit/Bed#: [place] [address] -01 I Date of Admission: 10/23/2024   Date of Service: 10/30/2024 I Hospital Day: 7    Assessment & Plan  Acute on chronic diastolic heart failure (HCC)  Wt Readings from Last 3 Encounters:   10/30/24 85 kg (187 lb 6.3 oz)   05/30/24 87.5 kg (192 lb 14.4 oz)   05/02/23 78.6 kg (173 lb 3.2 oz)     Acute on chronic diastolic heart failure. Resolved with iV diuretics  Cleared by cardiology for discharge with lasix 40mg BID with hold parameters  Daily weights, I/Os  Anticipate discharge back to facility tomorrow    Age-related osteoporosis without fracture  Continue on calcium and vitamin D and zonisamide 100 mg daily   Benign essential hypertension  Continue lasix  Permanent atrial fibrillation (HCC)  A-fib-patient is rate controlled on Eliquis for anticoagulation   Post-stroke epilepsy  Zonegran   History of stroke, 2022 & 2023  History of bilateral MCA strokes  Continue Statin, aspirin is held due to GI bleeds.  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 behavioral disturbance, psychotic disturbance, mood disturbance, or anxiety (HCC)  Patient is in dementia unit with multiple comorbidities and alzheimers Aox0 at baseline. No POA but 3 children who make decisions and would like to know about palliative and hospice.   [facility] discussion with family, would like palliative care going forward, not ready for end of life care at this time  Case management to assist with dispo  Stage 3b chronic kidney disease (HCC)  Lab Results   Component Value Date    EGFR 54 10/30/2024    EGFR 61 10/29/2024    EGFR 60 10/28/2024    CREATININE 0.94 10/30/2024    CREATININE 0.85 10/29/2024    CREATININE 0.86 10/28/2024   Stable. Does not meet SERA criteria  Microcytic anemia  Hemoglobin levels continue to downtrend. GI consulted due to black stools. Family was offered endoscopy, but they declined. Risks of doing so understood. They  would like to keep her on Eliquis due to their concern that she might suffer from a massive stroke again.     Results from last 7 days   Lab Units 10/30/24  0510 10/29/24  0456 10/28/24  0441 10/27/24  0535 10/25/24  0457 10/24/24  1401 10/24/24  0450 10/23/24  1709   HEMOGLOBIN g/dL 8.3* 7.6* 8.1* 8.0* 8.1*   < > 7.9* 9.1*   MCV fL 76* 74* 76* 75* 75*  --  75* 75*   RDW % 21.0* 20.1* 20.0* 19.9* 19.9*  --  19.8* 20.1*   IRON ug/dL  --   --   --   --   --   --   --  32*   TIBC ug/dL  --   --   --   --   --   --   --  436   FERRITIN ng/mL  --   --   --   --   --   --   --  30   FOLATE ng/mL  --   --   --   --   --   --  15.6  --    VITAMIN B 12 pg/mL  --   --   --   --   --   --   --  261    < > = values in this interval not displayed.     Bacterial conjunctivitis of left eye  Purulent crusting of left eye.,  Resolved continue with current treatment  Continue Ofloxacin drops  Advanced care planning/counseling discussion    Skin infection  Small lesion with surrounding erythema on left buttock, lesion had purulence and odor  Given cultures growing proteus, Keflex switched to cefuroxime.   Sick sinus syndrome (HCC)    Peripheral vascular disease, unspecified (HCC)      VTE Pharmacologic Prophylaxis: VTE Score: 5 Moderate Risk (Score 3-4) - Pharmacological DVT Prophylaxis Ordered: apixaban (Eliquis).    Mobility:   Basic Mobility Inpatient Raw Score: 18  -HLM Goal: 6: Walk 10 steps or more  -HLM Achieved: 4: Move to chair/commode    Discussions with Specialists or Other Care Team Provider: cardiology, gi    Education and Discussions with Family / Patient: patient    Current Length of Stay: 7 day(s)  Current Patient Status: Inpatient   Certification Statement: The patient will continue to require additional inpatient hospital stay due to dispo planning  Discharge Plan: bubba to facility    Code Status: Level 3 - DNAR and DNI    Subjective   Patient seen and examined. No new complaints overnight. Denies any chest pain  or shortness of breath.     Objective   Vitals:   Temp (24hrs), Av.2 °F (36.8 °C), Min:97.9 °F (36.6 °C), Max:98.5 °F (36.9 °C)    Temp:  [97.9 °F (36.6 °C)-98.5 °F (36.9 °C)] 97.9 °F (36.6 °C)  HR:  [59-78] 60  Resp:  [18-20] 20  BP: (106-141)/(58-80) 106/80  SpO2:  [97 %-99 %] 97 %  Body mass index is 34.27 kg/m².     Input and Output Summary (last 24 hours):     Intake/Output Summary (Last 24 hours) at 10/30/2024 1454  Last data filed at 10/30/2024 1356  Gross per 24 hour   Intake 1560 ml   Output 2256 ml   Net -696 ml       Physical Exam  Vitals reviewed.   Constitutional:       General: She is not in acute distress.  HENT:      Head: Normocephalic.      Nose: Nose normal.      Mouth/Throat:      Mouth: Mucous membranes are moist.   Eyes:      General: No scleral icterus.  Cardiovascular:      Rate and Rhythm: Normal rate and regular rhythm.   Pulmonary:      Effort: Pulmonary effort is normal. No respiratory distress.      Breath sounds: No wheezing.   Abdominal:      General: There is no distension.      Palpations: Abdomen is soft.      Tenderness: There is no abdominal tenderness.   Skin:     General: Skin is warm.   Neurological:      Mental Status: She is alert.   Psychiatric:         Mood and Affect: Mood normal.         Behavior: Behavior normal.       Lines/Drains:              Lab Results: I have reviewed the following results:   Results from last 7 days   Lab Units 10/30/24  0510 10/29/24  0456 10/28/24  0441   WBC Thousand/uL 5.94 7.57 7.92   HEMOGLOBIN g/dL 8.3* 7.6* 8.1*   PLATELETS Thousands/uL 165 166 161   MCV fL 76* 74* 76*   INR   --  1.64*  --      Results from last 7 days   Lab Units 10/30/24  0510 10/29/24  0456 10/28/24  0441 10/24/24  0450 10/23/24  1709   SODIUM mmol/L 138 138 137   < > 142   POTASSIUM mmol/L 4.4 4.0 4.1   < > 3.7   CHLORIDE mmol/L 107 107 106   < > 110*   CO2 mmol/L 27 26 26   < > 25   ANION GAP mmol/L 4 5 5   < > 7   BUN mg/dL 14 19 24   < > 15   CREATININE mg/dL  0.94 0.85 0.86   < > 0.93   CALCIUM mg/dL 9.2 8.8 8.9   < > 9.7   ALBUMIN g/dL  --   --   --   --  4.0   TOTAL BILIRUBIN mg/dL  --   --   --   --  1.06*   ALK PHOS U/L  --   --   --   --  93   ALT U/L  --   --   --   --  15   AST U/L  --   --   --   --  21   EGFR ml/min/1.73sq m 54 61 60   < > 55   GLUCOSE RANDOM mg/dL 85 91 82   < > 107    < > = values in this interval not displayed.     Results from last 7 days   Lab Units 10/29/24  0456 10/24/24  0450   MAGNESIUM mg/dL 2.1 2.8*         Results from last 7 days   Lab Units 10/23/24  2130 10/23/24  1859 10/23/24  1709   HS TNI 0HR ng/L  --   --  71*   HS TNI 2HR ng/L  --  59*  --    HS TNI 4HR ng/L 72*  --   --           Results from last 7 days   Lab Units 10/23/24  1709   LACTIC ACID mmol/L 1.7     Results from last 7 days   Lab Units 10/23/24  1648   POC GLUCOSE mg/dl 110         Results from last 7 days   Lab Units 10/24/24  0450   TSH 3RD GENERATON uIU/mL 1.384       Recent Cultures (last 7 days):   Results from last 7 days   Lab Units 10/27/24  1310   GRAM STAIN RESULT  2+ Gram positive rods*  2+ Gram positive cocci in pairs*  No polys seen*   WOUND CULTURE  3+ Growth of Proteus mirabilis*  1+ Growth of       Imaging:  Reviewed radiology reports from this admission: no new imaging    Last 24 Hours Medication List:     Current Facility-Administered Medications:     acetaminophen (TYLENOL) tablet 650 mg, Q4H PRN    apixaban (ELIQUIS) tablet 5 mg, BID    atorvastatin (LIPITOR) tablet 40 mg, Daily With Dinner    cephalexin (KEFLEX) capsule 500 mg, Q6H FRANKIE    Cholecalciferol (VITAMIN D3) tablet 1,000 Units, Daily    escitalopram (LEXAPRO) tablet 10 mg, Daily    ferrous sulfate tablet 325 mg, BID With Meals    furosemide (LASIX) tablet 40 mg, BID (diuretic)    HYDROmorphone HCl (DILAUDID) injection 0.2 mg, Q4H PRN    ofloxacin (OCUFLOX) 0.3 % ophthalmic solution 1 drop, 4x Daily    ondansetron (ZOFRAN) injection 4 mg, Q6H PRN    pantoprazole (PROTONIX) EC tablet  40 mg, Q12H    polyethylene glycol (MIRALAX) packet 17 g, Daily    potassium chloride (Klor-Con M20) CR tablet 20 mEq, Daily    QUEtiapine (SEROquel) tablet 12.5 mg, HS    senna (SENOKOT) tablet 8.6 mg, Daily    spironolactone (ALDACTONE) tablet 12.5 mg, Daily    thiamine tablet 100 mg, Daily    zonisamide (ZONEGRAN) capsule 100 mg, Daily      **Please Note: This note may have been constructed using a voice recognition system.**

## 2024-10-30 NOTE — CASE MANAGEMENT
Case Management Discharge Planning Note    Patient name Eusebio Tate  Location [place] [address] /[facility] M* MRN [id number]  : 1935 Date 10/30/2024       Current Admission Date: 10/23/2024  Current Admission Diagnosis:Acute on chronic diastolic heart failure (HCC)   Patient Active Problem List    Diagnosis Date Noted Date Diagnosed    Skin infection 10/27/2024     Microcytic anemia 10/24/2024     Bacterial conjunctivitis of left eye 10/24/2024     Advanced care planning/counseling discussion 10/24/2024     Severe concentric left ventricular hypertrophy 2024     Biatrial enlargement 2024     Peripheral vascular disease, unspecified (HCC) 2024     Stage 3b chronic kidney disease ([facility]) 2024     Class 1 obesity due to excess calories without serious comorbidity with body mass index (BMI) of 30.0 to 30.9 in adult 2023     Nodule of middle lobe of right lung 2023     Embolic stroke ([facility]) 02/15/2023     Prolonged Q-T interval on ECG 2023     Syncope 2023     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 behavioral disturbance, psychotic disturbance, mood disturbance, or anxiety ([facility]) 2023     Sick sinus syndrome ([facility]) 2023     Stroke-like symptoms 2023     History of stroke,  & 2023 01/10/2023     New onset seizure ([facility])      Post-stroke epilepsy 2022     Urinary retention 2022     Venous stasis dermatitis of both lower extremities 2022     Gait instability 2022     Other fatigue 2022     Atypical pneumonia 05/10/2022     Respiratory insufficiency 05/10/2022     Acute on chronic diastolic heart failure (HCC) 05/10/2022     Thrombocytopenia (HCC) 05/10/2022     Other hyperlipidemia 10/06/2021     Other insomnia 10/06/2021     Skin lesion 10/04/2021     Action tremor 2020     Age-related osteoporosis without fracture 2020     Permanent atrial fibrillation (HCC) 04/10/2015     Chronic anticoagulation  04/10/2015     Benign essential hypertension 04/11/2011       LOS (days): 7  Geometric Mean LOS (GMLOS) (days): 4.6  Days to GMLOS:-2.1     OBJECTIVE:  Risk of Unplanned Readmission Score: 21.41         Current admission status: Inpatient   Preferred Pharmacy:   [facility] - [place], [place] - [address]  [address] [address]  Phone: [phone] Fax: [phone]    [id number] - [place] - [address]  [address] [place] [address]  Phone: [phone] Fax: [phone]    [facility] - [place], [place] - [address]  [address] [place] [address]  Phone: [phone] Fax: [phone]    Primary Care Provider: Daniel Birch MD    Primary Insurance: [employer] [facility]  Secondary Insurance:     DISCHARGE DETAILS:    Additional Comments: CM met with Glenda from [facility], CM provided updates with mobility. CM confirmed patient return, pending medical clearance the following day, with transportation for 12:00P.M. [facility] agreeable with palliative services and Outpatient Physical and Occupational Therapies. CM contacted patient's son, Matt and confirmed plan with patient's son. Patient's son agreeable to discharge plan. CM updated [facility] referral with accepting [facility]. CM cancelled referral in [facility]. CM provided patient's sonMatt with [facility], patient's son agreeable to discharge plan and understood right to appeal. CM placed copy in scan bin. CM requested transportation with [facility] for 12:00P.M., the following day, Thursday October 31st 2024, also pending medical clearance. CM sent update to [facility] attending.

## 2024-10-30 NOTE — PLAN OF CARE
Problem: PHYSICAL THERAPY ADULT  Goal: Performs mobility at highest level of function for planned discharge setting.  See evaluation for individualized goals.  Description: Treatment/Interventions: Functional transfer training, LE strengthening/ROM, Therapeutic exercise, Endurance training, Cognitive reorientation, Patient/family training, Equipment eval/education, Bed mobility, Gait training, Compensatory technique education, Continued evaluation, Family, OT          See flowsheet documentation for full assessment, interventions and recommendations.  Outcome: Progressing  Note: Prognosis: Fair  Problem List: Decreased endurance, Decreased mobility, Decreased cognition, Impaired judgement, Decreased range of motion  Assessment: Pt. progressing well with voerall mobilty. DOnned brief prior to ambulating. Pt. noted with CUADRA post ambulation. Seated rest between ambulation due to fatigue. Noted with no LOB during session. Pt. probably functioning at her baseline. Will continue to follow epr PT POC.  Barriers to Discharge: None     Rehab Resource Intensity Level, PT: III (Minimum Resource Intensity) (back to facility with PT)    See flowsheet documentation for full assessment.

## 2024-10-30 NOTE — ASSESSMENT & PLAN NOTE
Small lesion with surrounding erythema on left buttock, lesion had purulence and odor  Given cultures growing proteus, Keflex switched to cefuroxime.

## 2024-10-30 NOTE — ASSESSMENT & PLAN NOTE
Hemoglobin levels continue to downtrend. GI consulted due to black stools. Family was offered endoscopy, but they declined. Risks of doing so understood. They would like to keep her on Eliquis due to their concern that she might suffer from a massive stroke again.     Results from last 7 days   Lab Units 10/30/24  0510 10/29/24  0456 10/28/24  0441 10/27/24  0535 10/25/24  0457 10/24/24  1401 10/24/24  0450 10/23/24  1709   HEMOGLOBIN g/dL 8.3* 7.6* 8.1* 8.0* 8.1*   < > 7.9* 9.1*   MCV fL 76* 74* 76* 75* 75*  --  75* 75*   RDW % 21.0* 20.1* 20.0* 19.9* 19.9*  --  19.8* 20.1*   IRON ug/dL  --   --   --   --   --   --   --  32*   TIBC ug/dL  --   --   --   --   --   --   --  436   FERRITIN ng/mL  --   --   --   --   --   --   --  30   FOLATE ng/mL  --   --   --   --   --   --  15.6  --    VITAMIN B 12 pg/mL  --   --   --   --   --   --   --  261    < > = values in this interval not displayed.

## 2024-10-30 NOTE — PHYSICAL THERAPY NOTE
Physical Therapy Treatment Note     10/30/24 1406   PT Last Visit   PT Visit Date 10/30/24   Note Type   Note Type Treatment   Pain Assessment   Pain Assessment Tool 0-10   Pain Score No Pain   Restrictions/Precautions   Weight Bearing Precautions Per Order No   Other Precautions Contact/isolation;Fall Risk;Telemetry;Cognitive;Chair Alarm   General   Chart Reviewed Yes   Subjective   Subjective Pt. agreeable to PT   Transfers   Sit to Stand 4  Minimal assistance   Additional items Assist x 1;Increased time required;Verbal cues;Armrests   Stand to Sit 4  Minimal assistance   Additional items Assist x 1;Increased time required;Verbal cues;Armrests   Stand pivot 4  Minimal assistance   Additional items Increased time required;Assist x 1;Verbal cues   Ambulation/Elevation   Gait pattern Forward Flexion;Decreased foot clearance;Narrow NOY;Foward flexed;Short stride;Decreased toe off;Decreased heel strike   Gait Assistance 4  Minimal assist   Additional items Assist x 1;Verbal cues   Assistive Device Rolling walker   Distance 150ft, 70ft   Balance   Static Sitting Good   Dynamic Sitting Fair   Static Standing Fair   Dynamic Standing Fair -   Ambulatory Fair -   Endurance Deficit   Endurance Deficit Yes   Endurance Deficit Description fatigue   Activity Tolerance   Activity Tolerance Patient tolerated treatment well   Nurse Made Aware Yes   Assessment   Prognosis Fair   Problem List Decreased endurance;Decreased mobility;Decreased cognition;Impaired judgement;Decreased range of motion   Assessment Pt. progressing well with voerall mobilty. DOnned brief prior to ambulating. Pt. noted with CUADRA post ambulation. Seated rest between ambulation due to fatigue. Noted with no LOB during session. Pt. probably functioning at her baseline. Will continue to follow epr PT POC.   Barriers to Discharge None   Goals   Patient Goals None reported   STG Expiration Date 11/03/24   PT Treatment Day 2   Plan   Treatment/Interventions  Functional transfer training;Spoke to case management;Spoke to nursing;Gait training;Equipment eval/education;Patient/family training;Cognitive reorientation   Progress Progressing toward goals   PT Frequency 2-3x/wk   Discharge Recommendation   Rehab Resource Intensity Level, PT III (Minimum Resource Intensity)  (back to facility with PT)   AM-PAC Basic Mobility Inpatient   Turning in Flat Bed Without Bedrails 3   Lying on Back to Sitting on Edge of Flat Bed Without Bedrails 3   Moving Bed to Chair 3   Standing Up From Chair Using Arms 3   Walk in Room 3   Climb 3-5 Stairs With Railing 3   Basic Mobility Inpatient Raw Score 18   Basic Mobility Standardized Score 41.05   Turning Head Towards Sound 4   Follow Simple Instructions 4   Low Function Basic Mobility Raw Score  26   Low Function Basic Mobility Standardized Score  41.2   [facility] Highest Level Of Mobility   -HLM Goal 6: Walk 10 steps or more   -HLM Achieved 7: Walk 25 feet or more   End of Consult   Patient Position at End of Consult All needs within reach;Bed/Chair alarm activated;Bedside chair           Jeronimo Orozco PTA    An AM-PAC basic mobility standardized score less than 42.9 suggest the patient may benefit from discharge to post-acute rehab services.

## 2024-10-31 ENCOUNTER — TELEPHONE (OUTPATIENT)
Dept: CARDIOLOGY CLINIC | Facility: CLINIC | Age: 89
End: 2024-10-31

## 2024-10-31 VITALS
RESPIRATION RATE: 18 BRPM | WEIGHT: 188.27 LBS | HEART RATE: 62 BPM | BODY MASS INDEX: 34.65 KG/M2 | TEMPERATURE: 98.4 F | DIASTOLIC BLOOD PRESSURE: 74 MMHG | SYSTOLIC BLOOD PRESSURE: 138 MMHG | OXYGEN SATURATION: 98 % | HEIGHT: 62 IN

## 2024-10-31 PROCEDURE — 99239 HOSP IP/OBS DSCHRG MGMT >30: CPT | Performed by: STUDENT IN AN ORGANIZED HEALTH CARE EDUCATION/TRAINING PROGRAM

## 2024-10-31 RX ORDER — CEFUROXIME AXETIL 500 MG/1
500 TABLET ORAL EVERY 12 HOURS SCHEDULED
Qty: 10 TABLET | Refills: 0 | Status: SHIPPED
Start: 2024-10-31 | End: 2024-11-05

## 2024-10-31 RX ORDER — SPIRONOLACTONE 25 MG/1
12.5 TABLET ORAL DAILY
Status: SHIPPED
Start: 2024-11-01

## 2024-10-31 RX ORDER — SENNOSIDES 8.6 MG
8.6 TABLET ORAL DAILY
Status: SHIPPED
Start: 2024-11-01

## 2024-10-31 RX ORDER — FERROUS SULFATE 325(65) MG
325 TABLET ORAL
Status: SHIPPED
Start: 2024-10-31

## 2024-10-31 RX ORDER — FUROSEMIDE 40 MG/1
40 TABLET ORAL 2 TIMES DAILY
Status: SHIPPED
Start: 2024-10-31

## 2024-10-31 RX ORDER — PANTOPRAZOLE SODIUM 40 MG/1
40 TABLET, DELAYED RELEASE ORAL DAILY
Status: SHIPPED
Start: 2024-10-31

## 2024-10-31 RX ADMIN — Medication 1000 UNITS: at 08:57

## 2024-10-31 RX ADMIN — FERROUS SULFATE TAB 325 MG (65 MG ELEMENTAL FE) 325 MG: 325 (65 FE) TAB at 08:57

## 2024-10-31 RX ADMIN — APIXABAN 5 MG: 5 TABLET, FILM COATED ORAL at 08:57

## 2024-10-31 RX ADMIN — CEFUROXIME AXETIL 500 MG: 250 TABLET, FILM COATED ORAL at 08:56

## 2024-10-31 RX ADMIN — ZONISAMIDE 100 MG: 100 CAPSULE ORAL at 08:56

## 2024-10-31 RX ADMIN — ESCITALOPRAM OXALATE 10 MG: 10 TABLET ORAL at 08:56

## 2024-10-31 RX ADMIN — FUROSEMIDE 40 MG: 40 TABLET ORAL at 08:56

## 2024-10-31 RX ADMIN — POTASSIUM CHLORIDE 20 MEQ: 1500 TABLET, EXTENDED RELEASE ORAL at 08:56

## 2024-10-31 RX ADMIN — OFLOXACIN 1 DROP: 3 SOLUTION/ DROPS OPHTHALMIC at 09:03

## 2024-10-31 RX ADMIN — PANTOPRAZOLE SODIUM 40 MG: 40 TABLET, DELAYED RELEASE ORAL at 05:29

## 2024-10-31 RX ADMIN — SPIRONOLACTONE 12.5 MG: 25 TABLET ORAL at 08:56

## 2024-10-31 RX ADMIN — THIAMINE HCL TAB 100 MG 100 MG: 100 TAB at 08:56

## 2024-10-31 NOTE — CASE MANAGEMENT
Case Management Assessment & Discharge Planning Note    Patient name Eusebio Tate  Location [place] [address] /[address] 2 M* MRN [id number]  : 1935 Date 10/31/2024       Current Admission Date: 10/23/2024  Current Admission Diagnosis:Acute on chronic diastolic heart failure (HCC)   Patient Active Problem List    Diagnosis Date Noted Date Diagnosed    Skin infection 10/27/2024     Microcytic anemia 10/24/2024     Bacterial conjunctivitis of left eye 10/24/2024     Advanced care planning/counseling discussion 10/24/2024     Severe concentric left ventricular hypertrophy 2024     Biatrial enlargement 2024     Peripheral vascular disease, unspecified (HCC) 2024     Stage 3b chronic kidney disease (HCC) 2024     Class 1 obesity due to excess calories without serious comorbidity with body mass index (BMI) of 30.0 to 30.9 in adult 2023     Nodule of middle lobe of right lung 2023     Embolic stroke ([facility]) 02/15/2023     Prolonged Q-T interval on ECG 2023     Syncope 2023     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 behavioral disturbance, psychotic disturbance, mood disturbance, or anxiety ([facility]) 2023     Sick sinus syndrome ([facility]) 2023     Stroke-like symptoms 2023     History of stroke,  & 2023 01/10/2023     New onset seizure ([facility])      Post-stroke epilepsy 2022     Urinary retention 2022     Venous stasis dermatitis of both lower extremities 2022     Gait instability 2022     Other fatigue 2022     Atypical pneumonia 05/10/2022     Respiratory insufficiency 05/10/2022     Acute on chronic diastolic heart failure (HCC) 05/10/2022     Thrombocytopenia (HCC) 05/10/2022     Other hyperlipidemia 10/06/2021     Other insomnia 10/06/2021     Skin lesion 10/04/2021     Action tremor 2020     Age-related osteoporosis without fracture 2020     Permanent atrial fibrillation (HCC) 04/10/2015     Chronic  anticoagulation 04/10/2015     Benign essential hypertension 04/11/2011       LOS (days): 8  Geometric Mean LOS (GMLOS) (days): 4.6  Days to GMLOS:-3     OBJECTIVE:    Risk of Unplanned Readmission Score: 21.65         Current admission status: Inpatient  Referral Reason: Other, Rehab ([facility] with Palliative care)    Preferred Pharmacy:   [facility] - [place], [place] - [address]  [address] [address]  Phone: [phone] Fax: [phone]    [id number] - [place] - [address]  [address] [place] [address]  Phone: [phone] Fax: [phone]    [facility] - [place], [place] - [address]  [address]  [place] [address]  Phone: [phone] Fax: [phone]    Primary Care Provider: Daniel Birch MD    Primary Insurance: [facility]  Secondary Insurance:     ASSESSMENT:  Active Health Care Proxies       Bebeto Matt Health Care Representative - Son   Primary Phone: [phone] (Mobile)            Social Determinants of Health (SDOH)      Flowsheet Row Most Recent Value   Housing Stability    In the last 12 months, was there a time when you were not able to pay the mortgage or rent on time? N   In the past 12 months, how many times have you moved where you were living? 0   At any time in the past 12 months, were you homeless or living in a shelter (including now)? N   Transportation Needs    In the past 12 months, has lack of transportation kept you from medical appointments or from getting medications? no   In the past 12 months, has lack of transportation kept you from meetings, work, or from getting things needed for daily living? No   Food Insecurity    Within the past 12 months, you worried that your food would run out before you got the money to buy more. Never true   Within the past 12 months, the food you bought just didn't last and you didn't have money to get more. Never  true   Utilities    In the past 12 months has the electric, gas, oil, or water company threatened to shut off services in your home? No            DISCHARGE DETAILS:    Additional Comments: CM followed up with patient's son, Matt to confirm discharge to return to patient's [facility] at 12:00P.M. CM confirmed no further CM needs at this time, patient's son agreeable. CM provided follow up for bedside nurse and SLIM attending.

## 2024-10-31 NOTE — DISCHARGE SUMMARY
Discharge Summary - Hospitalist   Name: Eusebio Tate 89 y.o. female I MRN: [id number]  Unit/Bed#: [place] [address] -01 I Date of Admission: 10/23/2024   Date of Service: 10/31/2024 I Hospital Day: 8     Assessment & Plan  Acute on chronic diastolic heart failure (HCC)  Wt Readings from Last 3 Encounters:   10/31/24 85.4 kg (188 lb 4.4 oz)   05/30/24 87.5 kg (192 lb 14.4 oz)   05/02/23 78.6 kg (173 lb 3.2 oz)     Acute on chronic diastolic heart failure. Resolved with iV diuretics  Cleared by cardiology for discharge with lasix 40mg BID with hold parameters  Discharge back to facility today  Age-related osteoporosis without fracture  Continue on calcium and vitamin D and zonisamide 100 mg daily   Benign essential hypertension  Continue lasix  Permanent atrial fibrillation (HCC)  A-fib-patient is rate controlled on Eliquis for anticoagulation   Post-stroke epilepsy  [place]   History of stroke, 2022 & 2023  History of bilateral MCA strokes  Continue Statin, aspirin is held due to GI bleeds.  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 behavioral disturbance, psychotic disturbance, mood disturbance, or anxiety (HCC)  Patient is in dementia unit with multiple comorbidities and alzheimers Aox0 at baseline. Palliative care going forward, not ready for end of life care at this time  Stage 3b chronic kidney disease (HCC)  Lab Results   Component Value Date    EGFR 54 10/30/2024    EGFR 61 10/29/2024    EGFR 60 10/28/2024    CREATININE 0.94 10/30/2024    CREATININE 0.85 10/29/2024    CREATININE 0.86 10/28/2024   Stable. Does not meet SERA criteria  Microcytic anemia  Hemoglobin levels continue to downtrend. GI consulted due to black stools. Family was offered endoscopy, but they declined. Risks of doing so understood. They would like to keep her on Eliquis due to their concern that she might suffer from a massive stroke again.     Results from last 7 days   Lab Units 10/30/24  0510 10/29/24  0456 10/28/24  4235  10/27/24  0535 10/25/24  0457   HEMOGLOBIN g/dL 8.3* 7.6* 8.1* 8.0* 8.1*   MCV fL 76* 74* 76* 75* 75*   RDW % 21.0* 20.1* 20.0* 19.9* 19.9*     Bacterial conjunctivitis of left eye  Purulent crusting of left eye.,  Resolved continue with current treatment  Continue Ofloxacin drops  Advanced care planning/counseling discussion    Skin infection  Small lesion with surrounding erythema on left buttock, lesion had purulence and odor  Given cultures growing proteus, Keflex switched to cefuroxime.   Sick sinus syndrome (HCC)    Peripheral vascular disease, unspecified (HCC)       Discharging Physician / Practitioner: Colby Albarado MD  PCP: Daniel Birch MD  Admission Date:   Admission Orders (From admission, onward)       Ordered        10/23/24 2036  INPATIENT ADMISSION  Once                          Discharge Date: 10/31/24    Medical Problems       Resolved Problems  Date Reviewed: 9/9/2024            Resolved    Acute metabolic encephalopathy 10/28/2024     Resolved by  Shagufta Skelton PA-C          Consultations During Hospital Stay:  GI  Cardiology  palliative    Significant Findings / Test Results:   Imaging  XR Chest:    Cardiomegaly with mild vascular interstitial prominence suggesting mild volume overload. Clear lungs.       Ct head:     No acute intracranial abnormality given mild motion degradation.     Unchanged chronic infarct in left parietotemporal lobe with mild chronic microangiopathy.     Unchanged small lobulated cutaneous lesion in left parietal scalp, indeterminate. Recommend direct visualization for further evaluation.    Incidental Findings:   As written above     Outpatient Tests Requested:  PCP, CBC, BMP  cardiology    Complications:  none    Reason for Admission: confusion and shortness of breath.    Hospital Course:     Eusebio aTte is a 89 y.o. female patient who originally presented to the hospital on 10/23/2024 due to altered mental status.    Patient is a 89-year-old female with history of  "atrial fibrillation on Eliquis, CAD, CKD, hypertension, dementia, and history of left MCA stroke with residual expressive aphasia,  history of right MCA stroke back in 2023, poststroke epilepsy, Alzheimer's dementia, SSS status post pacemaker, chronic diastolic heart failure, and hyperlipidemia presented to institution due to altered mental status.  There was a component of fluid overload for which she received IV diuretics for.  Cardiology was consulted who further optimized her regimen and recommended discharge with Lasix 40 mg p.o. twice daily.    Regarding her downtrending hemoglobin levels, spoke to GI about this.  They offered EGD and colonoscopy for further evaluation.  However, her son Matt spoke to her siblings and uniformly decided that they would not like us to proceed with further workup with EGD.  Palliative care was consulted, they would like to continue supportive measures but are not interested in end-of-life care at this time.  Matt is aware of the risks of continued bleeding in the future since we are not able to diagnose and treat if indicated.  He also prefers that we continue the Eliquis due to their concerns that she might suffer from another massive stroke if she is not on it.  They verbalized understanding of the need to follow-up with her primary care provider for repeat blood work and for transition of care.    Please see above list of diagnoses and related plan for additional information.     Condition at Discharge: fair     Discharge Day Visit / Exam:     Subjective:  patient seen and examined at bedside. Comfortable, No new complaints overnight.   Vitals: Blood Pressure: 138/74 (10/31/24 0719)  Pulse: 62 (10/31/24 0719)  Temperature: 98.4 °F (36.9 °C) (10/31/24 0719)  Temp Source: Oral (10/31/24 0719)  Respirations: 18 (10/31/24 0719)  Height: 5' 2\" (157.5 cm) (10/23/24 6132)  Weight - Scale: 85.4 kg (188 lb 4.4 oz) (10/31/24 0559)  SpO2: 98 % (10/31/24 0719)  Exam:   Physical " Exam  Vitals reviewed.   Constitutional:       General: She is not in acute distress.  HENT:      Head: Normocephalic.      Nose: Nose normal.      Mouth/Throat:      Mouth: Mucous membranes are moist.   Eyes:      General: No scleral icterus.  Cardiovascular:      Rate and Rhythm: Normal rate.   Pulmonary:      Effort: Pulmonary effort is normal. No respiratory distress.      Breath sounds: No wheezing.   Abdominal:      General: There is no distension.      Palpations: Abdomen is soft.      Tenderness: There is no abdominal tenderness.   Skin:     General: Skin is warm.   Neurological:      Mental Status: She is alert. Mental status is at baseline.   Psychiatric:         Mood and Affect: Mood normal.         Behavior: Behavior normal.       Discussion with Family: hannah    Discharge instructions/Information to patient and family:   See after visit summary for information provided to patient and family.      Provisions for Follow-Up Care:  See after visit summary for information related to follow-up care and any pertinent home health orders.      Disposition:     Back to facility    Planned Readmission: no     Discharge Statement:  I spent 40 minutes discharging the patient. This time was spent on the day of discharge. I had direct contact with the patient on the day of discharge. Greater than 50% of the total time was spent examining patient, answering all patient questions, arranging and discussing plan of care with patient as well as directly providing post-discharge instructions.  Additional time then spent on discharge activities.    Discharge Medications:  See after visit summary for reconciled discharge medications provided to patient and family.      ** Please Note: This note has been constructed using a voice recognition system **

## 2024-10-31 NOTE — RESTORATIVE TECHNICIAN NOTE
Restorative Technician Note      Patient Name: Eusebio Tate     Restorative Tech Visit Date: 10/31/24  Note Type: Mobility  Patient Position Upon Consult: Supine  Activity Performed: Ambulated; Range of motion  Assistive Device: Roller walker  Patient Position at End of Consult: All needs within reach; Bedside chair

## 2024-10-31 NOTE — ASSESSMENT & PLAN NOTE
Wt Readings from Last 3 Encounters:   10/31/24 85.4 kg (188 lb 4.4 oz)   05/30/24 87.5 kg (192 lb 14.4 oz)   05/02/23 78.6 kg (173 lb 3.2 oz)     Acute on chronic diastolic heart failure. Resolved with iV diuretics  Cleared by cardiology for discharge with lasix 40mg BID with hold parameters  Discharge back to facility today

## 2024-10-31 NOTE — UTILIZATION REVIEW
NOTIFICATION OF ADMISSION DISCHARGE   This is a Notification of Discharge from [facility]. Please be advised that this patient has been discharge from our facility. Below you will find the admission and discharge date and time including the patient’s disposition.   UTILIZATION REVIEW CONTACT:  Dione Messer MA  Utilization   Network Utilization Review Department  Phone: [phone] x carefully listen to the prompts. All voicemails are confidential.  Email: [email or web address]     ADMISSION INFORMATION  PRESENTATION DATE: 10/23/2024  4:37 PM  OBERVATION ADMISSION DATE: N/A  INPATIENT ADMISSION DATE: 10/23/24  8:36 PM   DISCHARGE DATE: 10/31/2024 12:06 PM   DISPOSITION:Non [facility] SNF/TCU/SNU    Network Utilization Review Department  ATTENTION: Please call with any questions or concerns to [phone] and carefully listen to the prompts so that you are directed to the right person. All voicemails are confidential.   For Discharge needs, contact Care Management DC Support Team at [phone] opt. 2  Send all requests for admission clinical reviews, approved or denied determinations and any other requests to dedicated fax number below belonging to the campus where the patient is receiving treatment. List of dedicated fax numbers for the Facilities:  FACILITY NAME UR FAX NUMBER   ADMISSION DENIALS (Administrative/Medical Necessity) [phone]   DISCHARGE SUPPORT TEAM ([facility]) [phone]   PARENT CHILD HEALTH (Maternity/NICU/Pediatrics) [phone]   [facility] [phone]   [facility] [phone]   [facility] [phone]   [facility] [phone]   [facility] [phone]   [facility] [phone]   [facility] [phone]   [facility]  [place] [phone]   [facility] [phone]   [facility] [phone]   [facility] [phone]   [facility] [phone]

## 2024-10-31 NOTE — TELEPHONE ENCOUNTER
.Caller: Matt Tate     Doctor: Reece Cortez MD     Reason for call: Pt currently hospitalized, has multiple appointments coming up. Pt is will be discharge to Nursing Home today. Will be difficult to get to all the appointments Pts son requesting the appointments be consolidated or cancel out duplicate appt.     Call back#: [phone]

## 2024-10-31 NOTE — PLAN OF CARE
Problem: Potential for Falls  Goal: Patient will remain free of falls  Description: INTERVENTIONS:  - Educate patient/family on patient safety including physical limitations  - Instruct patient to call for assistance with activity   - Consult OT/PT to assist with strengthening/mobility   - Keep Call bell within reach  - Keep bed low and locked with side rails adjusted as appropriate  - Keep care items and personal belongings within reach  - Initiate and maintain comfort rounds  - Make Fall Risk Sign visible to staff  - Offer Toileting every 2 Hours, in advance of need  - Initiate/Maintain bed alarm  - Obtain necessary fall risk management equipment: alarms  - Apply yellow socks and bracelet for high fall risk patients  - Consider moving patient to room near nurses station  Outcome: Progressing     Problem: PAIN - ADULT  Goal: Verbalizes/displays adequate comfort level or baseline comfort level  Description: Interventions:  - Encourage patient to monitor pain and request assistance  - Assess pain using appropriate pain scale  - Administer analgesics based on type and severity of pain and evaluate response  - Implement non-pharmacological measures as appropriate and evaluate response  - Consider cultural and social influences on pain and pain management  - Notify physician/advanced practitioner if interventions unsuccessful or patient reports new pain  Outcome: Progressing     Problem: INFECTION - ADULT  Goal: Absence or prevention of progression during hospitalization  Description: INTERVENTIONS:  - Assess and monitor for signs and symptoms of infection  - Monitor lab/diagnostic results  - Monitor all insertion sites, i.e. indwelling lines, tubes, and drains  - Monitor endotracheal if appropriate and nasal secretions for changes in amount and color  - [place] appropriate cooling/warming therapies per order  - Administer medications as ordered  - Instruct and encourage patient and family to use good hand hygiene  technique  - Identify and instruct in appropriate isolation precautions for identified infection/condition  Outcome: Progressing     Problem: SAFETY ADULT  Goal: Maintain or return to baseline ADL function  Description: INTERVENTIONS:  -  Assess patient's ability to carry out ADLs; assess patient's baseline for ADL function and identify physical deficits which impact ability to perform ADLs (bathing, care of mouth/teeth, toileting, grooming, dressing, etc.)  - Assess/evaluate cause of self-care deficits   - Assess range of motion  - Assess patient's mobility; develop plan if impaired  - Assess patient's need for assistive devices and provide as appropriate  - Encourage maximum independence but intervene and supervise when necessary  - Involve family in performance of ADLs  - Assess for home care needs following discharge   - Consider OT consult to assist with ADL evaluation and planning for discharge  - Provide patient education as appropriate  Outcome: Progressing     Problem: CARDIOVASCULAR - ADULT  Goal: Absence of cardiac dysrhythmias or at baseline rhythm  Description: INTERVENTIONS:  - Continuous cardiac monitoring, vital signs, obtain 12 lead EKG if ordered  - Administer antiarrhythmic and heart rate control medications as ordered  - Monitor electrolytes and administer replacement therapy as ordered  Outcome: Progressing     Problem: RESPIRATORY - ADULT  Goal: Achieves optimal ventilation and oxygenation  Description: INTERVENTIONS:  - Assess for changes in respiratory status  - Assess for changes in mentation and behavior  - Position to facilitate oxygenation and minimize respiratory effort  - Oxygen administered by appropriate delivery if ordered  - Initiate smoking cessation education as indicated  - Encourage broncho-pulmonary hygiene including cough, deep breathe, Incentive Spirometry  - Assess the need for suctioning and aspirate as needed  - Assess and instruct to report SOB or any respiratory  difficulty  - Respiratory Therapy support as indicated  Outcome: Progressing     Problem: GASTROINTESTINAL - ADULT  Goal: Maintains adequate nutritional intake  Description: INTERVENTIONS:  - Monitor percentage of each meal consumed  - Identify factors contributing to decreased intake, treat as appropriate  - Assist with meals as needed  - Monitor I&O, weight, and lab values if indicated  - Obtain nutrition services referral as needed  Outcome: Progressing     Problem: METABOLIC, FLUID AND ELECTROLYTES - ADULT  Goal: Fluid balance maintained  Description: INTERVENTIONS:  - Monitor labs   - Monitor I/O and WT  - Instruct patient on fluid and nutrition as appropriate  - Assess for signs & symptoms of volume excess or deficit  Outcome: Progressing     Problem: SKIN/TISSUE INTEGRITY - ADULT  Goal: Skin Integrity remains intact(Skin Breakdown Prevention)  Description: Assess:  -Perform [place] assessment every shift  -Clean and moisturize skin every shift  -Inspect skin when repositioning, toileting, and assisting with ADLS  -Assess under medical devices such as IV every shift  -Assess extremities for adequate circulation and sensation     Bed Management:  -Have minimal linens on bed & keep smooth, unwrinkled  -Change linens as needed when moist or perspiring  -Avoid sitting or lying in one position for more than 2 hours while in bed  -Keep HOB at 30 degrees     Toileting:  -Offer bedside commode  -Assess for incontinence every shift  -Use incontinent care products after each incontinent episode such as foam cleanser    Activity:  -Mobilize patient 3 times a day  -Encourage activity and walks on unit  -Encourage or provide ROM exercises   -Turn and reposition patient every 2 Hours  -Use appropriate equipment to lift or move patient in bed  -Instruct/ Assist with weight shifting every hour when out of bed in chair  -Consider limitation of chair time 3 hour intervals    Skin Care:  -Avoid use of baby powder, tape, friction  and shearing, hot water or constrictive clothing  -Relieve pressure over bony prominences using pillows  -Do not massage red bony areas    Next Steps:  -Teach patient strategies to minimize risks such as skin breakdown   -Consider consults to  interdisciplinary teams such as wound care  Outcome: Progressing  Goal: Incision(s), wounds(s) or drain site(s) healing without S/S of infection  Description: INTERVENTIONS  - Assess and document dressing, incision, wound bed, drain sites and surrounding tissue  - Provide patient and family education  - Perform skin care/dressing changes every shift  Outcome: Progressing     Problem: Prexisting or High Potential for Compromised Skin Integrity  Goal: Skin integrity is maintained or improved  Description: INTERVENTIONS:  - Identify patients at risk for skin breakdown  - Assess and monitor skin integrity  - Assess and monitor nutrition and hydration status  - Monitor labs   - Assess for incontinence   - Turn and reposition patient  - Assist with mobility/ambulation  - Relieve pressure over bony prominences  - Avoid friction and shearing  - Provide appropriate hygiene as needed including keeping skin clean and dry  - Evaluate need for skin moisturizer/barrier cream  - Collaborate with interdisciplinary team   - Patient/family teaching  - Consider wound care consult   Outcome: Progressing

## 2024-10-31 NOTE — ASSESSMENT & PLAN NOTE
Hemoglobin levels continue to downtrend. GI consulted due to black stools. Family was offered endoscopy, but they declined. Risks of doing so understood. They would like to keep her on Eliquis due to their concern that she might suffer from a massive stroke again.     Results from last 7 days   Lab Units 10/30/24  0510 10/29/24  0456 10/28/24  0441 10/27/24  0535 10/25/24  0457   HEMOGLOBIN g/dL 8.3* 7.6* 8.1* 8.0* 8.1*   MCV fL 76* 74* 76* 75* 75*   RDW % 21.0* 20.1* 20.0* 19.9* 19.9*

## 2024-10-31 NOTE — ASSESSMENT & PLAN NOTE
Patient is in dementia unit with multiple comorbidities and alzheimers Aox0 at baseline. Palliative care going forward, not ready for end of life care at this time

## 2024-11-13 ENCOUNTER — OFFICE VISIT (OUTPATIENT)
Dept: CARDIOLOGY CLINIC | Facility: CLINIC | Age: 89
End: 2024-11-13
Payer: COMMERCIAL

## 2024-11-13 ENCOUNTER — IN-CLINIC DEVICE VISIT (OUTPATIENT)
Dept: CARDIOLOGY CLINIC | Facility: CLINIC | Age: 89
End: 2024-11-13
Payer: COMMERCIAL

## 2024-11-13 VITALS — SYSTOLIC BLOOD PRESSURE: 91 MMHG | HEART RATE: 63 BPM | DIASTOLIC BLOOD PRESSURE: 51 MMHG

## 2024-11-13 DIAGNOSIS — I48.91 ATRIAL FIBRILLATION, UNSPECIFIED TYPE (HCC): ICD-10-CM

## 2024-11-13 DIAGNOSIS — F02.B0 MODERATE LATE ONSET ALZHEIMER'S DEMENTIA WITHOUT BEHAVIORAL DISTURBANCE, PSYCHOTIC DISTURBANCE, MOOD DISTURBANCE, OR ANXIETY (HCC): ICD-10-CM

## 2024-11-13 DIAGNOSIS — I51.7 SEVERE CONCENTRIC LEFT VENTRICULAR HYPERTROPHY: ICD-10-CM

## 2024-11-13 DIAGNOSIS — I87.2 VENOUS STASIS DERMATITIS OF BOTH LOWER EXTREMITIES: ICD-10-CM

## 2024-11-13 DIAGNOSIS — G30.1 MODERATE LATE ONSET ALZHEIMER'S DEMENTIA WITHOUT BEHAVIORAL DISTURBANCE, PSYCHOTIC DISTURBANCE, MOOD DISTURBANCE, OR ANXIETY (HCC): ICD-10-CM

## 2024-11-13 DIAGNOSIS — I49.5 SICK SINUS SYNDROME (HCC): ICD-10-CM

## 2024-11-13 DIAGNOSIS — I50.32 CHRONIC HEART FAILURE WITH PRESERVED EJECTION FRACTION (HFPEF, >= 50%) (HCC): Primary | ICD-10-CM

## 2024-11-13 DIAGNOSIS — I10 BENIGN ESSENTIAL HYPERTENSION: ICD-10-CM

## 2024-11-13 DIAGNOSIS — I49.5 SSS (SICK SINUS SYNDROME) (HCC): Primary | ICD-10-CM

## 2024-11-13 DIAGNOSIS — I48.21 PERMANENT ATRIAL FIBRILLATION (HCC): ICD-10-CM

## 2024-11-13 PROCEDURE — G2211 COMPLEX E/M VISIT ADD ON: HCPCS | Performed by: INTERNAL MEDICINE

## 2024-11-13 PROCEDURE — 93279 PRGRMG DEV EVAL PM/LDLS PM: CPT | Performed by: INTERNAL MEDICINE

## 2024-11-13 PROCEDURE — 99214 OFFICE O/P EST MOD 30 MIN: CPT | Performed by: INTERNAL MEDICINE

## 2024-11-13 NOTE — ASSESSMENT & PLAN NOTE
Patient is in memory care unit and is well taken care of.  No behavioral disturbance symptoms reported recently.  -- Continue therapeutic and supportive care.

## 2024-11-13 NOTE — ASSESSMENT & PLAN NOTE
Patient to have device check today.  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  -- Will continue follow-up in device clinic.

## 2024-11-13 NOTE — PROGRESS NOTES
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 BATTERY VOLTAGE ADEQUATE (10.9 YRS). : 97.2% (>40%~VVIR/60). ALL LEAD PARAMETERS WITHIN NORMAL LIMITS. NO SIGNIFICANT HIGH RATE EPISODES. PT TAKES ELIQUIS. EF: 55-60% (ECHO 12/16/23). NO PROGRAMMING CHANGES MADE TO DEVICE PARAMETERS. PT SEEN TODAY BY DR. SAAB. NORMAL DEVICE FUNCTION. CH

## 2024-11-13 NOTE — PROGRESS NOTES
[facility]OLOG[facility]  [facility]  Primary Office: [address] [place] [address]  Phone: [phone]; Fax: [phone]  *-*-*-*-*-*-*-*-*-*-*-*-*-*-*-*-*-*-*-*-*-*-*-*-*-*-*-*-*-*-*-*-*-*-*-*-*-*-*-*-*-*-*-*-*-*-*-*-*-*-*-*-*-*  ENCOUNTER DATE: 11/13/24 9:39 AM  PATIENT NAME: Eusebio Tate   1935    [id number]  AGE:89 y.o.      SEX: female  ENCOUNTER PROVIDER: Reece Coretz MD, [facility]  PRIMARY CARE PHYSICIAN: Daniel Birch MD    DIAGNOSES:  1.  Primary hypertension  2.  Permanent atrial fibrillation, with slow ventricular response, status post [employer]tr[employer] single-chamber pacemaker implantation 1/26/2023  3.  Chronic diastolic heart failure  4.  Venous dermatitis of lower extremities  5.  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  Mild pulmonary hypertension  7.  History of CVA July 2022, recurrent left parietal stroke in January 2023  8.  Degenerative joint disease with osteoarthritis.  9.  History of knee injuries with hemarthrosis, of left knee July 2022  10.  Mild tremors   11.  Vitamin D deficiency  12.  Moderate to marked biatrial enlargement  13.  Peripheral venous insufficiency with venous dermatitis of lower extremities  14.  Post-stroke epilepsy  15.  Mild pulmonary hypertension  16. Dementia    ECHOCARDIOGRAM 16 December 2023 [facility]: Severe concentric left ventricular hypertrophy suggestive of infiltrative cardiomyopathy, LVEF 55 to 60% indeterminate diastolic function.  Normal right ventricle size and function.  Severe left atrial enlargement, mild right atrial enlargement  Mildly calcified aortic valve without stenosis or regurgitation.  Mitral valve annular calcification trace mitral valve regurgitation.  Trace tricuspid valve regurgitation  No obvious pulmonary hypertension  No pericardial effusion  Mildly dilated aortic root.    ECHOCARDIOGRAM 1/10/2023  LVEF 60% with moderate to severe biatrial enlargement, aortic  "valve sclerosis, mild mitral regurgitation and mild to moderate tricuspid regurgitation, mild pulmonary hypertension, PASP 44 mmHg on furosemide 40 mg daily    ECHOCARDIOGRAM May 2022:  Marked asymmetric left ventricular hypertrophy involving the apex and surrounding regions, normal left ventricular systolic function, indeterminate diastolic function, moderate to marked biatrial enlargement, aortic valve sclerosis, mild pulmonary hypertension with estimated RVSP/PASP 44 mmHg, no pericardial effusion.       CURRENT ECG   No results found for this visit on 11/13/24.     Pacemaker [employer] remote transmission 9/12/2024:  MDT SC PM/ACTIVE SYSTEM IS MRI CONDITIONAL   CARELINK TRANSMISSION: BATTERY STATUS \"11 YRS.\"  97%. ALL AVAILABLE LEAD PARAMETERS WITHIN NORMAL LIMITS. NO SIGNIFICANT HIGH RATE EPISODES. NORMAL DEVICE FUNCTION. NC     CARDIOLOGY ASSESSMENT & PLAN      Diagnosis ICD-10-CM Associated Orders   1. Chronic heart failure with preserved ejection fraction (HFpEF, >= 50%) ([facility])  I50.32       2. Permanent atrial fibrillation ([facility])  I48.21       3. Sick sinus syndrome ([facility])  I49.5       4. Venous stasis dermatitis of both lower extremities  I87.2       5. 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 behavioral disturbance, psychotic disturbance, mood disturbance, or anxiety ([facility])  G30.1     F02.B0       6. Severe concentric left ventricular hypertrophy  I51.7       7. Benign essential hypertension  I10          1. Chronic heart failure with preserved ejection fraction (HFpEF, >= 50%) ([facility])  Assessment & Plan:  Wt Readings from Last 3 Encounters:   10/31/24 85.4 kg (188 lb 4.4 oz)   05/30/24 87.5 kg (192 lb 14.4 oz)   05/02/23 78.6 kg (173 lb 3.2 oz)         RELEVANT FINDINGS   Appears to be well compensated.  Weight is stable.  There is no evidence of pulmonary vascular congestion.  Has significant venous dermatitis without significant lower extremity fluid retention.  Blood pressure is noted to be " borderline.   CURRENT RELEVANT MEDICATIONS Furosemide 40 mg twice daily, spironolactone 25 mg daily.  Not on ACE inhibitor or ARB therapy due to history of CKD.   GOALS Minimize risk of decompensated heart failure.   MEDICATION CHANGES Advising to continue current medications.   OTHER RECOMMENDATIONS Continued monitoring of weight and intermittent blood pressures.  Continued heart failure restricted diet.  Some tips are provided below.       The following routine measures are being advised to prevent exacerbation of congestive heart failure:     - Daily or atleast weekly checking of weight.    Notify your clinicians if greater than 2 lb is gained in 1 day or greater than 5 lb is gained in 1 wk.    - Checking for lower extremity swelling.    Examine legs for swelling (new or increase in existing swelling) and describe if swelling reaches ankle, shin, or knee.    - Monitoring for decrease in exercise tolerance.    Check your self for unusual shortness of breath at rest, or with mild exertion, moderate exertion, or none at all.    - Monitoring for worsening shortness of breath on lying down.    Check for increase in number of pillows used at night and for increase in waking at night with shortness of breath.    - Salt/sodium restriction to less than 2 g a day.    Calculate total sodium intake in a day from nutrition labels and food charts. Understand hidden sources of salt intake.  Eliminate the salt shaker. (Remember, One teaspoon of table salt = 2,300 mg of sodium)   Avoid using garlic salt, onion salt, MSG, meat tenderizers, broth mixes, Chinese food, soy sauce, teriyaki sauce, barbeque sauce, sauerkraut, olives, pickles, pickle relish, smith bits, and croutons.   Use fresh ingredients and/or foods with no added salt.  Try orange, lemon, lime, pineapple juice, or vinegar as a base for meat marinades or to add tart flavor.  Avoid convenience foods such as canned soups, entrees, vegetables, pasta and rice mixes,  frozen dinners, instant cereal and puddings, and gravy sauce mixes.   Select frozen meals that contain around 600 mg sodium or less.  Use fresh, frozen, no-added-salt canned vegetables, low-sodium soups, and low-sodium lunchmeats.  Look for seasoning or spice blends with no salt, or try fresh herbs, onions, or garlic.    You are advised to inform us for any concerns regarding above measures.              2. Permanent atrial fibrillation (HCC)  Assessment & Plan:  Has longstanding permanent permanent atrial fibrillation and is on chronic anticoagulation.  Has had stroke during Eliquis interruption in the past.  Noted to have recent melena although as per her son stool testing was negative.  Hemoglobin and hematocrit decreased but stable.  -- Suggest close follow-up with primary care physician and a repeat hemoglobin and hematocrit in 1 month time.  -- Continue iron supplementation and monitoring.  -- Will continue to follow in device clinic.  3. Sick sinus syndrome (HCC)  Assessment & Plan:  Patient to have device check today.  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  -- Will continue follow-up in device clinic.  4. Venous stasis dermatitis of both lower extremities  5. 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 behavioral disturbance, psychotic disturbance, mood disturbance, or anxiety (HCC)  Assessment & Plan:  Patient is in memory care unit and is well taken care of.  No behavioral disturbance symptoms reported recently.  -- Continue therapeutic and supportive care.  6. Severe concentric left ventricular hypertrophy  Assessment & Plan:  Severe left ventricular hypertrophy by echocardiogram in December 2023.  Has severe left atrial lodgment and mild right atrial enlargement.  Has chronic heart failure.  Currently appears to be well compensated.  On examination there is no murmur suggestive of cavity or outflow tract obstruction.  -- Will continue current medications with close  monitoring.  7. Benign essential hypertension  Assessment & Plan:  Blood pressure noted to be on the lower side.  He is asymptomatic.  -- Continue close monitoring.        INTERVAL HISTORY, HISTORY OF PRESENT ILLNESS & COMPREHENSIVE VISIT INFORMATION     Eusebio Tate is here for follow-up regarding her cardiac comorbidities which include:  Permanent atrial fibrillation, status post single-chamber pacemaker, asymmetric left ventricle hypertrophy, hypertensive heart disease, chronic diastolic heart failure and other comorbidities.  She was last seen by me during recent hospitalization at [facility] between 10/23/2024 and 10/31/2024.  She was hospitalized for decompensated heart failure and required IV diuresis.  She was eventually discharged back to [facility] care unit.  More recently she was hospitalized at [facility] between 11/9/2024 and 11/11/2024 after presenting with melena..  She was conservatively managed with twice daily PPI therapy.  She was discharged to facility with plan for palliative approach in the future.  She is here for follow-up accompanied with her son.  Patient herself is noted to due to her dementia but does provide some input to her son who is helping with the interaction.  She denies feeling any lightheadedness or dizziness.  She denies any chest pain or shortness of breath.  She does have chronic lower extremity edema and dryness of skin.  Denies any open wounds.  She is wheelchair-bound.  Has had no recent falls.    She is currently a resident at [facility] in the memory care unit.  She is here accompanied with son Matt.  Since last year she has had several emergency room visits and hospitalizations primarily due to cellulitis and at times heart failure.  Most recently she was hospitalized at [facility] in March 2024.      Today she mentions that she has been overall feeling all right.  She  does get short of breath upon activity.  Denies dizziness or chest pain.  Right lower extremity pain is better.  Denies orthopnea or PND.  She is starting to walk again a little with the help of a walker. She has had no falls since her last hospitalization in March.  According to her son her stool was tested for blood and it was negative and her melena or black stool was attributed to iron supplementation.     Functional capacity status: poor   (Excellent- >10 METs; Good: (7-10 METs); Moderate (4-7 METs); Poor (<= 4 METs)     Any chronic stressors: None   (feeling of poor health, financial problems, and social isolation etc).     Tobacco or alcohol dependence: Does not smoke and she does not drink.     Current cardiac medications: Atorvastatin 40 mg daily spironolactone 25 mg daily, Eliquis 5 g twice daily furosemide 40 mg twice daily and extra furosemide as needed based on the weight.  Was on furosemide in the past but not taking it now.  Was on Eliquis and aspirin but these have been discontinued due to melena.    Last recent comprehensive blood work available:   Blood work 11/10/2024 sodium 138 potassium 3.9 chloride 108 bicarb 24 BUN 19 creatinine 0.94 GFR 59  Normal LFTs on 11/9/2024  Most recent hemoglobin 8.3 on 11/9/2024.  Had presented with hemoglobin of 7.6 and hematocrit of 25.0 on 10/29/2024.  Platelet count in 160s  TSH 1.384 on 10/24/2024  Hemoglobin A1c 5.4 in February 2023      REVIEW OF SYSTEMS   Positive for: As noted above in HPI  Negative for: All remaining as reviewed below and in HPI.    SYSTEM SYMPTOMS REVIEWED:  General--weight change, fever, night sweats  Respiratory--cough, wheezing, shortness of breath, sputum production  Cardiovascular--chest pain, syncope, dyspnea on exertion, edema, decline in exercise tolerance, claudication   Gastrointestinal--persistent vomiting, diarrhea, abdominal distention, blood in stool   Muscular or skeletal--joint pain or swelling   Neurologic--headaches,  syncope, abnormal movement  Hematologic--history of easy bruising and bleeding   Endocrine--thyroid enlargement, heat or cold intolerance, polyuria   Psychiatric--anxiety, depression     *-*-*-*-*-*-*-*-*-*-*-*-*-*-*-*-*-*-*-*-*-*-*-*-*-*-*-*-*-*-*-*-*-*-*-*-*-*-*-*-*-*-*-*-*-*-*-*-*-*-*-*-*-*-  VITAL SIGNS     CURRENT VITAL SIGNS:   Vitals:    11/13/24 0911   BP: 91/51   BP Location: Right arm   Patient Position: Sitting   Cuff Size: Large   Pulse: 63       BMI: There is no height or weight on file to calculate BMI.    WEIGHTS:   Wt Readings from Last 25 Encounters:   10/31/24 85.4 kg (188 lb 4.4 oz)   05/30/24 87.5 kg (192 lb 14.4 oz)   05/02/23 78.6 kg (173 lb 3.2 oz)   03/29/23 76.2 kg (168 lb)   02/15/23 82.8 kg (182 lb 8.7 oz)   01/10/23 77.6 kg (171 lb)   01/09/23 77.8 kg (171 lb 8.3 oz)   11/14/22 77.8 kg (171 lb 8.3 oz)   09/29/22 77.7 kg (171 lb 3.2 oz)   06/22/22 76.7 kg (169 lb 3.2 oz)   05/25/22 76.2 kg (168 lb)   05/23/22 76.4 kg (168 lb 6.4 oz)   05/12/22 76.5 kg (168 lb 10.4 oz)   11/11/21 72.2 kg (159 lb 3.2 oz)   10/04/21 72.7 kg (160 lb 4 oz)   08/26/21 71.8 kg (158 lb 6.4 oz)   04/14/21 69.9 kg (154 lb)   02/24/21 73.4 kg (161 lb 14.4 oz)   04/01/20 73.8 kg (162 lb 9.6 oz)          *-*-*-*-*-*-*-*-*-*-*-*-*-*-*-*-*-*-*-*-*-*-*-*-*-*-*-*-*-*-*-*-*-*-*-*-*-*-*-*-*-*-*-*-*-*-*-*-*-*-*-*-*-*-  PHYSICAL EXAM     General Appearance:    Alert, cooperative, no distress, appears stated age, borderline increased BMI.   Head, Eyes, ENT:    No obvious abnormality, moist mucous mebranes.   Neck:   Supple, no carotid bruit. No JVD, no obvious lymphadenoapthy   Back:     Symmetric, no curvature.   Lungs:     Respirations unlabored.  Decreased breath sounds at bases without any crackles.   Chest wall:    No tenderness or deformity   Heart:    Regular rate and rhythm, S1 and S2 normal, no murmur, rub  or gallop.   Abdomen:     Soft, non-tender.   Extremities:   Extremities warm, no cyanosis, there is 1+ lower  extremity edema.   Skin: Moderate to severe venostatic changes in lower extremities.  No open wounds noted.  Normal skin color, texture, and turgor. No rashes or lesions   Neuro: Pt is alert and oriented time 3 with no gross motor deficits.   Psych/ behavioral: Mood is normal. Behavior is normal.     *-*-*-*-*-*-*-*-*-*-*-*-*-*-*-*-*-*-*-*-*-*-*-*-*-*-*-*-*-*-*-*-*-*-*-*-*-*-*-*-*-*-*-*-*-*-*-*-*-*-*-*-*-*-  CURRENT MEDICATIONS LIST     Current Outpatient Medications:     acetaminophen (TYLENOL) 500 mg tablet, Take 500 mg by mouth every 6 (six) hours as needed for mild pain, Disp: , Rfl:     apixaban (ELIQUIS) 5 mg, Take 5 mg by mouth 2 (two) times a day, Disp: , Rfl:     atorvastatin (LIPITOR) 40 mg tablet, Take 40 mg by mouth daily, Disp: , Rfl:     bisacodyl (DULCOLAX) 5 mg EC tablet, Take 5 mg by mouth daily as needed for constipation, Disp: , Rfl:     calcium carbonate (TUMS) 500 mg chewable tablet, Chew 1 tablet daily, Disp: , Rfl:     Cholecalciferol (Vitamin D3) 1.25 MG (26040 UT) CAPS, Take 2,000 Units by mouth daily 2,000U daily, Disp: , Rfl:     Cranberry 450 MG CAPS, Take 1 capsule by mouth in the morning, Disp: , Rfl:     Diclofenac Sodium (VOLTAREN) 1 %, Apply 2 g topically 3 (three) times a day as needed (pain), Disp: , Rfl:     docusate sodium (COLACE) 100 mg capsule, Take 100 mg by mouth 2 (two) times a day, Disp: , Rfl:     Elastic Bandages & Supports (Medical Compression Stockings) MISC, Use in the morning, Disp: 4 each, Rfl: 1    ferrous sulfate 325 (65 Fe) mg tablet, Take 1 tablet (325 mg total) by mouth daily with breakfast, Disp: , Rfl:     furosemide (LASIX) 40 mg tablet, Take 1 tablet (40 mg total) by mouth 2 (two) times a day, Disp: , Rfl:     loperamide (IMODIUM) 2 mg capsule, , Disp: , Rfl:     nitroglycerin (NITROSTAT) 0.4 mg SL tablet, Place 0.4 mg under the tongue every 5 (five) minutes as needed for chest pain, Disp: , Rfl:     pantoprazole (PROTONIX) 40 mg tablet, Take 1 tablet (40  mg total) by mouth daily, Disp: , Rfl:     potassium chloride (K-DUR,KLOR-CON) 20 mEq tablet, Take 20 mEq by mouth 2 (two) times a day, Disp: , Rfl:     QUEtiapine (SEROquel) 25 mg tablet, Take 0.5 tablets (12.5 mg total) by mouth daily at bedtime Hold if sedated, Disp: 30 tablet, Rfl: 0    senna (SENOKOT) 8.6 mg, Take 1 tablet (8.6 mg total) by mouth daily, Disp: , Rfl:     spironolactone (ALDACTONE) 25 mg tablet, Take 0.5 tablets (12.5 mg total) by mouth daily, Disp: , Rfl:     thiamine (VITAMIN B1) 100 mg tablet, , Disp: , Rfl:     zonisamide (ZONEGRAN) 100 mg capsule, Take 1 capsule (100 mg total) by mouth daily Do not start before November 19, 2022., Disp: , Rfl: 0    Ascorbic Acid (VITAMIN C) 500 MG CAPS, Take 500 mg by mouth daily (Patient not taking: Reported on 11/13/2024), Disp: , Rfl:     escitalopram (LEXAPRO) 10 mg tablet, , Disp: , Rfl:     oxyCODONE (ROXICODONE) 5 immediate release tablet, Take 5 mg by mouth every 4 (four) hours as needed for moderate pain Take 1/2 tablet q6h as needed for pain (Patient not taking: Reported on 11/13/2024), Disp: , Rfl:     polyethylene glycol (MIRALAX) 17 g packet, Take 17 g by mouth daily (Patient not taking: Reported on 11/13/2024), Disp: , Rfl:     Valerian 500 MG CAPS, Take by mouth in the morning (Patient not taking: Reported on 11/13/2024), Disp: , Rfl:   No current facility-administered medications for this visit.       ALLERGIES     Allergies   Allergen Reactions    Morphine        *-*-*-*-*-*-*-*-*-*-*-*-*-*-*-*-*-*-*-*-*-*-*-*-*-*-*-*-*-*-*-*-*-*-*-*-*-*-*-*-*-*-*-*-*-*-*-*-*-*-  SIGNATURES:   @SIG@   Reece Cortez MD; [facility]UCIO    *-*-*-*-*-*-*-*-*-*-*-*-*-*-*-*-*-*-*-*-*-*-*-*-*-*-*-*-*-*-*-*-*-*-*-*-*-*-*-*-*-*-*-*-*-*-*-*-*-*-*-*-*-*-  PAST MEDICAL HISTORY IN:  Past Medical History:   Diagnosis Date    A-fib (HCC)     CAD (coronary artery disease)     CHF (congestive heart failure) (HCC)     CKD (chronic kidney disease) stage 2, GFR 60-89 ml/min     Dementia  (HCC)     Hypertension     Hypokalemia     Knee effusion     Memory loss     Seizure (HCC)     Urinary tract infection     PAST SURGICAL HISTORY:  Past Surgical History:   Procedure Laterality Date    CARDIAC ELECTROPHYSIOLOGY PROCEDURE N/A 1/26/2023    Procedure: Cardiac pacer implant;  Surgeon: Francisco Georges DO;  Location: [facility];  Service: Cardiology    CHOLECYSTECTOMY      REPLACEMENT TOTAL KNEE Left 2008         FAMILY HISTORY:  Family History   Problem Relation Age of Onset    Lung cancer Father     Leukemia Brother     Breast cancer Daughter     SOCIAL HISTORY:  Social History     Tobacco Use   Smoking Status Never    Passive exposure: Never   Smokeless Tobacco Never      Social History     Substance and Sexual Activity   Alcohol Use Never     Social History     Substance and Sexual Activity   Drug Use Never    [unfilled]       *-*-*-*-*-*-*-*-*-*-*-*-*-*-*-*-*-*-*-*-*-*-*-*-*-*-*-*-*-*-*-*-*-*-*-*-*-*-*-*-*-*-*-*-*-*-*-*-*-*-*-*-*-*

## 2024-11-13 NOTE — PATIENT INSTRUCTIONS
CARDIOLOGY ASSESSMENT & PLAN      Diagnosis ICD-10-CM Associated Orders   1. Chronic heart failure with preserved ejection fraction (HFpEF, >= 50%) ([facility])  I50.32       2. Permanent atrial fibrillation ([facility])  I48.21       3. Sick sinus syndrome ([facility])  I49.5       4. Venous stasis dermatitis of both lower extremities  I87.2       5. 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 behavioral disturbance, psychotic disturbance, mood disturbance, or anxiety ([facility])  G30.1     F02.B0       6. Severe concentric left ventricular hypertrophy  I51.7       7. Benign essential hypertension  I10          1. Chronic heart failure with preserved ejection fraction (HFpEF, >= 50%) ([facility])  Assessment & Plan:  Wt Readings from Last 3 Encounters:   10/31/24 85.4 kg (188 lb 4.4 oz)   05/30/24 87.5 kg (192 lb 14.4 oz)   05/02/23 78.6 kg (173 lb 3.2 oz)         RELEVANT FINDINGS   Appears to be well compensated.  Weight is stable.  There is no evidence of pulmonary vascular congestion.  Has significant venous dermatitis without significant lower extremity fluid retention.  Blood pressure is noted to be borderline.   CURRENT RELEVANT MEDICATIONS Furosemide 40 mg twice daily, spironolactone 25 mg daily.  Not on ACE inhibitor or ARB therapy due to history of CKD.   GOALS Minimize risk of decompensated heart failure.   MEDICATION CHANGES Advising to continue current medications.   OTHER RECOMMENDATIONS Continued monitoring of weight and intermittent blood pressures.  Continued heart failure restricted diet.  Some tips are provided below.       The following routine measures are being advised to prevent exacerbation of congestive heart failure:     - Daily or atleast weekly checking of weight.    Notify your clinicians if greater than 2 lb is gained in 1 day or greater than 5 lb is gained in 1 wk.    - Checking for lower extremity swelling.    Examine legs for swelling (new or increase in existing swelling) and describe if swelling reaches  ankle, shin, or knee.    - Monitoring for decrease in exercise tolerance.    Check your self for unusual shortness of breath at rest, or with mild exertion, moderate exertion, or none at all.    - Monitoring for worsening shortness of breath on lying down.    Check for increase in number of pillows used at night and for increase in waking at night with shortness of breath.    - Salt/sodium restriction to less than 2 g a day.    Calculate total sodium intake in a day from nutrition labels and food charts. Understand hidden sources of salt intake.  Eliminate the salt shaker. (Remember, One teaspoon of table salt = 2,300 mg of sodium)   Avoid using garlic salt, onion salt, MSG, meat tenderizers, broth mixes, Chinese food, soy sauce, teriyaki sauce, barbeque sauce, sauerkraut, olives, pickles, pickle relish, smith bits, and croutons.   Use fresh ingredients and/or foods with no added salt.  Try orange, lemon, lime, pineapple juice, or vinegar as a base for meat marinades or to add tart flavor.  Avoid convenience foods such as canned soups, entrees, vegetables, pasta and rice mixes, frozen dinners, instant cereal and puddings, and gravy sauce mixes.   Select frozen meals that contain around 600 mg sodium or less.  Use fresh, frozen, no-added-salt canned vegetables, low-sodium soups, and low-sodium lunchmeats.  Look for seasoning or spice blends with no salt, or try fresh herbs, onions, or garlic.    You are advised to inform us for any concerns regarding above measures.              2. Permanent atrial fibrillation (HCC)  Assessment & Plan:  Has longstanding permanent permanent atrial fibrillation and is on chronic anticoagulation.  Has had stroke during Eliquis interruption in the past.  Noted to have recent melena although as per her son stool testing was negative.  Hemoglobin and hematocrit decreased but stable.  -- Suggest close follow-up with primary care physician and a repeat hemoglobin and hematocrit in 1 month  time.  -- Continue iron supplementation and monitoring.  -- Will continue to follow in device clinic.  3. Sick sinus syndrome (HCC)  Assessment & Plan:  Patient to have device check today.  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  -- Will continue follow-up in device clinic.  4. Venous stasis dermatitis of both lower extremities  5. 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 behavioral disturbance, psychotic disturbance, mood disturbance, or anxiety (HCC)  Assessment & Plan:  Patient is in memory care unit and is well taken care of.  No behavioral disturbance symptoms reported recently.  -- Continue therapeutic and supportive care.  6. Severe concentric left ventricular hypertrophy  Assessment & Plan:  Severe left ventricular hypertrophy by echocardiogram in December 2023.  Has severe left atrial lodgment and mild right atrial enlargement.  Has chronic heart failure.  Currently appears to be well compensated.  On examination there is no murmur suggestive of cavity or outflow tract obstruction.  -- Will continue current medications with close monitoring.  7. Benign essential hypertension  Assessment & Plan:  Blood pressure noted to be on the lower side.  He is asymptomatic.  -- Continue close monitoring.

## 2024-11-13 NOTE — ASSESSMENT & PLAN NOTE
Problem: At Risk for Falls  Goal: # Patient does not fall  Outcome: Outcome Not Met, Continue to Monitor  Goal: # Takes action to control fall-related risks  Outcome: Outcome Not Met, Continue to Monitor  Goal: # Verbalizes understanding of fall risk/precautions  Description: Document education using the patient education activity  Outcome: Outcome Not Met, Continue to Monitor     Problem: Diabetes  Goal: Glycemic balance achieved/maintained  Description: Goal is to maintain blood sugar within range with no episodes of hypoglycemia  Outcome: Outcome Not Met, Continue to Monitor  Goal: Verbalizes/demonstrates understanding of Diabetes  Description: Document on Patient Education Activity  Outcome: Outcome Not Met, Continue to Monitor  Goal: Demonstrates ability to self-administer insulin  Description: Document on Patient Education Activity  Outcome: Outcome Not Met, Continue to Monitor     Problem: Cardiac Rhythm Disturbances with or without Devices  Goal: Hemodynamic stability achieved/maintained  Outcome: Outcome Not Met, Continue to Monitor  Goal: Anxiety is controlled  Outcome: Outcome Not Met, Continue to Monitor  Goal: Participates in ADL/Activity without s/s of intolerance  Outcome: Outcome Not Met, Continue to Monitor  Goal: Urinary elimination pattern returned to baseline  Description: Patient must be making adequate amounts of urine output (240cc/8 hours) and voiding. If indwelling urinary catheter: Remove asap (no later an Day 2 or provider must specify reason for continued use).  Outcome: Outcome Not Met, Continue to Monitor  Goal: Verbalizes understanding of rhythm disturbance, treatment procedure and pre, post-, and d/c care specific to intervention  Description: Document on Patient Education Activity  Outcome: Outcome Not Met, Continue to Monitor     Problem: Pain  Goal: #Acceptable pain level achieved/maintained at rest using NRS/Faces  Description: This goal is used for patients who can self-report.  Severe left ventricular hypertrophy by echocardiogram in December 2023.  Has severe left atrial lodgment and mild right atrial enlargement.  Has chronic heart failure.  Currently appears to be well compensated.  On examination there is no murmur suggestive of cavity or outflow tract obstruction.  -- Will continue current medications with close monitoring.    Acceptable means the level is at or below the identified comfort/function goal.  Outcome: Outcome Not Met, Continue to Monitor  Goal: # Acceptable pain level achieved/maintained at rest using NRS/Faces without oversedation (opioid naive or PCA/Epidural infusion)  Description: This goal is used if Opioid-naïve or on PCA/Epidural Infusion.  Outcome: Outcome Not Met, Continue to Monitor  Goal: # Acceptable pain level achieved/maintained with activity using NRS/Faces  Description: This goal is used for patients who can self-report and are not achieving acceptable pain control during activity.  Outcome: Outcome Not Met, Continue to Monitor  Goal: Acceptable pain/comfort level is achieved/maintained at rest (based on Pain Behaviors Scale)  Description: This goal is used for patients who are not able to self-report pain and are assessed for pain using the Pain Behaviors Scale  Outcome: Outcome Not Met, Continue to Monitor  Goal: Acceptable pain/comfort level is achieved/maintained at rest based on PAINAID scale (Dementia)  Description: This goal is used for patients who are not able to self-report pain, have dementia, and assessed using the PAINAD scale.  Outcome: Outcome Not Met, Continue to Monitor  Goal: Acceptable pain/comfort level is achieved/maintained at rest (based on pediatric behavior tool: NIPS, NPASS, or FLACC)  Description: This goal is used for pediatric patients who are not able to self report pain.  Outcome: Outcome Not Met, Continue to Monitor  Goal: # Verbalizes understanding of pain management  Description: Documented in Patient Education Activity  Outcome: Outcome Not Met, Continue to Monitor  Goal: Verbalizes understanding and effective use of Patient Controlled Analgesia (PCA)  Description: Documented in Patient Education Activity  This goal is used for patients with PCA  Outcome: Outcome Not Met, Continue to Monitor  Goal: Maximum comfort achieved/maintained at end of life (Hospice)  Outcome:  Outcome Not Met, Continue to Monitor     Problem: Impaired Physical Mobility  Goal: # Bed mobility, ambulation, and ADLs are maintained or returned to baseline during hospitalization  Outcome: Outcome Not Met, Continue to Monitor

## 2024-11-13 NOTE — ASSESSMENT & PLAN NOTE
Wt Readings from Last 3 Encounters:   10/31/24 85.4 kg (188 lb 4.4 oz)   05/30/24 87.5 kg (192 lb 14.4 oz)   05/02/23 78.6 kg (173 lb 3.2 oz)         RELEVANT FINDINGS   Appears to be well compensated.  Weight is stable.  There is no evidence of pulmonary vascular congestion.  Has significant venous dermatitis without significant lower extremity fluid retention.  Blood pressure is noted to be borderline.   CURRENT RELEVANT MEDICATIONS Furosemide 40 mg twice daily, spironolactone 25 mg daily.  Not on ACE inhibitor or ARB therapy due to history of CKD.   GOALS Minimize risk of decompensated heart failure.   MEDICATION CHANGES Advising to continue current medications.   OTHER RECOMMENDATIONS Continued monitoring of weight and intermittent blood pressures.  Continued heart failure restricted diet.  Some tips are provided below.       The following routine measures are being advised to prevent exacerbation of congestive heart failure:     - Daily or atleast weekly checking of weight.    Notify your clinicians if greater than 2 lb is gained in 1 day or greater than 5 lb is gained in 1 wk.    - Checking for lower extremity swelling.    Examine legs for swelling (new or increase in existing swelling) and describe if swelling reaches ankle, shin, or knee.    - Monitoring for decrease in exercise tolerance.    Check your self for unusual shortness of breath at rest, or with mild exertion, moderate exertion, or none at all.    - Monitoring for worsening shortness of breath on lying down.    Check for increase in number of pillows used at night and for increase in waking at night with shortness of breath.    - Salt/sodium restriction to less than 2 g a day.    Calculate total sodium intake in a day from nutrition labels and food charts. Understand hidden sources of salt intake.  Eliminate the salt shaker. (Remember, One teaspoon of table salt = 2,300 mg of sodium)   Avoid using garlic salt, onion salt, MSG, meat tenderizers,  broth mixes, Chinese food, soy sauce, teriyaki sauce, barbeque sauce, sauerkraut, olives, pickles, pickle relish, smith bits, and croutons.   Use fresh ingredients and/or foods with no added salt.  Try orange, lemon, lime, pineapple juice, or vinegar as a base for meat marinades or to add tart flavor.  Avoid convenience foods such as canned soups, entrees, vegetables, pasta and rice mixes, frozen dinners, instant cereal and puddings, and gravy sauce mixes.   Select frozen meals that contain around 600 mg sodium or less.  Use fresh, frozen, no-added-salt canned vegetables, low-sodium soups, and low-sodium lunchmeats.  Look for seasoning or spice blends with no salt, or try fresh herbs, onions, or garlic.    You are advised to inform us for any concerns regarding above measures.

## 2024-11-23 PROBLEM — H10.9 BACTERIAL CONJUNCTIVITIS OF LEFT EYE: Status: RESOLVED | Noted: 2024-10-24 | Resolved: 2024-11-23

## 2024-12-09 ENCOUNTER — TELEPHONE (OUTPATIENT)
Dept: CARDIOLOGY CLINIC | Facility: CLINIC | Age: 89
End: 2024-12-09

## 2024-12-09 NOTE — TELEPHONE ENCOUNTER
12/9/24 PER CALL FROM PT'S SON JERRICA LI PASSED AWAY 12/7/24. ALERTING [employer] TO SEND HIM A RETURN KIT TO SEND BOX BACK. RA

## (undated) DEVICE — SLITTER ADJUSTABLE

## (undated) DEVICE — CATH GUIDING FIXED SHAPE 43CM -NC

## (undated) DEVICE — INTRO SHEATH PEEL AWAY 7FR

## (undated) DEVICE — DGW .035 FC J3MM 150CM TEF: Brand: EMERALD